# Patient Record
Sex: FEMALE | Race: BLACK OR AFRICAN AMERICAN
[De-identification: names, ages, dates, MRNs, and addresses within clinical notes are randomized per-mention and may not be internally consistent; named-entity substitution may affect disease eponyms.]

---

## 2017-01-17 ENCOUNTER — HOSPITAL ENCOUNTER (EMERGENCY)
Dept: HOSPITAL 17 - NEPE | Age: 52
Discharge: HOME | End: 2017-01-17
Payer: COMMERCIAL

## 2017-01-17 VITALS
RESPIRATION RATE: 18 BRPM | OXYGEN SATURATION: 98 % | SYSTOLIC BLOOD PRESSURE: 203 MMHG | DIASTOLIC BLOOD PRESSURE: 95 MMHG | HEART RATE: 99 BPM

## 2017-01-17 VITALS
DIASTOLIC BLOOD PRESSURE: 95 MMHG | OXYGEN SATURATION: 97 % | HEART RATE: 87 BPM | RESPIRATION RATE: 18 BRPM | TEMPERATURE: 98.7 F | SYSTOLIC BLOOD PRESSURE: 204 MMHG

## 2017-01-17 VITALS — BODY MASS INDEX: 19.49 KG/M2 | WEIGHT: 121.25 LBS | HEIGHT: 66 IN

## 2017-01-17 VITALS
TEMPERATURE: 98.8 F | SYSTOLIC BLOOD PRESSURE: 200 MMHG | DIASTOLIC BLOOD PRESSURE: 91 MMHG | HEART RATE: 86 BPM | OXYGEN SATURATION: 100 % | RESPIRATION RATE: 18 BRPM

## 2017-01-17 VITALS — SYSTOLIC BLOOD PRESSURE: 186 MMHG | DIASTOLIC BLOOD PRESSURE: 88 MMHG | TEMPERATURE: 98.6 F

## 2017-01-17 DIAGNOSIS — F17.210: ICD-10-CM

## 2017-01-17 DIAGNOSIS — R10.13: Primary | ICD-10-CM

## 2017-01-17 DIAGNOSIS — I25.2: ICD-10-CM

## 2017-01-17 DIAGNOSIS — I25.10: ICD-10-CM

## 2017-01-17 DIAGNOSIS — I10: ICD-10-CM

## 2017-01-17 DIAGNOSIS — Z79.4: ICD-10-CM

## 2017-01-17 DIAGNOSIS — E11.9: ICD-10-CM

## 2017-01-17 LAB
ALP SERPL-CCNC: 198 U/L (ref 45–117)
ALT SERPL-CCNC: 21 U/L (ref 10–53)
ANION GAP SERPL CALC-SCNC: 13 MEQ/L (ref 5–15)
AST SERPL-CCNC: 12 U/L (ref 15–37)
BASOPHILS # BLD AUTO: 0.1 TH/MM3 (ref 0–0.2)
BASOPHILS NFR BLD: 0.8 % (ref 0–2)
BILIRUB SERPL-MCNC: 0.3 MG/DL (ref 0.2–1)
BUN SERPL-MCNC: 17 MG/DL (ref 7–18)
CHLORIDE SERPL-SCNC: 103 MEQ/L (ref 98–107)
EOSINOPHIL # BLD: 0.1 TH/MM3 (ref 0–0.4)
EOSINOPHIL NFR BLD: 1.6 % (ref 0–4)
ERYTHROCYTE [DISTWIDTH] IN BLOOD BY AUTOMATED COUNT: 13.2 % (ref 11.6–17.2)
GFR SERPLBLD BASED ON 1.73 SQ M-ARVRAT: 31 ML/MIN (ref 89–?)
HCO3 BLD-SCNC: 20.3 MEQ/L (ref 21–32)
HCT VFR BLD CALC: 35.6 % (ref 35–46)
HEMO FLAGS: (no result)
LYMPHOCYTES # BLD AUTO: 1.3 TH/MM3 (ref 1–4.8)
LYMPHOCYTES NFR BLD AUTO: 15.9 % (ref 9–44)
MCH RBC QN AUTO: 31.7 PG (ref 27–34)
MCHC RBC AUTO-ENTMCNC: 33.6 % (ref 32–36)
MCV RBC AUTO: 94.1 FL (ref 80–100)
MONOCYTES NFR BLD: 5.1 % (ref 0–8)
NEUTROPHILS # BLD AUTO: 6.4 TH/MM3 (ref 1.8–7.7)
NEUTROPHILS NFR BLD AUTO: 76.6 % (ref 16–70)
PLATELET # BLD: 418 TH/MM3 (ref 150–450)
POTASSIUM SERPL-SCNC: 4.1 MEQ/L (ref 3.5–5.1)
RBC # BLD AUTO: 3.79 MIL/MM3 (ref 4–5.3)
SODIUM SERPL-SCNC: 136 MEQ/L (ref 136–145)
WBC # BLD AUTO: 8.4 TH/MM3 (ref 4–11)

## 2017-01-17 PROCEDURE — 96375 TX/PRO/DX INJ NEW DRUG ADDON: CPT

## 2017-01-17 PROCEDURE — 83690 ASSAY OF LIPASE: CPT

## 2017-01-17 PROCEDURE — 99284 EMERGENCY DEPT VISIT MOD MDM: CPT

## 2017-01-17 PROCEDURE — 85025 COMPLETE CBC W/AUTO DIFF WBC: CPT

## 2017-01-17 PROCEDURE — 96374 THER/PROPH/DIAG INJ IV PUSH: CPT

## 2017-01-17 PROCEDURE — 80053 COMPREHEN METABOLIC PANEL: CPT

## 2017-01-17 NOTE — PD
HPI


Chief Complaint:  Abdominal Pain


Time Seen by Provider:  07:40


Travel History


International Travel<30 days:  No


Contact w/Intl Traveler<30days:  No


Traveled to known affect area:  No





History of Present Illness


HPI


The 51-year-old woman who presents to the emergency department complaining of 

epigastric pain.  She has a history of gastritis and pancreatitis.  Ongoing for 

the past 5 days or so but started getting worse yesterday.  She getting nausea 

yesterday.  She has her stools normally she takes iron, has not noticed any 

change.  She takes baby aspirin daily, but no alcohol, no NSAIDs.  She is on 

omeprazole now that she has been taking regularly.  She didn't take her morning 

medicines today.  States she is a lot of trouble with epigastric pain but hasn'

t really been giving her trouble recently.





Abdominal surgical history includes cholecystectomy.





History


Past Medical History


*** Narrative Medical


Diabetes


CAD, history of stent, history of MI


Hypertension


hyperlipidemia


Influenza Vaccination:  Yes


Menopausal:  Yes


:  2


Para:  3





Social History


Alcohol Use:  Yes (use to abuse; quit 3 years ago. )


Tobacco Use:  Yes (0.5 ppd)





Allergies-Medications


(Allergen,Severity, Reaction):  


Coded Allergies:  


     Shellfish (Verified  Adverse Reaction, Mild, VOMITNG, 16)


Reported Meds & Prescriptions





Reported Meds & Active Scripts


Active


Reglan (Metoclopramide HCl) 10 Mg Tab 10 Mg PO TIDAC 10 Days


Zofran ODT (Ondansetron HCl) 4 Mg Tab 4 Mg PO Q6 PRN


     May substitute, non-ODT form


Prilosec 20 Mg Cap (Omeprazole) 20 Mg Capcr 40 Mg PO DAILY 30 Days


Reported


Folate 1 Mg Tab (Folic Acid) 1 Mg Tab 1 Mg PO DAILY


Ferrous Sulfate 324 Mg Tab 325 Mg PO BID


Lioresal (Baclofen) 10 Mg Tab 10 Mg PO TID PRN


Gabapentin 400 Mg Cap 400 Mg PO 5 TIMES A DAY


Lantus (Insulin Glargine) 100 Units/Ml Inj 26 Unit SC HS


Ultram (Tramadol HCl) 50 Mg Tab 50 Mg PO Q4H PRN


Vasotec (Enalapril Maleate) 20 Mg Tab 20 Mg PO BID


Hctz (Hydrochlorothiazide) 50 Mg Tab 50 Mg PO DAILY


     FOR SWELLING


Simvastatin 20 Mg Tab 20 Mg PO HS


Aspirin 81 Mg Tab 81 Mg PO DAILY


Norvasc (Amlodipine Besylate) 2.5 Mg Tab 5 Mg PO DAILY


Tenormin (Atenolol) 25 Mg Tab 25 Mg PO BID


Glucophage (Metformin HCl) 1,000 Mg Tab 1,000 Mg PO BID








Review of Systems


Except as stated in HPI:  all other systems reviewed are Neg





Physical Exam


Narrative


GENERAL: Well-appearing 51-year-old woman, no acute distress.


SKIN: Warm and dry.


HEAD: Atraumatic. Normocephalic. 


CARDIOVASCULAR: Regular rate and rhythm.  No murmur appreciated.


RESPIRATORY: No accessory muscle use. Clear to auscultation. Breath sounds 

equal bilaterally. 


GASTROINTESTINAL: Abdomen is flat and soft.  Moderate epigastric tenderness.  

No rebound or guarding.


MUSCULOSKELETAL: No obvious deformities. No clubbing.  No cyanosis.  No edema. 


NEUROLOGICAL: Awake and alert. No obvious cranial nerve deficits.  Motor 

grossly within normal limits. Normal speech.


PSYCHIATRIC: Appropriate mood and affect; insight and judgment normal.





Data


Data


Last Documented VS





Vital Signs








  Date Time  Temp Pulse Resp B/P Pulse Ox O2 Delivery O2 Flow Rate FiO2


 


17 07:30   18     


 


17 07:30 98.8 86  200/91 100 Room Air  








Orders





 Complete Blood Count With Diff (17 07:34)


Comprehensive Metabolic Panel (17 07:34)


Lipase (17 07:34)


Iv Access Insert/Monitor (17 07:34)


Sodium Chloride 0.9% Flush (Ns Flush) (17 07:45)


Sodium Chlor 0.9% 1000 Ml Inj (Ns 1000 M (17 08:03)


Famotidine Inj (Pepcid Inj) (17 08:15)


Al-Mag Hy-Si 40-40-4 Mg/Ml Liq (Mag-Al P (17 08:15)


Lidocaine 2% Viscous (Xylocaine 2% Visco (17 08:15)


Morphine Inj (Morphine Inj) (17 09:15)





Labs








 Laboratory Tests








Test 17





 07:55


 


White Blood Count 8.4 TH/MM3


 


Red Blood Count 3.79 MIL/MM3


 


Hemoglobin 12.0 GM/DL


 


Hematocrit 35.6 %


 


Mean Corpuscular Volume 94.1 FL


 


Mean Corpuscular Hemoglobin 31.7 PG


 


Mean Corpuscular Hemoglobin 33.6 %





Concent 


 


Red Cell Distribution Width 13.2 %


 


Platelet Count 418 TH/MM3


 


Mean Platelet Volume 7.5 FL


 


Neutrophils (%) (Auto) 76.6 %


 


Lymphocytes (%) (Auto) 15.9 %


 


Monocytes (%) (Auto) 5.1 %


 


Eosinophils (%) (Auto) 1.6 %


 


Basophils (%) (Auto) 0.8 %


 


Neutrophils # (Auto) 6.4 TH/MM3


 


Lymphocytes # (Auto) 1.3 TH/MM3


 


Monocytes # (Auto) 0.4 TH/MM3


 


Eosinophils # (Auto) 0.1 TH/MM3


 


Basophils # (Auto) 0.1 TH/MM3


 


CBC Comment DIFF FINAL 


 


Differential Comment  


 


Sodium Level 136 MEQ/L


 


Potassium Level 4.1 MEQ/L


 


Chloride Level 103 MEQ/L


 


Carbon Dioxide Level 20.3 MEQ/L


 


Anion Gap 13 MEQ/L


 


Blood Urea Nitrogen 17 MG/DL


 


Creatinine 2.05 MG/DL


 


Estimat Glomerular Filtration 31 ML/MIN





Rate 


 


Random Glucose 189 MG/DL


 


Calcium Level 9.2 MG/DL


 


Total Bilirubin 0.3 MG/DL


 


Aspartate Amino Transf 12 U/L





(AST/SGOT) 


 


Alanine Aminotransferase 21 U/L





(ALT/SGPT) 


 


Alkaline Phosphatase 198 U/L


 


Total Protein 8.3 GM/DL


 


Albumin 3.6 GM/DL


 


Lipase 137 U/L














[facility]


Medical Decision Making


Medical Screen Exam Complete:  Yes


Emergency Medical Condition:  Yes


Interpretation(s)


LABS:


CBC unremarkable.


CMP remarkable for creatinine elevation, consistent with baseline


Lipase normal


Differential Diagnosis


Gastritis, pancreatitis, peptic ulcer disease, other


Narrative Course


Medical decision making





INITIAL cause a 51-year-old woman presents emergent from with epigastric pain, 

history of the same.  Looks well.  Multiple previous ED visits for this.  

Suspect gastritis.  No clear precipitant.  We will check labs, supportive 

treatment.





Diagnosis





 Primary Impression:  


 Abdominal pain


 Qualified Code:  R10.13 - Epigastric pain





***Additional Instructions:


Continue omeprazole as prescribed.





Take sucralfate in addition as prescribed.





Follow-up with your primary doctor for further evaluation.


***Med/Other Pt SpecificInfo:  Prescription(s) given


Scripts


Sucralfate (Carafate)1 Gm Tab1 Gm PO QID  #120 TAB  Ref 0


   On empty stomach


   Prov:Edgardo Stewart MD         17 


Omeprazole 40 Mg Cap40 Mg PO DAILY  #30 CAP  Ref 0


   Prov:Edgardo Stewart MD         17


Disposition:  01 DISCHARGE HOME


Condition:  Stable








Edgardo Stewart MD 2017 08:02

## 2017-05-29 ENCOUNTER — HOSPITAL ENCOUNTER (INPATIENT)
Dept: HOSPITAL 17 - NEPE | Age: 52
LOS: 3 days | Discharge: HOME | DRG: 439 | End: 2017-06-01
Attending: HOSPITALIST | Admitting: HOSPITALIST
Payer: COMMERCIAL

## 2017-05-29 VITALS
OXYGEN SATURATION: 100 % | TEMPERATURE: 99.1 F | DIASTOLIC BLOOD PRESSURE: 84 MMHG | HEART RATE: 86 BPM | SYSTOLIC BLOOD PRESSURE: 175 MMHG | RESPIRATION RATE: 16 BRPM

## 2017-05-29 VITALS
OXYGEN SATURATION: 96 % | SYSTOLIC BLOOD PRESSURE: 160 MMHG | TEMPERATURE: 97.6 F | HEART RATE: 74 BPM | RESPIRATION RATE: 20 BRPM | DIASTOLIC BLOOD PRESSURE: 78 MMHG

## 2017-05-29 VITALS
HEART RATE: 83 BPM | SYSTOLIC BLOOD PRESSURE: 179 MMHG | RESPIRATION RATE: 18 BRPM | DIASTOLIC BLOOD PRESSURE: 81 MMHG | OXYGEN SATURATION: 100 %

## 2017-05-29 VITALS — HEIGHT: 66 IN | WEIGHT: 120.15 LBS | BODY MASS INDEX: 19.31 KG/M2

## 2017-05-29 VITALS — SYSTOLIC BLOOD PRESSURE: 166 MMHG | DIASTOLIC BLOOD PRESSURE: 72 MMHG

## 2017-05-29 VITALS
SYSTOLIC BLOOD PRESSURE: 152 MMHG | RESPIRATION RATE: 22 BRPM | HEART RATE: 88 BPM | DIASTOLIC BLOOD PRESSURE: 87 MMHG | TEMPERATURE: 98.1 F | OXYGEN SATURATION: 100 %

## 2017-05-29 VITALS
TEMPERATURE: 98.6 F | DIASTOLIC BLOOD PRESSURE: 93 MMHG | HEART RATE: 82 BPM | SYSTOLIC BLOOD PRESSURE: 168 MMHG | RESPIRATION RATE: 22 BRPM | OXYGEN SATURATION: 100 %

## 2017-05-29 VITALS
DIASTOLIC BLOOD PRESSURE: 84 MMHG | HEART RATE: 82 BPM | SYSTOLIC BLOOD PRESSURE: 179 MMHG | OXYGEN SATURATION: 100 % | RESPIRATION RATE: 18 BRPM

## 2017-05-29 DIAGNOSIS — K59.00: ICD-10-CM

## 2017-05-29 DIAGNOSIS — E78.5: ICD-10-CM

## 2017-05-29 DIAGNOSIS — K21.9: ICD-10-CM

## 2017-05-29 DIAGNOSIS — I25.2: ICD-10-CM

## 2017-05-29 DIAGNOSIS — N17.9: ICD-10-CM

## 2017-05-29 DIAGNOSIS — E11.22: ICD-10-CM

## 2017-05-29 DIAGNOSIS — Z83.3: ICD-10-CM

## 2017-05-29 DIAGNOSIS — Z82.49: ICD-10-CM

## 2017-05-29 DIAGNOSIS — Z95.5: ICD-10-CM

## 2017-05-29 DIAGNOSIS — E11.9: ICD-10-CM

## 2017-05-29 DIAGNOSIS — N18.4: ICD-10-CM

## 2017-05-29 DIAGNOSIS — K31.84: ICD-10-CM

## 2017-05-29 DIAGNOSIS — Z72.0: ICD-10-CM

## 2017-05-29 DIAGNOSIS — I25.10: ICD-10-CM

## 2017-05-29 DIAGNOSIS — K85.90: Primary | ICD-10-CM

## 2017-05-29 DIAGNOSIS — I12.9: ICD-10-CM

## 2017-05-29 DIAGNOSIS — G62.9: ICD-10-CM

## 2017-05-29 LAB
ALP SERPL-CCNC: 176 U/L (ref 45–117)
ALT SERPL-CCNC: 24 U/L (ref 10–53)
ANION GAP SERPL CALC-SCNC: 8 MEQ/L (ref 5–15)
APTT BLD: 23.6 SEC (ref 24.3–30.1)
AST SERPL-CCNC: 14 U/L (ref 15–37)
BASOPHILS # BLD AUTO: 0.1 TH/MM3 (ref 0–0.2)
BASOPHILS NFR BLD: 0.7 % (ref 0–2)
BILIRUB SERPL-MCNC: 0.2 MG/DL (ref 0.2–1)
BUN SERPL-MCNC: 30 MG/DL (ref 7–18)
CHLORIDE SERPL-SCNC: 108 MEQ/L (ref 98–107)
COLOR UR: (no result)
COMMENT (UR): (no result)
CULTURE IF INDICATED: (no result)
EOSINOPHIL # BLD: 0.2 TH/MM3 (ref 0–0.4)
EOSINOPHIL NFR BLD: 2.2 % (ref 0–4)
ERYTHROCYTE [DISTWIDTH] IN BLOOD BY AUTOMATED COUNT: 13.6 % (ref 11.6–17.2)
GFR SERPLBLD BASED ON 1.73 SQ M-ARVRAT: 28 ML/MIN (ref 89–?)
GLUCOSE UR STRIP-MCNC: (no result) MG/DL
HCO3 BLD-SCNC: 22.9 MEQ/L (ref 21–32)
HCT VFR BLD CALC: 31.2 % (ref 35–46)
HEMO FLAGS: (no result)
HGB UR QL STRIP: (no result)
INR PPP: 0.9 RATIO
KETONES UR STRIP-MCNC: (no result) MG/DL
LYMPHOCYTES # BLD AUTO: 1.5 TH/MM3 (ref 1–4.8)
LYMPHOCYTES NFR BLD AUTO: 18.5 % (ref 9–44)
MAGNESIUM SERPL-MCNC: 2.3 MG/DL (ref 1.5–2.5)
MCH RBC QN AUTO: 31.5 PG (ref 27–34)
MCHC RBC AUTO-ENTMCNC: 33.8 % (ref 32–36)
MCV RBC AUTO: 93 FL (ref 80–100)
MONOCYTES NFR BLD: 8.3 % (ref 0–8)
NEUTROPHILS # BLD AUTO: 5.6 TH/MM3 (ref 1.8–7.7)
NEUTROPHILS NFR BLD AUTO: 70.3 % (ref 16–70)
NITRITE UR QL STRIP: (no result)
PLATELET # BLD: 390 TH/MM3 (ref 150–450)
POTASSIUM SERPL-SCNC: 4.4 MEQ/L (ref 3.5–5.1)
PROTHROMBIN TIME: 9.6 SEC (ref 9.8–11.6)
RBC # BLD AUTO: 3.35 MIL/MM3 (ref 4–5.3)
SODIUM SERPL-SCNC: 139 MEQ/L (ref 136–145)
SP GR UR STRIP: 1.01 (ref 1–1.03)
WBC # BLD AUTO: 8 TH/MM3 (ref 4–11)

## 2017-05-29 PROCEDURE — 83690 ASSAY OF LIPASE: CPT

## 2017-05-29 PROCEDURE — 96374 THER/PROPH/DIAG INJ IV PUSH: CPT

## 2017-05-29 PROCEDURE — 80076 HEPATIC FUNCTION PANEL: CPT

## 2017-05-29 PROCEDURE — 82787 IGG 1 2 3 OR 4 EACH: CPT

## 2017-05-29 PROCEDURE — 80061 LIPID PANEL: CPT

## 2017-05-29 PROCEDURE — 83735 ASSAY OF MAGNESIUM: CPT

## 2017-05-29 PROCEDURE — 74176 CT ABD & PELVIS W/O CONTRAST: CPT

## 2017-05-29 PROCEDURE — 85610 PROTHROMBIN TIME: CPT

## 2017-05-29 PROCEDURE — 71010: CPT

## 2017-05-29 PROCEDURE — 85025 COMPLETE CBC W/AUTO DIFF WBC: CPT

## 2017-05-29 PROCEDURE — 82948 REAGENT STRIP/BLOOD GLUCOSE: CPT

## 2017-05-29 PROCEDURE — 96375 TX/PRO/DX INJ NEW DRUG ADDON: CPT

## 2017-05-29 PROCEDURE — 80048 BASIC METABOLIC PNL TOTAL CA: CPT

## 2017-05-29 PROCEDURE — 83605 ASSAY OF LACTIC ACID: CPT

## 2017-05-29 PROCEDURE — 80053 COMPREHEN METABOLIC PANEL: CPT

## 2017-05-29 PROCEDURE — 82784 ASSAY IGA/IGD/IGG/IGM EACH: CPT

## 2017-05-29 PROCEDURE — 83036 HEMOGLOBIN GLYCOSYLATED A1C: CPT

## 2017-05-29 PROCEDURE — 85730 THROMBOPLASTIN TIME PARTIAL: CPT

## 2017-05-29 PROCEDURE — 81001 URINALYSIS AUTO W/SCOPE: CPT

## 2017-05-29 PROCEDURE — 96361 HYDRATE IV INFUSION ADD-ON: CPT

## 2017-05-29 PROCEDURE — 86140 C-REACTIVE PROTEIN: CPT

## 2017-05-29 RX ADMIN — ASPIRIN 81 MG SCH MG: 81 TABLET ORAL at 09:00

## 2017-05-29 RX ADMIN — FERROUS SULFATE TAB 325 MG (65 MG ELEMENTAL FE) SCH MG: 325 (65 FE) TAB at 09:00

## 2017-05-29 RX ADMIN — BACLOFEN SCH MG: 10 TABLET ORAL at 13:00

## 2017-05-29 RX ADMIN — ENALAPRIL MALEATE SCH MG: 10 TABLET ORAL at 09:00

## 2017-05-29 RX ADMIN — GABAPENTIN SCH MG: 300 CAPSULE ORAL at 21:25

## 2017-05-29 RX ADMIN — PHENYTOIN SODIUM SCH MLS/HR: 50 INJECTION INTRAMUSCULAR; INTRAVENOUS at 21:27

## 2017-05-29 RX ADMIN — MORPHINE SULFATE PRN MG: 2 INJECTION, SOLUTION INTRAMUSCULAR; INTRAVENOUS at 15:03

## 2017-05-29 RX ADMIN — ENALAPRIL MALEATE SCH MG: 10 TABLET ORAL at 21:26

## 2017-05-29 RX ADMIN — BACLOFEN SCH MG: 10 TABLET ORAL at 09:00

## 2017-05-29 RX ADMIN — FERROUS SULFATE TAB 325 MG (65 MG ELEMENTAL FE) SCH MG: 325 (65 FE) TAB at 21:26

## 2017-05-29 RX ADMIN — INSULIN ASPART SCH: 100 INJECTION, SOLUTION INTRAVENOUS; SUBCUTANEOUS at 18:00

## 2017-05-29 RX ADMIN — ATENOLOL SCH MG: 25 TABLET ORAL at 09:00

## 2017-05-29 RX ADMIN — SODIUM CHLORIDE PRN ML: 234 INJECTION INTRAMUSCULAR; INTRAVENOUS; SUBCUTANEOUS at 18:20

## 2017-05-29 RX ADMIN — FOLIC ACID SCH MG: 1 TABLET ORAL at 09:00

## 2017-05-29 RX ADMIN — INSULIN ASPART SCH: 100 INJECTION, SOLUTION INTRAVENOUS; SUBCUTANEOUS at 11:52

## 2017-05-29 RX ADMIN — ONDANSETRON PRN MG: 2 INJECTION, SOLUTION INTRAMUSCULAR; INTRAVENOUS at 15:03

## 2017-05-29 RX ADMIN — PANTOPRAZOLE SCH MG: 40 TABLET, DELAYED RELEASE ORAL at 09:00

## 2017-05-29 RX ADMIN — INSULIN ASPART SCH: 100 INJECTION, SOLUTION INTRAVENOUS; SUBCUTANEOUS at 20:14

## 2017-05-29 RX ADMIN — PRAVASTATIN SODIUM SCH MG: 40 TABLET ORAL at 21:00

## 2017-05-29 RX ADMIN — MORPHINE SULFATE PRN MG: 2 INJECTION, SOLUTION INTRAMUSCULAR; INTRAVENOUS at 21:26

## 2017-05-29 RX ADMIN — PHENYTOIN SODIUM SCH MLS/HR: 50 INJECTION INTRAMUSCULAR; INTRAVENOUS at 07:34

## 2017-05-29 RX ADMIN — BACLOFEN SCH MG: 10 TABLET ORAL at 18:00

## 2017-05-29 NOTE — PD.PN.STU
Subjective


Remarks


The patient is a 52 year old female with PMH diabetes, pancreatitis, HTN, HPL, 

gastroparesis who presented to the ED 5/28 with epigastric abdominal pain. The 

pain began Thursday night and progressively worsened. The character is 

squeezing with intermittent jabbing. It is constant. She has had similar pain 

in the past and was diagnosed with pancreatitis, gastritis, and gastroparesis. 

She denies drinking any alcohol. She has nausea without vomiting. She denies 

diarrhea. The pain is in the midepigastric area and goes around the right side 

of the abdomen to the back. Denies alleviating factors. Eating aggravates the 

pain. Normally has loose stools but last BM 5/27 was hard and had been 

constipated recently. She had to take a laxative. No blood in the stool.  





Surgery Hx: oral abscess drained, cholecystectomy, 1x stent


Family Hx: Mom: diabetes     Father: HTN


Social Hx: 1/2 ppd 30 yrs, denies alcohol use, smokes marijuana occasionally


Allergies: Shellfish





Objective


Vitals


General: The patient is a well-developed well-nourished female, uncomfortable 

appearing on my arrival to the room, holding the midepigastric area. 


Head and Neck exam: Head is normocephalic atraumatic. 


Eyes: EOMI, pupils are equal round and reactive to light. 


Nose: Midline septum with pink mucous membranes 


Mouth: Dentition unremarkable. Moist mucus membranes. Posterior oropharynx is 

not erythematous. No tonsillar hypertrophy. Uvula midline. Airway patent. 


Neck: No palpable lymphadenopathy. No nuchal rigidity. No thyromegaly. 


Cardiovascular: Regular rate and rhythm without murmurs, gallops, or rubs. 


Lungs: Clear to auscultation bilaterally. No wheezes, rhonchi, or rales.


 Abdomen:Soft, with tenderness on palpation in the midepigastric area and right 

upper quadrant of the abdomen. No guarding, rebound, or rigidity.  No 

tenderness on palpation of McBurney's point.


Extremities: No clubbing, cyanosis, or edema. 2+ pulses in all 4 extremities.  

No calf tenderness on palpation.











 Vital Signs








  Date Time  Temp Pulse Resp B/P Pulse Ox O2 Delivery O2 Flow Rate FiO2


 


5/29/17 08:20  81 19 166/72 99   


 


5/29/17 07:00     100 Room Air  


 


5/29/17 07:00  82 18 179/84 100 Room Air  


 


5/29/17 04:38  83 18 179/81 100 Room Air  


 


5/29/17 04:10 99.1 86 16 175/84 100 Room Air  








Result Diagram:  


5/29/17 0445                                                                   

             5/29/17 0445








A/P


Assessment and Plan


Acute pancreatitis:


NPO


IV fluids


Lipase levels 8074





Stage 4 CKD:


IV fluids


Hold Metformin


Pt needs to be on ACE-inhibitor at discharge





Type 2 diabetes:


Pt reports glucose levels in the 300s recently


Order HbA1c


D/C Metformin as GFR <35





Hypertension:


continue to monitor





Hyperlipidemia:


order lipid panel





Tobacco use:


Discussed cessation








Karel Reese M3 May 29, 2017 10:41

## 2017-05-29 NOTE — HHI.HP
HPI


Service


[facility]


Primary Care Physician


Jamie Hill MD


Admission Diagnosis


Acute Pancreatitis


Chief Complaint:  


abdomen pain


Travel History


International Travel<30 Days:  No


Contact w/Intl Traveler <30 Da:  No


Traveled to Known Affected Are:  No


History of Present Illness





Pt is 53 yo with recurrent pancreatitis, gastroparesis, cad, htn who presents 

with midepigastric and ruq abdomen pain over past3 or 4 days. Noted some 

vomiting as well. Feels some episodic


spasms sensation and does note constipation in recent days. took dulcolax with 

some hard stools. In ED noted to have acute pancreatitis In past has had 

multiple egd/colonoscopies and EUS


eval of pancreas. Also was given reglan with symptomatic improvment of GP in 

past but stopped and says her gastroenterologist told her to do so.











Review of Systems


Other


epigastric/ruq pain


constipation


vomiting.





Past Family Social History


Past Medical History


Hypertension, essential


Diabetes


GERD, 


CAD, hx of MI in  s/p PTCA with stent placement


Hyperlipidemia


Multiple hospitalizations for recurrent abdominal pain


History of pancreatitis


History of alcohol abuse


Uterine fibroids


Cholecystectomy


Tubal ligation, bilateral


PTCA with stent placement approximately 8 years ago


Cataract surgery


Colonoscopy 14 with Dr. Granados --> 2 sessile polyps of approximately 4 

mm were found in the sigmoid colon, internal and external hemorrhoids.  

Pathology did not show malignancy. 


EGD performed 3/27/14 by Dr. Hurtado --> Normal esophagus, some nodularity in 

the body and antrum of the stomach, mild gastritis.  Pathology did not show 

malignancy


EUS performed by Dr. Wade Uriostegui 14 --> Pancreatic parenchyma was 

isoechoic and homogeneous, common bile duct was normal.


Reported Medications





Tramadol (Tramadol HCl) 50 Mg Tab 50 Mg PO Q4H PRN


Simvastatin 20 Mg Tab 20 Mg PO HS


Omeprazole 40 Mg Cap 40 Mg PO DAILY


Norvasc (Amlodipine Besylate) 5 Mg Tab 5 Mg PO DAILY


Metformin (Metformin HCl) 1,000 Mg Tab 1,000 Mg PO BIDPC


     With meals


Lantus Inj (Insulin Glargine) 1,000 Unit/10 Ml Vial 26 Units SQ HS


Hydrochlorothiazide 50 Mg Tab 50 Mg PO DAILY


Gabapentin 300mg hs


Folic Acid 400 Mcg Tab 1 Mg PO DAILY


Feosol (Ferrous Sulfate) 200 Mg Tab 325 Mg PO BID


Vasotec (Enalapril Maleate) 20 Mg Tab 20 Mg PO BID


Atenolol 25 Mg Tab 25 Mg PO DAILY


Aspirin Children's (Aspirin) 81 Mg Chew 81 Mg CHEW DAILY


Allergies:  


Coded Allergies:  


     Shellfish (Verified  Adverse Reaction, Mild, VOMITNG, 17)


Family History


Father  81, prostate cancer


Mother  84, DM


1 living brother 62, DM


Brother , 53, lung CA


Sister  54, lung CA


Social History





Pt works as and LPN for [employer]


(+)Tobacco use, one half pack per day 30 years


Hx of alcohol use, former heavy alcohol use, the patient currently denies. Pt 

stopped etoh in 


Patient denies illicit street drugs





Physical Exam


Vital Signs


nad


heart reg


lung cta


abd epigastric/ruq tenderness. bs


ext no edema


 Vital Signs








  Date Time  Temp Pulse Resp B/P Pulse Ox O2 Delivery O2 Flow Rate FiO2


 


17 12:00 97.6 74 20 160/78 96   


 


17 08:20  81 19 166/72 99   


 


17 07:00     100 Room Air  


 


17 07:00  82 18 179/84 100 Room Air  


 


17 04:38  83 18 179/81 100 Room Air  


 


17 04:10 99.1 86 16 175/84 100 Room Air  








Laboratory





Laboratory Tests








Test 17





 04:45


 


White Blood Count 8.0 


 


Red Blood Count 3.35 


 


Hemoglobin 10.6 


 


Hematocrit 31.2 


 


Mean Corpuscular Volume 93.0 


 


Mean Corpuscular Hemoglobin 31.5 


 


Mean Corpuscular Hemoglobin 33.8 





Concent 


 


Red Cell Distribution Width 13.6 


 


Platelet Count 390 


 


Mean Platelet Volume 8.3 


 


Neutrophils (%) (Auto) 70.3 


 


Lymphocytes (%) (Auto) 18.5 


 


Monocytes (%) (Auto) 8.3 


 


Eosinophils (%) (Auto) 2.2 


 


Basophils (%) (Auto) 0.7 


 


Neutrophils # (Auto) 5.6 


 


Lymphocytes # (Auto) 1.5 


 


Monocytes # (Auto) 0.7 


 


Eosinophils # (Auto) 0.2 


 


Basophils # (Auto) 0.1 


 


CBC Comment DIFF FINAL 


 


Differential Comment  


 


Prothrombin Time 9.6 


 


Prothromb Time International 0.9 





Ratio 


 


Activated Partial 23.6 





Thromboplast Time 


 


Sodium Level 139 


 


Potassium Level 4.4 


 


Chloride Level 108 


 


Carbon Dioxide Level 22.9 


 


Anion Gap 8 


 


Blood Urea Nitrogen 30 


 


Creatinine 2.23 


 


Estimat Glomerular Filtration 28 





Rate 


 


Random Glucose 187 


 


Lactic Acid Level 1.0 


 


Calcium Level 9.1 


 


Magnesium Level 2.3 


 


Total Bilirubin 0.2 


 


Aspartate Amino Transf 14 





(AST/SGOT) 


 


Alanine Aminotransferase 24 





(ALT/SGPT) 


 


Alkaline Phosphatase 176 


 


C-Reactive Protein LESS THAN 0.29 


 


Total Protein 7.4 


 


Albumin 3.1 


 


Lipase 8074 








Result Diagram:  


17








Assessment and Plan


Problem List:  


(1) Acute pancreatitis


Status:  Acute


Plan:  Pt presents with recurrent acute pancreatitis of unclear etiology


apparent a/ckd





npo except meds


ivf


pain meds


check lipids/IgG levels


GI consult as needed


hold metformin and clarify


  outpt cr level. needs better


  control. resume insulin when


   taking po. ssi.





(2) Diabetes


Status:  Chronic


Plan:  see above





(3) HTN (hypertension)


Status:  Chronic


Plan:  see above





(4) CAD (coronary artery disease)


Status:  Chronic


Plan:  home meds





(5) Acute kidney injury superimposed on CKD


Status:  Acute


Plan:  see above








Physician Certification


2 Midnight Certification Type:  Admission for Inpatient Services


Order for Inpatient Services


3The services are ordered in accordance with Medicare regulations or non-

Medicare payer requirements, as applicable.  In the case of services not 

specified as inpatient-only, they are appropriately provided as inpatient 

services in accordance with the 2-midnight benchmark.


Estimated LOS (days):  3


3 days is the estimated time the patient will need to remain in the hospital, 

assuming treatment plan goals are met and no additional complications.


Post-Hospital Plan:  Home





Problem Qualifiers





(1) Acute pancreatitis:  


Qualified Code:  K85.90 - Acute pancreatitis without infection or necrosis, 

unspecified pancreatitis type





Vik Jasmine MD May 29, 2017 14:07

## 2017-05-29 NOTE — PD
HPI


Chief Complaint:  Abdominal Pain


Time Seen by Provider:  04:51


Travel History


International Travel<30 days:  No


Contact w/Intl Traveler<30days:  No


Traveled to known affect area:  No





History of Present Illness


HPI


The patient is a 52 year old female who presents to the [facility] 

emergency department with a history of abdominal pain that began on Thursday or 

Friday. The character is squeezing with intermittent jabbing. It is constant. 

She has had similar pain in the past and was diagnosed with pancreatitis, 

gastritis, and gastroparesis. Her GI Doctor is Dr. Granados. She last saw him 2 

months ago. She denies drinking any alcohol. She has nausea without vomiting. 

She denies diarrhea. The pain is in the midepigastric area. It is not made 

better with anything. It is made worse with eating. Her last BM was 2 days ago 

and was slightly hard. No blood in the stool.  


The patient denies any recent fevers, cough, congestion, neck pain, chest pain, 

shortness of breath, urinary symptoms, or neurologic symptoms.





[facility]


Past Medical History


*** Narrative Medical


The patient's past medical history is significant for pancreatitis, gastritis, 

and gastroparesis, DM, hypertension, hyperlipidemia, CAD with one stent placed, 

chronic back pain. PCP: Liz.


Hx Anticoagulant Therapy:  No


Arthritis:  Yes


Blood Disorders:  No


Anxiety:  No


Depression:  No


Cancer:  No


Cardiac Catheterization:  Yes


Cardiovascular Problems:  Yes


High Cholesterol:  Yes


Chemotherapy:  No


Chest Pain:  Yes


Congestive Heart Failure:  No


Cerebrovascular Accident:  No


Coronary Artery Disease:  Yes


Diabetes:  Yes


Patient Takes Glucophage:  Yes


Diminished Hearing:  No


Endocrine:  Yes


Gastrointestinal Disorders:  Yes (HX PANCREATITIS)


GERD:  Yes


Genitourinary:  Yes (BLOCKAGE IN URETER, STENTED BUT REMOVED)


Hypertension:  Yes


Immune Disorder:  No


Implanted Vascular Access Dvce:  Yes


Musculoskeletal:  No


Neurologic:  No


Psychiatric:  No


Reproductive:  Yes (PT STATES FIBROIDS)


Respiratory:  No


Migraines:  Yes


Pancreatitis:  Yes


Pneumonia:  Yes (CHILDHOOD)


Thyroid Disease:  No


Tetanus Vaccination:  Unknown


Influenza Vaccination:  Yes


Pregnant?:  Not Pregnant


Menopausal:  Yes


:  2


Para:  3


Miscarriage:  0


:  0


Tubal Ligation:  Yes





Past Surgical History


*** Narrative Surgical


The patient's past surgical history is significant for BTL, cholecystectomy, 

cardiac cath with stent.


Abdominal Surgery:  Yes


Cardiac Surgery:  Yes


Cholecystectomy:  Yes


Coronary Stent:  Yes


Ear Surgery:  No


Endocrine Surgery:  No


Eye Surgery:  No


Genitourinary Surgery:  Yes


Gynecologic Surgery:  Yes (TUBAL)


Hysterectomy:  No


Neurologic Surgery:  No


Oral Surgery:  Yes


Pacemaker:  No


Thoracic Surgery:  No


Other Surgery:  Yes





Social History


Alcohol Use:  Yes (use to abuse; quit 3 years ago. )


Tobacco Use:  Yes (0.5 ppd)


Substance Use:  Yes (thc )





Allergies-Medications


(Allergen,Severity, Reaction):  


Coded Allergies:  


     Shellfish (Verified  Adverse Reaction, Mild, VOMITNG, 17)


Reported Meds & Prescriptions





Reported Meds & Active Scripts


Active


Reported


Tramadol (Tramadol HCl) 50 Mg Tab 50 Mg PO Q4H PRN


Simvastatin 20 Mg Tab 20 Mg PO HS


Omeprazole 40 Mg Cap 40 Mg PO DAILY


Norvasc (Amlodipine Besylate) 5 Mg Tab 5 Mg PO DAILY


Reglan (Metoclopramide HCl) 10 Mg Tab 10 Mg PO TID


Metformin (Metformin HCl) 1,000 Mg Tab 1,000 Mg PO BIDPC


     With meals


Lantus Inj (Insulin Glargine) 1,000 Unit/10 Ml Vial 26 Units SQ HS


Hydrochlorothiazide 50 Mg Tab 50 Mg PO DAILY


Gabapentin 400 Mg Cap 400 Cap PO QID


Folic Acid 400 Mcg Tab 1 Mg PO DAILY


Feosol (Ferrous Sulfate) 200 Mg Tab 325 Mg PO BID


Vasotec (Enalapril Maleate) 20 Mg Tab 20 Mg PO BID


Baclofen 10 Mg Tab 10 Mg PO TID


Atenolol 25 Mg Tab 25 Mg PO DAILY


Aspirin Children's (Aspirin) 81 Mg Chew 81 Mg CHEW DAILY








Review of Systems


Except as stated in HPI:  all other systems reviewed are Neg


General / Constitutional:  No: Fever


Eyes:  No: Visual changes


HENT:  No: Headaches


Cardiovascular:  No: Chest Pain or Discomfort


Respiratory:  No: Shortness of Breath


Gastrointestinal:  Positive: Nausea, Abdominal Pain, Constipation, Indigestion, 

Loss of Appetite,  No: Vomiting, Diarrhea, Changes in Bowel Habits


Genitourinary:  No: Dysuria


Musculoskeletal:  No: Pain


Skin:  No Rash


Neurologic:  No: Weakness


Psychiatric:  No: Depression


Endocrine:  No: Polydipsia


Hematologic/Lymphatic:  No: Easy Bruising





Physical Exam


Narrative


General: 


The patient is a well-developed well-nourished female, uncomfortable appearing 

on my arrival to the room, holding the midepigastric area. 





Head and Neck exam: 


Head is normocephalic atraumatic. 


Eyes: EOMI, pupils are equal round and reactive to light. 


Nose: Midline septum with pink mucous membranes 


Mouth: Dentition unremarkable. Moist mucus membranes. Posterior oropharynx is 

not erythematous. No tonsillar hypertrophy. Uvula midline. Airway patent. 


Neck: No palpable lymphadenopathy. No nuchal rigidity. No thyromegaly. 





Cardiovascular: 


Regular rate and rhythm without murmurs, gallops, or rubs. 





Lungs: 


Clear to auscultation bilaterally. No wheezes, rhonchi, or rales.


 


Abdomen:


Soft, with tenderness on palpation in the midepigastric area and right upper 

quadrant of the abdomen. No guarding, rebound, or rigidity.  No tenderness on 

palpation of McBurney's point.  The patient has a positive Ayala sign.





Extremities: 


No clubbing, cyanosis, or edema. 2+ pulses in all 4 extremities.  No calf 

tenderness on palpation.





Back: 


No spinous process tenderness to palpation.  Right-sided CVA tenderness on 

palpation.





Neurologic Exam: Grossly nonfocal.





Skin Exam: No rash noted. Intact skin that is warm and dry.





Data


Data


Last Documented VS





Vital Signs








  Date Time  Temp Pulse Resp B/P Pulse Ox O2 Delivery O2 Flow Rate FiO2


 


17 07:00     100 Room Air  


 


17 07:00  82 18 179/84    


 


17 04:10 99.1       








Orders





 Complete Blood Count With Diff (17 04:52)


Comprehensive Metabolic Panel (17 04:52)


Prothrombin Time / Inr (Pt) (17 04:52)


Act Partial Throm Time (Ptt) (17 04:52)


C-Reactive Protein (Crp) (17 04:52)


Lipase (17 04:52)


Urinalysis - C+S If Indicated (17 04:52)


Magnesium (Mg) (17 04:52)


Chest, Single Ap (17 04:52)


Iv Access Insert/Monitor (17 04:52)


Ecg Monitoring (17 04:52)


Oximetry (17 04:52)


Lactic Acid (17 04:52)


Sodium Chlor 0.9% 1000 Ml Inj (Ns 1000 M (17 05:15)


Ondansetron Inj (Zofran Inj) (17 05:15)


Morphine Inj (Morphine Inj) (17 05:15)


Ct Abd/Pel W/O Iv Contrast (17 04:52)


Admit Order (Ed Use Only) (17 07:08)





Labs





 Laboratory Tests








Test 17





 04:45


 


White Blood Count 8.0 TH/MM3


 


Red Blood Count 3.35 MIL/MM3


 


Hemoglobin 10.6 GM/DL


 


Hematocrit 31.2 %


 


Mean Corpuscular Volume 93.0 FL


 


Mean Corpuscular Hemoglobin 31.5 PG


 


Mean Corpuscular Hemoglobin 33.8 %





Concent 


 


Red Cell Distribution Width 13.6 %


 


Platelet Count 390 TH/MM3


 


Mean Platelet Volume 8.3 FL


 


Neutrophils (%) (Auto) 70.3 %


 


Lymphocytes (%) (Auto) 18.5 %


 


Monocytes (%) (Auto) 8.3 %


 


Eosinophils (%) (Auto) 2.2 %


 


Basophils (%) (Auto) 0.7 %


 


Neutrophils # (Auto) 5.6 TH/MM3


 


Lymphocytes # (Auto) 1.5 TH/MM3


 


Monocytes # (Auto) 0.7 TH/MM3


 


Eosinophils # (Auto) 0.2 TH/MM3


 


Basophils # (Auto) 0.1 TH/MM3


 


CBC Comment DIFF FINAL 


 


Differential Comment  


 


Prothrombin Time 9.6 SEC


 


Prothromb Time International 0.9 RATIO





Ratio 


 


Activated Partial 23.6 SEC





Thromboplast Time 


 


Sodium Level 139 MEQ/L


 


Potassium Level 4.4 MEQ/L


 


Chloride Level 108 MEQ/L


 


Carbon Dioxide Level 22.9 MEQ/L


 


Anion Gap 8 MEQ/L


 


Blood Urea Nitrogen 30 MG/DL


 


Creatinine 2.23 MG/DL


 


Estimat Glomerular Filtration 28 ML/MIN





Rate 


 


Random Glucose 187 MG/DL


 


Lactic Acid Level 1.0 mmol/L


 


Calcium Level 9.1 MG/DL


 


Magnesium Level 2.3 MG/DL


 


Total Bilirubin 0.2 MG/DL


 


Aspartate Amino Transf 14 U/L





(AST/SGOT) 


 


Alanine Aminotransferase 24 U/L





(ALT/SGPT) 


 


Alkaline Phosphatase 176 U/L


 


C-Reactive Protein LESS THAN 0.29





 MG/DL


 


Total Protein 7.4 GM/DL


 


Albumin 3.1 GM/DL


 


Lipase 8074 U/L











[facility]


Medical Decision Making


Medical Screen Exam Complete:  Yes


Emergency Medical Condition:  Yes


Medical Record Reviewed:  Yes


Interpretation(s)





Last Impressions








Chest X-Ray 172 Signed





Impressions: 





 Service Date/Time:  Monday, May 29, 2017 05:50 - CONCLUSION: No acute disease.

   





     Severo Jean MD 


 


Abdomen/Pelvis  Signed





Impressions: 





 Service Date/Time:  Monday, May 29, 2017 06:16 - CONCLUSION:  Negative 





 noncontrast CT evaluation of the abdomen and pelvis.     Severo Jean MD 








Differential Diagnosis


Acute pancreatitis, versus peptic ulcer disease, versus gastritis, versus viral 

syndrome, versus acid reflux, versus bowel obstruction


Narrative Course


During the course of the patients emergency department visit, the patients 

history, examination, and differential diagnosis were reviewed with the 

patient. The patient had  IV access obtained and blood work sent for analysis.  

The patient was placed on a cardiac monitor with oximetry and blood pressure 

monitoring.  A chest x-ray to rule out free air was ordered, CT scan of the 

abdomen and pelvis was ordered.





The patient was initially provided normal saline 1 L IV fluid bolus, morphine 4 

mg IV for pain, Zofran 4 mg IV for nausea. 





The patients laboratory studies were reviewed and remarkable for an elevated 

lipase at 8074, CMP is remarkable for a chloride of 108, BUN 30, creatinine 2.23

, glucose 187 with AST 14, , C-reactive protein less than 0.29, PT 9.6, 

PTT 23.6.





Radiology studies were reviewed and remarkable for CT scan of the abdomen and 

pelvis showed no acute abnormality.  Chest x-ray showed no acute abnormality.





The patient will be admitted to the hospital for acute pancreatitis.





The patients results were discussed with the patient, including the plan of 

care. I explained that further testing and/ or monitoring is indicated based on 

the patients history, examination, and/ or laboratory findings. Therefore, I 

recommended admission for additional evaluation. The patient expressed 

understanding and was agreeable with this plan. The patient was admitted to the 

hospital in stable condition and sent to a bed under the care of the [facility] service.





Physician Communication


Physician Communication


The patient's case was discussed with Dr. Jasmine who did agree to admit 

the patient for further evaluation and treatment at this time.





Diagnosis





 Primary Impression:  


 Abdominal pain


 Qualified Code:  R10.10 - Pain of upper abdomen


 Additional Impression:  


 Acute pancreatitis


 Qualified Code:  K85.90 - Acute pancreatitis without infection or necrosis, 

unspecified pancreatitis type





Admitting Information


Admitting Physician Requests:  Admit








Lea Cuevas MD May 29, 2017 05:04

## 2017-05-29 NOTE — RADRPT
EXAM DATE/TIME:  05/29/2017 05:50 

 

[facility] COMPARISON:     

CHEST SINGLE AP, July 31, 2016, 18:38.

 

                     

INDICATIONS :     

Short of breath.

                     

 

MEDICAL HISTORY :     

Cardiovascular disease.  Hypertension  Diabetes mellitus type II.   Pancreatitis.   

 

SURGICAL HISTORY :     

Cholecystectomy. Tubal ligation.  

 

ENCOUNTER:     

Initial                                        

 

ACUITY:     

1 day      

 

PAIN SCORE:     

1/10

 

LOCATION:     

Bilateral chest 

 

FINDINGS:     

A single view of the chest demonstrates the lungs to be symmetrically aerated without evidence of mas
s, infiltrate or effusion.  The cardiomediastinal contours are unremarkable.  Osseous structures are 
intact.

 

CONCLUSION:     No acute disease.  

 

 

 

 Severo Jean MD on May 29, 2017 at 6:36           

Board Certified Radiologist.

 This report was verified electronically.

## 2017-05-29 NOTE — RADRPT
EXAM DATE/TIME:  05/29/2017 06:16 

 

[facility] COMPARISON:     

No previous studies available for comparison.

 

 

INDICATIONS :     

Epigastric pain X 3 days.

                  

 

ORAL CONTRAST:      

No oral contrast ingested.

                  

 

RADIATION DOSE:     

5.12 CTDIvol (mGy) 

 

 

MEDICAL HISTORY :     

Pancreatitis. Cardiovascular disease Myocardial infarction.Hypertension

 

SURGICAL HISTORY :      

Cholecystectomy. Tubal ligation.

 

ENCOUNTER:      

Initial

 

ACUITY:      

3 days

 

PAIN SCALE:      

7/10

 

LOCATION:         

abdomen

 

TECHNIQUE:     

Volumetric scanning of the abdomen and pelvis was performed.  Using automated exposure control and ad
justment of the mA and/or kV according to patient size, radiation dose was kept as low as reasonably 
achievable to obtain optimal diagnostic quality images. 

 

FINDINGS:     

 

LOWER LUNGS:     

The visualized lower lungs are clear.

 

LIVER:     

Homogeneous density without lesion.  There is no dilation of the biliary tree.  No calcified gallston
es.

 

SPLEEN:     

Normal size without lesion.

 

PANCREAS:     

Within normal limits. 

 

KIDNEYS:     

Normal in size and shape.  There is no mass, stone, or hydronephrosis.

 

ADRENAL GLANDS:     

Within normal limits.

 

VASCULAR:     

There is no aortic aneurysm.

 

BOWEL/MESENTERY:     

The stomach, small bowel, and colon demonstrate no acute abnormality.  There is no free intraperitone
al air or fluid. 

 

ABDOMINAL WALL:     

Within normal limits.

 

RETROPERITONEUM:     

There is no lymphadenopathy.

 

BLADDER:     

No wall thickening or mass.

 

REPRODUCTIVE:     

Calcifications within presumed uterine fibroids. Left lateral pelvic calcification to be calcified ly
mph node. No evidence of free pelvic fluid. No definite soft tissue mass.

 

INGUINAL:     

There is no lymphadenopathy or hernia.

 

MUSCULOSKELETAL:     

Within normal limits for patient age.

 

CONCLUSION:     

Negative noncontrast CT evaluation of the abdomen and pelvis.

 

 

 

 Severo Jean MD on May 29, 2017 at 6:41           

Board Certified Radiologist.

 This report was verified electronically.

## 2017-05-30 VITALS
OXYGEN SATURATION: 97 % | RESPIRATION RATE: 18 BRPM | SYSTOLIC BLOOD PRESSURE: 163 MMHG | DIASTOLIC BLOOD PRESSURE: 76 MMHG | TEMPERATURE: 97.6 F | HEART RATE: 83 BPM

## 2017-05-30 VITALS
SYSTOLIC BLOOD PRESSURE: 152 MMHG | RESPIRATION RATE: 18 BRPM | TEMPERATURE: 99.7 F | OXYGEN SATURATION: 96 % | DIASTOLIC BLOOD PRESSURE: 80 MMHG | HEART RATE: 74 BPM

## 2017-05-30 VITALS
DIASTOLIC BLOOD PRESSURE: 86 MMHG | RESPIRATION RATE: 16 BRPM | HEART RATE: 77 BPM | SYSTOLIC BLOOD PRESSURE: 177 MMHG | OXYGEN SATURATION: 98 % | TEMPERATURE: 98.5 F

## 2017-05-30 VITALS
HEART RATE: 75 BPM | SYSTOLIC BLOOD PRESSURE: 190 MMHG | DIASTOLIC BLOOD PRESSURE: 82 MMHG | OXYGEN SATURATION: 100 % | RESPIRATION RATE: 18 BRPM | TEMPERATURE: 98.3 F

## 2017-05-30 VITALS
HEART RATE: 78 BPM | OXYGEN SATURATION: 97 % | TEMPERATURE: 98.9 F | RESPIRATION RATE: 18 BRPM | SYSTOLIC BLOOD PRESSURE: 146 MMHG | DIASTOLIC BLOOD PRESSURE: 70 MMHG

## 2017-05-30 VITALS
SYSTOLIC BLOOD PRESSURE: 190 MMHG | OXYGEN SATURATION: 99 % | DIASTOLIC BLOOD PRESSURE: 94 MMHG | RESPIRATION RATE: 20 BRPM | HEART RATE: 78 BPM | TEMPERATURE: 98.5 F

## 2017-05-30 LAB
ALP SERPL-CCNC: 170 U/L (ref 45–117)
ALT SERPL-CCNC: 24 U/L (ref 10–53)
ANION GAP SERPL CALC-SCNC: 7 MEQ/L (ref 5–15)
AST SERPL-CCNC: 23 U/L (ref 15–37)
BILIRUB INDIRECT SERPL-MCNC: 0.2 MG/DL (ref 0–0.8)
BILIRUB SERPL-MCNC: 0.3 MG/DL (ref 0.2–1)
BUN SERPL-MCNC: 19 MG/DL (ref 7–18)
CHLORIDE SERPL-SCNC: 112 MEQ/L (ref 98–107)
GFR SERPLBLD BASED ON 1.73 SQ M-ARVRAT: 36 ML/MIN (ref 89–?)
HCO3 BLD-SCNC: 25.2 MEQ/L (ref 21–32)
HDLC SERPL-MCNC: 52.6 MG/DL (ref 40–60)
HEMOGLOBIN A1A: 0.8 %
HEMOGLOBIN A1B: 1.8 %
HEMOGLOBIN AO: 85 %
HEMOGLOBIN LA1C: 1.9 %
HEMOGLOBIN P3: 5.4 %
LDLC SERPL-MCNC: 76 MG/DL (ref 0–99)
POTASSIUM SERPL-SCNC: 4 MEQ/L (ref 3.5–5.1)
SODIUM SERPL-SCNC: 144 MEQ/L (ref 136–145)

## 2017-05-30 RX ADMIN — SODIUM BICARBONATE SCH MLS/HR: 84 INJECTION, SOLUTION INTRAVENOUS at 17:35

## 2017-05-30 RX ADMIN — SODIUM BICARBONATE SCH MLS/HR: 84 INJECTION, SOLUTION INTRAVENOUS at 06:13

## 2017-05-30 RX ADMIN — INSULIN ASPART SCH: 100 INJECTION, SOLUTION INTRAVENOUS; SUBCUTANEOUS at 05:54

## 2017-05-30 RX ADMIN — GABAPENTIN SCH MG: 300 CAPSULE ORAL at 21:30

## 2017-05-30 RX ADMIN — FERROUS SULFATE TAB 325 MG (65 MG ELEMENTAL FE) SCH MG: 325 (65 FE) TAB at 21:30

## 2017-05-30 RX ADMIN — ASPIRIN 81 MG SCH MG: 81 TABLET ORAL at 12:24

## 2017-05-30 RX ADMIN — INSULIN ASPART SCH: 100 INJECTION, SOLUTION INTRAVENOUS; SUBCUTANEOUS at 12:24

## 2017-05-30 RX ADMIN — FOLIC ACID SCH MG: 1 TABLET ORAL at 08:14

## 2017-05-30 RX ADMIN — PANTOPRAZOLE SCH MG: 40 TABLET, DELAYED RELEASE ORAL at 08:14

## 2017-05-30 RX ADMIN — ONDANSETRON PRN MG: 2 INJECTION, SOLUTION INTRAMUSCULAR; INTRAVENOUS at 18:37

## 2017-05-30 RX ADMIN — INSULIN ASPART SCH: 100 INJECTION, SOLUTION INTRAVENOUS; SUBCUTANEOUS at 18:39

## 2017-05-30 RX ADMIN — MORPHINE SULFATE PRN MG: 2 INJECTION, SOLUTION INTRAMUSCULAR; INTRAVENOUS at 06:14

## 2017-05-30 RX ADMIN — MORPHINE SULFATE PRN MG: 2 INJECTION, SOLUTION INTRAMUSCULAR; INTRAVENOUS at 12:24

## 2017-05-30 RX ADMIN — SODIUM CHLORIDE PRN ML: 234 INJECTION INTRAMUSCULAR; INTRAVENOUS; SUBCUTANEOUS at 01:00

## 2017-05-30 RX ADMIN — ENALAPRIL MALEATE SCH MG: 10 TABLET ORAL at 08:15

## 2017-05-30 RX ADMIN — PRAVASTATIN SODIUM SCH MG: 40 TABLET ORAL at 21:30

## 2017-05-30 RX ADMIN — ENALAPRIL MALEATE SCH MG: 10 TABLET ORAL at 21:30

## 2017-05-30 RX ADMIN — MORPHINE SULFATE PRN MG: 2 INJECTION, SOLUTION INTRAMUSCULAR; INTRAVENOUS at 17:34

## 2017-05-30 RX ADMIN — FERROUS SULFATE TAB 325 MG (65 MG ELEMENTAL FE) SCH MG: 325 (65 FE) TAB at 08:14

## 2017-05-30 RX ADMIN — ATENOLOL SCH MG: 25 TABLET ORAL at 08:14

## 2017-05-30 NOTE — HHI.PR
Subjective


Remarks


doing a little better.





Objective


Vitals


heart reg


lung cta


abd s/nt


ext no edema


 Vital Signs








  Date Time  Temp Pulse Resp B/P Pulse Ox O2 Delivery O2 Flow Rate FiO2


 


5/30/17 08:25 98.5 77 16 177/86 98   


 


5/30/17 06:37   18     


 


5/30/17 06:37   18     


 


5/30/17 04:50 98.3 75 18 190/82 100   


 


5/30/17 00:45 97.6 83 18 163/76 97   


 


5/29/17 22:00 98.1 88 22 152/87 100   


 


5/29/17 16:17 98.6 82 22 168/93 100   


 


5/29/17 12:00 97.6 74 20 160/78 96   














 5/29/17 5/29/17 5/30/17





 15:00 23:00 07:00


 


Intake Total  120 ml 0 ml


 


Output Total  500 ml 


 


Balance  -380 ml 0 ml


 


   


 


Intake Oral  120 ml 0 ml


 


Output Urine Total  500 ml 


 


# Voids 2  2


 


# Bowel Movements  0 0








Result Diagram:  


5/29/17 0445                                                                   

             5/30/17 0603








A/P


Problem List:  


(1) Acute pancreatitis


Status:  Acute


Plan:  Pt presents with recurrent acute pancreatitis of unclear etiology


on review of records she had an mrcp and dedicated pancreatic ct in 2014.


there was concern at that time for pancreatic head mass but this was not seen


  on the ct and he had f/u eus evaluation. on both imaging studies there was


  mention of pancreatic divisum or an accessory pancreatic duct.


apparent a/ckd





clears


more aggressive laxatives.


consider restarting reglan . pt stopped a yr ago.


ivf with d5 for low bg


pain meds


IgG level pending.


GI consult as needed


hold metformin and clarify


  outpt cr level. needs better


  control. resume insulin when


   taking po. ssi.





(2) Diabetes


Status:  Chronic


Plan:  see above





(3) HTN (hypertension)


Status:  Chronic


Plan:  see above





(4) CAD (coronary artery disease)


Status:  Chronic


Plan:  home meds





(5) Acute kidney injury superimposed on CKD


Status:  Acute


Plan:  see above








Problem Qualifiers





(1) Acute pancreatitis:  


Qualified Code:  K85.90 - Acute pancreatitis without infection or necrosis, 

unspecified pancreatitis type





Vik Jasmine MD May 30, 2017 08:52

## 2017-05-30 NOTE — PD.PN.STU
Subjective


Remarks


pt reports some improvement in abdominal pain


no BM/flatus


denies CP, SOB, n/v





Objective


Vitals


General: The patient is a well-developed well-nourished female, alert/awake/

oriented x3, does not appear to be in distress. 


Head and Neck exam: Head is normocephalic atraumatic. 


Eyes: EOMI, pupils are equal round and reactive to light. 


Nose: Midline septum with pink mucous membranes 


Mouth: Dentition unremarkable. Moist mucus membranes. Posterior oropharynx is 

not erythematous. No tonsillar hypertrophy. Uvula midline. Airway patent. 


Neck: No palpable lymphadenopathy. No nuchal rigidity. No thyromegaly. 


Cardiovascular: Regular rate and rhythm without murmurs, gallops, or rubs. 


Lungs: Clear to auscultation bilaterally. No wheezes, rhonchi, or rales.


Abdomen:Soft, with tenderness on palpation in the midepigastric area and right 

upper quadrant of the abdomen. No guarding, rebound, or rigidity. 





 Vital Signs








  Date Time  Temp Pulse Resp B/P Pulse Ox O2 Delivery O2 Flow Rate FiO2


 


5/30/17 06:37   18     


 


5/30/17 06:37   18     


 


5/30/17 04:50 98.3 75 18 190/82 100   


 


5/30/17 00:45 97.6 83 18 163/76 97   


 


5/29/17 22:00 98.1 88 22 152/87 100   


 


5/29/17 16:17 98.6 82 22 168/93 100   


 


5/29/17 12:00 97.6 74 20 160/78 96   


 


5/29/17 08:20  81 19 166/72 99   








 I/O








 5/29/17 5/29/17 5/29/17 5/30/17 5/30/17 5/30/17





 07:00 15:00 23:00 07:00 15:00 23:00


 


Intake Total   120 ml 0 ml  


 


Output Total   500 ml   


 


Balance   -380 ml 0 ml  


 


      


 


Intake Oral   120 ml 0 ml  


 


Output Urine Total   500 ml   


 


# Voids  2  2  


 


# Bowel Movements   0 0  








Result Diagram:  


5/29/17 0445                                                                   

             5/29/17 0445








A/P


Assessment and Plan


Acute pancreatitis:


Lipase levels 8074 on admission


5/30 - lipase levels down from 8074 to 398


advance diet to clears


D5 as hypoglycemic last night





Constipation:


CT shows large amount of stool


No BM with lactulose


5/30 - give Miralax





Stage 4 CKD:


IV fluids


Hold Metformin


Pt needs to be on ACE-inhibitor at discharge





Type 2 diabetes:


Pt reports glucose levels in the 300s recently


Order HbA1c


D/C Metformin as GFR <35





Hypertension:


5/30 - elevated this AM


has clonidine prn





Hyperlipidemia:


Triglycerides 174





Tobacco use:


Discussed cessation








Karel Reese M3 May 30, 2017 07:45

## 2017-05-31 VITALS
DIASTOLIC BLOOD PRESSURE: 69 MMHG | OXYGEN SATURATION: 100 % | TEMPERATURE: 96.2 F | SYSTOLIC BLOOD PRESSURE: 127 MMHG | HEART RATE: 75 BPM | RESPIRATION RATE: 16 BRPM

## 2017-05-31 VITALS
OXYGEN SATURATION: 97 % | HEART RATE: 79 BPM | DIASTOLIC BLOOD PRESSURE: 91 MMHG | SYSTOLIC BLOOD PRESSURE: 192 MMHG | TEMPERATURE: 98.4 F | RESPIRATION RATE: 20 BRPM

## 2017-05-31 VITALS
RESPIRATION RATE: 20 BRPM | SYSTOLIC BLOOD PRESSURE: 166 MMHG | DIASTOLIC BLOOD PRESSURE: 81 MMHG | TEMPERATURE: 98.1 F | OXYGEN SATURATION: 98 % | HEART RATE: 83 BPM

## 2017-05-31 VITALS
TEMPERATURE: 97.8 F | DIASTOLIC BLOOD PRESSURE: 80 MMHG | SYSTOLIC BLOOD PRESSURE: 169 MMHG | HEART RATE: 79 BPM | OXYGEN SATURATION: 98 % | RESPIRATION RATE: 20 BRPM

## 2017-05-31 VITALS
SYSTOLIC BLOOD PRESSURE: 182 MMHG | OXYGEN SATURATION: 99 % | TEMPERATURE: 97.9 F | HEART RATE: 74 BPM | RESPIRATION RATE: 20 BRPM | DIASTOLIC BLOOD PRESSURE: 77 MMHG

## 2017-05-31 VITALS
SYSTOLIC BLOOD PRESSURE: 182 MMHG | TEMPERATURE: 98.2 F | OXYGEN SATURATION: 97 % | RESPIRATION RATE: 20 BRPM | HEART RATE: 75 BPM | DIASTOLIC BLOOD PRESSURE: 79 MMHG

## 2017-05-31 LAB
ANION GAP SERPL CALC-SCNC: 6 MEQ/L (ref 5–15)
BUN SERPL-MCNC: 14 MG/DL (ref 7–18)
CHLORIDE SERPL-SCNC: 110 MEQ/L (ref 98–107)
GFR SERPLBLD BASED ON 1.73 SQ M-ARVRAT: 39 ML/MIN (ref 89–?)
HCO3 BLD-SCNC: 25.3 MEQ/L (ref 21–32)
IGG SERUM-IGG SUBCLASSES: 982 MG/DL (ref 694–1618)
IGG SUBCLASSES 1: 655 MG/DL (ref 382–929)
IGG SUBCLASSES 2: 198 MG/DL (ref 241–700)
IGG SUBCLASSES 3: 34 MG/DL (ref 22–178)
IGG4 SER-MCNC: 21.4 MG/DL (ref 4–86)
POTASSIUM SERPL-SCNC: 4 MEQ/L (ref 3.5–5.1)
SODIUM SERPL-SCNC: 141 MEQ/L (ref 136–145)

## 2017-05-31 RX ADMIN — FOLIC ACID SCH MG: 1 TABLET ORAL at 08:59

## 2017-05-31 RX ADMIN — INSULIN ASPART SCH: 100 INJECTION, SOLUTION INTRAVENOUS; SUBCUTANEOUS at 14:30

## 2017-05-31 RX ADMIN — WATER SCH ML: 1 IRRIGANT IRRIGATION at 16:00

## 2017-05-31 RX ADMIN — INSULIN ASPART SCH: 100 INJECTION, SOLUTION INTRAVENOUS; SUBCUTANEOUS at 02:20

## 2017-05-31 RX ADMIN — GABAPENTIN SCH MG: 300 CAPSULE ORAL at 20:58

## 2017-05-31 RX ADMIN — ENALAPRIL MALEATE SCH MG: 10 TABLET ORAL at 09:00

## 2017-05-31 RX ADMIN — HYDROCODONE BITARTRATE AND ACETAMINOPHEN PRN TAB: 7.5; 325 TABLET ORAL at 21:53

## 2017-05-31 RX ADMIN — FERROUS SULFATE TAB 325 MG (65 MG ELEMENTAL FE) SCH MG: 325 (65 FE) TAB at 08:59

## 2017-05-31 RX ADMIN — ATENOLOL SCH MG: 25 TABLET ORAL at 08:59

## 2017-05-31 RX ADMIN — HYDROCODONE BITARTRATE AND ACETAMINOPHEN PRN TAB: 7.5; 325 TABLET ORAL at 11:26

## 2017-05-31 RX ADMIN — PRAVASTATIN SODIUM SCH MG: 40 TABLET ORAL at 20:59

## 2017-05-31 RX ADMIN — WATER SCH ML: 1 IRRIGANT IRRIGATION at 11:22

## 2017-05-31 RX ADMIN — INSULIN ASPART SCH: 100 INJECTION, SOLUTION INTRAVENOUS; SUBCUTANEOUS at 06:15

## 2017-05-31 RX ADMIN — INSULIN ASPART SCH: 100 INJECTION, SOLUTION INTRAVENOUS; SUBCUTANEOUS at 17:58

## 2017-05-31 RX ADMIN — WATER SCH ML: 1 IRRIGANT IRRIGATION at 08:59

## 2017-05-31 RX ADMIN — ASPIRIN 81 MG SCH MG: 81 TABLET ORAL at 08:59

## 2017-05-31 RX ADMIN — ENALAPRIL MALEATE SCH MG: 10 TABLET ORAL at 20:58

## 2017-05-31 RX ADMIN — WATER SCH ML: 1 IRRIGANT IRRIGATION at 20:00

## 2017-05-31 RX ADMIN — SODIUM BICARBONATE SCH MLS/HR: 84 INJECTION, SOLUTION INTRAVENOUS at 02:22

## 2017-05-31 RX ADMIN — PANTOPRAZOLE SCH MG: 40 TABLET, DELAYED RELEASE ORAL at 08:59

## 2017-05-31 RX ADMIN — FERROUS SULFATE TAB 325 MG (65 MG ELEMENTAL FE) SCH MG: 325 (65 FE) TAB at 20:58

## 2017-05-31 RX ADMIN — WATER SCH ML: 1 IRRIGANT IRRIGATION at 23:10

## 2017-05-31 NOTE — PD.PN.STU
Subjective


Remarks


pt reports no abdominal pain


had a little bit of nausea last night, still no BM


tolerated clears, would like to advance diet


denies CP, SOB





Objective


Vitals


General: The patient is a well-developed well-nourished female, alert/awake/

oriented x3, does not appear to be in distress. 


Head and Neck exam: Head is normocephalic atraumatic. 


Eyes: EOMI, pupils are equal round and reactive to light. 


Nose: Midline septum with pink mucous membranes 


Mouth: Dentition unremarkable. Moist mucus membranes. Posterior oropharynx is 

not erythematous. No tonsillar hypertrophy. Uvula midline. Airway patent. 


Neck: No palpable lymphadenopathy. No nuchal rigidity. No thyromegaly. 


Cardiovascular: Regular rate and rhythm without murmurs, gallops, or rubs. 


Lungs: Clear to auscultation bilaterally. No wheezes, rhonchi, or rales.


Abdomen:Soft, nondistended, nontender. No guarding, rebound, or rigidity. 


 Vital Signs








  Date Time  Temp Pulse Resp B/P Pulse Ox O2 Delivery O2 Flow Rate FiO2


 


5/31/17 04:00 97.9 74 20 182/77 99   


 


5/31/17 00:00 98.1 83 20 166/81 98   


 


5/30/17 20:00 98.5 78 20 190/94 99   


 


5/30/17 17:39   16     


 


5/30/17 16:00 99.7 74 18 152/80 96   


 


5/30/17 12:00 98.9 78 18 146/70 97   








 I/O








 5/30/17 5/30/17 5/30/17 5/31/17 5/31/17 5/31/17





 07:00 15:00 23:00 07:00 15:00 23:00


 


Intake Total 0 ml 600 ml 420 ml 120 ml  


 


Output Total   300 ml 220 ml  


 


Balance 0 ml 600 ml 120 ml -100 ml  


 


      


 


Intake Oral 0 ml 600 ml 420 ml 120 ml  


 


Output Urine Total   300 ml 220 ml  


 


# Voids 2 2    


 


# Bowel Movements 0 0 0 0  








Result Diagram:  


5/29/17 0445                                                                   

             5/30/17 0603








A/P


Assessment and Plan


Acute pancreatitis:


Lipase levels 8074 on admission


5/30 - lipase levels down from 8074 to 398


discussed findings of previous imaging that showed pancreatic divisum


she is to f/u with GI who may be able to refer her to specialist in divisum 

cases


D5 as hypoglycemic last night


5/31 abdominal pain resolved, advancing to normal diet





Constipation:


CT shows large amount of stool


No BM with lactulose


5/31 - give additional lactulose





Stage 4 CKD:


IV fluids


Hold Metformin


D/C HCTZ from home meds


Pt needs to be on ACE-inhibitor at discharge





Type 2 diabetes:


Pt reports glucose levels in the 300s recently


Order HbA1c


D/C Metformin as GFR <35





Hypertension:


5/31 - elevated this AM


has clonidine prn





Hyperlipidemia:


Triglycerides 174





Tobacco use:


Discussed cessation








Karel Reese M3 May 31, 2017 08:40

## 2017-05-31 NOTE — HHI.PR
Subjective


Remarks


tolerating food.





Objective


Vitals


heart reg


lung cta


abd s/nt


ext no edema


 Vital Signs








  Date Time  Temp Pulse Resp B/P Pulse Ox O2 Delivery O2 Flow Rate FiO2


 


5/31/17 15:50 98.4 79 20 192/91 97   


 


5/31/17 11:55 98.2 75 20 182/79 97   


 


5/31/17 07:50 97.8 79 20 169/80 98   


 


5/31/17 04:00 97.9 74 20 182/77 99   


 


5/31/17 00:00 98.1 83 20 166/81 98   


 


5/30/17 20:00 98.5 78 20 190/94 99   














 5/30/17 5/30/17 5/31/17





 15:00 23:00 07:00


 


Intake Total 600 ml 420 ml 120 ml


 


Output Total  300 ml 220 ml


 


Balance 600 ml 120 ml -100 ml


 


   


 


Intake Oral 600 ml 420 ml 120 ml


 


Output Urine Total  300 ml 220 ml


 


# Voids 2  


 


# Bowel Movements 0 0 0








Result Diagram:  


5/29/17 0445                                                                   

             5/31/17 0709








A/P


Problem List:  


(1) Acute pancreatitis


Status:  Acute


Plan:  Pt presents with recurrent acute pancreatitis of unclear etiology


on review of records she had an mrcp and dedicated pancreatic ct in 2014.


there was concern at that time for pancreatic head mass but this was not seen


  on the ct and he had f/u eus evaluation. on both imaging studies there was


  mention of pancreatic divisum or an accessory pancreatic duct. she was


also dehydrated and was using hctz which could all be exacerbating the


pancreas from susceptible anatomy


apparent a/ckd





advance diet


more aggressive laxatives.


consider restarting reglan . pt stopped a yr ago.


ivf with d5 for low bg....stop today. resume levemir on d/c


pain meds


IgG level pending.


hold metformin due to cr


adjust bp meds for better control. d/c norvasc. start procardia.





(2) Diabetes


Status:  Chronic


Plan:  see above





(3) HTN (hypertension)


Status:  Chronic


Plan:  see above





(4) CAD (coronary artery disease)


Status:  Chronic


Plan:  home meds





(5) Acute kidney injury superimposed on CKD


Status:  Acute


Plan:  see above








Problem Qualifiers





(1) Acute pancreatitis:  


Qualified Code:  K85.90 - Acute pancreatitis without infection or necrosis, 

unspecified pancreatitis type





Vik Jasmine MD May 31, 2017 19:28

## 2017-06-01 VITALS
RESPIRATION RATE: 16 BRPM | OXYGEN SATURATION: 95 % | SYSTOLIC BLOOD PRESSURE: 133 MMHG | DIASTOLIC BLOOD PRESSURE: 62 MMHG | HEART RATE: 90 BPM | TEMPERATURE: 97.8 F

## 2017-06-01 VITALS
HEART RATE: 84 BPM | TEMPERATURE: 97 F | DIASTOLIC BLOOD PRESSURE: 80 MMHG | SYSTOLIC BLOOD PRESSURE: 182 MMHG | RESPIRATION RATE: 20 BRPM | OXYGEN SATURATION: 98 %

## 2017-06-01 VITALS
OXYGEN SATURATION: 100 % | SYSTOLIC BLOOD PRESSURE: 148 MMHG | TEMPERATURE: 97.6 F | RESPIRATION RATE: 20 BRPM | HEART RATE: 83 BPM | DIASTOLIC BLOOD PRESSURE: 68 MMHG

## 2017-06-01 RX ADMIN — INSULIN ASPART SCH: 100 INJECTION, SOLUTION INTRAVENOUS; SUBCUTANEOUS at 12:00

## 2017-06-01 RX ADMIN — WATER SCH ML: 1 IRRIGANT IRRIGATION at 11:12

## 2017-06-01 RX ADMIN — PANTOPRAZOLE SCH MG: 40 TABLET, DELAYED RELEASE ORAL at 08:12

## 2017-06-01 RX ADMIN — ATENOLOL SCH MG: 25 TABLET ORAL at 08:13

## 2017-06-01 RX ADMIN — INSULIN ASPART SCH: 100 INJECTION, SOLUTION INTRAVENOUS; SUBCUTANEOUS at 06:00

## 2017-06-01 RX ADMIN — FOLIC ACID SCH MG: 1 TABLET ORAL at 08:12

## 2017-06-01 RX ADMIN — ASPIRIN 81 MG SCH MG: 81 TABLET ORAL at 08:13

## 2017-06-01 RX ADMIN — ENALAPRIL MALEATE SCH MG: 10 TABLET ORAL at 08:13

## 2017-06-01 RX ADMIN — FERROUS SULFATE TAB 325 MG (65 MG ELEMENTAL FE) SCH MG: 325 (65 FE) TAB at 08:12

## 2017-06-01 RX ADMIN — WATER SCH ML: 1 IRRIGANT IRRIGATION at 03:14

## 2017-06-01 RX ADMIN — INSULIN ASPART SCH: 100 INJECTION, SOLUTION INTRAVENOUS; SUBCUTANEOUS at 00:00

## 2017-06-01 RX ADMIN — WATER SCH ML: 1 IRRIGANT IRRIGATION at 07:32

## 2017-06-01 RX ADMIN — HYDROCODONE BITARTRATE AND ACETAMINOPHEN PRN TAB: 7.5; 325 TABLET ORAL at 01:49

## 2017-06-01 NOTE — HHI.PR
Subjective


Remarks


had bad night. says she needed iv pain meds and wasn't provided


says she is not having pain after eating food. large bm yesterday


wants to go home. refusing labs this AM





Objective


Vitals


nad


heart reg


lung cta


abd s/bs


ext no edema


 Vital Signs








  Date Time  Temp Pulse Resp B/P Pulse Ox O2 Delivery O2 Flow Rate FiO2


 


6/1/17 00:00 97.8 90 16 133/62 95   


 


5/31/17 20:00 96.2 75 16 127/69 100   


 


5/31/17 15:50 98.4 79 20 192/91 97   


 


5/31/17 11:55 98.2 75 20 182/79 97   














 5/31/17 5/31/17 6/1/17





 15:00 23:00 07:00


 


Intake Total 360 ml 600 ml 480 ml


 


Balance 360 ml 600 ml 480 ml


 


   


 


Intake Oral 360 ml 600 ml 480 ml


 


# Voids 2 2 2


 


# Bowel Movements 1  








Result Diagram:  


5/29/17 0445                                                                   

             5/31/17 0709








A/P


Problem List:  


(1) Acute pancreatitis


Status:  Acute


Plan:  Pt presents with recurrent acute pancreatitis of unclear etiology


on review of records she had an mrcp and dedicated pancreatic ct in 2014.


there was concern at that time for pancreatic head mass but this was not seen


  on the ct and he had f/u eus evaluation. on both imaging studies there was


  mention of pancreatic divisum or an accessory pancreatic duct. she was


also dehydrated and was using hctz which could all be exacerbating the


pancreas from susceptible anatomy. no evidence for autoimmune pancreatitis.


apparent a/ckd





advance diet


consider restarting reglan . pt stopped a yr ago. she is not interested now


resume lantus on  d/c. advised to hold metformin due to high cr...she is not


  interested in rechecking cr prior to d/c but it was improving....I have 

advised


  her to hold metformin and have pcp recheck the cr next week and decide on 


  resuming the medicine.


we have made bp med adjustments...give meds this AM and recheck at noon prior


  to d/c


pain meds


stop hctz on d/c


I





(2) Diabetes


Status:  Chronic


Plan:  see above





(3) HTN (hypertension)


Status:  Chronic


Plan:  see above





(4) CAD (coronary artery disease)


Status:  Chronic


Plan:  home meds





(5) Acute kidney injury superimposed on CKD


Status:  Acute


Plan:  see above








Problem Qualifiers





(1) Acute pancreatitis:  


Qualified Code:  K85.90 - Acute pancreatitis without infection or necrosis, 

unspecified pancreatitis type





Vik Jasmine MD Jun 1, 2017 08:16

## 2017-06-01 NOTE — HHI.DCPOC
Discharge Care Plan


Diagnosis:  


(1) Acute pancreatitis


(2) Pancreatic divisum


(3) Dehydration


(4) Acute kidney injury superimposed on CKD


(5) Constipation


(6) HTN (hypertension)


(7) Diabetes


(8) CAD (coronary artery disease)


Goals to Promote Your Health


* To prevent worsening of your condition and complications


* To maintain your health at the optimal level


Directions to Meet Your Goals


*** Take your medications as prescribed


*** Follow your dietary instruction


*** Follow activity as directed








*** Keep your appointments as scheduled


*** Take your immunizations and boosters as scheduled


*** If your symptoms worsen call your PCP, if no PCP go to Urgent Care Center 

or Emergency Room***


*** Smoking is Dangerous to Your Health. Avoid second hand smoke***


***Call the 24-hour hour crisis hotline for domestic abuse at [phone]***








Vik Jasmine MD Jun 1, 2017 08:22

## 2017-08-16 ENCOUNTER — HOSPITAL ENCOUNTER (INPATIENT)
Dept: HOSPITAL 17 - NEPC | Age: 52
LOS: 12 days | Discharge: HOME | DRG: 871 | End: 2017-08-28
Attending: EMERGENCY MEDICINE
Payer: COMMERCIAL

## 2017-08-16 VITALS
OXYGEN SATURATION: 98 % | SYSTOLIC BLOOD PRESSURE: 179 MMHG | RESPIRATION RATE: 30 BRPM | HEART RATE: 108 BPM | DIASTOLIC BLOOD PRESSURE: 77 MMHG

## 2017-08-16 VITALS
TEMPERATURE: 102.5 F | HEART RATE: 112 BPM | RESPIRATION RATE: 32 BRPM | OXYGEN SATURATION: 100 % | DIASTOLIC BLOOD PRESSURE: 86 MMHG | SYSTOLIC BLOOD PRESSURE: 213 MMHG

## 2017-08-16 VITALS
RESPIRATION RATE: 28 BRPM | DIASTOLIC BLOOD PRESSURE: 88 MMHG | SYSTOLIC BLOOD PRESSURE: 193 MMHG | OXYGEN SATURATION: 97 % | HEART RATE: 115 BPM

## 2017-08-16 VITALS
DIASTOLIC BLOOD PRESSURE: 114 MMHG | TEMPERATURE: 101.9 F | HEART RATE: 114 BPM | SYSTOLIC BLOOD PRESSURE: 224 MMHG | OXYGEN SATURATION: 91 % | RESPIRATION RATE: 18 BRPM

## 2017-08-16 VITALS
SYSTOLIC BLOOD PRESSURE: 184 MMHG | DIASTOLIC BLOOD PRESSURE: 80 MMHG | HEART RATE: 118 BPM | RESPIRATION RATE: 28 BRPM | OXYGEN SATURATION: 98 % | TEMPERATURE: 100.9 F

## 2017-08-16 VITALS
HEART RATE: 110 BPM | RESPIRATION RATE: 32 BRPM | OXYGEN SATURATION: 98 % | SYSTOLIC BLOOD PRESSURE: 179 MMHG | DIASTOLIC BLOOD PRESSURE: 76 MMHG

## 2017-08-16 VITALS
SYSTOLIC BLOOD PRESSURE: 228 MMHG | HEART RATE: 112 BPM | RESPIRATION RATE: 36 BRPM | DIASTOLIC BLOOD PRESSURE: 105 MMHG | OXYGEN SATURATION: 91 %

## 2017-08-16 VITALS
RESPIRATION RATE: 34 BRPM | OXYGEN SATURATION: 93 % | SYSTOLIC BLOOD PRESSURE: 213 MMHG | DIASTOLIC BLOOD PRESSURE: 86 MMHG | TEMPERATURE: 102.5 F | HEART RATE: 114 BPM

## 2017-08-16 VITALS
DIASTOLIC BLOOD PRESSURE: 98 MMHG | HEART RATE: 119 BPM | RESPIRATION RATE: 32 BRPM | SYSTOLIC BLOOD PRESSURE: 219 MMHG | OXYGEN SATURATION: 100 %

## 2017-08-16 VITALS — WEIGHT: 132.72 LBS | BODY MASS INDEX: 22.11 KG/M2 | HEIGHT: 65 IN

## 2017-08-16 VITALS
SYSTOLIC BLOOD PRESSURE: 181 MMHG | DIASTOLIC BLOOD PRESSURE: 66 MMHG | OXYGEN SATURATION: 98 % | RESPIRATION RATE: 29 BRPM | HEART RATE: 112 BPM

## 2017-08-16 VITALS — OXYGEN SATURATION: 94 %

## 2017-08-16 DIAGNOSIS — I12.9: ICD-10-CM

## 2017-08-16 DIAGNOSIS — A41.9: Primary | ICD-10-CM

## 2017-08-16 DIAGNOSIS — I25.10: ICD-10-CM

## 2017-08-16 DIAGNOSIS — K59.00: ICD-10-CM

## 2017-08-16 DIAGNOSIS — E78.5: ICD-10-CM

## 2017-08-16 DIAGNOSIS — E11.43: ICD-10-CM

## 2017-08-16 DIAGNOSIS — E11.22: ICD-10-CM

## 2017-08-16 DIAGNOSIS — N17.0: ICD-10-CM

## 2017-08-16 DIAGNOSIS — D50.9: ICD-10-CM

## 2017-08-16 DIAGNOSIS — E11.42: ICD-10-CM

## 2017-08-16 DIAGNOSIS — Z79.4: ICD-10-CM

## 2017-08-16 DIAGNOSIS — E87.2: ICD-10-CM

## 2017-08-16 DIAGNOSIS — E87.5: ICD-10-CM

## 2017-08-16 DIAGNOSIS — D63.1: ICD-10-CM

## 2017-08-16 DIAGNOSIS — N18.4: ICD-10-CM

## 2017-08-16 DIAGNOSIS — J81.1: ICD-10-CM

## 2017-08-16 DIAGNOSIS — B37.3: ICD-10-CM

## 2017-08-16 DIAGNOSIS — R65.20: ICD-10-CM

## 2017-08-16 DIAGNOSIS — E11.649: ICD-10-CM

## 2017-08-16 DIAGNOSIS — J96.01: ICD-10-CM

## 2017-08-16 DIAGNOSIS — J18.9: ICD-10-CM

## 2017-08-16 DIAGNOSIS — E86.0: ICD-10-CM

## 2017-08-16 DIAGNOSIS — I27.2: ICD-10-CM

## 2017-08-16 DIAGNOSIS — I25.2: ICD-10-CM

## 2017-08-16 DIAGNOSIS — K31.84: ICD-10-CM

## 2017-08-16 DIAGNOSIS — Z95.5: ICD-10-CM

## 2017-08-16 DIAGNOSIS — K21.9: ICD-10-CM

## 2017-08-16 DIAGNOSIS — F17.210: ICD-10-CM

## 2017-08-16 LAB
ALP SERPL-CCNC: 209 U/L (ref 45–117)
ALT SERPL-CCNC: 22 U/L (ref 10–53)
ANION GAP SERPL CALC-SCNC: 11 MEQ/L (ref 5–15)
AST SERPL-CCNC: 26 U/L (ref 15–37)
BASOPHILS # BLD AUTO: 0.1 TH/MM3 (ref 0–0.2)
BASOPHILS NFR BLD: 0.4 % (ref 0–2)
BILIRUB SERPL-MCNC: 1.1 MG/DL (ref 0.2–1)
BUN SERPL-MCNC: 34 MG/DL (ref 7–18)
CHLORIDE SERPL-SCNC: 106 MEQ/L (ref 98–107)
EOSINOPHIL # BLD: 0 TH/MM3 (ref 0–0.4)
EOSINOPHIL NFR BLD: 0 % (ref 0–4)
ERYTHROCYTE [DISTWIDTH] IN BLOOD BY AUTOMATED COUNT: 13.8 % (ref 11.6–17.2)
GFR SERPLBLD BASED ON 1.73 SQ M-ARVRAT: 22 ML/MIN (ref 89–?)
HCO3 BLD-SCNC: 19 MEQ/L (ref 21–32)
HCT VFR BLD CALC: 28.8 % (ref 35–46)
HEMO FLAGS: (no result)
LYMPHOCYTES # BLD AUTO: 1.2 TH/MM3 (ref 1–4.8)
LYMPHOCYTES NFR BLD AUTO: 4.8 % (ref 9–44)
MCH RBC QN AUTO: 31.3 PG (ref 27–34)
MCHC RBC AUTO-ENTMCNC: 32.2 % (ref 32–36)
MCV RBC AUTO: 97.1 FL (ref 80–100)
MONOCYTES NFR BLD: 4.8 % (ref 0–8)
NEUTROPHILS # BLD AUTO: 22.1 TH/MM3 (ref 1.8–7.7)
NEUTROPHILS NFR BLD AUTO: 90 % (ref 16–70)
PLATELET # BLD: 315 TH/MM3 (ref 150–450)
POTASSIUM SERPL-SCNC: 4.6 MEQ/L (ref 3.5–5.1)
RBC # BLD AUTO: 2.97 MIL/MM3 (ref 4–5.3)
SODIUM SERPL-SCNC: 136 MEQ/L (ref 136–145)
WBC # BLD AUTO: 24.6 TH/MM3 (ref 4–11)

## 2017-08-16 PROCEDURE — 87040 BLOOD CULTURE FOR BACTERIA: CPT

## 2017-08-16 PROCEDURE — 94150 VITAL CAPACITY TEST: CPT

## 2017-08-16 PROCEDURE — 86235 NUCLEAR ANTIGEN ANTIBODY: CPT

## 2017-08-16 PROCEDURE — 86901 BLOOD TYPING SEROLOGIC RH(D): CPT

## 2017-08-16 PROCEDURE — 36430 TRANSFUSION BLD/BLD COMPNT: CPT

## 2017-08-16 PROCEDURE — 94003 VENT MGMT INPAT SUBQ DAY: CPT

## 2017-08-16 PROCEDURE — 80053 COMPREHEN METABOLIC PANEL: CPT

## 2017-08-16 PROCEDURE — 87449 NOS EACH ORGANISM AG IA: CPT

## 2017-08-16 PROCEDURE — 94640 AIRWAY INHALATION TREATMENT: CPT

## 2017-08-16 PROCEDURE — 85018 HEMOGLOBIN: CPT

## 2017-08-16 PROCEDURE — 82607 VITAMIN B-12: CPT

## 2017-08-16 PROCEDURE — A9567 TECHNETIUM TC-99M AEROSOL: HCPCS

## 2017-08-16 PROCEDURE — 86160 COMPLEMENT ANTIGEN: CPT

## 2017-08-16 PROCEDURE — 71250 CT THORAX DX C-: CPT

## 2017-08-16 PROCEDURE — 84165 PROTEIN E-PHORESIS SERUM: CPT

## 2017-08-16 PROCEDURE — 82805 BLOOD GASES W/O2 SATURATION: CPT

## 2017-08-16 PROCEDURE — 86803 HEPATITIS C AB TEST: CPT

## 2017-08-16 PROCEDURE — 85730 THROMBOPLASTIN TIME PARTIAL: CPT

## 2017-08-16 PROCEDURE — 76937 US GUIDE VASCULAR ACCESS: CPT

## 2017-08-16 PROCEDURE — 83520 IMMUNOASSAY QUANT NOS NONAB: CPT

## 2017-08-16 PROCEDURE — 86039 ANTINUCLEAR ANTIBODIES (ANA): CPT

## 2017-08-16 PROCEDURE — 86021 WBC ANTIBODY IDENTIFICATION: CPT

## 2017-08-16 PROCEDURE — 93005 ELECTROCARDIOGRAM TRACING: CPT

## 2017-08-16 PROCEDURE — 83540 ASSAY OF IRON: CPT

## 2017-08-16 PROCEDURE — 83010 ASSAY OF HAPTOGLOBIN QUANT: CPT

## 2017-08-16 PROCEDURE — 86922 COMPATIBILITY TEST ANTIGLOB: CPT

## 2017-08-16 PROCEDURE — 86225 DNA ANTIBODY NATIVE: CPT

## 2017-08-16 PROCEDURE — 86038 ANTINUCLEAR ANTIBODIES: CPT

## 2017-08-16 PROCEDURE — 83735 ASSAY OF MAGNESIUM: CPT

## 2017-08-16 PROCEDURE — 71010: CPT

## 2017-08-16 PROCEDURE — 93306 TTE W/DOPPLER COMPLETE: CPT

## 2017-08-16 PROCEDURE — 80048 BASIC METABOLIC PNL TOTAL CA: CPT

## 2017-08-16 PROCEDURE — 83605 ASSAY OF LACTIC ACID: CPT

## 2017-08-16 PROCEDURE — 83880 ASSAY OF NATRIURETIC PEPTIDE: CPT

## 2017-08-16 PROCEDURE — 36600 WITHDRAWAL OF ARTERIAL BLOOD: CPT

## 2017-08-16 PROCEDURE — P9016 RBC LEUKOCYTES REDUCED: HCPCS

## 2017-08-16 PROCEDURE — 84100 ASSAY OF PHOSPHORUS: CPT

## 2017-08-16 PROCEDURE — 87641 MR-STAPH DNA AMP PROBE: CPT

## 2017-08-16 PROCEDURE — 84484 ASSAY OF TROPONIN QUANT: CPT

## 2017-08-16 PROCEDURE — 83516 IMMUNOASSAY NONANTIBODY: CPT

## 2017-08-16 PROCEDURE — 82746 ASSAY OF FOLIC ACID SERUM: CPT

## 2017-08-16 PROCEDURE — 87086 URINE CULTURE/COLONY COUNT: CPT

## 2017-08-16 PROCEDURE — 86317 IMMUNOASSAY INFECTIOUS AGENT: CPT

## 2017-08-16 PROCEDURE — 82272 OCCULT BLD FECES 1-3 TESTS: CPT

## 2017-08-16 PROCEDURE — 94664 DEMO&/EVAL PT USE INHALER: CPT

## 2017-08-16 PROCEDURE — 85025 COMPLETE CBC W/AUTO DIFF WBC: CPT

## 2017-08-16 PROCEDURE — 86900 BLOOD TYPING SEROLOGIC ABO: CPT

## 2017-08-16 PROCEDURE — 85044 MANUAL RETICULOCYTE COUNT: CPT

## 2017-08-16 PROCEDURE — 80202 ASSAY OF VANCOMYCIN: CPT

## 2017-08-16 PROCEDURE — A9540 TC99M MAA: HCPCS

## 2017-08-16 PROCEDURE — 81001 URINALYSIS AUTO W/SCOPE: CPT

## 2017-08-16 PROCEDURE — 96365 THER/PROPH/DIAG IV INF INIT: CPT

## 2017-08-16 PROCEDURE — 83550 IRON BINDING TEST: CPT

## 2017-08-16 PROCEDURE — 86255 FLUORESCENT ANTIBODY SCREEN: CPT

## 2017-08-16 PROCEDURE — 86335 IMMUNFIX E-PHORSIS/URINE/CSF: CPT

## 2017-08-16 PROCEDURE — 82043 UR ALBUMIN QUANTITATIVE: CPT

## 2017-08-16 PROCEDURE — 76775 US EXAM ABDO BACK WALL LIM: CPT

## 2017-08-16 PROCEDURE — 86920 COMPATIBILITY TEST SPIN: CPT

## 2017-08-16 PROCEDURE — 94002 VENT MGMT INPAT INIT DAY: CPT

## 2017-08-16 PROCEDURE — 83690 ASSAY OF LIPASE: CPT

## 2017-08-16 PROCEDURE — 87804 INFLUENZA ASSAY W/OPTIC: CPT

## 2017-08-16 PROCEDURE — 85610 PROTHROMBIN TIME: CPT

## 2017-08-16 PROCEDURE — 78582 LUNG VENTILAT&PERFUS IMAGING: CPT

## 2017-08-16 PROCEDURE — 87340 HEPATITIS B SURFACE AG IA: CPT

## 2017-08-16 PROCEDURE — 96375 TX/PRO/DX INJ NEW DRUG ADDON: CPT

## 2017-08-16 PROCEDURE — 85014 HEMATOCRIT: CPT

## 2017-08-16 PROCEDURE — 96361 HYDRATE IV INFUSION ADD-ON: CPT

## 2017-08-16 PROCEDURE — 86431 RHEUMATOID FACTOR QUANT: CPT

## 2017-08-16 PROCEDURE — 86850 RBC ANTIBODY SCREEN: CPT

## 2017-08-16 PROCEDURE — 74176 CT ABD & PELVIS W/O CONTRAST: CPT

## 2017-08-16 PROCEDURE — 82948 REAGENT STRIP/BLOOD GLUCOSE: CPT

## 2017-08-16 RX ADMIN — ALBUTEROL SULFATE SCH MG: 2.5 SOLUTION RESPIRATORY (INHALATION) at 21:59

## 2017-08-16 NOTE — RADRPT
EXAM DATE/TIME:  08/16/2017 22:26 

 

[facility] COMPARISON:     

CHEST SINGLE AP, May 29, 2017, 5:50.

 

                     

INDICATIONS :     

Cough, fever, and shortness of breath. 

                     

 

MEDICAL HISTORY :     

None.          

 

SURGICAL HISTORY :        

Cardiac stent. 

 

ENCOUNTER:     

Initial                                        

 

ACUITY:     

2 days      

 

PAIN SCORE:     

0/10

 

LOCATION:      

chest 

 

FINDINGS:     

The heart size is borderline enlarged. There is diffuse increased interstitial markings. There is myrtle
eolar consolidation seen in the left mid and lower lung and at the right lower lung. A significant ef
fusion is not clearly seen.

 

CONCLUSION:     

Diffuse interstitial prominence and alveolar consolidation seen bilaterally being more prominent on t
he left. Diffuse infectious processes should be considered.

 

 

 

 Severo Weir MD on August 16, 2017 at 22:47           

Board Certified Radiologist.

 This report was verified electronically.

## 2017-08-16 NOTE — PD
HPI


Chief Complaint:  Respiratory Distress


Time Seen by Provider:  21:44


Travel History


International Travel<30 days:  No


Contact w/Intl Traveler<30days:  No


Traveled to known affect area:  No





History of Present Illness


HPI


2 DAYS OF SOB, PROD COUGH WITH YELLOW GREEN SPUTUM, WORSENING SOB, SMOKES ABOUT 

1PPD THOUGH NOT LATELY BC TOO SOB FOR IT.





PFSH


Past Medical History


Hx Anticoagulant Therapy:  No


Arthritis:  Yes


Blood Disorders:  No


Anxiety:  No


Depression:  No


Cancer:  No


Cardiac Catheterization:  Yes


Cardiovascular Problems:  Yes


High Cholesterol:  Yes


Chemotherapy:  No


Chest Pain:  Yes


Congestive Heart Failure:  No


Cerebrovascular Accident:  No


Coronary Artery Disease:  Yes


Diabetes:  Yes


Diminished Hearing:  No


Endocrine:  Yes


Gastrointestinal Disorders:  Yes (HX PANCREATITIS)


GERD:  Yes


Genitourinary:  Yes (BLOCKAGE IN URETER, STENTED BUT REMOVED)


Hypertension:  Yes


Immune Disorder:  No


Implanted Vascular Access Dvce:  Yes


Musculoskeletal:  No


Neurologic:  No


Psychiatric:  No


Reproductive:  Yes (PT STATES FIBROIDS)


Respiratory:  Yes


Migraines:  Yes


Pancreatitis:  Yes


Pneumonia:  Yes (CHILDHOOD)


Thyroid Disease:  No


Pregnant?:  Not Pregnant


Menopausal:  Yes


:  2


Para:  3


Miscarriage:  0


:  0


Tubal Ligation:  Yes





Past Surgical History


Abdominal Surgery:  Yes


Cardiac Surgery:  Yes


Cholecystectomy:  Yes


Coronary Stent:  Yes


Ear Surgery:  No


Endocrine Surgery:  No


Eye Surgery:  No


Genitourinary Surgery:  Yes


Gynecologic Surgery:  Yes (TUBAL)


Hysterectomy:  No


Neurologic Surgery:  No


Oral Surgery:  Yes


Pacemaker:  No


Thoracic Surgery:  No


Other Surgery:  Yes





Social History


Alcohol Use:  Yes (use to abuse; quit 3 years ago. )


Tobacco Use:  Yes (0.5 ppd)


Substance Use:  Yes (thc )





Allergies-Medications


(Allergen,Severity, Reaction):  


Coded Allergies:  


     shellfish derived (Unverified  Adverse Reaction, Mild, VOMITNG, 8/15/17)


Reported Meds & Prescriptions





Reported Meds & Active Scripts


Active


Nifedipine ER 24 HR (Nifedipine) 30 Mg Tab 30 Mg PO AS DIRECTED


     60mg po in morning


     30mg po in evening


Reported


Gabapentin 100 Mg Cap 300 Mg PO HS


Tramadol (Tramadol HCl) 50 Mg Tab 50 Mg PO Q4H PRN


Simvastatin 20 Mg Tab 20 Mg PO HS


Omeprazole 40 Mg Cap 40 Mg PO DAILY


Lantus Inj (Insulin Glargine) 1,000 Unit/10 Ml Vial 26 Units SQ HS


Folic Acid 400 Mcg Tab 1 Mg PO DAILY


Feosol (Ferrous Sulfate) 200 Mg Tab 325 Mg PO BID


Vasotec (Enalapril Maleate) 20 Mg Tab 20 Mg PO BID


Atenolol 25 Mg Tab 25 Mg PO DAILY


Aspirin Children's (Aspirin) 81 Mg Chew 81 Mg CHEW DAILY








Review of Systems


Except as stated in HPI:  all other systems reviewed are Neg


Respiratory:  Positive: Cough, Shortness of Breath, Wheezing





Physical Exam


Narrative


GENERAL: 


SKIN: Warm and dry.


HEAD: Atraumatic. Normocephalic. 


EYES: Pupils equal and round. No scleral icterus. No injection or drainage. 


ENT: No nasal bleeding or discharge.  Mucous membranes pink and moist.


NECK: Trachea midline. No JVD. 


CARDIOVASCULAR: TACHYCARDIC


RESPIRATORY: No accessory muscle use. TACHYPNEIC, LLL WHEEZY AND CRACKLES. 


GASTROINTESTINAL: Abdomen soft, non-tender, nondistended. Hepatic and splenic 

margins not palpable. 


MUSCULOSKELETAL: Extremities without clubbing, cyanosis, or edema. No obvious 

deformities. 


NEUROLOGICAL: Awake and alert. No obvious cranial nerve deficits.  Motor 

grossly within normal limits. Five out of 5 muscle strength in the arms and 

legs.  Normal speech.


PSYCHIATRIC: Appropriate mood and affect; insight and judgment normal.





Data


Data


Last Documented VS





Orders





 Orders


Albuterol Neb (Albuterol Neb) (17 21:44)


Albuterol Neb (Albuterol Neb) (17 21:45)


Albuterol Neb (Albuterol Neb) (17 21:45)


Electrocardiogram (17 21:44)


Complete Blood Count With Diff (17 21:44)


Comprehensive Metabolic Panel (17 21:44)


Lactic Acid Sepsis Protocol (17 21:44)


Lipase (17 21:44)


Troponin I (17 21:44)


Urinalysis - C+S If Indicated (17 21:44)


Influenzae A/B Antigen (17 21:44)


Blood Culture (17 21:44)


Chest, Single Ap (17 21:44)


Blood Glucose (17 21:44)


Ecg Monitoring (17 21:44)


Iv Access Insert/Monitor (17 21:44)


Oximetry (17 21:44)


Oxygen Administration (17 21:44)


Ceftriaxone Inj (Rocephin Inj) (17 21:44)


Azithromycin Inj (Zithromax Inj) (17 21:44)


Albuterol Neb (Albuterol Neb) (17 21:45)


Methylprednisolone So Succ Inj (Solumedr (17 21:45)


Ibuprofen (Motrin) (17 21:45)


Sodium Chlor 0.9% 1000 Ml Inj (Ns 1000 M (17 21:44)


Sodium Chlor 0.9% 1000 Ml Inj (Ns 1000 M (17 21:44)


Hydralazine Inj (Apresoline Inj) (17 21:45)


Ketorolac Inj (Toradol Inj) (17 23:15)


Hydromorphone Pf Inj (Dilaudid Pf Inj) (17 23:15)


Aspirin Chew (Aspirin Chew) (17 23:45)


Nitroglycerin 2% Oint (Nitroglycerin 2% (17 23:45)


Enoxaparin Inj (Lovenox Inj) (17 23:45)


Admit Order (Ed Use Only) (17 23:50)





Labs





Laboratory Tests








Test


  17


21:58 17


22:01


 


White Blood Count 24.6 TH/MM3  


 


Red Blood Count 2.97 MIL/MM3  


 


Hemoglobin 9.3 GM/DL  


 


Hematocrit 28.8 %  


 


Mean Corpuscular Volume 97.1 FL  


 


Mean Corpuscular Hemoglobin 31.3 PG  


 


Mean Corpuscular Hemoglobin


Concent 32.2 % 


  


 


 


Red Cell Distribution Width 13.8 %  


 


Platelet Count 315 TH/MM3  


 


Mean Platelet Volume 8.2 FL  


 


Neutrophils (%) (Auto) 90.0 %  


 


Lymphocytes (%) (Auto) 4.8 %  


 


Monocytes (%) (Auto) 4.8 %  


 


Eosinophils (%) (Auto) 0.0 %  


 


Basophils (%) (Auto) 0.4 %  


 


Neutrophils # (Auto) 22.1 TH/MM3  


 


Lymphocytes # (Auto) 1.2 TH/MM3  


 


Monocytes # (Auto) 1.2 TH/MM3  


 


Eosinophils # (Auto) 0.0 TH/MM3  


 


Basophils # (Auto) 0.1 TH/MM3  


 


CBC Comment DIFF FINAL  


 


Differential Comment   


 


Blood Urea Nitrogen 34 MG/DL  


 


Creatinine 2.79 MG/DL  


 


Random Glucose 84 MG/DL  


 


Total Protein 7.9 GM/DL  


 


Albumin 3.0 GM/DL  


 


Calcium Level 8.9 MG/DL  


 


Alkaline Phosphatase 209 U/L  


 


Aspartate Amino Transf


(AST/SGOT) 26 U/L 


  


 


 


Alanine Aminotransferase


(ALT/SGPT) 22 U/L 


  


 


 


Total Bilirubin 1.1 MG/DL  


 


Sodium Level 136 MEQ/L  


 


Potassium Level 4.6 MEQ/L  


 


Chloride Level 106 MEQ/L  


 


Carbon Dioxide Level 19.0 MEQ/L  


 


Anion Gap 11 MEQ/L  


 


Estimat Glomerular Filtration


Rate 22 ML/MIN 


  


 


 


Troponin I 0.37 NG/ML  


 


Lipase 46 U/L  


 


Lactic Acid Level  1.6 mmol/L 











[facility]


Medical Decision Making


Medical Screen Exam Complete:  Yes


Emergency Medical Condition:  Yes


Medical Record Reviewed:  Yes


Interpretation(s)


, INVERTED TWAVES AND SLIGHT DEPRESSION ON I, V5,V6


Differential Diagnosis


SEPSIS V PNA V HYPOXEMIA V UTI V PE V STEMI V NONSTEMI


Narrative Course


patient found to have pna findings on cxr as well as leukocytosis of 24...also e

/o renal insufficiency which may be causing elevation of troponin as well.


Critical Care Narrative


CRITICAL CARE NOTE: With evaluation of the patient, labs, EKG, receipt of 

radiologic studies, administration of medications, reevaluation the patient and 

discussion of the patient with the admitting physicians, the total critical 

care time was [60] minutes. Time to perform other separately billable 

procedures was not included in the critical care time.





Sepsis Criteria


SIRS Criteria (2 or more):  Temp > 100.9 or < 96.8, Heart rate over 90, WBC > 

[address], < 4000 or > 10% bands


Sepsis Criteria (SIRS+source):  Infect source susp/known


Severe Sepsis (+one):  Organ Dysfunction





Diagnosis





 Primary Impression:  


 ACUTE BILATERAL PNEUMONIA


 Additional Impressions:  


 Severe sepsis without septic shock


 ELEVATED TROPONIN


 Renal insufficiency





Admitting Information


Admitting Physician Requests:  it











Octavio Acosta MD Aug 16, 2017 22:02

## 2017-08-17 VITALS
OXYGEN SATURATION: 98 % | HEART RATE: 93 BPM | DIASTOLIC BLOOD PRESSURE: 84 MMHG | SYSTOLIC BLOOD PRESSURE: 167 MMHG | RESPIRATION RATE: 22 BRPM

## 2017-08-17 VITALS
OXYGEN SATURATION: 96 % | SYSTOLIC BLOOD PRESSURE: 138 MMHG | RESPIRATION RATE: 26 BRPM | DIASTOLIC BLOOD PRESSURE: 55 MMHG | HEART RATE: 95 BPM

## 2017-08-17 VITALS — HEART RATE: 83 BPM

## 2017-08-17 VITALS
HEART RATE: 104 BPM | OXYGEN SATURATION: 99 % | RESPIRATION RATE: 24 BRPM | DIASTOLIC BLOOD PRESSURE: 79 MMHG | SYSTOLIC BLOOD PRESSURE: 169 MMHG

## 2017-08-17 VITALS
SYSTOLIC BLOOD PRESSURE: 115 MMHG | HEART RATE: 94 BPM | DIASTOLIC BLOOD PRESSURE: 56 MMHG | OXYGEN SATURATION: 97 % | RESPIRATION RATE: 21 BRPM

## 2017-08-17 VITALS
HEART RATE: 84 BPM | TEMPERATURE: 99.6 F | SYSTOLIC BLOOD PRESSURE: 125 MMHG | OXYGEN SATURATION: 100 % | DIASTOLIC BLOOD PRESSURE: 63 MMHG | RESPIRATION RATE: 26 BRPM

## 2017-08-17 VITALS
DIASTOLIC BLOOD PRESSURE: 76 MMHG | HEART RATE: 78 BPM | OXYGEN SATURATION: 96 % | TEMPERATURE: 97.8 F | SYSTOLIC BLOOD PRESSURE: 150 MMHG | RESPIRATION RATE: 24 BRPM

## 2017-08-17 VITALS
TEMPERATURE: 98.2 F | HEART RATE: 83 BPM | SYSTOLIC BLOOD PRESSURE: 152 MMHG | DIASTOLIC BLOOD PRESSURE: 74 MMHG | OXYGEN SATURATION: 92 % | RESPIRATION RATE: 17 BRPM

## 2017-08-17 VITALS
RESPIRATION RATE: 20 BRPM | SYSTOLIC BLOOD PRESSURE: 143 MMHG | HEART RATE: 86 BPM | OXYGEN SATURATION: 100 % | DIASTOLIC BLOOD PRESSURE: 68 MMHG

## 2017-08-17 VITALS
DIASTOLIC BLOOD PRESSURE: 64 MMHG | HEART RATE: 84 BPM | RESPIRATION RATE: 18 BRPM | OXYGEN SATURATION: 100 % | SYSTOLIC BLOOD PRESSURE: 142 MMHG

## 2017-08-17 VITALS
OXYGEN SATURATION: 100 % | HEART RATE: 94 BPM | DIASTOLIC BLOOD PRESSURE: 73 MMHG | SYSTOLIC BLOOD PRESSURE: 153 MMHG | RESPIRATION RATE: 26 BRPM | TEMPERATURE: 98.4 F

## 2017-08-17 VITALS
HEART RATE: 90 BPM | RESPIRATION RATE: 22 BRPM | OXYGEN SATURATION: 97 % | DIASTOLIC BLOOD PRESSURE: 58 MMHG | SYSTOLIC BLOOD PRESSURE: 122 MMHG

## 2017-08-17 VITALS
TEMPERATURE: 98.1 F | DIASTOLIC BLOOD PRESSURE: 72 MMHG | SYSTOLIC BLOOD PRESSURE: 157 MMHG | HEART RATE: 93 BPM | RESPIRATION RATE: 19 BRPM | OXYGEN SATURATION: 100 %

## 2017-08-17 VITALS
RESPIRATION RATE: 20 BRPM | SYSTOLIC BLOOD PRESSURE: 133 MMHG | HEART RATE: 98 BPM | DIASTOLIC BLOOD PRESSURE: 60 MMHG | OXYGEN SATURATION: 100 %

## 2017-08-17 VITALS — OXYGEN SATURATION: 97 %

## 2017-08-17 VITALS — OXYGEN SATURATION: 95 %

## 2017-08-17 VITALS — OXYGEN SATURATION: 98 %

## 2017-08-17 LAB
ALP SERPL-CCNC: 170 U/L (ref 45–117)
ALT SERPL-CCNC: 18 U/L (ref 10–53)
ANION GAP SERPL CALC-SCNC: 14 MEQ/L (ref 5–15)
AST SERPL-CCNC: 21 U/L (ref 15–37)
BACTERIA #/AREA URNS HPF: (no result) /HPF
BASE EXCESS BLD CALC-SCNC: -10 MMOL/L (ref -2–2)
BASOPHILS # BLD AUTO: 0.1 TH/MM3 (ref 0–0.2)
BASOPHILS NFR BLD: 0.4 % (ref 0–2)
BENZODIAZEPINES PNL UR: 91 % (ref 90–100)
BILIRUB SERPL-MCNC: 0.5 MG/DL (ref 0.2–1)
BLOOD GAS CARBOXYHEMOGLOBIN: 1.5 % (ref 0–4)
BLOOD GAS HCO3: 15 MMOL/L (ref 22–26)
BLOOD GAS OXYGEN CONTENT: 10.5 VOL % (ref 12–20)
BLOOD GAS PCO2: 29 MMHG (ref 38–42)
BUN SERPL-MCNC: 40 MG/DL (ref 7–18)
CHLORIDE SERPL-SCNC: 110 MEQ/L (ref 98–107)
COLOR UR: YELLOW
COMMENT (UR): (no result)
CREAT UR-MCNC: 117 MG/DL (ref 27–300)
CRITICAL VALUE: YES
CULTURE IF INDICATED: (no result)
DRAW SITE: (no result)
EOSINOPHIL # BLD: 0 TH/MM3 (ref 0–0.4)
EOSINOPHIL NFR BLD: 0 % (ref 0–4)
ERYTHROCYTE [DISTWIDTH] IN BLOOD BY AUTOMATED COUNT: 14.1 % (ref 11.6–17.2)
GFR SERPLBLD BASED ON 1.73 SQ M-ARVRAT: 19 ML/MIN (ref 89–?)
GLUCOSE UR STRIP-MCNC: 300 MG/DL
HCO3 BLD-SCNC: 16 MEQ/L (ref 21–32)
HCT VFR BLD CALC: 26.2 % (ref 35–46)
HEMO FLAGS: (no result)
HGB UR QL STRIP: (no result)
HYALINE CASTS #/AREA URNS LPF: 3 /LPF
KETONES UR STRIP-MCNC: (no result) MG/DL
LITER FLOW: 15 L/M
LYMPHOCYTES # BLD AUTO: 0.7 TH/MM3 (ref 1–4.8)
LYMPHOCYTES NFR BLD AUTO: 3 % (ref 9–44)
MCH RBC QN AUTO: 31.3 PG (ref 27–34)
MCHC RBC AUTO-ENTMCNC: 31.3 % (ref 32–36)
MCV RBC AUTO: 100.1 FL (ref 80–100)
METHGB MFR BLDA: 1 % (ref 0–2)
MONOCYTES NFR BLD: 0.9 % (ref 0–8)
MUCOUS THREADS #/AREA URNS LPF: (no result) /LPF
NEUTROPHILS # BLD AUTO: 21.6 TH/MM3 (ref 1.8–7.7)
NEUTROPHILS NFR BLD AUTO: 95.7 % (ref 16–70)
NITRITE UR QL STRIP: (no result)
NUMBER OF ARTERIAL PUNCTURES: 1
O2/TOTAL GAS SETTING VFR VENT: 100 %
OXYGEN DEVICE: (no result)
PLATELET # BLD: 294 TH/MM3 (ref 150–450)
PO2 BLD: 76 MMHG (ref 61–120)
POTASSIUM SERPL-SCNC: 5.4 MEQ/L (ref 3.5–5.1)
RBC # BLD AUTO: 2.62 MIL/MM3 (ref 4–5.3)
SALICYLATES SERPL-MCNC: 8.1 G/DL (ref 12–16)
SODIUM SERPL-SCNC: 140 MEQ/L (ref 136–145)
SP GR UR STRIP: 1.02 (ref 1–1.03)
SQUAMOUS #/AREA URNS HPF: 1 /HPF (ref 0–5)
STAT: YES
TEMP CORR TO: 98.6
ULNAR PULSE: PRESENT
WBC # BLD AUTO: 22.6 TH/MM3 (ref 4–11)

## 2017-08-17 PROCEDURE — 5A09357 ASSISTANCE WITH RESPIRATORY VENTILATION, LESS THAN 24 CONSECUTIVE HOURS, CONTINUOUS POSITIVE AIRWAY PRESSURE: ICD-10-PCS | Performed by: EMERGENCY MEDICINE

## 2017-08-17 RX ADMIN — INSULIN ASPART SCH: 100 INJECTION, SOLUTION INTRAVENOUS; SUBCUTANEOUS at 21:00

## 2017-08-17 RX ADMIN — BUMETANIDE SCH MG: 0.25 INJECTION, SOLUTION INTRAMUSCULAR; INTRAVENOUS at 22:20

## 2017-08-17 RX ADMIN — CEFEPIME SCH MLS/HR: 2 INJECTION, POWDER, FOR SOLUTION INTRAVENOUS at 23:38

## 2017-08-17 RX ADMIN — IPRATROPIUM BROMIDE AND ALBUTEROL SULFATE SCH AMPULE: .5; 3 SOLUTION RESPIRATORY (INHALATION) at 15:33

## 2017-08-17 RX ADMIN — STANDARDIZED SENNA CONCENTRATE AND DOCUSATE SODIUM SCH TAB: 8.6; 5 TABLET, FILM COATED ORAL at 22:20

## 2017-08-17 RX ADMIN — PANTOPRAZOLE SCH MG: 40 TABLET, DELAYED RELEASE ORAL at 09:10

## 2017-08-17 RX ADMIN — PRAVASTATIN SODIUM SCH MG: 40 TABLET ORAL at 22:20

## 2017-08-17 RX ADMIN — NIFEDIPINE SCH MG: 30 TABLET, FILM COATED, EXTENDED RELEASE ORAL at 22:20

## 2017-08-17 RX ADMIN — NIFEDIPINE SCH MG: 30 TABLET, FILM COATED, EXTENDED RELEASE ORAL at 09:11

## 2017-08-17 RX ADMIN — Medication SCH ML: at 09:16

## 2017-08-17 RX ADMIN — GUAIFENESIN SCH MG: 600 TABLET, EXTENDED RELEASE ORAL at 10:56

## 2017-08-17 RX ADMIN — ATENOLOL SCH MG: 25 TABLET ORAL at 09:11

## 2017-08-17 RX ADMIN — IPRATROPIUM BROMIDE AND ALBUTEROL SULFATE SCH AMPULE: .5; 3 SOLUTION RESPIRATORY (INHALATION) at 11:44

## 2017-08-17 RX ADMIN — ASPIRIN 81 MG SCH MG: 81 TABLET ORAL at 09:11

## 2017-08-17 RX ADMIN — ACYCLOVIR SCH UNITS: 800 TABLET ORAL at 21:00

## 2017-08-17 RX ADMIN — FERROUS SULFATE TAB 325 MG (65 MG ELEMENTAL FE) SCH MG: 325 (65 FE) TAB at 22:20

## 2017-08-17 RX ADMIN — HYDROMORPHONE HYDROCHLORIDE PRN MG: 1 INJECTION, SOLUTION INTRAMUSCULAR; INTRAVENOUS; SUBCUTANEOUS at 22:58

## 2017-08-17 RX ADMIN — FOLIC ACID SCH MG: 1 TABLET ORAL at 09:11

## 2017-08-17 RX ADMIN — HYDROMORPHONE HYDROCHLORIDE PRN MG: 1 INJECTION, SOLUTION INTRAMUSCULAR; INTRAVENOUS; SUBCUTANEOUS at 18:41

## 2017-08-17 RX ADMIN — INSULIN ASPART SCH: 100 INJECTION, SOLUTION INTRAVENOUS; SUBCUTANEOUS at 12:38

## 2017-08-17 RX ADMIN — STANDARDIZED SENNA CONCENTRATE AND DOCUSATE SODIUM SCH TAB: 8.6; 5 TABLET, FILM COATED ORAL at 10:56

## 2017-08-17 RX ADMIN — GUAIFENESIN SCH MG: 600 TABLET, EXTENDED RELEASE ORAL at 22:20

## 2017-08-17 RX ADMIN — Medication SCH ML: at 22:59

## 2017-08-17 RX ADMIN — VANCOMYCIN HYDROCHLORIDE SCH MLS/HR: 1 INJECTION, SOLUTION INTRAVENOUS at 01:00

## 2017-08-17 RX ADMIN — IPRATROPIUM BROMIDE AND ALBUTEROL SULFATE SCH AMPULE: .5; 3 SOLUTION RESPIRATORY (INHALATION) at 20:33

## 2017-08-17 RX ADMIN — FERROUS SULFATE TAB 325 MG (65 MG ELEMENTAL FE) SCH MG: 325 (65 FE) TAB at 09:10

## 2017-08-17 NOTE — PD.CONS
\[facility] [place] [facility]\""


Service


Critical Care Medicine


Consult Requested By


Dr. Milan


Reason for Consult


Critical care management of patient with respiratory failure


Primary Care Physician


Jamie Hill MD


History of Present Illness


52-year-old  female with past medical history of type 2 

diabetes mellitus, coronary artery disease with prior myocardial infarction and 

stents, hyperlipidemia, recurrent alcohol-induced pancreatitis who presents to 

[facility] with 2 day history of shortness of breath.  She states 

dyspnea on exertion started Tuesday evening (8/15). She has had a cough which 

is now productive of yellow sputum.  She had not checked her temperature at 

home but was febrile upon admission with a MAXIMUM TEMPERATURE of 102.5.  She 

has had this some discomfort of her left far lateral  chest wall that occurs 

with cough. Her WBC on admission was 24.6 and CXR demonstrated bilateral 

interstitial and alveolar opacities.  Lactic acid was normal.  Blood cultures 

were drawn in the ED and are negative to date..  She was started on ceftriaxone 

and azithromycin.  Urine Legionella and pneumococcal antigens were performed 

and were negative.  Influenza was negative.  She may have  CK D stage III (

difficult to determine baseline creatinine due to multiple admissions with LETI) 

but presented with creatinine of 2.79, BUN 34 and GFR 22.  She was started on 

NS @ 100 ml/hr. Her creatinine has worsened since admission to 3.11. Her 

respiratory status has also worsened and she is now on NR which prompted 

transfer to ICU with intensivist consult. CT chest demonstrates bilateral 

opacities, most prominent RML/RLL and CARIN/LLL.   BNP is 2464. Troponins are 

flat at 0.37





Past Family Social History


Allergies:  


Coded Allergies:  


     shellfish derived (Unverified  Adverse Reaction, Mild, VOMITNG, 8/15/17)


Past Medical History


Diabetes mellitus


Coronary artery disease with prior myocardial infarction and stents


Hyperlipidemia


GERD


Alcohol abuse


Uterine fibroids


Alcohol-induced pancreatitis


Peripheral neuropathy


Past Surgical History


Cholecystectomy


Bilateral tubal ligation


Cataract resection


EGD 3/27/14


Colonoscopy


Coronary stent 2008


Reported Medications


Enalapril 20 mg by mouth twice a day


Gabapentin 3 mg by mouth daily at bedtime


Atenolol 25 mg by mouth daily


Nifedipine 60 mg by mouth every morning/30 g by mouth every afternoon


Simvastatin 20 mg by mouth daily at bedtime


Lantus 26 units subcutaneous daily at bedtime


Ferrous sulfate 325 mill grams by mouth twice a day


Aspirin 81 mg daily


Tramadol 50 g by mouth every 4 hours when necessary pain


Omeprazole 40 mg daily


Folic acid 1 mg by mouth daily


Family History


Mother had diabetes and end-stage renal disease, was on dialysis


Social History


She works as an LPN for [employer]


Smoked a pack of cigarettes per day for 30 years currently is still smoking a 

half a pack of cigarettes per day


Used to drink alcohol heavily but states she discontinued in 2014





Physical Exam


Vital Signs





 Vital Signs








  Date Time  Temp Pulse Resp B/P Pulse Ox O2 Delivery O2 Flow Rate FiO2


 


8/17/17 18:30 99.6 84 26 125/63 100   


 


8/17/17 18:00     95 Non-Rebreather 15.00 


 


8/17/17 15:29 98.2 83 17 152/74 92 Nasal Cannula 2 


 


8/17/17 11:40  93 22 167/84 98 Room Air  


 


8/17/17 07:00 98.1 93 19 157/72 100 Nasal Cannula 4 


 


8/17/17 06:15 98.4 94 26 153/73 100 Nasal Cannula 4 


 


8/17/17 05:00  86 20 143/68 100 Nasal Cannula 4 


 


8/17/17 04:00  84 18 142/64 100 Nasal Cannula 4 


 


8/17/17 03:00  95 26 138/55 96 Nasal Cannula 4 


 


8/17/17 02:00  90 22 122/58 97 Nasal Cannula 4 


 


8/17/17 01:15  94 21 115/56 97 Nasal Cannula 4 


 


8/17/17 00:15  98 20 133/60 100 Nasal Cannula 4 


 


8/17/17 00:00  104 24 169/79 99 Nasal Cannula 4 


 


8/16/17 23:45  110 32 179/76 98 Nasal Cannula 4 


 


8/16/17 23:30  108 30 179/77 98 Nasal Cannula 4 


 


8/16/17 23:15  112 29 181/66 98 Nasal Cannula 4 


 


8/16/17 23:03 100.9 118 28 184/80 98 Nasal Cannula 4 


 


8/16/17 22:30  115 28 193/88 97 Nasal Cannula 4 


 


8/16/17 22:15  119 32 219/98 100 Nasal Cannula 4 


 


8/16/17 21:54     94 Nasal Cannula 3.00 


 


8/16/17 21:50 102.5 112 32 213/86 100 Nasal Cannula 4 


 


8/16/17 21:50     98 Aerosol Mask  


 


8/16/17 21:50     94 Nasal Cannula 4 


 


8/16/17 21:43 102.5 114 34 213/86 93   


 


8/16/17 21:41  112 36 228/105 91 Nasal Cannula 4 


 


8/16/17 21:31 101.9 114 18 224/114 91 Room Air  








Physical Exam


GENERAL: Well-nourished, well-developed  female who is sitting 

up in [facility] bed, tachypneic, not wearing NR mask at sats are 79%. 


SKIN: Warm and dry, well-perfused.


HEAD: Atraumatic. Normocephalic. 


EYES: Pupils equal and round. No scleral icterus. No injection or drainage. 


ENT: No nasal bleeding or discharge.  Mucous membranes pink and moist.


NECK: Trachea midline. 


CARDIOVASCULAR: Regular rate and rhythm, rate in the 70s. No murmurs rubs or 

gallops. 


RESPIRATORY:  Tachypneic with labored breathing. Bibasilar rales. Scattered 

rhonchi with some wheeze. 


GASTROINTESTINAL: Abdomen soft, non-tender, nondistended. Bowel sounds present. 


MUSCULOSKELETAL: Extremities without clubbing, cyanosis, or edema. No obvious 

deformities. 


NEUROLOGICAL: Awake and alert. No obvious cranial nerve deficits.  Motor 

grossly within normal limits, moving all extremities spontaneously .  Normal 

speech.


Laboratory





Laboratory Tests








Test 8/16/17 8/16/17 8/17/17 8/17/17





 21:58 22:01 05:47 06:29


 


Sodium Level 136    140 


 


Potassium Level 4.6    5.4 


 


Chloride Level 106    110 


 


Carbon Dioxide Level 19.0    16.0 


 


Anion Gap 11    14 


 


Blood Urea Nitrogen 34    40 


 


Creatinine 2.79    3.11 


 


Estimat Glomerular Filtration 22    19 





Rate    


 


Random Glucose 84    211 


 


Calcium Level 8.9    8.4 


 


Total Bilirubin 1.1    0.5 


 


Aspartate Amino Transf 26    21 





(AST/SGOT)    


 


Alanine Aminotransferase 22    18 





(ALT/SGPT)    


 


Alkaline Phosphatase 209    170 


 


Troponin I 0.37    0.37 


 


Total Protein 7.9    7.2 


 


Albumin 3.0    2.6 


 


Lipase 46    


 


White Blood Count 24.6   22.6  


 


Red Blood Count 2.97   2.62  


 


Hemoglobin 9.3   8.2  


 


Hematocrit 28.8   26.2  


 


Mean Corpuscular Volume 97.1   100.1  


 


Mean Corpuscular Hemoglobin 31.3   31.3  


 


Mean Corpuscular Hemoglobin 32.2   31.3  





Concent    


 


Red Cell Distribution Width 13.8   14.1  


 


Platelet Count 315   294  


 


Mean Platelet Volume 8.2   8.6  


 


Neutrophils (%) (Auto) 90.0   95.7  


 


Lymphocytes (%) (Auto) 4.8   3.0  


 


Monocytes (%) (Auto) 4.8   0.9  


 


Eosinophils (%) (Auto) 0.0   0.0  


 


Basophils (%) (Auto) 0.4   0.4  


 


Neutrophils # (Auto) 22.1   21.6  


 


Lymphocytes # (Auto) 1.2   0.7  


 


Monocytes # (Auto) 1.2   0.2  


 


Eosinophils # (Auto) 0.0   0.0  


 


Basophils # (Auto) 0.1   0.1  


 


CBC Comment DIFF FINAL   DIFF FINAL  


 


Differential Comment      


 


Lactic Acid Level  1.6   


 


B-Type Natriuretic Peptide   2464  


 


    





Test 8/17/17   





 15:31   


 


Urine Color YELLOW    


 


Urine Turbidity HAZY    


 


Urine pH 5.5    


 


Urine Specific Gravity 1.017    


 


Urine Protein 300    


 


Urine Glucose (UA) 300    


 


Urine Ketones NEG    


 


Urine Occult Blood TRACE    


 


Urine Nitrite NEG    


 


Urine Bilirubin NEG    


 


Urine Urobilinogen LESS THAN 2.0    


 


Urine Leukocyte Esterase NEG    


 


Urine RBC 6    


 


Urine WBC 7    


 


Urine Squamous Epithelial 1    





Cells    


 


Urine Amorphous Sediment RARE    


 


Urine Bacteria RARE    


 


Urine Hyaline Casts 3    


 


Urine Mucus FEW    


 


Microscopic Urinalysis Comment CATH-CULTURE   





 IND   














 Date/Time Procedure Status





Source Growth 


 


 8/17/17 15:31 Urine Culture Received





Urine Catheterized Urine Pending 


 


 8/17/17 15:31 Legionella Antigen - Final Complete





Urine Random Urine PRESUMPTIVE NEGATIVE FOR LEGIONELLA P... 





 8/17/17 15:31 Streptococcus pneumoniae Antigen (M - Final Complete





Urine Random Urine PRESUMPTIVE NEGATIVE FOR STREPTOCOCCU... 


 


 8/16/17 23:07 Influenza Types A,B Antigen (ARLINE) - Final Complete





Nasal Washing NEGATIVE FOR FLU A AND B ANTIGEN.... 


 


 8/16/17 22:05 Aerobic Blood Culture - Preliminary Resulted





Blood Peripheral NO GROWTH IN 1 DAY 





 8/16/17 22:05 Anaerobic Blood Culture - Preliminary Resulted





Blood Peripheral NO GROWTH IN 1 DAY 








Result Diagram:  


8/17/17 0547                                                                   

             8/17/17 0629








Assessment and Plan


Assessment and Plan


NEURO:


Peripheral neuropathy


Prior history of alcohol abuse


Change Gabapentin 100 bid due to LETI


Ativan 0.5 mg IV for anxiety associated with Bipap mask placement. 


Hydromorphone as needed for pain





RESP:


Acute respiratory failure


Tobacco abuse


HCAP


Pulmonary edema -  noncardiogenic vs cardiogenic


Sats 100% on NR but placed on BiPAP 10/5 titrate FIO2  for sat >92% due to 

increased work of breathing. 


Monitor respiratory status closely in ICU, may require intubation. 


DuoNeb every 4 hours


Albuterol every 2 hours as needed


Broaden antimicrobial coverage to cover HCAP as per below. 


Continue guaifenesin 600 mg by mouth twice a day





CV:


Coronary artery disease with prior myocardial infarction and stents


Hyperlipidema


Hypertension


BNP 2400.  Patient denies known history of CHF. Followup 2 D Echo.


EKG demonstrates sinus tach and LVH.


Continue atenolol 25 mg by mouth daily.  Continue Procardia XL 30 mg twice a day


Continue aspirin 81 mg by mouth daily


Continue pravastatin 40 mg by mouth daily at bedtime





GI: 


History of recurrent pancreatitis, alcohol induced


History of gastroparesis


GERD


1800 ADA heart healthy diet


Lipase WNL 





FEN/RENAL:


Acute kidney injury overlying probable CKD


Proteinuria


Insert Rhodes to monitor intake and output closely.  Creatinine worsened despite 

fluid resus, remains oliguric. Started on Bumex 2 mg IV bid per nephrology. 


Renal ultrasoundno  hydronephrosis.  Small cyst on left kidney.  Increased 

echogenicity consistent with medical renal disease


Followup Complement, ANCA, PARMJIT, SPEP. 


Nephrology following, Dr. Whiteside. 


Avoid nephrotoxins.  Received Toradol and Motrin 8/16





ID:


Pneumonia ?HCAP


Leukocytosis


(Discharged 6/1/17 after hospitalization for pancreatitis)


Urine Legionella and pneumococcal antigen negative.  Influenza screen negative.

  Blood cultures 8/16 no growth to date


Urine culture pending


Will broaden antimicrobial coverage to include cefepime, vancomycin, Levaquin 

to cover for HCAP.  





HEME:


Chronic anemia


Monitor CBC


Continue ferrous sulfate 325 mg by mouth twice a day


Continue folic acid 1 mg by mouth daily





ENDO:


Diabetes mellitus


Detemir has been decreased to 20 units subcutaneous daily at bedtime.  Medium 

dose insulin sliding scale before meals/at bedtime





PROPH: Heparin 5000 subcutaneous every 12 for DVT prophylaxis. Pantoprazole 40 

mg po daily. 





ACCESS: Peripheral IV providing adequate access at this time.





Patient is critical ill with hypoxemic respiratory failure. Placing on Bipap 

now. If fails Bipap, will require intubation. Multiple reassessments at bedside 

to assess response.Patient clinically improving overnight, FIO2 weaned to 60%. 

Modest UOP ~750 in response to Bumex. 





CCT 55 minutes exclusive of separately billable procedures.








Rebekah Young MD Aug 17, 2017 20:00

## 2017-08-17 NOTE — PD.CONS
HPI


Service


Nephrology


Consult Requested By





Reason for Consult


Acute on CKD


Primary Care Physician


Jamie Hill MD


History of Present Illness


This is a 51 y/o AAF admitted for shortness of breath. On arrival she was 

febrile with leukocytosis, being treated for pneumonia with Zithromax and 

Rocephin. On arrival her creatinine was 2.7, is 3.1 today. She has metabolic 

acidosis and her potassium is 5.4. We were consulted for management. Looking 

back her creatinine has never been normal. In Aug 2016 her best creatinine was 

1.3, In may of this year it was 1.6, which may be her baseline (GFR in 30s). 

She has heavy proteinuria this admission. She is on 0.9% NS with no 

improvement. She has been given IV toradol and PO motrin. She has no edema. PMH 

of HTN, DM II for 20 years and is on metformin at home. She is a full code. Her 

mother had DM II and passed away. She was on dialysis.  (Migdalia Camejo

)





Review of Systems


Constitutional:  COMPLAINS OF: Fatigue, Fever, Chills, Change in appetite


Respiratory:  COMPLAINS OF: Cough, Sputum production, Shortness of breath,  

DENIES: Wheezing


Cardiovascular:  COMPLAINS OF: Dyspnea on Exertion,  DENIES: Chest pain


Genitourinary:  DENIES: Urinary frequency, Urgency


Musculoskeletal:  COMPLAINS OF: Back pain,  DENIES: Joint pain, Neck pain


Neurologic:  DENIES: Abnormal gait, Localized weakness, Paresthesias (Migdalia Camejo)





Past Family Social History


Allergies:  


Coded Allergies:  


     shellfish derived (Unverified  Adverse Reaction, Mild, VOMITNG, 8/15/17)


Past Medical History


Hypertension, essential


probable CKD 


DM II x 22 years


GERD, no esophagitis


CAD, hx of MI in 2008 s/p PTCA with stent placement


Hyperlipidemia


Multiple hospitalizations for recurrent abdominal pain


History of pancreatitis , ETOH induced 


History of alcohol abuse


Uterine fibroids


Past Surgical History


Cholecystectomy


Tubal ligation, bilateral


PTCA with stent placement approximately 8 years ago


Cataract surgery


Colonoscopy 4/29/14 with Dr. Granados --> 2 sessile polyps of approximately 4 

mm were found in the sigmoid colon, internal and external hemorrhoids.  

Pathology did not show malignancy. 


EGD performed 3/27/14 by Dr. Hemeidan --> Normal esophagus, some nodularity in 

the body and antrum of the stomach, mild gastritis.  Pathology did not show 

malignancy


EUS performed by Dr. Wade Uriostegui 5/22/14 --> Pancreatic parenchyma was 

isoechoic and homogeneous, common bile duct was normal.


Reported Medications


Metformin 1000 mg po BID


Nifedipine ER 24 HR (Nifedipine) 30 Mg Tab 30 Mg PO AS DIRECTED


     60mg po in morning


     30mg po in evening


Gabapentin 100 Mg Cap 300 Mg PO HS


Tramadol (Tramadol HCl) 50 Mg Tab 50 Mg PO Q4H PRN


Simvastatin 20 Mg Tab 20 Mg PO HS


Omeprazole 40 Mg Cap 40 Mg PO DAILY


Lantus Inj (Insulin Glargine) 1,000 Unit/10 Ml Vial 26 Units SQ HS


Folic Acid 400 Mcg Tab 1 Mg PO DAILY


Feosol (Ferrous Sulfate) 200 Mg Tab 325 Mg PO BID


Vasotec (Enalapril Maleate) 20 Mg Tab 20 Mg PO BID


Atenolol 25 Mg Tab 25 Mg PO DAILY


Aspirin Children's (Aspirin) 81 Mg Chew 81 Mg CHEW DAILY


Active Ordered Medications





 Current Medications








 Medications


  (Trade)  Dose


 Ordered  Sig/Luis Armando


 Route  Start Time


 Stop Time Status Last Admin


 


  (Aspirin Chew)  81 mg  DAILY


 CHEW  8/17/17 09:00


    8/17/17 09:11


 


 


  (Tenormin)  25 mg  DAILY


 PO  8/17/17 09:00


    8/17/17 09:11


 


 


  (Ferrous Sulfate)  325 mg  BID


 PO  8/17/17 09:00


    8/17/17 09:10


 


 


  (Folate)  1 mg  DAILY


 PO  8/17/17 09:00


    8/17/17 09:11


 


 


  (Neurontin)  300 mg  HS


 PO  8/17/17 21:00


     


 


 


  (Procardia Xl)  30 mg  BID


 PO  8/17/17 09:00


    8/17/17 09:11


 


 


  (Ultram)  50 mg  Q4H  PRN


 PO  8/17/17 00:00


    8/17/17 16:42


 


 


  (Protonix)  40 mg  DAILY


 PO  8/17/17 09:00


    8/17/17 09:10


 


 


  (Pravachol)  40 mg  HS


 PO  8/17/17 21:00


     


 


 


  (NS Flush)  2 ml  UNSCH  PRN


 IV FLUSH  8/17/17 00:00


     


 


 


  (NS Flush)  2 ml  BID


 IV FLUSH  8/17/17 09:00


    8/17/17 09:16


 


 


  (Zofran Inj)  4 mg  Q6H  PRN


 IVP  8/17/17 00:00


     


 


 


  (Narcan Inj)  0.4 mg  UNSCH  PRN


 IV  8/17/17 00:00


     


 


 


  (Theresa-Colace)  1 tab  BID


 PO  8/17/17 09:00


    8/17/17 10:56


 


 


  (Milk Of


 Magnesia Liq)  30 ml  Q12H  PRN


 PO  8/17/17 00:00


     


 


 


  (Senokot)  17.2 mg  Q12H  PRN


 PO  8/17/17 00:00


     


 


 


  (Dulcolax Supp)  10 mg  DAILY  PRN


 RECTAL  8/17/17 00:00


     


 


 


 Lactulose 30 ml  30 ml  DAILY  PRN


 PO  8/17/17 00:00


     


 


 


 Ceftriaxone


 Sodium 1000 mg/


 Sodium Chloride  100 ml @ 


 200 mls/hr  Q24H


 IV  8/17/17 21:00


     


 


 


  (Zithromax Inj/


  ml Inj)  250 ml @ 


 250 mls/hr  Q24H


 IV  8/17/17 22:00


     


 


 


 Guaifenesin 600 mg  600 mg  BID


 PO  8/17/17 09:00


    8/17/17 10:56


 


 


  (NS 1000 ml Inj)  1,000 ml @ 


 100 mls/hr  Q10H


 IV  8/17/17 10:15


    8/17/17 16:43


 


 


  (Catapres)  0.1 mg  Q6H  PRN


 PO  8/17/17 10:45


     


 


 


  (D50w (Vial) Inj)  50 ml  UNSCH  PRN


 IV PUSH  8/17/17 10:45


     


 


 


  (Glucagon Inj)  1 mg  UNSCH  PRN


 OTHER  8/17/17 10:45


     


 


 


  (Levemir Inj)  20 units  HS


 SQ  8/17/17 21:00


     


 








Family History


Mother had DM II, passed away on dialysis


Social History


smokes 1/2 PPD for years


former heavy ETOH, quit 5 years ago


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


independent


single, lives with brother


full code  (Migdalia Camejo)





Physical Exam


Vital Signs





 Vital Signs








  Date Time  Temp Pulse Resp B/P Pulse Ox O2 Delivery O2 Flow Rate FiO2


 


8/17/17 15:29 98.2 83 17 152/74 92 Nasal Cannula 2 


 


8/17/17 11:40  93 22 167/84 98 Room Air  


 


8/17/17 07:00 98.1 93 19 157/72 100 Nasal Cannula 4 


 


8/17/17 06:15 98.4 94 26 153/73 100 Nasal Cannula 4 


 


8/17/17 05:00  86 20 143/68 100 Nasal Cannula 4 


 


8/17/17 04:00  84 18 142/64 100 Nasal Cannula 4 


 


8/17/17 03:00  95 26 138/55 96 Nasal Cannula 4 


 


8/17/17 02:00  90 22 122/58 97 Nasal Cannula 4 


 


8/17/17 01:15  94 21 115/56 97 Nasal Cannula 4 


 


8/17/17 00:15  98 20 133/60 100 Nasal Cannula 4 


 


8/17/17 00:00  104 24 169/79 99 Nasal Cannula 4 


 


8/16/17 23:45  110 32 179/76 98 Nasal Cannula 4 


 


8/16/17 23:30  108 30 179/77 98 Nasal Cannula 4 


 


8/16/17 23:15  112 29 181/66 98 Nasal Cannula 4 


 


8/16/17 23:03 100.9 118 28 184/80 98 Nasal Cannula 4 


 


8/16/17 22:30  115 28 193/88 97 Nasal Cannula 4 


 


8/16/17 22:15  119 32 219/98 100 Nasal Cannula 4 


 


8/16/17 21:54     94 Nasal Cannula 3.00 


 


8/16/17 21:50 102.5 112 32 213/86 100 Nasal Cannula 4 


 


8/16/17 21:50     98 Aerosol Mask  


 


8/16/17 21:50     94 Nasal Cannula 4 


 


8/16/17 21:43 102.5 114 34 213/86 93   


 


8/16/17 21:41  112 36 228/105 91 Nasal Cannula 4 


 


8/16/17 21:31 101.9 114 18 224/114 91 Room Air  








Physical Exam


GENERAL: This is a well-nourished, well-developed AAF patient, in no apparent 

distress.


HEENT: Atraumatic. Normocephalic. No temporal or scalp tenderness. No scleral 

icterus. Airway patent.


NECK: Trachea midline, supple, nontender.


CARDIO: Regular rate, rhythm. no murmurs. 


RESP: No rales. lungs relatively clear, decreased in bases;  no wheezing. 


ABD: +BS, soft, non-tender, nondistended. 


EXT: Extremities without clubbing, cyanosis, or edema. Distal pulses intact 


NEURO: Awake and alert. Motor and sensory grossly within normal limits. Normal 

speech. CN II-XII intact


Laboratory





Laboratory Tests








Test 8/16/17 8/16/17 8/17/17 8/17/17





 21:58 22:01 05:47 06:29


 


Sodium Level 136    140 


 


Potassium Level 4.6    5.4 


 


Chloride Level 106    110 


 


Carbon Dioxide Level 19.0    16.0 


 


Anion Gap 11    14 


 


Blood Urea Nitrogen 34    40 


 


Creatinine 2.79    3.11 


 


Estimat Glomerular Filtration 22    19 





Rate    


 


Random Glucose 84    211 


 


Calcium Level 8.9    8.4 


 


Total Bilirubin 1.1    0.5 


 


Aspartate Amino Transf 26    21 





(AST/SGOT)    


 


Alanine Aminotransferase 22    18 





(ALT/SGPT)    


 


Alkaline Phosphatase 209    170 


 


Troponin I 0.37    0.37 


 


Total Protein 7.9    7.2 


 


Albumin 3.0    2.6 


 


Lipase 46    


 


White Blood Count 24.6   22.6  


 


Red Blood Count 2.97   2.62  


 


Hemoglobin 9.3   8.2  


 


Hematocrit 28.8   26.2  


 


Mean Corpuscular Volume 97.1   100.1  


 


Mean Corpuscular Hemoglobin 31.3   31.3  


 


Mean Corpuscular Hemoglobin 32.2   31.3  





Concent    


 


Red Cell Distribution Width 13.8   14.1  


 


Platelet Count 315   294  


 


Mean Platelet Volume 8.2   8.6  


 


Neutrophils (%) (Auto) 90.0   95.7  


 


Lymphocytes (%) (Auto) 4.8   3.0  


 


Monocytes (%) (Auto) 4.8   0.9  


 


Eosinophils (%) (Auto) 0.0   0.0  


 


Basophils (%) (Auto) 0.4   0.4  


 


Neutrophils # (Auto) 22.1   21.6  


 


Lymphocytes # (Auto) 1.2   0.7  


 


Monocytes # (Auto) 1.2   0.2  


 


Eosinophils # (Auto) 0.0   0.0  


 


Basophils # (Auto) 0.1   0.1  


 


CBC Comment DIFF FINAL   DIFF FINAL  


 


Differential Comment      


 


Lactic Acid Level  1.6   


 


    





Test 8/17/17   





 15:31   


 


Urine Color YELLOW    


 


Urine Turbidity HAZY    


 


Urine pH 5.5    


 


Urine Specific Gravity 1.017    


 


Urine Protein 300    


 


Urine Glucose (UA) 300    


 


Urine Ketones NEG    


 


Urine Occult Blood TRACE    


 


Urine Nitrite NEG    


 


Urine Bilirubin NEG    


 


Urine Urobilinogen LESS THAN 2.0    


 


Urine Leukocyte Esterase NEG    


 


Urine RBC 6    


 


Urine WBC 7    


 


Urine Squamous Epithelial 1    





Cells    


 


Urine Amorphous Sediment RARE    


 


Urine Bacteria RARE    


 


Urine Hyaline Casts 3    


 


Urine Mucus FEW    


 


Microscopic Urinalysis Comment CATH-CULTURE   





 IND   














 Date/Time Procedure Status





Source Growth 


 


 8/17/17 15:31 Urine Culture Received





Urine Catheterized Urine Pending 


 


 8/16/17 23:07 Influenza Types A,B Antigen (ARLINE) - Final Complete





Nasal Washing NEGATIVE FOR FLU A AND B ANTIGEN.... 


 


 8/16/17 22:05 Aerobic Blood Culture - Preliminary Resulted





Blood Peripheral NO GROWTH IN 1 DAY 





 8/16/17 22:05 Anaerobic Blood Culture - Preliminary Resulted





Blood Peripheral NO GROWTH IN 1 DAY 





 (Migdalia Camejo)


Result Diagram:  


8/17/17 0547                                                                   

             8/17/17 0629





Imaging





Last 72 hours Impressions








Renal Ultrasound 8/17/17 0000 Signed





Impressions: 





 Service Date/Time:  Thursday, August 17, 2017 10:58 - CONCLUSION:  1. No 





 evidence of hydronephrosis. 2. Small cyst in left kidney. 3. Kidneys appear 





 mildly increased in echogenicity which could indicate medical renal disease.  

   





 Rogerio Weaver MD 


 


Chest CT 8/17/17 0000 Signed





Impressions: 





 Service Date/Time:  Thursday, August 17, 2017 10:35 - CONCLUSION:  1. 

Bilateral 





 airspace disease with patchy areas of consolidation. This may represent 





 pulmonary edema. 2. Small bilateral pleural effusions and mild cardiomegaly 





 which could indicate congestive heart failure.      Rogerio Weaver MD 


 


Chest X-Ray 8/16/17 2144 Signed





Impressions: 





 Service Date/Time:  Wednesday, August 16, 2017 22:26 - CONCLUSION:  Diffuse 





 interstitial prominence and alveolar consolidation seen bilaterally being more 





 prominent on the left. Diffuse infectious processes should be considered.     





 Severo Weir MD 





 (Migdalia Camejo)





Assessment and Plan


Problem List:  


(1) Acute kidney injury superimposed on CKD


Plan:  It appears she has underlying CKD


baseline hard to determine as she has had multiple episodes of LETI


In Aug 2016 her creatinine was 1.3, In May of 2017 it was 1.6, GFR in 30s (CKD 3

)


LETI may be due to infection, sepsis syndrome, NSAID use. 


she has heavy proteinuria, quantify, this likely represents underlying diabetic 

nephropathy


I will stop the IVF as she is not improving


avoid nephrotoxic substances, she was given PO Motrin yesterday


monitor drug levels when appropriate, minimize non essential medications 


K 5.4, monitor for now





(2) Severe sepsis without septic shock


Plan:  On Rocephin and Zithromax, likely CAP 


significant leukocytosis 


ordered steroids, nebulizers, oxygen


CT reviewed 





(3) HTN (hypertension)


Plan:  monitor blood pressure


continue ordered medications, avoid ACE 





(4) Diabetes


Plan:  continue insulin therapy


I would avoid metformin (home medication) given hx of CKD 3-4


glucose goal 140-180 mg/dL


 (Migdalia Camejo)


Assessment and Plan


patient was seen and examined. Discussed with the intensivist. The patient has 

been transferred to ICU due to hypoxia and progressive shortness of breath. 


She has underlying stage III-IV CKD associated with proteinuria. 


Has leukocytosis. 


CXR, CT with bilateral pulmonary infiltrates. 


Diabetic. 


Plan:


Quantify proteinuria.


Diuretic challenge. 


Serologies since has renal and pulmonary involvement. 


Treat for HCAP: antibiotics to be changed. 


Will follow. May need dialysis.  (Kelton Whiteside MD)








Migdalia Camejo Aug 17, 2017 17:20


Kelton Whiteside MD Aug 17, 2017 20:01

## 2017-08-17 NOTE — HHI.HP
HPI


Service


[facility]


Primary Care Physician


Jamie Hill MD


Admission Diagnosis


SEPSIS, DENILSON PNA, ELEVATED TROPONIN


Chief Complaint:  


SOB


Travel History


International Travel<30 Days:  No


Contact w/Intl Traveler <30 Da:  No


Traveled to Known Affected Are:  No


History of Present Illness


Ms. Bonner is a 53 y/o AAF with HTN, diabetes, GERD, chronic tobacco use 

and CAD. She presented to the ED at [facility] on 17 with complaints of 

worsening SOB. She states that Tuesday (8/15/17) evening she started noticing 

some increased SOB with ambulation. She had been having a dry cough 

intermittently but on Tuesday her cough became more productive and she started 

bringing up some yellow/green colored sputum. She was having a lot of sinus 

congestion and post-nasal drip as well. She felt warm at home but did not take 

her temperature. She denies any chills. 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 

not aware of being around anyone who was acutely sick with a respiratory 

illness. She states that for the last two days she has not been eating or 

drinking much. Her SOB with minimal ambulation seemed to worsen yesterday 

evening and this prompted her to come to the ED for further evaluation. In the 

ED she was noted to have a fever and an elevated WBC count of 24.6. Blood 

cultures were drawn in the ED. Her renal function was worse than baseline as 

well with Cr 2.79, BUN 34, GFR 22. CXR in the ED revealed diffuse interstitial 

prominence and alveolar consolidation seen bilaterally being more prominent on 

the left, diffuse infectious processes should be considered. Pt was given IVF, 

IV Solu-Medrol, Duoneb treatment, Rocephin and Azithromycin in the ED. She had 

an episode of N/V in the ED last night. Pt reports that she has had decreased 

urine output in the last day as well but related this to not eating or drinking 

much of anything. She did urinate this morning which she felt was a normal 

amount.





Review of Systems


Constitutional:  COMPLAINS OF: Fever, DENIES: Chills, Night Sweats


Eyes:  DENIES: Vision loss


Ears, nose, mouth, throat:  DENIES: Hearing loss


Respiratory:  COMPLAINS OF: Cough, Sputum production, Shortness of breath


Cardiovascular:  COMPLAINS OF: Dyspnea on Exertion, DENIES: Chest pain, 

Palpitations, Lower Extremity Edema


Gastrointestinal:  COMPLAINS OF: Nausea, Vomiting, DENIES: Abdominal pain, GERD


Genitourinary:  COMPLAINS OF: Dysuria


Musculoskeletal:  DENIES: Back pain


Integumentary:  DENIES: Rash


Neurologic:  DENIES: Headache


Psychiatric:  DENIES: Confusion





Past Family Social History


Past Medical History


Hypertension, essential


Diabetes, unspecified complications


GERD, no esophagitis


CAD, hx of MI in  s/p PTCA with stent placement


Hyperlipidemia


Multiple hospitalizations for recurrent abdominal pain


History of pancreatitis ?pancreatic divisum


History of alcohol abuse


Uterine fibroids


Past Surgical History


Cholecystectomy


Tubal ligation, bilateral


PTCA with stent placement approximately 8 years ago


Cataract surgery


Colonoscopy 14 with Dr. Granados --> 2 sessile polyps of approximately 4 

mm were found in the sigmoid colon, internal and external hemorrhoids.  

Pathology did not show malignancy. 


EGD performed 3/27/14 by Dr. Hurtado --> Normal esophagus, some nodularity in 

the body and antrum of the stomach, mild gastritis.  Pathology did not show 

malignancy


EUS performed by Dr. Wade Uriostegui 14 --> Pancreatic parenchyma was 

isoechoic and homogeneous, common bile duct was normal.


Reported Medications


Nifedipine ER 24 HR 60mg po in morning, 30mg po in evening


Gabapentin 100 Mg Cap 300 Mg PO HS


Tramadol (Tramadol HCl) 50 Mg Tab 50 Mg PO Q4H PRN


Simvastatin 20 Mg Tab 20 Mg PO HS


Omeprazole 40 Mg Cap 40 Mg PO DAILY


Lantus Inj (Insulin Glargine) 1,000 Unit/10 Ml Vial 26 Units SQ HS


Folic Acid 400 Mcg Tab 1 Mg PO DAILY


Feosol (Ferrous Sulfate) 200 Mg Tab 325 Mg PO BID


Vasotec (Enalapril Maleate) 20 Mg Tab 20 Mg PO BID


Atenolol 25 Mg Tab 25 Mg PO DAILY


Aspirin Children's (Aspirin) 81 Mg Chew 81 Mg CHEW DAILY


Allergies:  


Coded Allergies:  


     shellfish derived (Unverified  Adverse Reaction, Mild, VOMITNG, 8/15/17)


Family History


Father  81, prostate cancer


Mother  84, DM


1 living brother 62, DM


Brother , 53, lung CA


Sister  54, lung CA


Social History


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


(+)Tobacco use, one half pack per day 30 years


Hx of alcohol use, former heavy alcohol use, the patient currently denies. Pt 

stopped etoh in 


Patient denies illicit street drugs





Physical Exam


Vital Signs





 Vital Signs








  Date Time  Temp Pulse Resp B/P Pulse Ox O2 Delivery O2 Flow Rate FiO2


 


17 07:00 98.1 93 19 157/72 100 Nasal Cannula 4 


 


17 06:15 98.4 94 26 153/73 100 Nasal Cannula 4 


 


17 05:00  86 20 143/68 100 Nasal Cannula 4 


 


17 04:00  84 18 142/64 100 Nasal Cannula 4 


 


17 03:00  95 26 138/55 96 Nasal Cannula 4 


 


17 02:00  90 22 122/58 97 Nasal Cannula 4 


 


17 01:15  94 21 115/56 97 Nasal Cannula 4 


 


17 00:15  98 20 133/60 100 Nasal Cannula 4 


 


17 00:00  104 24 169/79 99 Nasal Cannula 4 


 


17 23:45  110 32 179/76 98 Nasal Cannula 4 


 


17 23:30  108 30 179/77 98 Nasal Cannula 4 


 


17 23:15  112 29 181/66 98 Nasal Cannula 4 


 


17 23:03 100.9 118 28 184/80 98 Nasal Cannula 4 


 


17 22:30  115 28 193/88 97 Nasal Cannula 4 


 


17 22:15  119 32 219/98 100 Nasal Cannula 4 


 


17 21:54     94 Nasal Cannula 3.00 


 


17 21:50 102.5 112 32 213/86 100 Nasal Cannula 4 


 


17 21:50     98 Aerosol Mask  


 


17 21:50     94 Nasal Cannula 4 


 


17 21:43 102.5 114 34 213/86 93   


 


17 21:41  112 36 228/105 91 Nasal Cannula 4 


 


17 21:31 101.9 114 18 224/114 91 Room Air  








Physical Exam


GENERAL: This is a well-nourished, well-developed patient, in no apparent 

distress.


HEENT: Atraumatic. Normocephalic. No temporal or scalp tenderness. No scleral 

icterus. Airway patent.


NECK: Trachea midline, supple, nontender.


CARDIO: Regular. 


RESP: Bilateral rhonchi at the bases (R>L)


ABD: +BS, soft, non-tender, nondistended. 


EXT: Extremities without clubbing, cyanosis, or edema. 


NEURO: Awake and alert. Motor and sensory grossly within normal limits. Normal 

speech.


Laboratory





Laboratory Tests








Test 17





 21:58 22:01 05:47 06:29


 


White Blood Count 24.6   22.6  


 


Red Blood Count 2.97   2.62  


 


Hemoglobin 9.3   8.2  


 


Hematocrit 28.8   26.2  


 


Mean Corpuscular Volume 97.1   100.1  


 


Mean Corpuscular Hemoglobin 31.3   31.3  


 


Mean Corpuscular Hemoglobin 32.2   31.3  





Concent    


 


Red Cell Distribution Width 13.8   14.1  


 


Platelet Count 315   294  


 


Mean Platelet Volume 8.2   8.6  


 


Neutrophils (%) (Auto) 90.0   95.7  


 


Lymphocytes (%) (Auto) 4.8   3.0  


 


Monocytes (%) (Auto) 4.8   0.9  


 


Eosinophils (%) (Auto) 0.0   0.0  


 


Basophils (%) (Auto) 0.4   0.4  


 


Neutrophils # (Auto) 22.1   21.6  


 


Lymphocytes # (Auto) 1.2   0.7  


 


Monocytes # (Auto) 1.2   0.2  


 


Eosinophils # (Auto) 0.0   0.0  


 


Basophils # (Auto) 0.1   0.1  


 


CBC Comment DIFF FINAL   DIFF FINAL  


 


Differential Comment      


 


Sodium Level 136    140 


 


Potassium Level 4.6    5.4 


 


Chloride Level 106    110 


 


Carbon Dioxide Level 19.0    16.0 


 


Anion Gap 11    14 


 


Blood Urea Nitrogen 34    40 


 


Creatinine 2.79    3.11 


 


Estimat Glomerular Filtration 22    19 





Rate    


 


Random Glucose 84    211 


 


Calcium Level 8.9    8.4 


 


Total Bilirubin 1.1    0.5 


 


Aspartate Amino Transf 26    21 





(AST/SGOT)    


 


Alanine Aminotransferase 22    18 





(ALT/SGPT)    


 


Alkaline Phosphatase 209    170 


 


Troponin I 0.37    0.37 


 


Total Protein 7.9    7.2 


 


Albumin 3.0    2.6 


 


Lipase 46    


 


Lactic Acid Level  1.6   














 Date/Time Procedure Status





Source Growth 


 


 17 23:07 Influenza Types A,B Antigen (ARLINE) - Final Complete





Nasal Washing NEGATIVE FOR FLU A AND B ANTIGEN.... 


 


 17 22:05 Aerobic Blood Culture Received





Blood Peripheral Pending 





 17 22:05 Anaerobic Blood Culture Received





Blood Peripheral Pending 








Result Diagram:  


17 [id number]                                                                   

             17 0629





Imaging





Last Impressions








Chest X-Ray 178 Signed





Impressions: 





 Service Date/Time:  2017 22:26 - CONCLUSION:  Diffuse 





 interstitial prominence and alveolar consolidation seen bilaterally being more 





 prominent on the left. Diffuse infectious processes should be considered.     





 Severo Weir MD 











Septic Shock Reassessment


Heart:  Regular rate and rhythm


Lungs:  Course


Skin:  Warm





Assessment and Plan


Problem List:  


(1) Severe sepsis without septic shock


Status:  Acute


Plan:  


- Pt is a 53 y/o female with HTN, diabetes, chronic tobacco use, and CKD who 

presented to the ED with worsening productive cough, SOB, decreased appetite, 


 and fevers. 


- In the ED she was noted to have a fever and an elevated WBC count of 24.6. 


- Blood cultures were drawn in the ED and are pending.


- CXR in the ED revealed diffuse interstitial prominence and alveolar 

consolidation seen bilaterally being more prominent on the left, diffuse 

infectious processes


 should be considered. 


- Pt was given IVF, IV Solu-Medrol, Duoneb treatment, Rocephin and Azithromycin 

in the ED.  


- Renal function is worse than baseline as well with Cr 2.79, BUN 34, GFR 22 

and is worse today despite being given IVF in the ED. She had an episode of N/V


 in the ED last night. Pt reports that she has had decreased urine output in 

the last day as well but related this to not eating or drinking much of 

anything. She 


did urinate this morning which she felt was a normal amount. 


- Azithromycin and Rocephin were continued at admission. 


- Duoneb Q4H WA


- Mucinex


- Sputum culture


- Obtain urine legionella antigen and pneumococcal antigen


- CT thorax


- Renal US


- Cont. IVF


- Encourage oral intake


- Repeat labs in AM


- Stop ACE, Clonidine PRN


- Her troponins were elevated but flat in the ED, likely related to her acute 

on chronic renal failure. Pt has not had any chest pain. 


- Supportive care





- DVT prophylaxis with Lovenox





(2) Acute kidney injury superimposed on CKD


Status:  Chronic


Plan:  


- See above. 


- Etiology unclear, possibly related to dehydration


- Cont. IVF


- Renal US


- Avoid nephrotoxins





(3) Dehydration


Status:  Acute


Plan:  


- See above. 





(4) HTN (hypertension)


Status:  Chronic


Plan:  


- ACE on hold due to renal function. 


- Cont. Procardia XL, 60mg in AM and 30mg in PM


- Cont. Atenolol 25mg po daily


- Clonidine PRN





(5) Diabetes


Status:  Chronic


Plan:  


- NovoLog SSI


- Pt is on Levemir 26 units HS, this was resumed but will need to monitor her 

BS today 


 and see if this needs to be held for this evening. 





(6) CAD (coronary artery disease)


Status:  Chronic


Assessment and Plan


Patient examined.


Assessment and plan formulated with Isatu Smiley PA-C.


I agree with the above.





Physician Certification


2 Midnight Certification Type:  Admission for Inpatient Services


Order for Inpatient Services


The services are ordered in accordance with Medicare regulations or non-

Medicare payer requirements, as applicable.  In the case of services not 

specified as inpatient-only, they are appropriately provided as inpatient 

services in accordance with the 2-midnight benchmark.


Estimated LOS (days):  3


3 days is the estimated time the patient will need to remain in the hospital, 

assuming treatment plan goals are met and no additional complications.


Post-Hospital Plan:  Not yet determined











Isatu Smiley Aug 17, 2017 08:42


Jamaal Milan DO Aug 21, 2017 01:01

## 2017-08-17 NOTE — RADRPT
EXAM DATE/TIME:  08/17/2017 10:35 

 

[facility] COMPARISON:     

CHEST SINGLE AP, May 29, 2017, 5:50.  CHEST SINGLE AP, August 16, 2017, 22:26.

 

 

INDICATIONS :     

Shortness of breath. New bilateral pulmonary opacities and abnormal chest x-ray. 

                      

 

RADIATION DOSE:     

3.33 CTDIvol (mGy) 

 

 

MEDICAL HISTORY :     

Cardiovascular disease. Hypertension.  Diabetes

 

SURGICAL HISTORY :      

Cholecystectomy.  Cardiac stents

 

ENCOUNTER:      

Initial

 

ACUITY:      

3 days

 

PAIN SCALE:      

3/10

 

LOCATION:       

Bilateral chest 

 

TECHNIQUE:      

Volumetric scanning of the chest was performed.  Using automated exposure control and adjustment of t
he mA and/or kV according to patient size, radiation dose was kept as low as reasonably achievable to
 obtain optimal diagnostic quality images.  DICOM format image data is available electronically for r
eview and comparison.  

 

Follow-up recommendations for detected pulmonary nodules are based at a minimum on nodule size and pa
tient risk factors according to Fleischner Society Guidelines.

 

FINDINGS:     

 

LUNGS:     

There is no pneumothorax. There are bilateral scattered areas of airspace disease with multiple focal
 areas of mild consolidation. This greatest in the right anterior lateral middle lobe and posterior r
ight lower lobe.

 

PLEURAE:     

There are small bilateral pleural effusions.

 

MEDIASTINUM:     

The heart and great vessels demonstrate no acute abnormality.  There is no mediastinal or hilar lymph
adenopathy. Coronary artery calcifications are present. The heart size is mildly enlarged.

 

AXILLAE:     

Within normal limits.  No lymphadenopathy.

 

MUSCULOSKELETAL:     

Within normal limits for patient age.

 

MISCELLANEOUS:     

The visualized upper abdominal organs demonstrate no acute abnormality.

 

CONCLUSION:     

1. Bilateral airspace disease with patchy areas of consolidation. This may represent pulmonary edema.


2. Small bilateral pleural effusions and mild cardiomegaly which could indicate congestive heart fail
ure.

 

 

 

 

 Rogerio Weaver MD on August 17, 2017 at 10:45           

Board Certified Radiologist.

 This report was verified electronically.

## 2017-08-17 NOTE — RADRPT
EXAM DATE/TIME:  08/17/2017 10:58 

 

[facility] COMPARISON:     

No previous studies available for comparison.

        

 

 

INDICATIONS :     

Increased BUN/creatinine. 

                     

 

MEDICAL HISTORY :     

Hypercholesterolemia. Pancreatitis. Arthritis. Migraine. Coronary artery disease. Chest pain. HTN. Pn
eumonia. GERD. Fibroids. Diabetes. Substance use.  

 

SURGICAL HISTORY :     

Coronary artery stent. Cholecystectomy. Tubal ligation. Cardiac cath. Ureter stent placed and removed
. 

 

ENCOUNTER:     

Initial

 

ACUITY:     

1 day

 

PAIN SCORE:     

3/10

 

LOCATION:     

Bilateral flank 

MEASUREMENTS:     

 

RIGHT KIDNEY:     

12.5 x 6.9 x 6.5 cm

 

LEFT KIDNEY:     

10.9 x 6.1 x 5.9 cm

 

FINDINGS:     

 

RIGHT KIDNEY:     

Renal cortex is normal in thickness and echotexture. Kidneys are mildly increased in echogenicity. No
 hydronephrosis, stone, or mass.  

 

LEFT KIDNEY:     

Renal cortex is normal in thickness and echotexture. The kidneys are mildly increased in echogenicity
. No hydronephrosis, stone, or solid mass.  There is a small cyst in the lower pole measuring 8 x 9 m
m.

 

BLADDER:     

Within normal limits given the degree of distension.  

 

CONCLUSION:     

1. No evidence of hydronephrosis.

2. Small cyst in left kidney.

3. Kidneys appear mildly increased in echogenicity which could indicate medical renal disease.

 

 

 

 Rogerio Weaver MD on August 17, 2017 at 13:49           

Board Certified Radiologist.

 This report was verified electronically.

## 2017-08-17 NOTE — EKG
Date Performed: 08/16/2017       Time Performed: 21:44:22

 

PTAGE:      52 years

 

EKG:      SINUS TACHYCARDIA POSSIBLE LEFT ATRIAL ENLARGEMENT LEFT VENTRICULAR HYPERTROPHY AND ST-T CH
GEORGINA ABNORMAL ECG 

 

NO PREVIOUS TRACING            

 

DOCTOR:   Rene Phan  Interpretating Date/Time  08/17/2017 09:00:24

## 2017-08-18 VITALS
TEMPERATURE: 99.2 F | RESPIRATION RATE: 19 BRPM | DIASTOLIC BLOOD PRESSURE: 75 MMHG | OXYGEN SATURATION: 95 % | HEART RATE: 98 BPM | SYSTOLIC BLOOD PRESSURE: 157 MMHG

## 2017-08-18 VITALS — HEART RATE: 99 BPM

## 2017-08-18 VITALS
OXYGEN SATURATION: 100 % | DIASTOLIC BLOOD PRESSURE: 78 MMHG | TEMPERATURE: 100.7 F | HEART RATE: 85 BPM | RESPIRATION RATE: 24 BRPM | SYSTOLIC BLOOD PRESSURE: 163 MMHG

## 2017-08-18 VITALS
DIASTOLIC BLOOD PRESSURE: 80 MMHG | RESPIRATION RATE: 18 BRPM | SYSTOLIC BLOOD PRESSURE: 156 MMHG | HEART RATE: 91 BPM | OXYGEN SATURATION: 97 % | TEMPERATURE: 99.5 F

## 2017-08-18 VITALS
SYSTOLIC BLOOD PRESSURE: 163 MMHG | OXYGEN SATURATION: 99 % | DIASTOLIC BLOOD PRESSURE: 74 MMHG | TEMPERATURE: 100.5 F | HEART RATE: 103 BPM

## 2017-08-18 VITALS — OXYGEN SATURATION: 94 %

## 2017-08-18 VITALS — HEART RATE: 87 BPM

## 2017-08-18 VITALS — OXYGEN SATURATION: 92 %

## 2017-08-18 VITALS
OXYGEN SATURATION: 100 % | RESPIRATION RATE: 20 BRPM | DIASTOLIC BLOOD PRESSURE: 78 MMHG | HEART RATE: 77 BPM | TEMPERATURE: 98.2 F | SYSTOLIC BLOOD PRESSURE: 138 MMHG

## 2017-08-18 VITALS — HEART RATE: 84 BPM

## 2017-08-18 VITALS
OXYGEN SATURATION: 100 % | TEMPERATURE: 98.7 F | RESPIRATION RATE: 20 BRPM | HEART RATE: 82 BPM | DIASTOLIC BLOOD PRESSURE: 76 MMHG | SYSTOLIC BLOOD PRESSURE: 154 MMHG

## 2017-08-18 VITALS — HEART RATE: 95 BPM

## 2017-08-18 VITALS — HEART RATE: 85 BPM

## 2017-08-18 LAB
ALP SERPL-CCNC: 145 U/L (ref 45–117)
ALP SERPL-CCNC: 169 U/L (ref 45–117)
ALT SERPL-CCNC: 33 U/L (ref 10–53)
ALT SERPL-CCNC: 46 U/L (ref 10–53)
ANION GAP SERPL CALC-SCNC: 10 MEQ/L (ref 5–15)
ANION GAP SERPL CALC-SCNC: 10 MEQ/L (ref 5–15)
APTT BLD: 29.2 SEC (ref 24.3–30.1)
AST SERPL-CCNC: 16 U/L (ref 15–37)
AST SERPL-CCNC: 25 U/L (ref 15–37)
BASOPHILS # BLD AUTO: 0.1 TH/MM3 (ref 0–0.2)
BASOPHILS NFR BLD: 0.3 % (ref 0–2)
BILIRUB SERPL-MCNC: 0.5 MG/DL (ref 0.2–1)
BILIRUB SERPL-MCNC: 0.5 MG/DL (ref 0.2–1)
BUN SERPL-MCNC: 63 MG/DL (ref 7–18)
BUN SERPL-MCNC: 65 MG/DL (ref 7–18)
CALCIUM TP COR SERPL-MCNC: 7.6 MG/DL (ref 8.5–10.1)
CHLORIDE SERPL-SCNC: 107 MEQ/L (ref 98–107)
CHLORIDE SERPL-SCNC: 110 MEQ/L (ref 98–107)
EOSINOPHIL # BLD: 0 TH/MM3 (ref 0–0.4)
EOSINOPHIL NFR BLD: 0.1 % (ref 0–4)
ERYTHROCYTE [DISTWIDTH] IN BLOOD BY AUTOMATED COUNT: 13.9 % (ref 11.6–17.2)
GFR SERPLBLD BASED ON 1.73 SQ M-ARVRAT: 15 ML/MIN (ref 89–?)
GFR SERPLBLD BASED ON 1.73 SQ M-ARVRAT: 16 ML/MIN (ref 89–?)
HCO3 BLD-SCNC: 17.2 MEQ/L (ref 21–32)
HCO3 BLD-SCNC: 18.6 MEQ/L (ref 21–32)
HCT VFR BLD CALC: 25.8 % (ref 35–46)
HEMO FLAGS: (no result)
INR PPP: 0.9 RATIO
LYMPHOCYTES # BLD AUTO: 0.9 TH/MM3 (ref 1–4.8)
LYMPHOCYTES NFR BLD AUTO: 4.9 % (ref 9–44)
MAGNESIUM SERPL-MCNC: 2 MG/DL (ref 1.5–2.5)
MCH RBC QN AUTO: 32 PG (ref 27–34)
MCHC RBC AUTO-ENTMCNC: 32.2 % (ref 32–36)
MCV RBC AUTO: 99.3 FL (ref 80–100)
MONOCYTES NFR BLD: 4.8 % (ref 0–8)
NEUTROPHILS # BLD AUTO: 16.3 TH/MM3 (ref 1.8–7.7)
NEUTROPHILS NFR BLD AUTO: 89.9 % (ref 16–70)
PLATELET # BLD: 274 TH/MM3 (ref 150–450)
POTASSIUM SERPL-SCNC: 4.9 MEQ/L (ref 3.5–5.1)
POTASSIUM SERPL-SCNC: 5.6 MEQ/L (ref 3.5–5.1)
PROTHROMBIN TIME: 10.1 SEC (ref 9.8–11.6)
RBC # BLD AUTO: 2.6 MIL/MM3 (ref 4–5.3)
SODIUM SERPL-SCNC: 136 MEQ/L (ref 136–145)
SODIUM SERPL-SCNC: 137 MEQ/L (ref 136–145)
URINE TOTAL PROTEIN TIMED: 685 MG/DL
WBC # BLD AUTO: 18.1 TH/MM3 (ref 4–11)

## 2017-08-18 RX ADMIN — INSULIN ASPART SCH: 100 INJECTION, SOLUTION INTRAVENOUS; SUBCUTANEOUS at 16:54

## 2017-08-18 RX ADMIN — HEPARIN SODIUM SCH UNITS: 10000 INJECTION, SOLUTION INTRAVENOUS; SUBCUTANEOUS at 21:31

## 2017-08-18 RX ADMIN — VANCOMYCIN HYDROCHLORIDE SCH MLS/HR: 1 INJECTION, SOLUTION INTRAVENOUS at 01:00

## 2017-08-18 RX ADMIN — BUMETANIDE SCH MG: 0.25 INJECTION, SOLUTION INTRAMUSCULAR; INTRAVENOUS at 16:51

## 2017-08-18 RX ADMIN — ACYCLOVIR SCH UNITS: 800 TABLET ORAL at 21:00

## 2017-08-18 RX ADMIN — IPRATROPIUM BROMIDE AND ALBUTEROL SULFATE SCH AMPULE: .5; 3 SOLUTION RESPIRATORY (INHALATION) at 11:55

## 2017-08-18 RX ADMIN — INSULIN ASPART SCH: 100 INJECTION, SOLUTION INTRAVENOUS; SUBCUTANEOUS at 21:00

## 2017-08-18 RX ADMIN — HYDROMORPHONE HYDROCHLORIDE PRN MG: 1 INJECTION, SOLUTION INTRAMUSCULAR; INTRAVENOUS; SUBCUTANEOUS at 21:33

## 2017-08-18 RX ADMIN — GUAIFENESIN SCH MG: 600 TABLET, EXTENDED RELEASE ORAL at 21:31

## 2017-08-18 RX ADMIN — IPRATROPIUM BROMIDE AND ALBUTEROL SULFATE SCH AMPULE: .5; 3 SOLUTION RESPIRATORY (INHALATION) at 08:38

## 2017-08-18 RX ADMIN — STANDARDIZED SENNA CONCENTRATE AND DOCUSATE SODIUM SCH TAB: 8.6; 5 TABLET, FILM COATED ORAL at 21:31

## 2017-08-18 RX ADMIN — Medication SCH ML: at 07:58

## 2017-08-18 RX ADMIN — CHLORHEXIDINE GLUCONATE SCH PACK: 500 CLOTH TOPICAL at 04:00

## 2017-08-18 RX ADMIN — FERROUS SULFATE TAB 325 MG (65 MG ELEMENTAL FE) SCH MG: 325 (65 FE) TAB at 21:31

## 2017-08-18 RX ADMIN — FERROUS SULFATE TAB 325 MG (65 MG ELEMENTAL FE) SCH MG: 325 (65 FE) TAB at 07:55

## 2017-08-18 RX ADMIN — Medication SCH ML: at 21:31

## 2017-08-18 RX ADMIN — FOLIC ACID SCH MG: 1 TABLET ORAL at 07:55

## 2017-08-18 RX ADMIN — NIFEDIPINE SCH MG: 30 TABLET, FILM COATED, EXTENDED RELEASE ORAL at 07:55

## 2017-08-18 RX ADMIN — STANDARDIZED SENNA CONCENTRATE AND DOCUSATE SODIUM SCH TAB: 8.6; 5 TABLET, FILM COATED ORAL at 07:56

## 2017-08-18 RX ADMIN — ATENOLOL SCH MG: 25 TABLET ORAL at 07:56

## 2017-08-18 RX ADMIN — INSULIN ASPART SCH: 100 INJECTION, SOLUTION INTRAVENOUS; SUBCUTANEOUS at 11:00

## 2017-08-18 RX ADMIN — PRAVASTATIN SODIUM SCH MG: 40 TABLET ORAL at 21:31

## 2017-08-18 RX ADMIN — NIFEDIPINE SCH MG: 30 TABLET, FILM COATED, EXTENDED RELEASE ORAL at 21:31

## 2017-08-18 RX ADMIN — HEPARIN SODIUM SCH UNITS: 10000 INJECTION, SOLUTION INTRAVENOUS; SUBCUTANEOUS at 10:34

## 2017-08-18 RX ADMIN — GABAPENTIN SCH MG: 100 CAPSULE ORAL at 21:31

## 2017-08-18 RX ADMIN — LEVOFLOXACIN SCH MLS/HR: 5 INJECTION, SOLUTION INTRAVENOUS at 00:09

## 2017-08-18 RX ADMIN — BUMETANIDE SCH MG: 0.25 INJECTION, SOLUTION INTRAMUSCULAR; INTRAVENOUS at 07:58

## 2017-08-18 RX ADMIN — ASPIRIN 81 MG SCH MG: 81 TABLET ORAL at 07:55

## 2017-08-18 RX ADMIN — GUAIFENESIN SCH MG: 600 TABLET, EXTENDED RELEASE ORAL at 07:54

## 2017-08-18 RX ADMIN — INSULIN ASPART SCH: 100 INJECTION, SOLUTION INTRAVENOUS; SUBCUTANEOUS at 07:00

## 2017-08-18 RX ADMIN — HYDROMORPHONE HYDROCHLORIDE PRN MG: 1 INJECTION, SOLUTION INTRAMUSCULAR; INTRAVENOUS; SUBCUTANEOUS at 06:58

## 2017-08-18 RX ADMIN — IPRATROPIUM BROMIDE AND ALBUTEROL SULFATE SCH AMPULE: .5; 3 SOLUTION RESPIRATORY (INHALATION) at 15:23

## 2017-08-18 RX ADMIN — CEFEPIME SCH MLS/HR: 2 INJECTION, POWDER, FOR SOLUTION INTRAVENOUS at 23:08

## 2017-08-18 RX ADMIN — PANTOPRAZOLE SCH MG: 40 TABLET, DELAYED RELEASE ORAL at 07:55

## 2017-08-18 RX ADMIN — GABAPENTIN SCH MG: 100 CAPSULE ORAL at 08:02

## 2017-08-18 RX ADMIN — IPRATROPIUM BROMIDE AND ALBUTEROL SULFATE SCH AMPULE: .5; 3 SOLUTION RESPIRATORY (INHALATION) at 19:37

## 2017-08-18 NOTE — RADRPT
EXAM DATE/TIME:  08/18/2017 03:20 

 

[facility] COMPARISON:     

CHEST SINGLE AP, August 16, 2017, 22:26.

 

                     

INDICATIONS :     

Evaluate for pnuemonia

                     

 

MEDICAL HISTORY :            

Cardiovascular disease. Hypertension. Diabetes   

 

SURGICAL HISTORY :     

Cholecystectomy.   

 

ENCOUNTER:     

Subsequent                                        

 

ACUITY:     

3 days      

 

PAIN SCORE:     

Non-responsive.

 

LOCATION:     

Bilateral chest 

 

FINDINGS:     

A single view of the chest demonstrates the lungs to be symmetrically aerated with bilateral airspace
 disease. This actually appears to be slightly worse when compared to prior with probable associated 
developing effusions. Heart size is prominent. Osseous structures are intact.

 

CONCLUSION:     

Worsening bibasilar airspace disease and associated effusions.

 

 

 

 Ren Stubbs MD on August 18, 2017 at 4:36           

Board Certified Radiologist.

 This report was verified electronically.

## 2017-08-18 NOTE — ECHRPT
Indication:   shortness of breath

 

 CONCLUSIONS

 The left ventricular systolic function is normal with an estimated ejection fraction in the range of
 55-60%. 

 Mild concentric left ventricular hypertrophy. 

 Mild mitral valve regurgitation. 

 There is mild tricuspid valve regurgitation. 

 

 BP:  157   / 72      HR: 93                       Rhythm:           Sinus

 

 MEASUREMENTS  (Male / Female) Normal Values       Technical Quality:Good

 2D ECHO

 LV Diastolic Diameter PLAX        4.2 cm                4.2 - 5.9 / 3.9 - 5.3 cm

 LV Systolic Diameter PLAX         3.1 cm                

 IVS Diastolic Thickness           1.4 cm                0.6 - 1.0 / 0.6 - 0.9 cm

 LVPW Diastolic Thickness          1.2 cm                0.6 - 1.0 / 0.6 - 0.9 cm

 LV Relative Wall Thickness        0.6                   

 LA Systolic Diameter LX           3.8 cm                3.0 - 4.0 / 2.7 - 3.8 cm

 

 M-MODE

 Aortic Root Diameter MM           3.3 cm                

 AV Cusp Separation MM             2.2 cm                

 

 DOPPLER

 Mitral E Point Velocity           108.0 cm/s            

 Mitral A Point Velocity           65.8 cm/s             

 Mitral E to A Ratio               1.6                   

 TR Peak Velocity                  373.0 cm/s            

 TR Peak Gradient                  55.7 mmHg             

 

 

 FINDINGS

 

 LEFT VENTRICLE

 Normal left ventricular size. 

 Mild concentric left ventricular hypertrophy. 

 The left ventricular systolic function is normal with an estimated ejection fraction in the range of
 55-60%. 

 

 RIGHT VENTRICLE

 Normal right ventricular size and systolic function.  

 

 LEFT ATRIUM

 The left atrial size is mildly dilated. 

 

 

 RIGHT ATRIUM

 The right atrial size is normal.  

 

 ATRIAL SEPTUM

 The interatrial septum not well visualized. 

 

 

 AORTA

 The aortic root and proximal ascending aorta are normal in size on limited imaging.  

 

 MITRAL VALVE

 Structurally normal mitral valve. 

 Mild mitral valve regurgitation. 

 No mitral valve stenosis. 

 

 AORTIC VALVE

 Trileaflet aortic valve. No aortic valve stenosis or regurgitation.  

 

 TRICUSPID VALVE

 Structurally normal tricuspid valve. 

 No tricuspid valve stenosis. 

 There is mild tricuspid valve regurgitation. 

 There is estimated moderate-to-severe pulmonary hypertension present ( 66 mmHg). 

 

 PULMONARY VALVE

 The pulmonary valve is not well visualized.  

 

 VESSELS

 The inferior vena cava is normal in size.  

 

 

 

 

  Colby Thacker DO

  (Electronically Signed)

  Final Date:18 August 2017 20:16

## 2017-08-18 NOTE — HHI.CCPN
Subjective


Remarks/Hospital Course


52-year-old  female with past medical history of type 2 

diabetes mellitus, coronary artery disease with prior myocardial infarction and 

stents, hyperlipidemia, recurrent alcohol-induced pancreatitis who presents to 

[facility] with 2 day history of shortness of breath.  She states 

dyspnea on exertion started Tuesday evening (8/15). She has had a cough which 

is now productive of yellow sputum.  She had not checked her temperature at 

home but was febrile upon admission with a MAXIMUM TEMPERATURE of 102.5.  She 

has had this some discomfort of her left far lateral  chest wall that occurs 

with cough. Her WBC on admission was 24.6 and CXR demonstrated bilateral 

interstitial and alveolar opacities.  Lactic acid was normal.  Blood cultures 

were drawn in the ED and are negative to date..  She was started on ceftriaxone 

and azithromycin.  Urine Legionella and pneumococcal antigens were performed 

and were negative.  Influenza was negative.  She may have  CK D stage III (

difficult to determine baseline creatinine due to multiple admissions with LETI) 

but presented with creatinine of 2.79, BUN 34 and GFR 22.  She was started on 

NS @ 100 ml/hr. Her creatinine has worsened since admission to 3.11. Her 

respiratory status has also worsened and she is now on NR which prompted 

transfer to ICU with intensivist consult. CT chest demonstrates bilateral 

opacities, most prominent RML/RLL and CARIN/LLL.   BNP is 2464. Troponins are 

flat at 0.37





SUBJ 8/18: Patient is breathing fairly comfortably on Ventimask.  Lethargic.  

Urine output 750 mL since 6 AM, received Bumex 2 mg IV today by nephrology.  

Potassium of 5.6 treated by nephrology with IV insulin bicarbonate.  Repeat CMP 

pending at 3:00 pm.  Creatinine has worsened to 3.4.





Objective





 Vital Signs








  Date Time  Temp Pulse Resp B/P Pulse Ox O2 Delivery O2 Flow Rate FiO2


 


8/18/17 10:00  84      


 


8/18/17 08:00 100.7  24 163/78 100   


 


8/17/17 22:45        100


 


8/17/17 20:33      Non-Rebreather 15.00 








 Intake and Output








 8/17/17 8/17/17 8/18/17





 08:00 16:00 00:00


 


Intake Total   312 ml


 


Output Total   250 ml


 


Balance   62 ml








Result Diagram:  


8/18/17 0558                                                                   

             8/18/17 0558





Other Results





Microbiology








 Date/Time Procedure Status





Source Growth 


 


 8/16/17 23:07 Influenza Types A,B Antigen (ARLINE) - Final Complete





Nasal Washing NEGATIVE FOR FLU A AND B ANTIGEN.... 


 


 8/17/17 15:31 Legionella Antigen - Final Complete





Urine Random Urine PRESUMPTIVE NEGATIVE FOR LEGIONELLA P... 





 8/17/17 15:31 Streptococcus pneumoniae Antigen (M - Final Complete





Urine Random Urine PRESUMPTIVE NEGATIVE FOR STREPTOCOCCU... 








Laboratory Tests








Test 8/17/17





 21:22


 


Blood Gas Puncture Site LT RADIAL 


 


Blood Gas Patient Temperature 98.6 


 


Blood Gas HCO3 15 mmol/L





 (22-26)


 


Blood Gas Base Excess -10.0 mmol/L





 (-2-2)


 


Blood Gas Oxygen Saturation 91 % ()


 


Arterial Blood pH 7.33





 (7.380-7.420)


 


Arterial Blood Partial 29 mmHg (38-42)





Pressure CO2 


 


Arterial Blood Partial 76 mmHg





Pressure O2 ()


 


Arterial Blood Oxygen Content 10.5 Vol %





 (12.0-20.0)


 


Arterial Blood 1.5 % (0-4)





Carboxyhemoglobin 


 


Arterial Blood Methemoglobin 1.0 % (0-2)


 


Blood Gas Hemoglobin 8.1 G/DL





 (12.0-16.0)


 


Oxygen Delivery Device NRB 


 


Blood Gas Liter Flow 15 L/M


 


Blood Gas Inspired Oxygen 100 %








Objective Remarks


GENERAL: Well-nourished, well-developed  female who is sitting 

up in [facility] bed, on VM with O2 sat 95%


SKIN: Warm and dry, well-perfused.


HEAD: Atraumatic. Normocephalic. 


EYES: Pupils equal and round. No scleral icterus. 


ENT: No nasal bleeding or discharge.  Mucous membranes pink and moist.


NECK: Trachea midline. 


CARDIOVASCULAR: Regular rate and rhythm, rate in the 70s. No murmurs rubs or 

gallops. 


RESPIRATORY:  Bibasilar rales. Scattered rhonchi with some wheeze. 


GASTROINTESTINAL: Abdomen soft, non-tender, nondistended. Bowel sounds present. 


MUSCULOSKELETAL: Extremities without clubbing, cyanosis, or edema. 


NEUROLOGICAL: Awake and alert. Motor grossly within normal limits, moving all 

extremities spontaneously.  Normal speech.


Urinary Catheter:  Yes


Assessment to:  Continue





A/P


Assessment and Plan


NEURO:


Peripheral neuropathy


Prior history of alcohol abuse


Change Gabapentin 100 bid due to LETI


Ativan 0.5 mg IV for anxiety associated with Bipap mask placement. 


Hydromorphone as needed for pain





RESP:


Acute respiratory failure


Tobacco abuse


HCAP


Pulmonary edema -  noncardiogenic vs cardiogenic


BiPAP 10/5 as needed, currently comfortable on Ventimask


Monitor respiratory status closely in ICU, may require intubation. 


DuoNeb every 4 hours, Albuterol every 2 hours as needed


Broaden antimicrobial coverage to cover HCAP as per below. 


Continue guaifenesin 600 mg by mouth twice a day





CV:


Coronary artery disease with prior myocardial infarction and stents


Elevated troponin


Hyperlipidema


Hypertension


BNP 2400.  Patient denies known history of CHF. Followup 2 D Echo.


EKG demonstrates sinus tach and LVH.


Continue atenolol 25 mg by mouth daily.  Continue Procardia XL 30 mg twice a day


Continue aspirin 81 mg by mouth daily


Continue pravastatin 40 mg by mouth daily at bedtime


Use PRN Clonidine and Hydralazine for SBP >170





GI: 


History of recurrent pancreatitis, alcohol induced


History of gastroparesis


GERD


1800 ADA heart healthy diet


Lipase WNL 





FEN/RENAL:


Acute kidney injury overlying probable CKD


Proteinuria


Insert Rhodes to monitor intake and output closely. 


Started on Bumex 2 mg IV bid per nephrology. Creat 3.4 but no acute indication 

for HD


Bicarbonate and IV insulin given for hyperkalemia


Renal ultrasoundno  hydronephrosis.  Small cyst on left kidney.  Increased 

echogenicity consistent with medical renal disease


Followup Complement, ANCA, PARMJIT, SPEP. 


Nephrology following, Dr. Whiteside. 


Avoid nephrotoxins.  Received Toradol and Motrin 8/16





ID:


Pneumonia ?HCAP


Leukocytosis


(Discharged 6/1/17 after hospitalization for pancreatitis)


Urine Legionella and pneumococcal antigen negative.  Influenza screen negative.

  Blood cultures 8/16 no growth to date


Urine culture pending


Continue cefepime, vancomycin, Levaquin to cover for HCAP.  





HEME:


Chronic anemia


Monitor CBC


Continue ferrous sulfate 325 mg by mouth twice a day


Continue folic acid 1 mg by mouth daily





ENDO:


Diabetes mellitus


Detemir has been decreased to 20 units subcutaneous daily at bedtime.  Medium 

dose insulin sliding scale before meals/at bedtime





PROPH: Heparin 5000 subcutaneous every 12 for DVT prophylaxis. Pantoprazole 40 

mg po daily. 





ACCESS: Peripheral IV providing adequate access at this time.





Patient is critical ill with hypoxemic respiratory failure. may require 

intubation, and invasive mechanical ventilation.Patient clinically improving, 

FIO2 weaned to 50%. 





CCT 32 minutes exclusive of separately billable procedures.








Rocky Elkins MD Aug 18, 2017 13:47

## 2017-08-18 NOTE — HHI.NPPN
Subjective


Complaints:  Shortness of Breath


General Problems:  Anemia, Hypertension, Mebatolic Acidosis


Renal Failure:  Chronic, Acute


Interval History


She was moved to [facility]. On venti mask for oxygen. Her creatinine is worse today. 

In addition she is hyperkalemic.  (Migdalia Camejo)





Review of Systems


Respiratory


Lungs:  SOB, Cough (Migdalia Camejo)





Musculoskeletal


MS:  Pain/Stiffness (Migdalia Camejo)





Objective Data


Data











 8/17/17 8/18/17





 18:59 06:59


 


Intake Total  1073 ml


 


Output Total  525 ml


 


Balance  548 ml


 


  


 


Intake IV Total  1073 ml


 


Output Urine Total  525 ml











 Vital Signs








  Date Time  Temp Pulse Resp B/P Pulse Ox O2 Delivery O2 Flow Rate FiO2


 


8/18/17 10:00  84      


 


8/18/17 08:00 100.7 85 24 163/78 100   


 


8/18/17 08:00  85      


 


8/18/17 06:00  87      


 


8/18/17 04:00  82      


 


8/18/17 04:00 98.7 82 20 154/76 100   


 


8/18/17 02:00  85      


 


8/18/17 00:00  77      


 


8/18/17 00:00 98.2 77 20 138/78 100   


 


8/17/17 23:37   20     


 


8/17/17 22:45     98   100


 


8/17/17 22:00  83      


 


8/17/17 20:33     97 Non-Rebreather 15.00 


 


8/17/17 20:00  78      


 


8/17/17 20:00 97.8 78 24 150/76 96   


 


8/17/17 18:30 99.6 84 26 125/63 100   


 


8/17/17 18:00     95 Non-Rebreather 15.00 


 


8/17/17 15:29 98.2 83 17 152/74 92 Nasal Cannula 2 


 


8/17/17 11:40  93 22 167/84 98 Room Air  





 (Migdalia Camejo)


-:  


8/18/17 0558                                                                   

             8/18/17 0558








 Microbiology


8/17/17 Legionella Antigen - Final, Complete


          PRESUMPTIVE NEGATIVE FOR LEGIONELLA P...


8/17/17 Streptococcus pneumoniae Antigen (M - Final, Complete


          PRESUMPTIVE NEGATIVE FOR STREPTOCOCCU...


8/17/17 Urine Culture, Received


          Pending


Imaging





Last 72 hours Impressions








Chest X-Ray 8/18/17 0600 Signed





Impressions: 





 Service Date/Time:  Friday, August 18, 2017 03:20 - CONCLUSION:  Worsening 





 bibasilar airspace disease and associated effusions.     Ren Stubbs MD 


 


Renal Ultrasound 8/17/17 0000 Signed





Impressions: 





 Service Date/Time:  Thursday, August 17, 2017 10:58 - CONCLUSION:  1. No 





 evidence of hydronephrosis. 2. Small cyst in left kidney. 3. Kidneys appear 





 mildly increased in echogenicity which could indicate medical renal disease.  

   





 Rogerio Weaver MD 


 


Chest CT 8/17/17 0000 Signed





Impressions: 





 Service Date/Time:  Thursday, August 17, 2017 10:35 - CONCLUSION:  1. 

Bilateral 





 airspace disease with patchy areas of consolidation. This may represent 





 pulmonary edema. 2. Small bilateral pleural effusions and mild cardiomegaly 





 which could indicate congestive heart failure.      Rogerio Weaver MD 


 


Chest X-Ray 8/16/17 2144 Signed





Impressions: 





 Service Date/Time:  Wednesday, August 16, 2017 22:26 - CONCLUSION:  Diffuse 





 interstitial prominence and alveolar consolidation seen bilaterally being more 





 prominent on the left. Diffuse infectious processes should be considered.     





 Severo Weir MD 








Tubes & Lines:  Rhodes (Migdalia Camejo)





Physical Exam


General


Appearance:  Well Developed, Comfortable (Migdalia Camejo)





Throat


Throat Exam:  Oral Mucosa Pink & Moist (Migdalia Camejo)





Pulmonary


Resp Exam:  Crackles, Rhonchi, Sputum, Labored


Resp Remarks


left side lungs course (Migdalia Camejo)





Cardiology


CV Exam:  Regular, Normal Sinus Rhythm (Migdalia Camejo)





Gastrointestinal/Abdomen


GI Exam:  Soft, Non-Tender (Migdalia Camejo)





Musculoskeletal


MS Exam:  Joints Intact, Normal Tone (Migdalia Camejo)





Integumentary


Skin Exam:  Clear, Warm, Dry, Intact (Migdalia Camejo)





Extremeties


Extremities Exam:  No Edema, Pedal Pulses Palpable (Migdalia Camejo)





Neurologic


Neuro Exam:  Alert, Awake, Oriented, Speech Clear, Moving All Extremities (

Migdalia Camejo)





Psychiatric


Psych Exam:  Appropriate Responses (Migdalia Camejo)





Assessment/Plan


Discussed Condition With:  Patient


Assessment Summary:  LETI/Acute Renal Failure, Anemia of CKD, Hypertension, CKD 

Stage IV


Electrolyte Assessment:  Hyperkalemia


Problem List:  


(1) Acute kidney injury superimposed on CKD


Plan:  It appears she has underlying CKD, possibly due to diabetic nephropathy, 

possibly stage 3 with multiple episodes of LETI


this hospitalization she has LETI likely due to infection, sepsis syndrome, also 

given NSAIDs


serologies, complement levels, and serum electrophoresis  have been ordered, 

she has heavy proteinuria, 4.7 grams (will need ACE or ARB at discharge if able)


hyperkalemic today, ordered IV insulin, dextrose, bicarbonate


avoid nephrotoxic substances


monitor drug levels when appropriate, minimize non essential medications 


repeat potassium later today, repeat renal panel in AM


monitor blood pressure, maintain MAP > 65mmHg 





it is possible if she does not respond she may require dialysis support 





(2) Severe sepsis without septic shock


Plan:  respiratory source, CT and xray reviewed 


she is on cefepime, levaquin, vancomycin; febrile today


significant leukocytosis, may be due to steroid use 


on  oxygen, appreciate Resp therapy assistance 





(3) HTN (hypertension)


Plan:  monitor blood pressure


continue ordered medications, avoid ACE 





(4) Diabetes


Plan:  continue insulin therapy


glucose goal 140-180 mg/dL





avoid metformin (home medication) given hx of CKD 3-4


 (Migdalia Camejo)


Plan


patient was seen and examined. Renal function is worse. Has heavy proteinuria, 

placing her at very high risk for disease progression. She may require dialysis 

very soon, as early as tomorrow.  (Kelton Whiteside MD)








Migdalia Camejo Aug 18, 2017 11:20


Kelton Whiteside MD Aug 18, 2017 20:49

## 2017-08-19 VITALS
DIASTOLIC BLOOD PRESSURE: 78 MMHG | HEART RATE: 96 BPM | SYSTOLIC BLOOD PRESSURE: 147 MMHG | TEMPERATURE: 98 F | OXYGEN SATURATION: 95 %

## 2017-08-19 VITALS — OXYGEN SATURATION: 93 % | HEART RATE: 81 BPM | RESPIRATION RATE: 27 BRPM

## 2017-08-19 VITALS — HEART RATE: 96 BPM | OXYGEN SATURATION: 95 %

## 2017-08-19 VITALS — HEART RATE: 91 BPM | OXYGEN SATURATION: 98 %

## 2017-08-19 VITALS
OXYGEN SATURATION: 96 % | DIASTOLIC BLOOD PRESSURE: 71 MMHG | SYSTOLIC BLOOD PRESSURE: 145 MMHG | TEMPERATURE: 98.9 F | HEART RATE: 92 BPM

## 2017-08-19 VITALS
DIASTOLIC BLOOD PRESSURE: 79 MMHG | HEART RATE: 95 BPM | SYSTOLIC BLOOD PRESSURE: 170 MMHG | OXYGEN SATURATION: 95 % | TEMPERATURE: 98.6 F

## 2017-08-19 VITALS
OXYGEN SATURATION: 95 % | TEMPERATURE: 98.8 F | DIASTOLIC BLOOD PRESSURE: 72 MMHG | SYSTOLIC BLOOD PRESSURE: 152 MMHG | HEART RATE: 88 BPM

## 2017-08-19 VITALS — HEART RATE: 83 BPM | SYSTOLIC BLOOD PRESSURE: 137 MMHG | DIASTOLIC BLOOD PRESSURE: 65 MMHG | OXYGEN SATURATION: 95 %

## 2017-08-19 VITALS — OXYGEN SATURATION: 97 % | HEART RATE: 94 BPM

## 2017-08-19 VITALS — RESPIRATION RATE: 20 BRPM | OXYGEN SATURATION: 94 % | HEART RATE: 82 BPM

## 2017-08-19 VITALS — HEART RATE: 86 BPM | OXYGEN SATURATION: 94 % | RESPIRATION RATE: 24 BRPM

## 2017-08-19 VITALS — HEART RATE: 103 BPM

## 2017-08-19 VITALS
HEART RATE: 88 BPM | OXYGEN SATURATION: 95 % | SYSTOLIC BLOOD PRESSURE: 152 MMHG | DIASTOLIC BLOOD PRESSURE: 72 MMHG | TEMPERATURE: 98.8 F

## 2017-08-19 VITALS — OXYGEN SATURATION: 93 %

## 2017-08-19 VITALS
DIASTOLIC BLOOD PRESSURE: 61 MMHG | RESPIRATION RATE: 19 BRPM | OXYGEN SATURATION: 92 % | HEART RATE: 83 BPM | SYSTOLIC BLOOD PRESSURE: 131 MMHG

## 2017-08-19 VITALS — OXYGEN SATURATION: 92 % | HEART RATE: 85 BPM

## 2017-08-19 VITALS — OXYGEN SATURATION: 97 % | DIASTOLIC BLOOD PRESSURE: 71 MMHG | HEART RATE: 96 BPM | SYSTOLIC BLOOD PRESSURE: 163 MMHG

## 2017-08-19 VITALS
DIASTOLIC BLOOD PRESSURE: 73 MMHG | HEART RATE: 103 BPM | SYSTOLIC BLOOD PRESSURE: 155 MMHG | TEMPERATURE: 98.6 F | OXYGEN SATURATION: 95 %

## 2017-08-19 VITALS — HEART RATE: 81 BPM | RESPIRATION RATE: 19 BRPM | OXYGEN SATURATION: 95 %

## 2017-08-19 VITALS — OXYGEN SATURATION: 95 %

## 2017-08-19 VITALS — HEART RATE: 82 BPM

## 2017-08-19 VITALS — HEART RATE: 87 BPM | OXYGEN SATURATION: 95 %

## 2017-08-19 VITALS — OXYGEN SATURATION: 94 % | HEART RATE: 84 BPM

## 2017-08-19 VITALS — OXYGEN SATURATION: 96 % | HEART RATE: 93 BPM

## 2017-08-19 VITALS
HEART RATE: 94 BPM | TEMPERATURE: 99.2 F | OXYGEN SATURATION: 95 % | DIASTOLIC BLOOD PRESSURE: 72 MMHG | SYSTOLIC BLOOD PRESSURE: 151 MMHG

## 2017-08-19 VITALS — OXYGEN SATURATION: 98 % | HEART RATE: 96 BPM

## 2017-08-19 VITALS — HEART RATE: 85 BPM | OXYGEN SATURATION: 89 %

## 2017-08-19 VITALS — HEART RATE: 83 BPM | OXYGEN SATURATION: 95 %

## 2017-08-19 LAB
ALP SERPL-CCNC: 140 U/L (ref 45–117)
ALT SERPL-CCNC: 27 U/L (ref 10–53)
ANION GAP SERPL CALC-SCNC: 11 MEQ/L (ref 5–15)
AST SERPL-CCNC: 16 U/L (ref 15–37)
BACTERIA #/AREA URNS HPF: (no result) /HPF
BASOPHILS # BLD AUTO: 0.1 TH/MM3 (ref 0–0.2)
BASOPHILS NFR BLD: 0.4 % (ref 0–2)
BILIRUB SERPL-MCNC: 0.5 MG/DL (ref 0.2–1)
BUN SERPL-MCNC: 71 MG/DL (ref 7–18)
CALCIUM TP COR SERPL-MCNC: 7.6 MG/DL (ref 8.5–10.1)
CHLORIDE SERPL-SCNC: 106 MEQ/L (ref 98–107)
COLOR UR: YELLOW
COMMENT (UR): (no result)
CULTURE IF INDICATED: (no result)
EOSINOPHIL # BLD: 0.1 TH/MM3 (ref 0–0.4)
EOSINOPHIL NFR BLD: 0.7 % (ref 0–4)
ERYTHROCYTE [DISTWIDTH] IN BLOOD BY AUTOMATED COUNT: 13.6 % (ref 11.6–17.2)
GFR SERPLBLD BASED ON 1.73 SQ M-ARVRAT: 16 ML/MIN (ref 89–?)
GLUCOSE UR STRIP-MCNC: (no result) MG/DL
HCO3 BLD-SCNC: 18.8 MEQ/L (ref 21–32)
HCT VFR BLD CALC: 21.3 % (ref 35–46)
HCT VFR BLD CALC: 21.8 % (ref 35–46)
HEMO FLAGS: (no result)
HGB UR QL STRIP: (no result)
KETONES UR STRIP-MCNC: (no result) MG/DL
LYMPHOCYTES # BLD AUTO: 0.6 TH/MM3 (ref 1–4.8)
LYMPHOCYTES NFR BLD AUTO: 4.3 % (ref 9–44)
MAGNESIUM SERPL-MCNC: 1.9 MG/DL (ref 1.5–2.5)
MCH RBC QN AUTO: 31.6 PG (ref 27–34)
MCHC RBC AUTO-ENTMCNC: 32.5 % (ref 32–36)
MCV RBC AUTO: 97.4 FL (ref 80–100)
MONOCYTES NFR BLD: 5.8 % (ref 0–8)
MUCOUS THREADS #/AREA URNS LPF: (no result) /LPF
NEUTROPHILS # BLD AUTO: 11.8 TH/MM3 (ref 1.8–7.7)
NEUTROPHILS NFR BLD AUTO: 88.8 % (ref 16–70)
NITRITE UR QL STRIP: (no result)
PLATELET # BLD: 298 TH/MM3 (ref 150–450)
POTASSIUM SERPL-SCNC: 4.9 MEQ/L (ref 3.5–5.1)
RBC # BLD AUTO: 2.18 MIL/MM3 (ref 4–5.3)
REVIEW FLAG: (no result)
SODIUM SERPL-SCNC: 136 MEQ/L (ref 136–145)
SP GR UR STRIP: 1.01 (ref 1–1.03)
SQUAMOUS #/AREA URNS HPF: 2 /HPF (ref 0–5)
WBC # BLD AUTO: 13.3 TH/MM3 (ref 4–11)

## 2017-08-19 RX ADMIN — HEPARIN SODIUM SCH UNITS: 10000 INJECTION, SOLUTION INTRAVENOUS; SUBCUTANEOUS at 09:01

## 2017-08-19 RX ADMIN — Medication SCH ML: at 20:17

## 2017-08-19 RX ADMIN — INSULIN ASPART SCH: 100 INJECTION, SOLUTION INTRAVENOUS; SUBCUTANEOUS at 11:10

## 2017-08-19 RX ADMIN — NIFEDIPINE SCH MG: 30 TABLET, FILM COATED, EXTENDED RELEASE ORAL at 20:16

## 2017-08-19 RX ADMIN — FERROUS SULFATE TAB 325 MG (65 MG ELEMENTAL FE) SCH MG: 325 (65 FE) TAB at 09:01

## 2017-08-19 RX ADMIN — FERROUS SULFATE TAB 325 MG (65 MG ELEMENTAL FE) SCH MG: 325 (65 FE) TAB at 20:16

## 2017-08-19 RX ADMIN — PANTOPRAZOLE SCH MG: 40 TABLET, DELAYED RELEASE ORAL at 09:01

## 2017-08-19 RX ADMIN — FOLIC ACID SCH MG: 1 TABLET ORAL at 09:01

## 2017-08-19 RX ADMIN — IPRATROPIUM BROMIDE AND ALBUTEROL SULFATE SCH AMPULE: .5; 3 SOLUTION RESPIRATORY (INHALATION) at 09:32

## 2017-08-19 RX ADMIN — HYDROMORPHONE HYDROCHLORIDE PRN MG: 1 INJECTION, SOLUTION INTRAMUSCULAR; INTRAVENOUS; SUBCUTANEOUS at 01:00

## 2017-08-19 RX ADMIN — ASPIRIN 81 MG SCH MG: 81 TABLET ORAL at 09:01

## 2017-08-19 RX ADMIN — HEPARIN SODIUM SCH UNITS: 10000 INJECTION, SOLUTION INTRAVENOUS; SUBCUTANEOUS at 20:16

## 2017-08-19 RX ADMIN — BUMETANIDE SCH MG: 0.25 INJECTION, SOLUTION INTRAMUSCULAR; INTRAVENOUS at 17:29

## 2017-08-19 RX ADMIN — INSULIN ASPART SCH: 100 INJECTION, SOLUTION INTRAVENOUS; SUBCUTANEOUS at 18:23

## 2017-08-19 RX ADMIN — ATENOLOL SCH MG: 25 TABLET ORAL at 09:01

## 2017-08-19 RX ADMIN — ACYCLOVIR SCH UNITS: 800 TABLET ORAL at 20:15

## 2017-08-19 RX ADMIN — STANDARDIZED SENNA CONCENTRATE AND DOCUSATE SODIUM SCH TAB: 8.6; 5 TABLET, FILM COATED ORAL at 20:16

## 2017-08-19 RX ADMIN — CHLORHEXIDINE GLUCONATE SCH PACK: 500 CLOTH TOPICAL at 04:00

## 2017-08-19 RX ADMIN — Medication SCH ML: at 09:00

## 2017-08-19 RX ADMIN — IPRATROPIUM BROMIDE AND ALBUTEROL SULFATE SCH AMPULE: .5; 3 SOLUTION RESPIRATORY (INHALATION) at 11:42

## 2017-08-19 RX ADMIN — GABAPENTIN SCH MG: 100 CAPSULE ORAL at 21:00

## 2017-08-19 RX ADMIN — STANDARDIZED SENNA CONCENTRATE AND DOCUSATE SODIUM SCH TAB: 8.6; 5 TABLET, FILM COATED ORAL at 09:00

## 2017-08-19 RX ADMIN — PRAVASTATIN SODIUM SCH MG: 40 TABLET ORAL at 20:16

## 2017-08-19 RX ADMIN — IPRATROPIUM BROMIDE AND ALBUTEROL SULFATE SCH AMPULE: .5; 3 SOLUTION RESPIRATORY (INHALATION) at 15:01

## 2017-08-19 RX ADMIN — BUMETANIDE SCH MG: 0.25 INJECTION, SOLUTION INTRAMUSCULAR; INTRAVENOUS at 09:00

## 2017-08-19 RX ADMIN — INSULIN ASPART SCH: 100 INJECTION, SOLUTION INTRAVENOUS; SUBCUTANEOUS at 07:00

## 2017-08-19 RX ADMIN — HYDROMORPHONE HYDROCHLORIDE PRN MG: 1 INJECTION, SOLUTION INTRAMUSCULAR; INTRAVENOUS; SUBCUTANEOUS at 20:35

## 2017-08-19 RX ADMIN — INSULIN ASPART SCH: 100 INJECTION, SOLUTION INTRAVENOUS; SUBCUTANEOUS at 21:40

## 2017-08-19 RX ADMIN — GUAIFENESIN SCH MG: 600 TABLET, EXTENDED RELEASE ORAL at 09:00

## 2017-08-19 RX ADMIN — INSULIN ASPART SCH: 100 INJECTION, SOLUTION INTRAVENOUS; SUBCUTANEOUS at 16:00

## 2017-08-19 RX ADMIN — NIFEDIPINE SCH MG: 30 TABLET, FILM COATED, EXTENDED RELEASE ORAL at 09:01

## 2017-08-19 RX ADMIN — GUAIFENESIN SCH MG: 600 TABLET, EXTENDED RELEASE ORAL at 20:16

## 2017-08-19 RX ADMIN — GABAPENTIN SCH MG: 100 CAPSULE ORAL at 09:01

## 2017-08-19 RX ADMIN — ONDANSETRON PRN MG: 2 INJECTION, SOLUTION INTRAMUSCULAR; INTRAVENOUS at 20:36

## 2017-08-19 RX ADMIN — IPRATROPIUM BROMIDE AND ALBUTEROL SULFATE SCH AMPULE: .5; 3 SOLUTION RESPIRATORY (INHALATION) at 20:00

## 2017-08-19 NOTE — RADRPT
EXAM DATE/TIME:  08/19/2017 03:28 

 

[facility] COMPARISON:     

CHEST SINGLE AP, August 18, 2017, 3:20.

 

                     

INDICATIONS :     

Shortness of breath, possible pulmonary disease.

                     

 

MEDICAL HISTORY :     

Cardiovascular disease.  Hypertension  Diabetes mellitus type II.      

 

SURGICAL HISTORY :     

Cholecystectomy.   

 

ENCOUNTER:     

Subsequent                                        

 

ACUITY:     

4 - 6 days      

 

PAIN SCORE:     

Non-responsive.

 

LOCATION:     

Bilateral chest 

 

FINDINGS:     

There is stable to slight improvement in basilar airspace disease since August 18. Small effusions re
main. Cardiomegaly. No new infiltrate. No pneumothorax.

 

CONCLUSION:     

1. Slight improvement in basilar airspace disease. Small effusions remain.

 

 

 

 Valdez Colunga MD on August 19, 2017 at 4:32           

Board Certified Radiologist.

 This report was verified electronically.

## 2017-08-19 NOTE — HHI.NPPN
Subjective


Complaints:  Shortness of Breath


General Problems:  Anemia, Hypertension, Mebatolic Acidosis


Renal Failure:  Chronic, Acute


Additional Remarks


Patient feeling somewhat better today, no acute complaints





Review of Systems


Respiratory


Lungs:  SOB, Cough





Musculoskeletal


MS:  Pain/Stiffness





Objective Data


Data











 8/18/17 8/19/17





 19:00 07:00


 


Intake Total 720 ml 1481 ml


 


Output Total 800 ml 1200 ml


 


Balance -80 ml 281 ml


 


  


 


Intake Oral 720 ml 480 ml


 


IV Total  1001 ml


 


Output Urine Total 800 ml 1200 ml


 


# Bowel Movements 0 0











 Vital Signs








  Date Time  Temp Pulse Resp B/P Pulse Ox O2 Delivery O2 Flow Rate FiO2


 


8/19/17 09:32     93 Nasal Cannula 5.00 


 


8/19/17 08:00  103      


 


8/19/17 08:00 98.8 88 0 152/72 95   


 


8/19/17 07:34 98.8 88 0 152/72 95   


 


8/19/17 06:00  82      


 


8/19/17 04:15     95   50


 


8/19/17 04:00  96      


 


8/19/17 04:00 98.0 96 0 147/78 95   


 


8/19/17 02:00  93      


 


8/19/17 02:00     96   50


 


8/19/17 01:30   16     


 


8/19/17 00:00  95      


 


8/19/17 00:00 98.6 95 0 170/79 95   


 


8/18/17 23:01     94 Venturi Mask 6.00 50


 


8/18/17 22:00  95      


 


8/18/17 21:30   16     


 


8/18/17 20:00 100.5 103 0 163/74 99   


 


8/18/17 20:00  103      


 


8/18/17 18:00  99      


 


8/18/17 16:00  98      


 


8/18/17 16:00 99.2 98 19 157/75 95   


 


8/18/17 15:24     92 Venturi Mask  50


 


8/18/17 14:00  95      








-:  


8/19/17 1039                                                                   

             8/19/17 0610





Tubes & Lines:  Rhodes





Physical Exam


General


Appearance:  Well Developed, Comfortable





Throat


Throat Exam:  Oral Mucosa [place] & Moist





Pulmonary


Resp Exam:  Crackles, Rhonchi, Sputum, Labored





Cardiology


CV Exam:  Regular, Normal Sinus Rhythm





Gastrointestinal/Abdomen


GI Exam:  Soft, Non-Tender





Musculoskeletal


MS Exam:  Joints Intact, Normal Tone





Integumentary


Skin Exam:  Clear, Warm, Dry, Intact





Extremeties


Extremities Exam:  No Edema, Pedal Pulses Palpable





Neurologic


Neuro Exam:  Alert, Awake, Oriented, Speech Clear, Moving All Extremities





Psychiatric


Psych Exam:  Appropriate Responses





Assessment/Plan


Discussed Condition With:  Patient


Assessment Summary:  LETI/Acute Renal Failure, Anemia of CKD, Hypertension, CKD 

Stage IV


Electrolyte Assessment:  Hyperkalemia


Problem List:  


(1) Acute kidney injury superimposed on CKD


Plan:  It appears she has underlying CKD, possibly due to diabetic nephropathy, 

possibly stage 3 with multiple episodes of LETI.  


This hospitalization she has LETI likely due to infection, sepsis syndrome, also 

given NSAIDs





Hyperkalemia yesterday - treated with IV insulin, dextrose, bicarbonate.  

Improved today


UOP, creatinine improving - on Bumex 2mg IV BID.  Continue for now.








serologies, serum electrophoresis have been ordered


Complements wnl


She has heavy proteinuria, 4.7 grams (will need ACE or ARB at discharge if able

) - suspect DM nephropathy.  Her mother was previously on dialysis





avoid nephrotoxic substances


monitor drug levels when appropriate, minimize non essential medications


Vancomycin was ordered - however would prefer to avoid vancomycin as possible 

given LETI on CKD.  Daptomycin may be considered only if necessary.  Follow with 

critical care.


 





No need for HD at this time, however may need HD at some point, mother with 

ESRD and patient is willing to do dialysis if needed.  Continue to monitor for 

now.





(2) Severe sepsis without septic shock


Plan:  respiratory source


significant leukocytosis, may be due to steroid use 


Renal dose medications and antibiotics as possible.


On oxygen





(3) HTN (hypertension)


Plan:  monitor blood pressure


continue ordered medications, avoid ACE 





(4) Diabetes


Plan:  continue insulin therapy


glucose goal 140-180 mg/dL





avoid metformin (home medication) given hx of CKD 3-4











Pepe Guillermo MD Aug 19, 2017 12:26

## 2017-08-19 NOTE — HHI.CCPN
Subjective


Remarks/Hospital Course


52-year-old  female with past medical history of type 2 

diabetes mellitus, coronary artery disease with prior myocardial infarction and 

stents, hyperlipidemia, recurrent alcohol-induced pancreatitis who presents to 

[facility] with 2 day history of shortness of breath.  She states 

dyspnea on exertion started Tuesday evening (8/15). She has had a cough which 

is now productive of yellow sputum.  She had not checked her temperature at 

home but was febrile upon admission with a MAXIMUM TEMPERATURE of 102.5.  She 

has had this some discomfort of her left far lateral  chest wall that occurs 

with cough. Her WBC on admission was 24.6 and CXR demonstrated bilateral 

interstitial and alveolar opacities.  Lactic acid was normal.  Blood cultures 

were drawn in the ED and are negative to date..  She was started on ceftriaxone 

and azithromycin.  Urine Legionella and pneumococcal antigens were performed 

and were negative.  Influenza was negative.  She may have  CK D stage III (

difficult to determine baseline creatinine due to multiple admissions with LETI) 

but presented with creatinine of 2.79, BUN 34 and GFR 22.  She was started on 

NS @ 100 ml/hr. Her creatinine has worsened since admission to 3.11. Her 

respiratory status has also worsened and she is now on NR which prompted 

transfer to ICU with intensivist consult. CT chest demonstrates bilateral 

opacities, most prominent RML/RLL and CARIN/LLL.   BNP is 2464. Troponins are 

flat at 0.37





8/18: Patient is breathing fairly comfortably on Ventimask.  Lethargic.  Urine 

output 750 mL since 6 AM, received Bumex 2 mg IV today by nephrology.  

Potassium of 5.6 treated by nephrology with IV insulin bicarbonate.  Repeat CMP 

pending at 3:00 pm.  Creatinine has worsened to 3.4. 





Subjective:


8/19 hemoglobin 6.9 today from 8.3.  It was 7.3 on the recheck.  She does not 

have evidence of GI bleeding.  Creatinine is 3.68.  Potassium is better today.  

Complement levels are normal.  Serologies pending.


White blood cell count downtrending.  Afebrile. On NC. Feels vaginal pruritis 

like developing yeast infection.





Objective





 Vital Signs








  Date Time  Temp Pulse Resp B/P Pulse Ox O2 Delivery O2 Flow Rate FiO2


 


8/19/17 14:00  103      


 


8/19/17 12:00 98.6  0 155/73 95   


 


8/19/17 09:32      Nasal Cannula 5.00 


 


8/19/17 04:15        50








 Intake and Output








 8/18/17 8/18/17 8/19/17





 08:00 16:00 00:00


 


Intake Total 761 ml 720 ml 480 ml


 


Output Total 275 ml 800 ml 800 ml


 


Balance 486 ml -80 ml -320 ml








Result Diagram:  


8/19/17 1039                                                                   

             8/19/17 0610





Other Results





Microbiology








 Date/Time Procedure Status





Source Growth 


 


 8/16/17 23:07 Influenza Types A,B Antigen (ARLINE) - Final Complete





Nasal Washing NEGATIVE FOR FLU A AND B ANTIGEN.... 


 


 8/17/17 15:31 Legionella Antigen - Final Complete





Urine Random Urine PRESUMPTIVE NEGATIVE FOR LEGIONELLA P... 





 8/17/17 15:31 Streptococcus pneumoniae Antigen (M - Final Complete





Urine Random Urine PRESUMPTIVE NEGATIVE FOR STREPTOCOCCU... 


 


 8/17/17 15:31 Urine Culture - Final Complete





Urine Catheterized Urine  








Objective Remarks


GENERAL: Well-nourished, well-developed  female who is sitting 

up in bedside chair on NC. 


SKIN: Warm and dry, well-perfused.


HEAD: Atraumatic. Normocephalic. 


EYES: Pupils equal and round. No scleral icterus. 


ENT: No nasal bleeding or discharge.  Mucous membranes pink and moist.


NECK: Trachea midline. 


CARDIOVASCULAR: Regular rate and rhythm, rate in the 70s. No murmurs rubs or 

gallops. 


RESPIRATORY:  Bibasilar rales. Occasional rhonchi. No wheeze. 


GASTROINTESTINAL: Abdomen soft, non-tender, nondistended. Bowel sounds present. 


: Rhodes in place with yellow urine output. 


MUSCULOSKELETAL: Extremities without clubbing, cyanosis, or edema. 


NEUROLOGICAL: Awake and alert. Motor grossly within normal limits, moving all 

extremities spontaneously.  Normal speech.





A/P


Assessment and Plan


NEURO:


Peripheral neuropathy


Prior history of alcohol abuse


On Gabapentin 100 bid due to LETI


Hydromorphone as needed for pain





RESP:


Acute respiratory failure, resolved. 


Tobacco abuse


HCAP


Pulmonary edema


Off Bipap, tolerating NC.  


DuoNeb every 4 hours, Albuterol every 2 hours as needed


Antimicrobial coverage for HCAP as per below. 


Continue guaifenesin 600 mg by mouth twice a day





CV:


Coronary artery disease with prior myocardial infarction and stents


Elevated troponin


Hyperlipidema


Essential Hypertension


Pulmonary hypertension


BNP 2400.  Troponin flat at 0.37 more likely related to renal failure/volume 

status/pulm htn


2 D Echo  8/18ejection fraction 55-60%.  Mild concentric LVH.  Mild MR.  Mild 

TR.  Normal RV function. Moderate to severe pulmonary HTN present (66 mm Hg)


EKG demonstrates sinus tach and LVH.


Continue atenolol 25 mg by mouth daily.  Continue Procardia XL 30 mg twice a day


Continue aspirin 81 mg by mouth daily


Continue pravastatin 40 mg by mouth daily at bedtime


Use PRN Clonidine and Hydralazine for SBP >170


May request pulmonary to follow her on Monday.





GI: 


History of recurrent pancreatitis, alcohol induced


History of gastroparesis


GERD


1800 ADA heart healthy diet


Lipase WNL 





FEN/RENAL:


Acute kidney injury overlying probable CKD


Proteinuria


Has Rhodes to monitor intake and output closely while on diuretics.  


Continuing on Bumex 2 mg IV bid per nephrology. Creatinine 3.68. 


Bicarbonate and IV insulin given for hyperkalemia yesterday, now improved. 


Renal ultrasoundno  hydronephrosis.  Small cyst on left kidney.  Increased 

echogenicity consistent with medical renal disease


Complements normal. Followup , ANCA, PARMJIT, SPEP, anti gBM ab


Avoid nephrotoxins.  Received Toradol and Motrin 8/16


Nephrology following, Dr. Whiteside. 





ID:


Pneumonia ?Viral , also risk for HCAP (Discharged 6/1/17 after hospitalization 

for pancreatitis)


Leukocytosis


Has some upper respiratory symptoms as well, so may be viral proces. 


Urine Legionella and pneumococcal antigen negative.  Influenza screen negative.

  Blood cultures 8/16 no growth to date


Urine culture negative 


On  cefepime, vancomycin, Levaquin to cover for HCAP.  D/c vancomycin as 

cultures negative for MRSA. 


Diflucan 150 mg po x1 for vaginal candidiasis.





HEME:


Chronic anemia


Monitor CBC


Continue ferrous sulfate 325 mg by mouth twice a day


Continue folic acid 1 mg by mouth daily





ENDO:


Diabetes mellitus


Detemir 20 units subcutaneous daily at bedtime.  Medium dose insulin sliding 

scale before meals/at bedtime





PROPH: Heparin 5000 subcutaneous every 12 for DVT prophylaxis. Pantoprazole 40 

mg po daily. 





ACCESS: Peripheral IV providing adequate access at this time.





Patient updated at bedside





Level 2








Rebekah Young MD Aug 19, 2017 16:07

## 2017-08-20 VITALS — OXYGEN SATURATION: 95 %

## 2017-08-20 VITALS
DIASTOLIC BLOOD PRESSURE: 72 MMHG | RESPIRATION RATE: 19 BRPM | HEART RATE: 80 BPM | SYSTOLIC BLOOD PRESSURE: 150 MMHG | OXYGEN SATURATION: 97 %

## 2017-08-20 VITALS
OXYGEN SATURATION: 100 % | HEART RATE: 79 BPM | SYSTOLIC BLOOD PRESSURE: 122 MMHG | RESPIRATION RATE: 20 BRPM | DIASTOLIC BLOOD PRESSURE: 60 MMHG

## 2017-08-20 VITALS
TEMPERATURE: 98.6 F | OXYGEN SATURATION: 92 % | DIASTOLIC BLOOD PRESSURE: 73 MMHG | RESPIRATION RATE: 20 BRPM | SYSTOLIC BLOOD PRESSURE: 118 MMHG | HEART RATE: 72 BPM

## 2017-08-20 VITALS — HEART RATE: 83 BPM | RESPIRATION RATE: 32 BRPM | OXYGEN SATURATION: 98 %

## 2017-08-20 VITALS
SYSTOLIC BLOOD PRESSURE: 98 MMHG | OXYGEN SATURATION: 91 % | RESPIRATION RATE: 22 BRPM | HEART RATE: 81 BPM | DIASTOLIC BLOOD PRESSURE: 52 MMHG | TEMPERATURE: 98.3 F

## 2017-08-20 VITALS — OXYGEN SATURATION: 85 % | HEART RATE: 72 BPM | RESPIRATION RATE: 21 BRPM

## 2017-08-20 VITALS — OXYGEN SATURATION: 95 % | HEART RATE: 80 BPM | RESPIRATION RATE: 34 BRPM

## 2017-08-20 VITALS
OXYGEN SATURATION: 93 % | RESPIRATION RATE: 31 BRPM | SYSTOLIC BLOOD PRESSURE: 129 MMHG | HEART RATE: 79 BPM | DIASTOLIC BLOOD PRESSURE: 66 MMHG

## 2017-08-20 VITALS
RESPIRATION RATE: 22 BRPM | OXYGEN SATURATION: 96 % | HEART RATE: 87 BPM | SYSTOLIC BLOOD PRESSURE: 145 MMHG | DIASTOLIC BLOOD PRESSURE: 67 MMHG

## 2017-08-20 VITALS
HEART RATE: 82 BPM | DIASTOLIC BLOOD PRESSURE: 61 MMHG | RESPIRATION RATE: 20 BRPM | OXYGEN SATURATION: 98 % | SYSTOLIC BLOOD PRESSURE: 131 MMHG

## 2017-08-20 VITALS
RESPIRATION RATE: 19 BRPM | SYSTOLIC BLOOD PRESSURE: 137 MMHG | OXYGEN SATURATION: 97 % | HEART RATE: 80 BPM | DIASTOLIC BLOOD PRESSURE: 64 MMHG

## 2017-08-20 VITALS
DIASTOLIC BLOOD PRESSURE: 57 MMHG | SYSTOLIC BLOOD PRESSURE: 109 MMHG | OXYGEN SATURATION: 89 % | HEART RATE: 76 BPM | RESPIRATION RATE: 20 BRPM | TEMPERATURE: 99 F

## 2017-08-20 VITALS — OXYGEN SATURATION: 95 % | RESPIRATION RATE: 23 BRPM | HEART RATE: 81 BPM

## 2017-08-20 VITALS — HEART RATE: 83 BPM | RESPIRATION RATE: 21 BRPM | OXYGEN SATURATION: 92 %

## 2017-08-20 VITALS
RESPIRATION RATE: 22 BRPM | SYSTOLIC BLOOD PRESSURE: 98 MMHG | HEART RATE: 72 BPM | DIASTOLIC BLOOD PRESSURE: 52 MMHG | OXYGEN SATURATION: 89 % | TEMPERATURE: 98.8 F

## 2017-08-20 VITALS — OXYGEN SATURATION: 98 % | HEART RATE: 85 BPM | RESPIRATION RATE: 22 BRPM

## 2017-08-20 VITALS — RESPIRATION RATE: 21 BRPM | HEART RATE: 82 BPM | OXYGEN SATURATION: 94 %

## 2017-08-20 VITALS — HEART RATE: 85 BPM | OXYGEN SATURATION: 94 % | RESPIRATION RATE: 21 BRPM

## 2017-08-20 VITALS — HEART RATE: 79 BPM | RESPIRATION RATE: 37 BRPM | OXYGEN SATURATION: 98 %

## 2017-08-20 VITALS — HEART RATE: 87 BPM | RESPIRATION RATE: 31 BRPM | OXYGEN SATURATION: 98 %

## 2017-08-20 VITALS — HEART RATE: 71 BPM

## 2017-08-20 VITALS
HEART RATE: 81 BPM | RESPIRATION RATE: 19 BRPM | OXYGEN SATURATION: 94 % | SYSTOLIC BLOOD PRESSURE: 128 MMHG | DIASTOLIC BLOOD PRESSURE: 59 MMHG | TEMPERATURE: 100.5 F

## 2017-08-20 VITALS — RESPIRATION RATE: 25 BRPM | HEART RATE: 80 BPM | OXYGEN SATURATION: 97 %

## 2017-08-20 VITALS — RESPIRATION RATE: 28 BRPM | OXYGEN SATURATION: 96 % | HEART RATE: 79 BPM

## 2017-08-20 VITALS — HEART RATE: 79 BPM | RESPIRATION RATE: 31 BRPM | OXYGEN SATURATION: 96 %

## 2017-08-20 VITALS
SYSTOLIC BLOOD PRESSURE: 138 MMHG | OXYGEN SATURATION: 95 % | RESPIRATION RATE: 27 BRPM | HEART RATE: 90 BPM | DIASTOLIC BLOOD PRESSURE: 63 MMHG

## 2017-08-20 VITALS — HEART RATE: 79 BPM | RESPIRATION RATE: 18 BRPM | OXYGEN SATURATION: 98 %

## 2017-08-20 VITALS
RESPIRATION RATE: 18 BRPM | HEART RATE: 81 BPM | SYSTOLIC BLOOD PRESSURE: 136 MMHG | DIASTOLIC BLOOD PRESSURE: 67 MMHG | OXYGEN SATURATION: 96 %

## 2017-08-20 VITALS — OXYGEN SATURATION: 99 % | RESPIRATION RATE: 18 BRPM | HEART RATE: 80 BPM

## 2017-08-20 VITALS — RESPIRATION RATE: 19 BRPM | OXYGEN SATURATION: 97 % | HEART RATE: 80 BPM

## 2017-08-20 VITALS — RESPIRATION RATE: 21 BRPM | OXYGEN SATURATION: 97 % | HEART RATE: 86 BPM

## 2017-08-20 VITALS — RESPIRATION RATE: 17 BRPM | OXYGEN SATURATION: 98 % | HEART RATE: 79 BPM

## 2017-08-20 VITALS — HEART RATE: 75 BPM | OXYGEN SATURATION: 90 % | RESPIRATION RATE: 33 BRPM

## 2017-08-20 VITALS
OXYGEN SATURATION: 93 % | RESPIRATION RATE: 20 BRPM | DIASTOLIC BLOOD PRESSURE: 59 MMHG | HEART RATE: 80 BPM | SYSTOLIC BLOOD PRESSURE: 113 MMHG

## 2017-08-20 VITALS — RESPIRATION RATE: 22 BRPM | OXYGEN SATURATION: 98 % | HEART RATE: 81 BPM

## 2017-08-20 VITALS — OXYGEN SATURATION: 99 % | RESPIRATION RATE: 18 BRPM | HEART RATE: 78 BPM

## 2017-08-20 VITALS
OXYGEN SATURATION: 91 % | SYSTOLIC BLOOD PRESSURE: 118 MMHG | HEART RATE: 72 BPM | TEMPERATURE: 98.8 F | DIASTOLIC BLOOD PRESSURE: 60 MMHG | RESPIRATION RATE: 21 BRPM

## 2017-08-20 VITALS — HEART RATE: 78 BPM | OXYGEN SATURATION: 91 % | RESPIRATION RATE: 17 BRPM

## 2017-08-20 VITALS — HEART RATE: 80 BPM | OXYGEN SATURATION: 97 % | RESPIRATION RATE: 18 BRPM

## 2017-08-20 VITALS — RESPIRATION RATE: 17 BRPM | HEART RATE: 72 BPM | SYSTOLIC BLOOD PRESSURE: 110 MMHG | DIASTOLIC BLOOD PRESSURE: 54 MMHG

## 2017-08-20 VITALS
RESPIRATION RATE: 20 BRPM | HEART RATE: 79 BPM | SYSTOLIC BLOOD PRESSURE: 113 MMHG | DIASTOLIC BLOOD PRESSURE: 56 MMHG | OXYGEN SATURATION: 95 %

## 2017-08-20 VITALS — OXYGEN SATURATION: 94 % | HEART RATE: 80 BPM | RESPIRATION RATE: 21 BRPM

## 2017-08-20 VITALS
SYSTOLIC BLOOD PRESSURE: 128 MMHG | RESPIRATION RATE: 15 BRPM | HEART RATE: 76 BPM | OXYGEN SATURATION: 95 % | DIASTOLIC BLOOD PRESSURE: 63 MMHG

## 2017-08-20 VITALS — OXYGEN SATURATION: 96 % | HEART RATE: 79 BPM | RESPIRATION RATE: 23 BRPM

## 2017-08-20 VITALS — HEART RATE: 89 BPM | OXYGEN SATURATION: 98 % | RESPIRATION RATE: 22 BRPM

## 2017-08-20 VITALS — OXYGEN SATURATION: 94 % | RESPIRATION RATE: 31 BRPM | HEART RATE: 79 BPM

## 2017-08-20 VITALS
SYSTOLIC BLOOD PRESSURE: 131 MMHG | RESPIRATION RATE: 20 BRPM | DIASTOLIC BLOOD PRESSURE: 63 MMHG | OXYGEN SATURATION: 94 % | HEART RATE: 79 BPM

## 2017-08-20 VITALS — OXYGEN SATURATION: 94 % | HEART RATE: 83 BPM | RESPIRATION RATE: 22 BRPM

## 2017-08-20 VITALS — HEART RATE: 85 BPM | RESPIRATION RATE: 18 BRPM | OXYGEN SATURATION: 95 %

## 2017-08-20 VITALS
RESPIRATION RATE: 22 BRPM | HEART RATE: 79 BPM | DIASTOLIC BLOOD PRESSURE: 56 MMHG | TEMPERATURE: 98.3 F | SYSTOLIC BLOOD PRESSURE: 122 MMHG | OXYGEN SATURATION: 92 %

## 2017-08-20 VITALS — OXYGEN SATURATION: 93 % | HEART RATE: 77 BPM | RESPIRATION RATE: 18 BRPM

## 2017-08-20 VITALS — OXYGEN SATURATION: 97 % | RESPIRATION RATE: 23 BRPM | HEART RATE: 87 BPM

## 2017-08-20 VITALS
OXYGEN SATURATION: 97 % | SYSTOLIC BLOOD PRESSURE: 142 MMHG | HEART RATE: 79 BPM | DIASTOLIC BLOOD PRESSURE: 70 MMHG | RESPIRATION RATE: 19 BRPM

## 2017-08-20 VITALS — RESPIRATION RATE: 21 BRPM | OXYGEN SATURATION: 99 % | HEART RATE: 83 BPM

## 2017-08-20 VITALS
RESPIRATION RATE: 16 BRPM | DIASTOLIC BLOOD PRESSURE: 73 MMHG | OXYGEN SATURATION: 97 % | SYSTOLIC BLOOD PRESSURE: 142 MMHG | HEART RATE: 72 BPM

## 2017-08-20 VITALS — OXYGEN SATURATION: 89 % | HEART RATE: 77 BPM | RESPIRATION RATE: 30 BRPM

## 2017-08-20 VITALS
HEART RATE: 79 BPM | TEMPERATURE: 99.6 F | RESPIRATION RATE: 33 BRPM | DIASTOLIC BLOOD PRESSURE: 67 MMHG | SYSTOLIC BLOOD PRESSURE: 129 MMHG | OXYGEN SATURATION: 97 %

## 2017-08-20 VITALS
DIASTOLIC BLOOD PRESSURE: 72 MMHG | OXYGEN SATURATION: 97 % | HEART RATE: 86 BPM | SYSTOLIC BLOOD PRESSURE: 150 MMHG | RESPIRATION RATE: 22 BRPM

## 2017-08-20 VITALS — HEART RATE: 81 BPM | RESPIRATION RATE: 21 BRPM | OXYGEN SATURATION: 91 %

## 2017-08-20 VITALS — RESPIRATION RATE: 22 BRPM | HEART RATE: 84 BPM | OXYGEN SATURATION: 97 %

## 2017-08-20 VITALS — RESPIRATION RATE: 35 BRPM | OXYGEN SATURATION: 96 % | HEART RATE: 87 BPM

## 2017-08-20 VITALS — OXYGEN SATURATION: 99 % | RESPIRATION RATE: 21 BRPM | HEART RATE: 83 BPM

## 2017-08-20 VITALS — OXYGEN SATURATION: 96 % | RESPIRATION RATE: 19 BRPM | HEART RATE: 84 BPM

## 2017-08-20 VITALS — RESPIRATION RATE: 20 BRPM | HEART RATE: 79 BPM

## 2017-08-20 VITALS — HEART RATE: 83 BPM | RESPIRATION RATE: 36 BRPM | OXYGEN SATURATION: 95 %

## 2017-08-20 VITALS — OXYGEN SATURATION: 97 % | RESPIRATION RATE: 19 BRPM | HEART RATE: 80 BPM

## 2017-08-20 VITALS — OXYGEN SATURATION: 97 %

## 2017-08-20 LAB
ANION GAP SERPL CALC-SCNC: 11 MEQ/L (ref 5–15)
BASOPHILS # BLD AUTO: 0 TH/MM3 (ref 0–0.2)
BASOPHILS NFR BLD: 0.5 % (ref 0–2)
BUN SERPL-MCNC: 68 MG/DL (ref 7–18)
CHLORIDE SERPL-SCNC: 106 MEQ/L (ref 98–107)
EOSINOPHIL # BLD: 0.2 TH/MM3 (ref 0–0.4)
EOSINOPHIL NFR BLD: 2.1 % (ref 0–4)
ERYTHROCYTE [DISTWIDTH] IN BLOOD BY AUTOMATED COUNT: 13.3 % (ref 11.6–17.2)
GFR SERPLBLD BASED ON 1.73 SQ M-ARVRAT: 14 ML/MIN (ref 89–?)
HCO3 BLD-SCNC: 19.3 MEQ/L (ref 21–32)
HCT VFR BLD CALC: 17.9 % (ref 35–46)
HCT VFR BLD CALC: 28.7 % (ref 35–46)
HEMO FLAGS: (no result)
LYMPHOCYTES # BLD AUTO: 0.8 TH/MM3 (ref 1–4.8)
LYMPHOCYTES NFR BLD AUTO: 8.6 % (ref 9–44)
MCH RBC QN AUTO: 32.6 PG (ref 27–34)
MCHC RBC AUTO-ENTMCNC: 33.6 % (ref 32–36)
MCV RBC AUTO: 96.8 FL (ref 80–100)
MONOCYTES NFR BLD: 6.6 % (ref 0–8)
NEUTROPHILS # BLD AUTO: 7.5 TH/MM3 (ref 1.8–7.7)
NEUTROPHILS NFR BLD AUTO: 82.2 % (ref 16–70)
PLATELET # BLD: 274 TH/MM3 (ref 150–450)
POTASSIUM SERPL-SCNC: 4.2 MEQ/L (ref 3.5–5.1)
RBC # BLD AUTO: 1.85 MIL/MM3 (ref 4–5.3)
RETICS/RBC NFR: 1.4 % (ref 0.4–3)
REVIEW FLAG: (no result)
REVIEW FLAG: (no result)
SODIUM SERPL-SCNC: 136 MEQ/L (ref 136–145)
TOTAL PROTEIN SPE: 6.5 GM/DL (ref 6–7.6)
TRANSFERRIN IRON PROFILE: 159 MG/DL (ref 200–360)
VIT B12 SERPL-MCNC: (no result) PG/ML (ref 193–986)
WBC # BLD AUTO: 9.1 TH/MM3 (ref 4–11)

## 2017-08-20 PROCEDURE — 30233N1 TRANSFUSION OF NONAUTOLOGOUS RED BLOOD CELLS INTO PERIPHERAL VEIN, PERCUTANEOUS APPROACH: ICD-10-PCS | Performed by: EMERGENCY MEDICINE

## 2017-08-20 RX ADMIN — GABAPENTIN SCH MG: 100 CAPSULE ORAL at 09:00

## 2017-08-20 RX ADMIN — STANDARDIZED SENNA CONCENTRATE AND DOCUSATE SODIUM SCH TAB: 8.6; 5 TABLET, FILM COATED ORAL at 20:58

## 2017-08-20 RX ADMIN — PRAVASTATIN SODIUM SCH MG: 40 TABLET ORAL at 20:58

## 2017-08-20 RX ADMIN — FOLIC ACID SCH MG: 1 TABLET ORAL at 09:00

## 2017-08-20 RX ADMIN — CHLORHEXIDINE GLUCONATE SCH PACK: 500 CLOTH TOPICAL at 04:00

## 2017-08-20 RX ADMIN — INSULIN ASPART SCH: 100 INJECTION, SOLUTION INTRAVENOUS; SUBCUTANEOUS at 21:00

## 2017-08-20 RX ADMIN — IPRATROPIUM BROMIDE AND ALBUTEROL SULFATE SCH AMPULE: .5; 3 SOLUTION RESPIRATORY (INHALATION) at 15:14

## 2017-08-20 RX ADMIN — FERROUS SULFATE TAB 325 MG (65 MG ELEMENTAL FE) SCH MG: 325 (65 FE) TAB at 09:00

## 2017-08-20 RX ADMIN — NIFEDIPINE SCH MG: 30 TABLET, FILM COATED, EXTENDED RELEASE ORAL at 20:59

## 2017-08-20 RX ADMIN — IPRATROPIUM BROMIDE AND ALBUTEROL SULFATE SCH AMPULE: .5; 3 SOLUTION RESPIRATORY (INHALATION) at 07:42

## 2017-08-20 RX ADMIN — GUAIFENESIN SCH MG: 600 TABLET, EXTENDED RELEASE ORAL at 09:00

## 2017-08-20 RX ADMIN — GABAPENTIN SCH MG: 100 CAPSULE ORAL at 20:58

## 2017-08-20 RX ADMIN — PANTOPRAZOLE SCH MG: 40 TABLET, DELAYED RELEASE ORAL at 09:00

## 2017-08-20 RX ADMIN — Medication SCH ML: at 21:00

## 2017-08-20 RX ADMIN — HYDROMORPHONE HYDROCHLORIDE PRN MG: 1 INJECTION, SOLUTION INTRAMUSCULAR; INTRAVENOUS; SUBCUTANEOUS at 23:33

## 2017-08-20 RX ADMIN — Medication SCH ML: at 09:00

## 2017-08-20 RX ADMIN — ALBUTEROL SULFATE PRN MG: 2.5 SOLUTION RESPIRATORY (INHALATION) at 01:33

## 2017-08-20 RX ADMIN — ASPIRIN 81 MG SCH MG: 81 TABLET ORAL at 09:00

## 2017-08-20 RX ADMIN — GUAIFENESIN SCH MG: 600 TABLET, EXTENDED RELEASE ORAL at 20:59

## 2017-08-20 RX ADMIN — ACYCLOVIR SCH UNITS: 800 TABLET ORAL at 21:00

## 2017-08-20 RX ADMIN — ATENOLOL SCH MG: 25 TABLET ORAL at 09:00

## 2017-08-20 RX ADMIN — IPRATROPIUM BROMIDE AND ALBUTEROL SULFATE SCH AMPULE: .5; 3 SOLUTION RESPIRATORY (INHALATION) at 19:57

## 2017-08-20 RX ADMIN — ONDANSETRON PRN MG: 2 INJECTION, SOLUTION INTRAMUSCULAR; INTRAVENOUS at 02:42

## 2017-08-20 RX ADMIN — BUMETANIDE SCH MG: 0.25 INJECTION, SOLUTION INTRAMUSCULAR; INTRAVENOUS at 17:30

## 2017-08-20 RX ADMIN — INSULIN ASPART SCH: 100 INJECTION, SOLUTION INTRAVENOUS; SUBCUTANEOUS at 07:00

## 2017-08-20 RX ADMIN — HYDROMORPHONE HYDROCHLORIDE PRN MG: 1 INJECTION, SOLUTION INTRAMUSCULAR; INTRAVENOUS; SUBCUTANEOUS at 02:43

## 2017-08-20 RX ADMIN — BUMETANIDE SCH MG: 0.25 INJECTION, SOLUTION INTRAMUSCULAR; INTRAVENOUS at 09:00

## 2017-08-20 RX ADMIN — STANDARDIZED SENNA CONCENTRATE AND DOCUSATE SODIUM SCH TAB: 8.6; 5 TABLET, FILM COATED ORAL at 09:00

## 2017-08-20 RX ADMIN — LEVOFLOXACIN SCH MLS/HR: 5 INJECTION, SOLUTION INTRAVENOUS at 00:23

## 2017-08-20 RX ADMIN — CEFEPIME SCH MLS/HR: 2 INJECTION, POWDER, FOR SOLUTION INTRAVENOUS at 00:23

## 2017-08-20 RX ADMIN — INSULIN ASPART SCH: 100 INJECTION, SOLUTION INTRAVENOUS; SUBCUTANEOUS at 16:00

## 2017-08-20 RX ADMIN — INSULIN ASPART SCH: 100 INJECTION, SOLUTION INTRAVENOUS; SUBCUTANEOUS at 11:00

## 2017-08-20 RX ADMIN — FERROUS SULFATE TAB 325 MG (65 MG ELEMENTAL FE) SCH MG: 325 (65 FE) TAB at 20:58

## 2017-08-20 RX ADMIN — CEFEPIME SCH MLS/HR: 2 INJECTION, POWDER, FOR SOLUTION INTRAVENOUS at 22:54

## 2017-08-20 RX ADMIN — NIFEDIPINE SCH MG: 30 TABLET, FILM COATED, EXTENDED RELEASE ORAL at 09:00

## 2017-08-20 RX ADMIN — HYDROCODONE BITARTRATE AND ACETAMINOPHEN PRN TAB: 5; 325 TABLET ORAL at 21:36

## 2017-08-20 RX ADMIN — ONDANSETRON PRN MG: 2 INJECTION, SOLUTION INTRAMUSCULAR; INTRAVENOUS at 23:32

## 2017-08-20 RX ADMIN — IPRATROPIUM BROMIDE AND ALBUTEROL SULFATE SCH AMPULE: .5; 3 SOLUTION RESPIRATORY (INHALATION) at 11:20

## 2017-08-20 RX ADMIN — HYDROMORPHONE HYDROCHLORIDE PRN MG: 1 INJECTION, SOLUTION INTRAMUSCULAR; INTRAVENOUS; SUBCUTANEOUS at 17:29

## 2017-08-20 NOTE — HHI.NPPN
Subjective


Complaints:  Shortness of Breath


General Problems:  Anemia, Hypertension, Mebatolic Acidosis


Renal Failure:  Chronic, Acute


Additional Remarks


Being transfused PRBCs today, no acute complaints.





Review of Systems


Respiratory


Lungs:  SOB, Cough





Musculoskeletal


MS:  Pain/Stiffness





Objective Data


Data











 8/20/17 8/21/17





 19:00 07:00


 


Intake Total 1030 ml 


 


Output Total 300 ml 


 


Balance 730 ml 


 


  


 


Intake Oral 280 ml 


 


IV Total 750 ml 


 


Output Urine Total 300 ml 


 


# Bowel Movements 0 











Vital Signs








  Date Time  Temp Pulse Resp B/P (MAP) Pulse Ox O2 Delivery O2 Flow Rate FiO2


 


8/20/17 14:16 98.6 72 20 118/73 (88) 92   


 


8/20/17 14:00  72      


 


8/20/17 13:30  79 17  98   


 


8/20/17 13:22  79 20 122/60 (80) 100   


 


8/20/17 13:00  80 18  99   


 


8/20/17 12:30  83 32  98   


 


8/20/17 12:00 98.8 82 22 122/60 (80) 91   


 


8/20/17 12:00  72      


 


8/20/17 12:00  82 21 118/73 (88) 99   


 


8/20/17 11:30  80 19  97   


 


8/20/17 11:00  76 15 128/63 (84) 95   


 


8/20/17 10:30  77 18  93   


 


8/20/17 10:00  75 17 110/54 (72)    


 


8/20/17 10:00  72      


 


8/20/17 09:30  79 20     


 


8/20/17 09:00  79 20 113/56 (75) 95   


 


8/20/17 08:30  79 28  96   


 


8/20/17 08:00 98.3 79 18 113/56 (75) 92   


 


8/20/17 08:00  80 22 122/58 (79) 96   


 


8/20/17 07:54 98.3 81 22 98/52 (67) 91   


 


8/20/17 07:42     95 Nasal Cannula 3.00 


 


8/20/17 07:22  71      


 


8/20/17 07:17 98.8 72 22 98/52 (67) 89   


 


8/20/17 04:45  83 21  99   


 


8/20/17 04:30  81 21  91   


 


8/20/17 04:15  75 33  90   


 


8/20/17 04:00 99.0  20     


 


8/20/17 04:00  76 32 109/57 (74) 89   


 


8/20/17 03:45  78 17  91   


 


8/20/17 03:30  72 21  85   


 


8/20/17 03:15  77 30  89   


 


8/20/17 03:00  80 20 113/59 (77) 93   


 


8/20/17 02:45  87 35  96   


 


8/20/17 02:30  82 21  94   


 


8/20/17 02:15  79 18  98   


 


8/20/17 02:00  79 20 131/63 (85) 94   


 


8/20/17 01:45  79 23  96   


 


8/20/17 01:30  80 21  94   


 


8/20/17 01:15  79 37  98   


 


8/20/17 01:00  79 31 129/66 (87) 93   


 


8/20/17 00:45  83 21  92   


 


8/20/17 00:30  79 31  94   


 


8/20/17 00:15  81 23  95   


 


8/20/17 00:00  81 19 128/59 (82) 94   


 


8/20/17 00:00 100.5       


 


8/20/17 00:00  81      


 


8/19/17 23:45  81 19  95   


 


8/19/17 23:30  82 20  94   


 


8/19/17 23:15  81 27  93   


 


8/19/17 23:00  83 19 131/61 (84) 92   


 


8/19/17 22:45  86 24  94   


 


8/19/17 22:30  85   89   


 


8/19/17 22:15  83   95   


 


8/19/17 22:00  83      


 


8/19/17 22:00  83  137/65 (89) 95   


 


8/19/17 21:45  84   94   


 


8/19/17 21:30  85   92   


 


8/19/17 21:15  87   95   


 


8/19/17 21:00  96  163/71 (101) 97   


 


8/19/17 20:45  96   95   


 


8/19/17 20:30  96   98   


 


8/19/17 20:15  94   97   


 


8/19/17 20:00  92      


 


8/19/17 20:00 98.9       


 


8/19/17 20:00  92  145/71 (95) 96   


 


8/19/17 20:00     96 Nasal Cannula 3.00 


 


8/19/17 19:45  91   98   


 


8/19/17 18:00  103      


 


8/19/17 16:00  103      


 


8/19/17 16:00 99.2 94 0 151/72 (98) 95   








-:  


8/20/17 0346                                                                   

             8/20/17 0346








 Microbiology


8/19/17 Urine Culture - Preliminary, Resulted


          NO GROWTH IN 24 HOURS.


Tubes & Lines:  Rhodes





Physical Exam


General


Appearance:  Well Developed, Comfortable





Throat


Throat Exam:  Oral Mucosa [place] & Moist





Pulmonary


Resp Exam:  Crackles, Rhonchi, Sputum, Labored





Cardiology


CV Exam:  Regular, Normal Sinus Rhythm





Gastrointestinal/Abdomen


GI Exam:  Soft, Non-Tender





Musculoskeletal


MS Exam:  Joints Intact, Normal Tone





Integumentary


Skin Exam:  Clear, Warm, Dry, Intact





Extremeties


Extremities Exam:  No Edema, Pedal Pulses Palpable





Neurologic


Neuro Exam:  Alert, Awake, Oriented, Speech Clear, Moving All Extremities





Psychiatric


Psych Exam:  Appropriate Responses





Assessment/Plan


Discussed Condition With:  Patient


Assessment Summary:  LETI/Acute Renal Failure, Anemia of CKD, Hypertension, CKD 

Stage IV


Electrolyte Assessment:  Hyperkalemia


Problem List:  


(1) Acute kidney injury superimposed on CKD


ICD Codes:  N17.9 - Acute kidney failure, unspecified; N18.9 - Chronic kidney 

disease, unspecified


Status:  Chronic


Plan:  It appears she has underlying CKD, possibly due to diabetic nephropathy, 

possibly stage 3 with multiple episodes of LETI.  


This hospitalization she has LETI likely due to infection, sepsis syndrome, also 

given NSAIDs





Hyperkalemia Friday, with medical management.  K+ 4.2 today


On Bumex 2mg IV BID.   1L UOP over last 24 hours, creatinine 3.6 -> 4.0





Discussed with patient, may need HD in 1-2 days if continued worsening 

azotemia. 


Patient works at [employer].  If she goes to ESRD, she would like to 

consider PD as a treatment modality.


Monitor for now.  Discussed with critical care.





serologies, serum electrophoresis have been ordered


Complements wnl


She has heavy proteinuria, 4.7 grams - suspect DM nephropathy.  Her mother was 

previously on dialysis.








avoid nephrotoxic substances


monitor drug levels when appropriate, minimize non essential medications


Vancomycin was ordered - however would prefer to avoid vancomycin as possible 

given LETI on CKD.  Daptomycin may be considered only if necessary.  Follow with 

critical care.


 





(2) Severe sepsis without septic shock


ICD Codes:  A41.9 - Sepsis, unspecified organism; R65.20 - Severe sepsis 

without septic shock


Status:  Acute


Plan:  respiratory source


significant leukocytosis, may be due to steroid use 


Renal dose medications and antibiotics as possible.


On oxygen





(3) HTN (hypertension)


ICD Codes:  I10 - Hypertension


Status:  Chronic


Plan:  monitor blood pressure


continue ordered medications, avoid ACE 





(4) Diabetes


ICD Codes:  E11.9 - Diabetes mellitus


Status:  Chronic


Plan:  continue insulin therapy


glucose goal 140-180 mg/dL





avoid metformin (home medication) given hx of CKD 3-4





(5) Anemia


ICD Codes:  D64.9 - Anemia, unspecified


Status:  Chronic


Plan:  Drop in hemoglobin - being transfused today.


Likely component of chronic anemia with CKD. 


However relatively acute drop  - follow with critical care.  


Patient had been taking NSAIDs prior to admission.  Continue to monitor.


Transfusion ordered earlier today.








Problem Qualifiers





(1) Anemia:  


Qualified Codes:  N18.9 - Chronic kidney disease, unspecified; D63.1 - Anemia 

in chronic kidney disease








Pepe Guillermo MD Aug 20, 2017 15:17

## 2017-08-20 NOTE — RADRPT
EXAM DATE/TIME:  08/20/2017 20:36 

 

[facility] COMPARISON:     

No previous studies available for comparison.

 

 

INDICATIONS :     

Flank pain, anemia, evaluate for bleed.

                  

 

ORAL CONTRAST:      

Prescribed oral contrast ingested.

                  

 

RADIATION DOSE:     

9.96 CTDIvol (mGy) 

 

 

MEDICAL HISTORY :     

Cardiovascular disease. Hypertension. Pancreatitis.Diabetes.

 

SURGICAL HISTORY :      

Tubal ligation. Cholecystectomy.

 

ENCOUNTER:      

Initial

 

ACUITY:      

1 day

 

PAIN SCALE:      

8/10

 

LOCATION:       

Bilateral flank 

 

TECHNIQUE:     

Volumetric scanning of the abdomen and pelvis was performed.  Using automated exposure control and ad
justment of the mA and/or kV according to patient size, radiation dose was kept as low as reasonably 
achievable to obtain optimal diagnostic quality images.  DICOM format image data is available electro
nically for review and comparison.  

 

FINDINGS:     

CT Abdomen: The liver, spleen, pancreas, kidneys, adrenals are unremarkable. There is no evidence for
 any appreciable pathological adenopathy, free fluid, or bowel obstruction.  Small bilateral pleural 
effusions are present with bibasilar consolidation and focal infiltrates in lingula and right middle 
lobe. Injection sites are identified in subcutaneous tissues of anterior abdominal wall on the right.


 

CT pelvis: Multiple calcified fibroids are present. There is moderate amount of stool throughout the 
colon.

 

 

CONCLUSION:     

1. Uterine fibroid and moderate amount of stool. 

2. Bilateral pleural effusions and bibasilar consolidation and infiltrates in right middle lobe/lingu
laMichael 

 

 KMichael Quach MD on August 20, 2017 at 20:58           

Board Certified Radiologist.

 This report was verified electronically.

## 2017-08-20 NOTE — HHI.CCPN
Subjective


Remarks/Hospital Course


52-year-old  female with past medical history of type 2 

diabetes mellitus, coronary artery disease with prior myocardial infarction and 

stents, hyperlipidemia, recurrent alcohol-induced pancreatitis who presents to 

[facility] with 2 day history of shortness of breath.  She states 

dyspnea on exertion started Tuesday evening (8/15). She has had a cough which 

is now productive of yellow sputum.  She had not checked her temperature at 

home but was febrile upon admission with a MAXIMUM TEMPERATURE of 102.5.  She 

has had this some discomfort of her left far lateral  chest wall that occurs 

with cough. Her WBC on admission was 24.6 and CXR demonstrated bilateral 

interstitial and alveolar opacities.  Lactic acid was normal.  Blood cultures 

were drawn in the ED and are negative to date..  She was started on ceftriaxone 

and azithromycin.  Urine Legionella and pneumococcal antigens were performed 

and were negative.  Influenza was negative.  She may have  CK D stage III (

difficult to determine baseline creatinine due to multiple admissions with LETI) 

but presented with creatinine of 2.79, BUN 34 and GFR 22.  She was started on 

NS @ 100 ml/hr. Her creatinine has worsened since admission to 3.11. Her 

respiratory status has also worsened and she is now on NR which prompted 

transfer to ICU with intensivist consult. CT chest demonstrates bilateral 

opacities, most prominent RML/RLL and CARIN/LLL.   BNP is 2464. Troponins are 

flat at 0.37





8/18: Patient is breathing fairly comfortably on Ventimask.  Lethargic.  Urine 

output 750 mL since 6 AM, received Bumex 2 mg IV today by nephrology.  

Potassium of 5.6 treated by nephrology with IV insulin bicarbonate.  Repeat CMP 

pending at 3:00 pm.  Creatinine has worsened to 3.4. 





8/19 hemoglobin 6.9 today from 8.3.  It was 7.3 on the recheck.  She does not 

have evidence of GI bleeding.  Creatinine is 3.68.  Potassium is better today.  

Complement levels are normal.  Serologies pending.


White blood cell count downtrending.  Afebrile. On NC. Feels vaginal pruritis 

like developing yeast infection. 





Subjective:


8/20 [employer] downtime, note entered later.  Hgb 6 this morning after 

transfusion 1 unit PRBC yesterday.  No BMs. Transfusing 2 units PRBC. 


Complains of bilateral flank pain, abdominal fullness. 


Creatinine worsening, now 4.07. UOP 1 L last 24 hours. 


On 3 L NC. Afebrile.  She is anxious about possibly needing dialysis.





Objective





Vital Signs








  Date Time  Temp Pulse Resp B/P (MAP) Pulse Ox O2 Delivery O2 Flow Rate FiO2


 


8/20/17 14:16 98.6 72 20 118/73 (88) 92   


 


8/20/17 07:42      Nasal Cannula 3.00 


 


8/19/17 04:15        50














Intake and Output   


 


 8/20/17 8/20/17 8/21/17





 08:00 16:00 00:00


 


Intake Total 275 ml 1030 ml 


 


Output Total 300 ml 300 ml 


 


Balance -25 ml 730 ml 








Result Diagram:  


8/20/17 0346                                                                   

             8/20/17 0346





Other Results





Microbiology








 Date/Time


Source Procedure


Growth Status


 


 


 8/17/17 15:31


Urine Catheterized Urine Urine Culture - Final Complete





 8/17/17 15:31


Urine Random Urine Legionella Antigen - Final


PRESUMPTIVE NEGATIVE FOR LEGIONELLA P... Complete


 


 8/17/17 15:31


Urine Random Urine Streptococcus pneumoniae Antigen (M - Final


PRESUMPTIVE NEGATIVE FOR STREPTOCOCCU... Complete








Objective Remarks


GENERAL: Well-nourished, well-developed  female who is sitting 

up in bedside chair on NC 3L. 


SKIN: Warm and dry, well-perfused.


HEAD: Atraumatic. Normocephalic. 


EYES: Pupils equal and round. No scleral icterus. 


ENT: No nasal bleeding or discharge.  Mucous membranes pink and moist.


NECK: Trachea midline. 


CARDIOVASCULAR: Regular rate and rhythm, rate in the 70s. No murmurs rubs or 

gallops. 


RESPIRATORY:  R basilar rales. Occasional rhonchi. No wheeze. 


GASTROINTESTINAL: Abdomen soft, non-tender, mildly distended. Bowel sounds 

present. 


: Rhodes in place with yellow urine output. 


MUSCULOSKELETAL: Extremities without clubbing, cyanosis, or edema. 


NEUROLOGICAL: Awake and alert. Motor grossly within normal limits, moving all 

extremities spontaneously.  Normal speech.





A/P


Assessment and Plan


NEURO:


Peripheral neuropathy


Prior history of alcohol abuse


On Gabapentin 100 bid due to LETI


Lortab as needed for pain.  Hydromorphone as needed for xpain





RESP:


Acute respiratory failure, resolved. 


Tobacco abuse


Pneumonia ?Viral/HCAP


Pulmonary edema


Off Bipap, tolerating NC.  


DuoNeb every 4 hours, Albuterol every 2 hours as needed


Antimicrobial coverage for HCAP as per below. 


Continue guaifenesin 600 mg by mouth twice a day





CV:


Coronary artery disease with prior myocardial infarction and stents


Elevated troponin


Hyperlipidema


Essential Hypertension


Pulmonary hypertension


BNP 2400.  Troponin flat at 0.37 more likely related to renal failure/volume 

status/pulm htn


2 D Echo  8/18ejection fraction 55-60%.  Mild concentric LVH.  Mild MR.  Mild 

TR.  Normal RV function. Moderate to severe pulmonary HTN present (66 mm Hg)


EKG demonstrates sinus tach and LVH.


Continue atenolol 25 mg by mouth daily.  Continue Procardia XL 30 mg twice a 

day (pulm htn)


Continue aspirin 81 mg by mouth daily


Continue pravastatin 40 mg by mouth daily at bedtime


Use PRN Clonidine and Hydralazine for SBP >170





GI: 


History of recurrent pancreatitis, alcohol induced


History of gastroparesis


GERD


1800 ADA heart healthy diet


Lipase WNL 





FEN/RENAL:


Acute kidney injury overlying probable CKD


Proteinuria


Has Rhodes to monitor intake and output closely while on diuretics.  


Continuing on Bumex 2 mg IV bid per nephrology. Creatinine worsening 4.07. 

Discussed with Dr. Guillermo, nephrology.  


No urgent need for dialysis however may need dialysis tomorrow.  She is 

diuresing.


She likely has septic ATN superimposed on chronic CKD and unclear whether HD 

would be life long or if there is a chance of some improvement.  


Renal ultrasoundno  hydronephrosis.  Small cyst on left kidney.  Increased 

echogenicity consistent with medical renal disease


Complements normal. Followup , ANCA, PARMJIT, SPEP, anti gBM ab


Avoid nephrotoxins.  Received Toradol and Motrin 8/16


Nephrology following, Dr. Whiteside. 





ID:


Pneumonia ?Viral , also risk for HCAP (Discharged 6/1/17 after hospitalization 

for pancreatitis)


Leukocytosis


Has some upper respiratory symptoms as well, so may be viral proces. 


Urine Legionella and pneumococcal antigen negative.  Influenza screen negative.

  Blood cultures 8/16 no growth to date


Urine culture negative 


On  cefepime 8/17 #4,  Levaquin to cover for HCAP/atypicals .  Off  vancomycin 

as cultures negative for MRSA. 


Diflucan 150 mg po x1 for vaginal candidiasis.





HEME:


Acute on Chronic anemia


Transfused 1 unit packed red cells 8/19.  Hemoglobin 6 this morning.  No 

obvious source of GI bleeding.  Transfused 2 units packed red cells 8/20.


Patient states she had a colonoscopy 3-4 months ago by Dr. Granados and was 

negative.  No prior endoscopy.


Sent retic count which is low side of normal. Likely multifactorial anemia 

secondary to iron deficiency/anemia chronic disease/renal failure but unclear 

why continuing to drop. Haptoglobin is not low to indicate hemolysis. B12 ok. 

Does have flank pain so will obtain CT to r/o RP bleed. Hemoccult stool if she 

has a BM. 


Holding subcut heparin.


Continue ferrous sulfate 325 mg by mouth twice a day


Continue folic acid 1 mg by mouth daily





ENDO:


Diabetes mellitus


Glucose 69 overnight. Back off  detemir 15qhs.  Medium dose insulin sliding 

scale before meals/at bedtime





PROPH: Heparin 5000 subcutaneous holding now due to anemia with transfusions.. 

Pantoprazole 40 mg po daily. 





ACCESS: Peripheral IV providing adequate access at this time.





Patient and her brother updated at bedside.  Discussed with Dr. Guillermo.





Level 2











Rebekah Young MD Aug 20, 2017 15:23

## 2017-08-21 VITALS
OXYGEN SATURATION: 90 % | DIASTOLIC BLOOD PRESSURE: 68 MMHG | SYSTOLIC BLOOD PRESSURE: 144 MMHG | TEMPERATURE: 98.7 F | HEART RATE: 76 BPM | RESPIRATION RATE: 18 BRPM

## 2017-08-21 VITALS
DIASTOLIC BLOOD PRESSURE: 58 MMHG | SYSTOLIC BLOOD PRESSURE: 122 MMHG | RESPIRATION RATE: 31 BRPM | OXYGEN SATURATION: 93 % | HEART RATE: 81 BPM

## 2017-08-21 VITALS — RESPIRATION RATE: 19 BRPM | OXYGEN SATURATION: 99 % | HEART RATE: 83 BPM

## 2017-08-21 VITALS
SYSTOLIC BLOOD PRESSURE: 139 MMHG | HEART RATE: 80 BPM | DIASTOLIC BLOOD PRESSURE: 66 MMHG | OXYGEN SATURATION: 97 % | RESPIRATION RATE: 15 BRPM

## 2017-08-21 VITALS
RESPIRATION RATE: 22 BRPM | HEART RATE: 79 BPM | DIASTOLIC BLOOD PRESSURE: 59 MMHG | OXYGEN SATURATION: 98 % | SYSTOLIC BLOOD PRESSURE: 122 MMHG | TEMPERATURE: 98.3 F

## 2017-08-21 VITALS
OXYGEN SATURATION: 99 % | DIASTOLIC BLOOD PRESSURE: 69 MMHG | HEART RATE: 84 BPM | RESPIRATION RATE: 26 BRPM | SYSTOLIC BLOOD PRESSURE: 152 MMHG

## 2017-08-21 VITALS — HEART RATE: 82 BPM | OXYGEN SATURATION: 100 % | RESPIRATION RATE: 20 BRPM

## 2017-08-21 VITALS — HEART RATE: 83 BPM

## 2017-08-21 VITALS — OXYGEN SATURATION: 95 % | HEART RATE: 82 BPM | RESPIRATION RATE: 41 BRPM

## 2017-08-21 VITALS
OXYGEN SATURATION: 98 % | TEMPERATURE: 98 F | RESPIRATION RATE: 30 BRPM | HEART RATE: 82 BPM | SYSTOLIC BLOOD PRESSURE: 134 MMHG | DIASTOLIC BLOOD PRESSURE: 64 MMHG

## 2017-08-21 VITALS — RESPIRATION RATE: 21 BRPM | HEART RATE: 86 BPM | OXYGEN SATURATION: 99 %

## 2017-08-21 VITALS — OXYGEN SATURATION: 99 % | HEART RATE: 81 BPM | RESPIRATION RATE: 16 BRPM

## 2017-08-21 VITALS
HEART RATE: 77 BPM | DIASTOLIC BLOOD PRESSURE: 58 MMHG | OXYGEN SATURATION: 96 % | TEMPERATURE: 98 F | SYSTOLIC BLOOD PRESSURE: 124 MMHG | RESPIRATION RATE: 18 BRPM

## 2017-08-21 VITALS — RESPIRATION RATE: 19 BRPM | HEART RATE: 83 BPM | OXYGEN SATURATION: 96 %

## 2017-08-21 VITALS — RESPIRATION RATE: 21 BRPM | HEART RATE: 84 BPM | OXYGEN SATURATION: 96 %

## 2017-08-21 VITALS
DIASTOLIC BLOOD PRESSURE: 71 MMHG | TEMPERATURE: 98.8 F | OXYGEN SATURATION: 96 % | HEART RATE: 81 BPM | RESPIRATION RATE: 18 BRPM | SYSTOLIC BLOOD PRESSURE: 151 MMHG

## 2017-08-21 VITALS
DIASTOLIC BLOOD PRESSURE: 63 MMHG | HEART RATE: 82 BPM | SYSTOLIC BLOOD PRESSURE: 131 MMHG | RESPIRATION RATE: 20 BRPM | TEMPERATURE: 99.8 F | OXYGEN SATURATION: 92 %

## 2017-08-21 VITALS — HEART RATE: 84 BPM | RESPIRATION RATE: 21 BRPM | OXYGEN SATURATION: 98 %

## 2017-08-21 VITALS — HEART RATE: 79 BPM

## 2017-08-21 VITALS — OXYGEN SATURATION: 100 % | RESPIRATION RATE: 17 BRPM | HEART RATE: 81 BPM

## 2017-08-21 VITALS
HEART RATE: 83 BPM | RESPIRATION RATE: 16 BRPM | OXYGEN SATURATION: 99 % | SYSTOLIC BLOOD PRESSURE: 147 MMHG | DIASTOLIC BLOOD PRESSURE: 68 MMHG

## 2017-08-21 VITALS — RESPIRATION RATE: 22 BRPM | HEART RATE: 80 BPM | OXYGEN SATURATION: 95 %

## 2017-08-21 VITALS
DIASTOLIC BLOOD PRESSURE: 69 MMHG | RESPIRATION RATE: 18 BRPM | SYSTOLIC BLOOD PRESSURE: 146 MMHG | HEART RATE: 84 BPM | OXYGEN SATURATION: 98 %

## 2017-08-21 VITALS — HEART RATE: 81 BPM | OXYGEN SATURATION: 99 % | RESPIRATION RATE: 22 BRPM

## 2017-08-21 VITALS
RESPIRATION RATE: 28 BRPM | OXYGEN SATURATION: 99 % | HEART RATE: 75 BPM | SYSTOLIC BLOOD PRESSURE: 125 MMHG | DIASTOLIC BLOOD PRESSURE: 61 MMHG

## 2017-08-21 VITALS
RESPIRATION RATE: 20 BRPM | SYSTOLIC BLOOD PRESSURE: 150 MMHG | OXYGEN SATURATION: 95 % | DIASTOLIC BLOOD PRESSURE: 71 MMHG | HEART RATE: 86 BPM

## 2017-08-21 VITALS — HEART RATE: 80 BPM | RESPIRATION RATE: 18 BRPM | OXYGEN SATURATION: 92 %

## 2017-08-21 VITALS — HEART RATE: 77 BPM

## 2017-08-21 VITALS — OXYGEN SATURATION: 96 %

## 2017-08-21 VITALS — HEART RATE: 75 BPM

## 2017-08-21 VITALS — HEART RATE: 81 BPM

## 2017-08-21 VITALS — HEART RATE: 83 BPM | RESPIRATION RATE: 24 BRPM | OXYGEN SATURATION: 95 %

## 2017-08-21 VITALS — HEART RATE: 80 BPM

## 2017-08-21 VITALS — OXYGEN SATURATION: 99 %

## 2017-08-21 VITALS — RESPIRATION RATE: 18 BRPM | HEART RATE: 80 BPM | OXYGEN SATURATION: 92 %

## 2017-08-21 VITALS — HEART RATE: 73 BPM

## 2017-08-21 VITALS — HEART RATE: 84 BPM | RESPIRATION RATE: 24 BRPM | OXYGEN SATURATION: 95 %

## 2017-08-21 VITALS — HEART RATE: 84 BPM

## 2017-08-21 LAB
ALP SERPL-CCNC: 146 U/L (ref 45–117)
ALT SERPL-CCNC: 23 U/L (ref 10–53)
ANA SER QL: (no result)
ANION GAP SERPL CALC-SCNC: 12 MEQ/L (ref 5–15)
AST SERPL-CCNC: 13 U/L (ref 15–37)
BASOPHILS # BLD AUTO: 0 TH/MM3 (ref 0–0.2)
BASOPHILS NFR BLD: 0.2 % (ref 0–2)
BILIRUB SERPL-MCNC: 0.5 MG/DL (ref 0.2–1)
BUN SERPL-MCNC: 69 MG/DL (ref 7–18)
CHLORIDE SERPL-SCNC: 106 MEQ/L (ref 98–107)
EOSINOPHIL # BLD: 0.2 TH/MM3 (ref 0–0.4)
EOSINOPHIL NFR BLD: 2.3 % (ref 0–4)
ERYTHROCYTE [DISTWIDTH] IN BLOOD BY AUTOMATED COUNT: 14.6 % (ref 11.6–17.2)
GFR SERPLBLD BASED ON 1.73 SQ M-ARVRAT: 13 ML/MIN (ref 89–?)
HCO3 BLD-SCNC: 17 MEQ/L (ref 21–32)
HCT VFR BLD CALC: 29.5 % (ref 35–46)
HEMO FLAGS: (no result)
HEPATITIS B SURFACE ANTIBODY: 0 MIU/ML
LYMPHOCYTES # BLD AUTO: 0.4 TH/MM3 (ref 1–4.8)
LYMPHOCYTES NFR BLD AUTO: 4.8 % (ref 9–44)
MAGNESIUM SERPL-MCNC: 2 MG/DL (ref 1.5–2.5)
MCH RBC QN AUTO: 31.8 PG (ref 27–34)
MCHC RBC AUTO-ENTMCNC: 34 % (ref 32–36)
MCV RBC AUTO: 93.7 FL (ref 80–100)
MONOCYTES NFR BLD: 7.7 % (ref 0–8)
NEUTROPHILS # BLD AUTO: 7.7 TH/MM3 (ref 1.8–7.7)
NEUTROPHILS NFR BLD AUTO: 85 % (ref 16–70)
PLATELET # BLD: 313 TH/MM3 (ref 150–450)
POTASSIUM SERPL-SCNC: 4.5 MEQ/L (ref 3.5–5.1)
RBC # BLD AUTO: 3.15 MIL/MM3 (ref 4–5.3)
SODIUM SERPL-SCNC: 135 MEQ/L (ref 136–145)
WBC # BLD AUTO: 9.1 TH/MM3 (ref 4–11)

## 2017-08-21 RX ADMIN — INSULIN ASPART SCH: 100 INJECTION, SOLUTION INTRAVENOUS; SUBCUTANEOUS at 11:00

## 2017-08-21 RX ADMIN — HYDROMORPHONE HYDROCHLORIDE PRN MG: 1 INJECTION, SOLUTION INTRAMUSCULAR; INTRAVENOUS; SUBCUTANEOUS at 22:00

## 2017-08-21 RX ADMIN — Medication SCH ML: at 08:57

## 2017-08-21 RX ADMIN — INSULIN ASPART SCH: 100 INJECTION, SOLUTION INTRAVENOUS; SUBCUTANEOUS at 21:00

## 2017-08-21 RX ADMIN — FOLIC ACID SCH MG: 1 TABLET ORAL at 08:56

## 2017-08-21 RX ADMIN — ACYCLOVIR SCH UNITS: 800 TABLET ORAL at 21:00

## 2017-08-21 RX ADMIN — FERROUS SULFATE TAB 325 MG (65 MG ELEMENTAL FE) SCH MG: 325 (65 FE) TAB at 08:56

## 2017-08-21 RX ADMIN — NIFEDIPINE SCH MG: 30 TABLET, FILM COATED, EXTENDED RELEASE ORAL at 08:56

## 2017-08-21 RX ADMIN — BUMETANIDE SCH MG: 0.25 INJECTION, SOLUTION INTRAMUSCULAR; INTRAVENOUS at 08:55

## 2017-08-21 RX ADMIN — ATENOLOL SCH MG: 25 TABLET ORAL at 08:56

## 2017-08-21 RX ADMIN — GABAPENTIN SCH MG: 100 CAPSULE ORAL at 08:57

## 2017-08-21 RX ADMIN — NIFEDIPINE SCH MG: 30 TABLET, FILM COATED, EXTENDED RELEASE ORAL at 21:23

## 2017-08-21 RX ADMIN — STANDARDIZED SENNA CONCENTRATE AND DOCUSATE SODIUM SCH TAB: 8.6; 5 TABLET, FILM COATED ORAL at 08:56

## 2017-08-21 RX ADMIN — IPRATROPIUM BROMIDE AND ALBUTEROL SULFATE SCH AMPULE: .5; 3 SOLUTION RESPIRATORY (INHALATION) at 07:45

## 2017-08-21 RX ADMIN — GUAIFENESIN SCH MG: 600 TABLET, EXTENDED RELEASE ORAL at 08:56

## 2017-08-21 RX ADMIN — IPRATROPIUM BROMIDE AND ALBUTEROL SULFATE SCH AMPULE: .5; 3 SOLUTION RESPIRATORY (INHALATION) at 15:40

## 2017-08-21 RX ADMIN — GUAIFENESIN SCH MG: 600 TABLET, EXTENDED RELEASE ORAL at 21:22

## 2017-08-21 RX ADMIN — HYDROCODONE BITARTRATE AND ACETAMINOPHEN PRN TAB: 5; 325 TABLET ORAL at 13:03

## 2017-08-21 RX ADMIN — HYDROCODONE BITARTRATE AND ACETAMINOPHEN PRN TAB: 5; 325 TABLET ORAL at 08:31

## 2017-08-21 RX ADMIN — Medication SCH ML: at 21:23

## 2017-08-21 RX ADMIN — IPRATROPIUM BROMIDE AND ALBUTEROL SULFATE SCH AMPULE: .5; 3 SOLUTION RESPIRATORY (INHALATION) at 20:55

## 2017-08-21 RX ADMIN — IPRATROPIUM BROMIDE AND ALBUTEROL SULFATE SCH AMPULE: .5; 3 SOLUTION RESPIRATORY (INHALATION) at 11:07

## 2017-08-21 RX ADMIN — ASPIRIN 81 MG SCH MG: 81 TABLET ORAL at 08:56

## 2017-08-21 RX ADMIN — FERROUS SULFATE TAB 325 MG (65 MG ELEMENTAL FE) SCH MG: 325 (65 FE) TAB at 21:22

## 2017-08-21 RX ADMIN — GABAPENTIN SCH MG: 100 CAPSULE ORAL at 21:22

## 2017-08-21 RX ADMIN — STANDARDIZED SENNA CONCENTRATE AND DOCUSATE SODIUM SCH TAB: 8.6; 5 TABLET, FILM COATED ORAL at 21:22

## 2017-08-21 RX ADMIN — INSULIN ASPART SCH: 100 INJECTION, SOLUTION INTRAVENOUS; SUBCUTANEOUS at 07:00

## 2017-08-21 RX ADMIN — INSULIN ASPART SCH: 100 INJECTION, SOLUTION INTRAVENOUS; SUBCUTANEOUS at 16:00

## 2017-08-21 RX ADMIN — PANTOPRAZOLE SCH MG: 40 TABLET, DELAYED RELEASE ORAL at 08:57

## 2017-08-21 RX ADMIN — HEPARIN SODIUM SCH UNITS: 10000 INJECTION, SOLUTION INTRAVENOUS; SUBCUTANEOUS at 21:22

## 2017-08-21 NOTE — PD.TRANSFR
Transfer Summary


Admission Date


Aug 16, 2017 at 23:52


Admitting Diagnosis


SEPSIS, DENILSON PNA, ELEVATED TROPONIN


Diagnoses:  


(1) Severe sepsis without septic shock


(2) Acute kidney injury superimposed on CKD


Diagnosis:  Principal





(3) Dehydration


(4) HTN (hypertension)


Diagnosis:  Secondary





(5) Diabetes


Diagnosis:  Secondary





(6) CAD (coronary artery disease)


Diagnosis:  Secondary





Transfer Summary/Subjective


52-year-old  female with past medical history of type 2 

diabetes mellitus, coronary artery disease with prior myocardial infarction and 

stents, hyperlipidemia, recurrent alcohol-induced pancreatitis who presents to 

[facility] with 2 day history of shortness of breath.  She states 

dyspnea on exertion started Tuesday evening (8/15). She has had a cough which 

is now productive of yellow sputum.  She had not checked her temperature at 

home but was febrile upon admission with a MAXIMUM TEMPERATURE of 102.5.  She 

has had this some discomfort of her left far lateral  chest wall that occurs 

with cough. Her WBC on admission was 24.6 and CXR demonstrated bilateral 

interstitial and alveolar opacities.  Lactic acid was normal.  Blood cultures 

were drawn in the ED and are negative to date..  She was started on ceftriaxone 

and azithromycin.  Urine Legionella and pneumococcal antigens were performed 

and were negative.  Influenza was negative.  She may have  CK D stage III (

difficult to determine baseline creatinine due to multiple admissions with LETI) 

but presented with creatinine of 2.79, BUN 34 and GFR 22.  She was started on 

NS @ 100 ml/hr. Her creatinine has worsened since admission to 3.11. Her 

respiratory status has also worsened and she is now on NR which prompted 

transfer to ICU with intensivist consult. CT chest demonstrates bilateral 

opacities, most prominent RML/RLL and CARIN/LLL.   BNP is 2464. Troponins are 

flat at 0.37





8/18: Patient is breathing fairly comfortably on Ventimask.  Lethargic.  Urine 

output 750 mL since 6 AM, received Bumex 2 mg IV today by nephrology.  

Potassium of 5.6 treated by nephrology with IV insulin bicarbonate.  Repeat CMP 

pending at 3:00 pm.  Creatinine has worsened to 3.4. 





8/19 hemoglobin 6.9 today from 8.3.  It was 7.3 on the recheck.  She does not 

have evidence of GI bleeding.  Creatinine is 3.68.  Potassium is better today.  

Complement levels are normal.  Serologies pending.


White blood cell count downtrending.  Afebrile. On NC. Feels vaginal pruritis 

like developing yeast infection. 





8/20 [employer] downtime, note entered later.  Hgb 6 this morning after 

transfusion 1 unit PRBC yesterday.  No BMs. Transfusing 2 units PRBC. 


Complains of bilateral flank pain, abdominal fullness. 


Creatinine worsening, now 4.07. UOP 1 L last 24 hours. 


On 3 L NC. Afebrile.  She is anxious about possibly needing dialysis.





Subjective:


8/21 Pleuritic chest pain mid anterior chest since last night, now gone after 

some morphine, with some SOB. (previously pain left lateral and radiating back)

.  Initially thought it was heart burn but progressively worsening severity 

overnight. Cough with blood tinged sputum once this morning. Repeat Hgb 10 is 

higher than expected after transfusion of 2 units PRBC yesterday for Hgb 6 so 6 

may be lab error? She clinically hasn't seemed to be actively bleeding. No RP 

bleed on CT. On NC. She is anxious about possible HD start. Would be interested 

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 

discussed with nephrology.





Objective





Vital Signs








  Date Time  Temp Pulse Resp B/P (MAP) Pulse Ox O2 Delivery O2 Flow Rate FiO2


 


8/21/17 07:47     99 Nasal Cannula 3.00 


 


8/21/17 06:00  83      


 


8/21/17 04:45   17     


 


8/21/17 04:00    151/71 (97)    


 


8/21/17 04:00 98.8       


 


8/19/17 04:15        50














Intake and Output   


 


 8/21/17 8/21/17 8/22/17





 08:00 16:00 00:00


 


Intake Total 340 ml  


 


Output Total 1051 ml  


 


Balance -711 ml  








Result Diagram:  


8/21/17 0414                                                                   

             8/21/17 0414





Other Results





Microbiology








 Date/Time


Source Procedure


Growth Status


 


 


 8/19/17 20:25


Urine Catheterized Urine Urine Culture - Final


NO GROWTH IN 48 HOURS. Complete








Objective Remarks


GENERAL: Well-nourished, well-developed  female who is sitting 

up in bedside chair on NC 3L, anxious.  


SKIN: Warm and dry, well-perfused.


HEAD: Atraumatic. Normocephalic. 


EYES: Pupils equal and round. No scleral icterus. 


ENT: No nasal bleeding or discharge.  Mucous membranes pink and moist.


NECK: Trachea midline. 


CARDIOVASCULAR: Regular rate and rhythm, rate in the 70s. No murmurs rubs or 

gallops. 


RESPIRATORY:  R basilar rales. Occasional rhonchi. No wheeze. 


GASTROINTESTINAL: Abdomen soft, non-tender, mildly distended. Bowel sounds 

present. 


: Rhodes in place with yellow urine output. 


MUSCULOSKELETAL: Extremities without clubbing, cyanosis, or edema. 


NEUROLOGICAL: Awake and alert. Motor grossly within normal limits, moving all 

extremities spontaneously.  Normal speech.





A/P


Assessment and Plan


NEURO:


Peripheral neuropathy


Prior history of alcohol abuse


On Gabapentin 100 bid due to LETI


Lortab as needed for pain.  Hydromorphone as needed for xpain





RESP:


Acute respiratory failure, resolved. 


Tobacco abuse


Pneumonia ?Viral/HCAP


Pulmonary edema


Off Bipap, tolerating NC.  


DuoNeb every 4 hours, Albuterol every 2 hours as needed


Antimicrobial coverage for HCAP as per below. 


Continue guaifenesin 600 mg by mouth twice a day





CV:


Coronary artery disease with prior myocardial infarction and stents


Elevated troponin


Hyperlipidema


Essential Hypertension


Pulmonary hypertension


8/17 BNP 2400.  Initial Troponin flat at 0.37 more likely related to renal 

failure/volume status/pulm htn


Now with pleuritic chest pain that is worse. Troponin 0.13 EKG with LVH pattern 

with ST depression V5/v6 seen on prior. 


Obtain VQ scan 


2 D Echo  8/18ejection fraction 55-60%.  Mild concentric LVH.  Mild MR.  Mild 

TR.  Normal RV function. Moderate to severe pulmonary HTN present (66 mm Hg)


EKG demonstrates sinus tach and LVH.


D/c atenolol and start metoprolol 50 bid given HTN/renal failure. .  Continue 

Procardia XL 30 mg twice a day (pulm htn)


Continue aspirin 81 mg by mouth daily


Continue pravastatin 40 mg by mouth daily at bedtime


Use PRN Clonidine and Hydralazine for SBP >170





GI: 


History of recurrent pancreatitis, alcohol induced


History of gastroparesis


GERD


Constipation


1800 ADA heart healthy diet


Lipase WNL 


CT abd/pelvis - uterine fibroid.  Moderate amount of stool throughout the colon 

consistent with constipation.  Bibasilar consolidation and right middle lobe 

lingular infiltrate


Place unscheduled Theresa-Colace twice a day.  Dulcolax suppository now.





FEN/RENAL:


Acute kidney injury overlying probable CKD


Proteinuria


Has Rhodes to monitor intake and output closely while on diuretics.  Removed 

today per nephrology.  


Continuing on Bumex 2 mg IV bid per nephrology. Creatinine continues to worsen, 

4.39 


She is diuresing.  Nephrology watching and may need dialysis tomorrow.


She likely has septic ATN but superimposed on chronic CKD  and likely would 

result in lifelong need for renal replacement vs ?transplant


Renal ultrasoundno  hydronephrosis.  Small cyst on left kidney.  Increased 

echogenicity consistent with medical renal disease


Complements normal. Followup , ANCA, PARMJIT, SPEP, anti gBM ab


Avoid nephrotoxins.  Received Toradol and Motrin 8/16


Nephrology following, Dr. Whiteside. 





ID:


Pneumonia ?Viral , also risk for HCAP (Discharged 6/1/17 after hospitalization 

for pancreatitis)


Leukocytosis


Has some upper respiratory symptoms as well, so may have been viral process. 


Urine Legionella and pneumococcal antigen negative.  Influenza screen negative.

  Blood cultures 8/16 no growth to date


Urine culture negative 


On  cefepime 8/17 #5,  Levaquin to cover for HCAP/atypicals 8/17 (750 q 48 hours

, changed to po 8/21) .  Off  vancomycin as cultures negative for MRSA. 


Diflucan 150 mg po x1 for vaginal candidiasis.





HEME:


Acute on Chronic anemia


Transfused 1 unit packed red cells 8/19 after recheck of Hgb lab also low.  

Hemoglobin 6 on 8/21.  No obvious source of GI bleeding.  Transfused 2 units 

packed red cells 8/20. And now more than expected increase so suspect lab abn. 


Patient states she had a colonoscopy 3-4 months ago by Dr. Granados and was 

negative.  No prior endoscopy.


Sent retic count which is low side of normal. Likely multifactorial anemia 

secondary to iron deficiency/anemia chronic disease/renal failure. Haptoglobin 

is not low to indicate hemolysis. B12 ok. HAs  flank pain (vs pain lower thorax 

from pna)...CT with no e/o RP bleed.  


Continue ferrous sulfate 325 mg by mouth twice a day


Continue folic acid 1 mg by mouth daily





ENDO:


Diabetes mellitus


Glucose 69 overnight 8/19. Backed off  detemir 15qhs.  Medium dose insulin 

sliding scale before meals/at bedtime





PROPH: Does not seem to be bleeding and at risk for VTE so resumed Heparin 5000 

subcutaneous and will monitor closely. .. Pantoprazole 40 mg po daily. 





ACCESS: Peripheral IV providing adequate access at this time.





Patient and her brother updated at bedside.  Discussed with Dr. Whiteside and 

Migdalia Camejo. Discussed with Dr. Jasmine who will assume care. 

Transferring to [facility]. 





Level 2











Rebekah Young MD Aug 21, 2017 12:08

## 2017-08-21 NOTE — RADRPT
EXAM DATE/TIME:  08/21/2017 14:39 

 

[facility] COMPARISON:     

CHEST SINGLE AP, August 19, 2017, 3:28.

 

 

INDICATIONS :     

*** Shortness of breath for two days.

                       

 

DOSE:      

8.1 mCi Tc99m MAA IV 

                                           0.50 mCi Tc99m DTPA aerosol 

                       

                       

 

MEDICAL HISTORY :     

Diabetes mellitus type 2. Cardiovascular disease Myocardial infarction. 

 

SURGICAL HISTORY :      

Coronary artery stent.  Cholecystectomy. Tubal ligation. 

 

ENCOUNTER:     

Initial

 

ACUITY:     

1 day

 

PAIN SCALE:     

1/10

 

LOCATION:       

chest 

 

TECHNIQUE:     

Following five minutes of tidal breathing of DTPA aerosol, planar images of the lungs were performed 
in eight projections.  The patient was then injected with MAA, and eight-view perfusion scan was perf
ormed.  

 

FINDINGS:     

There is a homogeneous pattern of aerosol delivery to the periphery of both lungs.  No focal ventilat
ory defects are seen.

 

The perfusion lung scan demonstrates a homogenous pattern of uptake in both lungs.  No segmental or s
ubsegmental defects are seen. 

 

CONCLUSION:     Low probability for pulmonary embolism.

 

 

 

 Mp Dang MD on August 21, 2017 at 16:11           

Board Certified Radiologist.

 This report was verified electronically.

## 2017-08-21 NOTE — HHI.NPPN
Subjective


Complaints:  Shortness of Breath


General Problems:  Anemia, Hypertension, Mebatolic Acidosis


Renal Failure:  Chronic, Acute


Interval History


Creatinine is worse today. She has good urine output. No evidence of fluid 

overload. 


 (Migdalia Camejo)





Review of Systems


General


Constitutional:  Fatigue


 (Migdalia Camejo)





Respiratory


Lungs:  SOB, Cough


 (Migdalia Camejo)





Musculoskeletal


MS:  Pain/Stiffness


 (Migdalia Camejo)





Objective Data


Data





Vital Signs








  Date Time  Temp Pulse Resp B/P (MAP) Pulse Ox O2 Delivery O2 Flow Rate FiO2


 


8/21/17 12:00 98.3 79 22 122/59 (80) 98   


 


8/21/17 08:00 98.0 82 30 134/64 (87) 98   


 


8/21/17 07:47     99 Nasal Cannula 3.00 


 


8/21/17 06:00  83      


 


8/21/17 04:45  81 17  100   


 


8/21/17 04:30  81 16  99   


 


8/21/17 04:15  81 22  99   


 


8/21/17 04:00  81 18 151/71 (97) 96   


 


8/21/17 04:00 98.8       


 


8/21/17 04:00  81      


 


8/21/17 03:45  80 22  95   


 


8/21/17 03:30  80 18  92   


 


8/21/17 03:15  80 18  92   


 


8/21/17 03:00  81 31 122/58 (79) 93   


 


8/21/17 02:45  82 41  95   


 


8/21/17 02:30  83 19  96   


 


8/21/17 02:15  82 20  100   


 


8/21/17 02:00  83 16 147/68 (94) 99   


 


8/21/17 02:00  83      


 


8/21/17 01:45  83 19  99   


 


8/21/17 01:30  86 21  99   


 


8/21/17 01:15  84 21  98   


 


8/21/17 01:00  84 26 152/69 (96) 99   


 


8/21/17 00:45  84 24  95   


 


8/21/17 00:30  84 21  96   


 


8/21/17 00:15  83 24  95   


 


8/21/17 00:00  82 20 131/63 (85) 92   


 


8/21/17 00:00 99.8       


 


8/21/17 00:00  82      


 


8/20/17 23:45  85 21  94   


 


8/20/17 23:30  85 18  95   


 


8/20/17 23:15  86 21  97   


 


8/20/17 23:00  87 22 145/67 (93) 96   


 


8/20/17 22:45  89 22  98   


 


8/20/17 22:30  84 19  96   


 


8/20/17 22:15  84 22  97   


 


8/20/17 22:00  86 22 150/72 (98) 97   


 


8/20/17 22:00  86      


 


8/20/17 21:45  87 23  97   


 


8/20/17 21:30  90 27 138/63 (88) 95   


 


8/20/17 21:15  87 31  98   


 


8/20/17 21:00  85 22  98   


 


8/20/17 20:30  83 22  94   


 


8/20/17 20:15  79 31  96   


 


8/20/17 20:00 99.6       


 


8/20/17 20:00  80      


 


8/20/17 20:00  79 33 129/67 (87) 97   


 


8/20/17 19:57     97 Nasal Cannula 4.00 


 


8/20/17 19:45  80 25  97   


 


8/20/17 19:30  81 22  98   


 


8/20/17 19:15  80 19  97   


 


8/20/17 19:00  80 19 150/72 (98) 97   


 


8/20/17 18:45  80 34  95   


 


8/20/17 18:00  80      


 


8/20/17 18:00  80 19 137/64 (88) 97   


 


8/20/17 17:30  83      


 


8/20/17 17:30  83 21  99   


 


8/20/17 17:00  81 18 136/67 (90) 96   


 


8/20/17 17:00  81      


 


8/20/17 16:30  83 36  95   


 


8/20/17 16:30  83      


 


8/20/17 16:00  82 20 131/61 (84) 98   


 


8/20/17 16:00  82      


 


8/20/17 16:00  72      


 


8/20/17 15:30  80      


 


8/20/17 15:30  80 18  97   


 


8/20/17 15:00  79 19 142/70 (94) 97   


 


8/20/17 15:00  79      


 


8/20/17 14:30  78      


 


8/20/17 14:30  78 18  99   


 


8/20/17 14:16 98.6 72 20 118/73 (88) 92   


 


8/20/17 14:00  72      


 


8/20/17 14:00  78 16 142/73 (96) 97   


 


8/20/17 14:00  78      


 


8/20/17 13:30  79 17  98   


 


8/20/17 13:22  79 20 122/60 (80) 100   


 


8/20/17 13:00  80 18  99   








 (Migdalia Camejo)


-:  


8/21/17 0414                                                                   

             8/21/17 0414








 Microbiology


8/21/17 Stool Occult Blood (ARLINE) - Final, Complete


          HEMOCCULT NEGATIVE


Imaging





Last Impressions








Chest X-Ray 8/19/17 0600 Signed





Impressions: 





 Service Date/Time:  Saturday, August 19, 2017 03:28 - CONCLUSION:  1. Slight 





 improvement in basilar airspace disease. Small effusions remain.     Valdez Colunga MD 


 


Renal Ultrasound 8/17/17 0000 Signed





Impressions: 





 Service Date/Time:  Thursday, August 17, 2017 10:58 - CONCLUSION:  1. No 





 evidence of hydronephrosis. 2. Small cyst in left kidney. 3. Kidneys appear 





 mildly increased in echogenicity which could indicate medical renal disease.  

   





 Rogerio Weaver MD 


 


Chest CT 8/17/17 0000 Signed





Impressions: 





 Service Date/Time:  Thursday, August 17, 2017 10:35 - CONCLUSION:  1. 

Bilateral 





 airspace disease with patchy areas of consolidation. This may represent 





 pulmonary edema. 2. Small bilateral pleural effusions and mild cardiomegaly 





 which could indicate congestive heart failure.      Rogerio Weaver MD 








Tubes & Lines:  Valladares


 (Migdalia Camejo)





Physical Exam


General


Appearance:  Well Developed, No Acute Distress, Comfortable


 (Migdalia Camejo)





Throat


Throat Exam:  Oral Mucosa Pink & Moist


 (Migdalia Camejo)





Pulmonary


Resp Exam:  Clear Bilaterally, Breath Sounds Equal, No Distress, Labored


 (Migdalia Camejo)





Cardiology


CV Exam:  Regular, Normal Sinus Rhythm


 (Migdalia Camejo)





Gastrointestinal/Abdomen


GI Exam:  Soft, Non-Tender


 (Migdalia Camejo)





Musculoskeletal


MS Exam:  Joints Intact, Normal Tone


 (Migdalia Camejo)





Integumentary


Skin Exam:  Clear, Warm, Dry, Intact


 (Migdalia Camejo)





Extremeties


Extremities Exam:  No Edema, Pedal Pulses Palpable


 (Migdalia Camejo)





Neurologic


Neuro Exam:  Alert, Awake, Oriented, Speech Clear, Moving All Extremities


 (Migdalia Camejo)





Psychiatric


Psych Exam:  Appropriate Responses


 (Migdalia Camejo)





Assessment/Plan


Discussed Condition With:  Patient


Assessment Summary:  LETI/Acute Renal Failure, Anemia of CKD, Hypertension, CKD 

Stage IV


Electrolyte Assessment:  Hyperkalemia


Problem List:  


(1) Acute kidney injury superimposed on CKD


ICD Codes:  N17.9 - Acute kidney failure, unspecified; N18.9 - Chronic kidney 

disease, unspecified


Status:  Acute


Plan:  It appears she has underlying CKD, possibly due to diabetic nephropathy, 

stage 3 with multiple episodes of LETI. 


    She has heavy proteinuria, 4.7 grams - suspect DM nephropathy.  Her mother 

was previously on dialysis.





This hospitalization she has LETI likely due to infection, sepsis syndrome, also 

given NSAIDs


Renal function is worse today


she is on bumex BID, change to once daily, oral route


monitor urine output, currently non oliguric





it is possible she may require dialysis this admission, D/W patient; if HD is 

started it may become indefinite. In that case PD may be a better option for 

her. 


remove valladares today


avoid nephrotoxic substances


monitor drug levels when appropriate, minimize non essential medications





(2) Severe sepsis without septic shock


ICD Codes:  A41.9 - Sepsis, unspecified organism; R65.20 - Severe sepsis 

without septic shock


Status:  Acute


Plan:  respiratory source, possible viral illness


Renal dose medications and antibiotics as possible.


On oxygen


she is on cefepime and Levaquin 





(3) HTN (hypertension)


ICD Codes:  I10 - Hypertension


Status:  Chronic


Plan:  monitor blood pressure


continue ordered medications, avoid ACE 





(4) Diabetes


ICD Codes:  E11.9 - Diabetes mellitus


Status:  Chronic


Plan:  continue insulin therapy


glucose goal 140-180 mg/dL





avoid metformin (home medication) given hx of CKD 3-4





(5) Anemia


ICD Codes:  D64.9 - Anemia, unspecified


Status:  Chronic


Plan:  initial anemia reported may have been lab error


she was transfused, repeat Hb improved


no source of bleeding


follow CBC 


 (Migdalia Camejo)


Plan


patient was seen and examined. Renal function is worse. Discussed with the 

patient. May need to start dialysis during this admission. 


 (Kelton Whiteside MD)





Problem Qualifiers





(1) Anemia:  


Qualified Codes:  N18.9 - Chronic kidney disease, unspecified; D63.1 - Anemia 

in chronic kidney disease








Migdalia Camejo Aug 21, 2017 12:42


Kelton Whiteside MD Aug 22, 2017 11:13

## 2017-08-21 NOTE — EKG
Date Performed: 08/21/2017       Time Performed: 08:58:00

 

PTAGE:      52 years

 

EKG:      Sinus rhythm 

 

. Possible left atrial abnormality Possible LVH with secondary repolarization abnormality Lateral ST-
T changes are probably due to ventricular hypertrophy Abnormal ECG

 

PREVIOUS TRACING       : 08/16/2017 21.44 No significant change from previous tracing noted.

 

DOCTOR:   Bony Monroe  Interpretating Date/Time  08/21/2017 13:46:58

## 2017-08-22 VITALS
RESPIRATION RATE: 22 BRPM | OXYGEN SATURATION: 94 % | DIASTOLIC BLOOD PRESSURE: 68 MMHG | SYSTOLIC BLOOD PRESSURE: 144 MMHG | HEART RATE: 87 BPM

## 2017-08-22 VITALS
RESPIRATION RATE: 23 BRPM | HEART RATE: 91 BPM | OXYGEN SATURATION: 99 % | DIASTOLIC BLOOD PRESSURE: 70 MMHG | SYSTOLIC BLOOD PRESSURE: 159 MMHG

## 2017-08-22 VITALS — OXYGEN SATURATION: 98 %

## 2017-08-22 VITALS
RESPIRATION RATE: 23 BRPM | HEART RATE: 88 BPM | DIASTOLIC BLOOD PRESSURE: 69 MMHG | SYSTOLIC BLOOD PRESSURE: 150 MMHG | OXYGEN SATURATION: 93 %

## 2017-08-22 VITALS — RESPIRATION RATE: 25 BRPM | OXYGEN SATURATION: 99 % | HEART RATE: 85 BPM

## 2017-08-22 VITALS
TEMPERATURE: 98.9 F | OXYGEN SATURATION: 96 % | DIASTOLIC BLOOD PRESSURE: 68 MMHG | SYSTOLIC BLOOD PRESSURE: 146 MMHG | HEART RATE: 85 BPM | RESPIRATION RATE: 28 BRPM

## 2017-08-22 VITALS
HEART RATE: 87 BPM | RESPIRATION RATE: 19 BRPM | TEMPERATURE: 97.6 F | DIASTOLIC BLOOD PRESSURE: 75 MMHG | OXYGEN SATURATION: 96 % | SYSTOLIC BLOOD PRESSURE: 157 MMHG

## 2017-08-22 VITALS
OXYGEN SATURATION: 97 % | SYSTOLIC BLOOD PRESSURE: 168 MMHG | HEART RATE: 87 BPM | RESPIRATION RATE: 32 BRPM | DIASTOLIC BLOOD PRESSURE: 76 MMHG

## 2017-08-22 VITALS
HEART RATE: 91 BPM | SYSTOLIC BLOOD PRESSURE: 155 MMHG | DIASTOLIC BLOOD PRESSURE: 68 MMHG | OXYGEN SATURATION: 94 % | RESPIRATION RATE: 27 BRPM

## 2017-08-22 VITALS
RESPIRATION RATE: 18 BRPM | OXYGEN SATURATION: 98 % | DIASTOLIC BLOOD PRESSURE: 74 MMHG | SYSTOLIC BLOOD PRESSURE: 158 MMHG | HEART RATE: 87 BPM

## 2017-08-22 VITALS
RESPIRATION RATE: 24 BRPM | HEART RATE: 90 BPM | OXYGEN SATURATION: 97 % | DIASTOLIC BLOOD PRESSURE: 74 MMHG | SYSTOLIC BLOOD PRESSURE: 164 MMHG

## 2017-08-22 VITALS — RESPIRATION RATE: 20 BRPM | TEMPERATURE: 98.8 F | OXYGEN SATURATION: 97 % | HEART RATE: 89 BPM

## 2017-08-22 VITALS
DIASTOLIC BLOOD PRESSURE: 74 MMHG | HEART RATE: 92 BPM | RESPIRATION RATE: 18 BRPM | SYSTOLIC BLOOD PRESSURE: 162 MMHG | OXYGEN SATURATION: 97 %

## 2017-08-22 VITALS
HEART RATE: 83 BPM | RESPIRATION RATE: 18 BRPM | SYSTOLIC BLOOD PRESSURE: 144 MMHG | DIASTOLIC BLOOD PRESSURE: 68 MMHG | OXYGEN SATURATION: 91 %

## 2017-08-22 VITALS
HEART RATE: 85 BPM | SYSTOLIC BLOOD PRESSURE: 147 MMHG | DIASTOLIC BLOOD PRESSURE: 68 MMHG | OXYGEN SATURATION: 94 % | RESPIRATION RATE: 16 BRPM

## 2017-08-22 VITALS
DIASTOLIC BLOOD PRESSURE: 73 MMHG | HEART RATE: 89 BPM | OXYGEN SATURATION: 97 % | RESPIRATION RATE: 19 BRPM | SYSTOLIC BLOOD PRESSURE: 155 MMHG

## 2017-08-22 VITALS — RESPIRATION RATE: 26 BRPM | OXYGEN SATURATION: 93 % | TEMPERATURE: 98.6 F | HEART RATE: 90 BPM

## 2017-08-22 VITALS
OXYGEN SATURATION: 99 % | SYSTOLIC BLOOD PRESSURE: 147 MMHG | HEART RATE: 94 BPM | DIASTOLIC BLOOD PRESSURE: 66 MMHG | RESPIRATION RATE: 21 BRPM

## 2017-08-22 VITALS — HEART RATE: 89 BPM

## 2017-08-22 VITALS
TEMPERATURE: 98.8 F | DIASTOLIC BLOOD PRESSURE: 64 MMHG | RESPIRATION RATE: 21 BRPM | HEART RATE: 92 BPM | OXYGEN SATURATION: 94 % | SYSTOLIC BLOOD PRESSURE: 142 MMHG

## 2017-08-22 VITALS — HEART RATE: 91 BPM

## 2017-08-22 VITALS
HEART RATE: 86 BPM | OXYGEN SATURATION: 94 % | SYSTOLIC BLOOD PRESSURE: 154 MMHG | RESPIRATION RATE: 34 BRPM | DIASTOLIC BLOOD PRESSURE: 74 MMHG

## 2017-08-22 VITALS — HEART RATE: 97 BPM

## 2017-08-22 LAB
ANION GAP SERPL CALC-SCNC: 10 MEQ/L (ref 5–15)
BASOPHILS # BLD AUTO: 0 TH/MM3 (ref 0–0.2)
BASOPHILS NFR BLD: 0.3 % (ref 0–2)
BUN SERPL-MCNC: 64 MG/DL (ref 7–18)
CHLORIDE SERPL-SCNC: 110 MEQ/L (ref 98–107)
EOSINOPHIL # BLD: 0.3 TH/MM3 (ref 0–0.4)
EOSINOPHIL NFR BLD: 5.1 % (ref 0–4)
ERYTHROCYTE [DISTWIDTH] IN BLOOD BY AUTOMATED COUNT: 15.2 % (ref 11.6–17.2)
GFR SERPLBLD BASED ON 1.73 SQ M-ARVRAT: 13 ML/MIN (ref 89–?)
HCO3 BLD-SCNC: 16.7 MEQ/L (ref 21–32)
HCT VFR BLD CALC: 32.6 % (ref 35–46)
HEMO FLAGS: (no result)
LYMPHOCYTES # BLD AUTO: 0.5 TH/MM3 (ref 1–4.8)
LYMPHOCYTES NFR BLD AUTO: 7.6 % (ref 9–44)
MCH RBC QN AUTO: 31.7 PG (ref 27–34)
MCHC RBC AUTO-ENTMCNC: 33.3 % (ref 32–36)
MCV RBC AUTO: 95.3 FL (ref 80–100)
MONOCYTES NFR BLD: 12.3 % (ref 0–8)
NEUTROPHILS # BLD AUTO: 4.9 TH/MM3 (ref 1.8–7.7)
NEUTROPHILS NFR BLD AUTO: 74.7 % (ref 16–70)
PLATELET # BLD: 371 TH/MM3 (ref 150–450)
POTASSIUM SERPL-SCNC: 4.4 MEQ/L (ref 3.5–5.1)
RBC # BLD AUTO: 3.42 MIL/MM3 (ref 4–5.3)
SODIUM SERPL-SCNC: 137 MEQ/L (ref 136–145)
WBC # BLD AUTO: 6.5 TH/MM3 (ref 4–11)

## 2017-08-22 RX ADMIN — HYDROCODONE BITARTRATE AND ACETAMINOPHEN PRN TAB: 5; 325 TABLET ORAL at 22:15

## 2017-08-22 RX ADMIN — GUAIFENESIN SCH MG: 600 TABLET, EXTENDED RELEASE ORAL at 09:05

## 2017-08-22 RX ADMIN — LEVOFLOXACIN SCH MG: 750 TABLET, FILM COATED ORAL at 09:05

## 2017-08-22 RX ADMIN — IPRATROPIUM BROMIDE AND ALBUTEROL SULFATE SCH AMPULE: .5; 3 SOLUTION RESPIRATORY (INHALATION) at 12:03

## 2017-08-22 RX ADMIN — IPRATROPIUM BROMIDE AND ALBUTEROL SULFATE SCH AMPULE: .5; 3 SOLUTION RESPIRATORY (INHALATION) at 15:57

## 2017-08-22 RX ADMIN — HEPARIN SODIUM SCH UNITS: 10000 INJECTION, SOLUTION INTRAVENOUS; SUBCUTANEOUS at 22:16

## 2017-08-22 RX ADMIN — INSULIN ASPART SCH: 100 INJECTION, SOLUTION INTRAVENOUS; SUBCUTANEOUS at 16:00

## 2017-08-22 RX ADMIN — STANDARDIZED SENNA CONCENTRATE AND DOCUSATE SODIUM SCH TAB: 8.6; 5 TABLET, FILM COATED ORAL at 22:17

## 2017-08-22 RX ADMIN — HYDROMORPHONE HYDROCHLORIDE PRN MG: 1 INJECTION, SOLUTION INTRAMUSCULAR; INTRAVENOUS; SUBCUTANEOUS at 13:28

## 2017-08-22 RX ADMIN — NIFEDIPINE SCH MG: 30 TABLET, FILM COATED, EXTENDED RELEASE ORAL at 09:05

## 2017-08-22 RX ADMIN — BUMETANIDE SCH MG: 1 TABLET ORAL at 09:05

## 2017-08-22 RX ADMIN — PRAVASTATIN SODIUM SCH MG: 40 TABLET ORAL at 00:04

## 2017-08-22 RX ADMIN — Medication SCH ML: at 09:06

## 2017-08-22 RX ADMIN — INSULIN ASPART SCH: 100 INJECTION, SOLUTION INTRAVENOUS; SUBCUTANEOUS at 10:22

## 2017-08-22 RX ADMIN — GABAPENTIN SCH MG: 100 CAPSULE ORAL at 09:05

## 2017-08-22 RX ADMIN — INSULIN ASPART SCH: 100 INJECTION, SOLUTION INTRAVENOUS; SUBCUTANEOUS at 06:57

## 2017-08-22 RX ADMIN — INSULIN ASPART SCH: 100 INJECTION, SOLUTION INTRAVENOUS; SUBCUTANEOUS at 21:00

## 2017-08-22 RX ADMIN — ONDANSETRON PRN MG: 2 INJECTION, SOLUTION INTRAMUSCULAR; INTRAVENOUS at 22:24

## 2017-08-22 RX ADMIN — NIFEDIPINE SCH MG: 30 TABLET, FILM COATED, EXTENDED RELEASE ORAL at 22:18

## 2017-08-22 RX ADMIN — CEFEPIME SCH MLS/HR: 2 INJECTION, POWDER, FOR SOLUTION INTRAVENOUS at 00:04

## 2017-08-22 RX ADMIN — ONDANSETRON PRN MG: 2 INJECTION, SOLUTION INTRAMUSCULAR; INTRAVENOUS at 12:11

## 2017-08-22 RX ADMIN — ACYCLOVIR SCH UNITS: 800 TABLET ORAL at 21:00

## 2017-08-22 RX ADMIN — ALBUTEROL SULFATE PRN MG: 2.5 SOLUTION RESPIRATORY (INHALATION) at 22:30

## 2017-08-22 RX ADMIN — Medication SCH ML: at 22:14

## 2017-08-22 RX ADMIN — METOPROLOL TARTRATE SCH MG: 50 TABLET, FILM COATED ORAL at 09:05

## 2017-08-22 RX ADMIN — FERROUS SULFATE TAB 325 MG (65 MG ELEMENTAL FE) SCH MG: 325 (65 FE) TAB at 09:05

## 2017-08-22 RX ADMIN — PRAVASTATIN SODIUM SCH MG: 40 TABLET ORAL at 22:17

## 2017-08-22 RX ADMIN — HYDROMORPHONE HYDROCHLORIDE PRN MG: 1 INJECTION, SOLUTION INTRAMUSCULAR; INTRAVENOUS; SUBCUTANEOUS at 06:55

## 2017-08-22 RX ADMIN — PANTOPRAZOLE SCH MG: 40 TABLET, DELAYED RELEASE ORAL at 09:05

## 2017-08-22 RX ADMIN — IPRATROPIUM BROMIDE AND ALBUTEROL SULFATE SCH AMPULE: .5; 3 SOLUTION RESPIRATORY (INHALATION) at 09:29

## 2017-08-22 RX ADMIN — GABAPENTIN SCH MG: 100 CAPSULE ORAL at 22:18

## 2017-08-22 RX ADMIN — HEPARIN SODIUM SCH UNITS: 10000 INJECTION, SOLUTION INTRAVENOUS; SUBCUTANEOUS at 09:00

## 2017-08-22 RX ADMIN — METOPROLOL TARTRATE SCH MG: 50 TABLET, FILM COATED ORAL at 22:17

## 2017-08-22 RX ADMIN — IPRATROPIUM BROMIDE AND ALBUTEROL SULFATE SCH AMPULE: .5; 3 SOLUTION RESPIRATORY (INHALATION) at 20:16

## 2017-08-22 RX ADMIN — GUAIFENESIN SCH MG: 600 TABLET, EXTENDED RELEASE ORAL at 22:17

## 2017-08-22 RX ADMIN — FOLIC ACID SCH MG: 1 TABLET ORAL at 09:06

## 2017-08-22 RX ADMIN — HYDROCODONE BITARTRATE AND ACETAMINOPHEN PRN TAB: 5; 325 TABLET ORAL at 00:07

## 2017-08-22 RX ADMIN — FERROUS SULFATE TAB 325 MG (65 MG ELEMENTAL FE) SCH MG: 325 (65 FE) TAB at 22:17

## 2017-08-22 RX ADMIN — ASPIRIN 81 MG SCH MG: 81 TABLET ORAL at 09:06

## 2017-08-22 RX ADMIN — STANDARDIZED SENNA CONCENTRATE AND DOCUSATE SODIUM SCH TAB: 8.6; 5 TABLET, FILM COATED ORAL at 09:05

## 2017-08-22 NOTE — HHI.NPPN
Subjective


Complaints:  Shortness of Breath


General Problems:  Anemia, Hypertension, Mebatolic Acidosis


Renal Failure:  Chronic, Acute


Interval History


Renal function is stable . No acute overnight events. Her urine output is 

adequate. 


 (Migdalia Camejo)





Review of Systems


General


Constitutional:  Fatigue


 (Migdalia Camejo)





Respiratory


Lungs:  SOB, Cough


 (Migdalia Camejo)





Musculoskeletal


MS:  Pain/Stiffness


 (Migdalia Camejo)





Objective Data


Data





Vital Signs








  Date Time  Temp Pulse Resp B/P (MAP) Pulse Ox O2 Delivery O2 Flow Rate FiO2


 


8/22/17 09:31     98   


 


8/22/17 09:00  87      


 


8/22/17 09:00  87 18 158/74 (102) 98   


 


8/22/17 08:00  85      


 


8/22/17 08:00 98.9 85 28 146/68 (94) 96   


 


8/22/17 07:28   17     


 


8/22/17 07:00  90      


 


8/22/17 07:00  90 24 164/74 (104) 97   


 


8/22/17 06:00  87 32 168/76 (106) 97   


 


8/22/17 05:00  86 34 154/74 (100) 94   


 


8/22/17 04:00 98.9 87 19 157/75 (102) 96   


 


8/22/17 04:00  87      


 


8/22/17 03:00  85 16 147/68 (94) 94   


 


8/22/17 02:00  87 22 144/68 (93) 94   


 


8/22/17 01:15   16     


 


8/22/17 01:00  83 18 144/68 (93) 91   


 


8/22/17 00:01  88 23 150/69 (96) 93   


 


8/22/17 00:00 98.6 90 26  93   


 


8/22/17 00:00  90      


 


8/21/17 23:00  86 20 150/71 (97) 95   


 


8/21/17 22:00  84 18 146/69 (94) 98   


 


8/21/17 21:00  80 15 139/66 (90) 97   


 


8/21/17 20:51     96 Nasal Cannula 4.00 


 


8/21/17 20:00 98.0 77 18 124/58 (80) 96   


 


8/21/17 20:00  77      


 


8/21/17 19:00  75 28 125/61 (82) 99   


 


8/21/17 18:00  73      


 


8/21/17 17:00  75      


 


8/21/17 16:00  76      


 


8/21/17 16:00 98.7 76 18 144/68 (93) 90   


 


8/21/17 14:00  77      


 


8/21/17 13:00  79      


 


8/21/17 12:00  79      


 


8/21/17 12:00 98.3 79 22 122/59 (80) 98   








 (Migdalia Camejo)


-:  


8/22/17 1024                                                                   

             8/21/17 0414





Imaging





Last Impressions








Abdomen/Pelvis CT 8/20/17 0000 Signed





Impressions: 





 Service Date/Time:  Sunday, August 20, 2017 20:36 - CONCLUSION:  1. Uterine 





 fibroid and moderate amount of stool.  2. Bilateral pleural effusions and 





 bibasilar consolidation and infiltrates in right middle lobe/lingula.    GILDA Quach MD 


 


Chest X-Ray 8/19/17 0600 Signed





Impressions: 





 Service Date/Time:  Saturday, August 19, 2017 03:28 - CONCLUSION:  1. Slight 





 improvement in basilar airspace disease. Small effusions remain.     Valdez Colunga MD 


 


Renal Ultrasound 8/17/17 0000 Signed





Impressions: 





 Service Date/Time:  Thursday, August 17, 2017 10:58 - CONCLUSION:  1. No 





 evidence of hydronephrosis. 2. Small cyst in left kidney. 3. Kidneys appear 





 mildly increased in echogenicity which could indicate medical renal disease.  

   





 Rogerio Weaver MD 


 


Chest CT 8/17/17 0000 Signed





Impressions: 





 Service Date/Time:  Thursday, August 17, 2017 10:35 - CONCLUSION:  1. 

Bilateral 





 airspace disease with patchy areas of consolidation. This may represent 





 pulmonary edema. 2. Small bilateral pleural effusions and mild cardiomegaly 





 which could indicate congestive heart failure.      Rogerio Weaver MD 








Tubes & Lines:  Rhodes


 (Migdalia Camejo)





Physical Exam


General


Appearance:  Well Developed, Well Nourished, No Acute Distress, Comfortable


 (Migdalia Camejo)





Throat


Throat Exam:  Oral Mucosa Pink & Moist


 (Migdalia Camejo)





Pulmonary


Resp Exam:  Clear Bilaterally, Breath Sounds Equal, No Distress, Labored


 (Migdalia Camejo)





Cardiology


CV Exam:  Regular, Normal Sinus Rhythm, Good Perfusion


 (Migdalia Camejo)





Gastrointestinal/Abdomen


GI Exam:  Soft, Non-Tender, Bowel Sounds Present


 (Migdalia Camejo)





Musculoskeletal


MS Exam:  Joints Intact, Normal Gait, Normal Tone, Good Strength


 (Migdalia Camejo)





Integumentary


Skin Exam:  Clear, Warm, Dry, Intact


 (Migdalia Camejo)





Extremeties


Extremities Exam:  No Edema, Pedal Pulses Palpable


 (Migdalia Camejo)





Neurologic


Neuro Exam:  Alert, Awake, Oriented, Speech Clear, Moving All Extremities


 (Migdalia Camejo)





Psychiatric


Psych Exam:  Appropriate Responses


 (Migdalia Camejo)





VTE Prophylaxis


Device:  SCDs


 (Migdalia Camejo)





Assessment/Plan


Discussed Condition With:  Patient


Assessment Summary:  LETI/Acute Renal Failure, Anemia of CKD, Hypertension, CKD 

Stage IV


Electrolyte Assessment:  Hyperkalemia


Problem List:  


(1) Acute kidney injury superimposed on CKD


ICD Codes:  N17.9 - Acute kidney failure, unspecified; N18.9 - Chronic kidney 

disease, unspecified


Status:  Acute


Plan:  It appears she has underlying CKD, possibly due to diabetic nephropathy, 

stage 3 with multiple episodes of LETI. 


    She has heavy proteinuria, 4.7 grams - suspect DM nephropathy.  





This hospitalization she has LETI likely due to infection, sepsis syndrome, also 

given NSAIDs


Renal function stabilized overnight 


she is on bumex PO once daily 


monitor urine output, currently non oliguric





it is possible she may require dialysis this admission, patient is aware; if 

dialysis is started it may become indefinite. In that case PD may be a better 

option for her. 


avoid nephrotoxic substances


monitor drug levels when appropriate, minimize non essential medications


obtain daily renal panel 





(2) Severe sepsis without septic shock


ICD Codes:  A41.9 - Sepsis, unspecified organism; R65.20 - Severe sepsis 

without septic shock


Status:  Acute


Plan:  respiratory source, possible viral illness


Renal dose medications and antibiotics as possible.


On oxygen


she is on cefepime and Levaquin 





(3) HTN (hypertension)


ICD Codes:  I10 - Hypertension


Status:  Chronic


Plan:  monitor blood pressure


continue ordered medications, avoid ACE 





(4) Diabetes


ICD Codes:  E11.9 - Diabetes mellitus


Status:  Chronic


Plan:  continue insulin therapy


glucose goal 140-180 mg/dL





avoid metformin (home medication) given hx of CKD 3-4





(5) Anemia


ICD Codes:  D64.9 - Anemia, unspecified


Status:  Chronic


Plan:  Hb improved and stable 


she was transfused the previous day


follow CBC 


 (Migdalia Camejo)


Plan


patient was seen and examined. Renal function is about the same as yesterday. 

Will defer initiation of dialysis. Continue observation. 


Avoid nephrotoxic agents. 


Agree with above assessment and plan. 


 (Kelton Whiteside MD)





Problem Qualifiers





(1) Anemia:  


Qualified Codes:  N18.9 - Chronic kidney disease, unspecified; D63.1 - Anemia 

in chronic kidney disease








Migdalia Camejo Aug 22, 2017 11:16


Kelton Whiteside MD Aug 22, 2017 16:25

## 2017-08-22 NOTE — EKG
Date Performed: 2017       Time Performed: 17:02:42

 

PTAGE:      52 years

 

EKG:      Sinus rhythm 

 

 POSSIBLE LEFT ATRIAL ENLARGEMENT ST DEVIATION AND MODERATE T-WAVE ABNORMALITY, CONSIDER LATERAL ISCH
EMIA LEFT VENTRICULAR HYPERTROPHY ABNORMAL ECG Compared to 

 

 PREVIOUS TRACING            , there has been no serial change. PREVIOUS TRACIN2017 08.58

 

DOCTOR:   Jimena Casiano  Interpretating Date/Time  2017 06:55:41

## 2017-08-22 NOTE — HHI.PR
Subjective


Remarks


breathing is better.


concerned about need for dialysis





Objective


Vitals


heart reg


lung good air entry


abd s/nt


ext no edema


Vital Signs








  Date Time  Temp Pulse Resp B/P (MAP) Pulse Ox O2 Delivery O2 Flow Rate FiO2


 


8/22/17 09:31     98   


 


8/22/17 09:00  87 18 158/74 (102) 98   


 


8/22/17 08:00 98.9 85 28 146/68 (94) 96   


 


8/22/17 07:28   17     


 


8/22/17 07:00  90 24 164/74 (104) 97   


 


8/22/17 06:00  87 32 168/76 (106) 97   


 


8/22/17 05:00  86 34 154/74 (100) 94   


 


8/22/17 04:00 98.9 87 19 157/75 (102) 96   


 


8/22/17 04:00  87      


 


8/22/17 03:00  85 16 147/68 (94) 94   


 


8/22/17 02:00  87 22 144/68 (93) 94   


 


8/22/17 01:15   16     


 


8/22/17 01:00  83 18 144/68 (93) 91   


 


8/22/17 00:01  88 23 150/69 (96) 93   


 


8/22/17 00:00 98.6 90 26  93   


 


8/22/17 00:00  90      


 


8/21/17 23:00  86 20 150/71 (97) 95   


 


8/21/17 22:00  84 18 146/69 (94) 98   


 


8/21/17 21:00  80 15 139/66 (90) 97   


 


8/21/17 20:51     96 Nasal Cannula 4.00 


 


8/21/17 20:00 98.0 77 18 124/58 (80) 96   


 


8/21/17 20:00  77      


 


8/21/17 19:00  75 28 125/61 (82) 99   


 


8/21/17 18:00  73      


 


8/21/17 17:00  75      


 


8/21/17 16:00  76      


 


8/21/17 16:00 98.7 76 18 144/68 (93) 90   


 


8/21/17 14:00  77      


 


8/21/17 13:00  79      


 


8/21/17 12:00  79      


 


8/21/17 12:00 98.3 79 22 122/59 (80) 98   


 


8/21/17 11:00  80      


 


8/21/17 10:00  81      








Result Diagram:  


8/21/17 0414                                                                   

             8/21/17 0414





Imaging





Last Impressions








Chest X-Ray 8/16/17 2144 Signed





Impressions: 





 Service Date/Time:  Wednesday, August 16, 2017 22:26 - CONCLUSION:  Diffuse 





 interstitial prominence and alveolar consolidation seen bilaterally being more 





 prominent on the left. Diffuse infectious processes should be considered.     





 Severo Weir MD 











A/P


Problem List:  


(1) Severe sepsis without septic shock


ICD Codes:  A41.9 - Sepsis, unspecified organism; R65.20 - Severe sepsis 

without septic shock


Status:  Acute


Plan:  - Pt is a 51 y/o female with HTN, diabetes, chronic tobacco use, and CKD 

who presented to the ED with worsening productive cough, SOB, decreased appetite

, 


 and fevers. - In the ED she was noted to have a fever and an elevated WBC 

count of 24.6. 


bilateral pneumonia


navid/ckd felt related to acute illness/infection and atn. ?meds.


pulmonary edema better.


acute anemia. stable after transfusion . no obvious bleeding..Patient states 

she had a colonoscopy 3-4 months ago by Dr. Granados and was negative.


Likely multifactorial anemia secondary to iron deficiency/anemia chronic disease

/renal failure. no hemolysis.





transfer out of ICU


oob/PT


dvt prophylaxis


pt on abx levaquin/cefepime for pna


duonebs. oxygen as needed.


renal following and to decide on dialysis


labs today are pending.


ssi. monitor bg. titrate basal insulin as needed.


avoid nephrotoxins


adjust bp meds as needed












































(2) Acute kidney injury superimposed on CKD


ICD Codes:  N17.9 - Acute kidney failure, unspecified; N18.9 - Chronic kidney 

disease, unspecified


Status:  Acute


Plan:  


- See above. 





(3) Dehydration


ICD Codes:  E86.0 - Dehydration


Status:  Acute


Plan:  


- See above. 





(4) HTN (hypertension)


ICD Codes:  I10 - Hypertension


Status:  Chronic


Plan:  


see above





(5) Diabetes


ICD Codes:  E11.9 - Diabetes mellitus


Status:  Chronic


Plan:  see above





(6) CAD (coronary artery disease)


ICD Codes:  I25.10 - Atherosclerotic heart disease of native coronary artery 

without angina pectoris


Status:  Chronic











Vik Jasmine MD Aug 22, 2017 09:46

## 2017-08-23 VITALS
SYSTOLIC BLOOD PRESSURE: 155 MMHG | RESPIRATION RATE: 22 BRPM | HEART RATE: 88 BPM | DIASTOLIC BLOOD PRESSURE: 73 MMHG | TEMPERATURE: 98 F | OXYGEN SATURATION: 97 %

## 2017-08-23 VITALS
HEART RATE: 94 BPM | RESPIRATION RATE: 18 BRPM | OXYGEN SATURATION: 99 % | DIASTOLIC BLOOD PRESSURE: 74 MMHG | TEMPERATURE: 98.9 F | SYSTOLIC BLOOD PRESSURE: 163 MMHG

## 2017-08-23 VITALS
OXYGEN SATURATION: 100 % | TEMPERATURE: 99 F | SYSTOLIC BLOOD PRESSURE: 130 MMHG | RESPIRATION RATE: 28 BRPM | HEART RATE: 85 BPM | DIASTOLIC BLOOD PRESSURE: 72 MMHG

## 2017-08-23 VITALS — HEART RATE: 92 BPM

## 2017-08-23 VITALS
HEART RATE: 91 BPM | TEMPERATURE: 98.6 F | OXYGEN SATURATION: 96 % | SYSTOLIC BLOOD PRESSURE: 154 MMHG | DIASTOLIC BLOOD PRESSURE: 73 MMHG | RESPIRATION RATE: 20 BRPM

## 2017-08-23 VITALS — OXYGEN SATURATION: 97 %

## 2017-08-23 VITALS
TEMPERATURE: 98.7 F | DIASTOLIC BLOOD PRESSURE: 63 MMHG | SYSTOLIC BLOOD PRESSURE: 137 MMHG | OXYGEN SATURATION: 99 % | RESPIRATION RATE: 20 BRPM | HEART RATE: 90 BPM

## 2017-08-23 VITALS
TEMPERATURE: 98.5 F | OXYGEN SATURATION: 96 % | DIASTOLIC BLOOD PRESSURE: 69 MMHG | SYSTOLIC BLOOD PRESSURE: 160 MMHG | RESPIRATION RATE: 25 BRPM | HEART RATE: 90 BPM

## 2017-08-23 VITALS — HEART RATE: 93 BPM

## 2017-08-23 VITALS — HEART RATE: 96 BPM

## 2017-08-23 VITALS — HEART RATE: 99 BPM

## 2017-08-23 VITALS — HEART RATE: 98 BPM

## 2017-08-23 VITALS — OXYGEN SATURATION: 96 %

## 2017-08-23 LAB
ALBUMIN SPE: 3.13 GM/DL (ref 3.5–5)
ALPHA1 GLOB SERPL ELPH-MCNC: 0.37 GM/DL (ref 0.11–0.29)
ALPHA2 GLOB SERPL ELPH-MCNC: 1.24 GM/DL (ref 0.22–1)
ANA TITER QUANT: (no result)
ANION GAP SERPL CALC-SCNC: 9 MEQ/L (ref 5–15)
BETA GLOBULINS  (SPE): 0.96 GM/DL (ref 0.53–1.03)
BUN SERPL-MCNC: 61 MG/DL (ref 7–18)
CHLORIDE SERPL-SCNC: 111 MEQ/L (ref 98–107)
GFR SERPLBLD BASED ON 1.73 SQ M-ARVRAT: 14 ML/MIN (ref 89–?)
HCO3 BLD-SCNC: 18.2 MEQ/L (ref 21–32)
MYELOPEROXIDASE: (no result) AI (ref ?–1)
POTASSIUM SERPL-SCNC: 4 MEQ/L (ref 3.5–5.1)
PROTEINASE3 AB SER IA-ACNC: (no result) AI (ref ?–1)
SODIUM SERPL-SCNC: 138 MEQ/L (ref 136–145)

## 2017-08-23 RX ADMIN — NIFEDIPINE SCH MG: 30 TABLET, FILM COATED, EXTENDED RELEASE ORAL at 22:01

## 2017-08-23 RX ADMIN — HYDROMORPHONE HYDROCHLORIDE PRN MG: 1 INJECTION, SOLUTION INTRAMUSCULAR; INTRAVENOUS; SUBCUTANEOUS at 22:20

## 2017-08-23 RX ADMIN — SIMETHICONE SCH MG: 125 TABLET, CHEWABLE ORAL at 22:01

## 2017-08-23 RX ADMIN — ONDANSETRON PRN MG: 2 INJECTION, SOLUTION INTRAMUSCULAR; INTRAVENOUS at 04:14

## 2017-08-23 RX ADMIN — FOLIC ACID SCH MG: 1 TABLET ORAL at 11:48

## 2017-08-23 RX ADMIN — IPRATROPIUM BROMIDE AND ALBUTEROL SULFATE SCH AMPULE: .5; 3 SOLUTION RESPIRATORY (INHALATION) at 07:33

## 2017-08-23 RX ADMIN — HYDROMORPHONE HYDROCHLORIDE PRN MG: 1 INJECTION, SOLUTION INTRAMUSCULAR; INTRAVENOUS; SUBCUTANEOUS at 23:30

## 2017-08-23 RX ADMIN — INSULIN ASPART SCH: 100 INJECTION, SOLUTION INTRAVENOUS; SUBCUTANEOUS at 06:37

## 2017-08-23 RX ADMIN — STANDARDIZED SENNA CONCENTRATE AND DOCUSATE SODIUM SCH TAB: 8.6; 5 TABLET, FILM COATED ORAL at 11:47

## 2017-08-23 RX ADMIN — BUMETANIDE SCH MG: 1 TABLET ORAL at 11:47

## 2017-08-23 RX ADMIN — PANTOPRAZOLE SCH MG: 40 TABLET, DELAYED RELEASE ORAL at 11:48

## 2017-08-23 RX ADMIN — INSULIN ASPART SCH: 100 INJECTION, SOLUTION INTRAVENOUS; SUBCUTANEOUS at 11:00

## 2017-08-23 RX ADMIN — IPRATROPIUM BROMIDE AND ALBUTEROL SULFATE SCH AMPULE: .5; 3 SOLUTION RESPIRATORY (INHALATION) at 21:36

## 2017-08-23 RX ADMIN — METOPROLOL TARTRATE SCH MG: 50 TABLET, FILM COATED ORAL at 22:02

## 2017-08-23 RX ADMIN — PRAVASTATIN SODIUM SCH MG: 40 TABLET ORAL at 22:01

## 2017-08-23 RX ADMIN — METOPROLOL TARTRATE SCH MG: 50 TABLET, FILM COATED ORAL at 11:48

## 2017-08-23 RX ADMIN — GUAIFENESIN SCH MG: 600 TABLET, EXTENDED RELEASE ORAL at 22:01

## 2017-08-23 RX ADMIN — Medication SCH ML: at 09:00

## 2017-08-23 RX ADMIN — STANDARDIZED SENNA CONCENTRATE AND DOCUSATE SODIUM SCH TAB: 8.6; 5 TABLET, FILM COATED ORAL at 22:02

## 2017-08-23 RX ADMIN — Medication SCH ML: at 22:03

## 2017-08-23 RX ADMIN — CEFEPIME SCH MLS/HR: 2 INJECTION, POWDER, FOR SOLUTION INTRAVENOUS at 01:09

## 2017-08-23 RX ADMIN — FERROUS SULFATE TAB 325 MG (65 MG ELEMENTAL FE) SCH MG: 325 (65 FE) TAB at 22:01

## 2017-08-23 RX ADMIN — FERROUS SULFATE TAB 325 MG (65 MG ELEMENTAL FE) SCH MG: 325 (65 FE) TAB at 11:47

## 2017-08-23 RX ADMIN — ASPIRIN 81 MG SCH MG: 81 TABLET ORAL at 11:48

## 2017-08-23 RX ADMIN — GABAPENTIN SCH MG: 100 CAPSULE ORAL at 22:02

## 2017-08-23 RX ADMIN — ONDANSETRON PRN MG: 2 INJECTION, SOLUTION INTRAMUSCULAR; INTRAVENOUS at 23:30

## 2017-08-23 RX ADMIN — INSULIN ASPART SCH: 100 INJECTION, SOLUTION INTRAVENOUS; SUBCUTANEOUS at 15:15

## 2017-08-23 RX ADMIN — HYDROMORPHONE HYDROCHLORIDE PRN MG: 1 INJECTION, SOLUTION INTRAMUSCULAR; INTRAVENOUS; SUBCUTANEOUS at 01:21

## 2017-08-23 RX ADMIN — HEPARIN SODIUM SCH UNITS: 10000 INJECTION, SOLUTION INTRAVENOUS; SUBCUTANEOUS at 11:48

## 2017-08-23 RX ADMIN — IPRATROPIUM BROMIDE AND ALBUTEROL SULFATE SCH AMPULE: .5; 3 SOLUTION RESPIRATORY (INHALATION) at 11:24

## 2017-08-23 RX ADMIN — GUAIFENESIN SCH MG: 600 TABLET, EXTENDED RELEASE ORAL at 11:48

## 2017-08-23 RX ADMIN — NIFEDIPINE SCH MG: 30 TABLET, FILM COATED, EXTENDED RELEASE ORAL at 11:48

## 2017-08-23 RX ADMIN — SIMETHICONE SCH MG: 125 TABLET, CHEWABLE ORAL at 14:59

## 2017-08-23 RX ADMIN — INSULIN ASPART SCH: 100 INJECTION, SOLUTION INTRAVENOUS; SUBCUTANEOUS at 22:21

## 2017-08-23 RX ADMIN — ONDANSETRON PRN MG: 2 INJECTION, SOLUTION INTRAMUSCULAR; INTRAVENOUS at 22:19

## 2017-08-23 RX ADMIN — IPRATROPIUM BROMIDE AND ALBUTEROL SULFATE SCH AMPULE: .5; 3 SOLUTION RESPIRATORY (INHALATION) at 16:22

## 2017-08-23 RX ADMIN — HEPARIN SODIUM SCH UNITS: 10000 INJECTION, SOLUTION INTRAVENOUS; SUBCUTANEOUS at 22:02

## 2017-08-23 RX ADMIN — GABAPENTIN SCH MG: 100 CAPSULE ORAL at 11:48

## 2017-08-23 RX ADMIN — CEFEPIME SCH MLS/HR: 2 INJECTION, POWDER, FOR SOLUTION INTRAVENOUS at 23:46

## 2017-08-23 NOTE — RADRPT
EXAM DATE/TIME:  08/23/2017 10:03 

 

[facility] COMPARISON:     

CHEST SINGLE AP, August 19, 2017, 3:28.

 

                     

INDICATIONS :     

Short of breath.

                     

 

MEDICAL HISTORY :     

Cardiovascular disease.  Hypertension  Pancreatitis.   myocardial infarction   

 

SURGICAL HISTORY :     

Cholecystectomy.   

 

ENCOUNTER:     

Initial                                        

 

ACUITY:     

1 week      

 

PAIN SCORE:     

0/10

 

LOCATION:     

Bilateral chest 

 

FINDINGS:     

A single portable frontal view of the chest shows bibasilar infiltrates less pronounced from the prio
r study. No discrete effusions. Heart since the upper limits of normal in terms of size. Bony structu
res are unremarkable.

 

CONCLUSION:     

Some improvement in the bibasilar infiltrates.

 

 

 

 Fei Greenfield Jr., MD on August 23, 2017 at 10:53           

Board Certified Radiologist.

 This report was verified electronically.

## 2017-08-23 NOTE — HHI.NPPN
Subjective


Complaints:  Shortness of Breath


General Problems:  Anemia, Hypertension, Mebatolic Acidosis


Renal Failure:  Chronic, Acute


Interval History


Renal function is better. She is resting quietly. Non oliguric. Vital signs are 

stable. 


 (Migdalia Camejo)





Review of Systems


General


Constitutional:  Fatigue


 (Migdalia Camejo)





Respiratory


Lungs:  SOB, Cough


 (Migdalia Camejo)





Musculoskeletal


MS:  Pain/Stiffness


 (Migdalia Camejo)





Objective Data


Data





Vital Signs








  Date Time  Temp Pulse Resp B/P (MAP) Pulse Ox O2 Delivery O2 Flow Rate FiO2


 


8/23/17 12:00  87      


 


8/23/17 12:00 98.5 90 25 160/69 (99) 96   


 


8/23/17 10:00  93      


 


8/23/17 09:00  92      


 


8/23/17 08:00  91      


 


8/23/17 08:00 98.6 91 20 154/73 (100) 96   


 


8/23/17 07:34     97   


 


8/23/17 04:00 99.0 85 28 130/72 (91) 100   


 


8/23/17 04:00  85      


 


8/23/17 00:00  90      


 


8/23/17 00:00 98.7 90 20 137/63 (87) 99   


 


8/22/17 22:00  97      


 


8/22/17 20:16     98   21


 


8/22/17 20:00  91      


 


8/22/17 17:00  91 23 159/70 (99) 99   


 


8/22/17 17:00  91      


 


8/22/17 16:06  89      


 


8/22/17 16:00  89      


 


8/22/17 16:00 98.8 89 20  97   


 


8/22/17 15:00  85      


 


8/22/17 15:00  85 25  99   


 


8/22/17 14:00  89 19 155/73 (100) 97   


 


8/22/17 14:00  89      








 (Migdalia Camejo)


-:  


8/22/17 1024                                                                   

             8/23/17 1054





Imaging





Last 72 hours Impressions








Chest X-Ray 8/23/17 0000 Signed





Impressions: 





 Service Date/Time:  Wednesday, August 23, 2017 10:03 - CONCLUSION:  Some 





 improvement in the bibasilar infiltrates.     Fei Greenfield Jr., MD 


 


Lung Scan- Nuclear Medicine 8/21/17 0000 Signed





Impressions: 





 Service Date/Time:  Monday, August 21, 2017 14:39 - CONCLUSION: Low 

probability 





 for pulmonary embolism.     Mp Dang MD 








Tubes & Lines:  Rhodes


 (NellMigdalia B. ARNP)





Physical Exam


General


Appearance:  Well Developed, Well Nourished, No Acute Distress, Comfortable


 (Nell,Migdalia B. ARNP)





Throat


Throat Exam:  Oral Mucosa Pink & Moist


 (Nell,Migdalia B. ARNP)





Pulmonary


Resp Exam:  Clear Bilaterally, Breath Sounds Equal, No Distress, Labored


 (Nell,Migdalia B. ARNP)





Cardiology


CV Exam:  Regular, Normal Sinus Rhythm, Good Perfusion


 (Nell,Migdalia B. ARNP)





Gastrointestinal/Abdomen


GI Exam:  Soft, Non-Tender, Bowel Sounds Present


 (Nell,Migdalia B. ARNP)





Musculoskeletal


MS Exam:  Joints Intact, Normal Gait, Normal Tone, Good Strength


 (Nell,Migdalia B. ARNP)





Integumentary


Skin Exam:  Clear, Warm, Dry, Intact


 (NellMigdalia B. ARNP)





Extremeties


Extremities Exam:  No Edema, Pedal Pulses Palpable


 (Nell,Migdalia B. ARNP)





Neurologic


Neuro Exam:  Alert, Awake, Oriented, Speech Clear, Moving All Extremities


 (NellMigdalia B. ARNP)





Psychiatric


Psych Exam:  Appropriate Responses


 (Nell,Migdalia B. ARNP)





VTE Prophylaxis


Device:  SCDs


 (Nell,Migdalia B. ARNP)





Assessment/Plan


Discussed Condition With:  Patient


Assessment Summary:  LETI/Acute Renal Failure, Anemia of CKD, Hypertension, CKD 

Stage IV


Electrolyte Assessment:  Hyperkalemia


Problem List:  


(1) Acute kidney injury superimposed on CKD


ICD Codes:  N17.9 - Acute kidney failure, unspecified; N18.9 - Chronic kidney 

disease, unspecified


Status:  Acute


Plan:  Suspected underlying CKD, possibly due to diabetic nephropathy, stage 3 

with multiple episodes of LETI. 


    She has heavy proteinuria, 4.7 grams - suspect DM nephropathy. Serum 

electrophoresis is in process. 





 LETI likely due to infection, sepsis syndrome, also given NSAIDs


Renal function improved, dialysis is not required at this time


she is on bumex PO once daily , continue


monitor urine output, currently non oliguric





Of note: if dialysis is started it may become indefinite. In that case PD may 

be a better option for her. 


avoid nephrotoxic substances


monitor drug levels when appropriate, minimize non essential medications


obtain daily renal panel 





(2) Severe sepsis without septic shock


ICD Codes:  A41.9 - Sepsis, unspecified organism; R65.20 - Severe sepsis 

without septic shock


Status:  Acute


Plan:  respiratory source, possible viral illness


Renal dose medications and antibiotics as possible.


On oxygen


she is on IV cefepime and po Levaquin 





(3) HTN (hypertension)


ICD Codes:  I10 - Hypertension


Status:  Chronic


Plan:  monitor blood pressure


continue ordered medications, avoid ACE 





(4) Diabetes


ICD Codes:  E11.9 - Diabetes mellitus


Status:  Chronic


Plan:  continue insulin therapy


glucose goal 140-180 mg/dL





avoid metformin (home medication) given hx of CKD 3-4





(5) Anemia


ICD Codes:  D64.9 - Anemia, unspecified


Status:  Chronic


Plan:  Hb improved and stable 


she was transfused this week


follow CBC 


 (Migdalia Camejo)


Plan


patient was seen and examined. Agree with above assessment and plan. No 

immediate need for dialysis. Continue to monitor. Possible discharge in 1-2 

days with outpatient followup. 


 (Kelton Whiteside MD)





Problem Qualifiers





(1) Anemia:  


Qualified Codes:  N18.9 - Chronic kidney disease, unspecified; D63.1 - Anemia 

in chronic kidney disease








Migdalia Camejo Aug 23, 2017 13:18


Kelton Whiteside MD Aug 23, 2017 20:17

## 2017-08-23 NOTE — HHI.PR
Subjective


Remarks


says she was having some upper abdomen


pressure into chest. worse with eating.





Objective


Vitals


heart reg


lung crackles bases


abd s/nt


ext no edema


Vital Signs








  Date Time  Temp Pulse Resp B/P (MAP) Pulse Ox O2 Delivery O2 Flow Rate FiO2


 


8/23/17 07:34     97   


 


8/23/17 04:00 99.0 85 28 130/72 (91) 100   


 


8/23/17 04:00  85      


 


8/23/17 00:00  90      


 


8/23/17 00:00 98.7 90 20 137/63 (87) 99   


 


8/22/17 22:00  97      


 


8/22/17 20:16     98   21


 


8/22/17 20:00  91      


 


8/22/17 17:00  91 23 159/70 (99) 99   


 


8/22/17 17:00  91      


 


8/22/17 16:06  89      


 


8/22/17 16:00  89      


 


8/22/17 16:00 98.8 89 20  97   


 


8/22/17 15:00  85      


 


8/22/17 15:00  85 25  99   


 


8/22/17 14:00  89 19 155/73 (100) 97   


 


8/22/17 14:00  89      


 


8/22/17 13:00  94      


 


8/22/17 13:00  94 21 147/66 (93) 99   


 


8/22/17 12:00  92      


 


8/22/17 12:00 98.8 92 21 142/64 (90) 94   


 


8/22/17 11:00  91 27 155/68 (97) 94   


 


8/22/17 11:00  91      


 


8/22/17 10:00  92      


 


8/22/17 10:00  92 18 162/74 (103) 97   








Result Diagram:  


8/22/17 1024                                                                   

             8/22/17 1035





Imaging





Last Impressions








Chest X-Ray 8/16/17 2144 Signed





Impressions: 





 Service Date/Time:  Wednesday, August 16, 2017 22:26 - CONCLUSION:  Diffuse 





 interstitial prominence and alveolar consolidation seen bilaterally being more 





 prominent on the left. Diffuse infectious processes should be considered.     





 Severo Weir MD 











A/P


Problem List:  


(1) Severe sepsis without septic shock


ICD Codes:  A41.9 - Sepsis, unspecified organism; R65.20 - Severe sepsis 

without septic shock


Status:  Acute


Plan:  - Pt is a 51 y/o female with HTN, diabetes, chronic tobacco use, and CKD 

who presented to the ED with worsening productive cough, SOB, decreased appetite

, 


 and fevers. - In the ED she was noted to have a fever and an elevated WBC 

count of 24.6. 


bilateral pneumonia


navid/ckd felt related to acute illness/infection and atn. ?meds.


pulmonary edema better.


acute anemia. stable after transfusion . no obvious bleeding..Patient states 

she had a colonoscopy 3-4 months ago by Dr. Granados and was negative.


Likely multifactorial anemia secondary to iron deficiency/anemia chronic disease

/renal failure. no hemolysis.


dm- hypoglycemia this morning due to poor po and insulin





transfer out of ICU


oob/PT


dvt prophylaxis


pt on abx levaquin/cefepime for pna


duonebs. oxygen as needed.


renal following and to decide on dialysis


labs today are pending.


ssi. monitor bg. hold levemir


avoid nephrotoxins


adjust bp meds as needed


cxr today


added simethicone.












































(2) Acute kidney injury superimposed on CKD


ICD Codes:  N17.9 - Acute kidney failure, unspecified; N18.9 - Chronic kidney 

disease, unspecified


Status:  Acute


Plan:  


- See above. 





(3) Dehydration


ICD Codes:  E86.0 - Dehydration


Status:  Acute


Plan:  


- See above. 





(4) HTN (hypertension)


ICD Codes:  I10 - Hypertension


Status:  Chronic


Plan:  


see above





(5) Diabetes


ICD Codes:  E11.9 - Diabetes mellitus


Status:  Chronic


Plan:  see above





(6) CAD (coronary artery disease)


ICD Codes:  I25.10 - Atherosclerotic heart disease of native coronary artery 

without angina pectoris


Status:  Chronic











Vik Jasmine MD Aug 23, 2017 09:38

## 2017-08-24 VITALS
DIASTOLIC BLOOD PRESSURE: 69 MMHG | HEART RATE: 90 BPM | RESPIRATION RATE: 18 BRPM | SYSTOLIC BLOOD PRESSURE: 129 MMHG | TEMPERATURE: 98.1 F | OXYGEN SATURATION: 98 %

## 2017-08-24 VITALS — HEART RATE: 90 BPM

## 2017-08-24 VITALS — HEART RATE: 94 BPM

## 2017-08-24 VITALS
OXYGEN SATURATION: 98 % | HEART RATE: 92 BPM | SYSTOLIC BLOOD PRESSURE: 160 MMHG | RESPIRATION RATE: 18 BRPM | DIASTOLIC BLOOD PRESSURE: 76 MMHG | TEMPERATURE: 99.5 F

## 2017-08-24 VITALS — HEART RATE: 92 BPM

## 2017-08-24 VITALS
SYSTOLIC BLOOD PRESSURE: 162 MMHG | TEMPERATURE: 98.5 F | RESPIRATION RATE: 16 BRPM | DIASTOLIC BLOOD PRESSURE: 74 MMHG | OXYGEN SATURATION: 98 % | HEART RATE: 84 BPM

## 2017-08-24 VITALS
OXYGEN SATURATION: 99 % | SYSTOLIC BLOOD PRESSURE: 161 MMHG | DIASTOLIC BLOOD PRESSURE: 75 MMHG | RESPIRATION RATE: 18 BRPM | TEMPERATURE: 98.3 F | HEART RATE: 87 BPM

## 2017-08-24 VITALS — HEART RATE: 85 BPM

## 2017-08-24 VITALS
SYSTOLIC BLOOD PRESSURE: 159 MMHG | OXYGEN SATURATION: 98 % | TEMPERATURE: 98.4 F | DIASTOLIC BLOOD PRESSURE: 82 MMHG | RESPIRATION RATE: 16 BRPM | HEART RATE: 92 BPM

## 2017-08-24 VITALS — HEART RATE: 89 BPM

## 2017-08-24 VITALS
HEART RATE: 90 BPM | DIASTOLIC BLOOD PRESSURE: 63 MMHG | RESPIRATION RATE: 18 BRPM | OXYGEN SATURATION: 94 % | SYSTOLIC BLOOD PRESSURE: 148 MMHG | TEMPERATURE: 98.4 F

## 2017-08-24 VITALS — HEART RATE: 96 BPM

## 2017-08-24 VITALS — HEART RATE: 86 BPM

## 2017-08-24 VITALS — HEART RATE: 88 BPM

## 2017-08-24 VITALS
RESPIRATION RATE: 18 BRPM | SYSTOLIC BLOOD PRESSURE: 153 MMHG | HEART RATE: 93 BPM | DIASTOLIC BLOOD PRESSURE: 68 MMHG | TEMPERATURE: 98.4 F | OXYGEN SATURATION: 98 %

## 2017-08-24 VITALS — HEART RATE: 87 BPM

## 2017-08-24 VITALS — OXYGEN SATURATION: 99 %

## 2017-08-24 VITALS — HEART RATE: 84 BPM

## 2017-08-24 VITALS — HEART RATE: 91 BPM

## 2017-08-24 LAB
ANION GAP SERPL CALC-SCNC: 8 MEQ/L (ref 5–15)
BUN SERPL-MCNC: 56 MG/DL (ref 7–18)
CHLORIDE SERPL-SCNC: 114 MEQ/L (ref 98–107)
GFR SERPLBLD BASED ON 1.73 SQ M-ARVRAT: 15 ML/MIN (ref 89–?)
HCO3 BLD-SCNC: 18.4 MEQ/L (ref 21–32)
POTASSIUM SERPL-SCNC: 3.8 MEQ/L (ref 3.5–5.1)
SODIUM SERPL-SCNC: 140 MEQ/L (ref 136–145)

## 2017-08-24 RX ADMIN — GUAIFENESIN SCH MG: 600 TABLET, EXTENDED RELEASE ORAL at 08:38

## 2017-08-24 RX ADMIN — FERROUS SULFATE TAB 325 MG (65 MG ELEMENTAL FE) SCH MG: 325 (65 FE) TAB at 08:37

## 2017-08-24 RX ADMIN — SIMETHICONE SCH MG: 125 TABLET, CHEWABLE ORAL at 05:58

## 2017-08-24 RX ADMIN — INSULIN ASPART SCH: 100 INJECTION, SOLUTION INTRAVENOUS; SUBCUTANEOUS at 20:43

## 2017-08-24 RX ADMIN — LEVOFLOXACIN SCH MG: 750 TABLET, FILM COATED ORAL at 08:37

## 2017-08-24 RX ADMIN — INSULIN ASPART SCH: 100 INJECTION, SOLUTION INTRAVENOUS; SUBCUTANEOUS at 05:53

## 2017-08-24 RX ADMIN — FOLIC ACID SCH MG: 1 TABLET ORAL at 08:37

## 2017-08-24 RX ADMIN — GABAPENTIN SCH MG: 100 CAPSULE ORAL at 08:37

## 2017-08-24 RX ADMIN — STANDARDIZED SENNA CONCENTRATE AND DOCUSATE SODIUM SCH TAB: 8.6; 5 TABLET, FILM COATED ORAL at 08:38

## 2017-08-24 RX ADMIN — NIFEDIPINE SCH MG: 30 TABLET, FILM COATED, EXTENDED RELEASE ORAL at 08:37

## 2017-08-24 RX ADMIN — GUAIFENESIN SCH MG: 600 TABLET, EXTENDED RELEASE ORAL at 21:02

## 2017-08-24 RX ADMIN — ASPIRIN 81 MG SCH MG: 81 TABLET ORAL at 08:37

## 2017-08-24 RX ADMIN — Medication SCH ML: at 08:38

## 2017-08-24 RX ADMIN — ONDANSETRON PRN MG: 2 INJECTION, SOLUTION INTRAMUSCULAR; INTRAVENOUS at 16:12

## 2017-08-24 RX ADMIN — PRAVASTATIN SODIUM SCH MG: 40 TABLET ORAL at 21:02

## 2017-08-24 RX ADMIN — HYDROMORPHONE HYDROCHLORIDE PRN MG: 1 INJECTION, SOLUTION INTRAMUSCULAR; INTRAVENOUS; SUBCUTANEOUS at 16:16

## 2017-08-24 RX ADMIN — HEPARIN SODIUM SCH UNITS: 10000 INJECTION, SOLUTION INTRAVENOUS; SUBCUTANEOUS at 08:38

## 2017-08-24 RX ADMIN — IPRATROPIUM BROMIDE AND ALBUTEROL SULFATE SCH AMPULE: .5; 3 SOLUTION RESPIRATORY (INHALATION) at 08:44

## 2017-08-24 RX ADMIN — SIMETHICONE SCH MG: 125 TABLET, CHEWABLE ORAL at 21:02

## 2017-08-24 RX ADMIN — CEFEPIME SCH MLS/HR: 2 INJECTION, POWDER, FOR SOLUTION INTRAVENOUS at 23:12

## 2017-08-24 RX ADMIN — METOPROLOL TARTRATE SCH MG: 50 TABLET, FILM COATED ORAL at 21:02

## 2017-08-24 RX ADMIN — INSULIN ASPART SCH: 100 INJECTION, SOLUTION INTRAVENOUS; SUBCUTANEOUS at 16:23

## 2017-08-24 RX ADMIN — STANDARDIZED SENNA CONCENTRATE AND DOCUSATE SODIUM SCH TAB: 8.6; 5 TABLET, FILM COATED ORAL at 21:00

## 2017-08-24 RX ADMIN — HEPARIN SODIUM SCH UNITS: 10000 INJECTION, SOLUTION INTRAVENOUS; SUBCUTANEOUS at 21:03

## 2017-08-24 RX ADMIN — ONDANSETRON PRN MG: 2 INJECTION, SOLUTION INTRAMUSCULAR; INTRAVENOUS at 23:21

## 2017-08-24 RX ADMIN — FERROUS SULFATE TAB 325 MG (65 MG ELEMENTAL FE) SCH MG: 325 (65 FE) TAB at 21:02

## 2017-08-24 RX ADMIN — IPRATROPIUM BROMIDE AND ALBUTEROL SULFATE SCH AMPULE: .5; 3 SOLUTION RESPIRATORY (INHALATION) at 12:17

## 2017-08-24 RX ADMIN — NIFEDIPINE SCH MG: 30 TABLET, FILM COATED, EXTENDED RELEASE ORAL at 21:02

## 2017-08-24 RX ADMIN — IPRATROPIUM BROMIDE AND ALBUTEROL SULFATE SCH AMPULE: .5; 3 SOLUTION RESPIRATORY (INHALATION) at 16:04

## 2017-08-24 RX ADMIN — SIMETHICONE SCH MG: 125 TABLET, CHEWABLE ORAL at 12:22

## 2017-08-24 RX ADMIN — GABAPENTIN SCH MG: 100 CAPSULE ORAL at 21:02

## 2017-08-24 RX ADMIN — HYDROMORPHONE HYDROCHLORIDE PRN MG: 1 INJECTION, SOLUTION INTRAMUSCULAR; INTRAVENOUS; SUBCUTANEOUS at 23:20

## 2017-08-24 RX ADMIN — METOPROLOL TARTRATE SCH MG: 50 TABLET, FILM COATED ORAL at 08:38

## 2017-08-24 RX ADMIN — INSULIN ASPART SCH: 100 INJECTION, SOLUTION INTRAVENOUS; SUBCUTANEOUS at 10:55

## 2017-08-24 RX ADMIN — Medication SCH ML: at 21:03

## 2017-08-24 RX ADMIN — BUMETANIDE SCH MG: 1 TABLET ORAL at 08:37

## 2017-08-24 RX ADMIN — PANTOPRAZOLE SCH MG: 40 TABLET, DELAYED RELEASE ORAL at 08:37

## 2017-08-24 NOTE — HHI.NPPN
Subjective


Complaints:  Shortness of Breath


General Problems:  Anemia, Hypertension, Mebatolic Acidosis


Renal Failure:  Chronic, Acute


History of Present Illness


53 y/o AAF admitted for shortness of breath. On arrival she was febrile with 

leukocytosis, being treated for pneumonia with Zithromax and Rocephin. On 

arrival her creatinine was 2.7, called to see for elevated BUN and Creatinine.


Additional Remarks


Patient is alert, no SOB, eating well, on room air.





Review of Systems


General


Constitutional:  Fatigue





Respiratory


Lungs:  SOB, Cough





Musculoskeletal


MS:  Pain/Stiffness





Objective Data


Data





Vital Signs








  Date Time  Temp Pulse Resp B/P (MAP) Pulse Ox O2 Delivery O2 Flow Rate FiO2


 


8/24/17 15:32  87      


 


8/24/17 15:31 98.3 87 18 161/75 (103) 99   


 


8/24/17 13:22  85      


 


8/24/17 12:10  88      


 


8/24/17 11:08  90      


 


8/24/17 11:08 98.1 88 18 129/69 (89) 98   


 


8/24/17 09:31  88      


 


8/24/17 08:50  90      


 


8/24/17 07:00 98.4 93 18 153/68 (96) 98   


 


8/24/17 07:00  92      


 


8/24/17 06:00  90      


 


8/24/17 05:00  89      


 


8/24/17 04:00  90      


 


8/24/17 03:30 98.4 90 18 148/63 (91) 94   


 


8/24/17 03:00  89      


 


8/24/17 02:00  92      


 


8/24/17 01:00  91      


 


8/24/17 00:00 99.5 94 18 160/76 (104) 98   


 


8/24/17 00:00  92      


 


8/23/17 23:00  99      


 


8/23/17 22:00  98      


 


8/23/17 21:36     96   


 


8/23/17 21:00  94      


 


8/23/17 21:00 98.9 92 18 163/74 (103) 99   


 


8/23/17 20:00  96      








-:  


8/22/17 1024                                                                   

             8/24/17 0525





Tubes & Lines:  [place]





Physical Exam


General


Appearance:  Well Developed, Well Nourished, No Acute Distress, Comfortable





Throat


Throat Exam:  Oral Mucosa [place] & Moist





Pulmonary


Resp Exam:  Clear Bilaterally, Breath Sounds Equal, No Distress, Labored





Cardiology


CV Exam:  Regular, Normal Sinus Rhythm, Good Perfusion





Gastrointestinal/Abdomen


GI Exam:  Soft, Non-Tender, Bowel Sounds Present





Musculoskeletal


MS Exam:  Joints Intact, Normal Gait, Normal Tone, Good Strength





Integumentary


Skin Exam:  Clear, Warm, Dry, Intact





Extremeties


Extremities Exam:  Trace Edema





Neurologic


Neuro Exam:  Alert, Awake, Oriented, Speech Clear





VTE Prophylaxis


Device:  SCDs





Assessment/Plan


Discussed Condition With:  Patient


Assessment Summary:  LETI/Acute Renal Failure, Anemia of CKD, Hypertension, CKD 

Stage IV


Electrolyte Assessment:  Hyperkalemia


Problem List:  


(1) Acute kidney injury superimposed on CKD


ICD Codes:  N17.9 - Acute kidney failure, unspecified; N18.9 - Chronic kidney 

disease, unspecified


Status:  Acute


Plan:  Suspected underlying CKD, possibly due to diabetic nephropathy, stage 3 

with multiple episodes of LETI. 


 She has heavy proteinuria, 4.7 grams - suspect DM nephropathy. Serum 

electrophoresis is in process. 





 LETI likely due to infection, sepsis syndrome, also given NSAIDs


Renal function improved, dialysis is not required at this time


she is on bumex PO once daily , continue


monitor urine output, currently non oliguric





Creatinine slightly better.


There is an element of LETI.





(2) Severe sepsis without septic shock


ICD Codes:  A41.9 - Sepsis, unspecified organism; R65.20 - Severe sepsis 

without septic shock


Status:  Acute


Plan:  respiratory source, possible viral illness


Renal dose medications and antibiotics as possible.


On oxygen


she is on IV cefepime and po Levaquin 





(3) HTN (hypertension)


ICD Codes:  I10 - Hypertension


Status:  Chronic


Plan:  monitor blood pressure


continue ordered medications, avoid ACE 





(4) Diabetes


ICD Codes:  E11.9 - Diabetes mellitus


Status:  Chronic


Plan:  continue insulin therapy


glucose goal 140-180 mg/dL





avoid metformin (home medication) given hx of CKD 3-4





(5) Anemia


ICD Codes:  D64.9 - Anemia, unspecified


Status:  Chronic


Plan:  Hb improved and stable 


she was transfused this week


follow CBC 








Problem Qualifiers





(1) Anemia:  


Qualified Codes:  N18.9 - Chronic kidney disease, unspecified; D63.1 - Anemia 

in chronic kidney disease








Jesica Choudhary MD Aug 24, 2017 17:41

## 2017-08-24 NOTE — HHI.PR
Subjective


Remarks


abdomen and chest pressure better with simethicone


eager for d/c home.





Objective


Vitals


nad


heart reg


lung few basilar crackles


abd s/nt


ext no edema


Vital Signs








  Date Time  Temp Pulse Resp B/P (MAP) Pulse Ox O2 Delivery O2 Flow Rate FiO2


 


8/24/17 06:00  90      


 


8/24/17 05:00  89      


 


8/24/17 04:00  90      


 


8/24/17 03:30 98.4 90 18 148/63 (91) 94   


 


8/24/17 03:00  89      


 


8/24/17 02:00  92      


 


8/24/17 01:00  91      


 


8/24/17 00:00 99.5 94 18 160/76 (104) 98   


 


8/24/17 00:00  92      


 


8/23/17 23:00  99      


 


8/23/17 22:00  98      


 


8/23/17 21:36     96   


 


8/23/17 21:00  94      


 


8/23/17 21:00 98.9 92 18 163/74 (103) 99   


 


8/23/17 20:00  96      


 


8/23/17 16:00 98.0 81 22 155/73 (100) 97   


 


8/23/17 16:00  88      


 


8/23/17 12:00  87      


 


8/23/17 12:00 98.5 90 25 160/69 (99) 96   


 


8/23/17 10:00  93      


 


8/23/17 09:00  92      








Result Diagram:  


8/22/17 1024                                                                   

             8/24/17 0525





Imaging





Last Impressions








Chest X-Ray 8/16/17 2144 Signed





Impressions: 





 Service Date/Time:  Wednesday, August 16, 2017 22:26 - CONCLUSION:  Diffuse 





 interstitial prominence and alveolar consolidation seen bilaterally being more 





 prominent on the left. Diffuse infectious processes should be considered.     





 Severo Weir MD 











A/P


Problem List:  


(1) Severe sepsis without septic shock


ICD Codes:  A41.9 - Sepsis, unspecified organism; R65.20 - Severe sepsis 

without septic shock


Status:  Acute


Plan:  - Pt is a 51 y/o female with HTN, diabetes, chronic tobacco use, and CKD 

who presented to the ED with worsening productive cough, SOB, decreased appetite

, 


 and fevers. - In the ED she was noted to have a fever and an elevated WBC 

count of 24.6. 


bilateral pneumonia


navid/ckd felt related to acute illness/infection and atn. ?meds.......bun/cr 

improved today.


pulmonary edema better.


acute anemia. stable after transfusion . no obvious bleeding..Patient states 

she had a colonoscopy 3-4 months ago by Dr. Granados and was negative.


Likely multifactorial anemia secondary to iron deficiency/anemia chronic disease

/renal failure. no hemolysis.


dm- hypoglycemia. better after holding scheduled insulin.





oob/PT


dvt prophylaxis


pt on abx levaquin/cefepime for pna


duonebs. oxygen as needed.


renal following and to decide on dialysis


diuretics per renal.


f/u positive phillip panel


ssi. monitor bg. hold levemir


avoid nephrotoxins


adjust bp meds as needed


simethicone and ppi












































(2) Acute kidney injury superimposed on CKD


ICD Codes:  N17.9 - Acute kidney failure, unspecified; N18.9 - Chronic kidney 

disease, unspecified


Status:  Acute


Plan:  


- See above. 





(3) Dehydration


ICD Codes:  E86.0 - Dehydration


Status:  Acute


Plan:  


- See above. 





(4) HTN (hypertension)


ICD Codes:  I10 - Hypertension


Status:  Chronic


Plan:  


see above





(5) Diabetes


ICD Codes:  E11.9 - Diabetes mellitus


Status:  Chronic


Plan:  see above





(6) CAD (coronary artery disease)


ICD Codes:  I25.10 - Atherosclerotic heart disease of native coronary artery 

without angina pectoris


Status:  Chronic











Vik Jasmine MD Aug 24, 2017 08:19

## 2017-08-25 VITALS — HEART RATE: 85 BPM

## 2017-08-25 VITALS — HEART RATE: 86 BPM

## 2017-08-25 VITALS — HEART RATE: 89 BPM

## 2017-08-25 VITALS
HEART RATE: 89 BPM | TEMPERATURE: 98.9 F | DIASTOLIC BLOOD PRESSURE: 68 MMHG | OXYGEN SATURATION: 94 % | SYSTOLIC BLOOD PRESSURE: 158 MMHG | RESPIRATION RATE: 16 BRPM

## 2017-08-25 VITALS
DIASTOLIC BLOOD PRESSURE: 88 MMHG | SYSTOLIC BLOOD PRESSURE: 160 MMHG | HEART RATE: 87 BPM | TEMPERATURE: 99.5 F | RESPIRATION RATE: 16 BRPM | OXYGEN SATURATION: 95 %

## 2017-08-25 VITALS
SYSTOLIC BLOOD PRESSURE: 160 MMHG | DIASTOLIC BLOOD PRESSURE: 94 MMHG | OXYGEN SATURATION: 98 % | HEART RATE: 88 BPM | RESPIRATION RATE: 20 BRPM

## 2017-08-25 VITALS
OXYGEN SATURATION: 98 % | DIASTOLIC BLOOD PRESSURE: 71 MMHG | HEART RATE: 96 BPM | RESPIRATION RATE: 16 BRPM | TEMPERATURE: 98.3 F | SYSTOLIC BLOOD PRESSURE: 142 MMHG

## 2017-08-25 VITALS
HEART RATE: 88 BPM | TEMPERATURE: 98.9 F | DIASTOLIC BLOOD PRESSURE: 67 MMHG | SYSTOLIC BLOOD PRESSURE: 150 MMHG | RESPIRATION RATE: 16 BRPM | OXYGEN SATURATION: 94 %

## 2017-08-25 VITALS — HEART RATE: 84 BPM

## 2017-08-25 VITALS
RESPIRATION RATE: 20 BRPM | TEMPERATURE: 97.8 F | SYSTOLIC BLOOD PRESSURE: 153 MMHG | OXYGEN SATURATION: 99 % | HEART RATE: 92 BPM | DIASTOLIC BLOOD PRESSURE: 76 MMHG

## 2017-08-25 VITALS — HEART RATE: 91 BPM

## 2017-08-25 VITALS — HEART RATE: 95 BPM

## 2017-08-25 VITALS — HEART RATE: 87 BPM

## 2017-08-25 VITALS — HEART RATE: 92 BPM

## 2017-08-25 LAB
ANION GAP SERPL CALC-SCNC: 7 MEQ/L (ref 5–15)
BUN SERPL-MCNC: 41 MG/DL (ref 7–18)
CHLORIDE SERPL-SCNC: 111 MEQ/L (ref 98–107)
GFR SERPLBLD BASED ON 1.73 SQ M-ARVRAT: 18 ML/MIN (ref 89–?)
HCO3 BLD-SCNC: 21 MEQ/L (ref 21–32)
POTASSIUM SERPL-SCNC: 3.6 MEQ/L (ref 3.5–5.1)
SODIUM SERPL-SCNC: 139 MEQ/L (ref 136–145)

## 2017-08-25 RX ADMIN — STANDARDIZED SENNA CONCENTRATE AND DOCUSATE SODIUM SCH TAB: 8.6; 5 TABLET, FILM COATED ORAL at 07:42

## 2017-08-25 RX ADMIN — INSULIN ASPART SCH: 100 INJECTION, SOLUTION INTRAVENOUS; SUBCUTANEOUS at 20:15

## 2017-08-25 RX ADMIN — METOPROLOL TARTRATE SCH MG: 50 TABLET, FILM COATED ORAL at 07:43

## 2017-08-25 RX ADMIN — GUAIFENESIN SCH MG: 600 TABLET, EXTENDED RELEASE ORAL at 07:43

## 2017-08-25 RX ADMIN — Medication SCH ML: at 20:15

## 2017-08-25 RX ADMIN — METOPROLOL TARTRATE SCH MG: 50 TABLET, FILM COATED ORAL at 20:14

## 2017-08-25 RX ADMIN — PANTOPRAZOLE SCH MG: 40 TABLET, DELAYED RELEASE ORAL at 07:44

## 2017-08-25 RX ADMIN — GABAPENTIN SCH MG: 100 CAPSULE ORAL at 07:42

## 2017-08-25 RX ADMIN — SIMETHICONE SCH MG: 125 TABLET, CHEWABLE ORAL at 14:00

## 2017-08-25 RX ADMIN — FERROUS SULFATE TAB 325 MG (65 MG ELEMENTAL FE) SCH MG: 325 (65 FE) TAB at 20:15

## 2017-08-25 RX ADMIN — FOLIC ACID SCH MG: 1 TABLET ORAL at 07:44

## 2017-08-25 RX ADMIN — PRAVASTATIN SODIUM SCH MG: 40 TABLET ORAL at 20:14

## 2017-08-25 RX ADMIN — GABAPENTIN SCH MG: 100 CAPSULE ORAL at 20:14

## 2017-08-25 RX ADMIN — HEPARIN SODIUM SCH UNITS: 10000 INJECTION, SOLUTION INTRAVENOUS; SUBCUTANEOUS at 07:43

## 2017-08-25 RX ADMIN — SIMETHICONE SCH MG: 125 TABLET, CHEWABLE ORAL at 06:17

## 2017-08-25 RX ADMIN — HEPARIN SODIUM SCH UNITS: 10000 INJECTION, SOLUTION INTRAVENOUS; SUBCUTANEOUS at 20:15

## 2017-08-25 RX ADMIN — INSULIN ASPART SCH: 100 INJECTION, SOLUTION INTRAVENOUS; SUBCUTANEOUS at 11:00

## 2017-08-25 RX ADMIN — SIMETHICONE SCH MG: 125 TABLET, CHEWABLE ORAL at 20:14

## 2017-08-25 RX ADMIN — STANDARDIZED SENNA CONCENTRATE AND DOCUSATE SODIUM SCH TAB: 8.6; 5 TABLET, FILM COATED ORAL at 20:14

## 2017-08-25 RX ADMIN — ONDANSETRON PRN MG: 2 INJECTION, SOLUTION INTRAMUSCULAR; INTRAVENOUS at 21:34

## 2017-08-25 RX ADMIN — INSULIN ASPART SCH: 100 INJECTION, SOLUTION INTRAVENOUS; SUBCUTANEOUS at 16:00

## 2017-08-25 RX ADMIN — ASPIRIN 81 MG SCH MG: 81 TABLET ORAL at 07:43

## 2017-08-25 RX ADMIN — NIFEDIPINE SCH MG: 30 TABLET, FILM COATED, EXTENDED RELEASE ORAL at 20:14

## 2017-08-25 RX ADMIN — BUMETANIDE SCH MG: 1 TABLET ORAL at 07:43

## 2017-08-25 RX ADMIN — CEFEPIME SCH MLS/HR: 2 INJECTION, POWDER, FOR SOLUTION INTRAVENOUS at 23:00

## 2017-08-25 RX ADMIN — Medication SCH ML: at 07:44

## 2017-08-25 RX ADMIN — GUAIFENESIN SCH MG: 600 TABLET, EXTENDED RELEASE ORAL at 20:14

## 2017-08-25 RX ADMIN — NIFEDIPINE SCH MG: 30 TABLET, FILM COATED, EXTENDED RELEASE ORAL at 07:43

## 2017-08-25 RX ADMIN — INSULIN ASPART SCH: 100 INJECTION, SOLUTION INTRAVENOUS; SUBCUTANEOUS at 06:02

## 2017-08-25 RX ADMIN — FERROUS SULFATE TAB 325 MG (65 MG ELEMENTAL FE) SCH MG: 325 (65 FE) TAB at 07:43

## 2017-08-25 RX ADMIN — HYDROMORPHONE HYDROCHLORIDE PRN MG: 1 INJECTION, SOLUTION INTRAMUSCULAR; INTRAVENOUS; SUBCUTANEOUS at 21:35

## 2017-08-25 NOTE — HHI.NPPN
Subjective


Complaints:  Shortness of Breath


General Problems:  Anemia, Hypertension, Mebatolic Acidosis


Renal Failure:  Chronic, Acute


History of Present Illness


53 y/o AAF admitted for shortness of breath. On arrival she was febrile with 

leukocytosis, being treated for pneumonia with Zithromax and Rocephin. On 

arrival her creatinine was 2.7, called to see for elevated BUN and Creatinine.


Additional Remarks


Patient is alert, eating well, no SOB.





Review of Systems


General


Constitutional:  Fatigue





Respiratory


Lungs:  SOB, Cough





Musculoskeletal


MS:  Pain/Stiffness





Objective Data


Data





Vital Signs








  Date Time  Temp Pulse Resp B/P (MAP) Pulse Ox O2 Delivery O2 Flow Rate FiO2


 


8/25/17 17:00  85      


 


8/25/17 16:00  86      


 


8/25/17 15:00 99.5 87 16 160/88 (112) 95   


 


8/25/17 15:00  83      


 


8/25/17 14:31  95      


 


8/25/17 13:11  92      


 


8/25/17 12:38  84      


 


8/25/17 12:28 98.9 89 16 158/68 (98) 94   


 


8/25/17 11:00  87      


 


8/25/17 10:00  84      


 


8/25/17 09:04  86      


 


8/25/17 08:00  84      


 


8/25/17 07:30  89      


 


8/25/17 07:00 98.9 88 16 150/67 (94) 94   


 


8/25/17 06:00  85      


 


8/25/17 05:00  84      


 


8/25/17 04:00  87      


 


8/25/17 03:30 98.3 96 16 142/71 (94) 98   


 


8/25/17 03:00  85      


 


8/25/17 02:00  85      


 


8/25/17 01:00  91      


 


8/25/17 00:00  84      


 


8/24/17 23:20 98.5 84 16 162/74 (103) 98   


 


8/24/17 23:00  84      


 


8/24/17 22:00  86      


 


8/24/17 21:28     99   21


 


8/24/17 21:00  96      


 


8/24/17 20:00 98.4 92 16 159/82 (107) 98   


 


8/24/17 20:00  90      


 


8/24/17 19:00  94      


 


8/24/17 18:00  84      








-:  


8/22/17 1024                                                                   

             8/25/17 0555





Tubes & Lines:  [place]





Physical Exam


General


Appearance:  Well Developed, Well Nourished, No Acute Distress, Comfortable





Throat


Throat Exam:  Oral Mucosa [place] & Moist





Pulmonary


Resp Exam:  Clear Bilaterally, Breath Sounds Equal, No Distress, Labored





Cardiology


CV Exam:  Regular, Normal Sinus Rhythm, Good Perfusion





Gastrointestinal/Abdomen


GI Exam:  Soft, Non-Tender, Bowel Sounds Present





Musculoskeletal


MS Exam:  Joints Intact, Normal Gait, Normal Tone, Good Strength





Integumentary


Skin Exam:  Clear, Warm, Dry, Intact





Extremeties


Extremities Exam:  Trace Edema





Neurologic


Neuro Exam:  Alert, Awake, Oriented, Speech Clear





VTE Prophylaxis


Device:  SCDs





Assessment/Plan


Discussed Condition With:  Patient


Assessment Summary:  LETI/Acute Renal Failure, Anemia of CKD, Hypertension, CKD 

Stage IV


Electrolyte Assessment:  Hyperkalemia


Problem List:  


(1) Acute kidney injury superimposed on CKD


ICD Codes:  N17.9 - Acute kidney failure, unspecified; N18.9 - Chronic kidney 

disease, unspecified


Status:  Acute


Plan:  Suspected underlying CKD, possibly due to diabetic nephropathy, stage 4 

with multiple episodes of LETI. 


 She has heavy proteinuria, 4.7 grams - suspect DM nephropathy. Serum 

electrophoresis is in process. 





 LETI likely due to infection, sepsis syndrome, also given NSAIDs


Renal function improved, dialysis is not required at this time


she is on bumex PO once daily , continue


monitor urine output, currently non oliguric





Creatinine continue to improve.


Patient most likely has chronic kidney disease due to Hypertensive and Diabetic 

renal disease and develop LETI.


Continue Bumex and antibiotics.


Dr. García will follow over the weekend and Dr. Whiteside from Monday, if patient 

is still here.





(2) Severe sepsis without septic shock


ICD Codes:  A41.9 - Sepsis, unspecified organism; R65.20 - Severe sepsis 

without septic shock


Status:  Acute


Plan:  respiratory source, possible viral illness


Renal dose medications and antibiotics as possible.


On oxygen


she is on IV cefepime and po Levaquin 





(3) HTN (hypertension)


ICD Codes:  I10 - Hypertension


Status:  Chronic


Plan:  monitor blood pressure


continue ordered medications, avoid ACE 





(4) Diabetes


ICD Codes:  E11.9 - Diabetes mellitus


Status:  Chronic


Plan:  continue insulin therapy


glucose goal 140-180 mg/dL





avoid metformin (home medication) given hx of CKD 3-4





(5) Anemia


ICD Codes:  D64.9 - Anemia, unspecified


Status:  Chronic


Plan:  Hb improved and stable 


she was transfused this week


follow CBC 








Problem Qualifiers





(1) Anemia:  


Qualified Codes:  N18.9 - Chronic kidney disease, unspecified; D63.1 - Anemia 

in chronic kidney disease








Jesica Choudhary MD Aug 25, 2017 17:29

## 2017-08-26 VITALS
HEART RATE: 82 BPM | TEMPERATURE: 98.7 F | RESPIRATION RATE: 20 BRPM | OXYGEN SATURATION: 97 % | SYSTOLIC BLOOD PRESSURE: 145 MMHG | DIASTOLIC BLOOD PRESSURE: 80 MMHG

## 2017-08-26 VITALS
HEART RATE: 80 BPM | DIASTOLIC BLOOD PRESSURE: 83 MMHG | SYSTOLIC BLOOD PRESSURE: 172 MMHG | RESPIRATION RATE: 18 BRPM | OXYGEN SATURATION: 99 % | TEMPERATURE: 98 F

## 2017-08-26 VITALS
HEART RATE: 88 BPM | RESPIRATION RATE: 18 BRPM | TEMPERATURE: 98.9 F | DIASTOLIC BLOOD PRESSURE: 81 MMHG | OXYGEN SATURATION: 94 % | SYSTOLIC BLOOD PRESSURE: 126 MMHG

## 2017-08-26 VITALS
OXYGEN SATURATION: 100 % | RESPIRATION RATE: 20 BRPM | HEART RATE: 84 BPM | TEMPERATURE: 98.9 F | DIASTOLIC BLOOD PRESSURE: 82 MMHG | SYSTOLIC BLOOD PRESSURE: 168 MMHG

## 2017-08-26 VITALS
RESPIRATION RATE: 18 BRPM | TEMPERATURE: 98.9 F | HEART RATE: 87 BPM | SYSTOLIC BLOOD PRESSURE: 135 MMHG | DIASTOLIC BLOOD PRESSURE: 71 MMHG | OXYGEN SATURATION: 96 %

## 2017-08-26 VITALS
TEMPERATURE: 97.8 F | SYSTOLIC BLOOD PRESSURE: 153 MMHG | DIASTOLIC BLOOD PRESSURE: 76 MMHG | HEART RATE: 83 BPM | RESPIRATION RATE: 18 BRPM | OXYGEN SATURATION: 98 %

## 2017-08-26 VITALS — HEART RATE: 85 BPM

## 2017-08-26 VITALS — HEART RATE: 75 BPM

## 2017-08-26 LAB
ANION GAP SERPL CALC-SCNC: 8 MEQ/L (ref 5–15)
BUN SERPL-MCNC: 38 MG/DL (ref 7–18)
C3 SERPL-MCNC: 143 MG/DL (ref 90–180)
C4 SERPL-MCNC: 47 MG/DL
CHLORIDE SERPL-SCNC: 108 MEQ/L (ref 98–107)
GFR SERPLBLD BASED ON 1.73 SQ M-ARVRAT: 20 ML/MIN (ref 89–?)
HCO3 BLD-SCNC: 21 MEQ/L (ref 21–32)
POTASSIUM SERPL-SCNC: 3.4 MEQ/L (ref 3.5–5.1)
RHEUMATOID FACT SERPL-ACNC: 14 IU/ML (ref ?–14)
SODIUM SERPL-SCNC: 137 MEQ/L (ref 136–145)

## 2017-08-26 RX ADMIN — METOPROLOL TARTRATE SCH MG: 50 TABLET, FILM COATED ORAL at 22:08

## 2017-08-26 RX ADMIN — HEPARIN SODIUM SCH UNITS: 10000 INJECTION, SOLUTION INTRAVENOUS; SUBCUTANEOUS at 08:25

## 2017-08-26 RX ADMIN — STANDARDIZED SENNA CONCENTRATE AND DOCUSATE SODIUM SCH TAB: 8.6; 5 TABLET, FILM COATED ORAL at 22:07

## 2017-08-26 RX ADMIN — NIFEDIPINE SCH MG: 30 TABLET, FILM COATED, EXTENDED RELEASE ORAL at 08:24

## 2017-08-26 RX ADMIN — HEPARIN SODIUM SCH UNITS: 10000 INJECTION, SOLUTION INTRAVENOUS; SUBCUTANEOUS at 22:11

## 2017-08-26 RX ADMIN — INSULIN ASPART SCH: 100 INJECTION, SOLUTION INTRAVENOUS; SUBCUTANEOUS at 05:36

## 2017-08-26 RX ADMIN — Medication SCH ML: at 22:17

## 2017-08-26 RX ADMIN — FERROUS SULFATE TAB 325 MG (65 MG ELEMENTAL FE) SCH MG: 325 (65 FE) TAB at 08:25

## 2017-08-26 RX ADMIN — NIFEDIPINE SCH MG: 30 TABLET, FILM COATED, EXTENDED RELEASE ORAL at 22:07

## 2017-08-26 RX ADMIN — INSULIN ASPART SCH: 100 INJECTION, SOLUTION INTRAVENOUS; SUBCUTANEOUS at 21:00

## 2017-08-26 RX ADMIN — INSULIN ASPART SCH: 100 INJECTION, SOLUTION INTRAVENOUS; SUBCUTANEOUS at 11:02

## 2017-08-26 RX ADMIN — CEFEPIME SCH MLS/HR: 2 INJECTION, POWDER, FOR SOLUTION INTRAVENOUS at 22:18

## 2017-08-26 RX ADMIN — GABAPENTIN SCH MG: 100 CAPSULE ORAL at 08:25

## 2017-08-26 RX ADMIN — SIMETHICONE SCH MG: 125 TABLET, CHEWABLE ORAL at 05:36

## 2017-08-26 RX ADMIN — GUAIFENESIN SCH MG: 600 TABLET, EXTENDED RELEASE ORAL at 22:07

## 2017-08-26 RX ADMIN — ONDANSETRON PRN MG: 2 INJECTION, SOLUTION INTRAMUSCULAR; INTRAVENOUS at 10:59

## 2017-08-26 RX ADMIN — Medication SCH ML: at 08:26

## 2017-08-26 RX ADMIN — METOPROLOL TARTRATE SCH MG: 50 TABLET, FILM COATED ORAL at 08:25

## 2017-08-26 RX ADMIN — FERROUS SULFATE TAB 325 MG (65 MG ELEMENTAL FE) SCH MG: 325 (65 FE) TAB at 22:07

## 2017-08-26 RX ADMIN — PANTOPRAZOLE SCH MG: 40 TABLET, DELAYED RELEASE ORAL at 08:25

## 2017-08-26 RX ADMIN — PRAVASTATIN SODIUM SCH MG: 40 TABLET ORAL at 22:07

## 2017-08-26 RX ADMIN — FOLIC ACID SCH MG: 1 TABLET ORAL at 08:25

## 2017-08-26 RX ADMIN — ASPIRIN 81 MG SCH MG: 81 TABLET ORAL at 08:25

## 2017-08-26 RX ADMIN — GABAPENTIN SCH MG: 100 CAPSULE ORAL at 22:07

## 2017-08-26 RX ADMIN — HYDROMORPHONE HYDROCHLORIDE PRN MG: 1 INJECTION, SOLUTION INTRAMUSCULAR; INTRAVENOUS; SUBCUTANEOUS at 10:52

## 2017-08-26 RX ADMIN — STANDARDIZED SENNA CONCENTRATE AND DOCUSATE SODIUM SCH TAB: 8.6; 5 TABLET, FILM COATED ORAL at 08:25

## 2017-08-26 RX ADMIN — GUAIFENESIN SCH MG: 600 TABLET, EXTENDED RELEASE ORAL at 08:26

## 2017-08-26 RX ADMIN — BUMETANIDE SCH MG: 1 TABLET ORAL at 08:24

## 2017-08-26 RX ADMIN — SIMETHICONE SCH MG: 125 TABLET, CHEWABLE ORAL at 22:07

## 2017-08-26 RX ADMIN — SIMETHICONE SCH MG: 125 TABLET, CHEWABLE ORAL at 14:02

## 2017-08-26 RX ADMIN — INSULIN ASPART SCH: 100 INJECTION, SOLUTION INTRAVENOUS; SUBCUTANEOUS at 17:36

## 2017-08-26 RX ADMIN — LEVOFLOXACIN SCH MG: 750 TABLET, FILM COATED ORAL at 08:25

## 2017-08-26 RX ADMIN — HYDROMORPHONE HYDROCHLORIDE PRN MG: 1 INJECTION, SOLUTION INTRAMUSCULAR; INTRAVENOUS; SUBCUTANEOUS at 18:41

## 2017-08-26 NOTE — HHI.NPPN
Subjective


Complaints:  Shortness of Breath


General Problems:  Anemia, Hypertension, Mebatolic Acidosis


Renal Failure:  Chronic, Acute


History of Present Illness


51 y/o AAF admitted for shortness of breath. On arrival she was febrile with 

leukocytosis, being treated for pneumonia with Zithromax and Rocephin. On 

arrival her creatinine was 2.7, called to see for elevated BUN and Creatinine.


Additional Remarks


Patient is alert, eating well, no SOB.





Review of Systems


General


Constitutional:  Fatigue





Respiratory


Lungs:  SOB, Cough





Musculoskeletal


MS:  Pain/Stiffness





Objective Data


Data





Vital Signs








  Date Time  Temp Pulse Resp B/P (MAP) Pulse Ox O2 Delivery O2 Flow Rate FiO2


 


8/26/17 12:19 98.9 88 18 126/81 (96) 94   


 


8/26/17 07:31  85      


 


8/26/17 07:00 98.9 87 18 135/71 (92) 96   


 


8/26/17 05:49 97.8 83 18 153/76 (101) 98   


 


8/26/17 05:10  75      


 


8/25/17 23:02  88 20 160/94 (116) 98   


 


8/25/17 20:12 97.8 92 20 153/76 (101) 99   


 


8/25/17 17:00  85      


 


8/25/17 16:00  86      


 


8/25/17 15:00 99.5 87 16 160/88 (112) 95   


 


8/25/17 15:00  83      


 


8/25/17 14:31  95      


 


8/25/17 13:11  92      








-:  


8/22/17 1024                                                                   

             8/26/17 0516





Tubes & Lines:  [place]





Physical Exam


General


Appearance:  Well Developed, Well Nourished, No Acute Distress, Comfortable





Throat


Throat Exam:  Oral Mucosa [place] & Moist





Pulmonary


Resp Exam:  Clear Bilaterally, Breath Sounds Equal, No Distress, Labored





Cardiology


CV Exam:  Regular, Normal Sinus Rhythm, Good Perfusion





Gastrointestinal/Abdomen


GI Exam:  Soft, Non-Tender, Bowel Sounds Present





Musculoskeletal


MS Exam:  Joints Intact, Normal Gait, Normal Tone, Good Strength





Integumentary


Skin Exam:  Clear, Warm, Dry, Intact





Extremeties


Extremities Exam:  Trace Edema





Neurologic


Neuro Exam:  Alert, Awake, Oriented, Speech Clear





VTE Prophylaxis


Device:  SCDs





Assessment/Plan


Discussed Condition With:  Patient


Assessment Summary:  LETI/Acute Renal Failure, Anemia of CKD, Hypertension, CKD 

Stage IV


Electrolyte Assessment:  Hyperkalemia


Problem List:  


(1) Acute kidney injury superimposed on CKD


ICD Codes:  N17.9 - Acute kidney failure, unspecified; N18.9 - Chronic kidney 

disease, unspecified


Status:  Acute


Plan:  Suspected underlying CKD, possibly due to diabetic nephropathy, stage 4 

with multiple episodes of LETI. 


 She has heavy proteinuria, 4.7 grams - suspect DM nephropathy. Serum 

electrophoresis is in process. 





 LETI likely due to infection, sepsis syndrome, also given NSAIDs


Renal function improved, dialysis is not required at this time


she is on bumex PO once daily , continue


monitor urine output, currently non oliguric





Creatinine continue to improve.


Patient most likely has chronic kidney disease due to Hypertensive and Diabetic 

renal disease and develop LEIT.


Continue Bumex and antibiotics.


Dr. Whiteside from Monday, if patient is still here.


OK to discharge follow up as out patient





(2) Severe sepsis without septic shock


ICD Codes:  A41.9 - Sepsis, unspecified organism; R65.20 - Severe sepsis 

without septic shock


Status:  Acute


Plan:  respiratory source, possible viral illness


Renal dose medications and antibiotics as possible.


On oxygen


she is on IV cefepime and po Levaquin 





(3) HTN (hypertension)


ICD Codes:  I10 - Hypertension


Status:  Chronic


Plan:  monitor blood pressure


continue ordered medications, avoid ACE 





(4) Diabetes


ICD Codes:  E11.9 - Diabetes mellitus


Status:  Chronic


Plan:  continue insulin therapy


glucose goal 140-180 mg/dL





avoid metformin (home medication) given hx of CKD 3-4





(5) Anemia


ICD Codes:  D64.9 - Anemia, unspecified


Status:  Chronic


Plan:  Hb improved and stable 


she was transfused this week


follow CBC 








Problem Qualifiers





(1) Anemia:  


Qualified Codes:  N18.9 - Chronic kidney disease, unspecified; D63.1 - Anemia 

in chronic kidney disease








Valentin García MD Aug 26, 2017 12:41

## 2017-08-26 NOTE — HHI.PR
Subjective


Remarks


nursing tells me her son  last night





Objective


Vitals


heart reg


lung course bs


abd s/nt


ext no edema


Vital Signs








  Date Time  Temp Pulse Resp B/P (MAP) Pulse Ox O2 Delivery O2 Flow Rate FiO2


 


17 07:31  85      


 


17 07:00 98.9 87 18 135/71 (92) 96   


 


17 05:49 97.8 83 18 153/76 (101) 98   


 


17 05:10  75      


 


17 23:02  88 20 160/94 (116) 98   


 


17 20:12 97.8 92 20 153/76 (101) 99   


 


17 17:00  85      


 


17 16:00  86      


 


17 15:00 99.5 87 16 160/88 (112) 95   


 


17 15:00  83      


 


17 14:31  95      


 


17 13:11  92      


 


17 12:38  84      


 


17 12:28 98.9 89 16 158/68 (98) 94   


 


17 11:00  87      


 


17 10:00  84      


 


17 09:04  86      








Result Diagram:  


17 1024                                                                   

             17 0516





Imaging





Last Impressions








Chest X-Ray 17 Signed





Impressions: 





 Service Date/Time:  2017 22:26 - CONCLUSION:  Diffuse 





 interstitial prominence and alveolar consolidation seen bilaterally being more 





 prominent on the left. Diffuse infectious processes should be considered.     





 Severo Weir MD 











A/P


Problem List:  


(1) Severe sepsis without septic shock


ICD Codes:  A41.9 - Sepsis, unspecified organism; R65.20 - Severe sepsis 

without septic shock


Status:  Acute


Plan:  - Pt is a 53 y/o female with HTN, diabetes, chronic tobacco use, and CKD 

who presented to the ED with worsening productive cough, SOB, decreased appetite

, 


 and fevers. - In the ED she was noted to have a fever and an elevated WBC 

count of 24.6. 


bilateral pneumonia


navid/ckd felt related to acute illness/infection and atn. ?meds.......bun/cr 

improved today.


pulmonary edema better.


acute anemia. stable after transfusion . no obvious bleeding..Patient states 

she had a colonoscopy 3-4 months ago by Dr. Granados and was negative.


Likely multifactorial anemia secondary to iron deficiency/anemia chronic disease

/renal failure. no hemolysis.


dm- hypoglycemia. better after holding scheduled insulin.





oob/PT


dvt prophylaxis


pt on abx levaquin/cefepime for pna..day 11


duonebs. oxygen as needed.


renal followingl....so far cr improving and no HD requirement


...will plan for d/c Monday if continued improvement unless renal feels


 she can go sooner with f/u


diuretics per renal.


f/u positive phillip panel


ssi. monitor bg. hold levemir


avoid nephrotoxins


adjust bp meds as needed


simethicone and ppi












































(2) Acute kidney injury superimposed on CKD


ICD Codes:  N17.9 - Acute kidney failure, unspecified; N18.9 - Chronic kidney 

disease, unspecified


Status:  Acute


Plan:  


- See above. 





(3) Dehydration


ICD Codes:  E86.0 - Dehydration


Status:  Acute


Plan:  


- See above. 





(4) HTN (hypertension)


ICD Codes:  I10 - Hypertension


Status:  Chronic


Plan:  


see above





(5) Diabetes


ICD Codes:  E11.9 - Diabetes mellitus


Status:  Chronic


Plan:  see above





(6) CAD (coronary artery disease)


ICD Codes:  I25.10 - Atherosclerotic heart disease of native coronary artery 

without angina pectoris


Status:  Chronic











Vik Jasmine MD Aug 26, 2017 08:03

## 2017-08-27 VITALS
HEART RATE: 84 BPM | DIASTOLIC BLOOD PRESSURE: 73 MMHG | OXYGEN SATURATION: 99 % | TEMPERATURE: 98.8 F | SYSTOLIC BLOOD PRESSURE: 141 MMHG | RESPIRATION RATE: 18 BRPM

## 2017-08-27 VITALS
RESPIRATION RATE: 18 BRPM | DIASTOLIC BLOOD PRESSURE: 77 MMHG | OXYGEN SATURATION: 96 % | HEART RATE: 83 BPM | SYSTOLIC BLOOD PRESSURE: 159 MMHG | TEMPERATURE: 98.6 F

## 2017-08-27 VITALS
DIASTOLIC BLOOD PRESSURE: 79 MMHG | RESPIRATION RATE: 16 BRPM | SYSTOLIC BLOOD PRESSURE: 174 MMHG | HEART RATE: 80 BPM | TEMPERATURE: 98.2 F | OXYGEN SATURATION: 99 %

## 2017-08-27 VITALS
OXYGEN SATURATION: 96 % | HEART RATE: 83 BPM | TEMPERATURE: 98.7 F | DIASTOLIC BLOOD PRESSURE: 74 MMHG | SYSTOLIC BLOOD PRESSURE: 172 MMHG | RESPIRATION RATE: 18 BRPM

## 2017-08-27 VITALS
RESPIRATION RATE: 20 BRPM | HEART RATE: 75 BPM | OXYGEN SATURATION: 97 % | TEMPERATURE: 97.9 F | SYSTOLIC BLOOD PRESSURE: 168 MMHG | DIASTOLIC BLOOD PRESSURE: 74 MMHG

## 2017-08-27 VITALS
OXYGEN SATURATION: 100 % | TEMPERATURE: 98.5 F | DIASTOLIC BLOOD PRESSURE: 77 MMHG | RESPIRATION RATE: 18 BRPM | SYSTOLIC BLOOD PRESSURE: 170 MMHG | HEART RATE: 79 BPM

## 2017-08-27 LAB
ANION GAP SERPL CALC-SCNC: 8 MEQ/L (ref 5–15)
BUN SERPL-MCNC: 37 MG/DL (ref 7–18)
CHLORIDE SERPL-SCNC: 107 MEQ/L (ref 98–107)
ENA SCL70 AB SER IA-ACNC: (no result) AI
GFR SERPLBLD BASED ON 1.73 SQ M-ARVRAT: 20 ML/MIN (ref 89–?)
HCO3 BLD-SCNC: 24.1 MEQ/L (ref 21–32)
POTASSIUM SERPL-SCNC: 3.7 MEQ/L (ref 3.5–5.1)
SODIUM SERPL-SCNC: 139 MEQ/L (ref 136–145)

## 2017-08-27 RX ADMIN — Medication SCH ML: at 09:54

## 2017-08-27 RX ADMIN — STANDARDIZED SENNA CONCENTRATE AND DOCUSATE SODIUM SCH TAB: 8.6; 5 TABLET, FILM COATED ORAL at 22:40

## 2017-08-27 RX ADMIN — GUAIFENESIN SCH MG: 600 TABLET, EXTENDED RELEASE ORAL at 09:52

## 2017-08-27 RX ADMIN — ASPIRIN 81 MG SCH MG: 81 TABLET ORAL at 09:53

## 2017-08-27 RX ADMIN — FERROUS SULFATE TAB 325 MG (65 MG ELEMENTAL FE) SCH MG: 325 (65 FE) TAB at 22:40

## 2017-08-27 RX ADMIN — INSULIN ASPART SCH: 100 INJECTION, SOLUTION INTRAVENOUS; SUBCUTANEOUS at 22:50

## 2017-08-27 RX ADMIN — INSULIN ASPART SCH: 100 INJECTION, SOLUTION INTRAVENOUS; SUBCUTANEOUS at 05:25

## 2017-08-27 RX ADMIN — NIFEDIPINE SCH MG: 30 TABLET, FILM COATED, EXTENDED RELEASE ORAL at 09:52

## 2017-08-27 RX ADMIN — METOPROLOL TARTRATE SCH MG: 50 TABLET, FILM COATED ORAL at 22:39

## 2017-08-27 RX ADMIN — HEPARIN SODIUM SCH UNITS: 10000 INJECTION, SOLUTION INTRAVENOUS; SUBCUTANEOUS at 09:53

## 2017-08-27 RX ADMIN — ONDANSETRON PRN MG: 2 INJECTION, SOLUTION INTRAMUSCULAR; INTRAVENOUS at 17:24

## 2017-08-27 RX ADMIN — HYDROMORPHONE HYDROCHLORIDE PRN MG: 1 INJECTION, SOLUTION INTRAMUSCULAR; INTRAVENOUS; SUBCUTANEOUS at 09:51

## 2017-08-27 RX ADMIN — HYDROMORPHONE HYDROCHLORIDE PRN MG: 1 INJECTION, SOLUTION INTRAMUSCULAR; INTRAVENOUS; SUBCUTANEOUS at 22:41

## 2017-08-27 RX ADMIN — SIMETHICONE SCH MG: 125 TABLET, CHEWABLE ORAL at 22:40

## 2017-08-27 RX ADMIN — PANTOPRAZOLE SCH MG: 40 TABLET, DELAYED RELEASE ORAL at 09:52

## 2017-08-27 RX ADMIN — CEFEPIME SCH MLS/HR: 2 INJECTION, POWDER, FOR SOLUTION INTRAVENOUS at 23:09

## 2017-08-27 RX ADMIN — FOLIC ACID SCH MG: 1 TABLET ORAL at 09:53

## 2017-08-27 RX ADMIN — METOPROLOL TARTRATE SCH MG: 50 TABLET, FILM COATED ORAL at 09:53

## 2017-08-27 RX ADMIN — NIFEDIPINE SCH MG: 30 TABLET, FILM COATED, EXTENDED RELEASE ORAL at 22:39

## 2017-08-27 RX ADMIN — SIMETHICONE SCH MG: 125 TABLET, CHEWABLE ORAL at 05:24

## 2017-08-27 RX ADMIN — SIMETHICONE SCH MG: 125 TABLET, CHEWABLE ORAL at 14:00

## 2017-08-27 RX ADMIN — HYDROMORPHONE HYDROCHLORIDE PRN MG: 1 INJECTION, SOLUTION INTRAMUSCULAR; INTRAVENOUS; SUBCUTANEOUS at 17:19

## 2017-08-27 RX ADMIN — GABAPENTIN SCH MG: 100 CAPSULE ORAL at 09:52

## 2017-08-27 RX ADMIN — GABAPENTIN SCH MG: 100 CAPSULE ORAL at 22:39

## 2017-08-27 RX ADMIN — Medication SCH ML: at 22:41

## 2017-08-27 RX ADMIN — FERROUS SULFATE TAB 325 MG (65 MG ELEMENTAL FE) SCH MG: 325 (65 FE) TAB at 09:53

## 2017-08-27 RX ADMIN — BUMETANIDE SCH MG: 1 TABLET ORAL at 09:52

## 2017-08-27 RX ADMIN — STANDARDIZED SENNA CONCENTRATE AND DOCUSATE SODIUM SCH TAB: 8.6; 5 TABLET, FILM COATED ORAL at 09:52

## 2017-08-27 RX ADMIN — HYDROMORPHONE HYDROCHLORIDE PRN MG: 1 INJECTION, SOLUTION INTRAMUSCULAR; INTRAVENOUS; SUBCUTANEOUS at 00:51

## 2017-08-27 RX ADMIN — HEPARIN SODIUM SCH UNITS: 10000 INJECTION, SOLUTION INTRAVENOUS; SUBCUTANEOUS at 22:41

## 2017-08-27 RX ADMIN — INSULIN ASPART SCH: 100 INJECTION, SOLUTION INTRAVENOUS; SUBCUTANEOUS at 11:00

## 2017-08-27 RX ADMIN — PRAVASTATIN SODIUM SCH MG: 40 TABLET ORAL at 22:40

## 2017-08-27 RX ADMIN — GUAIFENESIN SCH MG: 600 TABLET, EXTENDED RELEASE ORAL at 22:40

## 2017-08-27 NOTE — HHI.PR
Subjective


Remarks


pt still hoping to go home today.





Objective


Vitals


heart reg


lung cta


abd s/nt


ext no edema


Vital Signs








  Date Time  Temp Pulse Resp B/P (MAP) Pulse Ox O2 Delivery O2 Flow Rate FiO2


 


8/27/17 08:00 98.7 83 18 172/74 (106) 96   


 


8/27/17 04:00 97.9 75 20 168/74 (105) 97   


 


8/27/17 00:53 98.2 80 16 174/79 (110) 99   


 


8/26/17 20:00 98.9 84 20 168/82 (110) 100   


 


8/26/17 16:00 98.7 82 20 145/80 (101) 97   


 


8/26/17 14:02 98.0 80 18 172/83 (112) 99   


 


8/26/17 12:19 98.9 88 18 126/81 (96) 94   








Result Diagram:  


8/26/17 0516





Imaging





Last Impressions








Chest X-Ray 8/16/17 2144 Signed





Impressions: 





 Service Date/Time:  Wednesday, August 16, 2017 22:26 - CONCLUSION:  Diffuse 





 interstitial prominence and alveolar consolidation seen bilaterally being more 





 prominent on the left. Diffuse infectious processes should be considered.     





 Severo Weir MD 











A/P


Problem List:  


(1) Severe sepsis without septic shock


ICD Codes:  A41.9 - Sepsis, unspecified organism; R65.20 - Severe sepsis 

without septic shock


Status:  Acute


Plan:  - Pt is a 51 y/o female with HTN, diabetes, chronic tobacco use, and CKD 

who presented to the ED with worsening productive cough, SOB, decreased appetite

, 


 and fevers. - In the ED she was noted to have a fever and an elevated WBC 

count of 24.6. 


bilateral pneumonia


navid/ckd felt related to acute illness/infection and atn. ?meds.......bun/cr 

improved today.


pulmonary edema better.


acute anemia. stable after transfusion . no obvious bleeding..Patient states 

she had a colonoscopy 3-4 months ago by Dr. Granados and was negative.


Likely multifactorial anemia secondary to iron deficiency/anemia chronic disease

/renal failure. no hemolysis.


dm- hypoglycemia. better after holding scheduled insulin.





oob/PT


dvt prophylaxis


pt on abx levaquin/cefepime for pna..day 12


duonebs. oxygen as needed.


renal followingl....so far cr improving and no HD requirement


..labs pending for today. renal ok with d/c and pt wants to go.


  the recc is to continue diuretic..will need bmp this week and


  close renal/pcp f/u on labs and in office to assure getting better


  and davion the diuretic.


diuretics per renal.


f/u positive phillip panel


ssi. monitor bg. hold levemir


avoid nephrotoxins


adjust bp meds as needed


simethicone and ppi












































(2) Acute kidney injury superimposed on CKD


ICD Codes:  N17.9 - Acute kidney failure, unspecified; N18.9 - Chronic kidney 

disease, unspecified


Status:  Acute


Plan:  


- See above. 





(3) Dehydration


ICD Codes:  E86.0 - Dehydration


Status:  Acute


Plan:  


- See above. 





(4) HTN (hypertension)


ICD Codes:  I10 - Hypertension


Status:  Chronic


Plan:  


see above





(5) Diabetes


ICD Codes:  E11.9 - Diabetes mellitus


Status:  Chronic


Plan:  see above





(6) CAD (coronary artery disease)


ICD Codes:  I25.10 - Atherosclerotic heart disease of native coronary artery 

without angina pectoris


Status:  Chronic











Vik Jamsine MD Aug 27, 2017 09:44

## 2017-08-27 NOTE — HHI.NPPN
Subjective


Complaints:  Shortness of Breath


General Problems:  Anemia, Hypertension, Mebatolic Acidosis


Renal Failure:  Chronic, Acute


History of Present Illness


51 y/o AAF admitted for shortness of breath. On arrival she was febrile with 

leukocytosis, being treated for pneumonia with Zithromax and Rocephin. On 

arrival her creatinine was 2.7, called to see for elevated BUN and Creatinine.


Additional Remarks


Patient is alert, eating well, no SOB.





Review of Systems


General


Constitutional:  Fatigue





Respiratory


Lungs:  SOB, Cough





Musculoskeletal


MS:  Pain/Stiffness





Objective Data


Data





Vital Signs








  Date Time  Temp Pulse Resp B/P (MAP) Pulse Ox O2 Delivery O2 Flow Rate FiO2


 


8/27/17 12:00 98.5 79 18 170/77 (108) 100   


 


8/27/17 08:00 98.7 83 18 172/74 (106) 96   


 


8/27/17 04:00 97.9 75 20 168/74 (105) 97   


 


8/27/17 00:53 98.2 80 16 174/79 (110) 99   


 


8/26/17 20:00 98.9 84 20 168/82 (110) 100   


 


8/26/17 16:00 98.7 82 20 145/80 (101) 97   








-:  


8/27/17 0851





Tubes & Lines:  [place]





Physical Exam


General


Appearance:  Well Developed, Well Nourished, No Acute Distress, Comfortable





Throat


Throat Exam:  Oral Mucosa [place] & Moist





Pulmonary


Resp Exam:  Clear Bilaterally, Breath Sounds Equal, No Distress, Labored





Cardiology


CV Exam:  Regular, Normal Sinus Rhythm, Good Perfusion





Gastrointestinal/Abdomen


GI Exam:  Soft, Non-Tender, Bowel Sounds Present





Musculoskeletal


MS Exam:  Joints Intact, Normal Gait, Normal Tone, Good Strength





Integumentary


Skin Exam:  Clear, Warm, Dry, Intact





Extremeties


Extremities Exam:  Trace Edema





Neurologic


Neuro Exam:  Alert, Awake, Oriented, Speech Clear





VTE Prophylaxis


Device:  SCDs





Assessment/Plan


Discussed Condition With:  Patient


Assessment Summary:  LETI/Acute Renal Failure, Anemia of CKD, Hypertension, CKD 

Stage IV


Electrolyte Assessment:  Hyperkalemia


Problem List:  


(1) Acute kidney injury superimposed on CKD


ICD Codes:  N17.9 - Acute kidney failure, unspecified; N18.9 - Chronic kidney 

disease, unspecified


Status:  Acute


Plan:  Suspected underlying CKD, possibly due to diabetic nephropathy, stage 4 

with multiple episodes of LETI. 


 She has heavy proteinuria, 4.7 grams - suspect DM nephropathy. Serum 

electrophoresis is in process. 





 LETI likely due to infection, sepsis syndrome, also given NSAIDs


Renal function improved, dialysis is not required at this time


she is on bumex PO once daily , continue


monitor urine output, currently non oliguric





Creatinine continue to improve.


Patient most likely has chronic kidney disease due to Hypertensive and Diabetic 

renal disease and develop LETI.


Continue Bumex and antibiotics.


Dr. Whiteside from Monday, if patient is still here.


cr slightly higher


BP need better control





(2) Severe sepsis without septic shock


ICD Codes:  A41.9 - Sepsis, unspecified organism; R65.20 - Severe sepsis 

without septic shock


Status:  Acute


Plan:  respiratory source, possible viral illness


Renal dose medications and antibiotics as possible.


On oxygen


she is on IV cefepime and po Levaquin 





(3) HTN (hypertension)


ICD Codes:  I10 - Hypertension


Status:  Chronic


Plan:  monitor blood pressure


continue ordered medications, avoid ACE 





(4) Diabetes


ICD Codes:  E11.9 - Diabetes mellitus


Status:  Chronic


Plan:  continue insulin therapy


glucose goal 140-180 mg/dL





avoid metformin (home medication) given hx of CKD 3-4





(5) Anemia


ICD Codes:  D64.9 - Anemia, unspecified


Status:  Chronic


Plan:  Hb improved and stable 


she was transfused this week


follow CBC 








Problem Qualifiers





(1) Anemia:  


Qualified Codes:  N18.9 - Chronic kidney disease, unspecified; D63.1 - Anemia 

in chronic kidney disease








Valentin García MD Aug 27, 2017 14:48

## 2017-08-28 VITALS
HEART RATE: 87 BPM | OXYGEN SATURATION: 99 % | SYSTOLIC BLOOD PRESSURE: 158 MMHG | DIASTOLIC BLOOD PRESSURE: 75 MMHG | RESPIRATION RATE: 18 BRPM | TEMPERATURE: 98.7 F

## 2017-08-28 VITALS
RESPIRATION RATE: 18 BRPM | TEMPERATURE: 98.3 F | OXYGEN SATURATION: 97 % | DIASTOLIC BLOOD PRESSURE: 70 MMHG | SYSTOLIC BLOOD PRESSURE: 133 MMHG | HEART RATE: 78 BPM

## 2017-08-28 VITALS
TEMPERATURE: 98.4 F | RESPIRATION RATE: 18 BRPM | OXYGEN SATURATION: 98 % | DIASTOLIC BLOOD PRESSURE: 72 MMHG | SYSTOLIC BLOOD PRESSURE: 118 MMHG | HEART RATE: 82 BPM

## 2017-08-28 VITALS
HEART RATE: 79 BPM | DIASTOLIC BLOOD PRESSURE: 76 MMHG | TEMPERATURE: 98.8 F | RESPIRATION RATE: 18 BRPM | SYSTOLIC BLOOD PRESSURE: 164 MMHG | OXYGEN SATURATION: 98 %

## 2017-08-28 VITALS
DIASTOLIC BLOOD PRESSURE: 72 MMHG | RESPIRATION RATE: 18 BRPM | HEART RATE: 80 BPM | OXYGEN SATURATION: 97 % | TEMPERATURE: 98.2 F | SYSTOLIC BLOOD PRESSURE: 139 MMHG

## 2017-08-28 LAB
ANION GAP SERPL CALC-SCNC: 6 MEQ/L (ref 5–15)
BUN SERPL-MCNC: 40 MG/DL (ref 7–18)
CHLORIDE SERPL-SCNC: 107 MEQ/L (ref 98–107)
GFR SERPLBLD BASED ON 1.73 SQ M-ARVRAT: 19 ML/MIN (ref 89–?)
HCO3 BLD-SCNC: 25.2 MEQ/L (ref 21–32)
POTASSIUM SERPL-SCNC: 4 MEQ/L (ref 3.5–5.1)
SODIUM SERPL-SCNC: 138 MEQ/L (ref 136–145)

## 2017-08-28 RX ADMIN — Medication SCH ML: at 09:00

## 2017-08-28 RX ADMIN — PANTOPRAZOLE SCH MG: 40 TABLET, DELAYED RELEASE ORAL at 11:30

## 2017-08-28 RX ADMIN — LEVOFLOXACIN SCH MG: 750 TABLET, FILM COATED ORAL at 11:30

## 2017-08-28 RX ADMIN — FOLIC ACID SCH MG: 1 TABLET ORAL at 11:31

## 2017-08-28 RX ADMIN — STANDARDIZED SENNA CONCENTRATE AND DOCUSATE SODIUM SCH TAB: 8.6; 5 TABLET, FILM COATED ORAL at 11:31

## 2017-08-28 RX ADMIN — FERROUS SULFATE TAB 325 MG (65 MG ELEMENTAL FE) SCH MG: 325 (65 FE) TAB at 11:30

## 2017-08-28 RX ADMIN — NIFEDIPINE SCH MG: 30 TABLET, FILM COATED, EXTENDED RELEASE ORAL at 11:30

## 2017-08-28 RX ADMIN — HEPARIN SODIUM SCH UNITS: 10000 INJECTION, SOLUTION INTRAVENOUS; SUBCUTANEOUS at 11:31

## 2017-08-28 RX ADMIN — GABAPENTIN SCH MG: 100 CAPSULE ORAL at 11:30

## 2017-08-28 RX ADMIN — SIMETHICONE SCH MG: 125 TABLET, CHEWABLE ORAL at 15:52

## 2017-08-28 RX ADMIN — INSULIN ASPART SCH: 100 INJECTION, SOLUTION INTRAVENOUS; SUBCUTANEOUS at 16:24

## 2017-08-28 RX ADMIN — ASPIRIN 81 MG SCH MG: 81 TABLET ORAL at 11:30

## 2017-08-28 RX ADMIN — ONDANSETRON PRN MG: 2 INJECTION, SOLUTION INTRAMUSCULAR; INTRAVENOUS at 10:10

## 2017-08-28 RX ADMIN — ONDANSETRON PRN MG: 2 INJECTION, SOLUTION INTRAMUSCULAR; INTRAVENOUS at 16:26

## 2017-08-28 RX ADMIN — SIMETHICONE SCH MG: 125 TABLET, CHEWABLE ORAL at 06:00

## 2017-08-28 RX ADMIN — GUAIFENESIN SCH MG: 600 TABLET, EXTENDED RELEASE ORAL at 11:30

## 2017-08-28 RX ADMIN — HYDROMORPHONE HYDROCHLORIDE PRN MG: 1 INJECTION, SOLUTION INTRAMUSCULAR; INTRAVENOUS; SUBCUTANEOUS at 10:11

## 2017-08-28 RX ADMIN — INSULIN ASPART SCH: 100 INJECTION, SOLUTION INTRAVENOUS; SUBCUTANEOUS at 06:49

## 2017-08-28 RX ADMIN — METOPROLOL TARTRATE SCH MG: 50 TABLET, FILM COATED ORAL at 11:30

## 2017-08-28 RX ADMIN — BUMETANIDE SCH MG: 1 TABLET ORAL at 11:29

## 2017-08-28 NOTE — HHI.DS
Discharge Summary


Admission Date


Aug 16, 2017 at 23:52


Discharge Date:  Aug 28, 2017


Admitting Diagnosis


SEPSIS, DENILSON PNA, ELEVATED TROPONIN





(1) Severe sepsis without septic shock


ICD Codes:  A41.9 - Sepsis, unspecified organism; R65.20 - Severe sepsis 

without septic shock


Status:  Acute


(2) Acute kidney injury superimposed on CKD


ICD Codes:  N17.9 - Acute kidney failure, unspecified; N18.9 - Chronic kidney 

disease, unspecified


Status:  Acute


(3) Dehydration


ICD Codes:  E86.0 - Dehydration


Status:  Acute


(4) HTN (hypertension)


ICD Codes:  I10 - Hypertension


Status:  Chronic


(5) Diabetes


ICD Codes:  E11.9 - Diabetes mellitus


Status:  Chronic


(6) CAD (coronary artery disease)


ICD Codes:  I25.10 - Atherosclerotic heart disease of native coronary artery 

without angina pectoris


Status:  Chronic


Brief History


Ms. Bonner is a 51 y/o AAF with HTN, diabetes, GERD, chronic tobacco use 

and CAD. She presented to the ED at [facility] on 8/16/17 with complaints of 

worsening SOB. She states that Tuesday (8/15/17) evening she started noticing 

some increased SOB with ambulation. She had been having a dry cough 

intermittently but on Tuesday her cough became more productive and she started 

bringing up some yellow/green colored sputum. She was having a lot of sinus 

congestion and post-nasal drip as well. She felt warm at home but did not take 

her temperature. She denies any chills. 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 

not aware of being around anyone who was acutely sick with a respiratory 

illness. She states that for the last two days she has not been eating or 

drinking much. Her SOB with minimal ambulation seemed to worsen yesterday 

evening and this prompted her to come to the ED for further evaluation. In the 

ED she was noted to have a fever and an elevated WBC count of 24.6. Blood 

cultures were drawn in the ED. Her renal function was worse than baseline as 

well with Cr 2.79, BUN 34, GFR 22. CXR in the ED revealed diffuse interstitial 

prominence and alveolar consolidation seen bilaterally being more prominent on 

the left, diffuse infectious processes should be considered. Pt was given IVF, 

IV Solu-Medrol, Duoneb treatment, Rocephin and Azithromycin in the ED. She had 

an episode of N/V in the ED last night. Pt reports that she has had decreased 

urine output in the last day as well but related this to not eating or drinking 

much of anything. She did urinate this morning which she felt was a normal 

amount.


CBC/BMP:  


8/28/17 1127





Significant Findings





Laboratory Tests








Test


  8/26/17


05:16 8/27/17


08:51 8/28/17


11:27


 


Blood Urea Nitrogen


  38 MG/DL


(7-18) 37 MG/DL


(7-18) 40 MG/DL


(7-18)


 


Creatinine


  2.99 MG/DL


(0.50-1.00) 3.05 MG/DL


(0.50-1.00) 3.14 MG/DL


(0.50-1.00)


 


Random Glucose


  147 MG/DL


() 172 MG/DL


() 187 MG/DL


()


 


Calcium Level


  8.2 MG/DL


(8.5-10.1) 7.9 MG/DL


(8.5-10.1) 


 


 


Potassium Level


  3.4 MEQ/L


(3.5-5.1) 


  


 


 


Chloride Level


  108 MEQ/L


() 


  


 


 


Estimat Glomerular Filtration


Rate 20 ML/MIN


(>89) 20 ML/MIN


(>89) 19 ML/MIN


(>89)








Transfer Summary


52-year-old  female with past medical history of type 2 

diabetes mellitus, coronary artery disease with prior myocardial infarction and 

stents, hyperlipidemia, recurrent alcohol-induced pancreatitis who presents to 

[facility] with 2 day history of shortness of breath.  She states 

dyspnea on exertion started Tuesday evening (8/15). She has had a cough which 

is now productive of yellow sputum.  She had not checked her temperature at 

home but was febrile upon admission with a MAXIMUM TEMPERATURE of 102.5.  She 

has had this some discomfort of her left far lateral  chest wall that occurs 

with cough. Her WBC on admission was 24.6 and CXR demonstrated bilateral 

interstitial and alveolar opacities.  Lactic acid was normal.  Blood cultures 

were drawn in the ED and are negative to date..  She was started on ceftriaxone 

and azithromycin.  Urine Legionella and pneumococcal antigens were performed 

and were negative.  Influenza was negative.  She may have  CK D stage III (

difficult to determine baseline creatinine due to multiple admissions with LETI) 

but presented with creatinine of 2.79, BUN 34 and GFR 22.  She was started on 

NS @ 100 ml/hr. Her creatinine has worsened since admission to 3.11. Her 

respiratory status has also worsened and she is now on NR which prompted 

transfer to ICU with intensivist consult. CT chest demonstrates bilateral 

opacities, most prominent RML/RLL and CARIN/LLL.   BNP is 2464. Troponins are 

flat at 0.37





8/18: Patient is breathing fairly comfortably on Ventimask.  Lethargic.  Urine 

output 750 mL since 6 AM, received Bumex 2 mg IV today by nephrology.  

Potassium of 5.6 treated by nephrology with IV insulin bicarbonate.  Repeat CMP 

pending at 3:00 pm.  Creatinine has worsened to 3.4. 





8/19 hemoglobin 6.9 today from 8.3.  It was 7.3 on the recheck.  She does not 

have evidence of GI bleeding.  Creatinine is 3.68.  Potassium is better today.  

Complement levels are normal.  Serologies pending.


White blood cell count downtrending.  Afebrile. On NC. Feels vaginal pruritis 

like developing yeast infection. 





8/20 [employer] downtime, note entered later.  Hgb 6 this morning after 

transfusion 1 unit PRBC yesterday.  No BMs. Transfusing 2 units PRBC. 


Complains of bilateral flank pain, abdominal fullness. 


Creatinine worsening, now 4.07. UOP 1 L last 24 hours. 


On 3 L NC. Afebrile.  She is anxious about possibly needing dialysis.





Subjective:


8/21 Pleuritic chest pain mid anterior chest since last night, now gone after 

some morphine, with some SOB. (previously pain left lateral and radiating back)

.  Initially thought it was heart burn but progressively worsening severity 

overnight. Cough with blood tinged sputum once this morning. Repeat Hgb 10 is 

higher than expected after transfusion of 2 units PRBC yesterday for Hgb 6 so 6 

may be lab error? She clinically hasn't seemed to be actively bleeding. No RP 

bleed on CT. On NC. She is anxious about possible HD start. Would be interested 

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 

discussed with nephrology.


Hospital Course


(1) Severe sepsis without septic shock


ICD Codes:  A41.9 - Sepsis, unspecified organism; R65.20 - Severe sepsis 

without septic shock


Status:  Acute


Plan:  - Pt is a 51 y/o female with HTN, diabetes, chronic tobacco use, and CKD 

who presented to the ED with worsening productive cough, SOB, decreased appetite

, 


 and fevers. - In the ED she was noted to have a fever and an elevated WBC 

count of 24.6. 


bilateral pneumonia


leti/ckd felt related to acute illness/infection and atn. ?meds.......bun/cr 

improved today.


pulmonary edema better.


acute anemia. stable after transfusion . no obvious bleeding..Patient states 

she had a colonoscopy 3-4 months ago by Dr. Granados and was negative.


Likely multifactorial anemia secondary to iron deficiency/anemia chronic disease

/renal failure. no hemolysis.


dm- hypoglycemia. better after holding scheduled insulin.





oob/PT


dvt prophylaxis


pt on abx levaquin/cefepime for pna..day 12


duonebs. oxygen as needed.


renal followingl....so far cr improving and no HD requirement


..labs pending for today. renal ok with d/c and pt wants to go.


  the recc is to continue diuretic..will need bmp this week and


  close renal/pcp f/u on labs and in office to assure getting better


  and davion the diuretic.


diuretics per renal.


f/u positive phillip panel


ssi. monitor bg. hold levemir


avoid nephrotoxins


adjust bp meds as needed


simethicone and ppi














(2) Acute kidney injury superimposed on CKD


ICD Codes:  N17.9 - Acute kidney failure, unspecified; N18.9 - Chronic kidney 

disease, unspecified


Status:  Acute


Plan:  


- See above. 





(3) Dehydration


ICD Codes:  E86.0 - Dehydration


Status:  Acute


Plan:  


- See above. 





(4) HTN (hypertension)


ICD Codes:  I10 - Hypertension


Status:  Chronic


Plan:  


see above





(5) Diabetes


ICD Codes:  E11.9 - Diabetes mellitus


Status:  Chronic


Plan:  see above





(6) CAD (coronary artery disease)


ICD Codes:  I25.10 - Atherosclerotic heart disease of native coronary artery 

without angina pectoris


Status:  Chronic


Pt Condition on Discharge:  Stable


Discharge Disposition:  Discharge Home


Discharge Instructions


DIET: Follow Instructions for:  Heart Healthy Diet, Diabetic Diet


Activities you can perform:  Regular-No Restrictions


Activities to Avoid:  Strenuous Activity


Follow up Referrals:  


Nephrology with Dr. Kelton Whiteside


PCP Follow-up - 1 Week with Dr. Jamie Ombogo





New Medications:  


Bumetanide (Bumetanide) 1 Mg Tab


2 MG PO DAILY for renal disease, #30 TAB 0 Refills





Metoprolol Tartrate (Lopressor) 50 Mg Tab


50 MG PO Q12HR for htn, #60 TAB 0 Refills





Simethicone (Gas Relief Maximum Streng) 125 Mg Chw


125 MG PO Q8HR for gas pain, #60 EA 0 Refills





 


Continued Medications:  


Aspirin (Aspirin Children's) 81 Mg Chew


81 MG CHEW DAILY, TAB 0 Refills





Enalapril (Vasotec) 20 Mg Tab


20 MG PO BID, #30 TAB 0 Refills





Ferrous Sulfate (Feosol) 200 Mg Tab


325 MG PO BID for Nutritional Supplement, #60 TAB 0 Refills





Folic Acid (Folic Acid) 400 Mcg Tab


1 MG PO DAILY for Nutritional Supplement, TAB 0 Refills





Gabapentin (Gabapentin) 100 Mg Cap


300 MG PO HS, #90 CAP 0 Refills





Insulin Glargine Inj (Lantus Inj) 1,000 Unit/10 Ml Vial


26 UNITS SQ HS for Blood Sugar Management, VIAL 0 Refills





Nifedipine ER 24 HR (Nifedipine ER 24 HR) 30 Mg Tab


30 MG PO AS DIRECTED for htn, #90 TAB


60mg po in morning


 30mg po in evening


Omeprazole (Omeprazole) 40 Mg Cap


40 MG PO DAILY, #30 CAP 0 Refills





Simvastatin (Simvastatin) 20 Mg Tab


20 MG PO HS for Cholesterol Management, #30 TAB 0 Refills





Tramadol (Tramadol) 50 Mg Tab


50 MG PO Q4H PRN for PAIN, TAB 0 Refills





 


Discontinued Medications:  


Atenolol (Atenolol) 25 Mg Tab


25 MG PO DAILY for Blood Pressure Management, #30 TAB

















Jamaal Milan DO Aug 28, 2017 15:07

## 2017-08-28 NOTE — HHI.DCPOC
Discharge Care Plan


Diagnosis:  


(1) HTN (hypertension)


(2) Acute kidney injury superimposed on CKD


(3) Diabetes


(4) CAD (coronary artery disease)


Goals to Promote Your Health


* To prevent worsening of your condition and complications


* To maintain your health at the optimal level


Directions to Meet Your Goals


*** Take your medications as prescribed


*** Follow your dietary instruction


*** Follow activity as directed








*** Keep your appointments as scheduled


*** Take your immunizations and boosters as scheduled


*** If your symptoms worsen call your PCP, if no PCP go to Urgent Care Center 

or Emergency Room***


*** Smoking is Dangerous to Your Health. Avoid second hand smoke***


***Call the 24-hour hour crisis hotline for domestic abuse at 1-672.749.2316***











Jamaal Milan DO Aug 28, 2017 15:04

## 2017-08-30 LAB — SMA IGG SER-ACNC: <20 U

## 2017-09-06 ENCOUNTER — HOSPITAL ENCOUNTER (EMERGENCY)
Dept: HOSPITAL 17 - NEPC | Age: 52
Discharge: HOME | End: 2017-09-06
Payer: COMMERCIAL

## 2017-09-06 VITALS
HEART RATE: 84 BPM | TEMPERATURE: 98.9 F | DIASTOLIC BLOOD PRESSURE: 68 MMHG | OXYGEN SATURATION: 99 % | RESPIRATION RATE: 20 BRPM | SYSTOLIC BLOOD PRESSURE: 136 MMHG

## 2017-09-06 VITALS
SYSTOLIC BLOOD PRESSURE: 162 MMHG | DIASTOLIC BLOOD PRESSURE: 77 MMHG | HEART RATE: 78 BPM | RESPIRATION RATE: 18 BRPM | OXYGEN SATURATION: 98 %

## 2017-09-06 VITALS — SYSTOLIC BLOOD PRESSURE: 164 MMHG | DIASTOLIC BLOOD PRESSURE: 78 MMHG

## 2017-09-06 VITALS — RESPIRATION RATE: 18 BRPM

## 2017-09-06 VITALS — BODY MASS INDEX: 24.34 KG/M2 | HEIGHT: 62 IN | WEIGHT: 132.28 LBS

## 2017-09-06 DIAGNOSIS — Z95.5: ICD-10-CM

## 2017-09-06 DIAGNOSIS — E11.8: ICD-10-CM

## 2017-09-06 DIAGNOSIS — R10.13: ICD-10-CM

## 2017-09-06 DIAGNOSIS — K29.00: Primary | ICD-10-CM

## 2017-09-06 DIAGNOSIS — I25.10: ICD-10-CM

## 2017-09-06 DIAGNOSIS — N18.4: ICD-10-CM

## 2017-09-06 DIAGNOSIS — I13.10: ICD-10-CM

## 2017-09-06 LAB
ALP SERPL-CCNC: 175 U/L (ref 45–117)
ALT SERPL-CCNC: 23 U/L (ref 10–53)
ANION GAP SERPL CALC-SCNC: 7 MEQ/L (ref 5–15)
APTT BLD: 26.4 SEC (ref 24.3–30.1)
AST SERPL-CCNC: 19 U/L (ref 15–37)
BACTERIA #/AREA URNS HPF: (no result) /HPF
BASOPHILS # BLD AUTO: 0.1 TH/MM3 (ref 0–0.2)
BASOPHILS NFR BLD: 0.9 % (ref 0–2)
BILIRUB SERPL-MCNC: 0.8 MG/DL (ref 0.2–1)
BUN SERPL-MCNC: 38 MG/DL (ref 7–18)
CHLORIDE SERPL-SCNC: 112 MEQ/L (ref 98–107)
COLOR UR: (no result)
COMMENT (UR): (no result)
CULTURE IF INDICATED: (no result)
EOSINOPHIL # BLD: 0.1 TH/MM3 (ref 0–0.4)
EOSINOPHIL NFR BLD: 1 % (ref 0–4)
ERYTHROCYTE [DISTWIDTH] IN BLOOD BY AUTOMATED COUNT: 15.9 % (ref 11.6–17.2)
GFR SERPLBLD BASED ON 1.73 SQ M-ARVRAT: 21 ML/MIN (ref 89–?)
GLUCOSE UR STRIP-MCNC: (no result) MG/DL
HCO3 BLD-SCNC: 23 MEQ/L (ref 21–32)
HCT VFR BLD CALC: 33 % (ref 35–46)
HEMO FLAGS: (no result)
HGB UR QL STRIP: (no result)
HYALINE CASTS #/AREA URNS LPF: 1 /LPF
INR PPP: 0.9 RATIO
KETONES UR STRIP-MCNC: (no result) MG/DL
LYMPHOCYTES # BLD AUTO: 1.3 TH/MM3 (ref 1–4.8)
LYMPHOCYTES NFR BLD AUTO: 10.8 % (ref 9–44)
MCH RBC QN AUTO: 31.4 PG (ref 27–34)
MCHC RBC AUTO-ENTMCNC: 33.1 % (ref 32–36)
MCV RBC AUTO: 95 FL (ref 80–100)
MONOCYTES NFR BLD: 5.2 % (ref 0–8)
NEUTROPHILS # BLD AUTO: 10 TH/MM3 (ref 1.8–7.7)
NEUTROPHILS NFR BLD AUTO: 82.1 % (ref 16–70)
NITRITE UR QL STRIP: (no result)
PLATELET # BLD: 484 TH/MM3 (ref 150–450)
POTASSIUM SERPL-SCNC: 4.6 MEQ/L (ref 3.5–5.1)
PROTHROMBIN TIME: 10 SEC (ref 9.8–11.6)
RBC # BLD AUTO: 3.47 MIL/MM3 (ref 4–5.3)
SODIUM SERPL-SCNC: 142 MEQ/L (ref 136–145)
SP GR UR STRIP: 1.01 (ref 1–1.03)
SQUAMOUS #/AREA URNS HPF: 1 /HPF (ref 0–5)
WBC # BLD AUTO: 12.2 TH/MM3 (ref 4–11)

## 2017-09-06 PROCEDURE — 93005 ELECTROCARDIOGRAM TRACING: CPT

## 2017-09-06 PROCEDURE — 96361 HYDRATE IV INFUSION ADD-ON: CPT

## 2017-09-06 PROCEDURE — C9113 INJ PANTOPRAZOLE SODIUM, VIA: HCPCS

## 2017-09-06 PROCEDURE — 71010: CPT

## 2017-09-06 PROCEDURE — 85025 COMPLETE CBC W/AUTO DIFF WBC: CPT

## 2017-09-06 PROCEDURE — 85730 THROMBOPLASTIN TIME PARTIAL: CPT

## 2017-09-06 PROCEDURE — 96376 TX/PRO/DX INJ SAME DRUG ADON: CPT

## 2017-09-06 PROCEDURE — 99285 EMERGENCY DEPT VISIT HI MDM: CPT

## 2017-09-06 PROCEDURE — 85610 PROTHROMBIN TIME: CPT

## 2017-09-06 PROCEDURE — 80053 COMPREHEN METABOLIC PANEL: CPT

## 2017-09-06 PROCEDURE — 83605 ASSAY OF LACTIC ACID: CPT

## 2017-09-06 PROCEDURE — 81001 URINALYSIS AUTO W/SCOPE: CPT

## 2017-09-06 PROCEDURE — 96375 TX/PRO/DX INJ NEW DRUG ADDON: CPT

## 2017-09-06 PROCEDURE — 96374 THER/PROPH/DIAG INJ IV PUSH: CPT

## 2017-09-06 PROCEDURE — 83690 ASSAY OF LIPASE: CPT

## 2017-09-06 NOTE — RADRPT
EXAM DATE/TIME:  09/06/2017 12:42 

 

[facility] COMPARISON:     

CHEST SINGLE AP, August 23, 2017, 10:03.

 

                     

INDICATIONS :     

Cough. Patient complains of abdomen pain.

                     

 

MEDICAL HISTORY :            

Cardiovascular disease. Hypertension. Pancreatitis.Diabetes   

 

SURGICAL HISTORY :     

Cholecystectomy. Tubal ligation.  

 

ENCOUNTER:     

Initial                                        

 

ACUITY:     

1 day      

 

PAIN SCORE:     

0/10

 

LOCATION:     

Bilateral chest 

 

FINDINGS:     

A single view of the chest demonstrates the lungs to be symmetrically aerated without evidence of mas
s, infiltrate or effusion.  The cardiomediastinal contours are unremarkable.  Osseous structures are 
intact.

 

CONCLUSION:     

The lungs are clear.  No infiltrates seen.  The

 

 

 

 Fei Sahni MD on September 06, 2017 at 12:58           

Board Certified Radiologist.

 This report was verified electronically.

## 2017-09-06 NOTE — PD
Data


Data


Last Documented VS





Vital Signs








  Date Time  Temp Pulse Resp B/P (MAP) Pulse Ox O2 Delivery O2 Flow Rate FiO2


 


9/6/17 15:08   18     


 


9/6/17 12:05  78  162/77 (105) 98 Room Air  


 


9/6/17 11:32 98.9       








Orders





 Orders


Complete Blood Count With Diff (9/6/17 11:59)


Comprehensive Metabolic Panel (9/6/17 11:59)


Lipase (9/6/17 11:59)


Lactic Acid (9/6/17 11:59)


Prothrombin Time / Inr (Pt) (9/6/17 11:59)


Act Partial Throm Time (Ptt) (9/6/17 11:59)


Urinalysis - C+S If Indicated (9/6/17 11:59)


Iv Access Insert/Monitor (9/6/17 11:59)


Ecg Monitoring (9/6/17 11:59)


Oximetry (9/6/17 11:59)


NPO (9/6/17 11:59)


Morphine Inj (Morphine Inj) (9/6/17 12:00)


Ondansetron Inj (Zofran Inj) (9/6/17 12:00)


Sodium Chlor 0.9% 1000 Ml Inj (Ns 1000 M (9/6/17 11:59)


Sodium Chloride 0.9% Flush (Ns Flush) (9/6/17 12:00)


Electrocardiogram (9/6/17 11:59)


Chest, Single Ap (9/6/17 12:40)


Morphine Inj (Morphine Inj) (9/6/17 13:45)


Pantoprazole Inj (Protonix Inj) (9/6/17 15:00)





Labs





Laboratory Tests








Test


  9/6/17


12:30 9/6/17


13:00


 


White Blood Count 12.2 TH/MM3  


 


Red Blood Count 3.47 MIL/MM3  


 


Hemoglobin 10.9 GM/DL  


 


Hematocrit 33.0 %  


 


Mean Corpuscular Volume 95.0 FL  


 


Mean Corpuscular Hemoglobin 31.4 PG  


 


Mean Corpuscular Hemoglobin


Concent 33.1 % 


  


 


 


Red Cell Distribution Width 15.9 %  


 


Platelet Count 484 TH/MM3  


 


Mean Platelet Volume 8.2 FL  


 


Neutrophils (%) (Auto) 82.1 %  


 


Lymphocytes (%) (Auto) 10.8 %  


 


Monocytes (%) (Auto) 5.2 %  


 


Eosinophils (%) (Auto) 1.0 %  


 


Basophils (%) (Auto) 0.9 %  


 


Neutrophils # (Auto) 10.0 TH/MM3  


 


Lymphocytes # (Auto) 1.3 TH/MM3  


 


Monocytes # (Auto) 0.6 TH/MM3  


 


Eosinophils # (Auto) 0.1 TH/MM3  


 


Basophils # (Auto) 0.1 TH/MM3  


 


CBC Comment DIFF FINAL  


 


Differential Comment   


 


Prothrombin Time 10.0 SEC  


 


Prothromb Time International


Ratio 0.9 RATIO 


  


 


 


Activated Partial


Thromboplast Time 26.4 SEC 


  


 


 


Blood Urea Nitrogen 38 MG/DL  


 


Creatinine 2.81 MG/DL  


 


Random Glucose 69 MG/DL  


 


Total Protein 8.1 GM/DL  


 


Albumin 2.5 GM/DL  


 


Calcium Level 8.3 MG/DL  


 


Alkaline Phosphatase 175 U/L  


 


Aspartate Amino Transf


(AST/SGOT) 19 U/L 


  


 


 


Alanine Aminotransferase


(ALT/SGPT) 23 U/L 


  


 


 


Total Bilirubin 0.8 MG/DL  


 


Sodium Level 142 MEQ/L  


 


Potassium Level 4.6 MEQ/L  


 


Chloride Level 112 MEQ/L  


 


Carbon Dioxide Level 23.0 MEQ/L  


 


Anion Gap 7 MEQ/L  


 


Estimat Glomerular Filtration


Rate 21 ML/MIN 


  


 


 


Lactic Acid Level 0.5 mmol/L  


 


Lipase 284 U/L  


 


Urine Color  LIGHT-YELLOW 


 


Urine Turbidity  CLEAR 


 


Urine pH  6.0 


 


Urine Specific Gravity  1.006 


 


Urine Protein  100 mg/dL 


 


Urine Glucose (UA)  NEG mg/dL 


 


Urine Ketones  NEG mg/dL 


 


Urine Occult Blood  NEG 


 


Urine Nitrite  NEG 


 


Urine Bilirubin  NEG 


 


Urine Urobilinogen


  


  LESS THAN 2.0


MG/DL


 


Urine Leukocyte Esterase  NEG 


 


Urine RBC  1 /hpf 


 


Urine WBC  1 /hpf 


 


Urine Squamous Epithelial


Cells 


  1 /hpf 


 


 


Urine Bacteria  RARE /hpf 


 


Urine Hyaline Casts  1 /lpf 


 


Microscopic Urinalysis Comment


  


  CULT NOT


INDICATED











MDM


Supervised Visit with VALENTINA:  Yes


Narrative Course


The history, exam, and medical decision-making in the associated mid-level 

provider note were completed with my assistance. I reviewed and agree with the 

findings presented.  I attest that I had a face-to-face encounter with the 

patient on the same day, and personally performed and documented my assessment 

and findings in the medical record. 





*My assessment and Findings:





52-year-old woman with multiple medical problems, chronic kidney disease, 

presents with nausea vomiting not feeling well for the past several days.  

Recently in the hospital.  Which is a diuretic.  No change in her labs.  No 

evidence of worsening renal insufficiency.  Benign abdominal exam.  Recommend 

supportive treatment and close outpatient follow-up.


Diagnosis





 Primary Impression:  


 Epigastric abdominal pain


 Additional Impressions:  


 Gastritis


 Qualified Codes:  K29.00 - Acute gastritis without bleeding


 Nausea


Referrals:  


Jamie Hill MD (PCP)











Primary Care Physician


Patient Instructions:  General Instructions, Diet for Stomach Ulcers and 

Gastritis (ED)


Departure Forms:  Tests/Procedures





***Additional Instruction:  


Patient is felt to be stable for discharge based on her labs compared to 

previous.


She'll be discharged home with omeprazole 40 mg daily #30.


Patient also given Zofran 4 mg every 6 hours when necessary nausea.  #15 for


Patient is also given Carafate 1 g 30 minutes prior to eating and at that time.

  #120.


Patient is to rest and use a bland diet and push fluids and follow-up with her 

primary care physician.  


Patient will return to emergency Department with worsening symptoms as needed.


Scripts


Ondansetron (Zofran) 4 Mg Tab


4 MG PO Q6HR Y for NAUSEA OR VOMITING, #15 TAB 0 Refills


   Prov: Edgardo Stewart MD         9/6/17 


Sucralfate (Carafate) 1 Gram Tab


1 GM PO QID for Ulcer Prevention, #120 TAB 0 Refills


   On empty stomach


   Prov: Edgardo Stewart MD         9/6/17 


Omeprazole (Omeprazole) 40 Mg Cap


40 MG PO DAILY, #30 CAP 0 Refills


   Prov: Edgardo Stewart MD         9/6/17


Disposition:  01 DISCHARGE HOME


Condition:  Stable











Edgardo Stewart MD Sep 6, 2017 15:13

## 2017-09-06 NOTE — PD
HPI


Chief Complaint:  Abdominal Pain


Time Seen by Provider:  11:45


Travel History


International Travel<30 days:  No


Contact w/Intl Traveler<30days:  No


Traveled to known affect area:  No





History of Present Illness


HPI


52-year-old Afro-American female presents emergency Department with a five-day 

history of nausea and vomiting and abdominal discomfort and pain.  Patient has 

a history of recurrent pancreatitis, stage IV kidney disease, and gastritis in 

the past.  Patient is a type insulin-dependent diabetic.  She currently takes 

metformin and Lantus at night.  Patient states she's not been taking her 

insulin for the past 3 days as she has not been eating.  Patient states she is 

still urinating but decreased amount.  She states her stool is dark but she 

takes iron supplementation.  Patient denies fever, chills, or other 

constitutional symptoms.  Patient is been trying to drink broth but cannot keep 

any solid food down since last Friday.  She was recently hospitalized for 

bilateral pneumonia with sepsis approximately 2 weeks ago.  She called her 

primary care doctor Dr. Saab, who recommended she come into the emergency 

department for evaluation.  She is allergic to shellfish.





PFSH


Past Medical History


Hx Anticoagulant Therapy:  No


Arthritis:  Yes


Blood Disorders:  No


Anxiety:  No


Depression:  No


Cancer:  No


Cardiac Catheterization:  Yes


Cardiovascular Problems:  Yes


High Cholesterol:  Yes


Chemotherapy:  No


Chest Pain:  Yes


Congestive Heart Failure:  No


Cerebrovascular Accident:  No


Coronary Artery Disease:  Yes


Diabetes:  Yes


Diminished Hearing:  No


Endocrine:  Yes


Gastrointestinal Disorders:  Yes (HX PANCREATITIS)


GERD:  Yes


Genitourinary:  Yes (BLOCKAGE IN URETER, STENTED BUT REMOVED)


Hypertension:  Yes


Immune Disorder:  No


Implanted Vascular Access Dvce:  Yes


Musculoskeletal:  No


Neurologic:  No


Psychiatric:  No


Reproductive:  Yes (PT STATES FIBROIDS)


Respiratory:  Yes (pna)


Migraines:  Yes


Pancreatitis:  Yes


Pneumonia:  Yes (CHILDHOOD)


Thyroid Disease:  No


Pregnant?:  Not Pregnant


Menopausal:  Yes


:  2


Para:  3


Miscarriage:  0


:  0


Tubal Ligation:  Yes





Past Surgical History


Abdominal Surgery:  Yes


Cardiac Surgery:  Yes


Cholecystectomy:  Yes


Coronary Stent:  Yes


Ear Surgery:  No


Endocrine Surgery:  No


Eye Surgery:  No


Genitourinary Surgery:  Yes


Gynecologic Surgery:  Yes (TUBAL LIGATION)


Hysterectomy:  No


Neurologic Surgery:  No


Oral Surgery:  Yes


Pacemaker:  No


Thoracic Surgery:  No


Other Surgery:  Yes





Social History


Alcohol Use:  No (use to abuse; quit 3 years ago. )


Tobacco Use:  Yes (0.5 ppd)


Substance Use:  Yes (thc )





Allergies-Medications


(Allergen,Severity, Reaction):  


Coded Allergies:  


     shellfish derived (Unverified  Adverse Reaction, Mild, VOMITNG, 8/15/17)


Reported Meds & Prescriptions





Reported Meds & Active Scripts


Active


Gas Relief Maximum Streng (Simethicone) 125 Mg Chw 125 Mg PO Q8HR


Bumetanide 1 Mg Tab 2 Mg PO DAILY


Lopressor (Metoprolol Tartrate) 50 Mg Tab 50 Mg PO Q12HR


Nifedipine ER 24 HR (Nifedipine) 30 Mg Tab 30 Mg PO AS DIRECTED


     60mg po in morning


     30mg po in evening


Reported


Gabapentin 100 Mg Cap 300 Mg PO HS


Tramadol (Tramadol HCl) 50 Mg Tab 50 Mg PO Q4H PRN


Simvastatin 20 Mg Tab 20 Mg PO HS


Omeprazole 40 Mg Cap 40 Mg PO DAILY


Lantus Inj (Insulin Glargine) 1,000 Unit/10 Ml Vial 26 Units SQ HS


Folic Acid 400 Mcg Tab 1 Mg PO DAILY


Feosol (Ferrous Sulfate) 200 Mg Tab 325 Mg PO BID


Vasotec (Enalapril Maleate) 20 Mg Tab 20 Mg PO BID


Aspirin Children's (Aspirin) 81 Mg Chew 81 Mg CHEW DAILY








Review of Systems


Except as stated in HPI:  all other systems reviewed are Neg


General / Constitutional:  No: Fever, Chills


Eyes:  No: Visual changes


HENT:  No: Headaches


Cardiovascular:  No: Chest Pain or Discomfort


Respiratory:  No: Cough, Shortness of Breath, Wheezing


Gastrointestinal:  Positive: Nausea, Vomiting, Abdominal Pain, Loss of Appetite

, No: Diarrhea, Hematemesis, Hematochezia, Constipation, Changes in Bowel Habits

, Indigestion, Dysphagia


Genitourinary:  Positive: Decreased Urinary Output, Flank Pain, No: Urgency, 

Frequency, Dysuria, Pelvic Pain


Musculoskeletal:  No: Pain


Skin:  No Rash


Neurologic:  No: Weakness


Psychiatric:  No: Depression


Endocrine:  No: Polydipsia


Hematologic/Lymphatic:  No: Easy Bruising





Physical Exam


Narrative


GENERAL: Patient appears weak and in mild distress.


SKIN: Warm and dry.  Normal color.  Poor turgor with mild skin tenting.


HEAD: Atraumatic. Normocephalic. 


EYES: Pupils equal and round. No scleral icterus. No injection or drainage. 


ENT: No nasal bleeding or discharge.  Mucous membranes pink and somewhat dry.  

Posterior pharynx appears normal.  Airway is patent.


NECK: Trachea midline.  Supple and nontender.


CARDIOVASCULAR: Regular rate and rhythm.  No murmurs gallops or rubs.


RESPIRATORY: No accessory muscle use. Clear to auscultation. Breath sounds 

equal bilaterally. 


GASTROINTESTINAL: Abdomen soft, moderate epigastric tenderness, nondistended.  

Normal bowel sounds throughout all quadrants.  Mild bilateral CVA tenderness 

with percussion.  Hepatic and splenic margins not palpable. 


MUSCULOSKELETAL: Extremities without clubbing, cyanosis, or edema. No obvious 

deformities. 


NEUROLOGICAL: Awake and alert. No obvious cranial nerve deficits.  Motor 

grossly within normal limits. Five out of 5 muscle strength in the arms and 

legs.  Normal speech.


PSYCHIATRIC: Appropriate mood and affect; insight and judgment normal.





Data


Data


Last Documented VS





Vital Signs








  Date Time  Temp Pulse Resp B/P (MAP) Pulse Ox O2 Delivery O2 Flow Rate FiO2


 


17 12:05  78 18 162/77 (105) 98 Room Air  


 


17 11:32 98.9       








Orders





 Orders


Complete Blood Count With Diff (17 11:59)


Comprehensive Metabolic Panel (17 11:59)


Lipase (17 11:59)


Lactic Acid (17 11:59)


Prothrombin Time / Inr (Pt) (17 11:59)


Act Partial Throm Time (Ptt) (17 11:59)


Urinalysis - C+S If Indicated (17 11:59)


Iv Access Insert/Monitor (17 11:59)


Ecg Monitoring (17 11:59)


Oximetry (17 11:59)


NPO (17 11:59)


Morphine Inj (Morphine Inj) (17 12:00)


Ondansetron Inj (Zofran Inj) (17 12:00)


Sodium Chlor 0.9% 1000 Ml Inj (Ns 1000 M (17 11:59)


Sodium Chloride 0.9% Flush (Ns Flush) (17 12:00)


Electrocardiogram (17 11:59)


Chest, Single Ap (17 12:40)


Morphine Inj (Morphine Inj) (17 13:45)


Pantoprazole Inj (Protonix Inj) (17 15:00)





Labs





Laboratory Tests








Test


  17


12:30 17


13:00


 


White Blood Count 12.2 TH/MM3  


 


Red Blood Count 3.47 MIL/MM3  


 


Hemoglobin 10.9 GM/DL  


 


Hematocrit 33.0 %  


 


Mean Corpuscular Volume 95.0 FL  


 


Mean Corpuscular Hemoglobin 31.4 PG  


 


Mean Corpuscular Hemoglobin


Concent 33.1 % 


  


 


 


Red Cell Distribution Width 15.9 %  


 


Platelet Count 484 TH/MM3  


 


Mean Platelet Volume 8.2 FL  


 


Neutrophils (%) (Auto) 82.1 %  


 


Lymphocytes (%) (Auto) 10.8 %  


 


Monocytes (%) (Auto) 5.2 %  


 


Eosinophils (%) (Auto) 1.0 %  


 


Basophils (%) (Auto) 0.9 %  


 


Neutrophils # (Auto) 10.0 TH/MM3  


 


Lymphocytes # (Auto) 1.3 TH/MM3  


 


Monocytes # (Auto) 0.6 TH/MM3  


 


Eosinophils # (Auto) 0.1 TH/MM3  


 


Basophils # (Auto) 0.1 TH/MM3  


 


CBC Comment DIFF FINAL  


 


Differential Comment   


 


Prothrombin Time 10.0 SEC  


 


Prothromb Time International


Ratio 0.9 RATIO 


  


 


 


Activated Partial


Thromboplast Time 26.4 SEC 


  


 


 


Blood Urea Nitrogen 38 MG/DL  


 


Creatinine 2.81 MG/DL  


 


Random Glucose 69 MG/DL  


 


Total Protein 8.1 GM/DL  


 


Albumin 2.5 GM/DL  


 


Calcium Level 8.3 MG/DL  


 


Alkaline Phosphatase 175 U/L  


 


Aspartate Amino Transf


(AST/SGOT) 19 U/L 


  


 


 


Alanine Aminotransferase


(ALT/SGPT) 23 U/L 


  


 


 


Total Bilirubin 0.8 MG/DL  


 


Sodium Level 142 MEQ/L  


 


Potassium Level 4.6 MEQ/L  


 


Chloride Level 112 MEQ/L  


 


Carbon Dioxide Level 23.0 MEQ/L  


 


Anion Gap 7 MEQ/L  


 


Estimat Glomerular Filtration


Rate 21 ML/MIN 


  


 


 


Lactic Acid Level 0.5 mmol/L  


 


Lipase 284 U/L  


 


Urine Color  LIGHT-YELLOW 


 


Urine Turbidity  CLEAR 


 


Urine pH  6.0 


 


Urine Specific Gravity  1.006 


 


Urine Protein  100 mg/dL 


 


Urine Glucose (UA)  NEG mg/dL 


 


Urine Ketones  NEG mg/dL 


 


Urine Occult Blood  NEG 


 


Urine Nitrite  NEG 


 


Urine Bilirubin  NEG 


 


Urine Urobilinogen


  


  LESS THAN 2.0


MG/DL


 


Urine Leukocyte Esterase  NEG 


 


Urine RBC  1 /hpf 


 


Urine WBC  1 /hpf 


 


Urine Squamous Epithelial


Cells 


  1 /hpf 


 


 


Urine Bacteria  RARE /hpf 


 


Urine Hyaline Casts  1 /lpf 


 


Microscopic Urinalysis Comment


  


  CULT NOT


INDICATED











MDM


Medical Decision Making


Medical Screen Exam Complete:  Yes


Emergency Medical Condition:  Yes


Medical Record Reviewed:  Yes


Differential Diagnosis


Recurrent pancreatitis.  Gastritis.  Nausea and vomiting.  Dehydration.  Renal 

failure.  Electrolyte disturbance.  Abdominal pain.


Narrative Course


Patient is medically stable at time of exam.


Labs ordered including CBC, CMP, lipase, lactic acid, urinalysis.


CT scan is withheld at this time pending lab work.


EKG and chest x-ray ordered.


IV access is obtained patient is given 1000 mL normal saline bolus, 4 mg Zofran 

IV, and 4 mg morphine IV.


Chest x-ray shows no acute process per radiologist.


CBC shows slight leukocytosis at 12.2.  Hemoglobin is 10.9.  Hematocrit 33.0.  

Platelet count is 484.


EKG shows normal sinus rhythm without acute changes noted.


CMP shows sodium 142, potassium 4.6, chloride of 112, BUN is 38 and creatinine 

is 2.81 which is improved from previous.


Lactic acid is 0.5.  Lipase is 284.  Alkaline phosphatase is elevated at 175, 

however AST and ALT are unremarkable.  Albumin is 2.5.


Coagulation studies normal.


Urinalysis is unremarkable except for a protein of 100.


Patient is given 40 mg pantoprazole IV.


Patient is felt to be stable for discharge based on her labs compared to 

previous.


She'll be discharged home with omeprazole 40 mg daily #30.


Patient also given Zofran 4 mg every 6 hours when necessary nausea.  #15 for


Patient is also given Carafate 1 g 30 minutes prior to eating and at that time.

  #120.


Patient is to rest and use a bland diet and push fluids and follow-up with her 

primary care physician.  


Patient will return to emergency Department with worsening symptoms as needed.





Diagnosis





 Primary Impression:  


 Epigastric abdominal pain


 Additional Impressions:  


 Gastritis


 Qualified Codes:  K29.00 - Acute gastritis without bleeding


 Nausea


Referrals:  


Primary Care Physician


Patient Instructions:  Diet for Stomach Ulcers and Gastritis (ED), General 

Instructions





***Additional Instructions:  


Patient is felt to be stable for discharge based on her labs compared to 

previous.


She'll be discharged home with omeprazole 40 mg daily #30.


Patient also given Zofran 4 mg every 6 hours when necessary nausea.  #15 for


Patient is also given Carafate 1 g 30 minutes prior to eating and at that time.

  #120.


Patient is to rest and use a bland diet and push fluids and follow-up with her 

primary care physician.  


Patient will return to emergency Department with worsening symptoms as needed.


Disposition:  01 DISCHARGE HOME


Condition:  Stable











Valentin Olmedo Sep 6, 2017 11:46

## 2017-09-07 NOTE — EKG
Date Performed: 09/06/2017       Time Performed: 13:23:56

 

PTAGE:      52 years

 

EKG:      Sinus rhythm 

 

 LEFT ATRIAL ENLARGEMENT NONSPECIFIC T-WAVE ABNORMALITY ABNORMAL ECG

 

PREVIOUS TRACING       : 08/21/2017 17.02

 

DOCTOR:   Babatunde Jerez  Interpretating Date/Time  09/07/2017 12:50:44

## 2017-10-18 ENCOUNTER — HOSPITAL ENCOUNTER (OUTPATIENT)
Dept: HOSPITAL 17 - NEPD | Age: 52
Setting detail: OBSERVATION
LOS: 3 days | Discharge: HOME | End: 2017-10-21
Attending: HOSPITALIST | Admitting: HOSPITALIST
Payer: COMMERCIAL

## 2017-10-18 VITALS
OXYGEN SATURATION: 99 % | HEART RATE: 102 BPM | SYSTOLIC BLOOD PRESSURE: 181 MMHG | RESPIRATION RATE: 14 BRPM | TEMPERATURE: 98.7 F | DIASTOLIC BLOOD PRESSURE: 88 MMHG

## 2017-10-18 VITALS
OXYGEN SATURATION: 100 % | TEMPERATURE: 98.3 F | RESPIRATION RATE: 20 BRPM | DIASTOLIC BLOOD PRESSURE: 102 MMHG | SYSTOLIC BLOOD PRESSURE: 221 MMHG | HEART RATE: 112 BPM

## 2017-10-18 VITALS — OXYGEN SATURATION: 99 % | RESPIRATION RATE: 14 BRPM

## 2017-10-18 VITALS — HEIGHT: 64 IN | WEIGHT: 123.46 LBS | BODY MASS INDEX: 21.08 KG/M2

## 2017-10-18 DIAGNOSIS — I25.2: ICD-10-CM

## 2017-10-18 DIAGNOSIS — I25.10: ICD-10-CM

## 2017-10-18 DIAGNOSIS — E11.22: ICD-10-CM

## 2017-10-18 DIAGNOSIS — Z79.899: ICD-10-CM

## 2017-10-18 DIAGNOSIS — Z95.5: ICD-10-CM

## 2017-10-18 DIAGNOSIS — Z87.19: ICD-10-CM

## 2017-10-18 DIAGNOSIS — Z79.4: ICD-10-CM

## 2017-10-18 DIAGNOSIS — A04.72: ICD-10-CM

## 2017-10-18 DIAGNOSIS — M54.9: ICD-10-CM

## 2017-10-18 DIAGNOSIS — I12.9: ICD-10-CM

## 2017-10-18 DIAGNOSIS — R94.31: ICD-10-CM

## 2017-10-18 DIAGNOSIS — F17.210: ICD-10-CM

## 2017-10-18 DIAGNOSIS — R11.2: ICD-10-CM

## 2017-10-18 DIAGNOSIS — E86.0: ICD-10-CM

## 2017-10-18 DIAGNOSIS — E87.5: ICD-10-CM

## 2017-10-18 DIAGNOSIS — R10.13: Primary | ICD-10-CM

## 2017-10-18 DIAGNOSIS — G89.29: ICD-10-CM

## 2017-10-18 DIAGNOSIS — K21.9: ICD-10-CM

## 2017-10-18 DIAGNOSIS — N18.9: ICD-10-CM

## 2017-10-18 DIAGNOSIS — M19.90: ICD-10-CM

## 2017-10-18 DIAGNOSIS — K31.84: ICD-10-CM

## 2017-10-18 DIAGNOSIS — E78.00: ICD-10-CM

## 2017-10-18 LAB
ALP SERPL-CCNC: 143 U/L (ref 45–117)
ALT SERPL-CCNC: 28 U/L (ref 10–53)
ANION GAP SERPL CALC-SCNC: 10 MEQ/L (ref 5–15)
APTT BLD: 26.5 SEC (ref 24.3–30.1)
AST SERPL-CCNC: 26 U/L (ref 15–37)
B-OH-BUTYR SERPL-SCNC: 2.78 MMOL/L (ref 0–0.39)
BASOPHILS # BLD AUTO: 0.1 TH/MM3 (ref 0–0.2)
BASOPHILS NFR BLD: 1.2 % (ref 0–2)
BILIRUB SERPL-MCNC: 0.4 MG/DL (ref 0.2–1)
BUN SERPL-MCNC: 43 MG/DL (ref 7–18)
CHLORIDE SERPL-SCNC: 107 MEQ/L (ref 98–107)
COLOR UR: (no result)
COMMENT (UR): (no result)
CULTURE IF INDICATED: (no result)
EOSINOPHIL # BLD: 0.1 TH/MM3 (ref 0–0.4)
EOSINOPHIL NFR BLD: 1 % (ref 0–4)
ERYTHROCYTE [DISTWIDTH] IN BLOOD BY AUTOMATED COUNT: 14.8 % (ref 11.6–17.2)
GFR SERPLBLD BASED ON 1.73 SQ M-ARVRAT: 21 ML/MIN (ref 89–?)
GLUCOSE UR STRIP-MCNC: (no result) MG/DL
HCO3 BLD-SCNC: 21.4 MEQ/L (ref 21–32)
HCT VFR BLD CALC: 40.5 % (ref 35–46)
HEMO FLAGS: (no result)
HGB UR QL STRIP: (no result)
HYALINE CASTS #/AREA URNS LPF: 1 /LPF
INR PPP: 0.9 RATIO
KETONES UR STRIP-MCNC: 10 MG/DL
LYMPHOCYTES # BLD AUTO: 1.4 TH/MM3 (ref 1–4.8)
LYMPHOCYTES NFR BLD AUTO: 16 % (ref 9–44)
MCH RBC QN AUTO: 32.3 PG (ref 27–34)
MCHC RBC AUTO-ENTMCNC: 34.1 % (ref 32–36)
MCV RBC AUTO: 94.5 FL (ref 80–100)
MONOCYTES NFR BLD: 4.4 % (ref 0–8)
MUCOUS THREADS #/AREA URNS LPF: (no result) /LPF
NEUTROPHILS # BLD AUTO: 6.6 TH/MM3 (ref 1.8–7.7)
NEUTROPHILS NFR BLD AUTO: 77.4 % (ref 16–70)
NITRITE UR QL STRIP: (no result)
PLATELET # BLD: 554 TH/MM3 (ref 150–450)
POTASSIUM SERPL-SCNC: 5.5 MEQ/L (ref 3.5–5.1)
PROTHROMBIN TIME: 9.4 SEC (ref 9.8–11.6)
RBC # BLD AUTO: 4.29 MIL/MM3 (ref 4–5.3)
SODIUM SERPL-SCNC: 138 MEQ/L (ref 136–145)
SP GR UR STRIP: 1.01 (ref 1–1.03)
SQUAMOUS #/AREA URNS HPF: 1 /HPF (ref 0–5)
WBC # BLD AUTO: 8.5 TH/MM3 (ref 4–11)

## 2017-10-18 PROCEDURE — 96372 THER/PROPH/DIAG INJ SC/IM: CPT

## 2017-10-18 PROCEDURE — 99285 EMERGENCY DEPT VISIT HI MDM: CPT

## 2017-10-18 PROCEDURE — 96361 HYDRATE IV INFUSION ADD-ON: CPT

## 2017-10-18 PROCEDURE — G0378 HOSPITAL OBSERVATION PER HR: HCPCS

## 2017-10-18 PROCEDURE — 83690 ASSAY OF LIPASE: CPT

## 2017-10-18 PROCEDURE — 80053 COMPREHEN METABOLIC PANEL: CPT

## 2017-10-18 PROCEDURE — 96375 TX/PRO/DX INJ NEW DRUG ADDON: CPT

## 2017-10-18 PROCEDURE — 84100 ASSAY OF PHOSPHORUS: CPT

## 2017-10-18 PROCEDURE — 96376 TX/PRO/DX INJ SAME DRUG ADON: CPT

## 2017-10-18 PROCEDURE — C9113 INJ PANTOPRAZOLE SODIUM, VIA: HCPCS

## 2017-10-18 PROCEDURE — 80048 BASIC METABOLIC PNL TOTAL CA: CPT

## 2017-10-18 PROCEDURE — 85610 PROTHROMBIN TIME: CPT

## 2017-10-18 PROCEDURE — 82948 REAGENT STRIP/BLOOD GLUCOSE: CPT

## 2017-10-18 PROCEDURE — 82010 KETONE BODYS QUAN: CPT

## 2017-10-18 PROCEDURE — 85730 THROMBOPLASTIN TIME PARTIAL: CPT

## 2017-10-18 PROCEDURE — 96374 THER/PROPH/DIAG INJ IV PUSH: CPT

## 2017-10-18 PROCEDURE — 93005 ELECTROCARDIOGRAM TRACING: CPT

## 2017-10-18 PROCEDURE — 85025 COMPLETE CBC W/AUTO DIFF WBC: CPT

## 2017-10-18 PROCEDURE — 82800 BLOOD PH: CPT

## 2017-10-18 PROCEDURE — 83735 ASSAY OF MAGNESIUM: CPT

## 2017-10-18 PROCEDURE — 81001 URINALYSIS AUTO W/SCOPE: CPT

## 2017-10-18 PROCEDURE — 74176 CT ABD & PELVIS W/O CONTRAST: CPT

## 2017-10-18 RX ADMIN — Medication PRN ML: at 21:41

## 2017-10-18 RX ADMIN — Medication PRN ML: at 23:34

## 2017-10-18 NOTE — PD
HPI


Chief Complaint:  GI Complaint


Time Seen by Provider:  20:38


Travel History


International Travel<30 days:  No


Contact w/Intl Traveler<30days:  No


Traveled to known affect area:  No





History of Present Illness


HPI


52-year-old female here for evaluation of epigastric abdominal pain, nausea, 

and vomiting.  The patient reports symptoms have been going on for last 2 

weeks.  Chart review shows that the patient has had pancreatitis in the past.  

She reports that she no longer drinks alcohol.  Pain is described as nagging, 

severe, worse with movement and palpation.  She has not noted any blood in her 

emesis.  Last bowel movement was 2 days ago.  She denies history of abdominal 

surgeries.  States that she has not had anything to eat or drink in the last 

couple of days because of nausea.





PFSH


Past Medical History


Hx Anticoagulant Therapy:  No


Arthritis:  Yes


Blood Disorders:  No


Anxiety:  No


Depression:  No


Cancer:  No


Cardiac Catheterization:  Yes


Cardiovascular Problems:  Yes


High Cholesterol:  Yes


Chemotherapy:  No


Chest Pain:  Yes


Congestive Heart Failure:  No


Cerebrovascular Accident:  No


Coronary Artery Disease:  Yes


Diabetes:  Yes


Diminished Hearing:  No


Endocrine:  Yes


Gastrointestinal Disorders:  Yes (HX PANCREATITIS)


GERD:  Yes


Genitourinary:  Yes (BLOCKAGE IN URETER, STENTED BUT REMOVED)


Hypertension:  Yes


Immune Disorder:  No


Implanted Vascular Access Dvce:  Yes


Musculoskeletal:  No


Neurologic:  No


Psychiatric:  No


Reproductive:  Yes (PT STATES FIBROIDS)


Respiratory:  Yes (pna)


Migraines:  Yes


Pancreatitis:  Yes


Pneumonia:  Yes (CHILDHOOD)


Thyroid Disease:  No


Pregnant?:  Not Pregnant


Menopausal:  Yes


:  2


Para:  3


Miscarriage:  0


:  0


Tubal Ligation:  Yes





Past Surgical History


Abdominal Surgery:  Yes


Cardiac Surgery:  Yes


Cholecystectomy:  Yes


Coronary Stent:  Yes


Ear Surgery:  No


Endocrine Surgery:  No


Eye Surgery:  No


Genitourinary Surgery:  Yes


Gynecologic Surgery:  Yes (TUBAL LIGATION)


Hysterectomy:  No


Neurologic Surgery:  No


Oral Surgery:  Yes


Pacemaker:  No


Thoracic Surgery:  No


Other Surgery:  Yes





Social History


Alcohol Use:  No (use to abuse; quit 3 years ago. )


Tobacco Use:  Yes (0.5 ppd)


Substance Use:  Yes (thc )





Allergies-Medications


(Allergen,Severity, Reaction):  


Coded Allergies:  


     shellfish derived (Unverified  Adverse Reaction, Mild, VOMITNG, 10/18/17)


Reported Meds & Prescriptions





Reported Meds & Active Scripts


Active


Zofran (Ondansetron HCl) 4 Mg Tab 4 Mg PO Q6HR PRN


Carafate (Sucralfate) 1 Gram Tab 1 Gm PO QID


     On empty stomach


Omeprazole 40 Mg Cap 40 Mg PO DAILY


Gas Relief Maximum Streng (Simethicone) 125 Mg Chw 125 Mg PO Q8HR


Bumetanide 1 Mg Tab 2 Mg PO DAILY


Lopressor (Metoprolol Tartrate) 50 Mg Tab 50 Mg PO Q12HR


Nifedipine ER 24 HR (Nifedipine) 30 Mg Tab 30 Mg PO AS DIRECTED


     60mg po in morning


     30mg po in evening


Reported


Gabapentin 100 Mg Cap 300 Mg PO HS


Tramadol (Tramadol HCl) 50 Mg Tab 50 Mg PO Q4H PRN


Simvastatin 20 Mg Tab 20 Mg PO HS


Omeprazole 40 Mg Cap 40 Mg PO DAILY


Lantus Inj (Insulin Glargine) 1,000 Unit/10 Ml Vial 26 Units SQ HS


Folic Acid 400 Mcg Tab 1 Mg PO DAILY


Feosol (Ferrous Sulfate) 200 Mg Tab 325 Mg PO BID


Vasotec (Enalapril Maleate) 20 Mg Tab 20 Mg PO BID


Aspirin Children's (Aspirin) 81 Mg Chew 81 Mg CHEW DAILY








Review of Systems


Except as stated in HPI:  all other systems reviewed are Neg





Physical Exam


Narrative


GENERAL: Well-developed, thin, no apparent distress.


SKIN: Focused skin assessment warm/dry.


HEAD: Atraumatic. Normocephalic. 


EYES: Pupils equal and round. No scleral icterus. No injection or drainage. 


ENT: Mucous membranes pink and dry.


NECK: Trachea midline. No JVD. 


CARDIOVASCULAR: Regular rate and rhythm.  


RESPIRATORY: No accessory muscle use. Clear to auscultation. Breath sounds 

equal bilaterally. 


GASTROINTESTINAL: Abdomen soft, nondistended.  Mild epigastric tenderness 

without rebound or guarding.  Rest of abdomen is soft and nontender.  No 

hernias.  Normal bowel sounds.


MUSCULOSKELETAL: No obvious deformities. No clubbing.  No cyanosis.  No edema. 


NEUROLOGICAL: Awake and alert. No obvious cranial nerve deficits.  Motor 

grossly within normal limits. Normal speech.


PSYCHIATRIC: Appropriate mood and affect; insight and judgment normal.





Data


Data


Last Documented VS





Vital Signs








  Date Time  Temp Pulse Resp B/P (MAP) Pulse Ox O2 Delivery O2 Flow Rate FiO2


 


10/18/17 22:20   16     


 


10/18/17 21:51     99 Room Air  


 


10/18/17 17:39 98.3 112      








Orders





 Orders


Complete Blood Count With Diff (10/18/17 20:41)


Comprehensive Metabolic Panel (10/18/17 20:41)


Lipase (10/18/17 20:41)


Prothrombin Time / Inr (Pt) (10/18/17 20:41)


Act Partial Throm Time (Ptt) (10/18/17 20:41)


Urinalysis - C+S If Indicated (10/18/17 20:41)


Ct Abd/Pel W/O Iv Contrast (10/18/17 20:41)


Iv Access Insert/Monitor (10/18/17 20:41)


Ecg Monitoring (10/18/17 20:41)


Oximetry (10/18/17 20:41)


Morphine Inj (Morphine Inj) (10/18/17 20:45)


Ondansetron Inj (Zofran Inj) (10/18/17 20:45)


Sodium Chlor 0.9% 1000 Ml Inj (Ns 1000 M (10/18/17 20:41)


Sodium Chloride 0.9% Flush (Ns Flush) (10/18/17 20:45)


Electrocardiogram (10/18/17 20:41)


Blood Gas Venous Ph (10/18/17 20:44)


Beta Hydroxybutyrate (Acetone) (10/18/17 21:00)


Sodium Polysty Sulfate Liq (Kayexalate L (10/18/17 23:30)


Morphine Inj (Morphine Inj) (10/18/17 23:30)


Ondansetron Inj (Zofran Inj) (10/18/17 23:30)





Labs





Laboratory Tests








Test


  10/18/17


21:00 10/18/17


22:10


 


White Blood Count 8.5 TH/MM3  


 


Red Blood Count 4.29 MIL/MM3  


 


Hemoglobin 13.8 GM/DL  


 


Hematocrit 40.5 %  


 


Mean Corpuscular Volume 94.5 FL  


 


Mean Corpuscular Hemoglobin 32.3 PG  


 


Mean Corpuscular Hemoglobin


Concent 34.1 % 


  


 


 


Red Cell Distribution Width 14.8 %  


 


Platelet Count 554 TH/MM3  


 


Mean Platelet Volume 8.1 FL  


 


Neutrophils (%) (Auto) 77.4 %  


 


Lymphocytes (%) (Auto) 16.0 %  


 


Monocytes (%) (Auto) 4.4 %  


 


Eosinophils (%) (Auto) 1.0 %  


 


Basophils (%) (Auto) 1.2 %  


 


Neutrophils # (Auto) 6.6 TH/MM3  


 


Lymphocytes # (Auto) 1.4 TH/MM3  


 


Monocytes # (Auto) 0.4 TH/MM3  


 


Eosinophils # (Auto) 0.1 TH/MM3  


 


Basophils # (Auto) 0.1 TH/MM3  


 


CBC Comment DIFF FINAL  


 


Differential Comment   


 


Prothrombin Time 9.4 SEC  


 


Prothromb Time International


Ratio 0.9 RATIO 


  


 


 


Activated Partial


Thromboplast Time 26.5 SEC 


  


 


 


Urine Color LIGHT-YELLOW  


 


Urine Turbidity CLEAR  


 


Urine pH 6.0  


 


Urine Specific Gravity 1.014  


 


Urine Protein


  GREATER THAN


600 mg/dL 


 


 


Urine Glucose (UA) NEG mg/dL  


 


Urine Ketones 10 mg/dL  


 


Urine Occult Blood SMALL  


 


Urine Nitrite NEG  


 


Urine Bilirubin NEG  


 


Urine Urobilinogen


  LESS THAN 2.0


MG/DL 


 


 


Urine Leukocyte Esterase NEG  


 


Urine RBC 2 /hpf  


 


Urine WBC 1 /hpf  


 


Urine Squamous Epithelial


Cells 1 /hpf 


  


 


 


Urine Hyaline Casts 1 /lpf  


 


Urine Mucus FEW /lpf  


 


Microscopic Urinalysis Comment


  CULT NOT


INDICATED 


 


 


Venous Blood pH 7.30  


 


Blood Urea Nitrogen  43 MG/DL 


 


Creatinine  2.81 MG/DL 


 


Random Glucose  92 MG/DL 


 


Total Protein  7.3 GM/DL 


 


Albumin  3.1 GM/DL 


 


Calcium Level  8.8 MG/DL 


 


Alkaline Phosphatase  143 U/L 


 


Aspartate Amino Transf


(AST/SGOT) 


  26 U/L 


 


 


Alanine Aminotransferase


(ALT/SGPT) 


  28 U/L 


 


 


Total Bilirubin  0.4 MG/DL 


 


Sodium Level  138 MEQ/L 


 


Potassium Level  5.5 MEQ/L 


 


Chloride Level  107 MEQ/L 


 


Carbon Dioxide Level  21.4 MEQ/L 


 


Anion Gap  10 MEQ/L 


 


Estimat Glomerular Filtration


Rate 


  21 ML/MIN 


 


 


Lipase  281 U/L 


 


B-Hydroxybutyrate  2.78 MMOL/L 











[facility]


Medical Decision Making


Medical Screen Exam Complete:  Yes


Emergency Medical Condition:  Yes


Medical Record Reviewed:  Yes


Differential Diagnosis


Pancreatitis, gastritis, hepatobiliary disease, peptic ulcer disease, 

dehydration/metabolic abnormality, DKA


Narrative Course


Initial vital signs show heart rate 112, blood pressure 221/102, pulse ox 100% 

on room air, oral temp of 98.3F.





CBC shows WBC 8.5, hemoglobin 13.8, hematocrit 40.5, platelets 554, neutrophils 

77.4%.


CMP is remarkable for potassium 5.5, BUN 43, creatinine 2.81, GFR 21 which is 

around her baseline.


Lipase is 281.


Venous pH is 7.3.


Beta hydroxybutyrate is 2.78.





UA shows greater than 600 protein, 10 ketones, small occult blood, not 

suggestive of UTI.





CT abdomen pelvis:


CONCLUSION:     Stable CT scan of the abdomen and pelvis with no acute intra-

abdominal or pelvic process. 


 


                                       Extensive atherosclerotic vascular 

calcifications probably premature atherosclerosis. 


                                       Prior cholecystectomy and stable uterine 

calcified fibroids 





Patient was made aware of all findings.  She was given a dose of morphine and 

Zofran which initially improved her pain, however a couple hours later her pain 

had returned as well as her nausea.  She will be given a dose of Kayexalate for 

her hyperkalemia.  Given intractable pain and nausea as well as hyperkalemia, 

she'll be admitted for overnight observation.  Case discussed with [facility] 

hospitalist Dr. Jasmine who will admit the patient to his service.





Diagnosis





 Primary Impression:  


 Intractable abdominal pain


 Additional Impressions:  


 Hyperkalemia


 Nausea & vomiting


 Qualified Codes:  R11.2 - Nausea with vomiting, unspecified





Admitting Information


Admitting Physician Requests:  Observation











Deion Busch MD Oct 18, 2017 20:44

## 2017-10-18 NOTE — RADRPT
EXAM DATE/TIME:  10/18/2017 21:09 

 

[facility] COMPARISON:     

CT ABDOMEN & PELVIS W/O CONTRAST, August 20, 2017, 20:36.

 

 

INDICATIONS :     

Epigastric abdominal pain with nausea and vomiting.

                  

 

ORAL CONTRAST:      

No oral contrast ingested.

                  

 

RADIATION DOSE:     

6.64 CTDIvol (mGy) 

 

 

MEDICAL HISTORY :     

Pancreatitis. Gastroesophageal reflux disease. Myocardial infarction.CAD. Hypertension. Diabetes.

 

SURGICAL HISTORY :      

Cholecystectomy. Tubal ligation.

 

ENCOUNTER:      

Initial

 

ACUITY:      

1 week

 

PAIN SCALE:      

10/10

 

LOCATION:         

Epigastric.

 

TECHNIQUE:     

Volumetric scanning of the abdomen and pelvis was performed.  Using automated exposure control and ad
justment of the mA and/or kV according to patient size, radiation dose was kept as low as reasonably 
achievable to obtain optimal diagnostic quality images.  DICOM format image data is available electro
nically for review and comparison.  

 

FINDINGS:     

 

LOWER LUNGS:     

The visualized lower lungs are clear.

 

LIVER:     

Homogeneous density without lesion.  There is no dilation of the biliary tree. Clips in place prior c
holecystectomy. SPLEEN:     Normal size without lesion.

 

PANCREAS:     

Within normal limits. 

 

KIDNEYS:     

Normal in size and shape.  There is no mass, stone, or hydronephrosis.

 

ADRENAL GLANDS:     

Within normal limits.

 

VASCULAR:     

There is no aortic aneurysm. Calcifications in the abdominal aorta and iliac arteries as well as femo
ral arteries and origin of the celiac as well as portions of the SMA.

 

BOWEL/MESENTERY:     

The stomach, small bowel, and colon demonstrate no acute abnormality.  There is no free intraperitone
al air or fluid. 

 

ABDOMINAL WALL:     

Within normal limits.

 

RETROPERITONEUM:     

There is no lymphadenopathy.

 

BLADDER:     

No wall thickening or mass.

 

REPRODUCTIVE:     

Calcified uterine fibroids.

 

 INGUINAL:     

There is no lymphadenopathy or hernia.

 

MUSCULOSKELETAL:     

Within normal limits for patient age.

 

CONCLUSION:     Stable CT scan of the abdomen and pelvis with no acute intra-abdominal or pelvic proc
ess. 

 

                                       Extensive atherosclerotic vascular calcifications probably pre
mature atherosclerosis. 

                                       Prior cholecystectomy and stable uterine calcified fibroids 

 

 

 Boo Ricardo MD on October 18, 2017 at 21:22           

Board Certified Radiologist.

 This report was verified electronically.

## 2017-10-19 VITALS
RESPIRATION RATE: 18 BRPM | SYSTOLIC BLOOD PRESSURE: 142 MMHG | OXYGEN SATURATION: 98 % | TEMPERATURE: 97.7 F | DIASTOLIC BLOOD PRESSURE: 74 MMHG | HEART RATE: 84 BPM

## 2017-10-19 VITALS
DIASTOLIC BLOOD PRESSURE: 87 MMHG | TEMPERATURE: 97.8 F | HEART RATE: 99 BPM | OXYGEN SATURATION: 100 % | RESPIRATION RATE: 20 BRPM | SYSTOLIC BLOOD PRESSURE: 177 MMHG

## 2017-10-19 VITALS
HEART RATE: 98 BPM | DIASTOLIC BLOOD PRESSURE: 100 MMHG | TEMPERATURE: 98.1 F | RESPIRATION RATE: 20 BRPM | OXYGEN SATURATION: 99 % | SYSTOLIC BLOOD PRESSURE: 214 MMHG

## 2017-10-19 VITALS
DIASTOLIC BLOOD PRESSURE: 109 MMHG | SYSTOLIC BLOOD PRESSURE: 236 MMHG | TEMPERATURE: 97.6 F | OXYGEN SATURATION: 99 % | RESPIRATION RATE: 16 BRPM | HEART RATE: 107 BPM

## 2017-10-19 VITALS
DIASTOLIC BLOOD PRESSURE: 99 MMHG | HEART RATE: 108 BPM | RESPIRATION RATE: 20 BRPM | TEMPERATURE: 98.9 F | OXYGEN SATURATION: 97 % | SYSTOLIC BLOOD PRESSURE: 193 MMHG

## 2017-10-19 VITALS
OXYGEN SATURATION: 98 % | RESPIRATION RATE: 18 BRPM | HEART RATE: 93 BPM | DIASTOLIC BLOOD PRESSURE: 74 MMHG | TEMPERATURE: 98.1 F | SYSTOLIC BLOOD PRESSURE: 160 MMHG

## 2017-10-19 VITALS
DIASTOLIC BLOOD PRESSURE: 81 MMHG | OXYGEN SATURATION: 97 % | TEMPERATURE: 98.2 F | HEART RATE: 84 BPM | SYSTOLIC BLOOD PRESSURE: 166 MMHG | RESPIRATION RATE: 18 BRPM

## 2017-10-19 VITALS — HEART RATE: 82 BPM

## 2017-10-19 VITALS — TEMPERATURE: 98.5 F | DIASTOLIC BLOOD PRESSURE: 78 MMHG | SYSTOLIC BLOOD PRESSURE: 176 MMHG

## 2017-10-19 VITALS — HEART RATE: 85 BPM

## 2017-10-19 VITALS — HEART RATE: 107 BPM

## 2017-10-19 LAB
ANION GAP SERPL CALC-SCNC: 9 MEQ/L (ref 5–15)
BUN SERPL-MCNC: 36 MG/DL (ref 7–18)
CHLORIDE SERPL-SCNC: 112 MEQ/L (ref 98–107)
GFR SERPLBLD BASED ON 1.73 SQ M-ARVRAT: 25 ML/MIN (ref 89–?)
HCO3 BLD-SCNC: 19.3 MEQ/L (ref 21–32)
MAGNESIUM SERPL-MCNC: 2.4 MG/DL (ref 1.5–2.5)
POTASSIUM SERPL-SCNC: 4.8 MEQ/L (ref 3.5–5.1)
SODIUM SERPL-SCNC: 140 MEQ/L (ref 136–145)

## 2017-10-19 RX ADMIN — PHENYTOIN SODIUM SCH MLS/HR: 50 INJECTION INTRAMUSCULAR; INTRAVENOUS at 22:20

## 2017-10-19 RX ADMIN — ONDANSETRON PRN MG: 2 INJECTION, SOLUTION INTRAMUSCULAR; INTRAVENOUS at 07:50

## 2017-10-19 RX ADMIN — PHENYTOIN SODIUM SCH MLS/HR: 50 INJECTION INTRAMUSCULAR; INTRAVENOUS at 00:08

## 2017-10-19 RX ADMIN — ASPIRIN 81 MG SCH MG: 81 TABLET ORAL at 09:09

## 2017-10-19 RX ADMIN — FERROUS SULFATE TAB 325 MG (65 MG ELEMENTAL FE) SCH MG: 325 (65 FE) TAB at 09:09

## 2017-10-19 RX ADMIN — GABAPENTIN SCH MG: 100 CAPSULE ORAL at 20:36

## 2017-10-19 RX ADMIN — HYDROMORPHONE HYDROCHLORIDE PRN MG: 1 INJECTION, SOLUTION INTRAMUSCULAR; INTRAVENOUS; SUBCUTANEOUS at 11:44

## 2017-10-19 RX ADMIN — INSULIN ASPART SCH: 100 INJECTION, SOLUTION INTRAVENOUS; SUBCUTANEOUS at 17:00

## 2017-10-19 RX ADMIN — MORPHINE SULFATE PRN MG: 2 INJECTION, SOLUTION INTRAMUSCULAR; INTRAVENOUS at 07:50

## 2017-10-19 RX ADMIN — FOLIC ACID SCH MG: 1 TABLET ORAL at 09:09

## 2017-10-19 RX ADMIN — ONDANSETRON PRN MG: 2 INJECTION, SOLUTION INTRAMUSCULAR; INTRAVENOUS at 03:54

## 2017-10-19 RX ADMIN — BUMETANIDE SCH MG: 1 TABLET ORAL at 09:10

## 2017-10-19 RX ADMIN — PHENYTOIN SODIUM SCH MLS/HR: 50 INJECTION INTRAMUSCULAR; INTRAVENOUS at 08:00

## 2017-10-19 RX ADMIN — FERROUS SULFATE TAB 325 MG (65 MG ELEMENTAL FE) SCH MG: 325 (65 FE) TAB at 20:36

## 2017-10-19 RX ADMIN — SODIUM CHLORIDE SCH MG: 900 INJECTION, SOLUTION INTRAVENOUS at 22:29

## 2017-10-19 RX ADMIN — PRAVASTATIN SODIUM SCH MG: 40 TABLET ORAL at 20:36

## 2017-10-19 RX ADMIN — SODIUM CHLORIDE SCH MG: 900 INJECTION, SOLUTION INTRAVENOUS at 11:17

## 2017-10-19 RX ADMIN — MORPHINE SULFATE PRN MG: 2 INJECTION, SOLUTION INTRAMUSCULAR; INTRAVENOUS at 03:56

## 2017-10-19 RX ADMIN — ONDANSETRON PRN MG: 2 INJECTION, SOLUTION INTRAMUSCULAR; INTRAVENOUS at 14:38

## 2017-10-19 RX ADMIN — METOPROLOL TARTRATE SCH MG: 50 TABLET, FILM COATED ORAL at 09:09

## 2017-10-19 RX ADMIN — SIMETHICONE SCH MG: 125 TABLET, CHEWABLE ORAL at 22:20

## 2017-10-19 RX ADMIN — METOPROLOL TARTRATE SCH MG: 50 TABLET, FILM COATED ORAL at 20:36

## 2017-10-19 RX ADMIN — SIMETHICONE SCH MG: 125 TABLET, CHEWABLE ORAL at 07:47

## 2017-10-19 RX ADMIN — INSULIN ASPART SCH: 100 INJECTION, SOLUTION INTRAVENOUS; SUBCUTANEOUS at 08:00

## 2017-10-19 RX ADMIN — NIFEDIPINE SCH MG: 30 TABLET, FILM COATED, EXTENDED RELEASE ORAL at 20:36

## 2017-10-19 RX ADMIN — INSULIN ASPART SCH: 100 INJECTION, SOLUTION INTRAVENOUS; SUBCUTANEOUS at 22:21

## 2017-10-19 RX ADMIN — INSULIN ASPART SCH: 100 INJECTION, SOLUTION INTRAVENOUS; SUBCUTANEOUS at 12:00

## 2017-10-19 RX ADMIN — SODIUM CHLORIDE SCH MG: 900 INJECTION, SOLUTION INTRAVENOUS at 14:38

## 2017-10-19 RX ADMIN — SIMETHICONE SCH MG: 125 TABLET, CHEWABLE ORAL at 14:00

## 2017-10-19 RX ADMIN — HYDROMORPHONE HYDROCHLORIDE PRN MG: 1 INJECTION, SOLUTION INTRAMUSCULAR; INTRAVENOUS; SUBCUTANEOUS at 20:35

## 2017-10-19 RX ADMIN — ENALAPRIL MALEATE SCH MG: 10 TABLET ORAL at 20:36

## 2017-10-19 NOTE — HHI.HP
HPI


Service


[facility]


Primary Care Physician


Jamie Hill MD


Admission Diagnosis





intractable abdominal pain, hyperkalemia, nausea and vomiting


Chief Complaint:  


Abdominal pain


Travel History


International Travel<30 Days:  No


Contact w/Intl Traveler <30 Da:  No


Traveled to Known Affected Are:  No


History of Present Illness


Mrs. Bonner is a pleasant 53 y/o AAF with gastroparesis, hx of 

pancreatitis, HTN, diabetes, GERD, chronic back pain and CAD. She [place] 

[place] [facility] [place] with complaints of abd pain and nausea/vomiting that 

began around 2 weeks ago. She describes the pain as being located in the upper 

abdomen, occurs intermittently, pt unclear if this is worse with food intake or 

not. She tried taking oral pain medications at home but this did not seem to 

help with the pain. She states that she feels that this pain is similar pain 

she has had in the past with her pancreatitis. She has had intermittent N/V as 

well with the last episode of vomiting occurring yesterday and was nonbloody 

emesis. She also has a hx of gastroparesis but is not on any prokinetic 

medications currently. During a previous hospitalization in 2016 she was 

admitted with abd pain, N/V and had an EGD which noted gastritis. She was given 

a trial of Reglan with symptomatic improvement but she did not continue the 

Reglan after the prescription from the hospital ran out. She was seen by GI as 

an outpt and in 2016 she tested positive for C. diff and was treated with 

Flagyl. She then had an outpt GES in 10/2016 which noted gastroparesis and she 

was started on EES which gave her diarrhea. In 2016 she tested positive 

again for C. diff and was treated with Vancomycin x 14 days. She had a Capsule 

endoscopy on 2016 which noted severe delay in gastric emptying, pill 

stayed in the stomach for over 4 hours, severe gastritis and retained fluid in 

the the stomach, visualized SB is normal. She has not been on any prokinetic 

meds as her symptoms have been fairly well controlled per the pt. 





Pts labs in the ED noted Lipase 281, AlkPhos is 143 with all other LFTs being 

WNL. Her Cr is 2/47/BUN 36. CT Abd/pelvis (10/18) noted stable CT scan of the 

abdomen and pelvis with no acute intra-abdominal or pelvic process,      

extensive atherosclerotic vascular calcifications probably premature 

atherosclerosis, prior cholecystectomy and stable uterine calcified fibroids.  

No new medications in the last few months since her hospitalization in 2017 

with bilateral pneumonia and required an admission to the ICU. Pt denies any 

alcohol use, last etoh was in . No fevers or chills. No diarrhea. Last BM 

was around 4-5 days ago, typically has a BM every 2-3 days. Has not been eating 

or drinking much over last few days prior to admission.





Review of Systems


Constitutional:  DENIES: Fever, Chills


Eyes:  DENIES: Vision loss


Ears, nose, mouth, throat:  DENIES: Hearing loss


Respiratory:  DENIES: Cough, Shortness of breath


Cardiovascular:  DENIES: Chest pain, Palpitations, Lower Extremity Edema


Gastrointestinal:  COMPLAINS OF: Abdominal pain, Nausea, Vomiting, DENIES: 

Black stools, Bloody stools, Diarrhea


Genitourinary:  DENIES: Urinary frequency, Urinary incontinence, Hematuria, 

Dysuria


Integumentary:  DENIES: Rash


Neurologic:  DENIES: Headache


Psychiatric:  DENIES: Confusion





Past Family Social History


Past Medical History


Hypertension, essential


Diabetes, unspecified complications


GERD, no esophagitis


CAD, hx of MI in  s/p PTCA with stent placement


Hyperlipidemia


Multiple hospitalizations for recurrent abdominal pain


History of pancreatitis ?pancreatic divisum


Gastroparesis


History of alcohol abuse


Uterine fibroids


Hx of C. diff infection


Past Surgical History


Cholecystectomy


Tubal ligation, bilateral


PTCA with stent placement approximately 8 years ago


Cataract surgery


Colonoscopy 3/27/2017 with Dr. Granados --> Sessile polyp ranging between 3-5mm 

in the ascending colon, grade II internal hemorrhoids, medium external 

hemorrhoids


Capsule endoscopy 2016 --> Severe delay in gastric emptying, pill stayed 

in the stomach for over 4 hours, severe gastritis and retained fluid in the the 

stomach, visualized SB is normal. 


EGD 2016 with Dr. Granados --> erythematous gastritis in the gastric antrum.


Colonoscopy 14 with Dr. Granados --> 2 sessile polyps of approximately 4 

mm were found in the sigmoid colon, internal and external hemorrhoids.  

Pathology did not show malignancy. 


EGD performed 3/27/14 by Dr. Hurtado --> Normal esophagus, some nodularity in 

the body and antrum of the stomach, mild gastritis.  Pathology did not show 

malignancy


EUS performed by Dr. Wade Uriostegui 14 --> Pancreatic parenchyma was 

isoechoic and homogeneous, common bile duct was normal.


Reported Medications


Zofran 4 Mg PO Q6HR PRN


Gas Relief Maximum Streng 125 Mg PO Q8HR


Bumetanide 2 Mg PO DAILY


Lopressor 50 Mg PO Q12HR


Nifedipine 30 Mg PO TID


Lantus Inj 15 Units SQ HS


Gabapentin 300 Mg PO HS


Tramadol 50 Mg PO Q4H PRN


Simvastatin 20 Mg PO HS


Folic Acid 400 Mcg PO DAILY


Feosol 325 Mg PO BID


Vasotec 20 Mg PO BID


Aspirin Children's 81 Mg CHEW DAILY


Allergies:  


Coded Allergies:  


     shellfish derived (Unverified  Adverse Reaction, Mild, VOMITNG, 10/18/17)


Family History


Father  81, prostate cancer


Mother  84, DM


1 living brother 62, DM


Brother , 53, lung CA


Sister  54, lung CA


Social History


Pt works as and LPN for [employer]


(+)Tobacco use, one half pack per day 30+ years, none for the last 2 weeks. 


Hx of alcohol use, former heavy alcohol use, the patient currently denies. Pt 

stopped etoh in 


Patient denies illicit street drugs





Physical Exam


Vital Signs





Vital Signs








  Date Time  Temp Pulse Resp B/P (MAP) Pulse Ox O2 Delivery O2 Flow Rate FiO2


 


10/19/17 08:06 98.1 98 20 214/100 (138) 99   


 


10/19/17 03:32 98.9 108 20 193/99 (130) 97   


 


10/19/17 01:08 97.6 107 16 236/109 (151) 99   


 


10/19/17 00:31 98.5 108 16 176/78 (110) 98   


 


10/19/17 00:07   14     


 


10/18/17 22:20   16     


 


10/18/17 22:00 98.7 102 14 181/88 (119) 99 Room Air  


 


10/18/17 21:51   14  99 Room Air  


 


10/18/17 21:51   14     


 


10/18/17 17:39 98.3 112 20 221/102 (141) 100 Room Air  








Physical Exam


GENERAL: This is a well-nourished, well-developed patient, in no apparent 

distress.


HEENT: Atraumatic. Normocephalic. No temporal or scalp tenderness.No scleral 

icterus. Airway patent.


NECK: Trachea midline, supple, nontender.


CARDIO: Regular. 


RESP: CTA bilaterally. No wheezes, rales, or rhonchi.  


ABD: +BS, soft, upper abdominal tenderness to minimal palpation, nondistended. 


EXT: Extremities without clubbing, cyanosis, or edema. 


NEURO: Awake and alert. Motor and sensory grossly within normal limits. Normal 

speech.


Laboratory





Laboratory Tests








Test


  10/18/17


21:00 10/18/17


22:10 10/19/17


06:30


 


White Blood Count 8.5   


 


Red Blood Count 4.29   


 


Hemoglobin 13.8   


 


Hematocrit 40.5   


 


Mean Corpuscular Volume 94.5   


 


Mean Corpuscular Hemoglobin 32.3   


 


Mean Corpuscular Hemoglobin


Concent 34.1 


  


  


 


 


Red Cell Distribution Width 14.8   


 


Platelet Count 554   


 


Mean Platelet Volume 8.1   


 


Neutrophils (%) (Auto) 77.4   


 


Lymphocytes (%) (Auto) 16.0   


 


Monocytes (%) (Auto) 4.4   


 


Eosinophils (%) (Auto) 1.0   


 


Basophils (%) (Auto) 1.2   


 


Neutrophils # (Auto) 6.6   


 


Lymphocytes # (Auto) 1.4   


 


Monocytes # (Auto) 0.4   


 


Eosinophils # (Auto) 0.1   


 


Basophils # (Auto) 0.1   


 


CBC Comment DIFF FINAL   


 


Differential Comment    


 


Prothrombin Time 9.4   


 


Prothromb Time International


Ratio 0.9 


  


  


 


 


Activated Partial


Thromboplast Time 26.5 


  


  


 


 


Urine Color LIGHT-YELLOW   


 


Urine Turbidity CLEAR   


 


Urine pH 6.0   


 


Urine Specific Gravity 1.014   


 


Urine Protein


  GREATER THAN


600 


  


 


 


Urine Glucose (UA) NEG   


 


Urine Ketones 10   


 


Urine Occult Blood SMALL   


 


Urine Nitrite NEG   


 


Urine Bilirubin NEG   


 


Urine Urobilinogen LESS THAN 2.0   


 


Urine Leukocyte Esterase NEG   


 


Urine RBC 2   


 


Urine WBC 1   


 


Urine Squamous Epithelial


Cells 1 


  


  


 


 


Urine Hyaline Casts 1   


 


Urine Mucus FEW   


 


Microscopic Urinalysis Comment


  CULT NOT


INDICATED 


  


 


 


Venous Blood pH 7.30   


 


Blood Urea Nitrogen  43  36 


 


Creatinine  2.81  2.47 


 


Random Glucose  92  86 


 


Total Protein  7.3  


 


Albumin  3.1  


 


Calcium Level  8.8  8.3 


 


Alkaline Phosphatase  143  


 


Aspartate Amino Transf


(AST/SGOT) 


  26 


  


 


 


Alanine Aminotransferase


(ALT/SGPT) 


  28 


  


 


 


Total Bilirubin  0.4  


 


Sodium Level  138  140 


 


Potassium Level  5.5  4.8 


 


Chloride Level  107  112 


 


Carbon Dioxide Level  21.4  19.3 


 


Anion Gap  10  9 


 


Estimat Glomerular Filtration


Rate 


  21 


  25 


 


 


Lipase  281  


 


B-Hydroxybutyrate  2.78  


 


Phosphorus Level   4.4 


 


Magnesium Level   2.4 








Result Diagram:  


10/18/17 2100                                                                  

              10/19/17 0630





Imaging





Last Impressions








Abdomen/Pelvis CT 10/18/17 2041 Signed





Impressions: 





 Service Date/Time:  2017 21:09 - CONCLUSION: Stable CT 





 scan of the abdomen and pelvis with no acute intra-abdominal or pelvic 

process.  





                                         Extensive atherosclerotic vascular 





 calcifications probably premature atherosclerosis.                            

   





           Prior cholecystectomy and stable uterine calcified fibroids     

Boo Ricardo MD 











Septic Shock Reassessment


Heart:  Regular rate and rhythm


Lungs:  Clear


Skin:  Warm





Caprini VTE Risk Assessment


Caprini VTE Risk Assessment:  Mod/High Risk (score >= 2)


Caprini Risk Assessment Model











 Point Value = 1          Point Value = 2  Point Value = 3  Point Value = 5


 


Age 41-60


Minor surgery


BMI > 25 kg/m2


Swollen legs


Varicose veins


Pregnancy or postpartum


History of unexplained or recurrent


   spontaneous 


Oral contraceptives or hormone


   replacement


Sepsis (< 1 month)


Serious lung disease, including


   pneumonia (< 1 month)


Abnormal pulmonary function


Acute myocardial infarction


Congestive heart failure (< 1 month)


History of inflammatory bowel disease


Medical patient at bed rest Age 61-74


Arthroscopic surgery


Major open surgery (> 45 min)


Laparoscopic surgery (> 45 min)


Malignancy


Confined to bed (> 72 hours)


Immobilizing plaster cast


Central venous access Age >= 75


History of VTE


Family history of VTE


Factor V Leiden


Prothrombin 93691M


Lupus anticoagulant


Anticardiolipin antibodies


Elevated serum homocysteine


Heparin-induced thrombocytopenia


Other congenital or acquired


   thrombophilia Stroke (< 1 month)


Elective arthroplasty


Hip, pelvis, or leg fracture


Acute spinal cord injury (< 1 month)








Prophylaxis Regimen











   Total Risk


Factor Score Risk Level Prophylaxis Regimen


 


0-1      Low Early ambulation


 


2 Moderate Order ONE of the following:


*Sequential Compression Device (SCD)


*Heparin 5000 units SQ BID


 


3-4 Higher Order ONE of the following medications:


*Heparin 5000 units SQ TID


*Enoxaparin/Lovenox 40 mg SQ daily (WT < 150 kg, CrCl > 30 mL/min)


*Enoxaparin/Lovenox 30 mg SQ daily (WT < 150 kg, CrCl > 10-29 mL/min)


*Enoxaparin/Lovenox 30 mg SQ BID (WT < 150 kg, CrCl > 30 mL/min)


AND/OR


*Sequential Compression Device (SCD)


 


5 or more Highest Order ONE of the following medications:


*Heparin 5000 units SQ TID (Preferred with Epidurals)


*Enoxaparin/Lovenox 40 mg SQ daily (WT < 150 kg, CrCl > 30 mL/min)


*Enoxaparin/Lovenox 30 mg SQ daily (WT < 150 kg, CrCl > 10-29 mL/min)


*Enoxaparin/Lovenox 30 mg SQ BID (WT < 150 kg, CrCl > 30 mL/min)


AND


*Sequential Compression Device (SCD)











Assessment and Plan


Problem List:  


(1) Intractable abdominal pain


ICD Codes:  R10.9 - Unspecified abdominal pain


Status:  Acute


Plan:  


-  Pt is a 53 y/o AAF with gastroparesis, hx of pancreatitis, HTN, diabetes, 

GERD, chronic back pain and CAD. 


- She presented [place] [facility] [place] with complaints of abd pain and 

nausea/vomiting that began around 2 weeks ago. 


 She states that she feels that this pain is similar pain she has had in the 

past with her pancreatitis. She also has a hx of gastroparesis 


 but is not on any prokinetic medications currently. 


- During a previous hospitalization in 2016 she was admitted with abd pain, N/

V and had an EGD which noted gastritis. She was given a


 trial of Reglan with symptomatic improvement but she did not continue the 

Reglan after the prescription from the hospital ran out. She had


 an outpt GES in 10/2016 which noted gastroparesis and she was started on EES 

which gave her diarrhea. She had a Capsule endoscopy 


 on 2016 which noted severe delay in gastric emptying, pill stayed in the 

stomach for over 4 hours, severe gastritis and retained fluid 


 in the the stomach, visualized SB is normal. She has not been on any 

prokinetic meds as her symptoms have been fairly well controlled per the pt. 





- Pts labs in the ED noted Lipase 281, AlkPhos is 143 with all other LFTs being 

WNL. Her Cr is 2.47/BUN 36. 


- CT Abd/pelvis (10/18) noted stable CT scan of the abdomen and pelvis with no 

acute intra-abdominal or pelvic process, extensive atherosclerotic 


 vascular calcifications probably premature atherosclerosis, prior 

cholecystectomy and stable uterine calcified fibroids. 


- We will start Reglan 5mg IV Q8H


- Full liquid diet as tolerated


- IVF


- Monitor labs


- Supportive care





- DVT prophylaxis 





(2) Nausea & vomiting


ICD Codes:  R11.2 - Nausea with vomiting, unspecified


Status:  Acute


Plan:  


- See above. 





(3) Acute kidney injury superimposed on CKD


ICD Codes:  N17.9 - Acute kidney failure, unspecified; N18.9 - Chronic kidney 

disease, unspecified


Status:  Acute


Plan:  


- Likely secondary to dehydration from poor po intake. 


- Cont. IVF


- Monitor labs





(4) HTN (hypertension)


ICD Codes:  I10 - Hypertension


Status:  Chronic


Plan:  


- Home meds resumed


- Clonidine and Vasotec PRN





(5) Diabetes


ICD Codes:  E11.9 - Diabetes mellitus


Status:  Chronic


Plan:  


- NovoLog SSI


- Levemir





(6) Hyperkalemia


ICD Codes:  E87.5 - Hyperkalemia


Status:  Acute


Plan:  


- IVF


- Monitor





(7) Hx of Clostridium difficile infection


ICD Codes:  Z86.19 - Personal history of other infectious and parasitic diseases


Status:  Resolved


Plan:  


- She was seen by GI as an outpt and in 2016 she tested positive for C. diff 

and was treated with Flagyl.


- In 2016 she tested positive again for C. diff and was treated with 

Vancomycin x 14 days


- No diarrhea currently








Problem Qualifiers





(1) Nausea & vomiting:  


Qualified Codes:  R11.2 - Nausea with vomiting, unspecified


(2) Diabetes:  








Isatu Smiley Oct 19, 2017 08:25

## 2017-10-19 NOTE — EKG
Date Performed: 10/18/2017       Time Performed: 21:31:16

 

PTAGE:      52 years

 

EKG:      SINUS TACHYCARDIA POSSIBLE LEFT ATRIAL ENLARGEMENT BORDERLINE LEFT AXIS DEVIATION NONSPECIF
IC T-WAVE ABNORMALITY ABNORMAL RHYTHM ECG

 

PREVIOUS TRACING        09/06/2017 @ 13.23.56 Compared to prior tracing no significant change

 

DOCTOR:   Gomez Cuevas  Interpretating Date/Time  10/19/2017 21:45:08

## 2017-10-20 VITALS
TEMPERATURE: 98.3 F | SYSTOLIC BLOOD PRESSURE: 169 MMHG | OXYGEN SATURATION: 98 % | HEART RATE: 87 BPM | DIASTOLIC BLOOD PRESSURE: 77 MMHG | RESPIRATION RATE: 12 BRPM

## 2017-10-20 VITALS
TEMPERATURE: 98.4 F | HEART RATE: 82 BPM | RESPIRATION RATE: 18 BRPM | SYSTOLIC BLOOD PRESSURE: 165 MMHG | DIASTOLIC BLOOD PRESSURE: 74 MMHG | OXYGEN SATURATION: 99 %

## 2017-10-20 VITALS
OXYGEN SATURATION: 98 % | RESPIRATION RATE: 18 BRPM | HEART RATE: 85 BPM | SYSTOLIC BLOOD PRESSURE: 191 MMHG | DIASTOLIC BLOOD PRESSURE: 81 MMHG | TEMPERATURE: 98.5 F

## 2017-10-20 VITALS
HEART RATE: 85 BPM | OXYGEN SATURATION: 97 % | TEMPERATURE: 98.4 F | SYSTOLIC BLOOD PRESSURE: 146 MMHG | RESPIRATION RATE: 18 BRPM | DIASTOLIC BLOOD PRESSURE: 74 MMHG

## 2017-10-20 VITALS
TEMPERATURE: 98.2 F | DIASTOLIC BLOOD PRESSURE: 86 MMHG | RESPIRATION RATE: 16 BRPM | SYSTOLIC BLOOD PRESSURE: 177 MMHG | OXYGEN SATURATION: 100 % | HEART RATE: 84 BPM

## 2017-10-20 VITALS — HEART RATE: 83 BPM

## 2017-10-20 VITALS — HEART RATE: 82 BPM

## 2017-10-20 VITALS — HEART RATE: 90 BPM

## 2017-10-20 LAB
ANION GAP SERPL CALC-SCNC: 11 MEQ/L (ref 5–15)
BASOPHILS # BLD AUTO: 0.1 TH/MM3 (ref 0–0.2)
BASOPHILS NFR BLD: 1.1 % (ref 0–2)
BUN SERPL-MCNC: 31 MG/DL (ref 7–18)
CHLORIDE SERPL-SCNC: 110 MEQ/L (ref 98–107)
EOSINOPHIL # BLD: 0.2 TH/MM3 (ref 0–0.4)
EOSINOPHIL NFR BLD: 2.8 % (ref 0–4)
ERYTHROCYTE [DISTWIDTH] IN BLOOD BY AUTOMATED COUNT: 14.4 % (ref 11.6–17.2)
GFR SERPLBLD BASED ON 1.73 SQ M-ARVRAT: 27 ML/MIN (ref 89–?)
HCO3 BLD-SCNC: 20.3 MEQ/L (ref 21–32)
HCT VFR BLD CALC: 28.1 % (ref 35–46)
HEMO FLAGS: (no result)
LYMPHOCYTES # BLD AUTO: 1.6 TH/MM3 (ref 1–4.8)
LYMPHOCYTES NFR BLD AUTO: 25.8 % (ref 9–44)
MAGNESIUM SERPL-MCNC: 2 MG/DL (ref 1.5–2.5)
MCH RBC QN AUTO: 33.9 PG (ref 27–34)
MCHC RBC AUTO-ENTMCNC: 35.6 % (ref 32–36)
MCV RBC AUTO: 95.2 FL (ref 80–100)
MONOCYTES NFR BLD: 8.7 % (ref 0–8)
NEUTROPHILS # BLD AUTO: 3.8 TH/MM3 (ref 1.8–7.7)
NEUTROPHILS NFR BLD AUTO: 61.6 % (ref 16–70)
PLATELET # BLD: 412 TH/MM3 (ref 150–450)
POTASSIUM SERPL-SCNC: 4.5 MEQ/L (ref 3.5–5.1)
RBC # BLD AUTO: 2.95 MIL/MM3 (ref 4–5.3)
SODIUM SERPL-SCNC: 141 MEQ/L (ref 136–145)
WBC # BLD AUTO: 6.2 TH/MM3 (ref 4–11)

## 2017-10-20 RX ADMIN — PANTOPRAZOLE SODIUM SCH MG: 40 INJECTION, POWDER, FOR SOLUTION INTRAVENOUS at 20:45

## 2017-10-20 RX ADMIN — PHENYTOIN SODIUM SCH MLS/HR: 50 INJECTION INTRAMUSCULAR; INTRAVENOUS at 16:54

## 2017-10-20 RX ADMIN — ONDANSETRON PRN MG: 2 INJECTION, SOLUTION INTRAMUSCULAR; INTRAVENOUS at 09:32

## 2017-10-20 RX ADMIN — HYDROMORPHONE HYDROCHLORIDE PRN MG: 1 INJECTION, SOLUTION INTRAMUSCULAR; INTRAVENOUS; SUBCUTANEOUS at 03:39

## 2017-10-20 RX ADMIN — Medication PRN ML: at 09:33

## 2017-10-20 RX ADMIN — METOPROLOL TARTRATE SCH MG: 50 TABLET, FILM COATED ORAL at 20:44

## 2017-10-20 RX ADMIN — FOLIC ACID SCH MG: 1 TABLET ORAL at 09:32

## 2017-10-20 RX ADMIN — INSULIN ASPART SCH: 100 INJECTION, SOLUTION INTRAVENOUS; SUBCUTANEOUS at 08:00

## 2017-10-20 RX ADMIN — PANTOPRAZOLE SODIUM SCH MG: 40 INJECTION, POWDER, FOR SOLUTION INTRAVENOUS at 11:53

## 2017-10-20 RX ADMIN — SIMETHICONE SCH MG: 125 TABLET, CHEWABLE ORAL at 05:53

## 2017-10-20 RX ADMIN — GABAPENTIN SCH MG: 100 CAPSULE ORAL at 20:44

## 2017-10-20 RX ADMIN — PRAVASTATIN SODIUM SCH MG: 40 TABLET ORAL at 21:12

## 2017-10-20 RX ADMIN — SIMETHICONE SCH MG: 125 TABLET, CHEWABLE ORAL at 20:45

## 2017-10-20 RX ADMIN — NIFEDIPINE SCH MG: 30 TABLET, FILM COATED, EXTENDED RELEASE ORAL at 20:45

## 2017-10-20 RX ADMIN — NIFEDIPINE SCH MG: 30 TABLET, FILM COATED, EXTENDED RELEASE ORAL at 09:32

## 2017-10-20 RX ADMIN — HYDROCODONE BITARTRATE AND ACETAMINOPHEN PRN TAB: 5; 325 TABLET ORAL at 20:46

## 2017-10-20 RX ADMIN — ENALAPRIL MALEATE SCH MG: 10 TABLET ORAL at 20:45

## 2017-10-20 RX ADMIN — INSULIN ASPART SCH: 100 INJECTION, SOLUTION INTRAVENOUS; SUBCUTANEOUS at 12:00

## 2017-10-20 RX ADMIN — FERROUS SULFATE TAB 325 MG (65 MG ELEMENTAL FE) SCH MG: 325 (65 FE) TAB at 20:45

## 2017-10-20 RX ADMIN — BUMETANIDE SCH MG: 1 TABLET ORAL at 09:32

## 2017-10-20 RX ADMIN — SODIUM CHLORIDE SCH MG: 900 INJECTION, SOLUTION INTRAVENOUS at 16:54

## 2017-10-20 RX ADMIN — HYDROCODONE BITARTRATE AND ACETAMINOPHEN PRN TAB: 5; 325 TABLET ORAL at 11:52

## 2017-10-20 RX ADMIN — SODIUM CHLORIDE SCH MG: 900 INJECTION, SOLUTION INTRAVENOUS at 05:54

## 2017-10-20 RX ADMIN — INSULIN ASPART SCH: 100 INJECTION, SOLUTION INTRAVENOUS; SUBCUTANEOUS at 18:13

## 2017-10-20 RX ADMIN — METOPROLOL TARTRATE SCH MG: 50 TABLET, FILM COATED ORAL at 09:32

## 2017-10-20 RX ADMIN — ASPIRIN 81 MG SCH MG: 81 TABLET ORAL at 09:31

## 2017-10-20 RX ADMIN — ENALAPRIL MALEATE SCH MG: 10 TABLET ORAL at 09:32

## 2017-10-20 RX ADMIN — SODIUM CHLORIDE SCH MG: 900 INJECTION, SOLUTION INTRAVENOUS at 11:53

## 2017-10-20 RX ADMIN — SIMETHICONE SCH MG: 125 TABLET, CHEWABLE ORAL at 14:29

## 2017-10-20 RX ADMIN — INSULIN ASPART SCH: 100 INJECTION, SOLUTION INTRAVENOUS; SUBCUTANEOUS at 20:44

## 2017-10-20 RX ADMIN — FERROUS SULFATE TAB 325 MG (65 MG ELEMENTAL FE) SCH MG: 325 (65 FE) TAB at 09:32

## 2017-10-21 VITALS
SYSTOLIC BLOOD PRESSURE: 140 MMHG | HEART RATE: 80 BPM | DIASTOLIC BLOOD PRESSURE: 62 MMHG | RESPIRATION RATE: 18 BRPM | TEMPERATURE: 98.2 F | OXYGEN SATURATION: 96 %

## 2017-10-21 VITALS — HEART RATE: 81 BPM

## 2017-10-21 VITALS
SYSTOLIC BLOOD PRESSURE: 176 MMHG | HEART RATE: 84 BPM | RESPIRATION RATE: 15 BRPM | DIASTOLIC BLOOD PRESSURE: 81 MMHG | TEMPERATURE: 98.4 F | OXYGEN SATURATION: 99 %

## 2017-10-21 VITALS
SYSTOLIC BLOOD PRESSURE: 146 MMHG | HEART RATE: 86 BPM | OXYGEN SATURATION: 96 % | RESPIRATION RATE: 20 BRPM | TEMPERATURE: 98.2 F

## 2017-10-21 VITALS
TEMPERATURE: 97.9 F | DIASTOLIC BLOOD PRESSURE: 88 MMHG | HEART RATE: 79 BPM | SYSTOLIC BLOOD PRESSURE: 165 MMHG | OXYGEN SATURATION: 99 %

## 2017-10-21 VITALS — HEART RATE: 86 BPM

## 2017-10-21 VITALS — HEART RATE: 80 BPM

## 2017-10-21 LAB
ANION GAP SERPL CALC-SCNC: 6 MEQ/L (ref 5–15)
BUN SERPL-MCNC: 26 MG/DL (ref 7–18)
CHLORIDE SERPL-SCNC: 109 MEQ/L (ref 98–107)
GFR SERPLBLD BASED ON 1.73 SQ M-ARVRAT: 30 ML/MIN (ref 89–?)
HCO3 BLD-SCNC: 23.6 MEQ/L (ref 21–32)
POTASSIUM SERPL-SCNC: 4 MEQ/L (ref 3.5–5.1)
SODIUM SERPL-SCNC: 139 MEQ/L (ref 136–145)

## 2017-10-21 RX ADMIN — SIMETHICONE SCH MG: 125 TABLET, CHEWABLE ORAL at 05:27

## 2017-10-21 RX ADMIN — FOLIC ACID SCH MG: 1 TABLET ORAL at 08:39

## 2017-10-21 RX ADMIN — METOPROLOL TARTRATE SCH MG: 50 TABLET, FILM COATED ORAL at 08:37

## 2017-10-21 RX ADMIN — ASPIRIN 81 MG SCH MG: 81 TABLET ORAL at 08:39

## 2017-10-21 RX ADMIN — BUMETANIDE SCH MG: 1 TABLET ORAL at 08:39

## 2017-10-21 RX ADMIN — FERROUS SULFATE TAB 325 MG (65 MG ELEMENTAL FE) SCH MG: 325 (65 FE) TAB at 08:40

## 2017-10-21 RX ADMIN — ENALAPRIL MALEATE SCH MG: 10 TABLET ORAL at 08:38

## 2017-10-21 RX ADMIN — INSULIN ASPART SCH: 100 INJECTION, SOLUTION INTRAVENOUS; SUBCUTANEOUS at 12:00

## 2017-10-21 RX ADMIN — INSULIN ASPART SCH: 100 INJECTION, SOLUTION INTRAVENOUS; SUBCUTANEOUS at 08:00

## 2017-10-21 RX ADMIN — METOCLOPRAMIDE HYDROCHLORIDE SCH MG: 10 TABLET ORAL at 08:45

## 2017-10-21 RX ADMIN — NIFEDIPINE SCH MG: 30 TABLET, FILM COATED, EXTENDED RELEASE ORAL at 08:38

## 2017-10-21 RX ADMIN — PANTOPRAZOLE SODIUM SCH MG: 40 INJECTION, POWDER, FOR SOLUTION INTRAVENOUS at 08:40

## 2017-10-21 RX ADMIN — PHENYTOIN SODIUM SCH MLS/HR: 50 INJECTION INTRAMUSCULAR; INTRAVENOUS at 04:10

## 2017-10-21 RX ADMIN — METOCLOPRAMIDE HYDROCHLORIDE SCH MG: 10 TABLET ORAL at 12:00

## 2017-10-21 NOTE — HHI.PR
Subjective


Remarks


Pt overall feeling better, less abd pain


No vomiting


Tolerating regular consistency diet in small amounts.





Objective


Vitals





Vital Signs








  Date Time  Temp Pulse Resp B/P (MAP) Pulse Ox O2 Delivery O2 Flow Rate FiO2


 


10/21/17 09:13  81      


 


10/21/17 08:32 98.2 86 20 146/ 96   


 


10/21/17 04:02  86      


 


10/21/17 03:59 98.4 84 15 176/81 (112) 99   


 


10/21/17 00:12 97.9 79  165/88 (113) 99   


 


10/21/17 00:05  80      


 


10/20/17 20:09 98.2 84 16 177/86 (116) 100   


 


10/20/17 20:00  90      


 


10/20/17 16:27  82      


 


10/20/17 16:23 98.4 82 18 165/74 (104) 99   


 


10/20/17 10:58 98.4 85 18 146/74 (98) 97   








Result Diagram:  


10/20/17 0726                                                                  

              10/20/17 0720





Other Results





 Laboratory Tests








Test


  10/20/17


07:20 10/20/17


07:26 10/21/17


09:47


 


Blood Urea Nitrogen 31 MG/DL   


 


Creatinine 2.32 MG/DL   


 


Random Glucose 113 MG/DL   


 


Calcium Level 8.5 MG/DL   


 


Magnesium Level 2.0 MG/DL   


 


Sodium Level 141 MEQ/L   


 


Potassium Level 4.5 MEQ/L   


 


Chloride Level 110 MEQ/L   


 


Carbon Dioxide Level 20.3 MEQ/L   


 


Anion Gap 11 MEQ/L   


 


Estimat Glomerular Filtration


Rate 27 ML/MIN 


  


  


 


 


White Blood Count  6.2 TH/MM3  


 


Red Blood Count  2.95 MIL/MM3  


 


Hemoglobin  10.0 GM/DL  


 


Hematocrit  28.1 %  


 


Mean Corpuscular Volume  95.2 FL  


 


Mean Corpuscular Hemoglobin  33.9 PG  


 


Mean Corpuscular Hemoglobin


Concent 


  35.6 % 


  


 


 


Red Cell Distribution Width  14.4 %  


 


Platelet Count  412 TH/MM3  


 


Mean Platelet Volume  7.4 FL  


 


Neutrophils (%) (Auto)  61.6 %  


 


Lymphocytes (%) (Auto)  25.8 %  


 


Monocytes (%) (Auto)  8.7 %  


 


Eosinophils (%) (Auto)  2.8 %  


 


Basophils (%) (Auto)  1.1 %  


 


Neutrophils # (Auto)  3.8 TH/MM3  


 


Lymphocytes # (Auto)  1.6 TH/MM3  


 


Monocytes # (Auto)  0.5 TH/MM3  


 


Eosinophils # (Auto)  0.2 TH/MM3  


 


Basophils # (Auto)  0.1 TH/MM3  


 


CBC Comment  DIFF FINAL  


 


Differential Comment    








Imaging





Last Impressions








Abdomen/Pelvis CT 10/18/17 2041 Signed





Impressions: 





 Service Date/Time:  Wednesday, October 18, 2017 21:09 - CONCLUSION: Stable CT 





 scan of the abdomen and pelvis with no acute intra-abdominal or pelvic 

process.  





                                         Extensive atherosclerotic vascular 





 calcifications probably premature atherosclerosis.                            

   





           Prior cholecystectomy and stable uterine calcified fibroids     

Boo Ricardo MD 








Objective Remarks


General: NAD, AAOx3


Chest: CTA


Cardiac: Regular


Abd: +BS, soft ND much less tenderness in the upper abdomen


Ext: No edema





A/P


Problem List:  


(1) Intractable abdominal pain


ICD Codes:  R10.9 - Unspecified abdominal pain


Status:  Acute


Plan:  


-  Pt is a 51 y/o AAF with gastroparesis, hx of pancreatitis, HTN, diabetes, 

GERD, chronic back pain and CAD. 


- [place] [facility] [place] with complaints of abd pain and 

nausea/vomiting that began around 2 weeks ago. 


 She states that she feels that this pain is similar pain she has had in the 

past with her pancreatitis. She also has a hx of gastroparesis 


 but is not on any prokinetic medications currently. 


- During a previous hospitalization in 5/2016 she was admitted with abd pain, N/

V and had an EGD which noted gastritis. She was given a


 trial of Reglan with symptomatic improvement but she did not continue the 

Reglan after the prescription from the hospital ran out. She had


 an outpt GES in 10/2016 which noted gastroparesis and she was started on EES 

which gave her diarrhea. She had a Capsule endoscopy 


 on 12/14/2016 which noted severe delay in gastric emptying, pill stayed in the 

stomach for over 4 hours, severe gastritis and retained fluid 


 in the the stomach, visualized SB is normal. She has not been on any 

prokinetic meds as her symptoms have been fairly well controlled per the pt. 





- Pts labs in the ED noted Lipase 281, AlkPhos is 143 with all other LFTs being 

WNL. Her Cr is 2.47/BUN 36. 


- CT Abd/pelvis (10/18) noted stable CT scan of the abdomen and pelvis with no 

acute intra-abdominal or pelvic process, extensive atherosclerotic 


 vascular calcifications probably premature atherosclerosis, prior 

cholecystectomy and stable uterine calcified fibroids. 


- Pt has had symptomatic improvement with the Reglan, her symptoms are likely 

secondary to gastroparesis. 


- Pt tolerated Reglan 5mg IV Q8H


- She is tolerating regular consistency diet


- Stop IVF


- Minimize narcotics, Norco PRN


- Monitor labs


- Switch to oral Reglan 10mg TIDAC today and if tolerating will discharge to 

home this afternoon. 


- Pt will need outpt followup with GI


- Supportive care





- DVT prophylaxis 





(2) Nausea & vomiting


ICD Codes:  R11.2 - Nausea with vomiting, unspecified


Status:  Acute


Plan:  


- See above. 





(3) Acute kidney injury superimposed on CKD


ICD Codes:  N17.9 - Acute kidney failure, unspecified; N18.9 - Chronic kidney 

disease, unspecified


Status:  Acute


Plan:  


- Likely secondary to dehydration from poor po intake. 


- Cont. IVF


- Monitor labs





(4) HTN (hypertension)


ICD Codes:  I10 - Hypertension


Status:  Chronic


Plan:  


- Home meds resumed


- Clonidine and Vasotec PRN





(5) Diabetes


ICD Codes:  E11.9 - Diabetes mellitus


Status:  Chronic


Plan:  


- NovoLog SSI


- Levemir





(6) Hyperkalemia


ICD Codes:  E87.5 - Hyperkalemia


Status:  Acute


Plan:  


- IVF


- Monitor





(7) Hx of Clostridium difficile infection


ICD Codes:  Z86.19 - Personal history of other infectious and parasitic diseases


Status:  Resolved


Plan:  


- She was seen by GI as an outpt and in 8/2016 she tested positive for C. diff 

and was treated with Flagyl.


- In 12/2016 she tested positive again for C. diff and was treated with 

Vancomycin x 14 days


- No diarrhea currently








Problem Qualifiers





(1) Nausea & vomiting:  


Qualified Codes:  R11.2 - Nausea with vomiting, unspecified


(2) Diabetes:  








Isatu Smiley Oct 21, 2017 10:46

## 2017-10-21 NOTE — HHI.DS
Discharge Summary


Admission Date


Oct 18, 2017 at 23:24


Discharge Date:  Oct 21, 2017


Admitting Diagnosis





intractable abdominal pain, hyperkalemia, nausea and vomiting





(1) Gastroparesis


Diagnosis:  Principal


ICD Codes:  K31.84 - Gastroparesis


(2) Intractable abdominal pain


Diagnosis:  Secondary


ICD Codes:  R10.9 - Unspecified abdominal pain


Status:  Acute


(3) Nausea & vomiting


Diagnosis:  Secondary


ICD Codes:  R11.2 - Nausea with vomiting, unspecified


Status:  Acute


(4) Acute kidney injury superimposed on CKD


Diagnosis:  Secondary


ICD Codes:  N17.9 - Acute kidney failure, unspecified; N18.9 - Chronic kidney 

disease, unspecified


Status:  Acute


(5) HTN (hypertension)


Diagnosis:  Secondary


ICD Codes:  I10 - Hypertension


Status:  Chronic


(6) Diabetes


Diagnosis:  Secondary


ICD Codes:  E11.9 - Diabetes mellitus


Status:  Chronic


(7) Hyperkalemia


Diagnosis:  Secondary


ICD Codes:  E87.5 - Hyperkalemia


Status:  Acute


(8) Hx of Clostridium difficile infection


Diagnosis:  Secondary


ICD Codes:  Z86.19 - Personal history of other infectious and parasitic diseases


Status:  Resolved


Brief History


Mrs. Bonner is a pleasant 51 y/o AAF with gastroparesis, hx of 

pancreatitis, HTN, diabetes, GERD, chronic back pain and CAD. She presented to 

the ED at [facility] on 10/18/17 with complaints of abd pain and nausea/vomiting that 

began around 2 weeks ago. She describes the pain as being located in the upper 

abdomen, occurs intermittently, pt unclear if this is worse with food intake or 

not. She tried taking oral pain medications at home but this did not seem to 

help with the pain. She states that she feels that this pain is similar pain 

she has had in the past with her pancreatitis. She has had intermittent N/V as 

well with the last episode of vomiting occurring yesterday and was nonbloody 

emesis. She also has a hx of gastroparesis but is not on any prokinetic 

medications currently. During a previous hospitalization in 5/2016 she was 

admitted with abd pain, N/V and had an EGD which noted gastritis. She was given 

a trial of Reglan with symptomatic improvement but she did not continue the 

Reglan after the prescription from the hospital ran out. She was seen by GI as 

an outpt and in 8/2016 she tested positive for C. diff and was treated with 

Flagyl. She then had an outpt GES in 10/2016 which noted gastroparesis and she 

was started on EES which gave her diarrhea. In 12/2016 she tested positive 

again for C. diff and was treated with Vancomycin x 14 days. She had a Capsule 

endoscopy on 12/14/2016 which noted severe delay in gastric emptying, pill 

stayed in the stomach for over 4 hours, severe gastritis and retained fluid in 

the the stomach, visualized SB is normal. She has not been on any prokinetic 

meds as her symptoms have been fairly well controlled per the pt. 





Pts labs in the ED noted Lipase 281, AlkPhos is 143 with all other LFTs being 

WNL. Her Cr is 2/47/BUN 36. CT Abd/pelvis (10/18) noted stable CT scan of the 

abdomen and pelvis with no acute intra-abdominal or pelvic process,      

extensive atherosclerotic vascular calcifications probably premature 

atherosclerosis, prior cholecystectomy and stable uterine calcified fibroids.  

No new medications in the last few months since her hospitalization in 8/2017 

with bilateral pneumonia and required an admission to the ICU. Pt denies any 

alcohol use, last etoh was in 2014. No fevers or chills. No diarrhea. Last BM 

was around 4-5 days ago, typically has a BM every 2-3 days. Has not been eating 

or drinking much over last few days prior to admission.


CBC/BMP:  


10/20/17 0726                                                                  

              10/21/17 0947





Significant Findings





Laboratory Tests








Test


  10/18/17


21:00 10/18/17


22:10 10/19/17


06:30 10/20/17


07:20


 


Platelet Count


  554 TH/MM3


(150-450) 


  


  


 


 


Neutrophils (%) (Auto)


  77.4 %


(16.0-70.0) 


  


  


 


 


Prothrombin Time


  9.4 SEC


(9.8-11.6) 


  


  


 


 


Urine Protein


  GREATER THAN


600 mg/dL 


  


  


 


 


Urine Ketones 10 mg/dL (NEG)    


 


Urine Occult Blood SMALL (NEG)    


 


Urine Mucus FEW /lpf (OCC)    


 


Venous Blood pH


  7.30


(7.360-7.400) 


  


  


 


 


Blood Urea Nitrogen


  


  43 MG/DL


(7-18) 36 MG/DL


(7-18) 31 MG/DL


(7-18)


 


Creatinine


  


  2.81 MG/DL


(0.50-1.00) 2.47 MG/DL


(0.50-1.00) 2.32 MG/DL


(0.50-1.00)


 


Albumin


  


  3.1 GM/DL


(3.4-5.0) 


  


 


 


Alkaline Phosphatase


  


  143 U/L


() 


  


 


 


Potassium Level


  


  5.5 MEQ/L


(3.5-5.1) 


  


 


 


Estimat Glomerular Filtration


Rate 


  21 ML/MIN


(>89) 25 ML/MIN


(>89) 27 ML/MIN


(>89)


 


B-Hydroxybutyrate


  


  2.78 MMOL/L


(0.00-0.39) 


  


 


 


Calcium Level


  


  


  8.3 MG/DL


(8.5-10.1) 


 


 


Chloride Level


  


  


  112 MEQ/L


() 110 MEQ/L


()


 


Carbon Dioxide Level


  


  


  19.3 MEQ/L


(21.0-32.0) 20.3 MEQ/L


(21.0-32.0)


 


Random Glucose


  


  


  


  113 MG/DL


()


 


Test


  10/20/17


07:26 10/21/17


09:47 


  


 


 


Red Blood Count


  2.95 MIL/MM3


(4.00-5.30) 


  


  


 


 


Hemoglobin


  10.0 GM/DL


(11.6-15.3) 


  


  


 


 


Hematocrit


  28.1 %


(35.0-46.0) 


  


  


 


 


Monocytes (%) (Auto)


  8.7 %


(0.0-8.0) 


  


  


 


 


Blood Urea Nitrogen


  


  26 MG/DL


(7-18) 


  


 


 


Creatinine


  


  2.12 MG/DL


(0.50-1.00) 


  


 


 


Random Glucose


  


  216 MG/DL


() 


  


 


 


Chloride Level


  


  109 MEQ/L


() 


  


 


 


Estimat Glomerular Filtration


Rate 


  30 ML/MIN


(>89) 


  


 








Imaging





Last Impressions








Abdomen/Pelvis CT 10/18/17 2041 Signed





Impressions: 





 Service Date/Time:  Wednesday, October 18, 2017 21:09 - CONCLUSION: Stable CT 





 scan of the abdomen and pelvis with no acute intra-abdominal or pelvic 

process.  





                                         Extensive atherosclerotic vascular 





 calcifications probably premature atherosclerosis.                            

   





           Prior cholecystectomy and stable uterine calcified fibroids     

Boo Ricardo MD 








PE at Discharge


General: NAD, AAOx3


Chest: CTA


Cardiac: Regular


Abd: +BS, soft ND much less tenderness in the upper abdomen


Ext: No edema


Hospital Course


Gastroparesis/Intractable abdominal pain


Pt is a 51 y/o AAF with gastroparesis, hx of pancreatitis, HTN, diabetes, GERD, 

chronic back pain and CAD. She presented to the ED at [facility] on 10/18/17 with 

complaints of abd pain and nausea/vomiting that began around 2 weeks ago.  She 

states that she feels that this pain is similar pain she has had in the past 

with her pancreatitis. She also has a hx of gastroparesis but is not on any 

prokinetic medications currently. During a previous hospitalization in 5/2016 

she was admitted with abd pain, N/V and had an EGD which noted gastritis. She 

was given a trial of Reglan with symptomatic improvement but she did not 

continue the Reglan after the prescription from the hospital ran out. She had 

an outpt GES in 10/2016 which noted gastroparesis and she was started on EES 

which gave her diarrhea. She had a Capsule endoscopy on 12/14/2016 which noted 

severe delay in gastric emptying, pill stayed in the stomach for over 4 hours, 

severe gastritis and retained fluid in the the stomach, visualized SB is 

normal. She has not been on any prokinetic meds as her symptoms have been 

fairly well controlled per the pt. 





Pts labs in the ED noted Lipase 281, AlkPhos is 143 with all other LFTs being 

WNL. Her Cr is 2.47/BUN 36. CT Abd/pelvis (10/18) noted stable CT scan of the 

abdomen and pelvis with no acute intra-abdominal or pelvic process, extensive 

atherosclerotic vascular calcifications probably premature atherosclerosis, 

prior cholecystectomy and stable uterine calcified fibroids. Pt was started on 

IV Reglan and has had symptomatic improvement with the Reglan, her symptoms are 

likely secondary to gastroparesis. Pt tolerated Reglan 5mg IV Q8H and this was 

switched to PO Reglan on 10/21 and she tolerated regular consistency diet. Pt 

will be continued on Reglan 5mg TIDAC and we will continue Protonix 40mg po 

daily. 





Pt will need outpt followup with GI with Dr. Granados in 2 weeks, she will need 

to call for an appt


Pt will need to followup with her PCP, Dr. Hill, in 1 week, call for an appt.

  





Acute kidney injury superimposed on CKD


Pt previously had A/CKD in 8/2017 related to sepsis. The labs at admission were 

about the same as when she was previously discharged in 8/2017 but there was 

likely a degree of dehydration from N/V and poor po intake. Pts labs have been 

steadily improving with IVF and increased oral intake.


Pt Condition on Discharge:  Stable


Discharge Disposition:  Discharge Home


Discharge Instructions


DIET: Follow Instructions for:  Diabetic Diet, Renal Failure Diet


Activities you can perform:  Regular-No Restrictions


Follow up Referrals:  


Gastroenterology - 2 Weeks with Isis Granados MD


PCP Follow-up - 1 Week with Dr. Hill





New Medications:  


Pantoprazole (Protonix) 40 Mg Tab


40 MG PO DAILY for Reflux, #30 TAB 0 Refills





Metoclopramide (Metoclopramide) 10 Mg Tab


5 MG PO ACHS for gastroparesis, #90 TAB





 


Continued Medications:  


Aspirin (Aspirin Children's) 81 Mg Chew


81 MG CHEW DAILY, TAB 0 Refills





Bumetanide (Bumetanide) 1 Mg Tab


2 MG PO DAILY for renal disease, #30 TAB 0 Refills





Enalapril (Vasotec) 20 Mg Tab


20 MG PO BID, #30 TAB 0 Refills





Ferrous Sulfate (Feosol) 200 Mg Tab


325 MG PO BID for Nutritional Supplement, #60 TAB 0 Refills





Folic Acid (Folic Acid) 400 Mcg Tab


1 MG PO DAILY for Nutritional Supplement, TAB 0 Refills





Gabapentin (Gabapentin) 100 Mg Cap


300 MG PO HS, #90 CAP 0 Refills





Insulin Glargine Inj (Lantus Inj) 1,000 Unit/10 Ml Vial


15 UNITS SQ HS for Blood Sugar Management, VIAL 0 Refills





Metoprolol Tartrate (Lopressor) 50 Mg Tab


50 MG PO Q12HR for htn, #60 TAB 0 Refills





Nifedipine (Nifedipine) 20 Mg Cap


30 MG PO TID for Chest Pain, #90 CAP 0 Refills





Ondansetron (Zofran) 4 Mg Tab


4 MG PO Q6HR PRN for NAUSEA OR VOMITING, #15 TAB 0 Refills





Simethicone (Gas Relief Maximum Streng) 125 Mg Chw


125 MG PO Q8HR for gas pain, #60 EA 0 Refills





Simvastatin (Simvastatin) 20 Mg Tab


20 MG PO HS for Cholesterol Management, #30 TAB 0 Refills





Tramadol (Tramadol) 50 Mg Tab


50 MG PO Q4H PRN for PAIN, TAB 0 Refills

















Isatu Smiley Oct 21, 2017 12:51


Vik Jasmine MD Oct 21, 2017 12:54

## 2018-01-15 ENCOUNTER — HOSPITAL ENCOUNTER (INPATIENT)
Dept: HOSPITAL 17 - NEPE | Age: 53
LOS: 17 days | Discharge: HOME HEALTH | DRG: 871 | End: 2018-02-01
Attending: HOSPITALIST | Admitting: HOSPITALIST
Payer: SELF-PAY

## 2018-01-15 VITALS
TEMPERATURE: 99.6 F | OXYGEN SATURATION: 98 % | RESPIRATION RATE: 18 BRPM | DIASTOLIC BLOOD PRESSURE: 74 MMHG | HEART RATE: 95 BPM | SYSTOLIC BLOOD PRESSURE: 140 MMHG

## 2018-01-15 VITALS
RESPIRATION RATE: 18 BRPM | HEART RATE: 100 BPM | SYSTOLIC BLOOD PRESSURE: 177 MMHG | OXYGEN SATURATION: 92 % | DIASTOLIC BLOOD PRESSURE: 80 MMHG

## 2018-01-15 VITALS
RESPIRATION RATE: 21 BRPM | OXYGEN SATURATION: 91 % | DIASTOLIC BLOOD PRESSURE: 118 MMHG | SYSTOLIC BLOOD PRESSURE: 183 MMHG | HEART RATE: 103 BPM

## 2018-01-15 VITALS
DIASTOLIC BLOOD PRESSURE: 87 MMHG | SYSTOLIC BLOOD PRESSURE: 165 MMHG | RESPIRATION RATE: 20 BRPM | HEART RATE: 95 BPM | OXYGEN SATURATION: 100 % | TEMPERATURE: 98.6 F

## 2018-01-15 VITALS
HEART RATE: 99 BPM | OXYGEN SATURATION: 91 % | RESPIRATION RATE: 18 BRPM | TEMPERATURE: 98.7 F | DIASTOLIC BLOOD PRESSURE: 89 MMHG | SYSTOLIC BLOOD PRESSURE: 181 MMHG

## 2018-01-15 VITALS — OXYGEN SATURATION: 94 %

## 2018-01-15 VITALS
RESPIRATION RATE: 18 BRPM | HEART RATE: 98 BPM | OXYGEN SATURATION: 99 % | SYSTOLIC BLOOD PRESSURE: 198 MMHG | DIASTOLIC BLOOD PRESSURE: 93 MMHG

## 2018-01-15 VITALS
OXYGEN SATURATION: 100 % | HEART RATE: 102 BPM | DIASTOLIC BLOOD PRESSURE: 79 MMHG | TEMPERATURE: 99 F | SYSTOLIC BLOOD PRESSURE: 142 MMHG | RESPIRATION RATE: 20 BRPM

## 2018-01-15 VITALS
DIASTOLIC BLOOD PRESSURE: 89 MMHG | RESPIRATION RATE: 16 BRPM | SYSTOLIC BLOOD PRESSURE: 199 MMHG | HEART RATE: 103 BPM | OXYGEN SATURATION: 92 %

## 2018-01-15 VITALS
OXYGEN SATURATION: 83 % | SYSTOLIC BLOOD PRESSURE: 182 MMHG | HEART RATE: 112 BPM | RESPIRATION RATE: 22 BRPM | TEMPERATURE: 99.6 F | DIASTOLIC BLOOD PRESSURE: 81 MMHG

## 2018-01-15 VITALS — HEART RATE: 97 BPM

## 2018-01-15 VITALS — HEART RATE: 92 BPM

## 2018-01-15 VITALS — WEIGHT: 143.96 LBS | HEIGHT: 66 IN | BODY MASS INDEX: 23.14 KG/M2

## 2018-01-15 VITALS
OXYGEN SATURATION: 93 % | DIASTOLIC BLOOD PRESSURE: 78 MMHG | RESPIRATION RATE: 18 BRPM | SYSTOLIC BLOOD PRESSURE: 139 MMHG

## 2018-01-15 VITALS — OXYGEN SATURATION: 92 %

## 2018-01-15 VITALS
RESPIRATION RATE: 18 BRPM | HEART RATE: 95 BPM | SYSTOLIC BLOOD PRESSURE: 167 MMHG | OXYGEN SATURATION: 96 % | DIASTOLIC BLOOD PRESSURE: 87 MMHG

## 2018-01-15 VITALS — TEMPERATURE: 98.3 F | HEART RATE: 88 BPM

## 2018-01-15 VITALS — HEART RATE: 101 BPM

## 2018-01-15 VITALS — DIASTOLIC BLOOD PRESSURE: 81 MMHG | SYSTOLIC BLOOD PRESSURE: 158 MMHG

## 2018-01-15 VITALS — HEART RATE: 93 BPM

## 2018-01-15 DIAGNOSIS — E11.43: ICD-10-CM

## 2018-01-15 DIAGNOSIS — E83.39: ICD-10-CM

## 2018-01-15 DIAGNOSIS — K63.5: ICD-10-CM

## 2018-01-15 DIAGNOSIS — F17.210: ICD-10-CM

## 2018-01-15 DIAGNOSIS — I13.2: ICD-10-CM

## 2018-01-15 DIAGNOSIS — K31.84: ICD-10-CM

## 2018-01-15 DIAGNOSIS — K92.2: ICD-10-CM

## 2018-01-15 DIAGNOSIS — E88.89: ICD-10-CM

## 2018-01-15 DIAGNOSIS — D25.9: ICD-10-CM

## 2018-01-15 DIAGNOSIS — A41.9: Primary | ICD-10-CM

## 2018-01-15 DIAGNOSIS — F10.11: ICD-10-CM

## 2018-01-15 DIAGNOSIS — K29.70: ICD-10-CM

## 2018-01-15 DIAGNOSIS — R09.02: ICD-10-CM

## 2018-01-15 DIAGNOSIS — E78.5: ICD-10-CM

## 2018-01-15 DIAGNOSIS — D63.1: ICD-10-CM

## 2018-01-15 DIAGNOSIS — N18.6: ICD-10-CM

## 2018-01-15 DIAGNOSIS — R00.0: ICD-10-CM

## 2018-01-15 DIAGNOSIS — I25.10: ICD-10-CM

## 2018-01-15 DIAGNOSIS — I13.0: ICD-10-CM

## 2018-01-15 DIAGNOSIS — J18.9: ICD-10-CM
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
## 2018-01-15 DIAGNOSIS — I50.33: ICD-10-CM

## 2018-01-15 DIAGNOSIS — Z95.5: ICD-10-CM

## 2018-01-15 DIAGNOSIS — E11.21: ICD-10-CM

## 2018-01-15 DIAGNOSIS — D62: ICD-10-CM

## 2018-01-15 DIAGNOSIS — E11.22: ICD-10-CM

## 2018-01-15 DIAGNOSIS — M19.90: ICD-10-CM

## 2018-01-15 DIAGNOSIS — Z86.19: ICD-10-CM

## 2018-01-15 DIAGNOSIS — I25.2: ICD-10-CM

## 2018-01-15 DIAGNOSIS — N17.9: ICD-10-CM
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
## 2018-01-15 DIAGNOSIS — R04.2: ICD-10-CM

## 2018-01-15 DIAGNOSIS — Z86.010: ICD-10-CM

## 2018-01-15 DIAGNOSIS — J98.11: ICD-10-CM

## 2018-01-15 DIAGNOSIS — Z79.82: ICD-10-CM

## 2018-01-15 DIAGNOSIS — I21.4: ICD-10-CM

## 2018-01-15 DIAGNOSIS — K21.9: ICD-10-CM

## 2018-01-15 DIAGNOSIS — Z79.4: ICD-10-CM

## 2018-01-15 DIAGNOSIS — I27.20: ICD-10-CM

## 2018-01-15 LAB
ALBUMIN SERPL-MCNC: 3.1 GM/DL (ref 3.4–5)
ALP SERPL-CCNC: 172 U/L (ref 45–117)
ALT SERPL-CCNC: 40 U/L (ref 10–53)
AST SERPL-CCNC: 26 U/L (ref 15–37)
BASOPHILS # BLD AUTO: 0.1 TH/MM3 (ref 0–0.2)
BASOPHILS NFR BLD: 0.7 % (ref 0–2)
BILIRUB SERPL-MCNC: 0.4 MG/DL (ref 0.2–1)
BUN SERPL-MCNC: 47 MG/DL (ref 7–18)
CALCIUM SERPL-MCNC: 8.6 MG/DL (ref 8.5–10.1)
CHLORIDE SERPL-SCNC: 107 MEQ/L (ref 98–107)
CREAT SERPL-MCNC: 2.76 MG/DL (ref 0.5–1)
EOSINOPHIL # BLD: 0.2 TH/MM3 (ref 0–0.4)
EOSINOPHIL NFR BLD: 1.8 % (ref 0–4)
ERYTHROCYTE [DISTWIDTH] IN BLOOD BY AUTOMATED COUNT: 13.9 % (ref 11.6–17.2)
GFR SERPLBLD BASED ON 1.73 SQ M-ARVRAT: 22 ML/MIN (ref 89–?)
GLUCOSE SERPL-MCNC: 234 MG/DL (ref 74–106)
HCO3 BLD-SCNC: 23.8 MEQ/L (ref 21–32)
HCT VFR BLD CALC: 25.4 % (ref 35–46)
HGB BLD-MCNC: 8.8 GM/DL (ref 11.6–15.3)
INR PPP: 0.9 RATIO
LIPASE: 348 U/L (ref 73–393)
LYMPHOCYTES # BLD AUTO: 1.7 TH/MM3 (ref 1–4.8)
LYMPHOCYTES NFR BLD AUTO: 14.4 % (ref 9–44)
MAGNESIUM SERPL-MCNC: 2.6 MG/DL (ref 1.5–2.5)
MCH RBC QN AUTO: 34.6 PG (ref 27–34)
MCHC RBC AUTO-ENTMCNC: 34.7 % (ref 32–36)
MCV RBC AUTO: 99.8 FL (ref 80–100)
MONOCYTE #: 0.4 TH/MM3 (ref 0–0.9)
MONOCYTES NFR BLD: 3.3 % (ref 0–8)
NEUTROPHILS # BLD AUTO: 9.6 TH/MM3 (ref 1.8–7.7)
NEUTROPHILS NFR BLD AUTO: 79.8 % (ref 16–70)
PLATELET # BLD: 418 TH/MM3 (ref 150–450)
PMV BLD AUTO: 8.3 FL (ref 7–11)
PROT SERPL-MCNC: 7.7 GM/DL (ref 6.4–8.2)
PROTHROMBIN TIME: 9.1 SEC (ref 9.8–11.6)
RBC # BLD AUTO: 2.55 MIL/MM3 (ref 4–5.3)
SODIUM SERPL-SCNC: 138 MEQ/L (ref 136–145)
TROPONIN I SERPL-MCNC: 1.28 NG/ML (ref 0.02–0.05)
WBC # BLD AUTO: 12 TH/MM3 (ref 4–11)

## 2018-01-15 PROCEDURE — 94664 DEMO&/EVAL PT USE INHALER: CPT

## 2018-01-15 PROCEDURE — 96374 THER/PROPH/DIAG INJ IV PUSH: CPT

## 2018-01-15 PROCEDURE — 88305 TISSUE EXAM BY PATHOLOGIST: CPT

## 2018-01-15 PROCEDURE — 82607 VITAMIN B-12: CPT

## 2018-01-15 PROCEDURE — 83540 ASSAY OF IRON: CPT

## 2018-01-15 PROCEDURE — 80074 ACUTE HEPATITIS PANEL: CPT

## 2018-01-15 PROCEDURE — 93306 TTE W/DOPPLER COMPLETE: CPT

## 2018-01-15 PROCEDURE — 86900 BLOOD TYPING SEROLOGIC ABO: CPT

## 2018-01-15 PROCEDURE — 85730 THROMBOPLASTIN TIME PARTIAL: CPT

## 2018-01-15 PROCEDURE — 74250 X-RAY XM SM INT 1CNTRST STD: CPT

## 2018-01-15 PROCEDURE — 86901 BLOOD TYPING SEROLOGIC RH(D): CPT

## 2018-01-15 PROCEDURE — 80053 COMPREHEN METABOLIC PANEL: CPT

## 2018-01-15 PROCEDURE — P9016 RBC LEUKOCYTES REDUCED: HCPCS

## 2018-01-15 PROCEDURE — 71045 X-RAY EXAM CHEST 1 VIEW: CPT

## 2018-01-15 PROCEDURE — 85027 COMPLETE CBC AUTOMATED: CPT

## 2018-01-15 PROCEDURE — 86850 RBC ANTIBODY SCREEN: CPT

## 2018-01-15 PROCEDURE — 83010 ASSAY OF HAPTOGLOBIN QUANT: CPT

## 2018-01-15 PROCEDURE — 82552 ASSAY OF CPK IN BLOOD: CPT

## 2018-01-15 PROCEDURE — 85025 COMPLETE CBC W/AUTO DIFF WBC: CPT

## 2018-01-15 PROCEDURE — C9113 INJ PANTOPRAZOLE SODIUM, VIA: HCPCS

## 2018-01-15 PROCEDURE — 83550 IRON BINDING TEST: CPT

## 2018-01-15 PROCEDURE — 71046 X-RAY EXAM CHEST 2 VIEWS: CPT

## 2018-01-15 PROCEDURE — 82746 ASSAY OF FOLIC ACID SERUM: CPT

## 2018-01-15 PROCEDURE — 36556 INSERT NON-TUNNEL CV CATH: CPT

## 2018-01-15 PROCEDURE — 84703 CHORIONIC GONADOTROPIN ASSAY: CPT

## 2018-01-15 PROCEDURE — 96375 TX/PRO/DX INJ NEW DRUG ADDON: CPT

## 2018-01-15 PROCEDURE — 85014 HEMATOCRIT: CPT

## 2018-01-15 PROCEDURE — C1752 CATH,HEMODIALYSIS,SHORT-TERM: HCPCS

## 2018-01-15 PROCEDURE — 80069 RENAL FUNCTION PANEL: CPT

## 2018-01-15 PROCEDURE — 77001 FLUOROGUIDE FOR VEIN DEVICE: CPT

## 2018-01-15 PROCEDURE — 84100 ASSAY OF PHOSPHORUS: CPT

## 2018-01-15 PROCEDURE — 88312 SPECIAL STAINS GROUP 1: CPT

## 2018-01-15 PROCEDURE — 83615 LACTATE (LD) (LDH) ENZYME: CPT

## 2018-01-15 PROCEDURE — 85610 PROTHROMBIN TIME: CPT

## 2018-01-15 PROCEDURE — 94060 EVALUATION OF WHEEZING: CPT

## 2018-01-15 PROCEDURE — C1893 INTRO/SHEATH, FIXED,NON-PEEL: HCPCS

## 2018-01-15 PROCEDURE — 90935 HEMODIALYSIS ONE EVALUATION: CPT

## 2018-01-15 PROCEDURE — 36430 TRANSFUSION BLD/BLD COMPNT: CPT

## 2018-01-15 PROCEDURE — 93458 L HRT ARTERY/VENTRICLE ANGIO: CPT

## 2018-01-15 PROCEDURE — 84484 ASSAY OF TROPONIN QUANT: CPT

## 2018-01-15 PROCEDURE — 82948 REAGENT STRIP/BLOOD GLUCOSE: CPT

## 2018-01-15 PROCEDURE — 85018 HEMOGLOBIN: CPT

## 2018-01-15 PROCEDURE — 86922 COMPATIBILITY TEST ANTIGLOB: CPT

## 2018-01-15 PROCEDURE — C1769 GUIDE WIRE: HCPCS

## 2018-01-15 PROCEDURE — 80048 BASIC METABOLIC PNL TOTAL CA: CPT

## 2018-01-15 PROCEDURE — 83735 ASSAY OF MAGNESIUM: CPT

## 2018-01-15 PROCEDURE — 82550 ASSAY OF CK (CPK): CPT

## 2018-01-15 PROCEDURE — 83690 ASSAY OF LIPASE: CPT

## 2018-01-15 PROCEDURE — 83880 ASSAY OF NATRIURETIC PEPTIDE: CPT

## 2018-01-15 PROCEDURE — 93005 ELECTROCARDIOGRAM TRACING: CPT

## 2018-01-15 PROCEDURE — 86920 COMPATIBILITY TEST SPIN: CPT

## 2018-01-15 PROCEDURE — 94640 AIRWAY INHALATION TREATMENT: CPT

## 2018-01-15 PROCEDURE — 76937 US GUIDE VASCULAR ACCESS: CPT

## 2018-01-15 RX ADMIN — FOLIC ACID SCH MG: 1 TABLET ORAL at 08:48

## 2018-01-15 RX ADMIN — FUROSEMIDE SCH MG: 10 INJECTION, SOLUTION INTRAMUSCULAR; INTRAVENOUS at 08:50

## 2018-01-15 RX ADMIN — MORPHINE SULFATE PRN MG: 2 INJECTION, SOLUTION INTRAMUSCULAR; INTRAVENOUS at 22:12

## 2018-01-15 RX ADMIN — GABAPENTIN SCH MG: 300 CAPSULE ORAL at 20:31

## 2018-01-15 RX ADMIN — FUROSEMIDE SCH MG: 10 INJECTION, SOLUTION INTRAMUSCULAR; INTRAVENOUS at 17:54

## 2018-01-15 RX ADMIN — MORPHINE SULFATE PRN MG: 2 INJECTION, SOLUTION INTRAMUSCULAR; INTRAVENOUS at 16:15

## 2018-01-15 RX ADMIN — INSULIN ASPART SCH: 100 INJECTION, SOLUTION INTRAVENOUS; SUBCUTANEOUS at 12:00

## 2018-01-15 RX ADMIN — Medication SCH ML: at 09:00

## 2018-01-15 RX ADMIN — HEPARIN SODIUM PRN MLS/HR: 10000 INJECTION, SOLUTION INTRAVENOUS at 03:19

## 2018-01-15 RX ADMIN — SODIUM CHLORIDE SCH MLS/HR: 900 INJECTION INTRAVENOUS at 05:12

## 2018-01-15 RX ADMIN — HYDROCODONE BITARTRATE AND ACETAMINOPHEN PRN TAB: 5; 325 TABLET ORAL at 21:59

## 2018-01-15 RX ADMIN — NITROGLYCERIN SCH MG: 0.4 TABLET SUBLINGUAL at 01:49

## 2018-01-15 RX ADMIN — METOPROLOL TARTRATE SCH MG: 50 TABLET, FILM COATED ORAL at 08:48

## 2018-01-15 RX ADMIN — INSULIN ASPART SCH: 100 INJECTION, SOLUTION INTRAVENOUS; SUBCUTANEOUS at 17:00

## 2018-01-15 RX ADMIN — NITROGLYCERIN SCH MG: 0.4 TABLET SUBLINGUAL at 01:54

## 2018-01-15 RX ADMIN — INSULIN ASPART SCH: 100 INJECTION, SOLUTION INTRAVENOUS; SUBCUTANEOUS at 21:00

## 2018-01-15 RX ADMIN — METOPROLOL TARTRATE SCH MG: 50 TABLET, FILM COATED ORAL at 20:31

## 2018-01-15 RX ADMIN — INSULIN ASPART SCH: 100 INJECTION, SOLUTION INTRAVENOUS; SUBCUTANEOUS at 08:00

## 2018-01-15 RX ADMIN — MORPHINE SULFATE PRN MG: 2 INJECTION, SOLUTION INTRAMUSCULAR; INTRAVENOUS at 11:54

## 2018-01-15 RX ADMIN — AZITHROMYCIN SCH MLS/HR: 500 INJECTION, POWDER, LYOPHILIZED, FOR SOLUTION INTRAVENOUS at 04:31

## 2018-01-15 RX ADMIN — NITROGLYCERIN SCH MG: 0.4 TABLET SUBLINGUAL at 01:42

## 2018-01-15 RX ADMIN — STANDARDIZED SENNA CONCENTRATE AND DOCUSATE SODIUM SCH TAB: 8.6; 5 TABLET, FILM COATED ORAL at 20:31

## 2018-01-15 RX ADMIN — STANDARDIZED SENNA CONCENTRATE AND DOCUSATE SODIUM SCH TAB: 8.6; 5 TABLET, FILM COATED ORAL at 09:00

## 2018-01-15 RX ADMIN — PRAVASTATIN SODIUM SCH MG: 40 TABLET ORAL at 20:31

## 2018-01-15 RX ADMIN — ASPIRIN 81 MG SCH MG: 81 TABLET ORAL at 08:48

## 2018-01-15 RX ADMIN — Medication SCH ML: at 20:32

## 2018-01-15 RX ADMIN — IPRATROPIUM BROMIDE AND ALBUTEROL SULFATE PRN AMPULE: .5; 3 SOLUTION RESPIRATORY (INHALATION) at 12:35

## 2018-01-15 RX ADMIN — ACYCLOVIR SCH UNITS: 800 TABLET ORAL at 20:32

## 2018-01-15 NOTE — PD.CONS
HPI


Service


cardiology


Consult Requested By





Reason for Consult


NSTEMI, chf


Primary Care Physician


Jamie Hill MD


History of Present Illness


This is a 53 yo AAF with history of CAD, cardiac stent placed ~ 10 years ago 

who is not currently followed by cardiologist, CKD, CHF and diabetes admitted 

for progressive SOB and chest pain. She has been feeling SOB x several weeks 

and noted new onset chest pain 2 nights ago at rest. She was found to be septic 

on admission with pneumonia and LETI with creatinine 2.76. Troponin elevated, 

EKG showing T wave inversion to precordial leads. She continues to feel SOB, 

denies chest pain currently.





Review of Systems


Consitutional:  DENIES: Chills, Weight gain, Weight loss


Respiratory:  DENIES: Cough, Snoring, Wheezing, Sputum production


Cardiovascular:  DENIES: Palpitations, Syncope, Tachycardia


Gastrointestinal:  DENIES: Nausea, Vomiting, Change in bowel habits, Reflux, 

Bloody stools, Melena





Past Family Social History


Allergies:  


Coded Allergies:  


     shellfish derived (Unverified  Adverse Reaction, Mild, VOMITNG, 1/15/18)


Past Medical History





PMH:  HTN, Hyperlipidemia, CHF (Echo 8/18/17 w/ EF 55-60%), DM and CAD


Past Surgical History





PAST SURGICAL HISTORY: Cholecystectomy, Tubal Ligation


Reported Medications





Reported Meds & Active Scripts


Active


Zofran (Ondansetron HCl) 4 Mg Tab 4 Mg PO Q6HR PRN


Gas Relief Maximum Streng (Simethicone) 125 Mg Chw 125 Mg PO Q8HR


Lopressor (Metoprolol Tartrate) 50 Mg Tab 50 Mg PO Q12HR


Reported


Fish Oil + D3 (Fish Oil-Cholecalciferol) 1,200-1,000 Mg-Unit Cap 1 Cap PO DAILY


Nifedipine 20 Mg Cap 30 Mg PO TID


Lantus Inj (Insulin Glargine) 1,000 Unit/10 Ml Vial 15 Units SQ HS


Gabapentin 100 Mg Cap 300 Mg PO HS


Simvastatin 20 Mg Tab 20 Mg PO HS


Folic Acid 400 Mcg Tab 1 Mg PO DAILY


Feosol (Ferrous Sulfate) 200 Mg Tab 325 Mg PO BID


Vasotec (Enalapril Maleate) 20 Mg Tab 20 Mg PO BID


Aspirin Children's (Aspirin) 81 Mg Chew 81 Mg CHEW DAILY


Active Ordered Medications





Current Medications








 Medications


  (Trade)  Dose


 Ordered  Sig/Luis Armando


 Route  Start Time


 Stop Time Status Last Admin


 


  (NS Flush)  2 ml  UNSCH  PRN


 IVF  1/15/18 01:30


    1/15/18 01:42


 


 


 Heparin Sodium/


 Dextrose  250 ml @ 7


 mls/hr  TITRATE  PRN


 IV  1/15/18 02:30


    1/15/18 03:19


 


 


  (D50w (Vial) Inj)  50 ml  UNSCH  PRN


 IV PUSH  1/15/18 04:00


     


 


 


  (Glucagon Inj)  1 mg  UNSCH  PRN


 OTHER  1/15/18 04:00


     


 


 


  (NovoLOG


 SUPPLEMENTAL


 SCALE)  1  ACHS SLIDING  SCALE


 SQ  1/15/18 08:00


     


 


 


  (Lasix Inj)  40 mg  BID@09,18


 IV PUSH  1/15/18 09:00


     


 


 


 Ceftriaxone


 Sodium 1000 mg/


 Sodium Chloride  100 ml @ 


 200 mls/hr  Q24H


 IV  1/15/18 05:00


    1/15/18 05:12


 


 


 Azithromycin 500


 mg/Sodium Chloride  250 ml @ 


 250 mls/hr  Q24H


 IV  1/15/18 04:00


    1/15/18 04:31


 


 


  (Duoneb Neb)  1 ampule  Q4HR NEB  PRN


 NEB  1/15/18 04:00


     


 


 


  (NS Flush)  2 ml  UNSCH  PRN


 IV FLUSH  1/15/18 04:00


     


 


 


  (NS Flush)  2 ml  BID


 IV FLUSH  1/15/18 09:00


     


 


 


  (Zofran Inj)  4 mg  Q6H  PRN


 IVP  1/15/18 04:00


     


 


 


  (Tylenol)  650 mg  Q6H  PRN


 PO  1/15/18 04:00


     


 


 


  (Norco  5-325 Mg)  1 tab  Q4H  PRN


 PO  1/15/18 04:00


     


 


 


  (Morphine Inj)  2 mg  Q3H  PRN


 IV PUSH  1/15/18 04:00


     


 


 


  (Theresa-Colace)  1 tab  BID


 PO  1/15/18 09:00


     


 


 


  (Milk Of


 Magnesia Liq)  30 ml  Q12H  PRN


 PO  1/15/18 04:00


     


 


 


  (Senokot)  17.2 mg  Q12H  PRN


 PO  1/15/18 04:00


     


 


 


  (Dulcolax Supp)  10 mg  DAILY  PRN


 RECTAL  1/15/18 04:00


     


 


 


  (Lactulose Liq)  30 ml  DAILY  PRN


 PO  1/15/18 04:00


     


 


 


  (Aspirin Chew)  81 mg  DAILY


 CHEW  1/15/18 09:00


     


 


 


  (Folate)  1 mg  DAILY


 PO  1/15/18 09:00


     


 


 


  (Neurontin)  300 mg  HS


 PO  1/15/18 21:00


     


 


 


  (Levemir Inj)  15 units  HS


 SQ  1/15/18 21:00


     


 


 


  (Lopressor)  50 mg  Q12HR


 PO  1/15/18 09:00


     


 


 


  (Procardia)  30 mg  TID


 PO  1/15/18 09:00


     


 


 


  (Pravachol)  40 mg  HS


 PO  1/15/18 21:00


     


 








Family History


 No h/o DM or CAD


Social History


 History of alcohol abuse, quit 3 years ago.  Positive for tobacco.  Positive 

for Marijuana.





Physical Exam


Vital Signs





Vital Signs








  Date Time  Temp Pulse Resp B/P (MAP) Pulse Ox O2 Delivery O2 Flow Rate FiO2


 


1/15/18 07:33        


 


1/15/18 06:54  95 18 167/87 (113) 96 Nasal Cannula 3.00 


 


1/15/18 04:30  98 18 198/93 (128) 99 Room Air  


 


1/15/18 01:52  100 18 177/80 (112) 92 Nasal Cannula 6.00 


 


1/15/18 01:46  103 16 199/89 (125) 92 Nasal Cannula 6.00 


 


1/15/18 01:43  103 21 183/118 (139) 91 Nasal Cannula 6.00 


 


1/15/18 01:43     92 Nasal Cannula 6.00 


 


1/15/18 01:25     94 Nasal Cannula 6.00 


 


1/15/18 01:10     94  6.00 


 


1/15/18 01:09     94 Nasal Cannula 6.00 


 


1/15/18 01:07 98.7 99 18 181/89 (119) 91   


 


1/15/18 00:58 99.6 112 22 182/81 (114) 83 Room Air  








Physical Exam


GENERAL: 


SKIN: Warm and dry.


HEAD: Atraumatic. Normocephalic. 


EYES: Pupils equal and round. No scleral icterus. No injection or drainage. 


ENT: No nasal bleeding or discharge.  


NECK: Trachea midline. No JVD. 


CARDIOVASCULAR: Regular rate and rhythm.  No murmurs


RESPIRATORY: No accessory muscle use. Clear to auscultation. decreased breath 

sounds at bases bilateral


GASTROINTESTINAL: Abdomen soft, non-tender, nondistended. Hepatic and splenic 

margins not palpable. 


MUSCULOSKELETAL: Extremities without clubbing, cyanosis, or edema. No obvious 

deformities. 


NEUROLOGICAL: Awake and alert. No obvious cranial nerve deficits. .  Normal 

speech.


PSYCHIATRIC: Appropriate mood and affect; insight and judgment normal.


Laboratory





Laboratory Tests








Test


  1/15/18


01:35


 


White Blood Count 12.0 


 


Red Blood Count 2.55 


 


Hemoglobin 8.8 


 


Hematocrit 25.4 


 


Mean Corpuscular Volume 99.8 


 


Mean Corpuscular Hemoglobin 34.6 


 


Mean Corpuscular Hemoglobin


Concent 34.7 


 


 


Red Cell Distribution Width 13.9 


 


Platelet Count 418 


 


Mean Platelet Volume 8.3 


 


Neutrophils (%) (Auto) 79.8 


 


Lymphocytes (%) (Auto) 14.4 


 


Monocytes (%) (Auto) 3.3 


 


Eosinophils (%) (Auto) 1.8 


 


Basophils (%) (Auto) 0.7 


 


Neutrophils # (Auto) 9.6 


 


Lymphocytes # (Auto) 1.7 


 


Monocytes # (Auto) 0.4 


 


Eosinophils # (Auto) 0.2 


 


Basophils # (Auto) 0.1 


 


CBC Comment DIFF FINAL 


 


Differential Comment  


 


Prothrombin Time 9.1 


 


Prothromb Time International


Ratio 0.9 


 


 


Activated Partial


Thromboplast Time 23.3 


 


 


Blood Urea Nitrogen 47 


 


Creatinine 2.76 


 


Random Glucose 234 


 


Total Protein 7.7 


 


Albumin 3.1 


 


Calcium Level 8.6 


 


Magnesium Level 2.6 


 


Alkaline Phosphatase 172 


 


Aspartate Amino Transf


(AST/SGOT) 26 


 


 


Alanine Aminotransferase


(ALT/SGPT) 40 


 


 


Total Bilirubin 0.4 


 


Sodium Level 138 


 


Potassium Level 4.8 


 


Chloride Level 107 


 


Carbon Dioxide Level 23.8 


 


Anion Gap 7 


 


Estimat Glomerular Filtration


Rate 22 


 


 


Total Creatine Kinase 106 


 


Creatine Kinase MB 3.2 


 


Troponin I 1.28 


 


B-Type Natriuretic Peptide 2111 


 


Lipase 348 








Result Diagram:  


1/15/18 0135                                                                   

             1/15/18 0135





Imaging





Last 48 hours Impressions








Chest X-Ray 1/15/18 0125 Signed





Impressions: 





 Service Date/Time:  Monday, January 15, 2018 01:32 - CONCLUSION:  1. 





 Consolidation or atelectasis at the bases being worse on the right. 2. 

Suspected 





 nipple shadows. This could be followup at some point with a chest x-ray with 





 nipple markers.     Severo Weir MD 











Assessment and Plan


Problem List:  


(1) NSTEMI (non-ST elevated myocardial infarction)


ICD Codes:  I21.4 - Non-ST elevation (NSTEMI) myocardial infarction


(2) CHF (congestive heart failure)


ICD Codes:  I50.9 - Heart failure, unspecified


Assessment and Plan


This is a 53 yo AAF with history of CAD, cardiac stent placed ~ 10 years ago 

who is not currently followed by cardiologist, CKD, CHF and diabetes admitted 

for progressive SOB and chest pain. 





NSTEMI- troponin elevation may be demand mediated due to CKD vs. ischemic injury


she will need an ischemic work up once respiratory status and renal function 

improves.


 


respiratory insufficiency- multifactorial CHF vs. PNA, septic.





will repeat echo to check LV function











Blanca Luong Kerwin 15, 2018 08:55

## 2018-01-15 NOTE — HHI.PR
Subjective


Remarks


This is a pleasant 53 y/o Female admitted today she has Hypertension, 

Hyperlipidemia, CHF Echo 8/18/17 with Ejection Fraction 55-60%, 


DM II, CAD, %), DM and CAD, who came to ER with left side chest pain, with 

associated Shortness of breath, Creatinine 2.76, Leukocytosis


CXR with consolidation or atelectasis at bases.  S/p Lasix 40mg IV, Morphine, 

ASA and started on Heparin gtt.


Today seen in her bedroom, she has chronic Tobacco dependence Cardiology 

specialist following, continue antibiotics for Pneumonia.





Objective





Vital Signs








  Date Time  Temp Pulse Resp B/P (MAP) Pulse Ox O2 Delivery O2 Flow Rate FiO2


 


1/15/18 15:16  95      


 


1/15/18 15:16     98 Nasal Cannula 6.00 





      Humidified  


 


1/15/18 15:16 99.6 95 18 140/74 (96) 98   


 


1/15/18 14:00 98.6 95 20 165/87 (113) 100   


 


1/15/18 12:33     92 Nasal Cannula 6.00 


 


1/15/18 11:00   18 139/78 (98) 93   


 


1/15/18 07:53 98.3 88      


 


1/15/18 07:33        


 


1/15/18 06:54  95 18 167/87 (113) 96 Nasal Cannula 3.00 


 


1/15/18 04:30  98 18 198/93 (128) 99 Room Air  


 


1/15/18 01:52  100 18 177/80 (112) 92 Nasal Cannula 6.00 


 


1/15/18 01:46  103 16 199/89 (125) 92 Nasal Cannula 6.00 


 


1/15/18 01:43  103 21 183/118 (139) 91 Nasal Cannula 6.00 


 


1/15/18 01:43     92 Nasal Cannula 6.00 


 


1/15/18 01:25     94 Nasal Cannula 6.00 


 


1/15/18 01:10     94  6.00 


 


1/15/18 01:09     94 Nasal Cannula 6.00 


 


1/15/18 01:07 98.7 99 18 181/89 (119) 91   


 


1/15/18 00:58 99.6 112 22 182/81 (114) 83 Room Air  














I/O      


 


 1/14/18 1/14/18 1/14/18 1/15/18 1/15/18 1/15/18





 07:00 15:00 23:00 07:00 15:00 23:00


 


Intake Total    350 ml 100 ml 


 


Output Total     550 ml 


 


Balance    350 ml -450 ml 


 


      


 


Intake Oral     100 ml 


 


IV Total    350 ml  


 


Output Urine Total     550 ml 








Result Diagram:  


1/15/18 0135                                                                   

             1/15/18 0135





Imaging





Last Impressions








Chest X-Ray 1/15/18 0125 Signed





Impressions: 





 Service Date/Time:  Monday, January 15, 2018 01:32 - CONCLUSION:  1. 





 Consolidation or atelectasis at the bases being worse on the right. 2. 

Suspected 





 nipple shadows. This could be followup at some point with a chest x-ray with 





 nipple markers.     Severo Weir MD 








Procedures


None


Other Results





Laboratory Tests








Test


  1/15/18


01:35 1/15/18


09:51


 


White Blood Count 12.0 TH/MM3  


 


Red Blood Count 2.55 MIL/MM3  


 


Hemoglobin 8.8 GM/DL  


 


Hematocrit 25.4 %  


 


Mean Corpuscular Volume 99.8 FL  


 


Mean Corpuscular Hemoglobin 34.6 PG  


 


Mean Corpuscular Hemoglobin


Concent 34.7 % 


  


 


 


Red Cell Distribution Width 13.9 %  


 


Platelet Count 418 TH/MM3  


 


Mean Platelet Volume 8.3 FL  


 


Neutrophils (%) (Auto) 79.8 %  


 


Lymphocytes (%) (Auto) 14.4 %  


 


Monocytes (%) (Auto) 3.3 %  


 


Eosinophils (%) (Auto) 1.8 %  


 


Basophils (%) (Auto) 0.7 %  


 


Neutrophils # (Auto) 9.6 TH/MM3  


 


Lymphocytes # (Auto) 1.7 TH/MM3  


 


Monocytes # (Auto) 0.4 TH/MM3  


 


Eosinophils # (Auto) 0.2 TH/MM3  


 


Basophils # (Auto) 0.1 TH/MM3  


 


CBC Comment DIFF FINAL  


 


Differential Comment   


 


Prothrombin Time 9.1 SEC  


 


Prothromb Time International


Ratio 0.9 RATIO 


  


 


 


Blood Urea Nitrogen 47 MG/DL  


 


Creatinine 2.76 MG/DL  


 


Random Glucose 234 MG/DL  


 


Total Protein 7.7 GM/DL  


 


Albumin 3.1 GM/DL  


 


Calcium Level 8.6 MG/DL  


 


Magnesium Level 2.6 MG/DL  


 


Alkaline Phosphatase 172 U/L  


 


Aspartate Amino Transf


(AST/SGOT) 26 U/L 


  


 


 


Alanine Aminotransferase


(ALT/SGPT) 40 U/L 


  


 


 


Total Bilirubin 0.4 MG/DL  


 


Sodium Level 138 MEQ/L  


 


Potassium Level 4.8 MEQ/L  


 


Chloride Level 107 MEQ/L  


 


Carbon Dioxide Level 23.8 MEQ/L  


 


Anion Gap 7 MEQ/L  


 


Estimat Glomerular Filtration


Rate 22 ML/MIN 


  


 


 


Total Creatine Kinase 106 U/L  


 


Creatine Kinase MB 3.2 NG/ML  


 


Troponin I 1.28 NG/ML  


 


B-Type Natriuretic Peptide 2111 PG/ML  


 


Lipase 348 U/L  


 


Activated Partial


Thromboplast Time 


  26.4 SEC 


 








Objective Remarks


GENERAL: No acute distress. 


HEENT: PERRLA, EOMI. No scleral icterus or conjunctival pallor. No lid lag or 

facial droop.  


CARDIOVASCULAR: Regular rate and rhythm.  No obvious murmurs to auscultation. 

No chest tenderness to palpation. 


RESPIRATORY: No obvious rhonchi or wheezing. Clear to auscultation. Breath 

sounds equal bilaterally. 


GASTROINTESTINAL: Abdomen soft, non-tender, nondistended. BS normal. 


MUSCULOSKELETAL: Extremities without clubbing, cyanosis, or edema. No obvious 

deformities. 


NEUROLOGICAL: Awake, alert and oriented x4. No focal neurologic deficits. 

Moving both upper and lower extremities spontaneously.


Medications and IVs





Current Medications








 Medications


  (Trade)  Dose


 Ordered  Sig/Luis Armando


 Route  Start Time


 Stop Time Status Last Admin


 


  (NS Flush)  2 ml  UNSCH  PRN


 IVF  1/15/18 01:30


    1/15/18 01:42


 


 


 Heparin Sodium/


 Dextrose  250 ml @ 7


 mls/hr  TITRATE  PRN


 IV  1/15/18 02:30


    1/15/18 03:19


 


 


  (D50w (Vial) Inj)  50 ml  UNSCH  PRN


 IV PUSH  1/15/18 04:00


     


 


 


  (Glucagon Inj)  1 mg  UNSCH  PRN


 OTHER  1/15/18 04:00


     


 


 


  (NovoLOG


 SUPPLEMENTAL


 SCALE)  1  ACHS SLIDING  SCALE


 SQ  1/15/18 08:00


    1/15/18 12:00


 


 


  (Lasix Inj)  40 mg  BID@09,18


 IV PUSH  1/15/18 09:00


    1/15/18 08:50


 


 


 Ceftriaxone


 Sodium 1000 mg/


 Sodium Chloride  100 ml @ 


 200 mls/hr  Q24H


 IV  1/15/18 05:00


    1/15/18 05:12


 


 


 Azithromycin 500


 mg/Sodium Chloride  250 ml @ 


 250 mls/hr  Q24H


 IV  1/15/18 04:00


    1/15/18 04:31


 


 


  (Duoneb Neb)  1 ampule  Q4HR NEB  PRN


 NEB  1/15/18 04:00


    1/15/18 12:35


 


 


  (NS Flush)  2 ml  UNSCH  PRN


 IV FLUSH  1/15/18 04:00


     


 


 


  (NS Flush)  2 ml  BID


 IV FLUSH  1/15/18 09:00


    1/15/18 09:00


 


 


  (Zofran Inj)  4 mg  Q6H  PRN


 IVP  1/15/18 04:00


     


 


 


  (Tylenol)  650 mg  Q6H  PRN


 PO  1/15/18 04:00


     


 


 


  (Norco  5-325 Mg)  1 tab  Q4H  PRN


 PO  1/15/18 04:00


     


 


 


  (Morphine Inj)  2 mg  Q3H  PRN


 IV PUSH  1/15/18 04:00


    1/15/18 11:54


 


 


  (Theresa-Colace)  1 tab  BID


 PO  1/15/18 09:00


    1/15/18 09:00


 


 


  (Milk Of


 Magnesia Liq)  30 ml  Q12H  PRN


 PO  1/15/18 04:00


     


 


 


  (Senokot)  17.2 mg  Q12H  PRN


 PO  1/15/18 04:00


     


 


 


  (Dulcolax Supp)  10 mg  DAILY  PRN


 RECTAL  1/15/18 04:00


     


 


 


  (Lactulose Liq)  30 ml  DAILY  PRN


 PO  1/15/18 04:00


     


 


 


  (Aspirin Chew)  81 mg  DAILY


 CHEW  1/15/18 09:00


    1/15/18 08:48


 


 


  (Folate)  1 mg  DAILY


 PO  1/15/18 09:00


    1/15/18 08:48


 


 


  (Neurontin)  300 mg  HS


 PO  1/15/18 21:00


     


 


 


  (Levemir Inj)  15 units  HS


 SQ  1/15/18 21:00


     


 


 


  (Lopressor)  50 mg  Q12HR


 PO  1/15/18 09:00


    1/15/18 08:48


 


 


  (Procardia)  30 mg  TID


 PO  1/15/18 09:00


    1/15/18 14:16


 


 


  (Pravachol)  40 mg  HS


 PO  1/15/18 21:00


     


 


 


  (Flu


  (Quadrivalent)


 Vaccine Inj)  0.5 ml  ONCE ONCE


 IM  1/16/18 10:00


 1/16/18 10:01   


 











A/P


Assessment and Plan


1.  Sepsis:  Temp 99.6, , WBC 12.0, Source-PNA.  Rocephin/Zithro IV, 

caution w/ IVF in light of CHF. 


2.  PNA:  CXR w/ consolidation at bases, worse on right, images reviewed by me.

  Start Rocephin/Zithro as above, DuoNeb prn. 


3.  Hypoxia:  O2 sat 83% on RA upon arrival, likely secondary to combination of 

CHF and PNA.  Continue O2 as needed, monitor O2 closely.


4.  CHF:  Acute on Chronic.  Diastolic.  Echo 8/18/17 w/ EF 55-60%, BNP 2111, +

crackles on arrival, s/p Lasix 40mg IV, continue w/ Lasix 40mg IV bid, monitor I

/O, resume home medications. 


5.  NSTEMI:  Trop 1.28, c/o chest pain, s/p Morphine/NTG, currently chest pain 

free.  Started on Heparin gtt, will continue.  Admit to [facility], Telemetry, check 

serial enzymes for trend, ASA, Statin, B-Blocker. 


     Cardiology specialist following. 


6.  Renal Insufficiency:  Acute on Chronic.  Creatinine 2.76, previously 2.12 

on 10/21/17, caution with diuresis.  Monitor I/O.  Repeat labs in am. 


7.  DM:  Sliding scale w/ Accu-Cheks. 


8.  Tobacco Abuse: Strongly recommended to stop smoking, no NicoDerm to avoid 

vasoconstriction.  Ativan prn if needed








DVT Prophylaxis:  Heparin gtt


Discharge Planning


once cleared by Cardiology specialist.











Pop Jaffe MD [place] 15, 2018 15:52

## 2018-01-15 NOTE — HHI.HP
__________________________________________________





HPI


Service


[facility]


Primary Care Physician


Jamie Hill MD


Admission Diagnosis





NonSTEMI, CHF


Diagnoses:  


(1) Sepsis


Diagnosis:  Principal





(2) PNA (pneumonia)


Diagnosis:  Principal





(3) Hypoxia


Diagnosis:  Principal





(4) NSTEMI (non-ST elevated myocardial infarction)


Diagnosis:  Principal





(5) CHF (congestive heart failure)


Diagnosis:  Principal





(6) Renal insufficiency


Diagnosis:  Principal





(7) DM (diabetes mellitus)


Diagnosis:  Principal





Travel History


International Travel<30 Days:  No


Contact w/Intl Traveler <30 Da:  No


Traveled to Known Affected Are:  No


History of Present Illness


This is a 52-year-old female with a PMH of HTN, Hyperlipidemia, CHF (Echo  w/ EF 55-60%), DM and CAD who presented to the ER w/ complaints of chest 

pain and SOB starting last night.  Reports left-sided chest pain, constant, 

severe, pressure-like, 9/10, non-radiating w/ associated SOB.  Denies fever, 

chills or sick contacts.  On arrival, /81, , O2 sat 83% on RA, Temp 

99.6.  WBC 12.0.  Creatinine 2.76, producing 2.12 on 10/21/17.  Troponin 1.28.  

BNP 2111.  INR 0.9.  CXR with consolidation or atelectasis at bases.  S/p Lasix 

40mg IV, Morphine, ASA and started on Heparin gtt.





Review of Systems


Except as stated in HPI:  all other systems reviewed are Neg


ROS: 14 point review of systems otherwise negative.





Past Family Social History


Past Medical History


PMH:  HTN, Hyperlipidemia, CHF (Echo 17 w/ EF 55-60%), DM and CAD


Past Surgical History


PAST SURGICAL HISTORY: Cholecystectomy, Tubal Ligation


Allergies:  


Coded Allergies:  


     shellfish derived (Unverified  Adverse Reaction, Mild, VOMITNG, 1/15/18)


Family History


PAST FAMILY HISTORY:  Reviewed.  No h/o DM or CAD


Social History


PAST SOCIAL HISTORY:  History of alcohol abuse, quit 3 years ago.  Positive for 

tobacco.  Positive for Marijuana.





Physical Exam


Vital Signs





Vital Signs








  Date Time  Temp Pulse Resp B/P (MAP) Pulse Ox O2 Delivery O2 Flow Rate FiO2


 


1/15/18 04:30  98 18 198/93 (128) 99 Room Air  


 


1/15/18 01:52  100 18 177/80 (112) 92 Nasal Cannula 6.00 


 


1/15/18 01:46  103 16 199/89 (125) 92 Nasal Cannula 6.00 


 


1/15/18 01:43  103 21 183/118 (139) 91 Nasal Cannula 6.00 


 


1/15/18 01:43     92 Nasal Cannula 6.00 


 


1/15/18 01:25     94 Nasal Cannula 6.00 


 


1/15/18 01:10     94  6.00 


 


1/15/18 01:09     94 Nasal Cannula 6.00 


 


1/15/18 01:07 98.7 99 18 181/89 (119) 91   


 


1/15/18 00:58 99.6 112 22 182/81 (114) 83 Room Air  








Physical Exam


PE:


GENERAL: Middle-aged female in no acute distress.


HEENT: PERRLA, EOMI. No scleral icterus or conjunctival pallor. No lid lag or 

facial droop.  


CARDIOVASCULAR: Regular rate and rhythm.  No obvious murmurs to auscultation. 

No chest tenderness to palpation. 


RESPIRATORY: No obvious rhonchi or wheezing. Clear to auscultation. Breath 

sounds equal bilaterally. 


GASTROINTESTINAL: Abdomen soft, non-tender, nondistended. BS normal. 


MUSCULOSKELETAL: Extremities without clubbing, cyanosis, or edema. No obvious 

deformities. 


NEUROLOGICAL: Awake, alert and oriented x4. No focal neurologic deficits. 

Moving both upper and lower extremities spontaneously.


Laboratory





Laboratory Tests








Test


  1/15/18


01:35


 


White Blood Count 12.0 


 


Red Blood Count 2.55 


 


Hemoglobin 8.8 


 


Hematocrit 25.4 


 


Mean Corpuscular Volume 99.8 


 


Mean Corpuscular Hemoglobin 34.6 


 


Mean Corpuscular Hemoglobin


Concent 34.7 


 


 


Red Cell Distribution Width 13.9 


 


Platelet Count 418 


 


Mean Platelet Volume 8.3 


 


Neutrophils (%) (Auto) 79.8 


 


Lymphocytes (%) (Auto) 14.4 


 


Monocytes (%) (Auto) 3.3 


 


Eosinophils (%) (Auto) 1.8 


 


Basophils (%) (Auto) 0.7 


 


Neutrophils # (Auto) 9.6 


 


Lymphocytes # (Auto) 1.7 


 


Monocytes # (Auto) 0.4 


 


Eosinophils # (Auto) 0.2 


 


Basophils # (Auto) 0.1 


 


CBC Comment DIFF FINAL 


 


Differential Comment  


 


Prothrombin Time 9.1 


 


Prothromb Time International


Ratio 0.9 


 


 


Activated Partial


Thromboplast Time 23.3 


 


 


Blood Urea Nitrogen 47 


 


Creatinine 2.76 


 


Random Glucose 234 


 


Total Protein 7.7 


 


Albumin 3.1 


 


Calcium Level 8.6 


 


Magnesium Level 2.6 


 


Alkaline Phosphatase 172 


 


Aspartate Amino Transf


(AST/SGOT) 26 


 


 


Alanine Aminotransferase


(ALT/SGPT) 40 


 


 


Total Bilirubin 0.4 


 


Sodium Level 138 


 


Potassium Level 4.8 


 


Chloride Level 107 


 


Carbon Dioxide Level 23.8 


 


Anion Gap 7 


 


Estimat Glomerular Filtration


Rate 22 


 


 


Total Creatine Kinase 106 


 


Creatine Kinase MB 3.2 


 


Troponin I 1.28 


 


B-Type Natriuretic Peptide 2111 


 


Lipase 348 








Result Diagram:  


1/15/18 0135                                                                   

             1/15/18 0135








Caprini VTE Risk Assessment


Caprini VTE Risk Assessment:  Mod/High Risk (score >= 2)


Caprini Risk Assessment Model











 Point Value = 1          Point Value = 2  Point Value = 3  Point Value = 5


 


Age 41-60


Minor surgery


BMI > 25 kg/m2


Swollen legs


Varicose veins


Pregnancy or postpartum


History of unexplained or recurrent


   spontaneous 


Oral contraceptives or hormone


   replacement


Sepsis (< 1 month)


Serious lung disease, including


   pneumonia (< 1 month)


Abnormal pulmonary function


Acute myocardial infarction


Congestive heart failure (< 1 month)


History of inflammatory bowel disease


Medical patient at bed rest Age 61-74


Arthroscopic surgery


Major open surgery (> 45 min)


Laparoscopic surgery (> 45 min)


Malignancy


Confined to bed (> 72 hours)


Immobilizing plaster cast


Central venous access Age >= 75


History of VTE


Family history of VTE


Factor V Leiden


Prothrombin 33061I


Lupus anticoagulant


Anticardiolipin antibodies


Elevated serum homocysteine


Heparin-induced thrombocytopenia


Other congenital or acquired


   thrombophilia Stroke (< 1 month)


Elective arthroplasty


Hip, pelvis, or leg fracture


Acute spinal cord injury (< 1 month)








Prophylaxis Regimen











   Total Risk


Factor Score Risk Level Prophylaxis Regimen


 


0-1      Low Early ambulation


 


2 Moderate Order ONE of the following:


*Sequential Compression Device (SCD)


*Heparin 5000 units SQ BID


 


3-4 Higher Order ONE of the following medications:


*Heparin 5000 units SQ TID


*Enoxaparin/Lovenox 40 mg SQ daily (WT < 150 kg, CrCl > 30 mL/min)


*Enoxaparin/Lovenox 30 mg SQ daily (WT < 150 kg, CrCl > 10-29 mL/min)


*Enoxaparin/Lovenox 30 mg SQ BID (WT < 150 kg, CrCl > 30 mL/min)


AND/OR


*Sequential Compression Device (SCD)


 


5 or more Highest Order ONE of the following medications:


*Heparin 5000 units SQ TID (Preferred with Epidurals)


*Enoxaparin/Lovenox 40 mg SQ daily (WT < 150 kg, CrCl > 30 mL/min)


*Enoxaparin/Lovenox 30 mg SQ daily (WT < 150 kg, CrCl > 10-29 mL/min)


*Enoxaparin/Lovenox 30 mg SQ BID (WT < 150 kg, CrCl > 30 mL/min)


AND


*Sequential Compression Device (SCD)











Assessment and Plan


Problem List:  


(1) Sepsis


ICD Code:  A41.9 - Sepsis, unspecified organism


(2) PNA (pneumonia)


ICD Code:  J18.9 - Pneumonia, unspecified organism


(3) Hypoxia


ICD Code:  R09.02 - Hypoxemia


(4) NSTEMI (non-ST elevated myocardial infarction)


ICD Code:  I21.4 - Non-ST elevation (NSTEMI) myocardial infarction


(5) CHF (congestive heart failure)


ICD Code:  I50.9 - Heart failure, unspecified


(6) Renal insufficiency


ICD Code:  N28.9 - Disorder of kidney and ureter, unspecified


(7) DM (diabetes mellitus)


ICD Code:  E11.9 - Type 2 diabetes mellitus without complications


Assessment and Plan


A/P:


1.  Sepsis:  Temp 99.6, , WBC 12.0, Source-PNA.  Rocephin/Zithro IV, 

caution w/ IVF in light of CHF. 


2.  PNA:  CXR w/ consolidation at bases, worse on right, images reviewed by me.

  Start Rocephin/Zithro as above, DuoNeb prn. 


3.  Hypoxia:  O2 sat 83% on RA upon arrival, likely secondary to combination of 

CHF and PNA.  Continue O2 as needed, monitor O2 closely.


4.  CHF:  Acute on Chronic.  Diastolic.  Echo 17 w/ EF 55-60%, BNP 2111, +

crackles on arrival, s/p Lasix 40mg IV, continue w/ Lasix 40mg IV bid, monitor I

/O, resume home medications. 


5.  NSTEMI:  Trop 1.28, c/o chest pain, s/p Morphine/NTG, currently chest pain 

free.  Started on Heparin gtt, will continue.  Admit to [facility], Telemetry, check 

serial enzymes for trend, ASA, Statin, B-Blocker.  Consult Cardiology for 

further intervention. 


6.  Renal Insufficiency:  Acute on Chronic.  Creatinine 2.76, previously 2.12 

on 10/21/17, caution with diuresis.  Monitor I/O.  Repeat labs in am. 


7.  DM:  Sliding scale w/ Accu-Cheks. 


8.  Tobacco Abuse:  Pt counselled, no NicoDerm to avoid vasoconstriction.  

Ativan prn if needed


9.  DVT Prophylaxis:  Heparin gtt


10.  Social work for d/c planning as needed. 


11.  Labs/records/imaging reviewed by me, case discussed at length w/ ER 

physician.





Physician Certification


2 Midnight Certification Type:  Admission for Inpatient Services


Order for Inpatient Services


The services are ordered in accordance with Medicare regulations or non-

Medicare payer requirements, as applicable.  In the case of services not 

specified as inpatient-only, they are appropriately provided as inpatient 

services in accordance with the 2-midnight benchmark.


Estimated LOS (days):  2


 days is the estimated time the patient will need to remain in the hospital, 

assuming treatment plan goals are met and no additional complications.


Post-Hospital Plan:  Not yet determined











Maritza Hart MD [place] 15, 2018 04:56

## 2018-01-15 NOTE — EKG
Date Performed: 01/15/2018       Time Performed: 01:08:30

 

PTAGE:      52 years

 

EKG:      SINUS TACHYCARDIA POSSIBLE LEFT ATRIAL ENLARGEMENT LEFT VENTRICULAR HYPERTROPHY AND ST-T CH
GEORGINA ABNORMAL ECG Compared to 

 

 PREVIOUS TRACING            , ST segment depression and T-wave inversion is much more prominent in t
he lateral leads, consider lateral ischemia. PREVIOUS TRACING: 10/18/2017 21.31

 

DOCTOR:   Cali Huerta  Interpretating Date/Time  01/15/2018 16:15:36

## 2018-01-15 NOTE — PD
HPI


Chief Complaint:  Respiratory Distress


Time Seen by Provider:  01:12


Travel History


International Travel<30 days:  No


Contact w/Intl Traveler<30days:  No


Traveled to known affect area:  No





History of Present Illness


HPI


The patient is 52 year old female who presents to the [facility] emergency 

department with a history of shortness of breath that began last night she was 

trying to sleep.  The patient reports that she awakened frequently with 

shortness of breath.  She reports that throughout the day the shortness of 

breath was improved, however when she tried to go back to sleep again this 

evening she became short of breath.  She reports that she's had intermittent 

chest pain in the left side of her chest since yesterday.  She reports that the 

pain is a pressure sensation.  She reports that she does have a history of 

coronary artery disease and has had a myocardial infarction in the past and 

stents placed previously.  She cannot recall when she last had a stress test 

done.  She reports that it was at least over a year ago.  She denies having any 

prior history of congestive heart failure, DVT, or PE.  She reports that in 

August she did have a bilateral pneumonia.  She reports that she smokes a half 

a pack of cigarettes per day.  On review of systems otherwise, she denies 

having any recent fevers, cough or congestion, neck pain, worsening abdominal 

pain, vomiting, diarrhea, urinary symptoms, or neurologic symptoms.





[facility]


Past Medical History


*** Narrative Medical


The patient's past medical history is significant for a history of hypertension

, diabetes mellitus, acid reflux, coronary artery disease with a prior MI in 

 status post angioplasty and stent placement, hyperlipidemia, recurrent 

hospitalizations for recurrent abdominal pain, pancreatitis, gastroparesis, 

uterine fibroids, history of C. difficile colitis infection.


Hx Anticoagulant Therapy:  No


Arthritis:  Yes


Blood Disorders:  No


Anxiety:  No


Depression:  No


Cancer:  No


Cardiac Catheterization:  Yes


Cardiovascular Problems:  Yes


High Cholesterol:  Yes


Chemotherapy:  No


Chest Pain:  Yes


Congestive Heart Failure:  No


Cerebrovascular Accident:  No


Coronary Artery Disease:  Yes


Diabetes:  Yes


Patient Takes Glucophage:  No


Diminished Hearing:  No


Endocrine:  Yes


Gastrointestinal Disorders:  Yes (HX PANCREATITIS)


GERD:  Yes


Genitourinary:  Yes (BLOCKAGE IN URETER, STENTED BUT REMOVED)


Hypertension:  Yes


Immune Disorder:  No


Implanted Vascular Access Dvce:  Yes


Musculoskeletal:  No


Neurologic:  No


Psychiatric:  No


Reproductive:  Yes (PT STATES FIBROIDS)


Respiratory:  Yes (pna)


Migraines:  Yes


Pancreatitis:  Yes


Pneumonia:  Yes


Thyroid Disease:  No


Pregnant?:  Not Pregnant


Menopausal:  Yes


:  2


Para:  3


Miscarriage:  0


:  0


Tubal Ligation:  Yes





Past Surgical History


*** Narrative Surgical


The patient's past surgical history is significant for cholecystectomy, 

bilateral tubal ligation, cardiac stent placement, cataract surgery endoscopy 

and colonoscopy.


Abdominal Surgery:  Yes


Cardiac Surgery:  Yes


Cholecystectomy:  Yes


Coronary Stent:  Yes


Ear Surgery:  No


Endocrine Surgery:  No


Eye Surgery:  No


Genitourinary Surgery:  Yes


Gynecologic Surgery:  Yes (TUBAL LIGATION)


Hysterectomy:  No


Neurologic Surgery:  No


Oral Surgery:  Yes


Pacemaker:  No


Thoracic Surgery:  No


Other Surgery:  Yes





Social History


Alcohol Use:  No (use to abuse; quit 3 years ago. )


Tobacco Use:  Yes (0.5 ppd)


Substance Use:  Yes (marijuana)





Allergies-Medications


(Allergen,Severity, Reaction):  


Coded Allergies:  


     shellfish derived (Unverified  Adverse Reaction, Mild, VOMITNG, 1/15/18)


Reported Meds & Prescriptions





Reported Meds & Active Scripts


Active


Zofran (Ondansetron HCl) 4 Mg Tab 4 Mg PO Q6HR PRN


Gas Relief Maximum Streng (Simethicone) 125 Mg Chw 125 Mg PO Q8HR


Lopressor (Metoprolol Tartrate) 50 Mg Tab 50 Mg PO Q12HR


Reported


Fish Oil + D3 (Fish Oil-Cholecalciferol) 1,200-1,000 Mg-Unit Cap 1 Cap PO DAILY


Nifedipine 20 Mg Cap 30 Mg PO TID


Lantus Inj (Insulin Glargine) 1,000 Unit/10 Ml Vial 15 Units SQ HS


Gabapentin 100 Mg Cap 300 Mg PO HS


Simvastatin 20 Mg Tab 20 Mg PO HS


Folic Acid 400 Mcg Tab 1 Mg PO DAILY


Feosol (Ferrous Sulfate) 200 Mg Tab 325 Mg PO BID


Vasotec (Enalapril Maleate) 20 Mg Tab 20 Mg PO BID


Aspirin Children's (Aspirin) 81 Mg Chew 81 Mg CHEW DAILY








Review of Systems


Except as stated in HPI:  all other systems reviewed are Neg


General / Constitutional:  No: Fever


Eyes:  No: Visual changes


HENT:  No: Headaches


Cardiovascular:  Positive: Chest Pain or Discomfort, Dyspnea on exertion


Respiratory:  Positive: Shortness of Breath


Gastrointestinal:  No: Nausea, Vomiting, Diarrhea, Abdominal Pain


Genitourinary:  No: Dysuria


Musculoskeletal:  No: Pain


Skin:  No Rash


Neurologic:  No: Weakness


Psychiatric:  No: Depression


Endocrine:  No: Polydipsia


Hematologic/Lymphatic:  No: Easy Bruising





Physical Exam


Narrative


General: 


The patient is a well-developed well-nourished female, uncomfortable appearing 

on arrival holding the left side of her chest, short of breath with O2 

saturations on room air of 83%.  The patient is placed on supplemental oxygen 

and her O2 saturation came up into the 90s. 





Head and Neck exam: 


Head is normocephalic atraumatic. 


Eyes: EOMI, pupils are equal round and reactive to light. 


Nose: Midline septum with pink mucous membranes 


Mouth: Dentition unremarkable. Moist mucus membranes. Posterior oropharynx is 

not erythematous. No tonsillar hypertrophy. Uvula midline. Airway patent. 


Neck: No palpable lymphadenopathy. No nuchal rigidity. No thyromegaly. 





Cardiovascular: 


Sinus tachycardia in the low 100s without murmurs, gallops, or rubs. No pulse 

deficit to the extremities on simultaneous auscultation and palpation of her 

radial artery. 





Lungs: 


Crackles are audible bilateral lung bases, no wheezes, no rhonchi.  No 

accessory muscle use.  The patient has tachypnea noted on examination with 

tripoding.


 


Abdomen:


Soft, with reported pressure sensations on palpation of the upper abdomen 

bilaterally and in the midepigastric area.  No other tenderness on palpation of 

the other quadrants of the abdomen. No guarding, rebound, or rigidity.  Normal 

bowel sounds are audible.  No tenderness on palpation of McBurney's point.





Extremities: 


No clubbing, cyanosis, or edema. 2+ pulses in all 4 extremities.  No calf 

tenderness on palpation.  Negative Homans sign.  No palpable cords.





Back: 


No spinous process tenderness to palpation. No costovertebral angle tenderness 

to palpation. 





Neurologic Exam: Grossly nonfocal.  





Skin Exam: No rash noted. Intact skin that is warm and dry.





Data


Data


Last Documented VS





Vital Signs








  Date Time  Temp Pulse Resp B/P (MAP) Pulse Ox O2 Delivery O2 Flow Rate FiO2


 


1/15/18 01:52  100 18 177/80 (112) 92 Nasal Cannula 6.00 


 


1/15/18 01:07 98.7       








Orders





 Orders


Electrocardiogram (1/15/18 01:25)


B-Type Natriuretic Peptide (1/15/18 01:25)


Ckmb (Isoenzyme) Profile (1/15/18 01:25)


Complete Blood Count With Diff (1/15/18 01:25)


Comprehensive Metabolic Panel (1/15/18 01:25)


Magnesium (Mg) (1/15/18 01:25)


Prothrombin Time / Inr (Pt) (1/15/18 01:25)


Act Partial Throm Time (Ptt) (1/15/18 01:25)


Troponin I (1/15/18 01:25)


Lipase (1/15/18 01:25)


Chest, Single Ap (1/15/18 01:25)


Ecg Monitoring (1/15/18 01:25)


Bilateral Bp Monitoring (1/15/18 01:25)


Iv Access Insert/Monitor (1/15/18 01:25)


Oximetry (1/15/18 01:25)


Oxygen Administration (1/15/18 01:)


Aspirin Chew (Aspirin Chew) (1/15/18 01:30)


Nitroglycerin 2% Oint (Nitroglycerin 2% (1/15/18 01:30)


Sodium Chloride 0.9% Flush (Ns Flush) (1/15/18 01:30)


Nitroglycerin Sl (Nitrostat Sl) (1/15/18 01:30)


Ed Urine Pregnancytest Poc (1/15/18 01:25)


CKMB (1/15/18 01:35)


CKMB% (1/15/18 01:35)


Heparin Inj (Heparin Inj) (1/15/18 02:30)


Heparin-D5w 25,000 U/250 Ml (Heparin-D5w (1/15/18 02:30)


Cbc No Diff, Includes Plts (18 06:00)


Act Partial Throm Time (Ptt) (1/15/18 09:22)


Occult Blood (Hemoccult) Stool (1/15/18 02:22)


Furosemide Inj (Lasix Inj) (1/15/18 02:30)


Admit Order (Ed Use Only) (1/15/18 03:31)





Labs





Laboratory Tests








Test


  1/15/18


01:35


 


White Blood Count 12.0 TH/MM3 


 


Red Blood Count 2.55 MIL/MM3 


 


Hemoglobin 8.8 GM/DL 


 


Hematocrit 25.4 % 


 


Mean Corpuscular Volume 99.8 FL 


 


Mean Corpuscular Hemoglobin 34.6 PG 


 


Mean Corpuscular Hemoglobin


Concent 34.7 % 


 


 


Red Cell Distribution Width 13.9 % 


 


Platelet Count 418 TH/MM3 


 


Mean Platelet Volume 8.3 FL 


 


Neutrophils (%) (Auto) 79.8 % 


 


Lymphocytes (%) (Auto) 14.4 % 


 


Monocytes (%) (Auto) 3.3 % 


 


Eosinophils (%) (Auto) 1.8 % 


 


Basophils (%) (Auto) 0.7 % 


 


Neutrophils # (Auto) 9.6 TH/MM3 


 


Lymphocytes # (Auto) 1.7 TH/MM3 


 


Monocytes # (Auto) 0.4 TH/MM3 


 


Eosinophils # (Auto) 0.2 TH/MM3 


 


Basophils # (Auto) 0.1 TH/MM3 


 


CBC Comment DIFF FINAL 


 


Differential Comment  


 


Prothrombin Time 9.1 SEC 


 


Prothromb Time International


Ratio 0.9 RATIO 


 


 


Activated Partial


Thromboplast Time 23.3 SEC 


 


 


Blood Urea Nitrogen 47 MG/DL 


 


Creatinine 2.76 MG/DL 


 


Random Glucose 234 MG/DL 


 


Total Protein 7.7 GM/DL 


 


Albumin 3.1 GM/DL 


 


Calcium Level 8.6 MG/DL 


 


Magnesium Level 2.6 MG/DL 


 


Alkaline Phosphatase 172 U/L 


 


Aspartate Amino Transf


(AST/SGOT) 26 U/L 


 


 


Alanine Aminotransferase


(ALT/SGPT) 40 U/L 


 


 


Total Bilirubin 0.4 MG/DL 


 


Sodium Level 138 MEQ/L 


 


Potassium Level 4.8 MEQ/L 


 


Chloride Level 107 MEQ/L 


 


Carbon Dioxide Level 23.8 MEQ/L 


 


Anion Gap 7 MEQ/L 


 


Estimat Glomerular Filtration


Rate 22 ML/MIN 


 


 


Total Creatine Kinase 106 U/L 


 


Creatine Kinase MB 3.2 NG/ML 


 


Troponin I 1.28 NG/ML 


 


B-Type Natriuretic Peptide 2111 PG/ML 


 


Lipase 348 U/L 











MDM


Medical Decision Making


Medical Screen Exam Complete:  Yes


Emergency Medical Condition:  Yes


Medical Record Reviewed:  Yes


Interpretation(s)





Last Impressions








Chest X-Ray 1/15/18 0125 Signed





Impressions: 





 Service Date/Time:  Monday, January 15, 2018 01:32 - CONCLUSION:  1. 





 Consolidation or atelectasis at the bases being worse on the right. 2. 

Suspected 





 nipple shadows. This could be followup at some point with a chest x-ray with 





 nipple markers.     Severo Weir MD 








Differential Diagnosis


New-onset CHF, versus acute coronary syndrome, versus pneumonia.


Narrative Course


During the course of the patients emergency department visit, the patients 

history, examination, and differential diagnosis were reviewed with the 

patient. The patient was placed on a cardiac monitor with oximetry and frequent 

blood pressure monitoring. The patient had  IV access obtained and blood work 

sent for analysis.  The patient had an ECG done on arrival that shows a sinus 

tachycardia heart rate of 101, left ventricular hypertrophy by voltage criteria

, QRS duration is 81 ms,  ms, T waves are inverted in V4, V5, V6, lead 1

, aVL with downsloping ST segments.  No acute ST segment elevation.





The patient was initially provided aspirin 162 mg by mouth 1, nitroglycerin 

sublingual every 5 minutes 3, nitroglycerin 1 inch the chest wall.  Her chest x

-ray showed evidence of what appeared to be fluid overload and the patient was 

given Lasix 40 mg IV.





The patients laboratory studies were reviewed and remarkable for a white count 

of 12, hemoglobin 8.8, platelets 418 with a neutrophil count of 79.8.  The 

patient's hemoglobin compared to previously was 10 on 2017.  CMP is 

remarkable for BUN of 47, creatinine 2.76 which is increased compared to 

previously at 26 and 2.12 respectively.  Magnesium is 2.6, alkaline phosphatase 

172, , troponin I 1.28, BNP 2111, lipase 348, PT 9.1, PTT 23.3.





The patients results were discussed with the patient, including the plan of 

care. I explained that further testing and/ or monitoring is indicated based on 

the patients history, examination, and/ or laboratory findings. Therefore, I 

recommended admission for additional evaluation. The patient expressed 

understanding and was agreeable with this plan. The patient was admitted to the 

hospital in guarded condition and [facility]


Critical Care Narrative


Aggregate critical care time was 32 minutes. Time to perform other separately 

billable procedures was not  


included in the critical care time. My time did not include minutes spent 

treating any other patients simultaneously or on  


activities that did not directly contribute to the patient's treatment.  





The services I provided to this patient were to treat and/or prevent clinically 

significant deterioration that could result  


in: Cardiovascular collapse related to congestive heart failure, versus fluid 

overload with progressive respiratory failure





I provided critical care services requiring my management, as noted below:


Chart data review, documentation time, medication orders and management, vital 

sign assessments/reviewing monitor data,  


ordering and reviewing lab tests, ordering and interpreting/reviewing x-rays 

and diagnostic studies, care of the patient  


and discussion of the patient with the admitting physicians.





Physician Communication


Physician Communication


The patient's case including history, pertinent physical examination findings, 

and laboratory studies were discussed with Dr. Hart. It was agreed that the 

[facility]





Diagnosis





 Primary Impression:  


 NSTEMI (non-ST elevated myocardial infarction)


 Additional Impressions:  


 CHF (congestive heart failure)


 Qualified Codes:  I50.9 - Heart failure, unspecified


 Leukocytosis


 Qualified Codes:  D72.829 - Elevated white blood cell count, unspecified





Admitting Information


Admitting Physician Requests:  Admit











Lea Cuevas MD Ker[place] 15, 2018 01:52

## 2018-01-15 NOTE — RADRPT
EXAM DATE/TIME:  01/15/2018 01:32 

 

[facility] COMPARISON:     

CHEST SINGLE AP, September 06, 2017, 12:42.

 

                     

INDICATIONS :     

Shortness of breath.

                     

 

MEDICAL HISTORY :     

Pancreatitis.  Gastroesophageal reflux disease.  Myocardial infarction.   CAD Hypertension   

 

SURGICAL HISTORY :     

Cholecystectomy. Tubal ligation.  

 

ENCOUNTER:     

Initial                                        

 

ACUITY:     

1 day      

 

PAIN SCORE:     

0/10

 

LOCATION:     

Bilateral chest 

 

FINDINGS:     

The heart size is normal. There is increased density in the lower lungs bilaterally being worse on th
e right. There are nodular densities projecting over the lower chest regions bilaterally likely relat
ed to nipple shadows given their symmetry.

 

CONCLUSION:     

1. Consolidation or atelectasis at the bases being worse on the right.

2. Suspected nipple shadows. This could be followup at some point with a chest x-ray with nipple hermes
ers.

 

 

 

 Severo Weir MD on January 15, 2018 at 1:47           

Board Certified Radiologist.

 This report was verified electronically.

## 2018-01-16 VITALS
RESPIRATION RATE: 19 BRPM | DIASTOLIC BLOOD PRESSURE: 93 MMHG | HEART RATE: 85 BPM | OXYGEN SATURATION: 100 % | TEMPERATURE: 97.5 F | SYSTOLIC BLOOD PRESSURE: 180 MMHG

## 2018-01-16 VITALS
OXYGEN SATURATION: 99 % | RESPIRATION RATE: 20 BRPM | DIASTOLIC BLOOD PRESSURE: 84 MMHG | TEMPERATURE: 99.5 F | SYSTOLIC BLOOD PRESSURE: 148 MMHG | HEART RATE: 100 BPM

## 2018-01-16 VITALS
RESPIRATION RATE: 18 BRPM | DIASTOLIC BLOOD PRESSURE: 78 MMHG | HEART RATE: 87 BPM | TEMPERATURE: 97.8 F | OXYGEN SATURATION: 100 % | SYSTOLIC BLOOD PRESSURE: 153 MMHG

## 2018-01-16 VITALS — SYSTOLIC BLOOD PRESSURE: 160 MMHG | DIASTOLIC BLOOD PRESSURE: 84 MMHG

## 2018-01-16 VITALS — HEART RATE: 88 BPM

## 2018-01-16 VITALS — HEART RATE: 98 BPM

## 2018-01-16 VITALS
HEART RATE: 88 BPM | RESPIRATION RATE: 18 BRPM | TEMPERATURE: 98.7 F | OXYGEN SATURATION: 97 % | DIASTOLIC BLOOD PRESSURE: 72 MMHG | SYSTOLIC BLOOD PRESSURE: 141 MMHG

## 2018-01-16 VITALS — HEART RATE: 95 BPM

## 2018-01-16 VITALS
RESPIRATION RATE: 18 BRPM | SYSTOLIC BLOOD PRESSURE: 123 MMHG | TEMPERATURE: 98.6 F | DIASTOLIC BLOOD PRESSURE: 63 MMHG | OXYGEN SATURATION: 95 % | HEART RATE: 93 BPM

## 2018-01-16 VITALS
RESPIRATION RATE: 18 BRPM | DIASTOLIC BLOOD PRESSURE: 75 MMHG | TEMPERATURE: 97.5 F | OXYGEN SATURATION: 98 % | HEART RATE: 73 BPM | SYSTOLIC BLOOD PRESSURE: 150 MMHG

## 2018-01-16 VITALS — HEART RATE: 91 BPM

## 2018-01-16 VITALS — HEART RATE: 92 BPM

## 2018-01-16 VITALS — OXYGEN SATURATION: 99 %

## 2018-01-16 VITALS — HEART RATE: 83 BPM

## 2018-01-16 VITALS — DIASTOLIC BLOOD PRESSURE: 78 MMHG | OXYGEN SATURATION: 95 % | SYSTOLIC BLOOD PRESSURE: 155 MMHG

## 2018-01-16 VITALS — HEART RATE: 100 BPM

## 2018-01-16 VITALS — HEART RATE: 90 BPM

## 2018-01-16 VITALS — HEART RATE: 86 BPM

## 2018-01-16 VITALS — HEART RATE: 94 BPM

## 2018-01-16 VITALS — HEART RATE: 97 BPM

## 2018-01-16 VITALS — HEART RATE: 87 BPM

## 2018-01-16 VITALS — HEART RATE: 89 BPM

## 2018-01-16 LAB
ALBUMIN SERPL-MCNC: 2.7 GM/DL (ref 3.4–5)
ALP SERPL-CCNC: 168 U/L (ref 45–117)
ALT SERPL-CCNC: 33 U/L (ref 10–53)
AST SERPL-CCNC: 23 U/L (ref 15–37)
BASOPHILS # BLD AUTO: 0.1 TH/MM3 (ref 0–0.2)
BASOPHILS NFR BLD: 0.5 % (ref 0–2)
BILIRUB SERPL-MCNC: 0.4 MG/DL (ref 0.2–1)
BUN SERPL-MCNC: 43 MG/DL (ref 7–18)
CALCIUM SERPL-MCNC: 9 MG/DL (ref 8.5–10.1)
CHLORIDE SERPL-SCNC: 110 MEQ/L (ref 98–107)
CREAT SERPL-MCNC: 2.96 MG/DL (ref 0.5–1)
EOSINOPHIL # BLD: 0.1 TH/MM3 (ref 0–0.4)
EOSINOPHIL NFR BLD: 0.6 % (ref 0–4)
ERYTHROCYTE [DISTWIDTH] IN BLOOD BY AUTOMATED COUNT: 13.6 % (ref 11.6–17.2)
GFR SERPLBLD BASED ON 1.73 SQ M-ARVRAT: 20 ML/MIN (ref 89–?)
GLUCOSE SERPL-MCNC: 89 MG/DL (ref 74–106)
HCO3 BLD-SCNC: 20.9 MEQ/L (ref 21–32)
HCT VFR BLD CALC: 26 % (ref 35–46)
HGB BLD-MCNC: 8.6 GM/DL (ref 11.6–15.3)
LYMPHOCYTES # BLD AUTO: 0.9 TH/MM3 (ref 1–4.8)
LYMPHOCYTES NFR BLD AUTO: 7.2 % (ref 9–44)
MAGNESIUM SERPL-MCNC: 2.4 MG/DL (ref 1.5–2.5)
MCH RBC QN AUTO: 33.1 PG (ref 27–34)
MCHC RBC AUTO-ENTMCNC: 33 % (ref 32–36)
MCV RBC AUTO: 100.4 FL (ref 80–100)
MONOCYTE #: 0.4 TH/MM3 (ref 0–0.9)
MONOCYTES NFR BLD: 3.3 % (ref 0–8)
NEUTROPHILS # BLD AUTO: 11.3 TH/MM3 (ref 1.8–7.7)
NEUTROPHILS NFR BLD AUTO: 88.4 % (ref 16–70)
PLATELET # BLD: 423 TH/MM3 (ref 150–450)
PMV BLD AUTO: 8.3 FL (ref 7–11)
PROT SERPL-MCNC: 7.7 GM/DL (ref 6.4–8.2)
RBC # BLD AUTO: 2.59 MIL/MM3 (ref 4–5.3)
SODIUM SERPL-SCNC: 142 MEQ/L (ref 136–145)
WBC # BLD AUTO: 12.8 TH/MM3 (ref 4–11)

## 2018-01-16 RX ADMIN — FOLIC ACID SCH MG: 1 TABLET ORAL at 08:01

## 2018-01-16 RX ADMIN — SODIUM CHLORIDE SCH MLS/HR: 900 INJECTION INTRAVENOUS at 04:40

## 2018-01-16 RX ADMIN — ASPIRIN 81 MG SCH MG: 81 TABLET ORAL at 08:01

## 2018-01-16 RX ADMIN — INSULIN ASPART SCH: 100 INJECTION, SOLUTION INTRAVENOUS; SUBCUTANEOUS at 13:56

## 2018-01-16 RX ADMIN — Medication SCH ML: at 20:38

## 2018-01-16 RX ADMIN — METOPROLOL TARTRATE SCH MG: 50 TABLET, FILM COATED ORAL at 08:01

## 2018-01-16 RX ADMIN — STANDARDIZED SENNA CONCENTRATE AND DOCUSATE SODIUM SCH TAB: 8.6; 5 TABLET, FILM COATED ORAL at 20:38

## 2018-01-16 RX ADMIN — FUROSEMIDE SCH MG: 10 INJECTION, SOLUTION INTRAMUSCULAR; INTRAVENOUS at 08:02

## 2018-01-16 RX ADMIN — METOPROLOL TARTRATE SCH MG: 50 TABLET, FILM COATED ORAL at 20:37

## 2018-01-16 RX ADMIN — HEPARIN SODIUM PRN MLS/HR: 10000 INJECTION, SOLUTION INTRAVENOUS at 10:30

## 2018-01-16 RX ADMIN — STANDARDIZED SENNA CONCENTRATE AND DOCUSATE SODIUM SCH TAB: 8.6; 5 TABLET, FILM COATED ORAL at 08:02

## 2018-01-16 RX ADMIN — HYDROCODONE BITARTRATE AND ACETAMINOPHEN PRN TAB: 5; 325 TABLET ORAL at 22:47

## 2018-01-16 RX ADMIN — GABAPENTIN SCH MG: 300 CAPSULE ORAL at 20:37

## 2018-01-16 RX ADMIN — ACYCLOVIR SCH UNITS: 800 TABLET ORAL at 20:57

## 2018-01-16 RX ADMIN — Medication SCH ML: at 08:02

## 2018-01-16 RX ADMIN — INSULIN ASPART SCH: 100 INJECTION, SOLUTION INTRAVENOUS; SUBCUTANEOUS at 18:28

## 2018-01-16 RX ADMIN — AZITHROMYCIN SCH MLS/HR: 500 INJECTION, POWDER, LYOPHILIZED, FOR SOLUTION INTRAVENOUS at 04:40

## 2018-01-16 RX ADMIN — INSULIN ASPART SCH: 100 INJECTION, SOLUTION INTRAVENOUS; SUBCUTANEOUS at 22:48

## 2018-01-16 RX ADMIN — IPRATROPIUM BROMIDE AND ALBUTEROL SULFATE PRN AMPULE: .5; 3 SOLUTION RESPIRATORY (INHALATION) at 22:22

## 2018-01-16 RX ADMIN — INSULIN ASPART SCH: 100 INJECTION, SOLUTION INTRAVENOUS; SUBCUTANEOUS at 08:00

## 2018-01-16 RX ADMIN — PRAVASTATIN SODIUM SCH MG: 40 TABLET ORAL at 20:37

## 2018-01-16 NOTE — EKG
Date Performed: 01/15/2018       Time Performed: 12:14:26

 

PTAGE:      52 years

 

EKG:      Sinus rhythm 

 

. LVH with secondary repolarization abnormality Extensive ST-T changes are probably due to ventricula
r hypertrophy Since previous tracing, no significant change noted Abnormal ECG

 

PREVIOUS TRACING       : 01/15/2018 07.36

 

DOCTOR:   Jimena Casiano  Interpretating Date/Time  01/16/2018 15:17:17

## 2018-01-16 NOTE — EKG
Date Performed: 01/15/2018       Time Performed: 07:36:32

 

PTAGE:      52 years

 

EKG:      Sinus rhythm 

 

. LVH with secondary repolarization abnormality Lateral ST-T changes are probably due to ventricular 
hypertrophy Since previous tracing, no significant change noted Abnormal ECG

 

PREVIOUS TRACING       : 01/15/2018 01.08

 

DOCTOR:   Jimena Casiano  Interpretating Date/Time  01/16/2018 15:16:45

## 2018-01-16 NOTE — HHI.PR
Subjective


Remarks


This is a pleasant 51 y/o Female admitted today she has Hypertension, 

Hyperlipidemia, CHF Echo 8/18/17 with Ejection Fraction 55-60%, 


DM II, CAD, %), DM and CAD, who came to ER with left side chest pain, with 

associated Shortness of breath, Creatinine 2.76, Leukocytosis


CXR with consolidation or atelectasis at bases.  S/p Lasix 40mg IV, Morphine, 

ASA and started on Heparin gtt.


Today seen in her bedroom, she has chronic Tobacco dependence Cardiology 

specialist following, continue antibiotics for Pneumonia. 





1/16: Stable in her bedroom, awaiting final recommendations by Pulmonary 

specialist Improving her respiratory status. no nausea, vomit 


or diarrhea, discussed with nurse Miss Chan.





Objective





Vital Signs








  Date Time  Temp Pulse Resp B/P (MAP) Pulse Ox O2 Delivery O2 Flow Rate FiO2


 


1/16/18 07:15 97.5 85 19 180/93 (122) 100   


 


1/16/18 07:15     100 Nasal Cannula 5.00 





      Humidified  


 


1/16/18 07:00  90      


 


1/16/18 06:08  95      


 


1/16/18 05:00  90      


 


1/16/18 04:30 97.5 73 18 150/75 (100) 98   


 


1/16/18 04:00  87      


 


1/16/18 03:14     97 Nasal Cannula 5.00 





      Humidified  


 


1/16/18 03:00  86      


 


1/16/18 02:00  88      


 


1/16/18 00:00 98.7 88 18 141/72 (95) 97   


 


1/16/18 00:00     97 Nasal Cannula 5.00 





      Humidified  


 


1/15/18 20:00 99.0 102 20 142/79 (100) 100   


 


1/15/18 20:00     98 Nasal Cannula 5.00 





      Humidified  


 


1/15/18 19:45      Nasal Cannula 6.00 


 


1/15/18 19:00  101      


 


1/15/18 18:00  92      


 


1/15/18 17:59    158/81 (106)    


 


1/15/18 17:00  93      


 


1/15/18 16:30   18     


 


1/15/18 16:00  97      


 


1/15/18 15:16  95      


 


1/15/18 15:16     98 Nasal Cannula 6.00 





      Humidified  


 


1/15/18 15:16 99.6 95 18 140/74 (96) 98   


 


1/15/18 14:00 98.6 95 20 165/87 (113) 100   


 


1/15/18 12:33     92 Nasal Cannula 6.00 


 


1/15/18 11:00   18 139/78 (98) 93   














I/O      


 


 1/15/18 1/15/18 1/15/18 1/16/18 1/16/18 1/16/18





 07:00 15:00 23:00 07:00 15:00 23:00


 


Intake Total 350 ml 100 ml 692 ml 420 ml  


 


Output Total  550 ml 1100 ml 750 ml  


 


Balance 350 ml -450 ml -408 ml -330 ml  


 


      


 


Intake Oral  100 ml 480 ml 420 ml  


 


IV Total 350 ml  212 ml   


 


Output Urine Total  550 ml 1100 ml 750 ml  








Result Diagram:  


1/16/18 0705                                                                   

             1/16/18 0705





Imaging





Last Impressions








Chest X-Ray 1/15/18 0125 Signed





Impressions: 





 Service Date/Time:  Monday, January 15, 2018 01:32 - CONCLUSION:  1. 





 Consolidation or atelectasis at the bases being worse on the right. 2. 

Suspected 





 nipple shadows. This could be followup at some point with a chest x-ray with 





 nipple markers.     Severo Weir MD 








Procedures


None


Other Results





Laboratory Tests








Test


  1/15/18


01:35 1/16/18


07:05


 


Prothrombin Time 9.1 SEC  


 


Prothromb Time International


Ratio 0.9 RATIO 


  


 


 


Total Creatine Kinase 106 U/L  


 


Creatine Kinase MB 3.2 NG/ML  


 


Troponin I 1.28 NG/ML  


 


B-Type Natriuretic Peptide 2111 PG/ML  


 


Lipase 348 U/L  


 


White Blood Count  12.8 TH/MM3 


 


Red Blood Count  2.59 MIL/MM3 


 


Hemoglobin  8.6 GM/DL 


 


Hematocrit  26.0 % 


 


Mean Corpuscular Volume  100.4 FL 


 


Mean Corpuscular Hemoglobin  33.1 PG 


 


Mean Corpuscular Hemoglobin


Concent 


  33.0 % 


 


 


Red Cell Distribution Width  13.6 % 


 


Platelet Count  423 TH/MM3 


 


Mean Platelet Volume  8.3 FL 


 


Neutrophils (%) (Auto)  88.4 % 


 


Lymphocytes (%) (Auto)  7.2 % 


 


Monocytes (%) (Auto)  3.3 % 


 


Eosinophils (%) (Auto)  0.6 % 


 


Basophils (%) (Auto)  0.5 % 


 


Neutrophils # (Auto)  11.3 TH/MM3 


 


Lymphocytes # (Auto)  0.9 TH/MM3 


 


Monocytes # (Auto)  0.4 TH/MM3 


 


Eosinophils # (Auto)  0.1 TH/MM3 


 


Basophils # (Auto)  0.1 TH/MM3 


 


CBC Comment  DIFF FINAL 


 


Differential Comment   


 


Blood Urea Nitrogen  43 MG/DL 


 


Creatinine  2.96 MG/DL 


 


Random Glucose  89 MG/DL 


 


Total Protein  7.7 GM/DL 


 


Albumin  2.7 GM/DL 


 


Calcium Level  9.0 MG/DL 


 


Magnesium Level  2.4 MG/DL 


 


Alkaline Phosphatase  168 U/L 


 


Aspartate Amino Transf


(AST/SGOT) 


  23 U/L 


 


 


Alanine Aminotransferase


(ALT/SGPT) 


  33 U/L 


 


 


Total Bilirubin  0.4 MG/DL 


 


Sodium Level  142 MEQ/L 


 


Potassium Level  4.7 MEQ/L 


 


Chloride Level  110 MEQ/L 


 


Carbon Dioxide Level  20.9 MEQ/L 


 


Anion Gap  11 MEQ/L 


 


Estimat Glomerular Filtration


Rate 


  20 ML/MIN 


 








Objective Remarks


GENERAL: No acute distress. 


HEENT: PERRLA, EOMI. No scleral icterus or conjunctival pallor. No lid lag or 

facial droop.  


CARDIOVASCULAR: Regular rate and rhythm.  No obvious murmurs to auscultation. 

No chest tenderness to palpation. 


RESPIRATORY: No obvious rhonchi or wheezing. Clear to auscultation. Breath 

sounds equal bilaterally. 


GASTROINTESTINAL: Abdomen soft, non-tender, nondistended. BS normal. 


MUSCULOSKELETAL: Extremities without clubbing, cyanosis, or edema. No obvious 

deformities. 


NEUROLOGICAL: Awake, alert and oriented x4. No focal neurologic deficits.


Medications and IVs





Current Medications








 Medications


  (Trade)  Dose


 Ordered  Sig/Luis Armando


 Route  Start Time


 Stop Time Status Last Admin


 


  (NS Flush)  2 ml  UNSCH  PRN


 IVF  1/15/18 01:30


    1/15/18 01:42


 


 


 Heparin Sodium/


 Dextrose  250 ml @ 7


 mls/hr  TITRATE  PRN


 IV  1/15/18 02:30


    1/15/18 03:19


 


 


  (D50w (Vial) Inj)  50 ml  UNSCH  PRN


 IV PUSH  1/15/18 04:00


     


 


 


  (Glucagon Inj)  1 mg  UNSCH  PRN


 OTHER  1/15/18 04:00


     


 


 


  (NovoLOG


 SUPPLEMENTAL


 SCALE)  1  ACHS SLIDING  SCALE


 SQ  1/15/18 08:00


    1/15/18 12:00


 


 


  (Lasix Inj)  40 mg  BID@09,18


 IV PUSH  1/15/18 09:00


    1/16/18 08:02


 


 


 Ceftriaxone


 Sodium 1000 mg/


 Sodium Chloride  100 ml @ 


 200 mls/hr  Q24H


 IV  1/15/18 05:00


    1/16/18 04:40


 


 


 Azithromycin 500


 mg/Sodium Chloride  250 ml @ 


 250 mls/hr  Q24H


 IV  1/15/18 04:00


    1/16/18 04:40


 


 


  (Duoneb Neb)  1 ampule  Q4HR NEB  PRN


 NEB  1/15/18 04:00


    1/15/18 12:35


 


 


  (NS Flush)  2 ml  UNSCH  PRN


 IV FLUSH  1/15/18 04:00


     


 


 


  (NS Flush)  2 ml  BID


 IV FLUSH  1/15/18 09:00


    1/16/18 08:02


 


 


  (Zofran Inj)  4 mg  Q6H  PRN


 IVP  1/15/18 04:00


     


 


 


  (Tylenol)  650 mg  Q6H  PRN


 PO  1/15/18 04:00


     


 


 


  (Norco  5-325 Mg)  1 tab  Q4H  PRN


 PO  1/15/18 04:00


    1/15/18 21:59


 


 


  (Morphine Inj)  2 mg  Q3H  PRN


 IV PUSH  1/15/18 04:00


    1/15/18 22:12


 


 


  (Theresa-Colace)  1 tab  BID


 PO  1/15/18 09:00


    1/15/18 20:31


 


 


  (Milk Of


 Magnesia Liq)  30 ml  Q12H  PRN


 PO  1/15/18 04:00


     


 


 


  (Senokot)  17.2 mg  Q12H  PRN


 PO  1/15/18 04:00


     


 


 


  (Dulcolax Supp)  10 mg  DAILY  PRN


 RECTAL  1/15/18 04:00


     


 


 


  (Lactulose Liq)  30 ml  DAILY  PRN


 PO  1/15/18 04:00


     


 


 


  (Aspirin Chew)  81 mg  DAILY


 CHEW  1/15/18 09:00


    1/16/18 08:01


 


 


  (Folate)  1 mg  DAILY


 PO  1/15/18 09:00


    1/16/18 08:01


 


 


  (Neurontin)  300 mg  HS


 PO  1/15/18 21:00


    1/15/18 20:31


 


 


  (Levemir Inj)  15 units  HS


 SQ  1/15/18 21:00


    1/15/18 20:32


 


 


  (Lopressor)  50 mg  Q12HR


 PO  1/15/18 09:00


    1/16/18 08:01


 


 


  (Procardia)  30 mg  TID


 PO  1/15/18 09:00


    1/16/18 08:01


 


 


  (Pravachol)  40 mg  HS


 PO  1/15/18 21:00


    1/15/18 20:31


 


 


  (Flu


  (Quadrivalent)


 Vaccine Inj)  0.5 ml  ONCE ONCE


 IM  1/16/18 10:00


 1/16/18 10:01   


 











A/P


Assessment and Plan


1.  Sepsis:  Temp 99.6, , WBC 12.0, Source-PNA.  Rocephin/Zithro IV. 

continue with Leukocytosis worsening to 12.8 afebrile.


2.  PNA:  CXR w/ consolidation at bases, worse on right, images reviewed by me.

  Start Rocephin/Zithro as above, DuoNeb prn. 


3.  Hypoxia:  O2 sat 83% on RA upon arrival, likely secondary to combination of 

CHF and PNA.  Continue O2 as needed to keep oxygen saturation over 92%. 


4.  CHF:  Acute on Chronic.  Diastolic.  Echo 8/18/17 w/ EF 55-60%, BNP 2111, +

crackles on arrival, s/p Lasix 40mg IV, stopped Lasix due


     to worsening renal function and will continue Lasix 40 mg daily by mouth. 

Pulmonary Hypertension. 


5.  NSTEMI:  Trop 1.28, c/o chest pain, s/p Morphine/NTG, currently chest pain 

free.  Started on Heparin gtt, will continue.  Admit to [facility], Telemetry, check 

serial enzymes for trend, ASA, Statin, B-Blocker. 


     Cardiology recommended procedure but the patient has Chronic kidney 

disease stage IV asked for nephrology. patient known to Doctor Kelton Whiteside.


6.  Renal Insufficiency:  Stage IV asked for nephrology specialist for probable 

Cardiac Cath. 


7.  DM:  Sliding scale w/ Accu-Cheks. stable. 


8.  Tobacco Abuse: Strongly recommended to stop smoking, no NicoDerm to avoid 

vasoconstriction.  Ativan prn if needed





DVT Prophylaxis:  Heparin gtt


Discharge Planning


once cleared by Cardiology and Nephrology specialist.











Pop Jaffe MD Jan 16, 2018 09:28

## 2018-01-17 VITALS
HEART RATE: 98 BPM | SYSTOLIC BLOOD PRESSURE: 152 MMHG | OXYGEN SATURATION: 96 % | RESPIRATION RATE: 16 BRPM | TEMPERATURE: 98.8 F | DIASTOLIC BLOOD PRESSURE: 80 MMHG

## 2018-01-17 VITALS
RESPIRATION RATE: 16 BRPM | SYSTOLIC BLOOD PRESSURE: 152 MMHG | HEART RATE: 89 BPM | DIASTOLIC BLOOD PRESSURE: 87 MMHG | TEMPERATURE: 98.1 F | OXYGEN SATURATION: 97 %

## 2018-01-17 VITALS — HEART RATE: 83 BPM

## 2018-01-17 VITALS — HEART RATE: 88 BPM

## 2018-01-17 VITALS — HEART RATE: 85 BPM

## 2018-01-17 VITALS
HEART RATE: 92 BPM | SYSTOLIC BLOOD PRESSURE: 160 MMHG | RESPIRATION RATE: 20 BRPM | DIASTOLIC BLOOD PRESSURE: 78 MMHG | OXYGEN SATURATION: 99 % | TEMPERATURE: 98.8 F

## 2018-01-17 VITALS
RESPIRATION RATE: 20 BRPM | SYSTOLIC BLOOD PRESSURE: 166 MMHG | HEART RATE: 99 BPM | OXYGEN SATURATION: 98 % | DIASTOLIC BLOOD PRESSURE: 82 MMHG | TEMPERATURE: 98.8 F

## 2018-01-17 VITALS — RESPIRATION RATE: 18 BRPM | OXYGEN SATURATION: 97 % | HEART RATE: 91 BPM

## 2018-01-17 VITALS — HEART RATE: 81 BPM

## 2018-01-17 VITALS
TEMPERATURE: 98.2 F | DIASTOLIC BLOOD PRESSURE: 76 MMHG | RESPIRATION RATE: 20 BRPM | SYSTOLIC BLOOD PRESSURE: 153 MMHG | HEART RATE: 97 BPM

## 2018-01-17 VITALS — HEART RATE: 94 BPM

## 2018-01-17 VITALS — HEART RATE: 86 BPM

## 2018-01-17 VITALS — HEART RATE: 92 BPM

## 2018-01-17 VITALS — HEART RATE: 87 BPM

## 2018-01-17 VITALS — HEART RATE: 90 BPM

## 2018-01-17 VITALS — HEART RATE: 96 BPM

## 2018-01-17 VITALS
OXYGEN SATURATION: 95 % | DIASTOLIC BLOOD PRESSURE: 76 MMHG | SYSTOLIC BLOOD PRESSURE: 142 MMHG | TEMPERATURE: 98.3 F | HEART RATE: 85 BPM | RESPIRATION RATE: 16 BRPM

## 2018-01-17 VITALS — HEART RATE: 95 BPM

## 2018-01-17 VITALS — OXYGEN SATURATION: 97 %

## 2018-01-17 VITALS — HEART RATE: 97 BPM

## 2018-01-17 LAB
BUN SERPL-MCNC: 47 MG/DL (ref 7–18)
CALCIUM SERPL-MCNC: 8.2 MG/DL (ref 8.5–10.1)
CHLORIDE SERPL-SCNC: 108 MEQ/L (ref 98–107)
CREAT SERPL-MCNC: 2.75 MG/DL (ref 0.5–1)
GFR SERPLBLD BASED ON 1.73 SQ M-ARVRAT: 22 ML/MIN (ref 89–?)
GLUCOSE SERPL-MCNC: 86 MG/DL (ref 74–106)
HCO3 BLD-SCNC: 24.2 MEQ/L (ref 21–32)
SODIUM SERPL-SCNC: 140 MEQ/L (ref 136–145)

## 2018-01-17 RX ADMIN — MORPHINE SULFATE PRN MG: 2 INJECTION, SOLUTION INTRAMUSCULAR; INTRAVENOUS at 21:39

## 2018-01-17 RX ADMIN — HEPARIN SODIUM PRN MLS/HR: 10000 INJECTION, SOLUTION INTRAVENOUS at 11:11

## 2018-01-17 RX ADMIN — METOPROLOL TARTRATE SCH MG: 50 TABLET, FILM COATED ORAL at 21:34

## 2018-01-17 RX ADMIN — SODIUM CHLORIDE SCH MLS/HR: 900 INJECTION INTRAVENOUS at 05:50

## 2018-01-17 RX ADMIN — ASPIRIN 81 MG SCH MG: 81 TABLET ORAL at 08:20

## 2018-01-17 RX ADMIN — Medication SCH ML: at 21:34

## 2018-01-17 RX ADMIN — AZITHROMYCIN SCH MLS/HR: 500 INJECTION, POWDER, LYOPHILIZED, FOR SOLUTION INTRAVENOUS at 05:50

## 2018-01-17 RX ADMIN — METOPROLOL TARTRATE SCH MG: 50 TABLET, FILM COATED ORAL at 08:20

## 2018-01-17 RX ADMIN — INSULIN ASPART SCH: 100 INJECTION, SOLUTION INTRAVENOUS; SUBCUTANEOUS at 11:11

## 2018-01-17 RX ADMIN — INSULIN ASPART SCH: 100 INJECTION, SOLUTION INTRAVENOUS; SUBCUTANEOUS at 21:00

## 2018-01-17 RX ADMIN — IPRATROPIUM BROMIDE AND ALBUTEROL SULFATE PRN AMPULE: .5; 3 SOLUTION RESPIRATORY (INHALATION) at 07:32

## 2018-01-17 RX ADMIN — ONDANSETRON PRN MG: 2 INJECTION, SOLUTION INTRAMUSCULAR; INTRAVENOUS at 21:39

## 2018-01-17 RX ADMIN — HYDROCODONE BITARTRATE AND ACETAMINOPHEN PRN TAB: 5; 325 TABLET ORAL at 08:21

## 2018-01-17 RX ADMIN — MORPHINE SULFATE PRN MG: 2 INJECTION, SOLUTION INTRAMUSCULAR; INTRAVENOUS at 00:00

## 2018-01-17 RX ADMIN — GABAPENTIN SCH MG: 300 CAPSULE ORAL at 21:34

## 2018-01-17 RX ADMIN — STANDARDIZED SENNA CONCENTRATE AND DOCUSATE SODIUM SCH TAB: 8.6; 5 TABLET, FILM COATED ORAL at 08:21

## 2018-01-17 RX ADMIN — INSULIN ASPART SCH: 100 INJECTION, SOLUTION INTRAVENOUS; SUBCUTANEOUS at 08:00

## 2018-01-17 RX ADMIN — FOLIC ACID SCH MG: 1 TABLET ORAL at 08:21

## 2018-01-17 RX ADMIN — PRAVASTATIN SODIUM SCH MG: 40 TABLET ORAL at 21:34

## 2018-01-17 RX ADMIN — MORPHINE SULFATE PRN MG: 2 INJECTION, SOLUTION INTRAMUSCULAR; INTRAVENOUS at 06:46

## 2018-01-17 RX ADMIN — ACYCLOVIR SCH UNITS: 800 TABLET ORAL at 21:00

## 2018-01-17 RX ADMIN — STANDARDIZED SENNA CONCENTRATE AND DOCUSATE SODIUM SCH TAB: 8.6; 5 TABLET, FILM COATED ORAL at 21:34

## 2018-01-17 RX ADMIN — Medication SCH ML: at 08:21

## 2018-01-17 RX ADMIN — INSULIN ASPART SCH: 100 INJECTION, SOLUTION INTRAVENOUS; SUBCUTANEOUS at 17:57

## 2018-01-17 NOTE — PD.CONS
HPI


Service


Nephrology


Consult Requested By





Reason for Consult


Hx CKD, may need cardiac cath


Primary Care Physician


Jamie Hill MD


History of Present Illness


This is our 53 y/o AAF CKD patient. Her baseline is stage 3-4, creatinine has 

been around 2.1-2.5 since the Fall of . She was admitted with SOB, CP, 

diagnosed with NSTEMI. Her troponin was elevated over 1. PMH of DM II, HTN, 

anemia, CAD with one stent. She is on a heparin gtt currently. Vital signs are 

stable. We were consulted to assist with management given her possible need for 

cardiac cath this admission. She is not in distress, non oliguric. 


 (Migdalia Camejo)





Review of Systems


Constitutional:  COMPLAINS OF: Fatigue, DENIES: Weight gain, Weight loss


Ears, nose, mouth, throat:  COMPLAINS OF: Nasal discharge


Respiratory:  COMPLAINS OF: Shortness of breath, DENIES: Cough, Sputum 

production


Cardiovascular:  COMPLAINS OF: Chest pain, Dyspnea on Exertion (Migdalia Camejo)





Past Family Social History


Allergies:  


Coded Allergies:  


     shellfish derived (Unverified  Adverse Reaction, Mild, VOMITNG, 1/15/18)


Past Medical History


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


   diabetic nephropathy


Hypertension, essential


DM II x 22 years


GERD, no esophagitis


CAD, hx of MI in  s/p PTCA with stent placement


Hyperlipidemia


Multiple hospitalizations for recurrent abdominal pain


History of pancreatitis , ETOH induced 


History of alcohol abuse


Uterine fibroids


Past Surgical History


Cholecystectomy


Tubal ligation, bilateral


PTCA with stent placement approximately 8 years ago


Cataract surgery


Colonoscopy 14 with Dr. Granados --> 2 sessile polyps of approximately 4 

mm were found in the sigmoid colon, internal and external hemorrhoids.  

Pathology did not show malignancy. 


EGD performed 3/27/14 by Dr. Hurtado --> Normal esophagus, some nodularity in 

the body and antrum of the stomach, mild gastritis.  Pathology did not show 

malignancy


EUS performed by Dr. Wade Uriostegui 14 --> Pancreatic parenchyma was 

isoechoic and homogeneous, common bile duct was normal.


Reported Medications


Zofran (Ondansetron HCl) 4 Mg Tab 4 Mg PO Q6HR PRN


Gas Relief Maximum Streng (Simethicone) 125 Mg Chw 125 Mg PO Q8HR


Lopressor (Metoprolol Tartrate) 50 Mg Tab 50 Mg PO Q12HR


Reported


Fish Oil + D3 (Fish Oil-Cholecalciferol) 1,200-1,000 Mg-Unit Cap 1 Cap PO DAILY


Nifedipine 20 Mg Cap 30 Mg PO TID


Lantus Inj (Insulin Glargine) 1,000 Unit/10 Ml Vial 15 Units SQ HS


Gabapentin 100 Mg Cap 300 Mg PO HS


Simvastatin 20 Mg Tab 20 Mg PO HS


Folic Acid 400 Mcg Tab 1 Mg PO DAILY


Feosol (Ferrous Sulfate) 200 Mg Tab 325 Mg PO BID


Vasotec (Enalapril Maleate) 20 Mg Tab 20 Mg PO BID


Aspirin Children's (Aspirin) 81 Mg Chew 81 Mg CHEW DAILY


Active Ordered Medications





Current Medications








 Medications


  (Trade)  Dose


 Ordered  Sig/Luis Armando


 Route  Start Time


 Stop Time Status Last Admin


 


  (NS Flush)  2 ml  UNSCH  PRN


 IVF  1/15/18 01:30


    1/15/18 01:42


 


 


 Heparin Sodium/


 Dextrose  250 ml @ 7


 mls/hr  TITRATE  PRN


 IV  1/15/18 02:30


    18 11:11


 


 


  (D50w (Vial) Inj)  50 ml  UNSCH  PRN


 IV PUSH  1/15/18 04:00


     


 


 


  (Glucagon Inj)  1 mg  UNSCH  PRN


 OTHER  1/15/18 04:00


     


 


 


  (NovoLOG


 SUPPLEMENTAL


 SCALE)  1  ACHS SLIDING  SCALE


 SQ  1/15/18 08:00


    18 22:48


 


 


 Ceftriaxone


 Sodium 1000 mg/


 Sodium Chloride  100 ml @ 


 200 mls/hr  Q24H


 IV  1/15/18 05:00


    18 05:50


 


 


 Azithromycin 500


 mg/Sodium Chloride  250 ml @ 


 250 mls/hr  Q24H


 IV  1/15/18 04:00


    18 05:50


 


 


  (Duoneb Neb)  1 ampule  Q4HR NEB  PRN


 NEB  1/15/18 04:00


    18 07:32


 


 


  (NS Flush)  2 ml  UNSCH  PRN


 IV FLUSH  1/15/18 04:00


     


 


 


  (NS Flush)  2 ml  BID


 IV FLUSH  1/15/18 09:00


    18 08:21


 


 


  (Zofran Inj)  4 mg  Q6H  PRN


 IVP  1/15/18 04:00


     


 


 


  (Tylenol)  650 mg  Q6H  PRN


 PO  1/15/18 04:00


     


 


 


  (Norco  5-325 Mg)  1 tab  Q4H  PRN


 PO  1/15/18 04:00


    18 08:21


 


 


  (Morphine Inj)  2 mg  Q3H  PRN


 IV PUSH  1/15/18 04:00


    18 06:46


 


 


  (Theresa-Colace)  1 tab  BID


 PO  1/15/18 09:00


    18 08:21


 


 


  (Milk Of


 Magnesia Liq)  30 ml  Q12H  PRN


 PO  1/15/18 04:00


     


 


 


  (Senokot)  17.2 mg  Q12H  PRN


 PO  1/15/18 04:00


     


 


 


  (Dulcolax Supp)  10 mg  DAILY  PRN


 RECTAL  1/15/18 04:00


     


 


 


  (Lactulose Liq)  30 ml  DAILY  PRN


 PO  1/15/18 04:00


     


 


 


  (Aspirin Chew)  81 mg  DAILY


 CHEW  1/15/18 09:00


    18 08:20


 


 


  (Folate)  1 mg  DAILY


 PO  1/15/18 09:00


    18 08:21


 


 


  (Neurontin)  300 mg  HS


 PO  1/15/18 21:00


    18 20:37


 


 


  (Levemir Inj)  15 units  HS


 SQ  1/15/18 21:00


    18 20:57


 


 


  (Lopressor)  50 mg  Q12HR


 PO  1/15/18 09:00


    18 08:20


 


 


  (Procardia)  30 mg  TID


 PO  1/15/18 09:00


    18 08:21


 


 


  (Pravachol)  40 mg  HS


 PO  1/15/18 21:00


    18 20:37


 


 


  (Imdur)  120 mg  DAILY@07


 PO  18 07:00


     


 








Family History


Mother had DM, was on HD, 


Social History


smokes 1/2 PPD for years


former heavy ETOH, quit 5 years ago


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


independent


single, lives with brother


full code 


 (Migdalia Camejo)





Physical Exam


Vital Signs





Vital Signs








  Date Time  Temp Pulse Resp B/P (MAP) Pulse Ox O2 Delivery O2 Flow Rate FiO2


 


18 12:00  92      


 


18 11:00 98.3 85 16 142/76 (98) 95   


 


18 11:00     95 Nasal Cannula 2.00 





      Humidified  


 


18 11:00  86      


 


18 10:00  95      


 


18 09:00  96      


 


18 08:00  97      


 


18 07:00  98      


 


18 07:00 98.8 98 16 152/80 (104) 96   


 


18 07:00     96 Nasal Cannula 2.00 





      Humidified  


 


18 06:00  94      


 


18 05:00  90      


 


18 04:21     98 Nasal Cannula 3.00 





      Humidified  


 


18 04:00  86      


 


18 03:41 98.8 99 20 166/82 (110) 98   


 


18 03:00  81      


 


18 02:00  81      


 


18 01:00  83      


 


18 00:09 98.8 92 20 160/78 (105) 99   


 


18 00:00  83      


 


18 23:01     99 Nasal Cannula 3.00 


 


18 23:00     98 Nasal Cannula 3.00 





      Humidified  


 


18 23:00  90      


 


18 22:00  100      


 


18 21:00  97      


 


18 20:00 99.5 100 20 148/84 (105) 99   


 


18 20:00     98 Nasal Cannula 3.00 





      Humidified  


 


18 20:00  98      


 


18 19:00  100      


 


18 18:01  98      


 


18 17:38     95 Nasal Cannula 6.00 


 


18 17:38    155/78 (103)    


 


18 17:05  94      


 


18 16:01  94      


 


18 15:03     95 Nasal Cannula 3.00 


 


18 15:03 98.6 93 18 123/63 (83) 95   


 


18 15:00  91      


 


18 14:00  92      


 


18 13:57    160/84 (109)    


 


18 13:00  94      








Physical Exam


GENERAL: This is a well-nourished, well-developed AAF patient, in no apparent 

distress. On oxygen 


HEENT: Atraumatic. Normocephalic. No temporal or scalp tenderness. No scleral 

icterus. Airway patent.


NECK: Trachea midline, supple, nontender.


CARDIO: Regular rate, rhythm. no murmurs. 


RESP: No rales. lungs relatively clear, decreased in bases;  no wheezing. 


ABD: +BS, soft, non-tender, nondistended. 


EXT: Extremities without clubbing, cyanosis, or edema. Distal pulses intact 


NEURO: Awake and alert. Motor and sensory grossly within normal limits. Normal 

speech. CN II-XII intact


Laboratory





Laboratory Tests








Test


  18


16:02 18


23:18 18


06:23


 


Activated Partial


Thromboplast Time 32.5 


  35.7 


  38.0 


 


 


Blood Urea Nitrogen   47 


 


Creatinine   2.75 


 


Random Glucose   86 


 


Calcium Level   8.2 


 


Sodium Level   140 


 


Potassium Level   4.7 


 


Chloride Level   108 


 


Carbon Dioxide Level   24.2 


 


Anion Gap   8 


 


Estimat Glomerular Filtration


Rate 


  


  22 


 








 (Migdalia Camejo)


Result Diagram:  


18 0705                                                                   

             18 0623





Imaging





Last 72 hours Impressions








Chest X-Ray 1/15/18 0125 Signed





Impressions: 





 Service Date/Time:  Monday, January 15, 2018 01:32 - CONCLUSION:  1. 





 Consolidation or atelectasis at the bases being worse on the right. 2. 

Suspected 





 nipple shadows. This could be followup at some point with a chest x-ray with 





 nipple markers.     Severo Weir MD 








 (Migdalia Camejo)





Assessment and Plan


Problem List:  


(1) Renal insufficiency


ICD Codes:  N28.9 - Disorder of kidney and ureter, unspecified


Plan:  Baseline CKD 3-4, creatinine 2.1-2.5, GFR 24-30.


Renal function acceptable. 


She may require cardiac catheterization, has underlying diabetic nephropathy. 


The risk of LETI due to contrast exposure is 26%, risk of requiring dialysis is 

1.5%. 


Recommend to start 0.9% NS @ 50 cc/hr 12 hrs prior to procedure. 


Monitor urine output. 


Repeat labs daily, continue supportive care. 


Avoid additional nephrotoxic agents. Minimize contrast exposure if possible. 





(2) NSTEMI (non-ST elevated myocardial infarction)


ICD Codes:  I21.4 - Non-ST elevation (NSTEMI) myocardial infarction


Plan:  Cardiology following, plan for catheterization likely tomorrow


given ASA, heparin gtt to be stopped


Imdur started, appreciate recommendations 





(3) DM (diabetes mellitus)


ICD Codes:  E11.9 - Type 2 diabetes mellitus without complications


Plan:  Insulin as needed, maintain glucose 140-180mg/dL. 


If NPO use D10 @ 15 ml/hr to prevent hyperkalemia 





(4) Anemia


ICD Codes:  D64.9 - Anemia, unspecified


Status:  Chronic


Plan:  Check iron profile


transfuse if needed 





(5) CAD (coronary artery disease)


ICD Codes:  I25.10 - Atherosclerotic heart disease of native coronary artery 

without angina pectoris


Status:  Chronic


Plan:  Hx of stent in the past. New NSTEMI, see above 





(6) PNA (pneumonia)


ICD Codes:  J18.9 - Pneumonia, unspecified organism


Plan:  Low grade fever, chest xray reviewed


On Zithromax and rocephin 


 (Migdalia Camejo)


Assessment and Plan


patient was seen and examined. She has stage IV CKD. Admitted with NSTEMI. Risk 

for contrast nephropathy is about 26% and risk for dialysis is about 1 % if 100 

ml of contrast is used. Recommend hydration with 0.9 % NS before cardiac 

catheterization. 


 (Kelton Whiteside MD)











Migdalia Camejo 2018 12:31


Kelton Whiteside MD 2018 18:43

## 2018-01-17 NOTE — HHI.PR
Subjective


Remarks


This is a pleasant 53 y/o Female admitted today she has Hypertension, 

Hyperlipidemia, CHF Echo 8/18/17 with Ejection Fraction 55-60%, 


DM II, CAD, %), DM and CAD, who came to ER with left side chest pain, with 

associated Shortness of breath, Creatinine 2.76, Leukocytosis


CXR with consolidation or atelectasis at bases.  S/p Lasix 40mg IV, Morphine, 

ASA and started on Heparin gtt.


Today seen in her bedroom, she has chronic Tobacco dependence Cardiology 

specialist following, continue antibiotics for Pneumonia. 





1/16: Stable in her bedroom, awaiting final recommendations by Cardiology 

specialist, asked for Nephrology specialist to be prepared for


Probable, Cardiac Catheterization





01/17: Patient seen early in am and will be followed by nephrology for 

recommendations before Cardiac Cath may be performed, she is 


Improving respiratory wise, will follow CBC by tomorrow, seen by Cardiology 

specialist who is awaiting for Nephrology specialist consult.





Objective





Vital Signs








  Date Time  Temp Pulse Resp B/P (MAP) Pulse Ox O2 Delivery O2 Flow Rate FiO2


 


1/17/18 06:00  94      


 


1/17/18 05:00  90      


 


1/17/18 04:21     98 Nasal Cannula 3.00 





      Humidified  


 


1/17/18 04:00  86      


 


1/17/18 03:41 98.8 99 20 166/82 (110) 98   


 


1/17/18 03:00  81      


 


1/17/18 02:00  81      


 


1/17/18 01:00  83      


 


1/17/18 00:09 98.8 92 20 160/78 (105) 99   


 


1/17/18 00:00  83      


 


1/16/18 23:01     99 Nasal Cannula 3.00 


 


1/16/18 23:00     98 Nasal Cannula 3.00 





      Humidified  


 


1/16/18 23:00  90      


 


1/16/18 22:00  100      


 


1/16/18 21:00  97      


 


1/16/18 20:00 99.5 100 20 148/84 (105) 99   


 


1/16/18 20:00     98 Nasal Cannula 3.00 





      Humidified  


 


1/16/18 20:00  98      


 


1/16/18 19:00  100      


 


1/16/18 18:01  98      


 


1/16/18 17:38     95 Nasal Cannula 6.00 


 


1/16/18 17:38    155/78 (103)    


 


1/16/18 17:05  94      


 


1/16/18 16:01  94      


 


1/16/18 15:03     95 Nasal Cannula 3.00 


 


1/16/18 15:03 98.6 93 18 123/63 (83) 95   


 


1/16/18 15:00  91      


 


1/16/18 14:00  92      


 


1/16/18 13:57    160/84 (109)    


 


1/16/18 13:00  94      


 


1/16/18 12:00  89      


 


1/16/18 11:25      Nasal Cannula 3.00 





      Humidified  


 


1/16/18 11:00  87      


 


1/16/18 11:00     100 Nasal Cannula 4.00 





      Humidified  


 


1/16/18 11:00 97.8 87 18 153/78 (103) 100   


 


1/16/18 10:00  83      


 


1/16/18 09:00  90      














I/O      


 


 1/16/18 1/16/18 1/16/18 1/17/18 1/17/18 1/17/18





 07:00 15:00 23:00 07:00 15:00 23:00


 


Intake Total 420 ml 350 ml 547 ml 547 ml  


 


Output Total 750 ml  1300 ml 550 ml  


 


Balance -330 ml 350 ml -753 ml -3 ml  


 


      


 


Intake Oral 420 ml  480 ml 420 ml  


 


IV Total  350 ml 67 ml 127 ml  


 


Output Urine Total 750 ml  1300 ml 550 ml  


 


# Bowel Movements   0   








Result Diagram:  


1/16/18 0705                                                                   

             1/17/18 0623





Imaging





Last Impressions








Chest X-Ray 1/15/18 0125 Signed





Impressions: 





 Service Date/Time:  Monday, January 15, 2018 01:32 - CONCLUSION:  1. 





 Consolidation or atelectasis at the bases being worse on the right. 2. 

Suspected 





 nipple shadows. This could be followup at some point with a chest x-ray with 





 nipple markers.     Severo Weir MD 








Procedures


None


Other Results





Laboratory Tests








Test


  1/15/18


01:35 1/16/18


07:05 1/17/18


06:23


 


Prothrombin Time 9.1 SEC   


 


Prothromb Time International


Ratio 0.9 RATIO 


  


  


 


 


Total Creatine Kinase 106 U/L   


 


Creatine Kinase MB 3.2 NG/ML   


 


Troponin I 1.28 NG/ML   


 


B-Type Natriuretic Peptide 2111 PG/ML   


 


Lipase 348 U/L   


 


White Blood Count  12.8 TH/MM3  


 


Red Blood Count  2.59 MIL/MM3  


 


Hemoglobin  8.6 GM/DL  


 


Hematocrit  26.0 %  


 


Mean Corpuscular Volume  100.4 FL  


 


Mean Corpuscular Hemoglobin  33.1 PG  


 


Mean Corpuscular Hemoglobin


Concent 


  33.0 % 


  


 


 


Red Cell Distribution Width  13.6 %  


 


Platelet Count  423 TH/MM3  


 


Mean Platelet Volume  8.3 FL  


 


Neutrophils (%) (Auto)  88.4 %  


 


Lymphocytes (%) (Auto)  7.2 %  


 


Monocytes (%) (Auto)  3.3 %  


 


Eosinophils (%) (Auto)  0.6 %  


 


Basophils (%) (Auto)  0.5 %  


 


Neutrophils # (Auto)  11.3 TH/MM3  


 


Lymphocytes # (Auto)  0.9 TH/MM3  


 


Monocytes # (Auto)  0.4 TH/MM3  


 


Eosinophils # (Auto)  0.1 TH/MM3  


 


Basophils # (Auto)  0.1 TH/MM3  


 


CBC Comment  DIFF FINAL  


 


Differential Comment    


 


Blood Urea Nitrogen  43 MG/DL  47 MG/DL 


 


Creatinine  2.96 MG/DL  2.75 MG/DL 


 


Random Glucose  89 MG/DL  86 MG/DL 


 


Total Protein  7.7 GM/DL  


 


Albumin  2.7 GM/DL  


 


Calcium Level  9.0 MG/DL  8.2 MG/DL 


 


Magnesium Level  2.4 MG/DL  


 


Alkaline Phosphatase  168 U/L  


 


Aspartate Amino Transf


(AST/SGOT) 


  23 U/L 


  


 


 


Alanine Aminotransferase


(ALT/SGPT) 


  33 U/L 


  


 


 


Total Bilirubin  0.4 MG/DL  


 


Sodium Level  142 MEQ/L  140 MEQ/L 


 


Potassium Level  4.7 MEQ/L  4.7 MEQ/L 


 


Chloride Level  110 MEQ/L  108 MEQ/L 


 


Carbon Dioxide Level  20.9 MEQ/L  24.2 MEQ/L 


 


Activated Partial


Thromboplast Time 


  


  38.0 SEC 


 


 


Anion Gap   8 MEQ/L 


 


Estimat Glomerular Filtration


Rate 


  


  22 ML/MIN 


 








Objective Remarks


GENERAL: No acute distress. 


HEENT: PERRLA, EOMI. No scleral icterus or conjunctival pallor. No lid lag or 

facial droop.  


CARDIOVASCULAR: Regular rate and rhythm.  No obvious murmurs to auscultation. 

No chest tenderness to palpation. 


RESPIRATORY: No obvious rhonchi or wheezing. Clear to auscultation. Breath 

sounds equal bilaterally. 


GASTROINTESTINAL: Abdomen soft, non-tender, nondistended. BS normal. 


MUSCULOSKELETAL: Extremities without clubbing, cyanosis, or edema. No obvious 

deformities. 


NEUROLOGICAL: Awake, alert and oriented x4. No focal neurologic deficits.


Medications and IVs





Current Medications








 Medications


  (Trade)  Dose


 Ordered  Sig/Luis Armando


 Route  Start Time


 Stop Time Status Last Admin


 


  (NS Flush)  2 ml  UNSCH  PRN


 IVF  1/15/18 01:30


    1/15/18 01:42


 


 


 Heparin Sodium/


 Dextrose  250 ml @ 7


 mls/hr  TITRATE  PRN


 IV  1/15/18 02:30


    1/16/18 10:30


 


 


  (D50w (Vial) Inj)  50 ml  UNSCH  PRN


 IV PUSH  1/15/18 04:00


     


 


 


  (Glucagon Inj)  1 mg  UNSCH  PRN


 OTHER  1/15/18 04:00


     


 


 


  (NovoLOG


 SUPPLEMENTAL


 SCALE)  1  ACHS SLIDING  SCALE


 SQ  1/15/18 08:00


    1/16/18 22:48


 


 


 Ceftriaxone


 Sodium 1000 mg/


 Sodium Chloride  100 ml @ 


 200 mls/hr  Q24H


 IV  1/15/18 05:00


    1/17/18 05:50


 


 


 Azithromycin 500


 mg/Sodium Chloride  250 ml @ 


 250 mls/hr  Q24H


 IV  1/15/18 04:00


    1/17/18 05:50


 


 


  (Duoneb Neb)  1 ampule  Q4HR NEB  PRN


 NEB  1/15/18 04:00


    1/16/18 22:22


 


 


  (NS Flush)  2 ml  UNSCH  PRN


 IV FLUSH  1/15/18 04:00


     


 


 


  (NS Flush)  2 ml  BID


 IV FLUSH  1/15/18 09:00


    1/17/18 08:21


 


 


  (Zofran Inj)  4 mg  Q6H  PRN


 IVP  1/15/18 04:00


     


 


 


  (Tylenol)  650 mg  Q6H  PRN


 PO  1/15/18 04:00


     


 


 


  (Norco  5-325 Mg)  1 tab  Q4H  PRN


 PO  1/15/18 04:00


    1/17/18 08:21


 


 


  (Morphine Inj)  2 mg  Q3H  PRN


 IV PUSH  1/15/18 04:00


    1/17/18 06:46


 


 


  (Theresa-Colace)  1 tab  BID


 PO  1/15/18 09:00


    1/17/18 08:21


 


 


  (Milk Of


 Magnesia Liq)  30 ml  Q12H  PRN


 PO  1/15/18 04:00


     


 


 


  (Senokot)  17.2 mg  Q12H  PRN


 PO  1/15/18 04:00


     


 


 


  (Dulcolax Supp)  10 mg  DAILY  PRN


 RECTAL  1/15/18 04:00


     


 


 


  (Lactulose Liq)  30 ml  DAILY  PRN


 PO  1/15/18 04:00


     


 


 


  (Aspirin Chew)  81 mg  DAILY


 CHEW  1/15/18 09:00


    1/17/18 08:20


 


 


  (Folate)  1 mg  DAILY


 PO  1/15/18 09:00


    1/17/18 08:21


 


 


  (Neurontin)  300 mg  HS


 PO  1/15/18 21:00


    1/16/18 20:37


 


 


  (Levemir Inj)  15 units  HS


 SQ  1/15/18 21:00


    1/16/18 20:57


 


 


  (Lopressor)  50 mg  Q12HR


 PO  1/15/18 09:00


    1/17/18 08:20


 


 


  (Procardia)  30 mg  TID


 PO  1/15/18 09:00


    1/17/18 08:21


 


 


  (Pravachol)  40 mg  HS


 PO  1/15/18 21:00


    1/16/18 20:37


 











A/P


Assessment and Plan


1.  Sepsis:  Temp 99.6, , WBC 12.0, Source-PNA.  Rocephin/Zithro IV. will 

continue present antibiotics and follow 


     WBC tomorrow morning. 


2.  PNA:  CXR w/ consolidation at bases, worse on right, images reviewed by me.

  Start Rocephin/Zithro as above, DuoNeb prn. 


     Follow CXR  in am tomorrow. 


3.  Hypoxia:  O2 sat 83% on RA upon arrival, likely secondary to combination of 

CHF and PNA.  Continue O2 as needed to keep oxygen saturation over 92%. 


     Improving now with nasal Cannula at 2 L/min. 


4.  CHF:  Acute on Chronic.  Diastolic.  Echo 8/18/17 w/ EF 55-60%, BNP 2111, +

crackles on arrival, s/p Lasix 40mg IV, stopped Lasix due


     to worsening renal function and will continue Lasix 40 mg daily by mouth. 

Pulmonary Hypertension. 


5.  NSTEMI:  Trop 1.28, c/o chest pain, s/p Morphine/NTG, currently chest pain 

free.  Started on Heparin gtt, will continue.  Admit to [facility], Telemetry, check 

serial enzymes for trend, ASA, Statin, B-Blocker. 


     Cardiology recommended procedure but the patient has Chronic kidney 

disease stage IV asked for nephrology. patient known to Doctor Kelton Whiteside. 

seen by Nephrology as acceptable renal function


     for procedure, may develop worsening of renal function and the need for 

Dialysis was explained to the patient by specialist recommended IV fluids 12 

hours before procedure will leave this orders to Cardiology


     Once they schedule the patient for procedure. 


6.  Renal Insufficiency:  Stage IV asked for nephrology specialist for probable 

Cardiac Cath. seen #5


7.  DM:  Sliding scale w/ Accu-Cheks. stable. 


8.  Tobacco Abuse: Strongly recommended to stop smoking, no NicoDerm to avoid 

vasoconstriction.  Ativan prn if needed





DVT Prophylaxis:  Heparin gtt


Discharge Planning


once cleared by Cardiology and Nephrology specialist.











Pop Jaffe MD Jan 17, 2018 08:34

## 2018-01-17 NOTE — PD.CARD.PN
Subjective


Subjective Remarks


continues to feel SOB at rest. developed new onset chest pain around 5am this 

morning which has now resolved


 (Blanca Luong)





Objective


Medications





Current Medications








 Medications


  (Trade)  Dose


 Ordered  Sig/Luis Armando


 Route  Start Time


 Stop Time Status Last Admin


 


  (NS Flush)  2 ml  UNSCH  PRN


 IVF  1/15/18 01:30


    1/15/18 01:42


 


 


 Heparin Sodium/


 Dextrose  250 ml @ 7


 mls/hr  TITRATE  PRN


 IV  1/15/18 02:30


    1/16/18 10:30


 


 


  (D50w (Vial) Inj)  50 ml  UNSCH  PRN


 IV PUSH  1/15/18 04:00


     


 


 


  (Glucagon Inj)  1 mg  UNSCH  PRN


 OTHER  1/15/18 04:00


     


 


 


  (NovoLOG


 SUPPLEMENTAL


 SCALE)  1  ACHS SLIDING  SCALE


 SQ  1/15/18 08:00


    1/16/18 22:48


 


 


 Ceftriaxone


 Sodium 1000 mg/


 Sodium Chloride  100 ml @ 


 200 mls/hr  Q24H


 IV  1/15/18 05:00


    1/17/18 05:50


 


 


 Azithromycin 500


 mg/Sodium Chloride  250 ml @ 


 250 mls/hr  Q24H


 IV  1/15/18 04:00


    1/17/18 05:50


 


 


  (Duoneb Neb)  1 ampule  Q4HR NEB  PRN


 NEB  1/15/18 04:00


    1/16/18 22:22


 


 


  (NS Flush)  2 ml  UNSCH  PRN


 IV FLUSH  1/15/18 04:00


     


 


 


  (NS Flush)  2 ml  BID


 IV FLUSH  1/15/18 09:00


    1/17/18 08:21


 


 


  (Zofran Inj)  4 mg  Q6H  PRN


 IVP  1/15/18 04:00


     


 


 


  (Tylenol)  650 mg  Q6H  PRN


 PO  1/15/18 04:00


     


 


 


  (Norco  5-325 Mg)  1 tab  Q4H  PRN


 PO  1/15/18 04:00


    1/17/18 08:21


 


 


  (Morphine Inj)  2 mg  Q3H  PRN


 IV PUSH  1/15/18 04:00


    1/17/18 06:46


 


 


  (Theresa-Colace)  1 tab  BID


 PO  1/15/18 09:00


    1/17/18 08:21


 


 


  (Milk Of


 Magnesia Liq)  30 ml  Q12H  PRN


 PO  1/15/18 04:00


     


 


 


  (Senokot)  17.2 mg  Q12H  PRN


 PO  1/15/18 04:00


     


 


 


  (Dulcolax Supp)  10 mg  DAILY  PRN


 RECTAL  1/15/18 04:00


     


 


 


  (Lactulose Liq)  30 ml  DAILY  PRN


 PO  1/15/18 04:00


     


 


 


  (Aspirin Chew)  81 mg  DAILY


 CHEW  1/15/18 09:00


    1/17/18 08:20


 


 


  (Folate)  1 mg  DAILY


 PO  1/15/18 09:00


    1/17/18 08:21


 


 


  (Neurontin)  300 mg  HS


 PO  1/15/18 21:00


    1/16/18 20:37


 


 


  (Levemir Inj)  15 units  HS


 SQ  1/15/18 21:00


    1/16/18 20:57


 


 


  (Lopressor)  50 mg  Q12HR


 PO  1/15/18 09:00


    1/17/18 08:20


 


 


  (Procardia)  30 mg  TID


 PO  1/15/18 09:00


    1/17/18 08:21


 


 


  (Pravachol)  40 mg  HS


 PO  1/15/18 21:00


    1/16/18 20:37


 








Vital Signs / I&O





Vital Signs








  Date Time  Temp Pulse Resp B/P (MAP) Pulse Ox O2 Delivery O2 Flow Rate FiO2


 


1/17/18 06:00  94      


 


1/17/18 05:00  90      


 


1/17/18 04:21     98 Nasal Cannula 3.00 





      Humidified  


 


1/17/18 04:00  86      


 


1/17/18 03:41 98.8 99 20 166/82 (110) 98   


 


1/17/18 03:00  81      


 


1/17/18 02:00  81      


 


1/17/18 01:00  83      


 


1/17/18 00:09 98.8 92 20 160/78 (105) 99   


 


1/17/18 00:00  83      


 


1/16/18 23:01     99 Nasal Cannula 3.00 


 


1/16/18 23:00     98 Nasal Cannula 3.00 





      Humidified  


 


1/16/18 23:00  90      


 


1/16/18 22:00  100      


 


1/16/18 21:00  97      


 


1/16/18 20:00 99.5 100 20 148/84 (105) 99   


 


1/16/18 20:00     98 Nasal Cannula 3.00 





      Humidified  


 


1/16/18 20:00  98      


 


1/16/18 19:00  100      


 


1/16/18 18:01  98      


 


1/16/18 17:38     95 Nasal Cannula 6.00 


 


1/16/18 17:38    155/78 (103)    


 


1/16/18 17:05  94      


 


1/16/18 16:01  94      


 


1/16/18 15:03     95 Nasal Cannula 3.00 


 


1/16/18 15:03 98.6 93 18 123/63 (83) 95   


 


1/16/18 15:00  91      


 


1/16/18 14:00  92      


 


1/16/18 13:57    160/84 (109)    


 


1/16/18 13:00  94      


 


1/16/18 12:00  89      


 


1/16/18 11:25      Nasal Cannula 3.00 





      Humidified  


 


1/16/18 11:00  87      


 


1/16/18 11:00     100 Nasal Cannula 4.00 





      Humidified  


 


1/16/18 11:00 97.8 87 18 153/78 (103) 100   


 


1/16/18 10:00  83      


 


1/16/18 09:00  90      














I/O      


 


 1/16/18 1/16/18 1/16/18 1/17/18 1/17/18 1/17/18





 07:00 15:00 23:00 07:00 15:00 23:00


 


Intake Total 420 ml 350 ml 547 ml 547 ml  


 


Output Total 750 ml  1300 ml 550 ml  


 


Balance -330 ml 350 ml -753 ml -3 ml  


 


      


 


Intake Oral 420 ml  480 ml 420 ml  


 


IV Total  350 ml 67 ml 127 ml  


 


Output Urine Total 750 ml  1300 ml 550 ml  


 


# Bowel Movements   0   








Physical Exam


GENERAL: 


SKIN: Warm and dry.


HEAD: Atraumatic. Normocephalic. 


EYES: Pupils equal and round. No scleral icterus. No injection or drainage. 


ENT: No nasal bleeding or discharge. .


NECK: Trachea midline. No JVD. 


CARDIOVASCULAR: Regular rate and rhythm.  no murmurs


RESPIRATORY: No accessory muscle use. decreased breath sounds to bilateral 

bases. 


GASTROINTESTINAL: Abdomen soft, non-tender, nondistended. Hepatic and splenic 

margins not palpable. 


MUSCULOSKELETAL: Extremities without clubbing, cyanosis, or edema. No obvious 

deformities. 


NEUROLOGICAL: Awake and alert. No obvious cranial nerve deficits.  Motor 

grossly within normal limits. Five out of 5 muscle strength in the arms and 

legs.  Normal speech.


PSYCHIATRIC: Appropriate mood and affect; insight and judgment normal.


Laboratory





Laboratory Tests








Test


  1/16/18


09:35 1/16/18


16:02 1/16/18


23:18 1/17/18


06:23


 


Activated Partial


Thromboplast Time 31.7 SEC 


  32.5 SEC 


  35.7 SEC 


  38.0 SEC 


 


 


Blood Urea Nitrogen    47 MG/DL 


 


Creatinine    2.75 MG/DL 


 


Random Glucose    86 MG/DL 


 


Calcium Level    8.2 MG/DL 


 


Sodium Level    140 MEQ/L 


 


Potassium Level    4.7 MEQ/L 


 


Chloride Level    108 MEQ/L 


 


Carbon Dioxide Level    24.2 MEQ/L 


 


Anion Gap    8 MEQ/L 


 


Estimat Glomerular Filtration


Rate 


  


  


  22 ML/MIN 


 








 (Blanca Luong)





Assessment and Plan


Problem List:  


(1) NSTEMI (non-ST elevated myocardial infarction)


ICD Codes:  I21.4 - Non-ST elevation (NSTEMI) myocardial infarction


(2) CHF (congestive heart failure)


ICD Codes:  I50.9 - Heart failure, unspecified


Assessment and Plan


 51 yo AAF with history of CAD, cardiac stent placed ~ 10 years ago who is not 

currently followed by cardiologist, CKD, CHF and diabetes admitted for 

progressive SOB and chest pain. 





NSTEMI- likely demand mediated due to CKD vs. ischemic injury


creatinine 2.7


echo normal EF


Hgb= 8


ischemic workup when appropriate


will repeat CXR


start isosorbide


 


respiratory insufficiency- multifactorial CHF vs. PNA, septic.





 (Blanca Luong)


Assessment and Plan


------------------


NSTEMI


CKD


anemia





add isosorbide 120 daily


DC heparin


await nephrology evaluation


options:


medical therapy vs cardiac catheterization.


will need to decide based on nephrology recommendations


 (Edgardo Mercado MD)











Blanca Luong Jan 17, 2018 09:04


Edgardo Mercado MD Jan 17, 2018 09:37

## 2018-01-18 VITALS
SYSTOLIC BLOOD PRESSURE: 165 MMHG | OXYGEN SATURATION: 97 % | RESPIRATION RATE: 18 BRPM | HEART RATE: 89 BPM | DIASTOLIC BLOOD PRESSURE: 94 MMHG

## 2018-01-18 VITALS
HEART RATE: 95 BPM | SYSTOLIC BLOOD PRESSURE: 160 MMHG | RESPIRATION RATE: 22 BRPM | OXYGEN SATURATION: 99 % | DIASTOLIC BLOOD PRESSURE: 77 MMHG | TEMPERATURE: 98.7 F

## 2018-01-18 VITALS
RESPIRATION RATE: 18 BRPM | SYSTOLIC BLOOD PRESSURE: 140 MMHG | OXYGEN SATURATION: 100 % | TEMPERATURE: 98 F | HEART RATE: 87 BPM | DIASTOLIC BLOOD PRESSURE: 78 MMHG

## 2018-01-18 VITALS — SYSTOLIC BLOOD PRESSURE: 153 MMHG | HEART RATE: 88 BPM | DIASTOLIC BLOOD PRESSURE: 87 MMHG

## 2018-01-18 VITALS — SYSTOLIC BLOOD PRESSURE: 145 MMHG | RESPIRATION RATE: 18 BRPM | DIASTOLIC BLOOD PRESSURE: 82 MMHG | HEART RATE: 82 BPM

## 2018-01-18 VITALS
SYSTOLIC BLOOD PRESSURE: 195 MMHG | DIASTOLIC BLOOD PRESSURE: 102 MMHG | RESPIRATION RATE: 18 BRPM | HEART RATE: 91 BPM | OXYGEN SATURATION: 94 % | TEMPERATURE: 98.5 F

## 2018-01-18 VITALS — SYSTOLIC BLOOD PRESSURE: 158 MMHG | DIASTOLIC BLOOD PRESSURE: 87 MMHG

## 2018-01-18 VITALS — DIASTOLIC BLOOD PRESSURE: 87 MMHG | SYSTOLIC BLOOD PRESSURE: 159 MMHG

## 2018-01-18 VITALS — OXYGEN SATURATION: 81 % | SYSTOLIC BLOOD PRESSURE: 197 MMHG | DIASTOLIC BLOOD PRESSURE: 102 MMHG

## 2018-01-18 VITALS
RESPIRATION RATE: 18 BRPM | SYSTOLIC BLOOD PRESSURE: 130 MMHG | OXYGEN SATURATION: 92 % | HEART RATE: 95 BPM | DIASTOLIC BLOOD PRESSURE: 75 MMHG | TEMPERATURE: 99.3 F

## 2018-01-18 VITALS
RESPIRATION RATE: 20 BRPM | DIASTOLIC BLOOD PRESSURE: 97 MMHG | OXYGEN SATURATION: 97 % | HEART RATE: 87 BPM | SYSTOLIC BLOOD PRESSURE: 154 MMHG

## 2018-01-18 VITALS
TEMPERATURE: 98 F | HEART RATE: 87 BPM | RESPIRATION RATE: 19 BRPM | SYSTOLIC BLOOD PRESSURE: 143 MMHG | DIASTOLIC BLOOD PRESSURE: 81 MMHG | OXYGEN SATURATION: 99 %

## 2018-01-18 VITALS
HEART RATE: 92 BPM | TEMPERATURE: 98.5 F | OXYGEN SATURATION: 93 % | SYSTOLIC BLOOD PRESSURE: 163 MMHG | RESPIRATION RATE: 18 BRPM | DIASTOLIC BLOOD PRESSURE: 79 MMHG

## 2018-01-18 VITALS — HEART RATE: 91 BPM

## 2018-01-18 VITALS
RESPIRATION RATE: 18 BRPM | TEMPERATURE: 98.1 F | HEART RATE: 92 BPM | OXYGEN SATURATION: 100 % | SYSTOLIC BLOOD PRESSURE: 203 MMHG | DIASTOLIC BLOOD PRESSURE: 104 MMHG

## 2018-01-18 VITALS
RESPIRATION RATE: 20 BRPM | SYSTOLIC BLOOD PRESSURE: 163 MMHG | DIASTOLIC BLOOD PRESSURE: 97 MMHG | HEART RATE: 87 BPM | OXYGEN SATURATION: 95 %

## 2018-01-18 VITALS — SYSTOLIC BLOOD PRESSURE: 149 MMHG | HEART RATE: 86 BPM | DIASTOLIC BLOOD PRESSURE: 87 MMHG | OXYGEN SATURATION: 97 %

## 2018-01-18 VITALS — HEART RATE: 84 BPM

## 2018-01-18 VITALS
TEMPERATURE: 98 F | SYSTOLIC BLOOD PRESSURE: 142 MMHG | RESPIRATION RATE: 18 BRPM | DIASTOLIC BLOOD PRESSURE: 78 MMHG | HEART RATE: 88 BPM | OXYGEN SATURATION: 100 %

## 2018-01-18 VITALS
SYSTOLIC BLOOD PRESSURE: 144 MMHG | TEMPERATURE: 98 F | DIASTOLIC BLOOD PRESSURE: 85 MMHG | OXYGEN SATURATION: 99 % | HEART RATE: 89 BPM | RESPIRATION RATE: 20 BRPM

## 2018-01-18 VITALS
TEMPERATURE: 98 F | DIASTOLIC BLOOD PRESSURE: 93 MMHG | RESPIRATION RATE: 18 BRPM | OXYGEN SATURATION: 98 % | HEART RATE: 87 BPM | SYSTOLIC BLOOD PRESSURE: 161 MMHG

## 2018-01-18 VITALS
HEART RATE: 94 BPM | TEMPERATURE: 98.5 F | SYSTOLIC BLOOD PRESSURE: 177 MMHG | RESPIRATION RATE: 20 BRPM | DIASTOLIC BLOOD PRESSURE: 91 MMHG | OXYGEN SATURATION: 92 %

## 2018-01-18 VITALS — DIASTOLIC BLOOD PRESSURE: 88 MMHG | SYSTOLIC BLOOD PRESSURE: 148 MMHG

## 2018-01-18 VITALS — HEART RATE: 94 BPM

## 2018-01-18 VITALS — DIASTOLIC BLOOD PRESSURE: 85 MMHG | SYSTOLIC BLOOD PRESSURE: 162 MMHG | OXYGEN SATURATION: 100 % | HEART RATE: 88 BPM

## 2018-01-18 VITALS
SYSTOLIC BLOOD PRESSURE: 174 MMHG | OXYGEN SATURATION: 92 % | RESPIRATION RATE: 20 BRPM | TEMPERATURE: 98 F | DIASTOLIC BLOOD PRESSURE: 94 MMHG | HEART RATE: 174 BPM

## 2018-01-18 VITALS — HEART RATE: 86 BPM

## 2018-01-18 VITALS
TEMPERATURE: 98 F | HEART RATE: 86 BPM | RESPIRATION RATE: 18 BRPM | DIASTOLIC BLOOD PRESSURE: 82 MMHG | OXYGEN SATURATION: 98 % | SYSTOLIC BLOOD PRESSURE: 146 MMHG

## 2018-01-18 VITALS — HEART RATE: 95 BPM

## 2018-01-18 VITALS — OXYGEN SATURATION: 92 %

## 2018-01-18 VITALS — OXYGEN SATURATION: 94 % | SYSTOLIC BLOOD PRESSURE: 191 MMHG | DIASTOLIC BLOOD PRESSURE: 98 MMHG

## 2018-01-18 VITALS — HEART RATE: 98 BPM

## 2018-01-18 VITALS — HEART RATE: 88 BPM

## 2018-01-18 VITALS — HEART RATE: 97 BPM

## 2018-01-18 VITALS — HEART RATE: 90 BPM

## 2018-01-18 VITALS — HEART RATE: 96 BPM

## 2018-01-18 VITALS — OXYGEN SATURATION: 96 %

## 2018-01-18 VITALS — HEART RATE: 92 BPM

## 2018-01-18 LAB
% SATURATION IRON PROFILE: 36.5 % (ref 20–50)
BASOPHILS # BLD AUTO: 0.1 TH/MM3 (ref 0–0.2)
BASOPHILS NFR BLD: 1 % (ref 0–2)
EOSINOPHIL # BLD: 0.3 TH/MM3 (ref 0–0.4)
EOSINOPHIL NFR BLD: 4 % (ref 0–4)
ERYTHROCYTE [DISTWIDTH] IN BLOOD BY AUTOMATED COUNT: 13.5 % (ref 11.6–17.2)
FOLATE SERPL-MCNC: (no result) NG/ML (ref 3.1–17.5)
HCT VFR BLD CALC: 19.7 % (ref 35–46)
HCT VFR BLD CALC: 20.4 % (ref 35–46)
HGB BLD-MCNC: 6.6 GM/DL (ref 11.6–15.3)
HGB BLD-MCNC: 6.9 GM/DL (ref 11.6–15.3)
IRON (FE): 96 MCG/DL (ref 50–170)
LDH SERPL-CCNC: 178 U/L (ref 84–246)
LYMPHOCYTES # BLD AUTO: 1.5 TH/MM3 (ref 1–4.8)
LYMPHOCYTES NFR BLD AUTO: 19 % (ref 9–44)
MCH RBC QN AUTO: 33.9 PG (ref 27–34)
MCHC RBC AUTO-ENTMCNC: 33.8 % (ref 32–36)
MCV RBC AUTO: 100.5 FL (ref 80–100)
MONOCYTE #: 0.5 TH/MM3 (ref 0–0.9)
MONOCYTES NFR BLD: 6.6 % (ref 0–8)
NEUTROPHILS # BLD AUTO: 5.5 TH/MM3 (ref 1.8–7.7)
NEUTROPHILS NFR BLD AUTO: 69.4 % (ref 16–70)
PLATELET # BLD: 335 TH/MM3 (ref 150–450)
PMV BLD AUTO: 8.5 FL (ref 7–11)
RBC # BLD AUTO: 1.96 MIL/MM3 (ref 4–5.3)
TIBC SERPL-MCNC: 263 MCG/DL (ref 250–450)
VIT B12 SERPL-MCNC: 1111 PG/ML (ref 193–986)
WBC # BLD AUTO: 8 TH/MM3 (ref 4–11)

## 2018-01-18 PROCEDURE — 30233N1 TRANSFUSION OF NONAUTOLOGOUS RED BLOOD CELLS INTO PERIPHERAL VEIN, PERCUTANEOUS APPROACH: ICD-10-PCS | Performed by: INTERNAL MEDICINE

## 2018-01-18 RX ADMIN — ACYCLOVIR SCH UNITS: 800 TABLET ORAL at 21:00

## 2018-01-18 RX ADMIN — SODIUM CHLORIDE SCH MLS/HR: 900 INJECTION INTRAVENOUS at 05:00

## 2018-01-18 RX ADMIN — FOLIC ACID SCH MG: 1 TABLET ORAL at 09:01

## 2018-01-18 RX ADMIN — METOPROLOL TARTRATE SCH MG: 50 TABLET, FILM COATED ORAL at 09:00

## 2018-01-18 RX ADMIN — Medication SCH ML: at 20:57

## 2018-01-18 RX ADMIN — PRAVASTATIN SODIUM SCH MG: 40 TABLET ORAL at 20:57

## 2018-01-18 RX ADMIN — INSULIN ASPART SCH: 100 INJECTION, SOLUTION INTRAVENOUS; SUBCUTANEOUS at 08:00

## 2018-01-18 RX ADMIN — Medication SCH ML: at 09:01

## 2018-01-18 RX ADMIN — IPRATROPIUM BROMIDE AND ALBUTEROL SULFATE PRN AMPULE: .5; 3 SOLUTION RESPIRATORY (INHALATION) at 00:04

## 2018-01-18 RX ADMIN — Medication PRN ML: at 00:12

## 2018-01-18 RX ADMIN — GABAPENTIN SCH MG: 300 CAPSULE ORAL at 20:56

## 2018-01-18 RX ADMIN — METOPROLOL TARTRATE SCH MG: 100 TABLET, FILM COATED ORAL at 19:08

## 2018-01-18 RX ADMIN — IPRATROPIUM BROMIDE AND ALBUTEROL SULFATE PRN AMPULE: .5; 3 SOLUTION RESPIRATORY (INHALATION) at 21:20

## 2018-01-18 RX ADMIN — ASPIRIN 81 MG SCH MG: 81 TABLET ORAL at 09:01

## 2018-01-18 RX ADMIN — INSULIN ASPART SCH: 100 INJECTION, SOLUTION INTRAVENOUS; SUBCUTANEOUS at 17:00

## 2018-01-18 RX ADMIN — ISOSORBIDE MONONITRATE SCH MG: 60 TABLET, EXTENDED RELEASE ORAL at 05:55

## 2018-01-18 RX ADMIN — MORPHINE SULFATE PRN MG: 2 INJECTION, SOLUTION INTRAMUSCULAR; INTRAVENOUS at 00:12

## 2018-01-18 RX ADMIN — FERROUS SULFATE TAB 325 MG (65 MG ELEMENTAL FE) SCH MG: 325 (65 FE) TAB at 11:46

## 2018-01-18 RX ADMIN — FERROUS SULFATE TAB 325 MG (65 MG ELEMENTAL FE) SCH MG: 325 (65 FE) TAB at 20:56

## 2018-01-18 RX ADMIN — STANDARDIZED SENNA CONCENTRATE AND DOCUSATE SODIUM SCH TAB: 8.6; 5 TABLET, FILM COATED ORAL at 09:01

## 2018-01-18 RX ADMIN — INSULIN ASPART SCH: 100 INJECTION, SOLUTION INTRAVENOUS; SUBCUTANEOUS at 12:00

## 2018-01-18 RX ADMIN — INSULIN ASPART SCH: 100 INJECTION, SOLUTION INTRAVENOUS; SUBCUTANEOUS at 21:00

## 2018-01-18 RX ADMIN — MORPHINE SULFATE PRN MG: 2 INJECTION, SOLUTION INTRAMUSCULAR; INTRAVENOUS at 20:56

## 2018-01-18 RX ADMIN — AZITHROMYCIN SCH MLS/HR: 500 INJECTION, POWDER, LYOPHILIZED, FOR SOLUTION INTRAVENOUS at 04:45

## 2018-01-18 RX ADMIN — STANDARDIZED SENNA CONCENTRATE AND DOCUSATE SODIUM SCH TAB: 8.6; 5 TABLET, FILM COATED ORAL at 20:57

## 2018-01-18 NOTE — PD.CONS
HPI


History of Present Illness


This is a 52 year old female with DM, CHF, CKD who presented with SOB, chest 

pain.  GI has been consulted for poss GIB.  Pt had sudden drop in hgb from 8.6 

to 6.6, 6.9 on rck.  Her baseline is around 8-10.  SHe admits black tarry stool

, she takes iron.  No red blood in stool, hematemesis, abd pain, prior hx GIB.  

Denies NSAID use.  takes baby ASA at home.  Admits mult episodes nonbloody 

emesis 3 wks ago but none since. Cardiology has evaluated her and is treating 

her for NSTEMI.  She had EGD at [facility] in 2016 with finding erythematous gastritis

, path reactive gastropathy.  Colonoscopy 2014 with Dr Granados and 2 sessile 

polyps found.  


 (Glenis Jolly)





PFSH


Past Medical History


PMH:  HTN, Hyperlipidemia, CHF (Echo 8/18/17 w/ EF 55-60%), DM and CAD


Past Surgical History


PAST SURGICAL HISTORY: Cholecystectomy, Tubal Ligation


 (Glenis Jolly)


Coded Allergies:  


     shellfish derived (Unverified  Adverse Reaction, Mild, VOMITNG, 1/15/18)


Family History


PAST FAMILY HISTORY:  Reviewed.  No h/o DM or CAD


Social History


PAST SOCIAL HISTORY:  History of alcohol abuse, quit 3 years ago.  Positive for 

tobacco smokes 1/2ppd.  Positive for Marijuana.


 (Glenis Jolly)





Review of Systems


Constitutional:  DENIES: Fever


Endocrine:  DENIES: Polydipsia


Eyes:  DENIES: Blurred vision


Ears, nose, mouth, throat:  DENIES: Hearing loss


Respiratory:  COMPLAINS OF: Shortness of breath


Cardiovascular:  COMPLAINS OF: Chest pain


Gastrointestinal:  COMPLAINS OF: Black stools, DENIES: Abdominal pain, Bloody 

stools, Constipation, Diarrhea, Nausea, Vomiting, Hematemesis


Genitourinary:  DENIES: Hematuria


Musculoskeletal:  DENIES: Joint Swelling


Integumentary:  DENIES: Pruritus


Hematologic/lymphatic:  DENIES: Bruising


Neurologic:  DENIES: Abnormal gait


Psychiatric:  DENIES: Confusion (Glenis Jolly)





GI Exam


Vitals I&O





Vital Signs








  Date Time  Temp Pulse Resp B/P (MAP) Pulse Ox O2 Delivery O2 Flow Rate FiO2


 


1/18/18 07:00 98.7 95 22 160/77 (104) 99   


 


1/18/18 06:00  90      


 


1/18/18 05:00  88      


 


1/18/18 04:00  92      


 


1/18/18 04:00 98.5 94 20 177/91 (119) 92   


 


1/18/18 03:00     96 Nasal Cannula 2.00 


 


1/18/18 03:00  84      


 


1/18/18 02:00  86      


 


1/18/18 01:00  92      


 


1/18/18 00:17   18     


 


1/18/18 00:07     96 Nasal Cannula 2.00 


 


1/18/18 00:00 98.5 92 18 163/79 (107) 93   


 


1/18/18 00:00  90      


 


1/17/18 23:00     96 Nasal Cannula 2.00 


 


1/17/18 23:00  96      


 


1/17/18 22:00  92      


 


1/17/18 21:34 98.2 97 20 153/76 (101)    


 


1/17/18 21:00  90      


 


1/17/18 20:00  91      


 


1/17/18 20:00   18  97   


 


1/17/18 19:00     96 Nasal Cannula 2.00 


 


1/17/18 18:00  88      


 


1/17/18 17:43     97 Nasal Cannula 2.00 


 


1/17/18 17:00  85      


 


1/17/18 16:00  87      


 


1/17/18 15:00     97 Nasal Cannula 2.00 





      Humidified  


 


1/17/18 15:00 98.1 89 16 152/87 (108) 97   


 


1/17/18 15:00  89      


 


1/17/18 14:00  88      


 


1/17/18 13:00  88      


 


1/17/18 12:00  92      














I/O      


 


 1/17/18 1/17/18 1/17/18 1/18/18 1/18/18 1/18/18





 07:00 15:00 23:00 07:00 15:00 23:00


 


Intake Total 547 ml 56 ml 600 ml 1240 ml  


 


Output Total 550 ml  500 ml   


 


Balance -3 ml 56 ml 100 ml 1240 ml  


 


      


 


Intake Oral 420 ml  600 ml 240 ml  


 


IV Total 127 ml 56 ml  1000 ml  


 


Output Urine Total 550 ml  500 ml   


 


# Bowel Movements   0   








Imaging





Last Impressions








Chest X-Ray 1/15/18 0125 Signed





Impressions: 





 Service Date/Time:  Monday, January 15, 2018 01:32 - CONCLUSION:  1. 





 Consolidation or atelectasis at the bases being worse on the right. 2. 

Suspected 





 nipple shadows. This could be followup at some point with a chest x-ray with 





 nipple markers.     Severo Weir MD 








Laboratory











Test


  1/17/18


13:54 1/17/18


21:16 1/18/18


05:32 1/18/18


09:05


 


Activated Partial


Thromboplast Time 28.9 SEC 


  38.0 SEC 


  


  


 


 


White Blood Count   8.0 TH/MM3  


 


Red Blood Count   1.96 MIL/MM3  


 


Hemoglobin   6.6 GM/DL  6.9 GM/DL 


 


Hematocrit   19.7 %  20.4 % 


 


Mean Corpuscular Volume   100.5 FL  


 


Mean Corpuscular Hemoglobin   33.9 PG  


 


Mean Corpuscular Hemoglobin


Concent 


  


  33.8 % 


  


 


 


Red Cell Distribution Width   13.5 %  


 


Platelet Count   335 TH/MM3  


 


Mean Platelet Volume   8.5 FL  


 


Neutrophils (%) (Auto)   69.4 %  


 


Lymphocytes (%) (Auto)   19.0 %  


 


Monocytes (%) (Auto)   6.6 %  


 


Eosinophils (%) (Auto)   4.0 %  


 


Basophils (%) (Auto)   1.0 %  


 


Neutrophils # (Auto)   5.5 TH/MM3  


 


Lymphocytes # (Auto)   1.5 TH/MM3  


 


Monocytes # (Auto)   0.5 TH/MM3  


 


Eosinophils # (Auto)   0.3 TH/MM3  


 


Basophils # (Auto)   0.1 TH/MM3  


 


CBC Comment   DIFF FINAL  


 


Differential Comment     








Physical Examination


HEENT: PERRL; normocephalic; atraumatic; no jaundice.  


CHEST:  CTA, c/o pain on deep inspiration


CARDIAC:  RRR


ABDOMEN:  Soft, nondistended, nontender; no hepatosplenomegaly; bowel sounds 

are present in all four quadrants.


EXTREMITIES: No clubbing, cyanosis, or edema.


SKIN:  Normal; no rash; no jaundice.


CNS:  No focal deficits; alert and oriented times three.


 (Glenis Jolly [facility])





Assessment and Plan


Plan


ASSESSMENT


- anemia with sudden drop hgb - anemia multifactoria, pt has CKD.  had drop hgb 

from 8.6 to 6.6 and 6.9 on rck.  2x PRBC pending


    cardiology evaluating for NSTEMI, catheterization on hold until GI 

evaluates for GIB, cleared by cardiology for endoscopy.


   pt admits chronic black tarry stool, on iron.  no prior hx GIB.  last 

colonoscopy 2014 with finding 2 sessile polyps.  EGD 2016


   finding erythematous gastritis path reactive gastropathy.


- NSTEMI, cardiology following





PLAN


- EGD and colonoscopy tomorrow


- obtain consent


- clear liquids today


- GoLytely prep


- NPO after midnight


- monitor HH


- transfuse as needed, per cardiology for hgb <9


- further recs to follow


pt seen by myself and Dr Uriostegui and this note is written on his behalf


 (Glenis Jolly)


Physician Comments


Patient was seen and examined


Agree with above


Continue with current supportive care


Monitor labs


Plan for an EGD and a colonoscopy tomorrow


 (Wade Uriostegui MD)











Glenis Jolly Jan 18, 2018 11:52


Wade Uriostegui MD Jan 18, 2018 22:23

## 2018-01-18 NOTE — HHI.NPPN
Subjective


General Problems:  Anemia


Renal Failure:  Chronic, Acute, Stage III, Stage IV


Interval History


Her cardiac cath was postponed due to worsening anemia. Given 2 units PRBC. Due 

for EGD today.


 (Migdalia Camejo)





Review of Systems


General


Constitutional:  Fatigue


 (Migdalia Camejo)





Cardiovascular


Cardiac:  Chest Pain


 (Migdalia Camejo)





Objective Data


Data





Vital Signs








  Date Time  Temp Pulse Resp B/P (MAP) Pulse Ox O2 Delivery O2 Flow Rate FiO2


 


1/18/18 13:00  90      


 


1/18/18 12:00  92      


 


1/18/18 11:00 99.3 96 18 130/75 (93) 92   


 


1/18/18 11:00  95      


 


1/18/18 10:00  96      


 


1/18/18 09:00  98      


 


1/18/18 08:00  94      


 


1/18/18 07:00 98.7 95 22 160/77 (104) 99   


 


1/18/18 07:00  76      


 


1/18/18 06:00  90      


 


1/18/18 05:00  88      


 


1/18/18 04:00  92      


 


1/18/18 04:00 98.5 94 20 177/91 (119) 92   


 


1/18/18 03:00     96 Nasal Cannula 2.00 


 


1/18/18 03:00  84      


 


1/18/18 02:00  86      


 


1/18/18 01:00  92      


 


1/18/18 00:17   18     


 


1/18/18 00:07     96 Nasal Cannula 2.00 


 


1/18/18 00:00 98.5 92 18 163/79 (107) 93   


 


1/18/18 00:00  90      


 


1/17/18 23:00     96 Nasal Cannula 2.00 


 


1/17/18 23:00  96      


 


1/17/18 22:00  92      


 


1/17/18 21:34 98.2 97 20 153/76 (101)    


 


1/17/18 21:00  90      


 


1/17/18 20:00  91      


 


1/17/18 20:00   18  97   


 


1/17/18 19:00     96 Nasal Cannula 2.00 


 


1/17/18 18:00  88      


 


1/17/18 17:43     97 Nasal Cannula 2.00 


 


1/17/18 17:00  85      


 


1/17/18 16:00  87      


 


1/17/18 15:00     97 Nasal Cannula 2.00 





      Humidified  


 


1/17/18 15:00 98.1 89 16 152/87 (108) 97   


 


1/17/18 15:00  89      


 


1/17/18 14:00  88      








 (Migdalia Camejo)


-:  


1/18/18 0905                                                                   

             1/17/18 0623





Imaging





Last 72 hours Impressions








Chest X-Ray 1/18/18 0000 Signed





Impressions: 





 Service Date/Time:  Thursday, January 18, 2018 10:47 - CONCLUSION:  Improved 





 infiltrates in both lung bases.     Edgardo Mullins MD 








 (Migdalia Camejo)





Physical Exam


General


Appearance:  Well Developed, Well Nourished, Comfortable


 (Migdalia Camejo)





Eyes


Eye Exam:  Pupils Equal


 (Migdalia Camejo)





Throat


Throat Exam:  Oral Mucosa Pink & Moist


 (Migdalia Camejo)





Pulmonary


Resp Exam:  Clear Bilaterally, Breath Sounds Equal


 (Migdalia Camejo)





Cardiology


CV Exam:  Regular, Normal Sinus Rhythm, Good Perfusion


 (Migdalia Camejo)





Gastrointestinal/Abdomen


GI Exam:  Soft, Non-Tender, Bowel Sounds Present, Positive Bowel Movement


 (Migdalia Camejo)





Musculoskeletal


MS Exam:  Joints Intact, Normal Tone


 (Migdalia Camejo)





Integumentary


Skin Exam:  Clear, Warm, Dry, Intact


 (Migdalia Camejo)





Extremeties


Extremities Exam:  No Edema, Pedal Pulses Palpable


 (Migdalia Camejo)





Neurologic


Neuro Exam:  Alert, Awake, Oriented, Speech Clear, Moving All Extremities


 (Migdalia Camejo)





Psychiatric


Psych Exam:  Appropriate Responses


 (Migdalia Camejo)





Assessment/Plan


Discussed Condition With:  Patient


Assessment Summary:  LETI/Acute Renal Failure, Anemia of CKD, Hypertension, 

Diabetes Mellitus, CKD Stage III, CKD Stage IV


Problem List:  


(1) Renal insufficiency


ICD Codes:  N28.9 - Disorder of kidney and ureter, unspecified


Plan:  Baseline CKD 3-4, creatinine 2.1-2.5, GFR 24-30.


LETI due to NSTEMI


Renal function has improved slightly


Tolerating oral fluids, IVF not required at this time. 


Most likely will have cardiac catheterization, has underlying diabetic 

nephropathy. 


The risk of LETI due to contrast exposure is 26%, risk of requiring dialysis is 

1.5%. 


Recommend to start 0.9% NS @ 50 cc/hr 12 hrs prior to catheterization. 


Monitor urine output. 


Repeat labs daily, continue supportive care. 


Avoid additional nephrotoxic agents. Minimize contrast exposure if possible. 





(2) NSTEMI (non-ST elevated myocardial infarction)


ICD Codes:  I21.4 - Non-ST elevation (NSTEMI) myocardial infarction


Plan:  Cardiology following, valerie catheterization was postponed 


given ASA,off  heparin gtt


Imdur started, appreciate recommendations 





(3) DM (diabetes mellitus)


ICD Codes:  E11.9 - Type 2 diabetes mellitus without complications


Plan:  Insulin as needed, maintain glucose 140-180mg/dL. 


If NPO use D10 @ 15 ml/hr to prevent hyperkalemia 





(4) Anemia


ICD Codes:  D64.9 - Anemia, unspecified


Status:  Chronic


Plan:  Ordered 2 units


To have EGD/colonoscopy tomorrow


Iron profile ordered 





(5) CAD (coronary artery disease)


ICD Codes:  I25.10 - Atherosclerotic heart disease of native coronary artery 

without angina pectoris


Status:  Chronic


Plan:  Hx of stent in the past. New NSTEMI, see above 





(6) PNA (pneumonia)


ICD Codes:  J18.9 - Pneumonia, unspecified organism


Plan:  On Zithromax and rocephin 


Follow clinically 


 (Migdalia Camejo)


Plan


patient was seen and examined. Has developed anemia, blood transfusion was 

ordered. Discussed with Dr. Mercado, cardiac catheterization postponed. GI 

workup. Monitor renal function. 


 (Kelton Whiteside MD)











Migdalia Camejo Jan 18, 2018 13:49


Kelton Whiteside MD Jan 18, 2018 21:06

## 2018-01-18 NOTE — HHI.PR
Subjective


Remarks


Follow-up sepsis/pneumonia/CKD stage IV and now anemia OF possible acute blood 

loss


01/18/18-patient seen and examined, she has an acute drop in hemoglobin this 

morning to 6.6 however patient denies any bleeding.  Heart catheterization 

would be postponed until source of GI bleed is addressed. case was discussed 

with cardiology this morning.





Objective


Vitals





Vital Signs








  Date Time  Temp Pulse Resp B/P (MAP) Pulse Ox O2 Delivery O2 Flow Rate FiO2


 


1/18/18 06:00  90      


 


1/18/18 05:00  88      


 


1/18/18 04:00  92      


 


1/18/18 04:00 98.5 94 20 177/91 (119) 92   


 


1/18/18 03:00     96 Nasal Cannula 2.00 


 


1/18/18 03:00  84      


 


1/18/18 02:00  86      


 


1/18/18 01:00  92      


 


1/18/18 00:17   18     


 


1/18/18 00:07     96 Nasal Cannula 2.00 


 


1/18/18 00:00 98.5 92 18 163/79 (107) 93   


 


1/18/18 00:00  90      


 


1/17/18 23:00     96 Nasal Cannula 2.00 


 


1/17/18 23:00  96      


 


1/17/18 22:00  92      


 


1/17/18 21:34 98.2 97 20 153/76 (101)    


 


1/17/18 21:00  90      


 


1/17/18 20:00  91      


 


1/17/18 20:00   18  97   


 


1/17/18 19:00     96 Nasal Cannula 2.00 


 


1/17/18 18:00  88      


 


1/17/18 17:43     97 Nasal Cannula 2.00 


 


1/17/18 17:00  85      


 


1/17/18 16:00  87      


 


1/17/18 15:00     97 Nasal Cannula 2.00 





      Humidified  


 


1/17/18 15:00 98.1 89 16 152/87 (108) 97   


 


1/17/18 15:00  89      


 


1/17/18 14:00  88      


 


1/17/18 13:00  88      


 


1/17/18 12:00  92      


 


1/17/18 11:00 98.3 85 16 142/76 (98) 95   


 


1/17/18 11:00     95 Nasal Cannula 2.00 





      Humidified  


 


1/17/18 11:00  86      


 


1/17/18 11:00     96 Nasal Cannula 2.00 





      Humidified  














I/O      


 


 1/17/18 1/17/18 1/17/18 1/18/18 1/18/18 1/18/18





 07:00 15:00 23:00 07:00 15:00 23:00


 


Intake Total 547 ml 56 ml 600 ml 1240 ml  


 


Output Total 550 ml  500 ml   


 


Balance -3 ml 56 ml 100 ml 1240 ml  


 


      


 


Intake Oral 420 ml  600 ml 240 ml  


 


IV Total 127 ml 56 ml  1000 ml  


 


Output Urine Total 550 ml  500 ml   


 


# Bowel Movements   0   








Result Diagram:  


1/18/18 0905                                                                   

             1/17/18 0623





Imaging





Last Impressions








Chest X-Ray 1/15/18 0125 Signed





Impressions: 





 Service Date/Time:  Monday, January 15, 2018 01:32 - CONCLUSION:  1. 





 Consolidation or atelectasis at the bases being worse on the right. 2. 

Suspected 





 nipple shadows. This could be followup at some point with a chest x-ray with 





 nipple markers.     Severo Weir MD 








Objective Remarks


GENERAL: NAD


SKIN: Warm and dry.


HEAD: Normocephalic.


EYES: No scleral icterus. No injection or drainage. 


NECK: Supple, trachea midline. No JVD or lymphadenopathy.


CARDIOVASCULAR: Regular rate and rhythm without murmurs, gallops, or rubs. 


RESPIRATORY: Breath sounds equal bilaterally. No accessory muscle use.


GASTROINTESTINAL: Abdomen soft, non-tender, nondistended. 


MUSCULOSKELETAL: No cyanosis, or edema. 


BACK: Nontender without obvious deformity. No CVA tenderness.








A/P


Problem List:  


(1) Sepsis


ICD Code:  A41.9 - Sepsis, unspecified organism


(2) PNA (pneumonia)


ICD Code:  J18.9 - Pneumonia, unspecified organism


(3) Hypoxia


ICD Code:  R09.02 - Hypoxemia


(4) NSTEMI (non-ST elevated myocardial infarction)


ICD Code:  I21.4 - Non-ST elevation (NSTEMI) myocardial infarction


(5) CHF (congestive heart failure)


ICD Code:  I50.9 - Heart failure, unspecified


(6) Renal insufficiency


ICD Code:  N28.9 - Disorder of kidney and ureter, unspecified


(7) DM (diabetes mellitus)


ICD Code:  E11.9 - Type 2 diabetes mellitus without complications


(8) GI bleed


ICD Code:  K92.2 - Gastrointestinal hemorrhage, unspecified


Assessment and Plan


52-year-old female with





GI bleed?


Anemia of acute blood loss


   Transfused 2 units packed red blood cell


   Consult GI for possible evaluation for panendoscopy


   Check LDH, haptoglobin, iron study, B-12, folate


   PPI however will hold aspirin and heparin until patient seen by GI





Sepsis


   Resolved, continue with current antibiotics including Rocephin and 

azithromycin





PNA


   Currently on Rocephin and azithromycin


   DuoNeb when necessary


   Maintain oxygen saturation above 92%





Hypoxia


   Continue with treatment as in above





Acute on Chronic diastolic CHF


   Echo 8/18/17 w/ EF 55-60%


   Currently off Lasix secondary to worsening renal function


   Continue with Imdur/BB





NSTEMI:  


   Plan for heart catheterization on hold until patient seen by GI to workup 

anemia and possible GI bleed


   Secondary to possible GI bleed, will hold heparin and aspirin until patient 

seen by GI


   Continue with statin, beta blocker


   Management per cardiology





Stage IV CKD


   Management per nephrology





Hypertension


   Continue with Lopressor, Procardia





DM:  


   Sliding scale w/ Accu-Cheks. stable. 





Tobacco Abuse: 


   Strongly recommended to stop smoking, no NicoDerm to avoid vasoconstriction.

  Ativan prn if needed











Mp Lopez MD Jan 18, 2018 10:47

## 2018-01-18 NOTE — RADRPT
EXAM DATE/TIME:  01/18/2018 10:47 

 

[facility] COMPARISON:     

CHEST SINGLE AP, January 15, 2018, 1:32.

 

                     

INDICATIONS :     

Evaluate for pneumonia.

                     

 

MEDICAL HISTORY :     

Hypertension.       Pancreatitis. Gastroesophageal reflux disease. Myocardial infarction. CAD   

 

SURGICAL HISTORY :        

Cholecystectomy. Tubal ligation.

 

ENCOUNTER:     

Subsequent                                        

 

ACUITY:     

2 days      

 

PAIN SCORE:     

0/10

 

LOCATION:     

Bilateral distal 

 

FINDINGS:     

A single view of the chest demonstrates marked improvement in the bibasilar infiltrates right greater
 than left. Upper lungs are clear..  The cardiomediastinal contours are unremarkable.  Osseous struct
ures are intact.

 

CONCLUSION:     

Improved infiltrates in both lung bases.

 

 

 

 Edgardo Mullins MD on January 18, 2018 at 11:51           

Board Certified Radiologist.

 This report was verified electronically.

## 2018-01-19 VITALS
SYSTOLIC BLOOD PRESSURE: 190 MMHG | DIASTOLIC BLOOD PRESSURE: 106 MMHG | RESPIRATION RATE: 15 BRPM | TEMPERATURE: 98.4 F | OXYGEN SATURATION: 99 % | HEART RATE: 93 BPM

## 2018-01-19 VITALS — HEART RATE: 94 BPM

## 2018-01-19 VITALS
HEART RATE: 72 BPM | OXYGEN SATURATION: 98 % | DIASTOLIC BLOOD PRESSURE: 89 MMHG | RESPIRATION RATE: 18 BRPM | SYSTOLIC BLOOD PRESSURE: 160 MMHG | TEMPERATURE: 98.2 F

## 2018-01-19 VITALS — HEART RATE: 96 BPM | OXYGEN SATURATION: 95 % | SYSTOLIC BLOOD PRESSURE: 211 MMHG | DIASTOLIC BLOOD PRESSURE: 110 MMHG

## 2018-01-19 VITALS — HEART RATE: 90 BPM

## 2018-01-19 VITALS — HEART RATE: 88 BPM

## 2018-01-19 VITALS
TEMPERATURE: 98.3 F | HEART RATE: 88 BPM | SYSTOLIC BLOOD PRESSURE: 205 MMHG | RESPIRATION RATE: 17 BRPM | DIASTOLIC BLOOD PRESSURE: 108 MMHG

## 2018-01-19 VITALS — SYSTOLIC BLOOD PRESSURE: 176 MMHG | DIASTOLIC BLOOD PRESSURE: 91 MMHG

## 2018-01-19 VITALS — HEART RATE: 84 BPM

## 2018-01-19 VITALS
OXYGEN SATURATION: 96 % | TEMPERATURE: 98 F | HEART RATE: 89 BPM | RESPIRATION RATE: 20 BRPM | DIASTOLIC BLOOD PRESSURE: 92 MMHG | SYSTOLIC BLOOD PRESSURE: 179 MMHG

## 2018-01-19 VITALS — HEART RATE: 89 BPM

## 2018-01-19 VITALS — HEART RATE: 77 BPM

## 2018-01-19 VITALS — HEART RATE: 96 BPM

## 2018-01-19 VITALS — HEART RATE: 85 BPM

## 2018-01-19 VITALS — HEART RATE: 78 BPM

## 2018-01-19 VITALS — HEART RATE: 79 BPM

## 2018-01-19 LAB
ALBUMIN SERPL-MCNC: 2.9 GM/DL (ref 3.4–5)
BASOPHILS # BLD AUTO: 0.1 TH/MM3 (ref 0–0.2)
BASOPHILS NFR BLD: 0.6 % (ref 0–2)
BUN SERPL-MCNC: 39 MG/DL (ref 7–18)
CALCIUM SERPL-MCNC: 9 MG/DL (ref 8.5–10.1)
CHLORIDE SERPL-SCNC: 108 MEQ/L (ref 98–107)
CREAT SERPL-MCNC: 2.57 MG/DL (ref 0.5–1)
EOSINOPHIL # BLD: 0.4 TH/MM3 (ref 0–0.4)
EOSINOPHIL NFR BLD: 3.4 % (ref 0–4)
ERYTHROCYTE [DISTWIDTH] IN BLOOD BY AUTOMATED COUNT: 16.4 % (ref 11.6–17.2)
GFR SERPLBLD BASED ON 1.73 SQ M-ARVRAT: 24 ML/MIN (ref 89–?)
GLUCOSE SERPL-MCNC: 120 MG/DL (ref 74–106)
HCO3 BLD-SCNC: 20 MEQ/L (ref 21–32)
HCT VFR BLD CALC: 32.1 % (ref 35–46)
HGB BLD-MCNC: 11.1 GM/DL (ref 11.6–15.3)
LYMPHOCYTES # BLD AUTO: 1.3 TH/MM3 (ref 1–4.8)
LYMPHOCYTES NFR BLD AUTO: 11.5 % (ref 9–44)
MCH RBC QN AUTO: 32.8 PG (ref 27–34)
MCHC RBC AUTO-ENTMCNC: 34.5 % (ref 32–36)
MCV RBC AUTO: 95 FL (ref 80–100)
MONOCYTE #: 0.7 TH/MM3 (ref 0–0.9)
MONOCYTES NFR BLD: 6.4 % (ref 0–8)
NEUTROPHILS # BLD AUTO: 8.8 TH/MM3 (ref 1.8–7.7)
NEUTROPHILS NFR BLD AUTO: 78.1 % (ref 16–70)
PHOSPHATE SERPL-MCNC: 4 MG/DL (ref 2.5–4.9)
PLATELET # BLD: 378 TH/MM3 (ref 150–450)
PMV BLD AUTO: 7.8 FL (ref 7–11)
RBC # BLD AUTO: 3.38 MIL/MM3 (ref 4–5.3)
SODIUM SERPL-SCNC: 140 MEQ/L (ref 136–145)
TROPONIN I SERPL-MCNC: 0.37 NG/ML (ref 0.02–0.05)
TROPONIN I SERPL-MCNC: 0.38 NG/ML (ref 0.02–0.05)
WBC # BLD AUTO: 11.2 TH/MM3 (ref 4–11)

## 2018-01-19 PROCEDURE — 0DB68ZX EXCISION OF STOMACH, VIA NATURAL OR ARTIFICIAL OPENING ENDOSCOPIC, DIAGNOSTIC: ICD-10-PCS | Performed by: INTERNAL MEDICINE

## 2018-01-19 PROCEDURE — 0DBE8ZX EXCISION OF LARGE INTESTINE, VIA NATURAL OR ARTIFICIAL OPENING ENDOSCOPIC, DIAGNOSTIC: ICD-10-PCS | Performed by: INTERNAL MEDICINE

## 2018-01-19 RX ADMIN — ACYCLOVIR SCH UNITS: 800 TABLET ORAL at 21:00

## 2018-01-19 RX ADMIN — STANDARDIZED SENNA CONCENTRATE AND DOCUSATE SODIUM SCH TAB: 8.6; 5 TABLET, FILM COATED ORAL at 21:10

## 2018-01-19 RX ADMIN — SODIUM CHLORIDE SCH MLS/HR: 900 INJECTION INTRAVENOUS at 06:35

## 2018-01-19 RX ADMIN — Medication SCH ML: at 08:11

## 2018-01-19 RX ADMIN — NITROGLYCERIN SCH INCH: 20 OINTMENT TOPICAL at 01:40

## 2018-01-19 RX ADMIN — AZITHROMYCIN SCH MLS/HR: 500 INJECTION, POWDER, LYOPHILIZED, FOR SOLUTION INTRAVENOUS at 03:36

## 2018-01-19 RX ADMIN — INSULIN ASPART SCH: 100 INJECTION, SOLUTION INTRAVENOUS; SUBCUTANEOUS at 21:24

## 2018-01-19 RX ADMIN — MORPHINE SULFATE PRN MG: 2 INJECTION, SOLUTION INTRAMUSCULAR; INTRAVENOUS at 16:49

## 2018-01-19 RX ADMIN — NITROGLYCERIN SCH INCH: 20 OINTMENT TOPICAL at 20:22

## 2018-01-19 RX ADMIN — INSULIN ASPART SCH: 100 INJECTION, SOLUTION INTRAVENOUS; SUBCUTANEOUS at 12:00

## 2018-01-19 RX ADMIN — PRAVASTATIN SODIUM SCH MG: 40 TABLET ORAL at 21:10

## 2018-01-19 RX ADMIN — STANDARDIZED SENNA CONCENTRATE AND DOCUSATE SODIUM SCH TAB: 8.6; 5 TABLET, FILM COATED ORAL at 09:00

## 2018-01-19 RX ADMIN — ONDANSETRON PRN MG: 2 INJECTION, SOLUTION INTRAMUSCULAR; INTRAVENOUS at 16:49

## 2018-01-19 RX ADMIN — INSULIN ASPART SCH: 100 INJECTION, SOLUTION INTRAVENOUS; SUBCUTANEOUS at 17:00

## 2018-01-19 RX ADMIN — MORPHINE SULFATE PRN MG: 2 INJECTION, SOLUTION INTRAMUSCULAR; INTRAVENOUS at 21:18

## 2018-01-19 RX ADMIN — INSULIN ASPART SCH: 100 INJECTION, SOLUTION INTRAVENOUS; SUBCUTANEOUS at 07:57

## 2018-01-19 RX ADMIN — GABAPENTIN SCH MG: 300 CAPSULE ORAL at 21:10

## 2018-01-19 RX ADMIN — FOLIC ACID SCH MG: 1 TABLET ORAL at 08:12

## 2018-01-19 RX ADMIN — ISOSORBIDE MONONITRATE SCH MG: 60 TABLET, EXTENDED RELEASE ORAL at 06:35

## 2018-01-19 RX ADMIN — METOPROLOL TARTRATE SCH MG: 100 TABLET, FILM COATED ORAL at 08:12

## 2018-01-19 RX ADMIN — NITROGLYCERIN SCH INCH: 20 OINTMENT TOPICAL at 06:35

## 2018-01-19 RX ADMIN — NITROGLYCERIN SCH INCH: 20 OINTMENT TOPICAL at 23:45

## 2018-01-19 RX ADMIN — FERROUS SULFATE TAB 325 MG (65 MG ELEMENTAL FE) SCH MG: 325 (65 FE) TAB at 08:12

## 2018-01-19 RX ADMIN — MORPHINE SULFATE PRN MG: 2 INJECTION, SOLUTION INTRAMUSCULAR; INTRAVENOUS at 00:00

## 2018-01-19 RX ADMIN — NITROGLYCERIN SCH INCH: 20 OINTMENT TOPICAL at 12:00

## 2018-01-19 RX ADMIN — BISACODYL SCH MG: 5 TABLET, COATED ORAL at 20:23

## 2018-01-19 RX ADMIN — FERROUS SULFATE TAB 325 MG (65 MG ELEMENTAL FE) SCH MG: 325 (65 FE) TAB at 21:10

## 2018-01-19 RX ADMIN — Medication SCH ML: at 21:10

## 2018-01-19 RX ADMIN — MORPHINE SULFATE PRN MG: 2 INJECTION, SOLUTION INTRAMUSCULAR; INTRAVENOUS at 06:37

## 2018-01-19 RX ADMIN — PANTOPRAZOLE SODIUM SCH MG: 40 INJECTION, POWDER, FOR SOLUTION INTRAVENOUS at 08:11

## 2018-01-19 RX ADMIN — BISACODYL SCH MG: 5 TABLET, COATED ORAL at 16:48

## 2018-01-19 RX ADMIN — METOPROLOL TARTRATE SCH MG: 100 TABLET, FILM COATED ORAL at 21:10

## 2018-01-19 NOTE — PD.PROCEDR
GI Procedure





PROCEDURE PERFORMED


EGD with biopsy followed by colonoscopy with snare polypectomy





INDICATION FOR PROCEDURE


Anemia, possible GI bleed





PROCEDURE:


The procedure, risks and benefits were discussed with Ms. Walker and 

informed


consent was obtained.  Anesthesia sedated her with Diprivan.  She was placed in 

the left lateral decubitus position.





EGD:


The Pentax videoscope was introduced through the oropharynx and advanced to the 

second portion of the duodenum under direct visualization. Retroflexion was 

performed in the stomach.





FINDINGS:


The esophagus this was normal


The stomach there was some patchy erythema in the antrum but no ulcerations no 

erosions no blood or bleeding the rest of the stomach was unremarkable antral 

biopsies were taken for further evaluation


The duodenum this was normal





Colonoscopy:


The Pentax videoscope was introduced through the rectum and advanced to cecum 

where the ileocecal valve and appendiceal orifice were identified. Retroflexion 

was performed in the rectum. Colonic prep was fair





FINDINGS:


Colonic withdrawal time was greater than 6 minutes as the scope was slowly 

withdrawn colonic mucosa was carefully inspected the patient was noted to have 

3 sessile polyps probably hyperplastic but these were excised with cold snare 

technique otherwise colonic examination was unremarkable so as retroflexion and 

rectal examination





ESTIMATED BLOOD LOSS:


None





SPECIMENS REMOVED:


Antral and colon biopsies





COMPLICATIONS:


None





IMPRESSION:


Mild gastritis


Colon polyps





PLAN:


Await biopsies


Recommend small bowel follow-through


Continue with current supportive care and monitor labs and transfuse if needed


Colonoscopy in 5 years











Wade Uriostegui MD Jan 19, 2018 10:17

## 2018-01-19 NOTE — EKG
Date Performed: 01/19/2018       Time Performed: 01:25:12

 

PTAGE:      52 years

 

EKG:      Sinus rhythm 

 

 Left ventricular hypertrophy Nonspecific ST T-wave changes Abnormal ECG Compared to prior electrocar
diogram, ST T-wave changes are less marked.

 

DOCTOR:   Allen Waite  Interpretating Date/Time  01/19/2018 14:04:49

## 2018-01-19 NOTE — PD.CARD.PN
Subjective


Subjective Remarks


Denies any chest pain, shortness breath, or palpitations.  She does feel 

lightheaded.  Going for EGD and colonoscopy with GI today.


 (King Ervin)





Objective


Medications





Current Medications








 Medications


  (Trade)  Dose


 Ordered  Sig/Luis Armando


 Route  Start Time


 Stop Time Status Last Admin


 


  (D50w (Vial) Inj)  50 ml  UNSCH  PRN


 IV PUSH  1/15/18 04:00


     


 


 


  (Glucagon Inj)  1 mg  UNSCH  PRN


 OTHER  1/15/18 04:00


     


 


 


  (NovoLOG


 SUPPLEMENTAL


 SCALE)  1  ACHS SLIDING  SCALE


 SQ  1/15/18 08:00


    1/18/18 12:00


 


 


 Ceftriaxone


 Sodium 1000 mg/


 Sodium Chloride  100 ml @ 


 200 mls/hr  Q24H


 IV  1/15/18 05:00


    1/19/18 06:35


 


 


 Azithromycin 500


 mg/Sodium Chloride  250 ml @ 


 250 mls/hr  Q24H


 IV  1/15/18 04:00


    1/19/18 03:36


 


 


  (Duoneb Neb)  1 ampule  Q4HR NEB  PRN


 NEB  1/15/18 04:00


    1/18/18 21:20


 


 


  (NS Flush)  2 ml  UNSCH  PRN


 IV FLUSH  1/15/18 04:00


    1/18/18 00:12


 


 


  (NS Flush)  2 ml  BID


 IV FLUSH  1/15/18 09:00


    1/18/18 20:57


 


 


  (Zofran Inj)  4 mg  Q6H  PRN


 IVP  1/15/18 04:00


    1/17/18 21:39


 


 


  (Tylenol)  650 mg  Q6H  PRN


 PO  1/15/18 04:00


     


 


 


  (Norco  5-325 Mg)  1 tab  Q4H  PRN


 PO  1/15/18 04:00


    1/17/18 08:21


 


 


  (Morphine Inj)  2 mg  Q3H  PRN


 IV PUSH  1/15/18 04:00


    1/19/18 06:37


 


 


  (Theresa-Colace)  1 tab  BID


 PO  1/15/18 09:00


    1/18/18 09:01


 


 


  (Milk Of


 Magnesia Liq)  30 ml  Q12H  PRN


 PO  1/15/18 04:00


     


 


 


  (Senokot)  17.2 mg  Q12H  PRN


 PO  1/15/18 04:00


     


 


 


  (Dulcolax Supp)  10 mg  DAILY  PRN


 RECTAL  1/15/18 04:00


     


 


 


  (Lactulose Liq)  30 ml  DAILY  PRN


 PO  1/15/18 04:00


     


 


 


  (Aspirin Chew)  81 mg  DAILY


 CHEW  1/15/18 09:00


   Future Hold 1/18/18 09:01


 


 


  (Folate)  1 mg  DAILY


 PO  1/15/18 09:00


    1/18/18 09:01


 


 


  (Neurontin)  300 mg  HS


 PO  1/15/18 21:00


    1/18/18 20:56


 


 


  (Levemir Inj)  15 units  HS


 SQ  1/15/18 21:00


    1/16/18 20:57


 


 


  (Pravachol)  40 mg  HS


 PO  1/15/18 21:00


    1/18/18 20:57


 


 


  (Imdur)  120 mg  DAILY@07


 PO  1/18/18 07:00


    1/19/18 06:35


 


 


 Sodium Chloride  1,000 ml @ 


 50 mls/hr  Q20H


 IV  1/17/18 23:45


   Future Hold 1/17/18 23:45


 


 


  (Lopressor)  100 mg  Q12HR


 PO  1/18/18 21:00


    1/18/18 19:08


 


 


  (Norvasc)  5 mg  DAILY


 PO  1/18/18 13:00


    1/18/18 13:29


 


 


  (Ferrous Sulfate)  325 mg  BID


 PO  1/18/18 10:00


    1/18/18 20:56


 


 


  (Protonix Inj)  40 mg  Q24H


 IV PUSH  1/19/18 09:00


     


 


 


  (Nitroglycerin


 2% Oint)  1 inch  Q6HR


 TOPICAL  1/19/18 01:40


    1/19/18 06:35


 


 


  (Morphine Inj)  2 mg  Q3H  PRN


 IV PUSH  1/19/18 01:30


     


 








Vital Signs / I&O





Vital Signs








  Date Time  Temp Pulse Resp B/P (MAP) Pulse Ox O2 Delivery O2 Flow Rate FiO2


 


1/19/18 07:30 98.4 93 15 190/106 (134) 99   


 


1/19/18 06:50   18     


 


1/19/18 06:27  90      


 


1/19/18 05:28 98.2 72 18 160/89 (112) 98   


 


1/19/18 05:00  84      


 


1/19/18 04:00  77      


 


1/19/18 03:39     95 Nasal Cannula 4.00 


 


1/19/18 03:00  94      


 


1/19/18 02:00  96      


 


1/19/18 01:10  77      


 


1/19/18 00:56  96  211/110 (143) 95   


 


1/19/18 00:00  94      


 


1/18/18 23:46  91      


 


1/18/18 23:10     95 Nasal Cannula 4.00 


 


1/18/18 23:00 98.5 91 18 195/102 (133) 94   


 


1/18/18 22:00  90      


 


1/18/18 21:51    197/102 (133) 91   


 


1/18/18 21:00  97      


 


1/18/18 20:53     92 Nasal Cannula 2.00 


 


1/18/18 20:51    191/98 (129) 94   


 


1/18/18 20:00  95      


 


1/18/18 19:10 98.1 92 18 203/104 (137) 100   


 


1/18/18 19:10     100 Nasal Cannula 2.00 


 


1/18/18 19:00  91      


 


1/18/18 18:00  82  162/85 100   


 


1/18/18 18:00  88      


 


1/18/18 17:55 98.0 174 20 174/94 92   


 


1/18/18 17:50  87 20 163/97 95   


 


1/18/18 17:45  87 20 154/97 97   


 


1/18/18 17:43 98.0 87 18 161/93 98   


 


1/18/18 17:42  89 18 165/94 97   


 


1/18/18 17:35    159/87    


 


1/18/18 17:00    153/87    


 


1/18/18 17:00  88      


 


1/18/18 16:49    158/87    


 


1/18/18 16:30    148/88    


 


1/18/18 16:15  86  149/87 97   


 


1/18/18 16:00  82      


 


1/18/18 16:00   18 145/82    


 


1/18/18 15:45 98.0 86 18 146/82 98   


 


1/18/18 15:30 98.0 87 19 143/81 99   


 


1/18/18 15:16     96 Nasal Cannula 2.00 


 


1/18/18 15:15 98.0 89 20 144/85 99   


 


1/18/18 15:00 98.0 88 18 142/78 100   


 


1/18/18 15:00  82      


 


1/18/18 15:00 98.0 88 18 142/78 (99) 100   


 


1/18/18 14:45 98.0 87 18 140/78 100   


 


1/18/18 14:00  88      


 


1/18/18 13:00  90      


 


1/18/18 12:00  92      


 


1/18/18 11:00 99.3 96 18 130/75 (93) 92   


 


1/18/18 11:00     92 Room Air  


 


1/18/18 11:00  95      


 


1/18/18 10:00  96      


 


1/18/18 09:00  98      














I/O      


 


 1/18/18 1/18/18 1/18/18 1/19/18 1/19/18 1/19/18





 07:00 15:00 23:00 07:00 15:00 23:00


 


Intake Total 1240 ml 10 ml 3921 ml 4600 ml  


 


Output Total   375 ml 2500 ml  


 


Balance 1240 ml 10 ml 3546 ml 2100 ml  


 


      


 


Intake Oral 240 ml  3160 ml 4000 ml  


 


IV Total 1000 ml   350 ml  


 


Packed Cells   701 ml 250 ml  


 


Blood Product IV Normal Saline Flush  10 ml 60 ml   


 


Output Urine Total   375 ml 1000 ml  


 


Stool Total    1500 ml  


 


# Voids   4   








Physical Exam


GENERAL: Well-developed well-nourished.  In no acute distress.


NECK: No carotid bruits.  No JVD.  


CARDIOVASCULAR: Regular rate and rhythm.  No murmur appreciated.


RESPIRATORY: No accessory muscle use. Clear to auscultation. Breath sounds 

equal bilaterally. 


MUSCULOSKELETAL: No clubbing or cyanosis. No edema. 


NEUROLOGICAL: Awake and alert. Normal speech.


Laboratory





Laboratory Tests








Test


  1/18/18


09:05 1/18/18


13:05 1/19/18


01:53


 


Hemoglobin 6.9 GM/DL   11.1 GM/DL 


 


Hematocrit 20.4 %   32.1 % 


 


Haptoglobin  340 MG/DL  


 


Iron Level  96 MCG/DL  


 


Total Iron Binding Capacity  263 MCG/DL  


 


Percent Iron Saturation  36.5 %  


 


Lactate Dehydrogenase  178 U/L  


 


Vitamin B12 Level  1111 PG/ML  


 


Folate


  


  GREATER THAN


20.0 NG/ML 


 


 


White Blood Count   11.2 TH/MM3 


 


Red Blood Count   3.38 MIL/MM3 


 


Mean Corpuscular Volume   95.0 FL 


 


Mean Corpuscular Hemoglobin   32.8 PG 


 


Mean Corpuscular Hemoglobin


Concent 


  


  34.5 % 


 


 


Red Cell Distribution Width   16.4 % 


 


Platelet Count   378 TH/MM3 


 


Mean Platelet Volume   7.8 FL 


 


Neutrophils (%) (Auto)   78.1 % 


 


Lymphocytes (%) (Auto)   11.5 % 


 


Monocytes (%) (Auto)   6.4 % 


 


Eosinophils (%) (Auto)   3.4 % 


 


Basophils (%) (Auto)   0.6 % 


 


Neutrophils # (Auto)   8.8 TH/MM3 


 


Lymphocytes # (Auto)   1.3 TH/MM3 


 


Monocytes # (Auto)   0.7 TH/MM3 


 


Eosinophils # (Auto)   0.4 TH/MM3 


 


Basophils # (Auto)   0.1 TH/MM3 


 


CBC Comment   DIFF FINAL 


 


Differential Comment    


 


Blood Urea Nitrogen   39 MG/DL 


 


Creatinine   2.57 MG/DL 


 


Random Glucose   120 MG/DL 


 


Albumin   2.9 GM/DL 


 


Calcium Level   9.0 MG/DL 


 


Phosphorus Level   4.0 MG/DL 


 


Sodium Level   140 MEQ/L 


 


Potassium Level   4.7 MEQ/L 


 


Chloride Level   108 MEQ/L 


 


Carbon Dioxide Level   20.0 MEQ/L 


 


Anion Gap   12 MEQ/L 


 


Estimat Glomerular Filtration


Rate 


  


  24 ML/MIN 


 


 


Total Creatine Kinase   83 U/L 


 


Troponin I   0.37 NG/ML 








Imaging





Last Impressions








Chest X-Ray 1/18/18 0000 Signed





Impressions: 





 Service Date/Time:  Thursday, January 18, 2018 10:47 - CONCLUSION:  Improved 





 infiltrates in both lung bases.     Edgardo Mullins MD 








 (King Ervin)





Assessment and Plan


Problem List:  


(1) NSTEMI (non-ST elevated myocardial infarction)


ICD Codes:  I21.4 - Non-ST elevation (NSTEMI) myocardial infarction


(2) CHF (congestive heart failure)


ICD Codes:  I50.9 - Heart failure, unspecified


Assessment and Plan


53 yo AAF with history of CAD, cardiac stent placed ~ 10 years ago, CKD IV, CHF 

and diabetes admitted for progressive SOB and chest pain. 





Acute anemia: Hgb dropped to 6.6, no obvious bleeding.  Hemoglobin improved to 

11 today after transfusion.  GI consulted, planning an endoscopy today.  





NSTEMI: likely demand mediated due to CKD vs. ischemic injury.  Hold off on [facility] 

for now pending anemia resolution and depending on renal function.





CKD IV: nephrology following


 


Dyspnea: multifactorial, CHF vs. PNA vs anemia.  Echocardiogram unremarkable.  

Continue antibiotics per primary team.


 (King Ervin)


Assessment and Plan


---------------


NSTEMI - CKD and anemia.  [facility] on hold


colonscopy/EGD - no source.  Await biopsies.  Recommend small bowel follow-

through.


will need GI clearance if patient still agreeable to [facility].


continue current medica therapy


 (Edgardo Mercado MD)











King Ervin Jan 19, 2018 08:17


Edgardo Mercado MD Jan 19, 2018 11:29

## 2018-01-19 NOTE — RADRPT
EXAM DATE/TIME:  01/19/2018 10:44 

 

[facility] COMPARISON:     

No previous studies available for comparison.

 

                     

INDICATIONS :     

Anemia, GI bleed.

                     

 

FLUORO TIME:     

.4 minutes

 

IMAGE COUNT:     

13

                     

 

CONTRAST:     

Entero Vu 24% Barium Sulfate (24% w/v, 20% w/w)

                     

 

IMAGING TIME(S):  

15 min, 30 min, 1 hr, 1.5 hrs, 2 hr, 2.5 hrs3hr., 3.5hr,

 

MEDICAL HISTORY :            

Diabetes mellitus type 2. Cardiovascular disease Myocardial infarction.   

 

SURGICAL HISTORY :        

Coronary artery stent. Cholecystectomy. Tubal ligation.

 

ENCOUNTER:     

Subsequent                                        

 

ACUITY:     

4 - 6 days      

 

PAIN SCORE:     

Not given

 

LOCATION:       

abdomen

 

FINDINGS:     

Preliminary film is unremarkable.

 

The stomach is grossly unremarkable.

 

Examination of the small bowel demonstrates normal mucosal pattern involving the jejunum and ileum.  
There is no evidence of mass or obstruction.  No intraluminal filling defects are identified.  Small 
bowel transit time is normal at 3 hours and 30 minutes.  Fluoroscopy of the abdomen and terminal ileu
m demonstrates no abnormality.

 

CONCLUSION:     Unremarkable small bowel examination.

 

 

 

 Boo Ricardo MD on January 19, 2018 at 14:54           

Board Certified Radiologist.

 This report was verified electronically.

## 2018-01-19 NOTE — HHI.NPPN
Subjective


General Problems:  Anemia


Renal Failure:  Chronic, Acute, Stage III, Stage IV


Interval History


Just returned from colonoscopy/EGD. No bleeding identified. Hypertensive. Renal 

function is better. 


 (Migdalia Camejo)





Review of Systems


General


Constitutional:  Fatigue


 (Migdalia Camejo)





Cardiovascular


Cardiac:  Chest Pain


 (Migdalia Camejo)





Objective Data


Data











 1/19/18 1/20/18





 19:00 07:00


 


Intake Total 200 ml 


 


Balance 200 ml 


 


  


 


Other 200 ml 











Vital Signs








  Date Time  Temp Pulse Resp B/P (MAP) Pulse Ox O2 Delivery O2 Flow Rate FiO2


 


1/19/18 10:04 98.0 84 18 178/80 (112) 99   


 


1/19/18 08:15    176/91 (119)    


 


1/19/18 08:00  78      


 


1/19/18 07:30 98.4 93 15 190/106 (134) 99   


 


1/19/18 07:00  88      


 


1/19/18 07:00     98 Nasal Cannula 4.00 


 


1/19/18 06:50   18     


 


1/19/18 06:27  90      


 


1/19/18 05:28 98.2 72 18 160/89 (112) 98   


 


1/19/18 05:00  84      


 


1/19/18 04:00  77      


 


1/19/18 03:39     95 Nasal Cannula 4.00 


 


1/19/18 03:00  94      


 


1/19/18 02:00  96      


 


1/19/18 01:10  77      


 


1/19/18 00:56  96  211/110 (143) 95   


 


1/19/18 00:00  94      


 


1/18/18 23:46  91      


 


1/18/18 23:10     95 Nasal Cannula 4.00 


 


1/18/18 23:00 98.5 91 18 195/102 (133) 94   


 


1/18/18 22:00  90      


 


1/18/18 21:51    197/102 (133) 91   


 


1/18/18 21:00  97      


 


1/18/18 20:53     92 Nasal Cannula 2.00 


 


1/18/18 20:51    191/98 (129) 94   


 


1/18/18 20:00  95      


 


1/18/18 19:10 98.1 92 18 203/104 (137) 100   


 


1/18/18 19:10     100 Nasal Cannula 2.00 


 


1/18/18 19:00  91      


 


1/18/18 18:00  82  162/85 100   


 


1/18/18 18:00  88      


 


1/18/18 17:55 98.0 174 20 174/94 92   


 


1/18/18 17:50  87 20 163/97 95   


 


1/18/18 17:45  87 20 154/97 97   


 


1/18/18 17:43 98.0 87 18 161/93 98   


 


1/18/18 17:42  89 18 165/94 97   


 


1/18/18 17:35    159/87    


 


1/18/18 17:00    153/87    


 


1/18/18 17:00  88      


 


1/18/18 16:49    158/87    


 


1/18/18 16:30    148/88    


 


1/18/18 16:15  86  149/87 97   








 (Migdalia Camejo)


-:  


1/19/18 0153                                                                   

             1/19/18 0153








Physical Exam


General


Appearance:  Well Developed, Well Nourished, Comfortable


 (Migdalia Camejo)





Eyes


Eye Exam:  Pupils Equal


 (Migdalia Camejo)





Throat


Throat Exam:  Oral Mucosa Pink & Moist


 (Migdalia Camejo)





Pulmonary


Resp Exam:  Clear Bilaterally, Breath Sounds Equal


 (Migdalia Camejo)





Cardiology


CV Exam:  Regular, Normal Sinus Rhythm, Good Perfusion


 (Migdalia Camejo)





Gastrointestinal/Abdomen


GI Exam:  Soft, Non-Tender, Bowel Sounds Present, Positive Bowel Movement


 (Migdalia Camejo)





Musculoskeletal


MS Exam:  Joints Intact, Normal Tone


 (Migdalia Camejo)





Integumentary


Skin Exam:  Clear, Warm, Dry, Intact


 (Migdalia Camejo)





Extremeties


Extremities Exam:  No Edema, Pedal Pulses Palpable


 (Migdalia Camejo)





Neurologic


Neuro Exam:  Alert, Awake, Oriented, Speech Clear, Moving All Extremities


 (Migdalia Camejo)





Psychiatric


Psych Exam:  Appropriate Responses


 (Migdalia Camejo)





Assessment/Plan


Discussed Condition With:  Patient


Assessment Summary:  LETI/Acute Renal Failure, Anemia of CKD, Hypertension, 

Diabetes Mellitus, CKD Stage III, CKD Stage IV


Problem List:  


(1) Renal insufficiency


ICD Codes:  N28.9 - Disorder of kidney and ureter, unspecified


Plan:  Baseline CKD 3-4, creatinine 2.1-2.5, GFR 24-30.


LETI due to NSTEMI


Renal function is better


Tolerating oral fluids, IVF not required at this time. 


Most likely will have cardiac catheterization, has underlying diabetic 

nephropathy. 


The risk of LETI due to contrast exposure is 26%, risk of requiring dialysis is 

1.5%. 


Recommend to start 0.9% NS @ 50 cc/hr 12 hrs prior to catheterization. 


Monitor urine output. 


Repeat labs daily, continue supportive care. 


Avoid additional nephrotoxic agents. Minimize contrast exposure if possible. 





(2) NSTEMI (non-ST elevated myocardial infarction)


ICD Codes:  I21.4 - Non-ST elevation (NSTEMI) myocardial infarction


Plan:  Cardiology following, valerie catheterization was postponed 


given ASA,off  heparin gtt


Imdur started, appreciate recommendations 





(3) DM (diabetes mellitus)


ICD Codes:  E11.9 - Type 2 diabetes mellitus without complications


Plan:  Insulin as needed, maintain glucose 140-180mg/dL. 


If NPO use D10 @ 15 ml/hr to prevent hyperkalemia 





(4) Anemia


ICD Codes:  D64.9 - Anemia, unspecified


Status:  Chronic


Plan:  Hb improved, s/p 2 units 


s/p  EGD/colonoscopy 


no evidence of iron deficiency 





(5) CAD (coronary artery disease)


ICD Codes:  I25.10 - Atherosclerotic heart disease of native coronary artery 

without angina pectoris


Status:  Chronic


Plan:  Hx of stent in the past. New NSTEMI, see above 





(6) PNA (pneumonia)


ICD Codes:  J18.9 - Pneumonia, unspecified organism


Plan:  On Zithromax and rocephin 


Follow clinically 





(7) HTN (hypertension)


ICD Codes:  I10 - Hypertension


Status:  Chronic


Plan:  BP elevated


On Imdur and metoprolol; start clonidine BID , increase Norvasc to 10 mg 


 (Migdalia Camejo)


Plan


patient was seen and examined on the 19th. s/p EGD and colonoscopy. BP was high

, we started Clonidine. Monitor. Renal function is stable. 


 (Kelton Whiteside MD)











Migdalia Camejo Jan 19, 2018 16:04


Kelton Whiteside MD Jan 20, 2018 17:29

## 2018-01-19 NOTE — HHI.PR
Subjective


Remarks


Follow-up sepsis/pneumonia/CKD stage IV and now anemia OF possible acute blood 

loss


01/18/18-patient seen and examined, she has an acute drop in hemoglobin this 

morning to 6.6 however patient denies any bleeding.  Heart catheterization 

would be postponed until source of GI bleed is addressed. case was discussed 

with cardiology this morning.


01/19/18-patient seen and examined, she returned from panendoscopy.  Complain 

of nausea.  She states she had overnight chest pain however currently denies 

any.  H&H improved after blood transfusion.





Objective


Vitals





Vital Signs








  Date Time  Temp Pulse Resp B/P (MAP) Pulse Ox O2 Delivery O2 Flow Rate FiO2


 


1/19/18 10:04 98.0 84 18 178/80 (112) 99   


 


1/19/18 08:15    176/91 (119)    


 


1/19/18 08:00  78      


 


1/19/18 07:30 98.4 93 15 190/106 (134) 99   


 


1/19/18 07:00  88      


 


1/19/18 07:00     98 Nasal Cannula 4.00 


 


1/19/18 06:50   18     


 


1/19/18 06:27  90      


 


1/19/18 05:28 98.2 72 18 160/89 (112) 98   


 


1/19/18 05:00  84      


 


1/19/18 04:00  77      


 


1/19/18 03:39     95 Nasal Cannula 4.00 


 


1/19/18 03:00  94      


 


1/19/18 02:00  96      


 


1/19/18 01:10  77      


 


1/19/18 00:56  96  211/110 (143) 95   


 


1/19/18 00:00  94      


 


1/18/18 23:46  91      


 


1/18/18 23:10     95 Nasal Cannula 4.00 


 


1/18/18 23:00 98.5 91 18 195/102 (133) 94   


 


1/18/18 22:00  90      


 


1/18/18 21:51    197/102 (133) 91   


 


1/18/18 21:00  97      


 


1/18/18 20:53     92 Nasal Cannula 2.00 


 


1/18/18 20:51    191/98 (129) 94   


 


1/18/18 20:00  95      


 


1/18/18 19:10 98.1 92 18 203/104 (137) 100   


 


1/18/18 19:10     100 Nasal Cannula 2.00 


 


1/18/18 19:00  91      


 


1/18/18 18:00  82  162/85 100   


 


1/18/18 18:00  88      


 


1/18/18 17:55 98.0 174 20 174/94 92   


 


1/18/18 17:50  87 20 163/97 95   


 


1/18/18 17:45  87 20 154/97 97   


 


1/18/18 17:43 98.0 87 18 161/93 98   


 


1/18/18 17:42  89 18 165/94 97   


 


1/18/18 17:35    159/87    


 


1/18/18 17:00    153/87    


 


1/18/18 17:00  88      


 


1/18/18 16:49    158/87    


 


1/18/18 16:30    148/88    


 


1/18/18 16:15  86  149/87 97   


 


1/18/18 16:00  82      


 


1/18/18 16:00   18 145/82    


 


1/18/18 15:45 98.0 86 18 146/82 98   


 


1/18/18 15:30 98.0 87 19 143/81 99   


 


1/18/18 15:16     96 Nasal Cannula 2.00 


 


1/18/18 15:15 98.0 89 20 144/85 99   














I/O      


 


 1/18/18 1/18/18 1/18/18 1/19/18 1/19/18 1/19/18





 07:00 15:00 23:00 07:00 15:00 23:00


 


Intake Total 1240 ml 10 ml 3921 ml 4600 ml 200 ml 


 


Output Total   375 ml 2500 ml  


 


Balance 1240 ml 10 ml 3546 ml 2100 ml 200 ml 


 


      


 


Intake Oral 240 ml  3160 ml 4000 ml  


 


IV Total 1000 ml   350 ml  


 


Packed Cells   701 ml 250 ml  


 


Blood Product IV Normal Saline Flush  10 ml 60 ml   


 


Other     200 ml 


 


Output Urine Total   375 ml 1000 ml  


 


Stool Total    1500 ml  


 


# Voids   4   








Result Diagram:  


1/19/18 0153                                                                   

             1/19/18 0153





Objective Remarks


GENERAL: NAD


SKIN: Warm and dry.


HEAD: Normocephalic.


EYES: No scleral icterus. No injection or drainage. 


NECK: Supple, trachea midline. No JVD or lymphadenopathy.


CARDIOVASCULAR: Regular rate and rhythm without murmurs, gallops, or rubs. 


RESPIRATORY: Breath sounds equal bilaterally. No accessory muscle use.


GASTROINTESTINAL: Abdomen soft, non-tender, nondistended. 


MUSCULOSKELETAL: No cyanosis, or edema. 


BACK: Nontender without obvious deformity. No CVA tenderness.








A/P


Problem List:  


(1) Sepsis


ICD Code:  A41.9 - Sepsis, unspecified organism


(2) PNA (pneumonia)


ICD Code:  J18.9 - Pneumonia, unspecified organism


(3) Hypoxia


ICD Code:  R09.02 - Hypoxemia


(4) NSTEMI (non-ST elevated myocardial infarction)


ICD Code:  I21.4 - Non-ST elevation (NSTEMI) myocardial infarction


(5) CHF (congestive heart failure)


ICD Code:  I50.9 - Heart failure, unspecified


(6) Renal insufficiency


ICD Code:  N28.9 - Disorder of kidney and ureter, unspecified


(7) DM (diabetes mellitus)


ICD Code:  E11.9 - Type 2 diabetes mellitus without complications


(8) GI bleed


ICD Code:  K92.2 - Gastrointestinal hemorrhage, unspecified


Assessment and Plan


52-year-old female with





GI bleed?


Anemia of acute blood loss


   Transfused 2 units packed red blood cell


   Appreciate input from GI, status post panendoscopy pending biopsy


   PPI 





Sepsis


   Resolved, continue with current antibiotics including Rocephin and 

azithromycin





PNA


   Currently on Rocephin and azithromycin


   DuoNeb when necessary


   Maintain oxygen saturation above 92%





Hypoxia


   Continue with treatment as in above





Acute on Chronic diastolic CHF


   Echo 8/18/17 w/ EF 55-60%


   Currently off Lasix secondary to worsening renal function


   Continue with Imdur/BB





NSTEMI:  


   Plan for heart catheterization possible this weekend


   Secondary to possible GI bleed, will hold heparin and aspirin until patient 

seen by GI


   Continue with statin, beta blocker


   Management per cardiology





Stage IV CKD


   Management per nephrology





Hypertension


   Continue with Lopressor, Procardia





DM:  


   Sliding scale w/ Accu-Cheks. stable. 





Tobacco Abuse: 


   Strongly recommended to stop smoking, no NicoDerm to avoid vasoconstriction.

  Ativan prn if needed











Mp Lopez MD Jan 19, 2018 15:10

## 2018-01-20 VITALS
DIASTOLIC BLOOD PRESSURE: 68 MMHG | TEMPERATURE: 98.3 F | OXYGEN SATURATION: 97 % | HEART RATE: 82 BPM | SYSTOLIC BLOOD PRESSURE: 149 MMHG | RESPIRATION RATE: 18 BRPM

## 2018-01-20 VITALS
RESPIRATION RATE: 16 BRPM | SYSTOLIC BLOOD PRESSURE: 148 MMHG | DIASTOLIC BLOOD PRESSURE: 84 MMHG | TEMPERATURE: 98.3 F | OXYGEN SATURATION: 97 % | HEART RATE: 77 BPM

## 2018-01-20 VITALS
DIASTOLIC BLOOD PRESSURE: 71 MMHG | HEART RATE: 61 BPM | SYSTOLIC BLOOD PRESSURE: 150 MMHG | TEMPERATURE: 98.1 F | OXYGEN SATURATION: 97 % | RESPIRATION RATE: 20 BRPM

## 2018-01-20 VITALS — HEART RATE: 76 BPM

## 2018-01-20 VITALS — HEART RATE: 78 BPM

## 2018-01-20 VITALS
RESPIRATION RATE: 19 BRPM | OXYGEN SATURATION: 96 % | SYSTOLIC BLOOD PRESSURE: 146 MMHG | TEMPERATURE: 99.1 F | DIASTOLIC BLOOD PRESSURE: 85 MMHG | HEART RATE: 78 BPM

## 2018-01-20 VITALS — HEART RATE: 82 BPM

## 2018-01-20 VITALS — HEART RATE: 71 BPM

## 2018-01-20 VITALS — HEART RATE: 81 BPM

## 2018-01-20 VITALS
HEART RATE: 80 BPM | OXYGEN SATURATION: 95 % | DIASTOLIC BLOOD PRESSURE: 74 MMHG | SYSTOLIC BLOOD PRESSURE: 152 MMHG | TEMPERATURE: 98.4 F | RESPIRATION RATE: 18 BRPM

## 2018-01-20 VITALS
HEART RATE: 82 BPM | TEMPERATURE: 98.4 F | OXYGEN SATURATION: 94 % | DIASTOLIC BLOOD PRESSURE: 73 MMHG | RESPIRATION RATE: 20 BRPM | SYSTOLIC BLOOD PRESSURE: 148 MMHG

## 2018-01-20 VITALS — HEART RATE: 77 BPM

## 2018-01-20 VITALS — HEART RATE: 79 BPM

## 2018-01-20 VITALS — OXYGEN SATURATION: 97 %

## 2018-01-20 VITALS — HEART RATE: 74 BPM

## 2018-01-20 VITALS — HEART RATE: 63 BPM

## 2018-01-20 VITALS — HEART RATE: 75 BPM

## 2018-01-20 VITALS — HEART RATE: 80 BPM

## 2018-01-20 LAB
ERYTHROCYTE [DISTWIDTH] IN BLOOD BY AUTOMATED COUNT: 15.2 % (ref 11.6–17.2)
HCT VFR BLD CALC: 26.6 % (ref 35–46)
HGB BLD-MCNC: 9.3 GM/DL (ref 11.6–15.3)
MCH RBC QN AUTO: 33.3 PG (ref 27–34)
MCHC RBC AUTO-ENTMCNC: 34.9 % (ref 32–36)
MCV RBC AUTO: 95.2 FL (ref 80–100)
PLATELET # BLD: 310 TH/MM3 (ref 150–450)
PMV BLD AUTO: 7.9 FL (ref 7–11)
RBC # BLD AUTO: 2.79 MIL/MM3 (ref 4–5.3)
TROPONIN I SERPL-MCNC: 0.3 NG/ML (ref 0.02–0.05)
WBC # BLD AUTO: 8 TH/MM3 (ref 4–11)

## 2018-01-20 RX ADMIN — ACYCLOVIR SCH UNITS: 800 TABLET ORAL at 20:50

## 2018-01-20 RX ADMIN — NITROGLYCERIN SCH INCH: 20 OINTMENT TOPICAL at 23:57

## 2018-01-20 RX ADMIN — INSULIN ASPART SCH: 100 INJECTION, SOLUTION INTRAVENOUS; SUBCUTANEOUS at 17:00

## 2018-01-20 RX ADMIN — HYDROCODONE BITARTRATE AND ACETAMINOPHEN PRN TAB: 5; 325 TABLET ORAL at 20:51

## 2018-01-20 RX ADMIN — INSULIN ASPART SCH: 100 INJECTION, SOLUTION INTRAVENOUS; SUBCUTANEOUS at 13:42

## 2018-01-20 RX ADMIN — METOPROLOL TARTRATE SCH MG: 100 TABLET, FILM COATED ORAL at 09:35

## 2018-01-20 RX ADMIN — PANTOPRAZOLE SODIUM SCH MG: 40 INJECTION, POWDER, FOR SOLUTION INTRAVENOUS at 09:36

## 2018-01-20 RX ADMIN — GABAPENTIN SCH MG: 300 CAPSULE ORAL at 20:49

## 2018-01-20 RX ADMIN — INSULIN ASPART SCH: 100 INJECTION, SOLUTION INTRAVENOUS; SUBCUTANEOUS at 22:03

## 2018-01-20 RX ADMIN — NITROGLYCERIN SCH INCH: 20 OINTMENT TOPICAL at 05:20

## 2018-01-20 RX ADMIN — PRAVASTATIN SODIUM SCH MG: 40 TABLET ORAL at 20:49

## 2018-01-20 RX ADMIN — AZITHROMYCIN SCH MLS/HR: 500 INJECTION, POWDER, LYOPHILIZED, FOR SOLUTION INTRAVENOUS at 03:27

## 2018-01-20 RX ADMIN — INSULIN ASPART SCH: 100 INJECTION, SOLUTION INTRAVENOUS; SUBCUTANEOUS at 08:00

## 2018-01-20 RX ADMIN — ISOSORBIDE MONONITRATE SCH MG: 60 TABLET, EXTENDED RELEASE ORAL at 05:20

## 2018-01-20 RX ADMIN — FERROUS SULFATE TAB 325 MG (65 MG ELEMENTAL FE) SCH MG: 325 (65 FE) TAB at 09:35

## 2018-01-20 RX ADMIN — FERROUS SULFATE TAB 325 MG (65 MG ELEMENTAL FE) SCH MG: 325 (65 FE) TAB at 20:49

## 2018-01-20 RX ADMIN — NITROGLYCERIN SCH INCH: 20 OINTMENT TOPICAL at 17:38

## 2018-01-20 RX ADMIN — FOLIC ACID SCH MG: 1 TABLET ORAL at 09:35

## 2018-01-20 RX ADMIN — MORPHINE SULFATE PRN MG: 2 INJECTION, SOLUTION INTRAMUSCULAR; INTRAVENOUS at 05:21

## 2018-01-20 RX ADMIN — METOPROLOL TARTRATE SCH MG: 100 TABLET, FILM COATED ORAL at 20:49

## 2018-01-20 RX ADMIN — Medication SCH ML: at 09:36

## 2018-01-20 RX ADMIN — STANDARDIZED SENNA CONCENTRATE AND DOCUSATE SODIUM SCH TAB: 8.6; 5 TABLET, FILM COATED ORAL at 20:49

## 2018-01-20 RX ADMIN — Medication SCH ML: at 20:49

## 2018-01-20 RX ADMIN — NITROGLYCERIN SCH INCH: 20 OINTMENT TOPICAL at 13:42

## 2018-01-20 RX ADMIN — SODIUM CHLORIDE SCH MLS/HR: 900 INJECTION INTRAVENOUS at 04:23

## 2018-01-20 RX ADMIN — MORPHINE SULFATE PRN MG: 2 INJECTION, SOLUTION INTRAMUSCULAR; INTRAVENOUS at 17:39

## 2018-01-20 RX ADMIN — STANDARDIZED SENNA CONCENTRATE AND DOCUSATE SODIUM SCH TAB: 8.6; 5 TABLET, FILM COATED ORAL at 09:35

## 2018-01-20 NOTE — HHI.PR
Subjective


Remarks


Follow-up sepsis/pneumonia/CKD stage IV and now anemia OF possible acute blood 

loss


01/18/18-patient seen and examined, she has an acute drop in hemoglobin this 

morning to 6.6 however patient denies any bleeding.  Heart catheterization 

would be postponed until source of GI bleed is addressed. case was discussed 

with cardiology this morning.


01/19/18-patient seen and examined, she returned from panendoscopy.  Complain 

of nausea.  She states she had overnight chest pain however currently denies 

any.  H&H improved after blood transfusion.


01/20/18-patient seen and examined, she had an endoscopy yesterday.  H&H 

dropped this morning however patient has no bleeding.  Denies any chest pain.  

Overnight BP elevated however currently improved.





Objective


Vitals





Vital Signs








  Date Time  Temp Pulse Resp B/P (MAP) Pulse Ox O2 Delivery O2 Flow Rate FiO2


 


1/20/18 08:00 98.4 84 20 148/73 (98) 94   


 


1/20/18 07:00  82      


 


1/20/18 06:00  71      


 


1/20/18 05:00  77      


 


1/20/18 04:00  82      


 


1/20/18 04:00      Nasal Cannula 2.00 


 


1/20/18 04:00 98.3 82 18 149/68 (95) 97   


 


1/20/18 03:00  76      


 


1/20/18 02:00  74      


 


1/20/18 01:00  63      


 


1/20/18 00:00 98.1 61 20 150/71 (97) 97   


 


1/20/18 00:00      Nasal Cannula 4.00 


 


1/20/18 00:00  61      


 


1/19/18 23:00  85      


 


1/19/18 22:00  79      


 


1/19/18 21:00  88      


 


1/19/18 20:20      Nasal Cannula 2.00 


 


1/19/18 20:00  89      


 


1/19/18 20:00      Nasal Cannula 4.00 


 


1/19/18 20:00 98.0 89 20 179/92 (121) 96   


 


1/19/18 18:00  84      


 


1/19/18 17:04   16     


 


1/19/18 17:00  88      


 


1/19/18 16:09 98.3 88 17 205/108 (140)    


 


1/19/18 16:00  89      


 


1/19/18 16:00     99 Nasal Cannula 4.00 














I/O      


 


 1/19/18 1/19/18 1/19/18 1/20/18 1/20/18 1/20/18





 07:00 15:00 23:00 07:00 15:00 23:00


 


Intake Total 4600 ml 200 ml 720 ml 680 ml  


 


Output Total 2500 ml   800 ml  


 


Balance 2100 ml 200 ml 720 ml -120 ml  


 


      


 


Intake Oral 4000 ml  720 ml 480 ml  


 


IV Total 350 ml   200 ml  


 


Packed Cells 250 ml     


 


Other  200 ml    


 


Output Urine Total 1000 ml   800 ml  


 


Stool Total 1500 ml     


 


# Voids   2 1  


 


# Bowel Movements    1  








Result Diagram:  


1/20/18 0638                                                                   

             1/19/18 0153





Imaging





Last Impressions








Small Bowel X-Ray 1/19/18 0000 Signed





Impressions: 





 Service Date/Time:  Friday, January 19, 2018 10:44 - CONCLUSION: Unremarkable 





 small bowel examination.     Boo Ricardo MD 


 


Chest X-Ray 1/18/18 0000 Signed





Impressions: 





 Service Date/Time:  Thursday, January 18, 2018 10:47 - CONCLUSION:  Improved 





 infiltrates in both lung bases.     Edgardo Mullins MD 








Objective Remarks


GENERAL: NAD


SKIN: Warm and dry.


HEAD: Normocephalic.


EYES: No scleral icterus. No injection or drainage. 


NECK: Supple, trachea midline. No JVD or lymphadenopathy.


CARDIOVASCULAR: Regular rate and rhythm without murmurs, gallops, or rubs. 


RESPIRATORY: Breath sounds equal bilaterally. No accessory muscle use.


GASTROINTESTINAL: Abdomen soft, non-tender, nondistended. 


MUSCULOSKELETAL: No cyanosis, or edema. 


BACK: Nontender without obvious deformity. No CVA tenderness.





Procedures


Panendoscopy 01/19/18





A/P


Problem List:  


(1) Sepsis


ICD Code:  A41.9 - Sepsis, unspecified organism


(2) PNA (pneumonia)


ICD Code:  J18.9 - Pneumonia, unspecified organism


(3) Hypoxia


ICD Code:  R09.02 - Hypoxemia


(4) NSTEMI (non-ST elevated myocardial infarction)


ICD Code:  I21.4 - Non-ST elevation (NSTEMI) myocardial infarction


(5) CHF (congestive heart failure)


ICD Code:  I50.9 - Heart failure, unspecified


(6) Renal insufficiency


ICD Code:  N28.9 - Disorder of kidney and ureter, unspecified


(7) DM (diabetes mellitus)


ICD Code:  E11.9 - Type 2 diabetes mellitus without complications


(8) GI bleed


ICD Code:  K92.2 - Gastrointestinal hemorrhage, unspecified


Assessment and Plan


52-year-old female with





GI bleed?


Anemia of acute blood loss


   Transfused 2 units packed red blood cell


   Appreciate input from GI, status post panendoscopy pending biopsy


   No Evidence of iron deficiency anemia


   PPI and continue to monitor H&H





Sepsis


   Resolved, continue with current antibiotics including Rocephin and 

azithromycin





PNA


   Currently on Rocephin and azithromycin


   DuoNeb when necessary


   Maintain oxygen saturation above 92%





Hypoxia


   Continue with treatment as in above





Acute on Chronic diastolic CHF


   Echo 8/18/17 w/ EF 55-60%


   Currently off Lasix secondary to worsening renal function


   Continue with Imdur/BB





NSTEMI:  


   Plan for heart catheterization possible next week by Cardiology, however per 

nephrology


      The risk of LETI due to contrast exposure is 26%, risk of requiring 

dialysis is 1.5%. 


      Recommend to start 0.9% NS @ 50 cc/hr 12 hrs prior to catheterization. 


   Secondary to possible GI bleed, continue to hold heparin and aspirin 


   Continue with statin, beta blocker


   Management per cardiology





Stage IV CKD


   Management per nephrology





Hypertension


   Continue with Lopressor, Procardia, clonidine 2 times a day





DM:  


   Sliding scale w/ Accu-Cheks. stable. 





Tobacco Abuse: 


   Strongly recommended to stop smoking, no NicoDerm to avoid vasoconstriction.

  Ativan prn if needed











Mp Lopez MD Jan 20, 2018 11:50

## 2018-01-20 NOTE — HHI.NPPN
Subjective


General Problems:  Anemia


Renal Failure:  Chronic, Acute, Stage III, Stage IV


Additional Remarks


Patient is alert, eating the breakfast, no SOB.





Review of Systems


General


Constitutional:  Fatigue





Cardiovascular


Cardiac:  Chest Pain





Objective Data


Data





Vital Signs








  Date Time  Temp Pulse Resp B/P (MAP) Pulse Ox O2 Delivery O2 Flow Rate FiO2


 


1/20/18 08:00 98.4 84 20 148/73 (98) 94   


 


1/20/18 07:00  82      


 


1/20/18 06:00  71      


 


1/20/18 05:00  77      


 


1/20/18 04:00  82      


 


1/20/18 04:00      Nasal Cannula 2.00 


 


1/20/18 04:00 98.3 82 18 149/68 (95) 97   


 


1/20/18 03:00  76      


 


1/20/18 02:00  74      


 


1/20/18 01:00  63      


 


1/20/18 00:00 98.1 61 20 150/71 (97) 97   


 


1/20/18 00:00      Nasal Cannula 4.00 


 


1/20/18 00:00  61      


 


1/19/18 23:00  85      


 


1/19/18 22:00  79      


 


1/19/18 21:00  88      


 


1/19/18 20:20      Nasal Cannula 2.00 


 


1/19/18 20:00  89      


 


1/19/18 20:00      Nasal Cannula 4.00 


 


1/19/18 20:00 98.0 89 20 179/92 (121) 96   


 


1/19/18 18:00  84      


 


1/19/18 17:04   16     


 


1/19/18 17:00  88      


 


1/19/18 16:09 98.3 88 17 205/108 (140)    


 


1/19/18 16:00  89      


 


1/19/18 16:00     99 Nasal Cannula 4.00 








-:  


1/20/18 0638                                                                   

             1/19/18 0153








Physical Exam


General


Appearance:  No Acute Distress, Comfortable





Eyes


Eye Exam:  Pupils Equal





Throat


Throat Exam:  Oral Mucosa [place] & Moist





Pulmonary


Resp Exam:  Breath Sounds Equal, No Distress, Rhonchi, Decreased Bases





Cardiology


CV Exam:  Regular, Normal Sinus Rhythm, Good Perfusion





Gastrointestinal/Abdomen


GI Exam:  Soft, Non-Tender, Bowel Sounds Present, Positive Bowel Movement





Musculoskeletal


MS Exam:  Joints Intact, Normal Tone





Integumentary


Skin Exam:  Clear, Warm, Dry, Intact





Extremeties


Extremities Exam:  No Edema





Neurologic


Neuro Exam:  Alert, Awake, Oriented, Speech Clear, Moving All Extremities





Psychiatric


Psych Exam:  Appropriate Responses





Assessment/Plan


Discussed Condition With:  Patient


Assessment Summary:  LETI/Acute Renal Failure, Anemia of CKD, Hypertension, 

Diabetes Mellitus, CKD Stage III, CKD Stage IV


Problem List:  


(1) Renal insufficiency


ICD Codes:  N28.9 - Disorder of kidney and ureter, unspecified


Plan:  Baseline CKD 3-4, creatinine 2.1-2.5, GFR 24-30.


LETI due to NSTEMI


Renal function is better


Tolerating oral fluids, IVF not required at this time. 


Most likely will have cardiac catheterization, has underlying diabetic 

nephropathy. 


The risk of LETI due to contrast exposure is 26%, risk of requiring dialysis is 

1.5%. 


Recommend to start 0.9% NS @ 50 cc/hr 12 hrs prior to catheterization. 


Monitor urine output. 


Repeat labs daily, continue supportive care. 


Avoid additional nephrotoxic agents. Minimize contrast exposure if possible. 


Creatinine is slightly better, at her baseline.





(2) NSTEMI (non-ST elevated myocardial infarction)


ICD Codes:  I21.4 - Non-ST elevation (NSTEMI) myocardial infarction


Plan:  Cardiology following, valerie catheterization was postponed 


given ASA,off  heparin gtt


Imdur started, appreciate recommendations 





(3) DM (diabetes mellitus)


ICD Codes:  E11.9 - Type 2 diabetes mellitus without complications


Plan:  Insulin as needed, maintain glucose 140-180mg/dL. 


If NPO use D10 @ 15 ml/hr to prevent hyperkalemia 





(4) Anemia


ICD Codes:  D64.9 - Anemia, unspecified


Status:  Chronic


Plan:  Hb improved, s/p 2 units 


s/p  EGD/colonoscopy 


no evidence of iron deficiency 





(5) CAD (coronary artery disease)


ICD Codes:  I25.10 - Atherosclerotic heart disease of native coronary artery 

without angina pectoris


Status:  Chronic


Plan:  Hx of stent in the past. New NSTEMI, see above 





(6) PNA (pneumonia)


ICD Codes:  J18.9 - Pneumonia, unspecified organism


Plan:  On Zithromax and rocephin 


Follow clinically 





(7) HTN (hypertension)


ICD Codes:  I10 - Hypertension


Status:  Chronic


Plan:  BP is better now.


On Imdur and metoprolol; started clonidine BID , and Norvasc to 10 mg, continue 

same , follow the BP.














Jesica Choudhary MD Jan 20, 2018 11:15

## 2018-01-20 NOTE — HHI.GIFU
Subjective


Remarks


Patient sitting in the chair doing well no complaints





Objective


Vitals I&O





Vital Signs








  Date Time  Temp Pulse Resp B/P (MAP) Pulse Ox O2 Delivery O2 Flow Rate FiO2


 


1/20/18 18:00  81      


 


1/20/18 17:02     97   21


 


1/20/18 17:00  76      


 


1/20/18 16:07 98.3 77 16 148/84 (105) 97   


 


1/20/18 16:00  76      


 


1/20/18 16:00     96 Nasal Cannula 2.00 


 


1/20/18 15:00  75      


 


1/20/18 14:00  78      


 


1/20/18 13:00  74      


 


1/20/18 12:00     96 Room Air  


 


1/20/18 12:00  76      


 


1/20/18 11:00  79      


 


1/20/18 11:00 99.1 78 19 146/85 (105) 96   


 


1/20/18 10:00  80      


 


1/20/18 09:00  82      


 


1/20/18 08:00 98.4 84 20 148/73 (98) 94   


 


1/20/18 08:00     98 Nasal Cannula 2.00 


 


1/20/18 08:00  82      


 


1/20/18 07:00  82      


 


1/20/18 06:00  71      


 


1/20/18 05:00  77      


 


1/20/18 04:00  82      


 


1/20/18 04:00      Nasal Cannula 2.00 


 


1/20/18 04:00 98.3 82 18 149/68 (95) 97   


 


1/20/18 03:00  76      


 


1/20/18 02:00  74      


 


1/20/18 01:00  63      


 


1/20/18 00:00 98.1 61 20 150/71 (97) 97   


 


1/20/18 00:00      Nasal Cannula 4.00 


 


1/20/18 00:00  61      


 


1/19/18 23:00  85      














I/O      


 


 1/19/18 1/19/18 1/19/18 1/20/18 1/20/18 1/20/18





 07:00 15:00 23:00 07:00 15:00 23:00


 


Intake Total 4600 ml 200 ml 720 ml 680 ml  


 


Output Total 2500 ml   800 ml  


 


Balance 2100 ml 200 ml 720 ml -120 ml  


 


      


 


Intake Oral 4000 ml  720 ml 480 ml  


 


IV Total 350 ml   200 ml  


 


Packed Cells 250 ml     


 


Other  200 ml    


 


Output Urine Total 1000 ml   800 ml  


 


Stool Total 1500 ml     


 


# Voids   2 1  


 


# Bowel Movements    1  








Laboratory





Laboratory Tests








Test


  1/20/18


00:05 1/20/18


06:38


 


Total Creatine Kinase 51  


 


Troponin I 0.30  


 


White Blood Count  8.0 


 


Red Blood Count  2.79 


 


Hemoglobin  9.3 


 


Hematocrit  26.6 


 


Mean Corpuscular Volume  95.2 


 


Mean Corpuscular Hemoglobin  33.3 


 


Mean Corpuscular Hemoglobin


Concent 


  34.9 


 


 


Red Cell Distribution Width  15.2 


 


Platelet Count  310 


 


Mean Platelet Volume  7.9 








Imaging





Last Impressions








Small Bowel X-Ray 1/19/18 0000 Signed





Impressions: 





 Service Date/Time:  Friday, January 19, 2018 10:44 - CONCLUSION: Unremarkable 





 small bowel examination.     Boo Ricardo MD 


 


Chest X-Ray 1/18/18 0000 Signed





Impressions: 





 Service Date/Time:  Thursday, January 18, 2018 10:47 - CONCLUSION:  Improved 





 infiltrates in both lung bases.     Edgardo Mullins MD 








Physical Exam


HEENT: Normocephalic; atraumatic; no jaundice.  Throat is clear.


NECK: Neck is supple, no JVD.


CHEST:  Chest is clear to auscultation and percussion.


CARDIAC:  Regular rate and rhythm with no murmur gallop or rubs.


ABDOMEN:  Soft, nondistended, nontender; no hepatosplenomegaly; bowel sounds 

are present in all four quadrants.


EXTREMITIES: No clubbing, cyanosis, or edema.


SKIN:  Normal; no rash; no jaundice.


CNS:  No focal deficits; alert and oriented times three.





Assessment and Plan


Plan


ASSESSMENT


- anemia with sudden drop hgb - anemia multifactoria, pt has CKD.  had drop hgb 

from 8.6 to 6.6 and 6.9 on rck.  2x PRBC pending


    cardiology evaluating for NSTEMI, catheterization on hold until GI 

evaluates for GIB, cleared by cardiology for endoscopy.


   pt admits chronic black tarry stool, on iron.  no prior hx GIB.  last 

colonoscopy 2014 with finding 2 sessile polyps.  EGD 2016


   finding erythematous gastritis path reactive gastropathy.


- NSTEMI, cardiology following





PLAN


-EGD showed gastritis colonoscopy showed colon polyp otherwise unremarkable


-Diet as tolerated


-Okay for discharge from a GI standpoint


-Continue PPI


-We will sign off











Wade Uriostegui MD Jan 20, 2018 22:07

## 2018-01-20 NOTE — PD.CARD.PN
Subjective


Subjective Remarks


No chest pain, 


She has headache, and right arm aching.





Objective


Medications





Current Medications








 Medications


  (Trade)  Dose


 Ordered  Sig/Luis Armando


 Route  Start Time


 Stop Time Status Last Admin


 


  (D50w (Vial) Inj)  50 ml  UNSCH  PRN


 IV PUSH  1/15/18 04:00


     


 


 


  (Glucagon Inj)  1 mg  UNSCH  PRN


 OTHER  1/15/18 04:00


     


 


 


  (NovoLOG


 SUPPLEMENTAL


 SCALE)  1  ACHS SLIDING  SCALE


 SQ  1/15/18 08:00


    1/20/18 13:42


 


 


 Ceftriaxone


 Sodium 1000 mg/


 Sodium Chloride  100 ml @ 


 200 mls/hr  Q24H


 IV  1/15/18 05:00


    1/20/18 04:23


 


 


 Azithromycin 500


 mg/Sodium Chloride  250 ml @ 


 250 mls/hr  Q24H


 IV  1/15/18 04:00


    1/20/18 03:27


 


 


  (Duoneb Neb)  1 ampule  Q4HR NEB  PRN


 NEB  1/15/18 04:00


    1/18/18 21:20


 


 


  (NS Flush)  2 ml  UNSCH  PRN


 IV FLUSH  1/15/18 04:00


    1/18/18 00:12


 


 


  (NS Flush)  2 ml  BID


 IV FLUSH  1/15/18 09:00


    1/20/18 09:36


 


 


  (Zofran Inj)  4 mg  Q6H  PRN


 IVP  1/15/18 04:00


    1/19/18 16:49


 


 


  (Tylenol)  650 mg  Q6H  PRN


 PO  1/15/18 04:00


     


 


 


  (Norco  5-325 Mg)  1 tab  Q4H  PRN


 PO  1/15/18 04:00


    1/17/18 08:21


 


 


  (Morphine Inj)  2 mg  Q3H  PRN


 IV PUSH  1/15/18 04:00


    1/20/18 05:21


 


 


  (Theresa-Colace)  1 tab  BID


 PO  1/15/18 09:00


    1/20/18 09:35


 


 


  (Milk Of


 Magnesia Liq)  30 ml  Q12H  PRN


 PO  1/15/18 04:00


     


 


 


  (Senokot)  17.2 mg  Q12H  PRN


 PO  1/15/18 04:00


     


 


 


  (Dulcolax Supp)  10 mg  DAILY  PRN


 RECTAL  1/15/18 04:00


     


 


 


  (Lactulose Liq)  30 ml  DAILY  PRN


 PO  1/15/18 04:00


     


 


 


  (Aspirin Chew)  81 mg  DAILY


 CHEW  1/15/18 09:00


   Future Hold 1/18/18 09:01


 


 


  (Folate)  1 mg  DAILY


 PO  1/15/18 09:00


    1/20/18 09:35


 


 


  (Neurontin)  300 mg  HS


 PO  1/15/18 21:00


    1/19/18 21:10


 


 


  (Levemir Inj)  15 units  HS


 SQ  1/15/18 21:00


    1/16/18 20:57


 


 


  (Pravachol)  40 mg  HS


 PO  1/15/18 21:00


    1/19/18 21:10


 


 


  (Imdur)  120 mg  DAILY@07


 PO  1/18/18 07:00


    1/20/18 05:20


 


 


 Sodium Chloride  1,000 ml @ 


 50 mls/hr  Q20H


 IV  1/17/18 23:45


   Future Hold 1/17/18 23:45


 


 


  (Lopressor)  100 mg  Q12HR


 PO  1/18/18 21:00


    1/20/18 09:35


 


 


  (Ferrous Sulfate)  325 mg  BID


 PO  1/18/18 10:00


    1/20/18 09:35


 


 


  (Protonix Inj)  40 mg  Q24H


 IV PUSH  1/19/18 09:00


    1/20/18 09:36


 


 


  (Nitroglycerin


 2% Oint)  1 inch  Q6HR


 TOPICAL  1/19/18 01:40


    1/20/18 13:42


 


 


  (Morphine Inj)  2 mg  Q3H  PRN


 IV PUSH  1/19/18 01:30


     


 


 


  (Norvasc)  10 mg  DAILY


 PO  1/20/18 09:00


    1/20/18 09:35


 


 


  (Catapres)  0.1 mg  Q12HR


 PO  1/19/18 21:00


    1/20/18 09:36


 








Vital Signs / I&O





Vital Signs








  Date Time  Temp Pulse Resp B/P (MAP) Pulse Ox O2 Delivery O2 Flow Rate FiO2


 


1/20/18 17:02     97   21


 


1/20/18 16:07 98.3 77 16 148/84 (105) 97   


 


1/20/18 14:00  78      


 


1/20/18 13:00  74      


 


1/20/18 12:00  76      


 


1/20/18 11:00  79      


 


1/20/18 10:00  80      


 


1/20/18 09:00  82      


 


1/20/18 08:00 98.4 84 20 148/73 (98) 94   


 


1/20/18 08:00  82      


 


1/20/18 07:00  82      


 


1/20/18 06:00  71      


 


1/20/18 05:00  77      


 


1/20/18 04:00  82      


 


1/20/18 04:00      Nasal Cannula 2.00 


 


1/20/18 04:00 98.3 82 18 149/68 (95) 97   


 


1/20/18 03:00  76      


 


1/20/18 02:00  74      


 


1/20/18 01:00  63      


 


1/20/18 00:00 98.1 61 20 150/71 (97) 97   


 


1/20/18 00:00      Nasal Cannula 4.00 


 


1/20/18 00:00  61      


 


1/19/18 23:00  85      


 


1/19/18 22:00  79      


 


1/19/18 21:00  88      


 


1/19/18 20:20      Nasal Cannula 2.00 


 


1/19/18 20:00  89      


 


1/19/18 20:00      Nasal Cannula 4.00 


 


1/19/18 20:00 98.0 89 20 179/92 (121) 96   


 


1/19/18 18:00  84      














I/O      


 


 1/19/18 1/19/18 1/19/18 1/20/18 1/20/18 1/20/18





 07:00 15:00 23:00 07:00 15:00 23:00


 


Intake Total 4600 ml 200 ml 720 ml 680 ml  


 


Output Total 2500 ml   800 ml  


 


Balance 2100 ml 200 ml 720 ml -120 ml  


 


      


 


Intake Oral 4000 ml  720 ml 480 ml  


 


IV Total 350 ml   200 ml  


 


Packed Cells 250 ml     


 


Other  200 ml    


 


Output Urine Total 1000 ml   800 ml  


 


Stool Total 1500 ml     


 


# Voids   2 1  


 


# Bowel Movements    1  








Physical Exam


GENERAL: Well-developed well-nourished.  In no acute distress.


NECK: No carotid bruits.  No JVD.  


CARDIOVASCULAR: Regular rate and rhythm.  No murmur appreciated.


RESPIRATORY: No accessory muscle use. Clear to auscultation. Breath sounds 

equal bilaterally. 


MUSCULOSKELETAL: No clubbing or cyanosis. No edema. 


NEUROLOGICAL: Awake and alert. Normal speech.


Laboratory





Laboratory Tests








Test


  1/20/18


00:05 1/20/18


06:38


 


Total Creatine Kinase 51 U/L  


 


Troponin I 0.30 NG/ML  


 


White Blood Count  8.0 TH/MM3 


 


Red Blood Count  2.79 MIL/MM3 


 


Hemoglobin  9.3 GM/DL 


 


Hematocrit  26.6 % 


 


Mean Corpuscular Volume  95.2 FL 


 


Mean Corpuscular Hemoglobin  33.3 PG 


 


Mean Corpuscular Hemoglobin


Concent 


  34.9 % 


 


 


Red Cell Distribution Width  15.2 % 


 


Platelet Count  310 TH/MM3 


 


Mean Platelet Volume  7.9 FL 











Assessment and Plan


Problem List:  


(1) NSTEMI (non-ST elevated myocardial infarction)


ICD Codes:  I21.4 - Non-ST elevation (NSTEMI) myocardial infarction


(2) CHF (congestive heart failure)


ICD Codes:  I50.9 - Heart failure, unspecified


(3) Renal insufficiency


ICD Codes:  N28.9 - Renal insufficiency


Status:  Acute


(4) Anemia


ICD Codes:  D64.9 - Anemia, unspecified


Status:  Chronic


(5) DM (diabetes mellitus)


ICD Codes:  E11.9 - Type 2 diabetes mellitus without complications


(6) Renal insufficiency


ICD Codes:  N28.9 - Disorder of kidney and ureter, unspecified


(7) HTN (hypertension)


ICD Codes:  I10 - Hypertension


Status:  Chronic


Assessment and Plan


53 yo AAF with history of CAD,with stenting  ~ 10 years ago, CKD IV, CHF and 

diabetes admitted for progressive SOB and chest pain. 





Acute anemia: Hgb dropped to 6.6, no obvious bleeding.  Hemoglobin improved to 

11 today after transfusion.  GI consulted, s/p EGD and colonoscopy 1/19/18, 

mild gastritis, polyp.    Await biopsies.  Recommend small bowel follow-

through.will need GI clearance if patient still agreeable to [facility] per Dr. Mercado.





NSTEMI: likely demand mediated due to CKD, severe anemia vs. ischemic injury.  

Hold off on [facility] for now pending anemia resolution and depending on renal 

function.





CKD IV: Nephrology recommended "The risk of LETI due to contrast exposure is 26%

, risk of requiring dialysis is 1.5%. 


Recommend to start 0.9% NS @ 50 cc/hr 12 hrs prior to catheterization. "


 


Dyspnea: multifactorial, CHF vs. PNA vs anemia.  Echocardiogram unremarkable.  

Continue antibiotics per primary team.











I am seeing patient this weekend requested by DR.Stephen Mercado.











Wing Serenity Tobar MD Jan 20, 2018 17:39

## 2018-01-20 NOTE — EKG
Date Performed: 2018       Time Performed: 18:22:22

 

PTAGE:      52 years

 

EKG:      Sinus rhythm 

 

 Possible left atrial abnormality LVH with secondary repolarization abnormality Extensive ST-T change
s may be due to hypertrophy and/or ischemia Abnormal ECG Compared to 

 

 PREVIOUS TRACING            , anterolateral ST changes are slightly more prominent. There is perhaps
 minimal inferior ST elevation, though not meeting criteria, would certainly consider ischemia or acu
te injury in the clinical situation wards. PREVIOUS TRACIN2018 01.25

 

DOCTOR:   Esequiel Rai  Interpretating Date/Time  2018 14:05:57

## 2018-01-21 VITALS — HEART RATE: 77 BPM

## 2018-01-21 VITALS — HEART RATE: 78 BPM

## 2018-01-21 VITALS — RESPIRATION RATE: 17 BRPM | OXYGEN SATURATION: 97 % | HEART RATE: 83 BPM | TEMPERATURE: 99.3 F

## 2018-01-21 VITALS
DIASTOLIC BLOOD PRESSURE: 81 MMHG | OXYGEN SATURATION: 96 % | TEMPERATURE: 98.1 F | RESPIRATION RATE: 18 BRPM | SYSTOLIC BLOOD PRESSURE: 150 MMHG | HEART RATE: 75 BPM

## 2018-01-21 VITALS
DIASTOLIC BLOOD PRESSURE: 75 MMHG | RESPIRATION RATE: 17 BRPM | HEART RATE: 70 BPM | SYSTOLIC BLOOD PRESSURE: 144 MMHG | TEMPERATURE: 98.1 F | OXYGEN SATURATION: 99 %

## 2018-01-21 VITALS — HEART RATE: 84 BPM

## 2018-01-21 VITALS
OXYGEN SATURATION: 95 % | HEART RATE: 79 BPM | TEMPERATURE: 98 F | RESPIRATION RATE: 18 BRPM | DIASTOLIC BLOOD PRESSURE: 79 MMHG | SYSTOLIC BLOOD PRESSURE: 159 MMHG

## 2018-01-21 VITALS — SYSTOLIC BLOOD PRESSURE: 162 MMHG | HEART RATE: 74 BPM | DIASTOLIC BLOOD PRESSURE: 83 MMHG

## 2018-01-21 VITALS
OXYGEN SATURATION: 97 % | DIASTOLIC BLOOD PRESSURE: 77 MMHG | RESPIRATION RATE: 16 BRPM | SYSTOLIC BLOOD PRESSURE: 158 MMHG | HEART RATE: 70 BPM | TEMPERATURE: 98.3 F

## 2018-01-21 VITALS
DIASTOLIC BLOOD PRESSURE: 89 MMHG | OXYGEN SATURATION: 98 % | RESPIRATION RATE: 18 BRPM | HEART RATE: 79 BPM | TEMPERATURE: 98.2 F | SYSTOLIC BLOOD PRESSURE: 170 MMHG

## 2018-01-21 VITALS — HEART RATE: 70 BPM

## 2018-01-21 VITALS
HEART RATE: 80 BPM | RESPIRATION RATE: 18 BRPM | SYSTOLIC BLOOD PRESSURE: 171 MMHG | DIASTOLIC BLOOD PRESSURE: 88 MMHG | OXYGEN SATURATION: 96 % | TEMPERATURE: 98 F

## 2018-01-21 VITALS — HEART RATE: 76 BPM

## 2018-01-21 VITALS — HEART RATE: 82 BPM

## 2018-01-21 VITALS — HEART RATE: 74 BPM

## 2018-01-21 VITALS — OXYGEN SATURATION: 97 %

## 2018-01-21 VITALS — HEART RATE: 80 BPM

## 2018-01-21 LAB
ERYTHROCYTE [DISTWIDTH] IN BLOOD BY AUTOMATED COUNT: 15.1 % (ref 11.6–17.2)
HCT VFR BLD CALC: 26.6 % (ref 35–46)
HGB BLD-MCNC: 9.2 GM/DL (ref 11.6–15.3)
MCH RBC QN AUTO: 32.9 PG (ref 27–34)
MCHC RBC AUTO-ENTMCNC: 34.7 % (ref 32–36)
MCV RBC AUTO: 94.7 FL (ref 80–100)
PLATELET # BLD: 303 TH/MM3 (ref 150–450)
PMV BLD AUTO: 7.7 FL (ref 7–11)
RBC # BLD AUTO: 2.81 MIL/MM3 (ref 4–5.3)
WBC # BLD AUTO: 7.3 TH/MM3 (ref 4–11)

## 2018-01-21 RX ADMIN — MORPHINE SULFATE PRN MG: 2 INJECTION, SOLUTION INTRAMUSCULAR; INTRAVENOUS at 01:10

## 2018-01-21 RX ADMIN — SODIUM CHLORIDE SCH MLS/HR: 900 INJECTION INTRAVENOUS at 04:01

## 2018-01-21 RX ADMIN — MORPHINE SULFATE PRN MG: 2 INJECTION, SOLUTION INTRAMUSCULAR; INTRAVENOUS at 08:46

## 2018-01-21 RX ADMIN — NITROGLYCERIN SCH INCH: 20 OINTMENT TOPICAL at 17:52

## 2018-01-21 RX ADMIN — FOLIC ACID SCH MG: 1 TABLET ORAL at 08:39

## 2018-01-21 RX ADMIN — HYDROCODONE BITARTRATE AND ACETAMINOPHEN PRN TAB: 5; 325 TABLET ORAL at 20:44

## 2018-01-21 RX ADMIN — METOPROLOL TARTRATE SCH MG: 100 TABLET, FILM COATED ORAL at 20:44

## 2018-01-21 RX ADMIN — NITROGLYCERIN SCH INCH: 20 OINTMENT TOPICAL at 23:33

## 2018-01-21 RX ADMIN — FERROUS SULFATE TAB 325 MG (65 MG ELEMENTAL FE) SCH MG: 325 (65 FE) TAB at 08:39

## 2018-01-21 RX ADMIN — INSULIN ASPART SCH: 100 INJECTION, SOLUTION INTRAVENOUS; SUBCUTANEOUS at 12:00

## 2018-01-21 RX ADMIN — ACYCLOVIR SCH UNITS: 800 TABLET ORAL at 20:45

## 2018-01-21 RX ADMIN — INSULIN ASPART SCH: 100 INJECTION, SOLUTION INTRAVENOUS; SUBCUTANEOUS at 17:00

## 2018-01-21 RX ADMIN — STANDARDIZED SENNA CONCENTRATE AND DOCUSATE SODIUM SCH TAB: 8.6; 5 TABLET, FILM COATED ORAL at 20:45

## 2018-01-21 RX ADMIN — STANDARDIZED SENNA CONCENTRATE AND DOCUSATE SODIUM SCH TAB: 8.6; 5 TABLET, FILM COATED ORAL at 08:39

## 2018-01-21 RX ADMIN — INSULIN ASPART SCH: 100 INJECTION, SOLUTION INTRAVENOUS; SUBCUTANEOUS at 20:46

## 2018-01-21 RX ADMIN — MORPHINE SULFATE PRN MG: 2 INJECTION, SOLUTION INTRAMUSCULAR; INTRAVENOUS at 05:26

## 2018-01-21 RX ADMIN — INSULIN ASPART SCH: 100 INJECTION, SOLUTION INTRAVENOUS; SUBCUTANEOUS at 08:00

## 2018-01-21 RX ADMIN — Medication SCH ML: at 20:45

## 2018-01-21 RX ADMIN — PANTOPRAZOLE SODIUM SCH MG: 40 INJECTION, POWDER, FOR SOLUTION INTRAVENOUS at 08:38

## 2018-01-21 RX ADMIN — NITROGLYCERIN SCH INCH: 20 OINTMENT TOPICAL at 05:25

## 2018-01-21 RX ADMIN — AZITHROMYCIN SCH MLS/HR: 500 INJECTION, POWDER, LYOPHILIZED, FOR SOLUTION INTRAVENOUS at 04:01

## 2018-01-21 RX ADMIN — MORPHINE SULFATE PRN MG: 2 INJECTION, SOLUTION INTRAMUSCULAR; INTRAVENOUS at 20:47

## 2018-01-21 RX ADMIN — Medication SCH ML: at 08:38

## 2018-01-21 RX ADMIN — FERROUS SULFATE TAB 325 MG (65 MG ELEMENTAL FE) SCH MG: 325 (65 FE) TAB at 20:44

## 2018-01-21 RX ADMIN — PRAVASTATIN SODIUM SCH MG: 40 TABLET ORAL at 20:45

## 2018-01-21 RX ADMIN — METOPROLOL TARTRATE SCH MG: 100 TABLET, FILM COATED ORAL at 08:38

## 2018-01-21 RX ADMIN — ISOSORBIDE MONONITRATE SCH MG: 60 TABLET, EXTENDED RELEASE ORAL at 06:23

## 2018-01-21 RX ADMIN — NITROGLYCERIN SCH INCH: 20 OINTMENT TOPICAL at 13:48

## 2018-01-21 RX ADMIN — GABAPENTIN SCH MG: 300 CAPSULE ORAL at 20:44

## 2018-01-21 NOTE — HHI.NPPN
Subjective


General Problems:  Anemia


Renal Failure:  Chronic, Acute, Stage III, Stage IV


Additional Remarks


Patient is alert, no SOB, no edema


 (Gellermann,Diane M. ARNP)





Review of Systems


General


Constitutional:  Fatigue


 (Gellermann,Diane M. ARNP)





Respiratory


Respiratory Remarks


Mild SOB


 (Gellermann,Diane M. ARNP)





Cardiovascular


Cardiac:  Chest Pain


 (Gellermann,Diane M. ARNP)





Gastrointestinal


GI Remarks


no abdominal pain


 (Gellermann,Diane M. ARNP)





Objective Data


Data





Vital Signs








  Date Time  Temp Pulse Resp B/P (MAP) Pulse Ox O2 Delivery O2 Flow Rate FiO2


 


1/21/18 10:15  76      


 


1/21/18 09:00  76      


 


1/21/18 08:00  82      


 


1/21/18 08:00     96 Room Air  


 


1/21/18 07:30 99.3 83 17  97   


 


1/21/18 07:00  84      


 


1/21/18 06:00  80      


 


1/21/18 05:00  78      


 


1/21/18 04:00 98.0 79 18 159/79 (105) 95   


 


1/21/18 04:00      Room Air  


 


1/21/18 04:00  79      


 


1/21/18 03:00  77      


 


1/21/18 02:00  82      


 


1/21/18 01:00  80      


 


1/21/18 00:00 98.1 75 18 150/81 (104) 96   


 


1/21/18 00:00      Nasal Cannula 2.00 


 


1/21/18 00:00  75      


 


1/20/18 23:00  79      


 


1/20/18 22:00  81      


 


1/20/18 21:00  76      


 


1/20/18 20:00  80      


 


1/20/18 20:00      Nasal Cannula 2.00 


 


1/20/18 20:00 98.4 80 18 152/74 (100) 95   


 


1/20/18 18:00  81      


 


1/20/18 17:02     97   21


 


1/20/18 17:00  76      


 


1/20/18 16:07 98.3 77 16 148/84 (105) 97   


 


1/20/18 16:00  76      


 


1/20/18 16:00     96 Nasal Cannula 2.00 


 


1/20/18 15:00  75      


 


1/20/18 14:00  78      


 


1/20/18 13:00  74      


 


1/20/18 12:00     96 Room Air  


 


1/20/18 12:00  76      








 (Gellermann,Diane M. ARNP)


-:  


1/21/18 0503                                                                   

             1/19/18 0153





Imaging





Last Impressions








Small Bowel X-Ray 1/19/18 0000 Signed





Impressions: 





 Service Date/Time:  Friday, January 19, 2018 10:44 - CONCLUSION: Unremarkable 





 small bowel examination.     Boo Ricardo MD 


 


Chest X-Ray 1/18/18 0000 Signed





Impressions: 





 Service Date/Time:  Thursday, January 18, 2018 10:47 - CONCLUSION:  Improved 





 infiltrates in both lung bases.     Edgardo Mullins MD 








 (GellermannLisa M. ARNP)





Physical Exam


General


Appearance:  No Acute Distress, Comfortable


 (GellermannLisa M. ARNP)





Eyes


Eye Exam:  Pupils Equal


 (GellermannLisa M. ARNP)





Throat


Throat Exam:  Oral Mucosa Pink & Moist


 (Gellermann,Lisa M. ARNP)





Pulmonary


Resp Exam:  Breath Sounds Equal, No Distress, Decreased Bases


 (Gellermann,Lisa M. ARNP)





Cardiology


CV Exam:  Regular, Normal Sinus Rhythm, Good Perfusion


 (GellermannLisa M. ARNP)





Gastrointestinal/Abdomen


GI Exam:  Soft, Non-Tender, Bowel Sounds Present, Positive Bowel Movement


 (GellermannLisa M. ARNP)





Musculoskeletal


MS Exam:  Joints Intact, Normal Tone


 (GellermannLisa M. ARNP)





Integumentary


Skin Exam:  Clear, Warm, Dry, Intact


 (Gellermann,Lisa M. ARNP)





Extremeties


Extremities Exam:  No Edema


 (GellermannLisa M. ARNP)





Neurologic


Neuro Exam:  Alert, Awake, Oriented, Speech Clear, Moving All Extremities


 (GellermannLisa M. ARNP)





Psychiatric


Psych Exam:  Appropriate Responses


 (GellermannLisa M. ARNP)





Assessment/Plan


Discussed Condition With:  Patient


Assessment Summary:  LETI/Acute Renal Failure, Anemia of CKD, Hypertension, 

Diabetes Mellitus, CKD Stage III, CKD Stage IV


Problem List:  


(1) Renal insufficiency


ICD Codes:  N28.9 - Disorder of kidney and ureter, unspecified


Plan:  Baseline CKD 3-4, creatinine 2.1-2.5, GFR 24-30.


LETI due to NSTEMI


Renal function is better


Tolerating oral fluids, IVF not required at this time. 


Most likely will have cardiac catheterization, has underlying diabetic 

nephropathy. 


The risk of LETI due to contrast exposure is 26%, risk of requiring dialysis is 

1.5%. 


Recommend to start 0.9% NS @ 50 cc/hr 12 hrs prior to catheterization. 


Monitor urine output. 


Repeat labs daily, continue supportive care. 


Avoid additional nephrotoxic agents. Minimize contrast exposure if possible. 


Creatinine is slightly better at 2.57 and GFR 24, at her baseline at last check


Will order renal panel in AM


Dr. Whiteside to follow in AM





(2) NSTEMI (non-ST elevated myocardial infarction)


ICD Codes:  I21.4 - Non-ST elevation (NSTEMI) myocardial infarction


Plan:  Cardiology following, heart catheterization was postponed 


given ASA,off  heparin gtt


Continues to complain of mild chest discomfort





(3) DM (diabetes mellitus)


ICD Codes:  E11.9 - Type 2 diabetes mellitus without complications


Plan:  Insulin as needed, maintain glucose 140-180mg/dL. 


If NPO use D10 @ 15 ml/hr to prevent hyperkalemia 





(4) Anemia


ICD Codes:  D64.9 - Anemia, unspecified


Status:  Chronic


Plan:  Hb improved, s/p 2 units 


s/p  EGD/colonoscopy 


no evidence of iron deficiency 


HGB 9.2





(5) CAD (coronary artery disease)


ICD Codes:  I25.10 - Atherosclerotic heart disease of native coronary artery 

without angina pectoris


Status:  Chronic


Plan:  Hx of stent in the past. New NSTEMI, see above 





(6) PNA (pneumonia)


ICD Codes:  J18.9 - Pneumonia, unspecified organism


Plan:  On Zithromax and rocephin 


Follow clinically 





(7) HTN (hypertension)


ICD Codes:  I10 - Hypertension


Status:  Chronic


Plan:  BP is better now.


On Imdur, metoprolol, clonidine, and Norvasc to 10 mg, continue same 


 (Gellermann,Diane M. ARNP)


Problem List:  


(1) Renal insufficiency


ICD Codes:  N28.9 - Disorder of kidney and ureter, unspecified


Plan:  Baseline CKD 3-4, creatinine 2.1-2.5, GFR 24-30.


LETI due to NSTEMI


Renal function is better


Tolerating oral fluids, IVF not required at this time. 


Most likely will have cardiac catheterization, has underlying diabetic 

nephropathy. 


The risk of LETI due to contrast exposure is 26%, risk of requiring dialysis is 

1.5%. 


Recommend to start 0.9% NS @ 50 cc/hr 12 hrs prior to catheterization. 


Monitor urine output. 


Repeat labs daily, continue supportive care. 


Avoid additional nephrotoxic agents. Minimize contrast exposure if possible. 


Creatinine is slightly better at 2.57 and GFR 24, at her baseline at last check.


No new BMP.


To continue PT.


Will order renal panel in AM


Dr. Whiteside to follow in AM





(2) NSTEMI (non-ST elevated myocardial infarction)


ICD Codes:  I21.4 - Non-ST elevation (NSTEMI) myocardial infarction


Plan:  Cardiology following, heart catheterization was postponed 


given ASA,off  heparin gtt


Continues to complain of mild chest discomfort





(3) DM (diabetes mellitus)


ICD Codes:  E11.9 - Type 2 diabetes mellitus without complications


Plan:  Insulin as needed, maintain glucose 140-180mg/dL. 


If NPO use D10 @ 15 ml/hr to prevent hyperkalemia 





(4) Anemia


ICD Codes:  D64.9 - Anemia, unspecified


Status:  Chronic


Plan:  Hb improved, s/p 2 units 


s/p  EGD/colonoscopy 


no evidence of iron deficiency 


HGB 9.2





(5) CAD (coronary artery disease)


ICD Codes:  I25.10 - Atherosclerotic heart disease of native coronary artery 

without angina pectoris


Status:  Chronic


Plan:  Hx of stent in the past. New NSTEMI, see above 





(6) PNA (pneumonia)


ICD Codes:  J18.9 - Pneumonia, unspecified organism


Plan:  On Zithromax and rocephin 


Follow clinically 





(7) HTN (hypertension)


ICD Codes:  I10 - Hypertension


Status:  Chronic


Plan:  BP is better now.


On Imdur, metoprolol, clonidine, and Norvasc to 10 mg, continue same 


 (Jesica Choudhary MD)











Gellermann,Diane M. ARNP Jan 21, 2018 12:01


Jesica Choudhary MD Jan 21, 2018 18:43

## 2018-01-21 NOTE — HHI.PR
Subjective


Remarks


Follow-up sepsis/pneumonia/CKD stage IV and now anemia OF possible acute blood 

loss


01/18/18-patient seen and examined, she has an acute drop in hemoglobin this 

morning to 6.6 however patient denies any bleeding.  Heart catheterization 

would be postponed until source of GI bleed is addressed. case was discussed 

with cardiology this morning.


01/19/18-patient seen and examined, she returned from panendoscopy.  Complain 

of nausea.  She states she had overnight chest pain however currently denies 

any.  H&H improved after blood transfusion.


01/20/18-patient seen and examined, she had an endoscopy yesterday.  H&H 

dropped this morning however patient has no bleeding.  Denies any chest pain.  

Overnight BP elevated however currently improved.


01/21/18-patient seen and examined, still complains of substernal chest pain 

with radiation to left arm.  H&H stable and BP slightly up.  Currently afebrile





Objective


Vitals





Vital Signs








  Date Time  Temp Pulse Resp B/P (MAP) Pulse Ox O2 Delivery O2 Flow Rate FiO2


 


1/21/18 10:15  76      


 


1/21/18 09:00  76      


 


1/21/18 08:00  82      


 


1/21/18 08:00     96 Room Air  


 


1/21/18 07:30 99.3 83 17  97   


 


1/21/18 07:00  84      


 


1/21/18 06:00  80      


 


1/21/18 05:00  78      


 


1/21/18 04:00 98.0 79 18 159/79 (105) 95   


 


1/21/18 04:00      Room Air  


 


1/21/18 04:00  79      


 


1/21/18 03:00  77      


 


1/21/18 02:00  82      


 


1/21/18 01:00  80      


 


1/21/18 00:00 98.1 75 18 150/81 (104) 96   


 


1/21/18 00:00      Nasal Cannula 2.00 


 


1/21/18 00:00  75      


 


1/20/18 23:00  79      


 


1/20/18 22:00  81      


 


1/20/18 21:00  76      


 


1/20/18 20:00  80      


 


1/20/18 20:00      Nasal Cannula 2.00 


 


1/20/18 20:00 98.4 80 18 152/74 (100) 95   


 


1/20/18 18:00  81      


 


1/20/18 17:02     97   21


 


1/20/18 17:00  76      


 


1/20/18 16:07 98.3 77 16 148/84 (105) 97   


 


1/20/18 16:00  76      


 


1/20/18 16:00     96 Nasal Cannula 2.00 


 


1/20/18 15:00  75      


 


1/20/18 14:00  78      


 


1/20/18 13:00  74      


 


1/20/18 12:00     96 Room Air  


 


1/20/18 12:00  76      














I/O      


 


 1/20/18 1/20/18 1/20/18 1/21/18 1/21/18 1/21/18





 07:00 15:00 23:00 07:00 15:00 23:00


 


Intake Total 680 ml   480 ml  


 


Output Total 800 ml   700 ml  


 


Balance -120 ml   -220 ml  


 


      


 


Intake Oral 480 ml   480 ml  


 


IV Total 200 ml     


 


Output Urine Total 800 ml   700 ml  


 


# Voids 1     


 


# Bowel Movements 1   1  








Result Diagram:  


1/21/18 0503                                                                   

             1/19/18 0153





Objective Remarks


GENERAL: NAD


SKIN: Warm and dry.


HEAD: Normocephalic.


EYES: No scleral icterus. No injection or drainage. 


NECK: Supple, trachea midline. No JVD or lymphadenopathy.


CARDIOVASCULAR: Regular rate and rhythm without murmurs, gallops, or rubs. 


RESPIRATORY: Breath sounds equal bilaterally. No accessory muscle use.


GASTROINTESTINAL: Abdomen soft, non-tender, nondistended. 


MUSCULOSKELETAL: No cyanosis, or edema. 


BACK: Nontender without obvious deformity. No CVA tenderness.





Procedures


Panendoscopy 01/19/18





A/P


Problem List:  


(1) Sepsis


ICD Code:  A41.9 - Sepsis, unspecified organism


(2) PNA (pneumonia)


ICD Code:  J18.9 - Pneumonia, unspecified organism


(3) Hypoxia


ICD Code:  R09.02 - Hypoxemia


(4) NSTEMI (non-ST elevated myocardial infarction)


ICD Code:  I21.4 - Non-ST elevation (NSTEMI) myocardial infarction


(5) CHF (congestive heart failure)


ICD Code:  I50.9 - Heart failure, unspecified


(6) Renal insufficiency


ICD Code:  N28.9 - Disorder of kidney and ureter, unspecified


(7) DM (diabetes mellitus)


ICD Code:  E11.9 - Type 2 diabetes mellitus without complications


(8) GI bleed


ICD Code:  K92.2 - Gastrointestinal hemorrhage, unspecified


Assessment and Plan


52-year-old female with





GI bleed-resolved


Anemia of acute blood loss


   Transfused 2 units packed red blood cell


   Appreciate input from GI, status post panendoscopy with finding of gastritis 

EGD and colon polyps on colonoscopy pending biopsy report


   No Evidence of iron deficiency anemia


   Unremarkable small bowel follow-through


   PPI and continue to monitor H&H


   GI signed off





Sepsis


   Resolved, continue with current antibiotics including Rocephin and 

azithromycin





PNA


   Currently on Rocephin and azithromycin, de-escalate antibiotics as patient 

with significant improvement. Therefore will DC Rocephin and continue 

azithromycin


   DuoNeb when necessary


   Maintain oxygen saturation above 92%





Hypoxia-resolved


   Continue with treatment as in above





Acute on Chronic diastolic CHF


   Echo 8/18/17 w/ EF 55-60%


   Currently off Lasix secondary to worsening renal function


   Continue with Imdur/BB





NSTEMI:  


   Plan for heart catheterization possible next week by Cardiology, however per 

nephrology


      The risk of LETI due to contrast exposure is 26%, risk of requiring 

dialysis is 1.5%. 


      Recommend to start 0.9% NS @ 50 cc/hr 12 hrs prior to catheterization. 


   Secondary to possible GI bleed (Now resolved), both aspirin and heparin drip 

well and hold.  However secondary to ongoing chest pain, will resume aspirin 

pending recommendation from GI to resume heparin drip


   Continue with statin, beta blocker


   Management per cardiology





Stage IV CKD


   Management per nephrology





Hypertension


   Continue with Lopressor, Procardia, clonidine 2 times a day





DM:  


   Sliding scale w/ Accu-Cheks. stable. 





Tobacco Abuse: 


   Strongly recommended to stop smoking, no NicoDerm to avoid vasoconstriction.

  Ativan prn if needed











Mp Lopez MD Jan 21, 2018 11:23

## 2018-01-21 NOTE — PD.CARD.PN
Subjective


Subjective Remarks


No chest pain, 


She has headache, and right arm aching.





Objective


Medications





Current Medications








 Medications


  (Trade)  Dose


 Ordered  Sig/Luis Armando


 Route  Start Time


 Stop Time Status Last Admin


 


  (D50w (Vial) Inj)  50 ml  UNSCH  PRN


 IV PUSH  1/15/18 04:00


     


 


 


  (Glucagon Inj)  1 mg  UNSCH  PRN


 OTHER  1/15/18 04:00


     


 


 


  (NovoLOG


 SUPPLEMENTAL


 SCALE)  1  ACHS SLIDING  SCALE


 SQ  1/15/18 08:00


    1/21/18 17:00


 


 


 Ceftriaxone


 Sodium 1000 mg/


 Sodium Chloride  100 ml @ 


 200 mls/hr  Q24H


 IV  1/15/18 05:00


 1/22/18 22:00  1/21/18 04:01


 


 


  (Duoneb Neb)  1 ampule  Q4HR NEB  PRN


 NEB  1/15/18 04:00


    1/18/18 21:20


 


 


  (NS Flush)  2 ml  UNSCH  PRN


 IV FLUSH  1/15/18 04:00


    1/18/18 00:12


 


 


  (NS Flush)  2 ml  BID


 IV FLUSH  1/15/18 09:00


    1/21/18 08:38


 


 


  (Zofran Inj)  4 mg  Q6H  PRN


 IVP  1/15/18 04:00


    1/19/18 16:49


 


 


  (Tylenol)  650 mg  Q6H  PRN


 PO  1/15/18 04:00


     


 


 


  (Norco  5-325 Mg)  1 tab  Q4H  PRN


 PO  1/15/18 04:00


    1/20/18 20:51


 


 


  (Morphine Inj)  2 mg  Q3H  PRN


 IV PUSH  1/15/18 04:00


    1/21/18 08:46


 


 


  (Theresa-Colace)  1 tab  BID


 PO  1/15/18 09:00


    1/21/18 08:39


 


 


  (Milk Of


 Magnesia Liq)  30 ml  Q12H  PRN


 PO  1/15/18 04:00


     


 


 


  (Senokot)  17.2 mg  Q12H  PRN


 PO  1/15/18 04:00


     


 


 


  (Dulcolax Supp)  10 mg  DAILY  PRN


 RECTAL  1/15/18 04:00


     


 


 


  (Lactulose Liq)  30 ml  DAILY  PRN


 PO  1/15/18 04:00


     


 


 


  (Aspirin Chew)  81 mg  DAILY


 CHEW  1/15/18 09:00


   Future hold 1/18/18 09:01


 


 


  (Folate)  1 mg  DAILY


 PO  1/15/18 09:00


    1/21/18 08:39


 


 


  (Neurontin)  300 mg  HS


 PO  1/15/18 21:00


    1/20/18 20:49


 


 


  (Levemir Inj)  15 units  HS


 SQ  1/15/18 21:00


    1/16/18 20:57


 


 


  (Pravachol)  40 mg  HS


 PO  1/15/18 21:00


    1/20/18 20:49


 


 


  (Imdur)  120 mg  DAILY@07


 PO  1/18/18 07:00


    1/21/18 06:23


 


 


 Sodium Chloride  1,000 ml @ 


 50 mls/hr  Q20H


 IV  1/17/18 23:45


   Future Hold 1/17/18 23:45


 


 


  (Lopressor)  100 mg  Q12HR


 PO  1/18/18 21:00


    1/21/18 08:38


 


 


  (Ferrous Sulfate)  325 mg  BID


 PO  1/18/18 10:00


    1/21/18 08:39


 


 


  (Nitroglycerin


 2% Oint)  1 inch  Q6HR


 TOPICAL  1/19/18 01:40


    1/21/18 17:52


 


 


  (Morphine Inj)  2 mg  Q3H  PRN


 IV PUSH  1/19/18 01:30


    1/20/18 18:41


 


 


  (Norvasc)  10 mg  DAILY


 PO  1/20/18 09:00


    1/21/18 08:38


 


 


  (Catapres)  0.1 mg  Q12HR


 PO  1/19/18 21:00


    1/21/18 08:39


 


 


  (Catapres)  0.1 mg  Q6H  PRN


 PO  1/20/18 19:15


     


 


 


  (Protonix)  40 mg  DAILY


 PO  1/22/18 09:00


     


 


 


  (Zithromax)  500 mg  DAILY


 PO  1/22/18 09:00


     


 








Vital Signs / I&O





Vital Signs








  Date Time  Temp Pulse Resp B/P (MAP) Pulse Ox O2 Delivery O2 Flow Rate FiO2


 


1/21/18 19:00  74      


 


1/21/18 18:00  70      


 


1/21/18 17:00  70      


 


1/21/18 16:00 98.1 71 17 144/75 (98) 99   


 


1/21/18 16:00  70      


 


1/21/18 15:00  73      


 


1/21/18 14:00  70      


 


1/21/18 13:00  70      


 


1/21/18 12:00 98.3 84 16 158/77 (104) 97   


 


1/21/18 12:00  70      


 


1/21/18 11:00  70      


 


1/21/18 10:15  76      


 


1/21/18 09:00  76      


 


1/21/18 08:00  82      


 


1/21/18 08:00     96 Room Air  


 


1/21/18 07:30 99.3 83 17  97   


 


1/21/18 07:00  84      


 


1/21/18 06:00  80      


 


1/21/18 05:00  78      


 


1/21/18 04:00 98.0 79 18 159/79 (105) 95   


 


1/21/18 04:00      Room Air  


 


1/21/18 04:00  79      


 


1/21/18 03:00  77      


 


1/21/18 02:00  82      


 


1/21/18 01:00  80      


 


1/21/18 00:00 98.1 75 18 150/81 (104) 96   


 


1/21/18 00:00      Nasal Cannula 2.00 


 


1/21/18 00:00  75      


 


1/20/18 23:00  79      


 


1/20/18 22:00  81      


 


1/20/18 21:00  76      


 


1/20/18 20:00  80      


 


1/20/18 20:00      Nasal Cannula 2.00 


 


1/20/18 20:00 98.4 80 18 152/74 (100) 95   














I/O      


 


 1/20/18 1/20/18 1/20/18 1/21/18 1/21/18 1/21/18





 07:00 15:00 23:00 07:00 15:00 23:00


 


Intake Total 680 ml   480 ml  


 


Output Total 800 ml   700 ml  


 


Balance -120 ml   -220 ml  


 


      


 


Intake Oral 480 ml   480 ml  


 


IV Total 200 ml     


 


Output Urine Total 800 ml   700 ml  


 


# Voids 1     


 


# Bowel Movements 1   1  








Physical Exam


GENERAL: Well-developed well-nourished.  In no acute distress.


NECK: No carotid bruits.  No JVD.  


CARDIOVASCULAR: Regular rate and rhythm.  No murmur appreciated.


RESPIRATORY: No accessory muscle use. Clear to auscultation. Breath sounds 

equal bilaterally. 


MUSCULOSKELETAL: No clubbing or cyanosis. No edema. 


NEUROLOGICAL: Awake and alert. Normal speech.


Laboratory





Laboratory Tests








Test


  1/21/18


05:03


 


White Blood Count 7.3 TH/MM3 


 


Red Blood Count 2.81 MIL/MM3 


 


Hemoglobin 9.2 GM/DL 


 


Hematocrit 26.6 % 


 


Mean Corpuscular Volume 94.7 FL 


 


Mean Corpuscular Hemoglobin 32.9 PG 


 


Mean Corpuscular Hemoglobin


Concent 34.7 % 


 


 


Red Cell Distribution Width 15.1 % 


 


Platelet Count 303 TH/MM3 


 


Mean Platelet Volume 7.7 FL 











Assessment and Plan


Problem List:  


(1) NSTEMI (non-ST elevated myocardial infarction)


ICD Codes:  I21.4 - Non-ST elevation (NSTEMI) myocardial infarction


(2) CHF (congestive heart failure)


ICD Codes:  I50.9 - Heart failure, unspecified


(3) Renal insufficiency


ICD Codes:  N28.9 - Renal insufficiency


Status:  Acute


(4) Anemia


ICD Codes:  D64.9 - Anemia, unspecified


Status:  Chronic


(5) DM (diabetes mellitus)


ICD Codes:  E11.9 - Type 2 diabetes mellitus without complications


(6) Renal insufficiency


ICD Codes:  N28.9 - Disorder of kidney and ureter, unspecified


(7) HTN (hypertension)


ICD Codes:  I10 - Hypertension


Status:  Chronic


Assessment and Plan


51 yo AAF with history of CAD,with stenting  ~ 10 years ago, CKD IV, CHF and 

diabetes admitted for progressive SOB and chest pain. 





Acute anemia: Hgb dropped to 6.6, no obvious bleeding.  Hemoglobin improved to 

11 today after transfusion.  GI consulted, s/p EGD and colonoscopy 1/19/18, 

mild gastritis, polyp.    Await biopsies.  Recommend small bowel follow-

through.will need GI clearance if patient still agreeable to [facility] per Dr. Mercado.





NSTEMI: likely demand mediated due to CKD, severe anemia vs. ischemic injury.  

Hold off on [facility] for now pending anemia resolution and depending on renal 

function.





CKD IV: Nephrology recommended "The risk of LETI due to contrast exposure is 26%

, risk of requiring dialysis is 1.5%. 


Recommend to start 0.9% NS @ 50 cc/hr 12 hrs prior to catheterization. "


 


Dyspnea: multifactorial, CHF vs. PNA vs anemia.  Echocardiogram unremarkable.  

Continue antibiotics per primary team.











I seeing patient this weekend requested by DR.Stephen Mercado. 


Dr Mercado will resume care tomorrow











Wing Serenity Tobar MD Jan 21, 2018 19:51

## 2018-01-22 VITALS — HEART RATE: 80 BPM

## 2018-01-22 VITALS — HEART RATE: 76 BPM

## 2018-01-22 VITALS
HEART RATE: 72 BPM | TEMPERATURE: 98.3 F | SYSTOLIC BLOOD PRESSURE: 163 MMHG | DIASTOLIC BLOOD PRESSURE: 89 MMHG | OXYGEN SATURATION: 100 % | RESPIRATION RATE: 16 BRPM

## 2018-01-22 VITALS — HEART RATE: 72 BPM

## 2018-01-22 VITALS
OXYGEN SATURATION: 97 % | DIASTOLIC BLOOD PRESSURE: 71 MMHG | RESPIRATION RATE: 16 BRPM | HEART RATE: 76 BPM | SYSTOLIC BLOOD PRESSURE: 159 MMHG

## 2018-01-22 VITALS
DIASTOLIC BLOOD PRESSURE: 84 MMHG | RESPIRATION RATE: 18 BRPM | SYSTOLIC BLOOD PRESSURE: 172 MMHG | HEART RATE: 73 BPM | TEMPERATURE: 98.4 F | OXYGEN SATURATION: 97 %

## 2018-01-22 VITALS
OXYGEN SATURATION: 97 % | SYSTOLIC BLOOD PRESSURE: 147 MMHG | HEART RATE: 72 BPM | DIASTOLIC BLOOD PRESSURE: 74 MMHG | RESPIRATION RATE: 16 BRPM | TEMPERATURE: 98.3 F

## 2018-01-22 VITALS
SYSTOLIC BLOOD PRESSURE: 143 MMHG | OXYGEN SATURATION: 98 % | RESPIRATION RATE: 16 BRPM | HEART RATE: 77 BPM | DIASTOLIC BLOOD PRESSURE: 72 MMHG

## 2018-01-22 VITALS — HEART RATE: 78 BPM

## 2018-01-22 VITALS
HEART RATE: 78 BPM | DIASTOLIC BLOOD PRESSURE: 74 MMHG | OXYGEN SATURATION: 98 % | SYSTOLIC BLOOD PRESSURE: 155 MMHG | RESPIRATION RATE: 16 BRPM

## 2018-01-22 VITALS
OXYGEN SATURATION: 96 % | RESPIRATION RATE: 15 BRPM | SYSTOLIC BLOOD PRESSURE: 180 MMHG | DIASTOLIC BLOOD PRESSURE: 88 MMHG | HEART RATE: 88 BPM | TEMPERATURE: 98.1 F

## 2018-01-22 VITALS — SYSTOLIC BLOOD PRESSURE: 157 MMHG | DIASTOLIC BLOOD PRESSURE: 71 MMHG | HEART RATE: 73 BPM | OXYGEN SATURATION: 98 %

## 2018-01-22 VITALS
HEART RATE: 78 BPM | OXYGEN SATURATION: 97 % | DIASTOLIC BLOOD PRESSURE: 76 MMHG | RESPIRATION RATE: 16 BRPM | TEMPERATURE: 97.6 F | SYSTOLIC BLOOD PRESSURE: 163 MMHG

## 2018-01-22 VITALS — HEART RATE: 74 BPM

## 2018-01-22 VITALS — SYSTOLIC BLOOD PRESSURE: 152 MMHG | HEART RATE: 74 BPM | DIASTOLIC BLOOD PRESSURE: 71 MMHG | OXYGEN SATURATION: 98 %

## 2018-01-22 VITALS
RESPIRATION RATE: 12 BRPM | SYSTOLIC BLOOD PRESSURE: 153 MMHG | HEART RATE: 70 BPM | OXYGEN SATURATION: 100 % | DIASTOLIC BLOOD PRESSURE: 80 MMHG | TEMPERATURE: 97.6 F

## 2018-01-22 VITALS — OXYGEN SATURATION: 98 %

## 2018-01-22 VITALS — HEART RATE: 79 BPM

## 2018-01-22 VITALS — HEART RATE: 75 BPM

## 2018-01-22 LAB
BASOPHILS # BLD AUTO: 0.1 TH/MM3 (ref 0–0.2)
BASOPHILS NFR BLD: 0.8 % (ref 0–2)
BUN SERPL-MCNC: 38 MG/DL (ref 7–18)
CALCIUM SERPL-MCNC: 8.9 MG/DL (ref 8.5–10.1)
CHLORIDE SERPL-SCNC: 106 MEQ/L (ref 98–107)
CREAT SERPL-MCNC: 3.05 MG/DL (ref 0.5–1)
EOSINOPHIL # BLD: 0.2 TH/MM3 (ref 0–0.4)
EOSINOPHIL NFR BLD: 3.6 % (ref 0–4)
ERYTHROCYTE [DISTWIDTH] IN BLOOD BY AUTOMATED COUNT: 14.7 % (ref 11.6–17.2)
GFR SERPLBLD BASED ON 1.73 SQ M-ARVRAT: 20 ML/MIN (ref 89–?)
GLUCOSE SERPL-MCNC: 269 MG/DL (ref 74–106)
HCO3 BLD-SCNC: 23.3 MEQ/L (ref 21–32)
HCT VFR BLD CALC: 30 % (ref 35–46)
HGB BLD-MCNC: 10.2 GM/DL (ref 11.6–15.3)
LYMPHOCYTES # BLD AUTO: 0.8 TH/MM3 (ref 1–4.8)
LYMPHOCYTES NFR BLD AUTO: 12.1 % (ref 9–44)
MCH RBC QN AUTO: 32.7 PG (ref 27–34)
MCHC RBC AUTO-ENTMCNC: 33.9 % (ref 32–36)
MCV RBC AUTO: 96.3 FL (ref 80–100)
MONOCYTE #: 0.6 TH/MM3 (ref 0–0.9)
MONOCYTES NFR BLD: 8.6 % (ref 0–8)
NEUTROPHILS # BLD AUTO: 4.9 TH/MM3 (ref 1.8–7.7)
NEUTROPHILS NFR BLD AUTO: 74.9 % (ref 16–70)
PLATELET # BLD: 340 TH/MM3 (ref 150–450)
PMV BLD AUTO: 7.5 FL (ref 7–11)
RBC # BLD AUTO: 3.11 MIL/MM3 (ref 4–5.3)
SODIUM SERPL-SCNC: 137 MEQ/L (ref 136–145)
WBC # BLD AUTO: 6.5 TH/MM3 (ref 4–11)

## 2018-01-22 PROCEDURE — 4A023N7 MEASUREMENT OF CARDIAC SAMPLING AND PRESSURE, LEFT HEART, PERCUTANEOUS APPROACH: ICD-10-PCS | Performed by: INTERNAL MEDICINE

## 2018-01-22 PROCEDURE — B2131ZZ FLUOROSCOPY OF MULTIPLE CORONARY ARTERY BYPASS GRAFTS USING LOW OSMOLAR CONTRAST: ICD-10-PCS | Performed by: INTERNAL MEDICINE

## 2018-01-22 PROCEDURE — B2111ZZ FLUOROSCOPY OF MULTIPLE CORONARY ARTERIES USING LOW OSMOLAR CONTRAST: ICD-10-PCS | Performed by: INTERNAL MEDICINE

## 2018-01-22 RX ADMIN — PRAVASTATIN SODIUM SCH MG: 40 TABLET ORAL at 21:06

## 2018-01-22 RX ADMIN — Medication SCH ML: at 21:07

## 2018-01-22 RX ADMIN — FOLIC ACID SCH MG: 1 TABLET ORAL at 10:43

## 2018-01-22 RX ADMIN — ASPIRIN 81 MG SCH MG: 81 TABLET ORAL at 10:41

## 2018-01-22 RX ADMIN — INSULIN ASPART SCH: 100 INJECTION, SOLUTION INTRAVENOUS; SUBCUTANEOUS at 08:00

## 2018-01-22 RX ADMIN — INSULIN ASPART SCH: 100 INJECTION, SOLUTION INTRAVENOUS; SUBCUTANEOUS at 12:00

## 2018-01-22 RX ADMIN — MORPHINE SULFATE PRN MG: 2 INJECTION, SOLUTION INTRAMUSCULAR; INTRAVENOUS at 03:53

## 2018-01-22 RX ADMIN — INSULIN ASPART SCH: 100 INJECTION, SOLUTION INTRAVENOUS; SUBCUTANEOUS at 17:00

## 2018-01-22 RX ADMIN — INSULIN ASPART SCH: 100 INJECTION, SOLUTION INTRAVENOUS; SUBCUTANEOUS at 21:00

## 2018-01-22 RX ADMIN — METOPROLOL TARTRATE SCH MG: 100 TABLET, FILM COATED ORAL at 10:41

## 2018-01-22 RX ADMIN — FERROUS SULFATE TAB 325 MG (65 MG ELEMENTAL FE) SCH MG: 325 (65 FE) TAB at 10:41

## 2018-01-22 RX ADMIN — METOPROLOL TARTRATE SCH MG: 100 TABLET, FILM COATED ORAL at 21:06

## 2018-01-22 RX ADMIN — PANTOPRAZOLE SCH MG: 40 TABLET, DELAYED RELEASE ORAL at 10:43

## 2018-01-22 RX ADMIN — STANDARDIZED SENNA CONCENTRATE AND DOCUSATE SODIUM SCH TAB: 8.6; 5 TABLET, FILM COATED ORAL at 10:43

## 2018-01-22 RX ADMIN — GABAPENTIN SCH MG: 300 CAPSULE ORAL at 21:07

## 2018-01-22 RX ADMIN — NITROGLYCERIN SCH INCH: 20 OINTMENT TOPICAL at 11:00

## 2018-01-22 RX ADMIN — MORPHINE SULFATE PRN MG: 2 INJECTION, SOLUTION INTRAMUSCULAR; INTRAVENOUS at 21:08

## 2018-01-22 RX ADMIN — NITROGLYCERIN SCH INCH: 20 OINTMENT TOPICAL at 05:13

## 2018-01-22 RX ADMIN — ISOSORBIDE MONONITRATE SCH MG: 60 TABLET, EXTENDED RELEASE ORAL at 06:03

## 2018-01-22 RX ADMIN — Medication SCH ML: at 10:44

## 2018-01-22 RX ADMIN — ACYCLOVIR SCH UNITS: 800 TABLET ORAL at 21:00

## 2018-01-22 RX ADMIN — PHENYTOIN SODIUM SCH MLS/HR: 50 INJECTION INTRAMUSCULAR; INTRAVENOUS at 10:44

## 2018-01-22 RX ADMIN — AZITHROMYCIN SCH MG: 250 TABLET, FILM COATED ORAL at 10:42

## 2018-01-22 RX ADMIN — STANDARDIZED SENNA CONCENTRATE AND DOCUSATE SODIUM SCH TAB: 8.6; 5 TABLET, FILM COATED ORAL at 21:07

## 2018-01-22 RX ADMIN — FERROUS SULFATE TAB 325 MG (65 MG ELEMENTAL FE) SCH MG: 325 (65 FE) TAB at 21:07

## 2018-01-22 RX ADMIN — PHENYTOIN SODIUM SCH MLS/HR: 50 INJECTION INTRAMUSCULAR; INTRAVENOUS at 18:54

## 2018-01-22 RX ADMIN — SODIUM CHLORIDE SCH MLS/HR: 900 INJECTION INTRAVENOUS at 05:13

## 2018-01-22 RX ADMIN — NITROGLYCERIN SCH INCH: 20 OINTMENT TOPICAL at 19:40

## 2018-01-22 NOTE — HHI.NPPN
Subjective


General Problems:  Anemia


Renal Failure:  Chronic, Acute, Stage III, Stage IV


Interval History


Her labs are ordered, unavailable. She informs me she is unsure if she would 

like to attempt medical management vs heart cath. No acute complaints today. 


 (Migdalia Camejo)





Review of Systems


General


Constitutional:  Fatigue


 (Migdalia Camejo)





Respiratory


Respiratory Remarks


Mild SOB


 (Migdalia Camejo)





Cardiovascular


Cardiac:  Chest Pain


Cardiac Remarks


resolved


 (Migdalia Camejo)





Gastrointestinal


GI Remarks


no abdominal pain


 (Migdalia Camejo)





Objective Data


Data





Vital Signs








  Date Time  Temp Pulse Resp B/P (MAP) Pulse Ox O2 Delivery O2 Flow Rate FiO2


 


1/22/18 08:23     96 Room Air  


 


1/22/18 08:00  78      


 


1/22/18 07:55 98.1 88 15 180/88 (118) 96   


 


1/22/18 07:00  76      


 


1/22/18 06:00  76      


 


1/22/18 05:00      Room Air  


 


1/22/18 05:00 98.4 78 18 172/84 (113) 97   


 


1/22/18 05:00  73      


 


1/22/18 04:00  78      


 


1/22/18 03:00  74      


 


1/22/18 02:00  75      


 


1/22/18 01:00  76      


 


1/22/18 00:00  79      


 


1/21/18 23:54      Room Air  


 


1/21/18 23:54 98.2 79 18 170/89 (116) 98   


 


1/21/18 23:00  77      


 


1/21/18 22:09     97   21


 


1/21/18 22:00  78      


 


1/21/18 21:00  74      


 


1/21/18 21:00    162/83 (109)    


 


1/21/18 20:00 98.0 80 18 171/88 (115) 96   


 


1/21/18 20:00  80      


 


1/21/18 20:00      Room Air  


 


1/21/18 19:00  74      


 


1/21/18 18:00  70      


 


1/21/18 17:00  70      


 


1/21/18 16:00 98.1 71 17 144/75 (98) 99   


 


1/21/18 16:00  70      


 


1/21/18 15:00  73      


 


1/21/18 15:00     96 Nasal Cannula 2.00 


 


1/21/18 14:00  70      


 


1/21/18 13:00  70      


 


1/21/18 12:00 98.3 84 16 158/77 (104) 97   


 


1/21/18 12:00  70      


 


1/21/18 11:00     96 Nasal Cannula 2.00 


 


1/21/18 11:00  70      


 


1/21/18 10:15  76      








 (Migdalia Camejo)


-:  


1/21/18 0503                                                                   

             1/19/18 0153








Physical Exam


General


Appearance:  Well Developed, No Acute Distress, Comfortable


 (Migdalia Camejo)





Eyes


Eye Exam:  Pupils Equal


 (Migdalia Camejo)





Throat


Throat Exam:  Oral Mucosa Pink & Moist


 (Migdalia Camejo)





Pulmonary


Resp Exam:  Breath Sounds Equal, No Distress, Decreased Bases


 (Migdalia Camejo)





Cardiology


CV Exam:  Regular, Normal Sinus Rhythm, Good Perfusion


 (Migdalia Camejo)





Gastrointestinal/Abdomen


GI Exam:  Soft, Non-Tender, Bowel Sounds Present, Positive Bowel Movement


 (Migdalia Camejo)





Musculoskeletal


MS Exam:  Joints Intact, Normal Tone


 (Migdalia Camejo)





Integumentary


Skin Exam:  Clear, Warm, Dry, Intact


 (Migdalia Camejo)





Extremeties


Extremities Exam:  No Edema


 (Migdalia Camejo)





Neurologic


Neuro Exam:  Alert, Awake, Oriented, Speech Clear, Moving All Extremities


 (Migdalia Camejo)





Psychiatric


Psych Exam:  Appropriate Responses


 (Migdalia Camejo)





Assessment/Plan


Discussed Condition With:  Patient


Assessment Summary:  LETI/Acute Renal Failure, Anemia of CKD, Hypertension, 

Diabetes Mellitus, CKD Stage III, CKD Stage IV


Problem List:  


(1) Renal insufficiency


ICD Codes:  N28.9 - Disorder of kidney and ureter, unspecified


Plan:  Baseline CKD 3-4, creatinine 2.1-2.5, GFR 24-30.


LETI due to NSTEMI


Renal panel in process


May need cardiac catheterization, has underlying diabetic nephropathy. 


Tolerating oral fluids, IVF to be given 12 hrs pre procedure if LHC is planned, 

0.9% NS


The risk of LETI due to contrast exposure is 26%, risk of requiring dialysis is 

1.5%. D/W patient. 


Monitor urine output. She is non oliguric.


Avoid additional nephrotoxic agents. Minimize contrast exposure if possible. 


Await daily labs. 





(2) NSTEMI (non-ST elevated myocardial infarction)


ICD Codes:  I21.4 - Non-ST elevation (NSTEMI) myocardial infarction


Plan:  Cardiology following, heart catheterization possible, may attempt 

medical management


given ASA,off  heparin gtt





(3) DM (diabetes mellitus)


ICD Codes:  E11.9 - Type 2 diabetes mellitus without complications


Plan:  Insulin as needed, maintain glucose 140-180mg/dL. 


If NPO use D10 @ 15 ml/hr to prevent hyperkalemia 





(4) Anemia


ICD Codes:  D64.9 - Anemia, unspecified


Status:  Chronic


Plan:  Hb improved, s/p 2 units 


s/p  EGD/colonoscopy 


no evidence of iron deficiency 








(5) CAD (coronary artery disease)


ICD Codes:  I25.10 - Atherosclerotic heart disease of native coronary artery 

without angina pectoris


Status:  Chronic


Plan:  Hx of stent in the past. New NSTEMI, see above 





(6) PNA (pneumonia)


ICD Codes:  J18.9 - Pneumonia, unspecified organism


Plan:  On Zithromax and rocephin 


Follow clinically 





(7) HTN (hypertension)


ICD Codes:  I10 - Hypertension


Status:  Chronic


Plan:  BP elevated overnight 


On Imdur, metoprolol, clonidine, and Norvasc


Increase clonidine to 0.2 mg BID if hypertension continues 


 (Migdalia Camejo)


Plan


patient was seen and examined. I will increase Clonidine to 0.2 mg PO BID. 

After cardiac catheterization, will consider ACE inhibitor, ARB 


 (Kelton Whiteside MD)











Migdalia Camejo Jan 22, 2018 09:33


Kelton Whiteside MD Jan 22, 2018 10:03

## 2018-01-22 NOTE — MA
cc:

MILTON GENTILE MD

****

 

 

DATE

1/22/2018

 

TOTAL AMOUNT OF CONTRAST ADMINISTERED

20 cc total

 

INDICATION

Non-ST-elevation MI.

 

PROCEDURES PERFORMED

1. Fluoroscopy with interpretation.

2. Coronary angiography.

3. Left heart catheterization.

 

METHOD

The risks, benefits and alternatives were discussed with the patient. The

patient understood and consented to the procedure.

 

PROCEDURE DETAILS

The patient was brought to the catheterization lab and placed on the

catheterization table.  Right wrist was prepped and draped in sterile fashion.

Right wrist was anesthetized with 2% lidocaine.  Right radial artery was

cannulated and 6-Martiniquais 7 cm sheath was placed out difficulty.

 

Left heart catheterization; intra ventricular hemodynamics measured at 160/15

mmHg.

 

CORONARY ANGIOGRAPHY

1. Left main coronary is angiographically normal.

2. Left anterior descending coronary has moderate calcium present proximally

     but only mild luminal irregularities. In  the proximal to midsegment there

     is any eccentric 40-50% lesion. At the bend towards the descending portion

     there  are two tandem stenoses, the first appears to be about 75% and is

     very eccentric. The second probably about 50% but also has myocardial

     bridge present.  The apical LAD has a 90% discrete stenosis with small

     caliber size.

3. Left circumflex territory is technically codominant. It gives rise to a

     small posterior descending branch.  Circumflex gives rise to several small

     obtuse marginal branches with minor luminal irregularities.

4. The right coronary artery is a dominant vessel giving rise to a posterior

     descending branch. There is a stent to the midsegment. Proximal right

     coronary has 90% stenosis.  In the mid to distal segment just prior to the

     posterior descending and posterolateral branches there is a 50% stenosis.

 

CONCLUSION

1. Severe two-vessel native coronary disease involving the proximal right

   coronary artery and mid left anterior descending coronary artery.

2. Normal left-sided filling pressures.

 

PLAN

Given the patient's creatinine we elected not proceed with attempted

revascularization at this time, especially given that it is two-vessel

involved.  I suspect the right coronary is likely the culprit lesion.  Plan to

discuss with the patient about potential options which would include continued

medical therapy versus high-risk percutaneous intervention versus consideration

of bypass surgery.

 

 

 

 

                              _________________________________

                              MD TANG Contreras/ANA

D:  1/22/2018/2:38 PM

T:  1/22/2018/4:09 PM

Visit #:  [id number]

Job #:  [id number]

## 2018-01-22 NOTE — PD.CARD.PN
Subjective


Subjective Remarks


admits to left-sided chest discomfort overnight with positional changes. mild 

SOB at rest. Hgb improving


 (Blanca Luong)





Objective


Medications





Current Medications








 Medications


  (Trade)  Dose


 Ordered  Sig/Luis Armando


 Route  Start Time


 Stop Time Status Last Admin


 


  (D50w (Vial) Inj)  50 ml  UNSCH  PRN


 IV PUSH  1/15/18 04:00


     


 


 


  (Glucagon Inj)  1 mg  UNSCH  PRN


 OTHER  1/15/18 04:00


     


 


 


  (NovoLOG


 SUPPLEMENTAL


 SCALE)  1  ACHS SLIDING  SCALE


 SQ  1/15/18 08:00


    1/21/18 20:46


 


 


 Ceftriaxone


 Sodium 1000 mg/


 Sodium Chloride  100 ml @ 


 200 mls/hr  Q24H


 IV  1/15/18 05:00


 1/22/18 22:00  1/22/18 05:13


 


 


  (Duoneb Neb)  1 ampule  Q4HR NEB  PRN


 NEB  1/15/18 04:00


    1/18/18 21:20


 


 


  (NS Flush)  2 ml  UNSCH  PRN


 IV FLUSH  1/15/18 04:00


    1/18/18 00:12


 


 


  (NS Flush)  2 ml  BID


 IV FLUSH  1/15/18 09:00


    1/21/18 20:45


 


 


  (Zofran Inj)  4 mg  Q6H  PRN


 IVP  1/15/18 04:00


    1/19/18 16:49


 


 


  (Tylenol)  650 mg  Q6H  PRN


 PO  1/15/18 04:00


     


 


 


  (Norco  5-325 Mg)  1 tab  Q4H  PRN


 PO  1/15/18 04:00


    1/21/18 20:44


 


 


  (Morphine Inj)  2 mg  Q3H  PRN


 IV PUSH  1/15/18 04:00


    1/22/18 03:53


 


 


  (Theresa-Colace)  1 tab  BID


 PO  1/15/18 09:00


    1/21/18 20:45


 


 


  (Milk Of


 Magnesia Liq)  30 ml  Q12H  PRN


 PO  1/15/18 04:00


     


 


 


  (Senokot)  17.2 mg  Q12H  PRN


 PO  1/15/18 04:00


     


 


 


  (Dulcolax Supp)  10 mg  DAILY  PRN


 RECTAL  1/15/18 04:00


     


 


 


  (Lactulose Liq)  30 ml  DAILY  PRN


 PO  1/15/18 04:00


     


 


 


  (Aspirin Chew)  81 mg  DAILY


 CHEW  1/15/18 09:00


   Future hold 1/18/18 09:01


 


 


  (Folate)  1 mg  DAILY


 PO  1/15/18 09:00


    1/21/18 08:39


 


 


  (Neurontin)  300 mg  HS


 PO  1/15/18 21:00


    1/21/18 20:44


 


 


  (Levemir Inj)  15 units  HS


 SQ  1/15/18 21:00


    1/16/18 20:57


 


 


  (Pravachol)  40 mg  HS


 PO  1/15/18 21:00


    1/21/18 20:45


 


 


  (Imdur)  120 mg  DAILY@07


 PO  1/18/18 07:00


    1/22/18 06:03


 


 


 Sodium Chloride  1,000 ml @ 


 50 mls/hr  Q20H


 IV  1/17/18 23:45


   Future Hold 1/17/18 23:45


 


 


  (Lopressor)  100 mg  Q12HR


 PO  1/18/18 21:00


    1/21/18 20:44


 


 


  (Ferrous Sulfate)  325 mg  BID


 PO  1/18/18 10:00


    1/21/18 20:44


 


 


  (Nitroglycerin


 2% Oint)  1 inch  Q6HR


 TOPICAL  1/19/18 01:40


    1/22/18 05:13


 


 


  (Morphine Inj)  2 mg  Q3H  PRN


 IV PUSH  1/19/18 01:30


    1/20/18 18:41


 


 


  (Norvasc)  10 mg  DAILY


 PO  1/20/18 09:00


    1/21/18 08:38


 


 


  (Catapres)  0.1 mg  Q12HR


 PO  1/19/18 21:00


    1/21/18 20:44


 


 


  (Catapres)  0.1 mg  Q6H  PRN


 PO  1/20/18 19:15


    1/22/18 03:53


 


 


  (Protonix)  40 mg  DAILY


 PO  1/22/18 09:00


     


 


 


  (Zithromax)  500 mg  DAILY


 PO  1/22/18 09:00


     


 








Vital Signs / I&O





Vital Signs








  Date Time  Temp Pulse Resp B/P (MAP) Pulse Ox O2 Delivery O2 Flow Rate FiO2


 


1/22/18 06:00  76      


 


1/22/18 05:00      Room Air  


 


1/22/18 05:00 98.4 78 18 172/84 (113) 97   


 


1/22/18 05:00  73      


 


1/22/18 04:00  78      


 


1/22/18 03:00  74      


 


1/22/18 02:00  75      


 


1/22/18 01:00  76      


 


1/22/18 00:00  79      


 


1/21/18 23:54      Room Air  


 


1/21/18 23:54 98.2 79 18 170/89 (116) 98   


 


1/21/18 23:00  77      


 


1/21/18 22:09     97   21


 


1/21/18 22:00  78      


 


1/21/18 21:00  74      


 


1/21/18 21:00    162/83 (109)    


 


1/21/18 20:00 98.0 80 18 171/88 (115) 96   


 


1/21/18 20:00  80      


 


1/21/18 20:00      Room Air  


 


1/21/18 19:00  74      


 


1/21/18 18:00  70      


 


1/21/18 17:00  70      


 


1/21/18 16:00 98.1 71 17 144/75 (98) 99   


 


1/21/18 16:00  70      


 


1/21/18 15:00  73      


 


1/21/18 15:00     96 Nasal Cannula 2.00 


 


1/21/18 14:00  70      


 


1/21/18 13:00  70      


 


1/21/18 12:00 98.3 84 16 158/77 (104) 97   


 


1/21/18 12:00  70      


 


1/21/18 11:00     96 Nasal Cannula 2.00 


 


1/21/18 11:00  70      


 


1/21/18 10:15  76      


 


1/21/18 09:00  76      


 


1/21/18 08:00  82      


 


1/21/18 08:00     96 Room Air  














I/O      


 


 1/21/18 1/21/18 1/21/18 1/22/18 1/22/18 1/22/18





 07:00 15:00 23:00 07:00 15:00 23:00


 


Intake Total 480 ml   480 ml  


 


Output Total 700 ml   800 ml  


 


Balance -220 ml   -320 ml  


 


      


 


Intake Oral 480 ml   480 ml  


 


Output Urine Total 700 ml   800 ml  


 


# Bowel Movements 1     








Physical Exam


GENERAL: 


SKIN: Warm and dry.


HEAD: Atraumatic. Normocephalic. 


EYES: Pupils equal and round. No scleral icterus. No injection or drainage. 


ENT: No nasal bleeding or discharge. .


NECK: Trachea midline. No JVD. 


CARDIOVASCULAR: Regular rate and rhythm.  no murmurs


RESPIRATORY: No accessory muscle use. decreased breath sounds to bilateral 

bases. 


GASTROINTESTINAL: Abdomen soft, non-tender, nondistended. Hepatic and splenic 

margins not palpable. 


MUSCULOSKELETAL: Extremities without clubbing, cyanosis, or edema. No obvious 

deformities. 


NEUROLOGICAL: Awake and alert. No obvious cranial nerve deficits. Normal speech.


PSYCHIATRIC: Appropriate mood and affect; insight and judgment normal.


Laboratory





Laboratory Tests








Test


  1/15/18


01:35 1/16/18


07:05 1/17/18


21:16 1/18/18


13:05


 


Prothrombin Time 9.1 SEC    


 


Prothromb Time International


Ratio 0.9 RATIO 


  


  


  


 


 


Creatine Kinase MB 3.2 NG/ML    


 


B-Type Natriuretic Peptide 2111 PG/ML    


 


Lipase 348 U/L    


 


Blood Urea Nitrogen  43 MG/DL   


 


Creatinine  2.96 MG/DL   


 


Random Glucose  89 MG/DL   


 


Total Protein  7.7 GM/DL   


 


Albumin  2.7 GM/DL   


 


Calcium Level  9.0 MG/DL   


 


Magnesium Level  2.4 MG/DL   


 


Alkaline Phosphatase  168 U/L   


 


Aspartate Amino Transf


(AST/SGOT) 


  23 U/L 


  


  


 


 


Alanine Aminotransferase


(ALT/SGPT) 


  33 U/L 


  


  


 


 


Total Bilirubin  0.4 MG/DL   


 


Sodium Level  142 MEQ/L   


 


Potassium Level  4.7 MEQ/L   


 


Chloride Level  110 MEQ/L   


 


Carbon Dioxide Level  20.9 MEQ/L   


 


Activated Partial


Thromboplast Time 


  


  38.0 SEC 


  


 


 


Haptoglobin    340 MG/DL 


 


Iron Level    96 MCG/DL 


 


Total Iron Binding Capacity    263 MCG/DL 


 


Percent Iron Saturation    36.5 % 


 


Lactate Dehydrogenase    178 U/L 


 


Vitamin B12 Level    1111 PG/ML 


 


Folate


  


  


  


  GREATER THAN


20.0 NG/ML


 


Test


  1/19/18


01:53 1/20/18


00:05 1/21/18


05:03 


 


 


Neutrophils (%) (Auto) 78.1 %    


 


Lymphocytes (%) (Auto) 11.5 %    


 


Monocytes (%) (Auto) 6.4 %    


 


Eosinophils (%) (Auto) 3.4 %    


 


Basophils (%) (Auto) 0.6 %    


 


Neutrophils # (Auto) 8.8 TH/MM3    


 


Lymphocytes # (Auto) 1.3 TH/MM3    


 


Monocytes # (Auto) 0.7 TH/MM3    


 


Eosinophils # (Auto) 0.4 TH/MM3    


 


Basophils # (Auto) 0.1 TH/MM3    


 


CBC Comment DIFF FINAL    


 


Differential Comment     


 


Blood Urea Nitrogen 39 MG/DL    


 


Creatinine 2.57 MG/DL    


 


Random Glucose 120 MG/DL    


 


Albumin 2.9 GM/DL    


 


Calcium Level 9.0 MG/DL    


 


Phosphorus Level 4.0 MG/DL    


 


Sodium Level 140 MEQ/L    


 


Potassium Level 4.7 MEQ/L    


 


Chloride Level 108 MEQ/L    


 


Carbon Dioxide Level 20.0 MEQ/L    


 


Anion Gap 12 MEQ/L    


 


Estimat Glomerular Filtration


Rate 24 ML/MIN 


  


  


  


 


 


Total Creatine Kinase  51 U/L   


 


Troponin I  0.30 NG/ML   


 


White Blood Count   7.3 TH/MM3  


 


Red Blood Count   2.81 MIL/MM3  


 


Hemoglobin   9.2 GM/DL  


 


Hematocrit   26.6 %  


 


Mean Corpuscular Volume   94.7 FL  


 


Mean Corpuscular Hemoglobin   32.9 PG  


 


Mean Corpuscular Hemoglobin


Concent 


  


  34.7 % 


  


 


 


Red Cell Distribution Width   15.1 %  


 


Platelet Count   303 TH/MM3  


 


Mean Platelet Volume   7.7 FL  








 (Blanca Luong)





Assessment and Plan


Problem List:  


(1) NSTEMI (non-ST elevated myocardial infarction)


ICD Codes:  I21.4 - Non-ST elevation (NSTEMI) myocardial infarction


(2) CHF (congestive heart failure)


ICD Codes:  I50.9 - Heart failure, unspecified


(3) Renal insufficiency


ICD Codes:  N28.9 - Renal insufficiency


Status:  Acute


(4) Anemia


ICD Codes:  D64.9 - Anemia, unspecified


Status:  Chronic


(5) DM (diabetes mellitus)


ICD Codes:  E11.9 - Type 2 diabetes mellitus without complications


(6) Renal insufficiency


ICD Codes:  N28.9 - Disorder of kidney and ureter, unspecified


(7) HTN (hypertension)


ICD Codes:  I10 - Hypertension


Status:  Chronic


Assessment and Plan


53 yo AAF with history of CAD, cardiac stent placed ~ 10 years ago, CKD IV, CHF 

and diabetes admitted for progressive SOB and chest pain. 





anemia- Hgb improved; 9.2 after infusion


GI workup shows mild gastritis





NSTEMI- likely demand mediated due to CKD vs. ischemic injury


she will need a LHC once anemia improves





CKD IV- nephrology following, creatinine 2.5





 


respiratory insufficiency- multifactorial CHF vs. PNA vs. anemia





 (Blanca Luong)


Assessment and Plan


-------------


discussed the case with patient in great detail


patient has also discussed her situation extensively with her daughter


they would like to proceed with LHC given recurrent CP and elevated trop


GI cleared for LHC


Hb stable


start gentle hydration. Patient understands risks with LHC.  Will attempt to 

minimize contrast < 50 cc total.


 (Edgardo Mercado MD)











Blanca Luong Jan 22, 2018 07:47


Edgardo Mercado MD Jan 22, 2018 13:42

## 2018-01-23 VITALS
DIASTOLIC BLOOD PRESSURE: 74 MMHG | RESPIRATION RATE: 18 BRPM | SYSTOLIC BLOOD PRESSURE: 140 MMHG | HEART RATE: 65 BPM | OXYGEN SATURATION: 99 % | TEMPERATURE: 97.9 F

## 2018-01-23 VITALS — HEART RATE: 66 BPM

## 2018-01-23 VITALS
OXYGEN SATURATION: 99 % | DIASTOLIC BLOOD PRESSURE: 86 MMHG | RESPIRATION RATE: 17 BRPM | TEMPERATURE: 97.9 F | SYSTOLIC BLOOD PRESSURE: 170 MMHG | HEART RATE: 72 BPM

## 2018-01-23 VITALS — OXYGEN SATURATION: 98 %

## 2018-01-23 VITALS
SYSTOLIC BLOOD PRESSURE: 142 MMHG | OXYGEN SATURATION: 100 % | TEMPERATURE: 98.3 F | DIASTOLIC BLOOD PRESSURE: 71 MMHG | HEART RATE: 70 BPM | RESPIRATION RATE: 14 BRPM

## 2018-01-23 VITALS — HEART RATE: 70 BPM

## 2018-01-23 VITALS
HEART RATE: 63 BPM | TEMPERATURE: 98 F | DIASTOLIC BLOOD PRESSURE: 63 MMHG | SYSTOLIC BLOOD PRESSURE: 123 MMHG | RESPIRATION RATE: 18 BRPM | OXYGEN SATURATION: 97 %

## 2018-01-23 VITALS
DIASTOLIC BLOOD PRESSURE: 70 MMHG | SYSTOLIC BLOOD PRESSURE: 136 MMHG | TEMPERATURE: 98 F | OXYGEN SATURATION: 100 % | HEART RATE: 73 BPM | RESPIRATION RATE: 20 BRPM

## 2018-01-23 VITALS — HEART RATE: 64 BPM

## 2018-01-23 VITALS — HEART RATE: 74 BPM

## 2018-01-23 VITALS — HEART RATE: 62 BPM

## 2018-01-23 VITALS — HEART RATE: 78 BPM

## 2018-01-23 VITALS — HEART RATE: 72 BPM

## 2018-01-23 VITALS — HEART RATE: 68 BPM

## 2018-01-23 LAB
BASOPHILS # BLD AUTO: 0 TH/MM3 (ref 0–0.2)
BASOPHILS NFR BLD: 0.8 % (ref 0–2)
BUN SERPL-MCNC: 40 MG/DL (ref 7–18)
CALCIUM SERPL-MCNC: 8.4 MG/DL (ref 8.5–10.1)
CHLORIDE SERPL-SCNC: 109 MEQ/L (ref 98–107)
CREAT SERPL-MCNC: 2.95 MG/DL (ref 0.5–1)
EOSINOPHIL # BLD: 0.2 TH/MM3 (ref 0–0.4)
EOSINOPHIL NFR BLD: 4 % (ref 0–4)
ERYTHROCYTE [DISTWIDTH] IN BLOOD BY AUTOMATED COUNT: 14.9 % (ref 11.6–17.2)
GFR SERPLBLD BASED ON 1.73 SQ M-ARVRAT: 20 ML/MIN (ref 89–?)
GLUCOSE SERPL-MCNC: 180 MG/DL (ref 74–106)
HCO3 BLD-SCNC: 23.3 MEQ/L (ref 21–32)
HCT VFR BLD CALC: 27.6 % (ref 35–46)
HGB BLD-MCNC: 9.5 GM/DL (ref 11.6–15.3)
LYMPHOCYTES # BLD AUTO: 1.3 TH/MM3 (ref 1–4.8)
LYMPHOCYTES NFR BLD AUTO: 21.2 % (ref 9–44)
MCH RBC QN AUTO: 33 PG (ref 27–34)
MCHC RBC AUTO-ENTMCNC: 34.4 % (ref 32–36)
MCV RBC AUTO: 95.8 FL (ref 80–100)
MONOCYTE #: 0.6 TH/MM3 (ref 0–0.9)
MONOCYTES NFR BLD: 9.2 % (ref 0–8)
NEUTROPHILS # BLD AUTO: 4 TH/MM3 (ref 1.8–7.7)
NEUTROPHILS NFR BLD AUTO: 64.8 % (ref 16–70)
PLATELET # BLD: 324 TH/MM3 (ref 150–450)
PMV BLD AUTO: 8.4 FL (ref 7–11)
RBC # BLD AUTO: 2.88 MIL/MM3 (ref 4–5.3)
SODIUM SERPL-SCNC: 139 MEQ/L (ref 136–145)
WBC # BLD AUTO: 6.2 TH/MM3 (ref 4–11)

## 2018-01-23 RX ADMIN — FERROUS SULFATE TAB 325 MG (65 MG ELEMENTAL FE) SCH MG: 325 (65 FE) TAB at 08:11

## 2018-01-23 RX ADMIN — ACYCLOVIR SCH UNITS: 800 TABLET ORAL at 20:30

## 2018-01-23 RX ADMIN — FOLIC ACID SCH MG: 1 TABLET ORAL at 08:11

## 2018-01-23 RX ADMIN — MAGNESIUM HYDROXIDE PRN ML: 400 SUSPENSION ORAL at 20:37

## 2018-01-23 RX ADMIN — Medication SCH ML: at 20:33

## 2018-01-23 RX ADMIN — MORPHINE SULFATE PRN MG: 2 INJECTION, SOLUTION INTRAMUSCULAR; INTRAVENOUS at 00:44

## 2018-01-23 RX ADMIN — METOPROLOL TARTRATE SCH MG: 100 TABLET, FILM COATED ORAL at 20:32

## 2018-01-23 RX ADMIN — NITROGLYCERIN SCH INCH: 20 OINTMENT TOPICAL at 06:09

## 2018-01-23 RX ADMIN — INSULIN ASPART SCH: 100 INJECTION, SOLUTION INTRAVENOUS; SUBCUTANEOUS at 18:07

## 2018-01-23 RX ADMIN — MORPHINE SULFATE PRN MG: 2 INJECTION, SOLUTION INTRAMUSCULAR; INTRAVENOUS at 06:09

## 2018-01-23 RX ADMIN — NITROGLYCERIN SCH INCH: 20 OINTMENT TOPICAL at 18:06

## 2018-01-23 RX ADMIN — INSULIN ASPART SCH: 100 INJECTION, SOLUTION INTRAVENOUS; SUBCUTANEOUS at 20:31

## 2018-01-23 RX ADMIN — MORPHINE SULFATE PRN MG: 2 INJECTION, SOLUTION INTRAMUSCULAR; INTRAVENOUS at 23:32

## 2018-01-23 RX ADMIN — ISOSORBIDE MONONITRATE SCH MG: 60 TABLET, EXTENDED RELEASE ORAL at 06:09

## 2018-01-23 RX ADMIN — MORPHINE SULFATE PRN MG: 2 INJECTION, SOLUTION INTRAMUSCULAR; INTRAVENOUS at 09:20

## 2018-01-23 RX ADMIN — PRAVASTATIN SODIUM SCH MG: 40 TABLET ORAL at 20:33

## 2018-01-23 RX ADMIN — STANDARDIZED SENNA CONCENTRATE AND DOCUSATE SODIUM SCH TAB: 8.6; 5 TABLET, FILM COATED ORAL at 20:33

## 2018-01-23 RX ADMIN — MAGNESIUM HYDROXIDE PRN ML: 400 SUSPENSION ORAL at 08:07

## 2018-01-23 RX ADMIN — INSULIN ASPART SCH: 100 INJECTION, SOLUTION INTRAVENOUS; SUBCUTANEOUS at 12:00

## 2018-01-23 RX ADMIN — NITROGLYCERIN SCH INCH: 20 OINTMENT TOPICAL at 23:29

## 2018-01-23 RX ADMIN — METOPROLOL TARTRATE SCH MG: 100 TABLET, FILM COATED ORAL at 08:08

## 2018-01-23 RX ADMIN — PHENYTOIN SODIUM SCH MLS/HR: 50 INJECTION INTRAMUSCULAR; INTRAVENOUS at 04:54

## 2018-01-23 RX ADMIN — ASPIRIN 81 MG SCH MG: 81 TABLET ORAL at 08:11

## 2018-01-23 RX ADMIN — NITROGLYCERIN SCH INCH: 20 OINTMENT TOPICAL at 00:43

## 2018-01-23 RX ADMIN — PANTOPRAZOLE SCH MG: 40 TABLET, DELAYED RELEASE ORAL at 08:10

## 2018-01-23 RX ADMIN — PHENYTOIN SODIUM SCH MLS/HR: 50 INJECTION INTRAMUSCULAR; INTRAVENOUS at 14:54

## 2018-01-23 RX ADMIN — INSULIN ASPART SCH: 100 INJECTION, SOLUTION INTRAVENOUS; SUBCUTANEOUS at 08:12

## 2018-01-23 RX ADMIN — AZITHROMYCIN SCH MG: 250 TABLET, FILM COATED ORAL at 08:09

## 2018-01-23 RX ADMIN — Medication PRN ML: at 18:08

## 2018-01-23 RX ADMIN — MORPHINE SULFATE PRN MG: 2 INJECTION, SOLUTION INTRAMUSCULAR; INTRAVENOUS at 18:07

## 2018-01-23 RX ADMIN — GABAPENTIN SCH MG: 300 CAPSULE ORAL at 20:32

## 2018-01-23 RX ADMIN — NITROGLYCERIN SCH INCH: 20 OINTMENT TOPICAL at 14:32

## 2018-01-23 RX ADMIN — FERROUS SULFATE TAB 325 MG (65 MG ELEMENTAL FE) SCH MG: 325 (65 FE) TAB at 20:33

## 2018-01-23 RX ADMIN — STANDARDIZED SENNA CONCENTRATE AND DOCUSATE SODIUM SCH TAB: 8.6; 5 TABLET, FILM COATED ORAL at 08:10

## 2018-01-23 RX ADMIN — Medication SCH ML: at 09:21

## 2018-01-23 NOTE — PD.CARD.PN
Subjective


Subjective Remarks


Has some chest pain with associated shortness of breath when she exerts herself 

to try to get out of bed or go to the restroom.  Currently considering her 

options for medical therapy, high risk PCI, or bypass.


 (King Ervin)





Objective


Medications





Current Medications








 Medications


  (Trade)  Dose


 Ordered  Sig/Luis Armando


 Route  Start Time


 Stop Time Status Last Admin


 


  (D50w (Vial) Inj)  50 ml  UNSCH  PRN


 IV PUSH  1/15/18 04:00


     


 


 


  (Glucagon Inj)  1 mg  UNSCH  PRN


 OTHER  1/15/18 04:00


     


 


 


  (NovoLOG


 SUPPLEMENTAL


 SCALE)  1  ACHS SLIDING  SCALE


 SQ  1/15/18 08:00


    1/22/18 21:00


 


 


  (Duoneb Neb)  1 ampule  Q4HR NEB  PRN


 NEB  1/15/18 04:00


    1/18/18 21:20


 


 


  (NS Flush)  2 ml  UNSCH  PRN


 IV FLUSH  1/15/18 04:00


    1/18/18 00:12


 


 


  (NS Flush)  2 ml  BID


 IV FLUSH  1/15/18 09:00


    1/22/18 21:07


 


 


  (Zofran Inj)  4 mg  Q6H  PRN


 IVP  1/15/18 04:00


    1/19/18 16:49


 


 


  (Tylenol)  650 mg  Q6H  PRN


 PO  1/15/18 04:00


     


 


 


  (Norco  5-325 Mg)  1 tab  Q4H  PRN


 PO  1/15/18 04:00


    1/21/18 20:44


 


 


  (Morphine Inj)  2 mg  Q3H  PRN


 IV PUSH  1/15/18 04:00


    1/23/18 06:09


 


 


  (Theresa-Colace)  1 tab  BID


 PO  1/15/18 09:00


    1/22/18 21:07


 


 


  (Milk Of


 Magnesia Liq)  30 ml  Q12H  PRN


 PO  1/15/18 04:00


     


 


 


  (Senokot)  17.2 mg  Q12H  PRN


 PO  1/15/18 04:00


     


 


 


  (Dulcolax Supp)  10 mg  DAILY  PRN


 RECTAL  1/15/18 04:00


     


 


 


  (Lactulose Liq)  30 ml  DAILY  PRN


 PO  1/15/18 04:00


     


 


 


  (Aspirin Chew)  81 mg  DAILY


 CHEW  1/15/18 09:00


   Future hold 1/22/18 10:41


 


 


  (Folate)  1 mg  DAILY


 PO  1/15/18 09:00


    1/22/18 10:43


 


 


  (Neurontin)  300 mg  HS


 PO  1/15/18 21:00


    1/22/18 21:07


 


 


  (Levemir Inj)  15 units  HS


 SQ  1/15/18 21:00


    1/22/18 21:00


 


 


  (Pravachol)  40 mg  HS


 PO  1/15/18 21:00


    1/22/18 21:06


 


 


  (Imdur)  120 mg  DAILY@07


 PO  1/18/18 07:00


    1/23/18 06:09


 


 


 Sodium Chloride  1,000 ml @ 


 50 mls/hr  Q20H


 IV  1/17/18 23:45


   Future Hold 1/17/18 23:45


 


 


  (Lopressor)  100 mg  Q12HR


 PO  1/18/18 21:00


    1/22/18 21:06


 


 


  (Ferrous Sulfate)  325 mg  BID


 PO  1/18/18 10:00


    1/22/18 21:07


 


 


  (Nitroglycerin


 2% Oint)  1 inch  Q6HR


 TOPICAL  1/19/18 01:40


    1/23/18 06:09


 


 


  (Morphine Inj)  2 mg  Q3H  PRN


 IV PUSH  1/19/18 01:30


    1/20/18 18:41


 


 


  (Norvasc)  10 mg  DAILY


 PO  1/20/18 09:00


    1/22/18 10:43


 


 


  (Catapres)  0.1 mg  Q6H  PRN


 PO  1/20/18 19:15


    1/22/18 03:53


 


 


  (Protonix)  40 mg  DAILY


 PO  1/22/18 09:00


    1/22/18 10:43


 


 


  (Zithromax)  500 mg  DAILY


 PO  1/22/18 09:00


    1/22/18 10:42


 


 


  (Apresoline)  50 mg  Q8HR


 PO  1/22/18 09:00


    1/23/18 06:09


 


 


 Sodium Chloride  1,000 ml @ 


 100 mls/hr  Q10H


 IV  1/22/18 08:54


 1/27/18 08:53  1/22/18 10:44


 


 


  (Catapres)  0.2 mg  Q12HR


 PO  1/22/18 21:00


    1/22/18 21:07


 








Vital Signs / I&O





Vital Signs








  Date Time  Temp Pulse Resp B/P (MAP) Pulse Ox O2 Delivery O2 Flow Rate FiO2


 


1/23/18 03:00 98.3 68 14 142/71 (94) 100   


 


1/22/18 23:00 97.6 70 12 153/80 (104) 100   


 


1/22/18 20:00 98.3 75 16 163/89 (113) 100   


 


1/22/18 19:27     98   21


 


1/22/18 18:00  78      


 


1/22/18 17:00  76      


 


1/22/18 16:30  73  157/71 (99) 98   


 


1/22/18 16:00  75  152/71 (98) 98   


 


1/22/18 16:00  74      


 


1/22/18 15:45  77 16 143/72 (95) 98   


 


1/22/18 15:20     98 Room Air  


 


1/22/18 15:15  78 16 155/74 (101) 98   


 


1/22/18 15:00  72      


 


1/22/18 15:00 97.6 78 16 163/76 (105) 97   


 


1/22/18 15:00  78 15 163/76 (105) 97   


 


1/22/18 14:51  76 16 159/71 (100) 97   


 


1/22/18 12:22     97 Nasal Cannula  


 


1/22/18 12:00  72      


 


1/22/18 11:00  72      


 


1/22/18 11:00 98.3 77 16 147/74 (98) 97   


 


1/22/18 10:00  72      


 


1/22/18 09:00  80      


 


1/22/18 08:23     96 Room Air  


 


1/22/18 08:00  78      


 


1/22/18 07:55 98.1 88 15 180/88 (118) 96   














I/O      


 


 1/22/18 1/22/18 1/22/18 1/23/18 1/23/18 1/23/18





 07:00 15:00 23:00 07:00 15:00 23:00


 


Intake Total 480 ml 100 ml 930 ml   


 


Output Total 800 ml  450 ml   


 


Balance -320 ml 100 ml 480 ml   


 


      


 


Intake Oral 480 ml  480 ml   


 


IV Total  100 ml 450 ml   


 


Output Urine Total 800 ml  450 ml   








Physical Exam


GENERAL: Well-developed well-nourished.  In no acute distress.


NECK: No carotid bruits.  No JVD.  


CARDIOVASCULAR: Regular rate and rhythm.  No murmur appreciated.


RESPIRATORY: No accessory muscle use. Clear to auscultation. Breath sounds 

equal bilaterally. 


MUSCULOSKELETAL: No clubbing or cyanosis. No edema. 


NEUROLOGICAL: Awake and alert. Normal speech.


Laboratory





Laboratory Tests








Test


  1/22/18


11:30 1/23/18


04:39


 


White Blood Count 6.5 TH/MM3  6.2 TH/MM3 


 


Red Blood Count 3.11 MIL/MM3  2.88 MIL/MM3 


 


Hemoglobin 10.2 GM/DL  9.5 GM/DL 


 


Hematocrit 30.0 %  27.6 % 


 


Mean Corpuscular Volume 96.3 FL  95.8 FL 


 


Mean Corpuscular Hemoglobin 32.7 PG  33.0 PG 


 


Mean Corpuscular Hemoglobin


Concent 33.9 % 


  34.4 % 


 


 


Red Cell Distribution Width 14.7 %  14.9 % 


 


Platelet Count 340 TH/MM3  324 TH/MM3 


 


Mean Platelet Volume 7.5 FL  8.4 FL 


 


Neutrophils (%) (Auto) 74.9 %  64.8 % 


 


Lymphocytes (%) (Auto) 12.1 %  21.2 % 


 


Monocytes (%) (Auto) 8.6 %  9.2 % 


 


Eosinophils (%) (Auto) 3.6 %  4.0 % 


 


Basophils (%) (Auto) 0.8 %  0.8 % 


 


Neutrophils # (Auto) 4.9 TH/MM3  4.0 TH/MM3 


 


Lymphocytes # (Auto) 0.8 TH/MM3  1.3 TH/MM3 


 


Monocytes # (Auto) 0.6 TH/MM3  0.6 TH/MM3 


 


Eosinophils # (Auto) 0.2 TH/MM3  0.2 TH/MM3 


 


Basophils # (Auto) 0.1 TH/MM3  0.0 TH/MM3 


 


CBC Comment DIFF FINAL  DIFF FINAL 


 


Differential Comment    


 


Blood Urea Nitrogen 38 MG/DL  40 MG/DL 


 


Creatinine 3.05 MG/DL  2.95 MG/DL 


 


Random Glucose 269 MG/DL  180 MG/DL 


 


Calcium Level 8.9 MG/DL  8.4 MG/DL 


 


Sodium Level 137 MEQ/L  139 MEQ/L 


 


Potassium Level 4.7 MEQ/L  4.3 MEQ/L 


 


Chloride Level 106 MEQ/L  109 MEQ/L 


 


Carbon Dioxide Level 23.3 MEQ/L  23.3 MEQ/L 


 


Anion Gap 8 MEQ/L  7 MEQ/L 


 


Estimat Glomerular Filtration


Rate 20 ML/MIN 


  20 ML/MIN 


 








Imaging





Last Impressions








Small Bowel X-Ray 1/19/18 0000 Signed





Impressions: 





 Service Date/Time:  Friday, January 19, 2018 10:44 - CONCLUSION: Unremarkable 





 small bowel examination.     Boo Ricardo MD 


 


Chest X-Ray 1/18/18 0000 Signed





Impressions: 





 Service Date/Time:  Thursday, January 18, 2018 10:47 - CONCLUSION:  Improved 





 infiltrates in both lung bases.     Edgardo Mullins MD 








 (King Ervin)





Assessment and Plan


Problem List:  


(1) NSTEMI (non-ST elevated myocardial infarction)


ICD Codes:  I21.4 - Non-ST elevation (NSTEMI) myocardial infarction


(2) CHF (congestive heart failure)


ICD Codes:  I50.9 - Heart failure, unspecified


(3) Renal insufficiency


ICD Codes:  N28.9 - Renal insufficiency


Status:  Acute


(4) Anemia


ICD Codes:  D64.9 - Anemia, unspecified


Status:  Chronic


(5) DM (diabetes mellitus)


ICD Codes:  E11.9 - Type 2 diabetes mellitus without complications


(6) Renal insufficiency


ICD Codes:  N28.9 - Disorder of kidney and ureter, unspecified


(7) HTN (hypertension)


ICD Codes:  I10 - Hypertension


Status:  Chronic


Assessment and Plan


51 yo AAF with history of CAD, cardiac stent placed ~ 10 years ago, CKD IV, CHF 

and diabetes admitted for progressive SOB and chest pain. 





Normocytic anemia: Hemoglobin improved after transfusion.  Currently stable.  

GI signed off.





NSTEMI: C 1/22/18 showed severe 2 vessel native coronary artery disease 

involving the right coronary artery and mid left anterior descending.





Coronary artery disease: The patient is currently considering medical therapy 

versus high risk PCI versus consideration of bypass surgery.  Continue Imdur, 

aspirin, statin.





CKD IV: nephrology on board.


 


Dyspnea: multifactorial, CHF vs. PNA vs anemia.  Echocardiogram unremarkable.  

Continue antibiotics per primary team.


 (King Ervin)


Assessment and Plan


--------------------


monitor Cr


discussed with patient and daughter.


they are leaning toward PCI


will see Cr and Hb in am and make decision about attempted PCI Wed or Thurs.


risk of ARF discussed.


 (Edgardo Mercado MD)











King Ervin Jan 23, 2018 07:46


Edgardo Mercado MD Jan 23, 2018 13:18

## 2018-01-23 NOTE — HHI.NPPN
Subjective


General Problems:  Anemia


Renal Failure:  Chronic, Acute, Stage III, Stage IV


Interval History


20 cc IV contrast administered with cath yesterday, no intervention. She fell 

today, left leg pain. PT is at bedside. 


 (Migdalia Camejo)





Review of Systems


General


Constitutional:  Fatigue


 (Migdalia Camejo)





Respiratory


Respiratory Remarks


Mild SOB


 (Migdalia Camejo)





Cardiovascular


Cardiac:  Chest Pain


Cardiac Remarks


resolved


 (Migdalia Camejo)





Gastrointestinal


GI Remarks


no abdominal pain


 (Migdalia Camejo)





Musculoskeletal


MS:  Pain/Stiffness


 (Migdalia Camejo)





Objective Data


Data





Vital Signs








  Date Time  Temp Pulse Resp B/P (MAP) Pulse Ox O2 Delivery O2 Flow Rate FiO2


 


1/23/18 09:22   16     


 


1/23/18 08:04     98 Nasal Cannula 2.00 


 


1/23/18 07:00   16     


 


1/23/18 06:00  72      


 


1/23/18 05:00  70      


 


1/23/18 04:00  70      


 


1/23/18 03:00     100 Nasal Cannula 2.00 


 


1/23/18 03:00 98.3 68 14 142/71 (94) 100   


 


1/23/18 03:00  70      


 


1/23/18 02:00  68      


 


1/23/18 01:00  68      


 


1/23/18 00:00  72      


 


1/22/18 23:00     100 Nasal Cannula 2.00 


 


1/22/18 23:00 97.6 70 12 153/80 (104) 100   


 


1/22/18 23:00  71      


 


1/22/18 22:00  72      


 


1/22/18 21:00  74      


 


1/22/18 20:00  72      


 


1/22/18 20:00 98.3 75 16 163/89 (113) 100   


 


1/22/18 19:27     98   21


 


1/22/18 19:00     100 Nasal Cannula 2.00 


 


1/22/18 19:00  74      


 


1/22/18 18:00  78      


 


1/22/18 17:00  76      


 


1/22/18 16:30  73  157/71 (99) 98   


 


1/22/18 16:00  75  152/71 (98) 98   


 


1/22/18 16:00  74      


 


1/22/18 15:45  77 16 143/72 (95) 98   


 


1/22/18 15:20     98 Room Air  


 


1/22/18 15:15  78 16 155/74 (101) 98   


 


1/22/18 15:00  72      


 


1/22/18 15:00 97.6 78 16 163/76 (105) 97   


 


1/22/18 15:00  78 15 163/76 (105) 97   


 


1/22/18 14:51  76 16 159/71 (100) 97   


 


1/22/18 12:22     97 Nasal Cannula  


 


1/22/18 12:00  72      


 


1/22/18 11:00  72      


 


1/22/18 11:00 98.3 77 16 147/74 (98) 97   








 (Migdalia Camejo)


-:  


1/23/18 0439                                                                   

             1/23/18 0439








Physical Exam


General


Appearance:  Well Developed, No Acute Distress, Comfortable


 (Migdalia CamejoP)





Eyes


Eye Exam:  Pupils Equal


 (Migdalia CamejoP)





Throat


Throat Exam:  Oral Mucosa Pink & Moist


 (Migdalia CamejoP)





Pulmonary


Resp Exam:  Breath Sounds Equal, No Distress, Decreased Bases


 (Migdalia CamejoP)





Cardiology


CV Exam:  Regular, Normal Sinus Rhythm, Good Perfusion


 (Migdalia CamejoP)





Gastrointestinal/Abdomen


GI Exam:  Soft, Non-Tender, Bowel Sounds Present, Positive Bowel Movement


 (Migdalia Camejo BMichael ARNP)





Musculoskeletal


MS Exam:  Joints Intact, Normal Tone


 (Migdalia Camejo B. ARNP)





Integumentary


Skin Exam:  Clear, Warm, Dry, Intact


 (Migdalia CamejoP)





Extremeties


Extremities Exam:  No Edema


 (Migdalia CamejoP)





Neurologic


Neuro Exam:  Alert, Awake, Oriented, Speech Clear, Moving All Extremities


 (Migdalia CamejoP)





Psychiatric


Psych Exam:  Appropriate Responses


 (Migdalia Camejo)





Assessment/Plan


Discussed Condition With:  Patient


Assessment Summary:  LETI/Acute Renal Failure, Anemia of CKD, Hypertension, 

Diabetes Mellitus, CKD Stage III, CKD Stage IV


Problem List:  


(1) Renal insufficiency


ICD Codes:  N28.9 - Disorder of kidney and ureter, unspecified


Plan:  Baseline CKD 3-4, creatinine 2.1-2.5, GFR 24-30.


LETI due to NSTEMI


Renal function slightly better than yesterday


s/p cardiac cath, small amount of contrast


follow renal function daily  


Monitor urine output. She is non oliguric.


Avoid additional nephrotoxic agents. 


Await daily labs. 





Consider ACE-I or ARB when renal function improves. 





(2) NSTEMI (non-ST elevated myocardial infarction)


ICD Codes:  I21.4 - Non-ST elevation (NSTEMI) myocardial infarction


Plan:  Cardiology following, may attempt medical management


given ASA,off  heparin gtt





(3) DM (diabetes mellitus)


ICD Codes:  E11.9 - Type 2 diabetes mellitus without complications


Plan:  Insulin as needed, maintain glucose 140-180mg/dL. 


If NPO use D10 @ 15 ml/hr to prevent hyperkalemia 





(4) Anemia


ICD Codes:  D64.9 - Anemia, unspecified


Status:  Chronic


Plan:  Hb stable


no evidence of iron deficiency 








(5) CAD (coronary artery disease)


ICD Codes:  I25.10 - Atherosclerotic heart disease of native coronary artery 

without angina pectoris


Status:  Chronic


Plan:  Hx of stent in the past. New NSTEMI, see above 





(6) PNA (pneumonia)


ICD Codes:  J18.9 - Pneumonia, unspecified organism


Plan:  On Zithromax and rocephin 


Follow clinically 





(7) HTN (hypertension)


ICD Codes:  I10 - Hypertension


Status:  Chronic


Plan:  On Imdur, metoprolol, clonidine, and Norvasc


 (Migdalia Camejo)


Plan


patient was seen and examined. Agree with above assessment and plan. 


 (Kelton Whiteside MD)











Migdalia Camejo Jan 23, 2018 10:16


Kelton Whiteside MD Jan 23, 2018 11:10

## 2018-01-23 NOTE — HHI.PR
Subjective


Remarks


Follow-up for NSTEMI and acute renal failure


Patient denying any chest pain the moment.  She says intermittent.  Patient 

continues to feel lightheadedness when she gets up.


Otherwise she denies any shortness of breathing.  Her daughters at the bedside 

during interview.  She had a lot of questions about cardiac bypass and high 

risk PCI.





Objective


Vitals





Vital Signs








  Date Time  Temp Pulse Resp B/P (MAP) Pulse Ox O2 Delivery O2 Flow Rate FiO2


 


1/23/18 11:29     97 Room Air  


 


1/23/18 11:29 98.0 63 18 123/63 (83) 97   


 


1/23/18 10:00  66      


 


1/23/18 10:00   17     


 


1/23/18 09:22   16     


 


1/23/18 09:00  66      


 


1/23/18 08:04     98 Nasal Cannula 2.00 


 


1/23/18 08:00 97.9 72 17 170/86 (114) 99   


 


1/23/18 08:00  72      


 


1/23/18 08:00      Nasal Cannula 2.00 


 


1/23/18 07:00  74      


 


1/23/18 06:00  72      


 


1/23/18 05:00  70      


 


1/23/18 04:00  70      


 


1/23/18 03:00     100 Nasal Cannula 2.00 


 


1/23/18 03:00 98.3 68 14 142/71 (94) 100   


 


1/23/18 03:00  70      


 


1/23/18 02:00  68      


 


1/23/18 01:00  68      


 


1/23/18 00:00  72      


 


1/22/18 23:00     100 Nasal Cannula 2.00 


 


1/22/18 23:00 97.6 70 12 153/80 (104) 100   


 


1/22/18 23:00  71      


 


1/22/18 22:00  72      


 


1/22/18 21:00  74      


 


1/22/18 20:00  72      


 


1/22/18 20:00 98.3 75 16 163/89 (113) 100   


 


1/22/18 19:27     98   21


 


1/22/18 19:00     100 Nasal Cannula 2.00 


 


1/22/18 19:00  74      


 


1/22/18 18:00  78      


 


1/22/18 17:00  76      


 


1/22/18 16:30  73  157/71 (99) 98   


 


1/22/18 16:00  75  152/71 (98) 98   


 


1/22/18 16:00  74      


 


1/22/18 15:45  77 16 143/72 (95) 98   


 


1/22/18 15:20     98 Room Air  


 


1/22/18 15:15  78 16 155/74 (101) 98   


 


1/22/18 15:00  72      


 


1/22/18 15:00 97.6 78 16 163/76 (105) 97   


 


1/22/18 15:00  78 15 163/76 (105) 97   














I/O      


 


 1/22/18 1/22/18 1/22/18 1/23/18 1/23/18 1/23/18





 06:59 14:59 22:59 06:59 14:59 22:59


 


Intake Total 480 ml 100 ml 930 ml 480 ml  


 


Output Total 800 ml  450 ml 800 ml  


 


Balance -320 ml 100 ml 480 ml -320 ml  


 


      


 


Intake Oral 480 ml  480 ml 480 ml  


 


IV Total  100 ml 450 ml   


 


Output Urine Total 800 ml  450 ml 800 ml  








Result Diagram:  


1/23/18 0439 1/23/18 0439





Objective Remarks


GENERAL: in NAD


CARDIOVASCULAR: Regular rate and rhythm without murmurs, gallops, or rubs. 


RESPIRATORY: Breath sounds equal bilaterally. No accessory muscle use.


GASTROINTESTINAL: Abdomen soft, non-tender, nondistended. 


MUSCULOSKELETAL: No cyanosis, or edema.


Procedures


Panendoscopy 01/19/18


Medications and IVs





Current Medications


Aspirin (Aspirin Chew) 324 mg ONCE  ONCE PO  Last administered on 1/15/18at 01:

42;  Start 1/15/18 at 01:30;  Stop 1/15/18 at 01:31;  Status DC


Nitroglycerin (Nitroglycerin 2% Oint) 1 inch ONCE  ONCE TOP  Last administered 

on 1/15/18at 01:42;  Start 1/15/18 at 01:30;  Stop 1/15/18 at 01:31;  Status DC


Sodium Chloride (NS Flush) 2 ml UNSCH  PRN IVF FLUSH AFTER USING IV ACCESS Last 

administered on 1/15/18at 01:42;  Start 1/15/18 at 01:30;  Stop 1/18/18 at 09:58

;  Status DC


Nitroglycerin (Nitrostat Sl) 0.4 mg Q5M SL  Last administered on 1/15/18at 01:54

;  Start 1/15/18 at 01:30;  Stop 1/15/18 at 01:41;  Status DC


Heparin Sodium (Porcine) (Heparin Inj) 3,000 units ONCE  ONCE IV  Last 

administered on 1/15/18at 03:17;  Start 1/15/18 at 02:30;  Stop 1/15/18 at 02:31

;  Status DC


Heparin Sodium/ Dextrose 250 ml @ 7 mls/hr TITRATE  PRN IV Coagulation 

Management Last administered on 1/17/18at 11:11;  Start 1/15/18 at 02:30;  Stop 

1/17/18 at 23:52;  Status DC


Furosemide (Lasix Inj) 40 mg ONCE  ONCE IV PUSH  Last administered on 1/15/18at 

03:18;  Start 1/15/18 at 02:30;  Stop 1/15/18 at 02:31;  Status DC


Dextrose (D50w (Vial) Inj) 50 ml UNSCH  PRN IV PUSH HYPOGLYCEMIA-SEE COMMENTS;  

Start 1/15/18 at 04:00


Glucagon (Glucagon Inj) 1 mg UNSCH  PRN OTHER HYPOGLYCEMIA-SEE COMMENTS;  Start 

1/15/18 at 04:00


Insulin Aspart (NovoLOG SUPPLEMENTAL SCALE) 1 ACHS SLIDING  SCALE SQ  Last 

administered on 1/23/18at 08:12;  Start 1/15/18 at 08:00


Furosemide (Lasix Inj) 40 mg BID@09,18 IV PUSH  Last administered on 1/16/18at 

08:02;  Start 1/15/18 at 09:00;  Stop 1/16/18 at 17:09;  Status DC


Ceftriaxone Sodium 1000 mg/ Sodium Chloride 100 ml @  200 mls/hr Q24H IV  Last 

administered on 1/22/18at 05:13;  Start 1/15/18 at 05:00;  Stop 1/22/18 at 22:00

;  Status DC


Azithromycin 500 mg/Sodium Chloride 250 ml @  250 mls/hr Q24H IV  Last 

administered on 1/21/18at 04:01;  Start 1/15/18 at 04:00;  Stop 1/21/18 at 11:25

;  Status DC


Albuterol/ Ipratropium (Duoneb Neb) 1 ampule Q4HR NEB  PRN NEB SOB/WHEEZING 

Last administered on 1/18/18at 21:20;  Start 1/15/18 at 04:00


Sodium Chloride (NS Flush) 2 ml UNSCH  PRN IV FLUSH FLUSH AFTER USING IV ACCESS 

Last administered on 1/18/18at 00:12;  Start 1/15/18 at 04:00


Sodium Chloride (NS Flush) 2 ml BID IV FLUSH  Last administered on 1/23/18at 09:

21;  Start 1/15/18 at 09:00


Ondansetron HCl (Zofran Inj) 4 mg Q6H  PRN IVP NAUSEA OR VOMITING Last 

administered on 1/19/18at 16:49;  Start 1/15/18 at 04:00


Acetaminophen (Tylenol) 650 mg Q6H  PRN PO FEVER/PAIN SCALE 1 TO 2 Last 

administered on 1/23/18at 08:12;  Start 1/15/18 at 04:00


Acetaminophen/ Hydrocodone Bitart (Norco  5-325 Mg) 1 tab Q4H  PRN PO PAIN 

SCALE 3 TO 5 Last administered on 1/21/18at 20:44;  Start 1/15/18 at 04:00


Morphine Sulfate (Morphine Inj) 2 mg Q3H  PRN IV PUSH Pain 6-10 Last 

administered on 1/23/18at 09:20;  Start 1/15/18 at 04:00


Senna/Docusate Sodium (Theresa-Colace) 1 tab BID PO  Last administered on 1/23/ 18at 08:10;  Start 1/15/18 at 09:00


Magnesium Hydroxide (Milk Of Magnesia Liq) 30 ml Q12H  PRN PO Mild constipation 

Last administered on 1/23/18at 08:07;  Start 1/15/18 at 04:00


Sennosides (Senokot) 17.2 mg Q12H  PRN PO Moderate constipation;  Start 1/15/18 

at 04:00


Bisacodyl (Dulcolax Supp) 10 mg DAILY  PRN RECTAL SEVERE CONSITIPATION;  Start 1

/15/18 at 04:00


Lactulose (Lactulose Liq) 30 ml DAILY  PRN PO SEVERE CONSITIPATION;  Start 1/15/

18 at 04:00


Aspirin (Aspirin Chew) 81 mg DAILY CHEW  Last administered on 1/23/18at 08:11;  

Start 1/15/18 at 09:00;  Status Future hold


Folic Acid (Folate) 1 mg DAILY PO  Last administered on 1/23/18at 08:11;  Start 

1/15/18 at 09:00


Gabapentin (Neurontin) 300 mg HS PO  Last administered on 1/22/18at 21:07;  

Start 1/15/18 at 21:00


Insulin Detemir (Levemir Inj) 15 units HS SQ  Last administered on 1/22/18at 21:

00;  Start 1/15/18 at 21:00


Metoprolol Tartrate (Lopressor) 50 mg Q12HR PO  Last administered on 1/18/18at 

09:00;  Start 1/15/18 at 09:00;  Stop 1/18/18 at 09:42;  Status DC


Nifedipine (Procardia) 30 mg TID PO  Last administered on 1/18/18at 09:01;  

Start 1/15/18 at 09:00;  Stop 1/18/18 at 09:42;  Status DC


Pravastatin Sodium (Pravachol) 40 mg HS PO  Last administered on 1/22/18at 21:06

;  Start 1/15/18 at 21:00


Morphine Sulfate (Morphine Inj) 4 mg ONCE  ONCE IV PUSH  Last administered on 1/

15/18at 04:34;  Start 1/15/18 at 04:15;  Stop 1/15/18 at 04:19;  Status DC


Ondansetron HCl (Zofran Inj) 4 mg ONCE  ONCE IV PUSH  Last administered on 1/15/

18at 04:32;  Start 1/15/18 at 04:15;  Stop 1/15/18 at 04:19;  Status DC


Influenza Virus Vaccine (Flu (Quadrivalent) Vaccine Inj) 0.5 ml ONCE ONCE IM ;  

Start 1/16/18 at 10:00;  Stop 1/16/18 at 10:01;  Status DC


Isosorbide Mononitrate (Imdur) 120 mg DAILY@07 PO  Last administered on 1/23/ 18at 06:09;  Start 1/18/18 at 07:00


Sodium Chloride 1,000 ml @  50 mls/hr Q20H IV  Last administered on 1/17/18at 23

:45;  Start 1/17/18 at 23:45;  Status Future Hold


Metoprolol Tartrate (Lopressor) 100 mg Q12HR PO  Last administered on 1/23/18at 

08:08;  Start 1/18/18 at 21:00


Amlodipine Besylate (Norvasc) 5 mg DAILY PO  Last administered on 1/19/18at 08:

12;  Start 1/18/18 at 13:00;  Stop 1/19/18 at 12:46;  Status DC


Ferrous Sulfate (Ferrous Sulfate) 325 mg BID PO  Last administered on 1/23/18at 

08:11;  Start 1/18/18 at 10:00


Polyethylene Glycol/ Electrolytes (Colyte Liq) 4,000 ml ONCE  ONCE PO  Last 

administered on 1/18/18at 16:04;  Start 1/18/18 at 16:00;  Stop 1/18/18 at 16:01

;  Status DC


Pantoprazole Sodium (Protonix Inj) 40 mg Q24H IV PUSH  Last administered on 1/21 /18at 08:38;  Start 1/19/18 at 09:00;  Stop 1/21/18 at 11:16;  Status DC


Furosemide (Lasix Inj) 40 mg ONCE  ONCE IV PUSH  Last administered on 1/18/18at 

23:07;  Start 1/18/18 at 22:15;  Stop 1/18/18 at 22:16;  Status DC


Nitroglycerin (Nitroglycerin 2% Oint) 1 inch Q6HR TOPICAL  Last administered on 

1/23/18at 14:32;  Start 1/19/18 at 01:40


Morphine Sulfate (Morphine Inj) 2 mg Q3H  PRN IV PUSH pain not relieved by ntg 

Last administered on 1/20/18at 18:41;  Start 1/19/18 at 01:30


Clonidine (Catapres) 0.1 mg ONCE  ONCE PO  Last administered on 1/19/18at 01:40

;  Start 1/19/18 at 01:30;  Stop 1/19/18 at 01:31;  Status DC


Magnesium Citrate (Citroma Liq) 300 ml ONCE  ONCE PO ;  Start 1/19/18 at 12:00;

  Stop 1/19/18 at 12:01;  Status DC


Magnesium Citrate (Citroma Liq) 300 ml ONCE  ONCE PO ;  Start 1/19/18 at 18:00;

  Stop 1/19/18 at 18:01;  Status DC


Bisacodyl (Dulcolax Ec) 10 mg DAILY@18,21 PO ;  Start 1/19/18 at 18:00;  Stop 1/ 19/18 at 21:03;  Status DC


Amlodipine Besylate (Norvasc) 10 mg DAILY PO  Last administered on 1/23/18at 08:

09;  Start 1/20/18 at 09:00


Clonidine (Catapres) 0.1 mg ONCE  ONCE PO  Last administered on 1/19/18at 15:26

;  Start 1/19/18 at 15:15;  Stop 1/19/18 at 15:16;  Status DC


Clonidine (Catapres) 0.1 mg Q12HR PO  Last administered on 1/21/18at 20:44;  

Start 1/19/18 at 21:00;  Stop 1/22/18 at 10:01;  Status DC


Clonidine (Catapres) 0.1 mg NOW  ONCE PO  Last administered on 1/19/18at 16:45;

  Start 1/19/18 at 16:45;  Stop 1/19/18 at 16:46;  Status DC


Clonidine (Catapres) 0.1 mg Q6H  PRN PO SBP>160, DBP>90 Last administered on 1/ 22/18at 03:53;  Start 1/20/18 at 19:15


Pantoprazole Sodium (Protonix) 40 mg DAILY PO  Last administered on 1/23/18at 08

:10;  Start 1/22/18 at 09:00


Azithromycin (Zithromax) 500 mg DAILY PO  Last administered on 1/23/18at 08:09;

  Start 1/22/18 at 09:00


Hydralazine HCl (Apresoline) 50 mg Q8HR PO  Last administered on 1/23/18at 14:34

;  Start 1/22/18 at 09:00


Sodium Chloride 1,000 ml @  100 mls/hr Q10H IV  Last administered on 1/22/18at 

10:44;  Start 1/22/18 at 08:54;  Stop 1/27/18 at 08:53


Clonidine (Catapres) 0.2 mg Q12HR PO  Last administered on 1/23/18at 08:09;  

Start 1/22/18 at 21:00


Sodium Chloride 1,000 ml @  75 mls/hr [id number] IV  Last administered on 1/22/ 18at 12:00;  Start 1/22/18 at 12:00;  Stop 1/22/18 at 22:30;  Status DC


Heparin Sodium/ Sodium Chloride 1,000 ml @  As Directed STK-MED ONCE .ROUTE ;  

Start 1/22/18 at 13:50;  Stop 1/22/18 at 13:51;  Status DC


Midazolam HCl (Versed Inj) 2 mg STK-MED ONCE .ROUTE ;  Start 1/22/18 at 13:50;  

Stop 1/22/18 at 13:51;  Status DC


Verapamil HCl (Isoptin Inj) 5 mg STK-MED ONCE .ROUTE ;  Start 1/22/18 at 13:50;

  Stop 1/22/18 at 13:51;  Status DC


Heparin Sodium (Porcine) (Heparin Inj) 10,000 units STK-MED ONCE .ROUTE ;  

Start 1/22/18 at 13:50;  Stop 1/22/18 at 13:51;  Status DC


Nitroglycerin 5 ml @ As Directed STK-MED ONCE .ROUTE ;  Start 1/22/18 at 13:50;

  Stop 1/22/18 at 13:51;  Status DC


Fentanyl Citrate (fentaNYL INJ) 100 mcg STK-MED ONCE .ROUTE ;  Start 1/22/18 at 

13:54;  Stop 1/22/18 at 13:55;  Status DC


Sodium Chloride 1,000 ml @  As Directed STK-MED ONCE IV ;  Start 1/19/18 at 12:

00;  Stop 1/22/18 at 13:55;  Status DC


Propofol (Diprivan  200 Mg/20 ml Inj) 400 mg STK-MED ONCE IV ;  Start 1/19/18 

at 12:00;  Stop 1/22/18 at 13:55;  Status DC


Lidocaine HCl (Xylocaine-Mpf 1% Inj) 5 ml STK-MED ONCE OTHER ;  Start 1/19/18 

at 12:00;  Stop 1/22/18 at 13:55;  Status DC


Miscellaneous Information 1 ONCE  ONCE XX ;  Start 1/22/18 at 14:45;  Stop 1/22/ 18 at 14:56;  Status DC


Bacitracin (Bacitracin Oint Packet) 0.9 gm ONCE  ONCE TOP ;  Start 1/22/18 at 14

:45;  Stop 1/22/18 at 14:54;  Status DC


Iohexol (OMNIPAQUE 350 INJ (Cath Lab)) 50 ml STK-MED ONCE OTHER ;  Start 1/22/ 18 at 15:18;  Stop 1/22/18 at 15:19;  Status DC





A/P


Problem List:  


(1) Sepsis


ICD Code:  A41.9 - Sepsis, unspecified organism


(2) PNA (pneumonia)


ICD Code:  J18.9 - Pneumonia, unspecified organism


(3) Hypoxia


ICD Code:  R09.02 - Hypoxemia


(4) NSTEMI (non-ST elevated myocardial infarction)


ICD Code:  I21.4 - Non-ST elevation (NSTEMI) myocardial infarction


(5) CHF (congestive heart failure)


ICD Code:  I50.9 - Heart failure, unspecified


(6) Renal insufficiency


ICD Code:  N28.9 - Disorder of kidney and ureter, unspecified


(7) DM (diabetes mellitus)


ICD Code:  E11.9 - Type 2 diabetes mellitus without complications


(8) GI bleed


ICD Code:  K92.2 - Gastrointestinal hemorrhage, unspecified


Assessment and Plan


52-year-old female with





GI bleed-resolved


Anemia of acute blood loss


   Status post transfused with 2 units of packed red blood cells.


   Appreciate input from GI, status post panendoscopy with finding of gastritis 

EGD and colon polyps on colonoscopy pending biopsy report


   No Evidence of iron deficiency anemia


   Unremarkable small bowel follow-through


   PPI and continue to monitor H&H


   GI signed off





Sepsis


   Resolved, continue with current antibiotics including Rocephin and 

azithromycin





PNA


   Rocephin and azithromycin discontinued on 1/22 and continues to do well off 

of antibiotics.


   DuoNeb when necessary


   Maintain oxygen saturation above 92%





Hypoxia-resolved


   Continue with treatment as in above





Acute on Chronic diastolic CHF


   Echo 8/18/17 w/ EF 55-60%


   Currently off Lasix secondary to worsening renal function


   Continue with Imdur/BB





NSTEMI:  


   Post cardiac catheterization on 1/22 showing severe two-vessel disease of 

the right coronary artery in the mid LAD.  Recommended high risk PCI versus 

cardiac bypass versus medical management.  At the moment patient is unsure.





Stage IV CKD


   Management per nephrology


   Avoid nephrotoxins.  Patient has good urine output.  Stable.


   Continue strict ins and outs 


    trend creatinine.





Hypertension


   Continue with Lopressor, Procardia, clonidine 2 times a day





DM:  


   Sliding scale w/ Accu-Cheks. stable. 





Tobacco Abuse: 


   Strongly recommended to stop smoking, no NicoDerm to avoid vasoconstriction.

  Ativan prn if needed


Discharge Planning


Patient is deciding on high risk PCI versus cardiac bypass versus medical 

management.











Yany Nathan MD Jan 23, 2018 15:02

## 2018-01-24 VITALS — HEART RATE: 68 BPM

## 2018-01-24 VITALS — HEART RATE: 74 BPM

## 2018-01-24 VITALS — HEART RATE: 76 BPM

## 2018-01-24 VITALS
RESPIRATION RATE: 16 BRPM | SYSTOLIC BLOOD PRESSURE: 121 MMHG | HEART RATE: 68 BPM | DIASTOLIC BLOOD PRESSURE: 68 MMHG | TEMPERATURE: 98.4 F | OXYGEN SATURATION: 98 %

## 2018-01-24 VITALS
HEART RATE: 76 BPM | SYSTOLIC BLOOD PRESSURE: 150 MMHG | TEMPERATURE: 98.8 F | OXYGEN SATURATION: 99 % | DIASTOLIC BLOOD PRESSURE: 79 MMHG | RESPIRATION RATE: 20 BRPM

## 2018-01-24 VITALS — HEART RATE: 72 BPM

## 2018-01-24 VITALS
TEMPERATURE: 98.2 F | SYSTOLIC BLOOD PRESSURE: 141 MMHG | HEART RATE: 76 BPM | RESPIRATION RATE: 18 BRPM | DIASTOLIC BLOOD PRESSURE: 70 MMHG | OXYGEN SATURATION: 100 %

## 2018-01-24 VITALS
HEART RATE: 73 BPM | SYSTOLIC BLOOD PRESSURE: 138 MMHG | OXYGEN SATURATION: 99 % | DIASTOLIC BLOOD PRESSURE: 72 MMHG | TEMPERATURE: 98.3 F | RESPIRATION RATE: 20 BRPM

## 2018-01-24 VITALS — HEART RATE: 77 BPM

## 2018-01-24 VITALS
OXYGEN SATURATION: 99 % | TEMPERATURE: 98.2 F | DIASTOLIC BLOOD PRESSURE: 75 MMHG | SYSTOLIC BLOOD PRESSURE: 141 MMHG | RESPIRATION RATE: 16 BRPM | HEART RATE: 75 BPM

## 2018-01-24 VITALS — HEART RATE: 70 BPM

## 2018-01-24 VITALS — OXYGEN SATURATION: 98 %

## 2018-01-24 VITALS
TEMPERATURE: 98.4 F | RESPIRATION RATE: 16 BRPM | SYSTOLIC BLOOD PRESSURE: 127 MMHG | DIASTOLIC BLOOD PRESSURE: 74 MMHG | OXYGEN SATURATION: 100 % | HEART RATE: 68 BPM

## 2018-01-24 VITALS — OXYGEN SATURATION: 99 %

## 2018-01-24 VITALS — HEART RATE: 80 BPM

## 2018-01-24 VITALS — HEART RATE: 78 BPM

## 2018-01-24 LAB
BUN SERPL-MCNC: 50 MG/DL (ref 7–18)
CALCIUM SERPL-MCNC: 9.1 MG/DL (ref 8.5–10.1)
CHLORIDE SERPL-SCNC: 103 MEQ/L (ref 98–107)
CREAT SERPL-MCNC: 4.11 MG/DL (ref 0.5–1)
ERYTHROCYTE [DISTWIDTH] IN BLOOD BY AUTOMATED COUNT: 15 % (ref 11.6–17.2)
GFR SERPLBLD BASED ON 1.73 SQ M-ARVRAT: 14 ML/MIN (ref 89–?)
GLUCOSE SERPL-MCNC: 157 MG/DL (ref 74–106)
HCO3 BLD-SCNC: 23 MEQ/L (ref 21–32)
HCT VFR BLD CALC: 27.6 % (ref 35–46)
HGB BLD-MCNC: 9.3 GM/DL (ref 11.6–15.3)
MCH RBC QN AUTO: 32.3 PG (ref 27–34)
MCHC RBC AUTO-ENTMCNC: 33.7 % (ref 32–36)
MCV RBC AUTO: 95.9 FL (ref 80–100)
PLATELET # BLD: 320 TH/MM3 (ref 150–450)
PMV BLD AUTO: 7.9 FL (ref 7–11)
RBC # BLD AUTO: 2.87 MIL/MM3 (ref 4–5.3)
SODIUM SERPL-SCNC: 137 MEQ/L (ref 136–145)
WBC # BLD AUTO: 8.2 TH/MM3 (ref 4–11)

## 2018-01-24 RX ADMIN — INSULIN ASPART SCH: 100 INJECTION, SOLUTION INTRAVENOUS; SUBCUTANEOUS at 12:59

## 2018-01-24 RX ADMIN — PRAVASTATIN SODIUM SCH MG: 40 TABLET ORAL at 21:52

## 2018-01-24 RX ADMIN — PANTOPRAZOLE SCH MG: 40 TABLET, DELAYED RELEASE ORAL at 08:49

## 2018-01-24 RX ADMIN — MORPHINE SULFATE PRN MG: 2 INJECTION, SOLUTION INTRAMUSCULAR; INTRAVENOUS at 08:51

## 2018-01-24 RX ADMIN — ISOSORBIDE MONONITRATE SCH MG: 60 TABLET, EXTENDED RELEASE ORAL at 05:59

## 2018-01-24 RX ADMIN — INSULIN ASPART SCH: 100 INJECTION, SOLUTION INTRAVENOUS; SUBCUTANEOUS at 18:11

## 2018-01-24 RX ADMIN — Medication SCH ML: at 08:47

## 2018-01-24 RX ADMIN — INSULIN ASPART SCH: 100 INJECTION, SOLUTION INTRAVENOUS; SUBCUTANEOUS at 21:00

## 2018-01-24 RX ADMIN — NITROGLYCERIN SCH INCH: 20 OINTMENT TOPICAL at 05:59

## 2018-01-24 RX ADMIN — NITROGLYCERIN SCH INCH: 20 OINTMENT TOPICAL at 18:11

## 2018-01-24 RX ADMIN — Medication SCH ML: at 20:12

## 2018-01-24 RX ADMIN — NITROGLYCERIN SCH INCH: 20 OINTMENT TOPICAL at 13:00

## 2018-01-24 RX ADMIN — MORPHINE SULFATE PRN MG: 2 INJECTION, SOLUTION INTRAMUSCULAR; INTRAVENOUS at 20:21

## 2018-01-24 RX ADMIN — STANDARDIZED SENNA CONCENTRATE AND DOCUSATE SODIUM SCH TAB: 8.6; 5 TABLET, FILM COATED ORAL at 21:53

## 2018-01-24 RX ADMIN — ASPIRIN 81 MG SCH MG: 81 TABLET ORAL at 08:50

## 2018-01-24 RX ADMIN — STANDARDIZED SENNA CONCENTRATE AND DOCUSATE SODIUM SCH TAB: 8.6; 5 TABLET, FILM COATED ORAL at 08:49

## 2018-01-24 RX ADMIN — AZITHROMYCIN SCH MG: 250 TABLET, FILM COATED ORAL at 08:47

## 2018-01-24 RX ADMIN — PHENYTOIN SODIUM SCH MLS/HR: 50 INJECTION INTRAMUSCULAR; INTRAVENOUS at 20:08

## 2018-01-24 RX ADMIN — METOPROLOL TARTRATE SCH MG: 100 TABLET, FILM COATED ORAL at 21:53

## 2018-01-24 RX ADMIN — ACYCLOVIR SCH UNITS: 800 TABLET ORAL at 22:11

## 2018-01-24 RX ADMIN — METOPROLOL TARTRATE SCH MG: 100 TABLET, FILM COATED ORAL at 08:49

## 2018-01-24 RX ADMIN — FERROUS SULFATE TAB 325 MG (65 MG ELEMENTAL FE) SCH MG: 325 (65 FE) TAB at 21:52

## 2018-01-24 RX ADMIN — GABAPENTIN SCH MG: 300 CAPSULE ORAL at 21:53

## 2018-01-24 RX ADMIN — INSULIN ASPART SCH: 100 INJECTION, SOLUTION INTRAVENOUS; SUBCUTANEOUS at 08:00

## 2018-01-24 RX ADMIN — MORPHINE SULFATE PRN MG: 2 INJECTION, SOLUTION INTRAMUSCULAR; INTRAVENOUS at 23:18

## 2018-01-24 RX ADMIN — WATER PRN ML: 1 IRRIGANT IRRIGATION at 08:46

## 2018-01-24 RX ADMIN — FOLIC ACID SCH MG: 1 TABLET ORAL at 08:49

## 2018-01-24 RX ADMIN — FERROUS SULFATE TAB 325 MG (65 MG ELEMENTAL FE) SCH MG: 325 (65 FE) TAB at 08:48

## 2018-01-24 RX ADMIN — PHENYTOIN SODIUM SCH MLS/HR: 50 INJECTION INTRAMUSCULAR; INTRAVENOUS at 00:52

## 2018-01-24 NOTE — HHI.NPPN
Subjective


General Problems:  Anemia


Renal Failure:  Chronic, Acute, Stage III, Stage IV


Interval History


Labs are in process. [facility] scheduled for tomorrow AM. 


 (Migdalia Camejo)





Review of Systems


General


Constitutional:  Fatigue


 (Migdalia Camejo)





Respiratory


Respiratory Remarks


Mild SOB


 (Migdalia Camejo)





Cardiovascular


Cardiac:  Chest Pain


Cardiac Remarks


resolved


 (Migdalia Camejo)





Gastrointestinal


GI Remarks


no abdominal pain


 (Migdalia Camejo)





Musculoskeletal


MS:  Pain/Stiffness


 (Migdalia Camejo)





Objective Data


Data





Vital Signs








  Date Time  Temp Pulse Resp B/P (MAP) Pulse Ox O2 Delivery O2 Flow Rate FiO2


 


1/24/18 08:39     99 Nasal Cannula 2.00 


 


1/24/18 08:39 98.2 75 16 141/75 (97) 99   


 


1/24/18 06:00  77      


 


1/24/18 05:00  74      


 


1/24/18 04:00 98.8 76 20 150/79 (102) 99   


 


1/24/18 04:00  72      


 


1/24/18 04:00      Room Air  


 


1/24/18 03:00  72      


 


1/24/18 02:00  72      


 


1/24/18 01:00  70      


 


1/24/18 00:00 98.3 73 20 138/72 (94) 99   


 


1/24/18 00:00  73      


 


1/24/18 00:00      Room Air  


 


1/23/18 23:00  72      


 


1/23/18 22:00  74      


 


1/23/18 21:00  74      


 


1/23/18 20:00  73      


 


1/23/18 20:00      Room Air  


 


1/23/18 20:00 98.0 72 20 136/70 (92) 100   


 


1/23/18 19:00  78      


 


1/23/18 18:00  72      


 


1/23/18 17:00  68      


 


1/23/18 16:00  66      


 


1/23/18 15:00 97.9 65 18 140/74 (96) 99   


 


1/23/18 15:00  68      


 


1/23/18 15:00      Room Air  


 


1/23/18 14:00  64      


 


1/23/18 13:00  64      


 


1/23/18 12:00  62      


 


1/23/18 11:29     97 Room Air  


 


1/23/18 11:29 98.0 63 18 123/63 (83) 97   


 


1/23/18 11:00  62      


 


1/23/18 10:00  66      


 


1/23/18 10:00   17     


 


1/23/18 09:22   16     








 (Migdalia Camejo)


-:  


1/24/18 0500                                                                   

             1/23/18 0439








Physical Exam


General


Appearance:  Well Developed, No Acute Distress, Comfortable


 (Migdalia Camejo)





Eyes


Eye Exam:  Pupils Equal


 (Migdalia Camejo)





Throat


Throat Exam:  Oral Mucosa Pink & Moist


 (Migdalia Camejo)





Pulmonary


Resp Exam:  Breath Sounds Equal, No Distress, Decreased Bases


 (Migdalia Camejo)





Cardiology


CV Exam:  Regular, Normal Sinus Rhythm, Good Perfusion


 (Migdalia Camejo)





Gastrointestinal/Abdomen


GI Exam:  Soft, Non-Tender, Bowel Sounds Present, Positive Bowel Movement


 (Migdalia Camejo)





Musculoskeletal


MS Exam:  Joints Intact, Normal Tone


 (Migdalia Camejo)





Integumentary


Skin Exam:  Clear, Warm, Dry, Intact


 (Migdalia Camejo)





Extremeties


Extremities Exam:  No Edema


 (Migdalia Camejo)





Neurologic


Neuro Exam:  Alert, Awake, Oriented, Speech Clear, Moving All Extremities


 (Migdalia Camejo)





Psychiatric


Psych Exam:  Appropriate Responses


 (Migdalia Camejo)





Assessment/Plan


Discussed Condition With:  Patient


Assessment Summary:  LETI/Acute Renal Failure, Anemia of CKD, Hypertension, 

Diabetes Mellitus, CKD Stage III, CKD Stage IV


Problem List:  


(1) Renal insufficiency


ICD Codes:  N28.9 - Disorder of kidney and ureter, unspecified


Plan:  Baseline CKD 3-4, creatinine 2.1-2.5, GFR 24-30.


LETI due to NSTEMI


Renal panel is in process 


Due for cardiac cath in AM. IVF (0.9% NS to be started tonight)


Repeat labs tomorrow. 


Monitor urine output. She is non oliguric.


Avoid additional nephrotoxic agents. 





Consider ACE-I or ARB when renal function improves. 





(2) NSTEMI (non-ST elevated myocardial infarction)


ICD Codes:  I21.4 - Non-ST elevation (NSTEMI) myocardial infarction


Plan:  Cardiology following, [facility] in AM.


given ASA,off  heparin gtt





(3) DM (diabetes mellitus)


ICD Codes:  E11.9 - Type 2 diabetes mellitus without complications


Plan:  Insulin as needed, maintain glucose 140-180mg/dL. 


If NPO use D10 @ 15 ml/hr to prevent hyperkalemia 





(4) Anemia


ICD Codes:  D64.9 - Anemia, unspecified


Status:  Chronic


Plan:  Hb stable


no evidence of iron deficiency 








(5) CAD (coronary artery disease)


ICD Codes:  I25.10 - Atherosclerotic heart disease of native coronary artery 

without angina pectoris


Status:  Chronic


Plan:  Hx of stent in the past. New NSTEMI, see above 





(6) PNA (pneumonia)


ICD Codes:  J18.9 - Pneumonia, unspecified organism


Plan:  On Zithromax 


Follow clinically 





(7) HTN (hypertension)


ICD Codes:  I10 - Hypertension


Status:  Chronic


Plan:  On Imdur, metoprolol, clonidine, and Norvasc


 (Migdalia Camejo)


Plan


patient was seen and examined in the morning. Her labs were reviewed in the 

evening. GFR has declined significantly. She is at high risk for contrast 

nephropathy. On IVF. Likely will need dialysis soon. 


 (Kelton Whiteside MD)











Migdalia Camejo Jan 24, 2018 09:10


Kelton Whiteside MD Jan 24, 2018 20:03

## 2018-01-24 NOTE — PD.CARD.PN
Subjective


Subjective Remarks


Still having chest pain with associated shortness of breath, unchanged.  Denies 

palpitations.  Would like to proceed with PCI.


 (King Ervin)





Objective


Medications





Current Medications








 Medications


  (Trade)  Dose


 Ordered  Sig/Luis Armando


 Route  Start Time


 Stop Time Status Last Admin


 


  (D50w (Vial) Inj)  50 ml  UNSCH  PRN


 IV PUSH  1/15/18 04:00


     


 


 


  (Glucagon Inj)  1 mg  UNSCH  PRN


 OTHER  1/15/18 04:00


     


 


 


  (NovoLOG


 SUPPLEMENTAL


 SCALE)  1  ACHS SLIDING  SCALE


 SQ  1/15/18 08:00


    1/23/18 20:31


 


 


  (Duoneb Neb)  1 ampule  Q4HR NEB  PRN


 NEB  1/15/18 04:00


    1/18/18 21:20


 


 


  (NS Flush)  2 ml  UNSCH  PRN


 IV FLUSH  1/15/18 04:00


    1/23/18 18:08


 


 


  (NS Flush)  2 ml  BID


 IV FLUSH  1/15/18 09:00


    1/23/18 20:33


 


 


  (Zofran Inj)  4 mg  Q6H  PRN


 IVP  1/15/18 04:00


    1/19/18 16:49


 


 


  (Tylenol)  650 mg  Q6H  PRN


 PO  1/15/18 04:00


    1/23/18 08:12


 


 


  (Norco  5-325 Mg)  1 tab  Q4H  PRN


 PO  1/15/18 04:00


    1/21/18 20:44


 


 


  (Morphine Inj)  2 mg  Q3H  PRN


 IV PUSH  1/15/18 04:00


    1/23/18 23:32


 


 


  (Theresa-Colace)  1 tab  BID


 PO  1/15/18 09:00


    1/23/18 20:33


 


 


  (Milk Of


 Magnesia Liq)  30 ml  Q12H  PRN


 PO  1/15/18 04:00


    1/23/18 20:37


 


 


  (Senokot)  17.2 mg  Q12H  PRN


 PO  1/15/18 04:00


     


 


 


  (Dulcolax Supp)  10 mg  DAILY  PRN


 RECTAL  1/15/18 04:00


     


 


 


  (Lactulose Liq)  30 ml  DAILY  PRN


 PO  1/15/18 04:00


     


 


 


  (Aspirin Chew)  81 mg  DAILY


 CHEW  1/15/18 09:00


   Future hold 1/23/18 08:11


 


 


  (Folate)  1 mg  DAILY


 PO  1/15/18 09:00


    1/23/18 08:11


 


 


  (Neurontin)  300 mg  HS


 PO  1/15/18 21:00


    1/23/18 20:32


 


 


  (Levemir Inj)  15 units  HS


 SQ  1/15/18 21:00


    1/23/18 20:30


 


 


  (Pravachol)  40 mg  HS


 PO  1/15/18 21:00


    1/23/18 20:33


 


 


  (Imdur)  120 mg  DAILY@07


 PO  1/18/18 07:00


    1/24/18 05:59


 


 


 Sodium Chloride  1,000 ml @ 


 50 mls/hr  Q20H


 IV  1/17/18 23:45


   Future Hold 1/17/18 23:45


 


 


  (Lopressor)  100 mg  Q12HR


 PO  1/18/18 21:00


    1/23/18 20:32


 


 


  (Ferrous Sulfate)  325 mg  BID


 PO  1/18/18 10:00


    1/23/18 20:33


 


 


  (Nitroglycerin


 2% Oint)  1 inch  Q6HR


 TOPICAL  1/19/18 01:40


    1/24/18 05:59


 


 


  (Morphine Inj)  2 mg  Q3H  PRN


 IV PUSH  1/19/18 01:30


    1/20/18 18:41


 


 


  (Norvasc)  10 mg  DAILY


 PO  1/20/18 09:00


    1/23/18 08:09


 


 


  (Catapres)  0.1 mg  Q6H  PRN


 PO  1/20/18 19:15


    1/22/18 03:53


 


 


  (Protonix)  40 mg  DAILY


 PO  1/22/18 09:00


    1/23/18 08:10


 


 


  (Zithromax)  500 mg  DAILY


 PO  1/22/18 09:00


    1/23/18 08:09


 


 


  (Apresoline)  50 mg  Q8HR


 PO  1/22/18 09:00


    1/24/18 06:00


 


 


 Sodium Chloride  1,000 ml @ 


 100 mls/hr  Q10H


 IV  1/22/18 08:54


 1/27/18 08:53  1/22/18 10:44


 


 


  (Catapres)  0.2 mg  Q12HR


 PO  1/22/18 21:00


    1/23/18 20:32


 








Vital Signs / I&O





Vital Signs








  Date Time  Temp Pulse Resp B/P (MAP) Pulse Ox O2 Delivery O2 Flow Rate FiO2


 


1/24/18 06:00  77      


 


1/24/18 05:00  74      


 


1/24/18 04:00 98.8 76 20 150/79 (102) 99   


 


1/24/18 04:00  72      


 


1/24/18 04:00      Room Air  


 


1/24/18 03:00  72      


 


1/24/18 02:00  72      


 


1/24/18 01:00  70      


 


1/24/18 00:00 98.3 73 20 138/72 (94) 99   


 


1/24/18 00:00  73      


 


1/24/18 00:00      Room Air  


 


1/23/18 23:00  72      


 


1/23/18 22:00  74      


 


1/23/18 21:00  74      


 


1/23/18 20:00  73      


 


1/23/18 20:00      Room Air  


 


1/23/18 20:00 98.0 72 20 136/70 (92) 100   


 


1/23/18 19:00  78      


 


1/23/18 18:00  72      


 


1/23/18 17:00  68      


 


1/23/18 16:00  66      


 


1/23/18 15:00 97.9 65 18 140/74 (96) 99   


 


1/23/18 15:00  68      


 


1/23/18 15:00      Room Air  


 


1/23/18 14:00  64      


 


1/23/18 13:00  64      


 


1/23/18 12:00  62      


 


1/23/18 11:29     97 Room Air  


 


1/23/18 11:29 98.0 63 18 123/63 (83) 97   


 


1/23/18 11:00  62      


 


1/23/18 10:00  66      


 


1/23/18 10:00   17     


 


1/23/18 09:22   16     


 


1/23/18 09:00  66      


 


1/23/18 08:04     98 Nasal Cannula 2.00 


 


1/23/18 08:00 97.9 72 17 170/86 (114) 99   


 


1/23/18 08:00  72      


 


1/23/18 08:00      Nasal Cannula 2.00 














I/O      


 


 1/23/18 1/23/18 1/23/18 1/24/18 1/24/18 1/24/18





 07:00 15:00 23:00 07:00 15:00 23:00


 


Intake Total 480 ml  910 ml 240 ml  


 


Output Total 800 ml  400 ml 300 ml  


 


Balance -320 ml  510 ml -60 ml  


 


      


 


Intake Oral 480 ml  910 ml 240 ml  


 


Output Urine Total 800 ml  400 ml 300 ml  


 


# Voids  1 1   


 


# Bowel Movements  0 0   








Physical Exam


GENERAL: Well-developed well-nourished.  In no acute distress.


NECK: No carotid bruits.  No JVD.  


CARDIOVASCULAR: Regular rate and rhythm.  No murmur appreciated.


RESPIRATORY: No accessory muscle use. Clear to auscultation. Breath sounds 

equal bilaterally. 


MUSCULOSKELETAL: No clubbing or cyanosis. No edema. 


NEUROLOGICAL: Awake and alert. Normal speech.


Laboratory





Laboratory Tests








Test


  1/24/18


05:00


 


White Blood Count 8.2 TH/MM3 


 


Red Blood Count 2.87 MIL/MM3 


 


Hemoglobin 9.3 GM/DL 


 


Hematocrit 27.6 % 


 


Mean Corpuscular Volume 95.9 FL 


 


Mean Corpuscular Hemoglobin 32.3 PG 


 


Mean Corpuscular Hemoglobin


Concent 33.7 % 


 


 


Red Cell Distribution Width 15.0 % 


 


Platelet Count 320 TH/MM3 


 


Mean Platelet Volume 7.9 FL 








Imaging





Last Impressions








Small Bowel X-Ray 1/19/18 0000 Signed





Impressions: 





 Service Date/Time:  Friday, January 19, 2018 10:44 - CONCLUSION: Unremarkable 





 small bowel examination.     Boo Ricardo MD 


 


Chest X-Ray 1/18/18 0000 Signed





Impressions: 





 Service Date/Time:  Thursday, January 18, 2018 10:47 - CONCLUSION:  Improved 





 infiltrates in both lung bases.     Edgardo Mullins MD 








 (King Ervin)





Assessment and Plan


Problem List:  


(1) NSTEMI (non-ST elevated myocardial infarction)


ICD Codes:  I21.4 - Non-ST elevation (NSTEMI) myocardial infarction


(2) CHF (congestive heart failure)


ICD Codes:  I50.9 - Heart failure, unspecified


(3) Renal insufficiency


ICD Codes:  N28.9 - Renal insufficiency


Status:  Acute


(4) Anemia


ICD Codes:  D64.9 - Anemia, unspecified


Status:  Chronic


(5) DM (diabetes mellitus)


ICD Codes:  E11.9 - Type 2 diabetes mellitus without complications


(6) Renal insufficiency


ICD Codes:  N28.9 - Disorder of kidney and ureter, unspecified


(7) HTN (hypertension)


ICD Codes:  I10 - Hypertension


Status:  Chronic


Assessment and Plan


53 yo AAF with history of CAD, cardiac stent placed ~ 10 years ago, CKD IV, CHF 

and diabetes admitted for progressive SOB and chest pain. 





Normocytic anemia: Hemoglobin improved after transfusion.  Currently stable.  

GI signed off.





NSTEMI: LHC 1/22/18 showed severe 2 vessel native coronary artery disease 

involving the right coronary artery and mid left anterior descending.





Coronary artery disease: The patient wants to proceed with high risk PCI, we'll 

plan for Thursday.  Continue Imdur, aspirin, statin.





CKD IV: nephrology on board.  Monitor creatinine.


 


Dyspnea: multifactorial, CHF vs. PNA vs anemia.  Echocardiogram unremarkable.  

Continue antibiotics per primary team.


 (King Ervin)


Assessment and Plan


---------------


patient would like PCI attempt


R/B/A discussed


start plavix today.  to see if any bleeding.


minimize contrast as much as possible.  RCA culprit


LAD will be staged


NPO p MN


 (Edgardo Mercado MD)











King Ervin Jan 24, 2018 07:39


Edgardo Mercado MD Jan 24, 2018 13:07

## 2018-01-24 NOTE — HHI.PR
Subjective


Remarks


Follow-up for chest pain


Patient continues to complain about chest pain.  Chest pain seems to be more 

when patient tries to get up and ambulate.


She also continues to have lightheadedness.


Otherwise no other complaints.





Objective


Vitals





Vital Signs








  Date Time  Temp Pulse Resp B/P (MAP) Pulse Ox O2 Delivery O2 Flow Rate FiO2


 


1/24/18 15:27 98.4 68 16 127/74 (91) 100   


 


1/24/18 15:27      Nasal Cannula 2.00 


 


1/24/18 13:50     98 Nasal Cannula  


 


1/24/18 11:00 98.4 70 16 121/68 (85) 98   


 


1/24/18 11:00     98 Room Air  


 


1/24/18 09:00   17     


 


1/24/18 08:39     99 Nasal Cannula 2.00 


 


1/24/18 08:39 98.2 75 16 141/75 (97) 99   


 


1/24/18 07:00  76      


 


1/24/18 06:00  77      


 


1/24/18 05:00  74      


 


1/24/18 04:00 98.8 76 20 150/79 (102) 99   


 


1/24/18 04:00  72      


 


1/24/18 04:00      Room Air  


 


1/24/18 03:00  72      


 


1/24/18 02:00  72      


 


1/24/18 01:00  70      


 


1/24/18 00:00 98.3 73 20 138/72 (94) 99   


 


1/24/18 00:00  73      


 


1/24/18 00:00      Room Air  


 


1/23/18 23:00  72      


 


1/23/18 22:00  74      


 


1/23/18 21:00  74      


 


1/23/18 20:00  73      


 


1/23/18 20:00      Room Air  


 


1/23/18 20:00 98.0 72 20 136/70 (92) 100   


 


1/23/18 19:00  78      


 


1/23/18 18:00  72      


 


1/23/18 17:00  68      


 


1/23/18 16:00  66      














I/O      


 


 1/23/18 1/23/18 1/23/18 1/24/18 1/24/18 1/24/18





 07:00 15:00 23:00 07:00 15:00 23:00


 


Intake Total 480 ml  910 ml 240 ml  


 


Output Total 800 ml  400 ml 300 ml  


 


Balance -320 ml  510 ml -60 ml  


 


      


 


Intake Oral 480 ml  910 ml 240 ml  


 


Output Urine Total 800 ml  400 ml 300 ml  


 


# Voids  1 1   


 


# Bowel Movements  0 0   








Result Diagram:  


1/24/18 0500                                                                   

             1/24/18 1215





Imaging





Last Impressions








Small Bowel X-Ray 1/19/18 0000 Signed





Impressions: 





 Service Date/Time:  Friday, January 19, 2018 10:44 - CONCLUSION: Unremarkable 





 small bowel examination.     Boo Ricardo MD 


 


Chest X-Ray 1/18/18 0000 Signed





Impressions: 





 Service Date/Time:  Thursday, January 18, 2018 10:47 - CONCLUSION:  Improved 





 infiltrates in both lung bases.     Edgardo Mullins MD 








Objective Remarks


GENERAL: in NAD


CARDIOVASCULAR: Regular rate and rhythm without murmurs, gallops, or rubs.  

Chest pain reproducible with palpation of the chest wall.


RESPIRATORY: Breath sounds equal bilaterally. No accessory muscle use.


GASTROINTESTINAL: Abdomen soft, non-tender, nondistended. 


MUSCULOSKELETAL: No cyanosis, or edema.


Procedures


Panendoscopy 01/19/18


Medications and IVs





Current Medications


Aspirin (Aspirin Chew) 324 mg ONCE  ONCE PO  Last administered on 1/15/18at 01:

42;  Start 1/15/18 at 01:30;  Stop 1/15/18 at 01:31;  Status DC


Nitroglycerin (Nitroglycerin 2% Oint) 1 inch ONCE  ONCE TOP  Last administered 

on 1/15/18at 01:42;  Start 1/15/18 at 01:30;  Stop 1/15/18 at 01:31;  Status DC


Sodium Chloride (NS Flush) 2 ml UNSCH  PRN IVF FLUSH AFTER USING IV ACCESS Last 

administered on 1/15/18at 01:42;  Start 1/15/18 at 01:30;  Stop 1/18/18 at 09:58

;  Status DC


Nitroglycerin (Nitrostat Sl) 0.4 mg Q5M SL  Last administered on 1/15/18at 01:54

;  Start 1/15/18 at 01:30;  Stop 1/15/18 at 01:41;  Status DC


Heparin Sodium (Porcine) (Heparin Inj) 3,000 units ONCE  ONCE IV  Last 

administered on 1/15/18at 03:17;  Start 1/15/18 at 02:30;  Stop 1/15/18 at 02:31

;  Status DC


Heparin Sodium/ Dextrose 250 ml @ 7 mls/hr TITRATE  PRN IV Coagulation 

Management Last administered on 1/17/18at 11:11;  Start 1/15/18 at 02:30;  Stop 

1/17/18 at 23:52;  Status DC


Furosemide (Lasix Inj) 40 mg ONCE  ONCE IV PUSH  Last administered on 1/15/18at 

03:18;  Start 1/15/18 at 02:30;  Stop 1/15/18 at 02:31;  Status DC


Dextrose (D50w (Vial) Inj) 50 ml UNSCH  PRN IV PUSH HYPOGLYCEMIA-SEE COMMENTS;  

Start 1/15/18 at 04:00


Glucagon (Glucagon Inj) 1 mg UNSCH  PRN OTHER HYPOGLYCEMIA-SEE COMMENTS;  Start 

1/15/18 at 04:00


Insulin Aspart (NovoLOG SUPPLEMENTAL SCALE) 1 ACHS SLIDING  SCALE SQ  Last 

administered on 1/24/18at 12:59;  Start 1/15/18 at 08:00


Furosemide (Lasix Inj) 40 mg BID@09,18 IV PUSH  Last administered on 1/16/18at 

08:02;  Start 1/15/18 at 09:00;  Stop 1/16/18 at 17:09;  Status DC


Ceftriaxone Sodium 1000 mg/ Sodium Chloride 100 ml @  200 mls/hr Q24H IV  Last 

administered on 1/22/18at 05:13;  Start 1/15/18 at 05:00;  Stop 1/22/18 at 22:00

;  Status DC


Azithromycin 500 mg/Sodium Chloride 250 ml @  250 mls/hr Q24H IV  Last 

administered on 1/21/18at 04:01;  Start 1/15/18 at 04:00;  Stop 1/21/18 at 11:25

;  Status DC


Albuterol/ Ipratropium (Duoneb Neb) 1 ampule Q4HR NEB  PRN NEB SOB/WHEEZING 

Last administered on 1/18/18at 21:20;  Start 1/15/18 at 04:00


Sodium Chloride (NS Flush) 2 ml UNSCH  PRN IV FLUSH FLUSH AFTER USING IV ACCESS 

Last administered on 1/23/18at 18:08;  Start 1/15/18 at 04:00


Sodium Chloride (NS Flush) 2 ml BID IV FLUSH  Last administered on 1/24/18at 08:

47;  Start 1/15/18 at 09:00


Ondansetron HCl (Zofran Inj) 4 mg Q6H  PRN IVP NAUSEA OR VOMITING Last 

administered on 1/19/18at 16:49;  Start 1/15/18 at 04:00


Acetaminophen (Tylenol) 650 mg Q6H  PRN PO FEVER/PAIN SCALE 1 TO 2 Last 

administered on 1/23/18at 08:12;  Start 1/15/18 at 04:00


Acetaminophen/ Hydrocodone Bitart (Norco  5-325 Mg) 1 tab Q4H  PRN PO PAIN 

SCALE 3 TO 5 Last administered on 1/21/18at 20:44;  Start 1/15/18 at 04:00


Morphine Sulfate (Morphine Inj) 2 mg Q3H  PRN IV PUSH Pain 6-10 Last 

administered on 1/24/18at 08:51;  Start 1/15/18 at 04:00


Senna/Docusate Sodium (Theresa-Colace) 1 tab BID PO  Last administered on 1/24/ 18at 08:49;  Start 1/15/18 at 09:00


Magnesium Hydroxide (Milk Of Magnesia Liq) 30 ml Q12H  PRN PO Mild constipation 

Last administered on 1/23/18at 20:37;  Start 1/15/18 at 04:00


Sennosides (Senokot) 17.2 mg Q12H  PRN PO Moderate constipation;  Start 1/15/18 

at 04:00


Bisacodyl (Dulcolax Supp) 10 mg DAILY  PRN RECTAL SEVERE CONSITIPATION;  Start 1

/15/18 at 04:00


Lactulose (Lactulose Liq) 30 ml DAILY  PRN PO SEVERE CONSITIPATION Last 

administered on 1/24/18at 08:46;  Start 1/15/18 at 04:00


Aspirin (Aspirin Chew) 81 mg DAILY CHEW  Last administered on 1/24/18at 08:50;  

Start 1/15/18 at 09:00;  Status Future hold


Folic Acid (Folate) 1 mg DAILY PO  Last administered on 1/24/18at 08:49;  Start 

1/15/18 at 09:00


Gabapentin (Neurontin) 300 mg HS PO  Last administered on 1/23/18at 20:32;  

Start 1/15/18 at 21:00


Insulin Detemir (Levemir Inj) 15 units HS SQ  Last administered on 1/23/18at 20:

30;  Start 1/15/18 at 21:00


Metoprolol Tartrate (Lopressor) 50 mg Q12HR PO  Last administered on 1/18/18at 

09:00;  Start 1/15/18 at 09:00;  Stop 1/18/18 at 09:42;  Status DC


Nifedipine (Procardia) 30 mg TID PO  Last administered on 1/18/18at 09:01;  

Start 1/15/18 at 09:00;  Stop 1/18/18 at 09:42;  Status DC


Pravastatin Sodium (Pravachol) 40 mg HS PO  Last administered on 1/23/18at 20:33

;  Start 1/15/18 at 21:00


Morphine Sulfate (Morphine Inj) 4 mg ONCE  ONCE IV PUSH  Last administered on 1/

15/18at 04:34;  Start 1/15/18 at 04:15;  Stop 1/15/18 at 04:19;  Status DC


Ondansetron HCl (Zofran Inj) 4 mg ONCE  ONCE IV PUSH  Last administered on 1/15/

18at 04:32;  Start 1/15/18 at 04:15;  Stop 1/15/18 at 04:19;  Status DC


Influenza Virus Vaccine (Flu (Quadrivalent) Vaccine Inj) 0.5 ml ONCE ONCE IM ;  

Start 1/16/18 at 10:00;  Stop 1/16/18 at 10:01;  Status DC


Isosorbide Mononitrate (Imdur) 120 mg DAILY@07 PO  Last administered on 1/24/ 18at 05:59;  Start 1/18/18 at 07:00


Sodium Chloride 1,000 ml @  50 mls/hr Q20H IV  Last administered on 1/17/18at 23

:45;  Start 1/17/18 at 23:45;  Stop 1/24/18 at 09:11;  Status DC


Metoprolol Tartrate (Lopressor) 100 mg Q12HR PO  Last administered on 1/24/18at 

08:49;  Start 1/18/18 at 21:00


Amlodipine Besylate (Norvasc) 5 mg DAILY PO  Last administered on 1/19/18at 08:

12;  Start 1/18/18 at 13:00;  Stop 1/19/18 at 12:46;  Status DC


Ferrous Sulfate (Ferrous Sulfate) 325 mg BID PO  Last administered on 1/24/18at 

08:48;  Start 1/18/18 at 10:00


Polyethylene Glycol/ Electrolytes (Colyte Liq) 4,000 ml ONCE  ONCE PO  Last 

administered on 1/18/18at 16:04;  Start 1/18/18 at 16:00;  Stop 1/18/18 at 16:01

;  Status DC


Pantoprazole Sodium (Protonix Inj) 40 mg Q24H IV PUSH  Last administered on 1/21 /18at 08:38;  Start 1/19/18 at 09:00;  Stop 1/21/18 at 11:16;  Status DC


Furosemide (Lasix Inj) 40 mg ONCE  ONCE IV PUSH  Last administered on 1/18/18at 

23:07;  Start 1/18/18 at 22:15;  Stop 1/18/18 at 22:16;  Status DC


Nitroglycerin (Nitroglycerin 2% Oint) 1 inch Q6HR TOPICAL  Last administered on 

1/24/18at 13:00;  Start 1/19/18 at 01:40


Morphine Sulfate (Morphine Inj) 2 mg Q3H  PRN IV PUSH pain not relieved by ntg 

Last administered on 1/20/18at 18:41;  Start 1/19/18 at 01:30


Clonidine (Catapres) 0.1 mg ONCE  ONCE PO  Last administered on 1/19/18at 01:40

;  Start 1/19/18 at 01:30;  Stop 1/19/18 at 01:31;  Status DC


Magnesium Citrate (Citroma Liq) 300 ml ONCE  ONCE PO ;  Start 1/19/18 at 12:00;

  Stop 1/19/18 at 12:01;  Status DC


Magnesium Citrate (Citroma Liq) 300 ml ONCE  ONCE PO ;  Start 1/19/18 at 18:00;

  Stop 1/19/18 at 18:01;  Status DC


Bisacodyl (Dulcolax Ec) 10 mg DAILY@18,21 PO ;  Start 1/19/18 at 18:00;  Stop 1/ 19/18 at 21:03;  Status DC


Amlodipine Besylate (Norvasc) 10 mg DAILY PO  Last administered on 1/24/18at 08:

47;  Start 1/20/18 at 09:00


Clonidine (Catapres) 0.1 mg ONCE  ONCE PO  Last administered on 1/19/18at 15:26

;  Start 1/19/18 at 15:15;  Stop 1/19/18 at 15:16;  Status DC


Clonidine (Catapres) 0.1 mg Q12HR PO  Last administered on 1/21/18at 20:44;  

Start 1/19/18 at 21:00;  Stop 1/22/18 at 10:01;  Status DC


Clonidine (Catapres) 0.1 mg NOW  ONCE PO  Last administered on 1/19/18at 16:45;

  Start 1/19/18 at 16:45;  Stop 1/19/18 at 16:46;  Status DC


Clonidine (Catapres) 0.1 mg Q6H  PRN PO SBP>160, DBP>90 Last administered on 1/ 22/18at 03:53;  Start 1/20/18 at 19:15


Pantoprazole Sodium (Protonix) 40 mg DAILY PO  Last administered on 1/24/18at 08

:49;  Start 1/22/18 at 09:00


Azithromycin (Zithromax) 500 mg DAILY PO  Last administered on 1/24/18at 08:47;

  Start 1/22/18 at 09:00


Hydralazine HCl (Apresoline) 50 mg Q8HR PO  Last administered on 1/24/18at 14:04

;  Start 1/22/18 at 09:00


Sodium Chloride 1,000 ml @  100 mls/hr Q10H IV  Last administered on 1/22/18at 

10:44;  Start 1/22/18 at 08:54;  Stop 1/24/18 at 09:27;  Status DC


Clonidine (Catapres) 0.2 mg Q12HR PO  Last administered on 1/24/18at 08:47;  

Start 1/22/18 at 21:00


Sodium Chloride 1,000 ml @  75 mls/hr [id number] IV  Last administered on 1/22/ 18at 12:00;  Start 1/22/18 at 12:00;  Stop 1/22/18 at 22:30;  Status DC


Heparin Sodium/ Sodium Chloride 1,000 ml @  As Directed STK-MED ONCE .ROUTE ;  

Start 1/22/18 at 13:50;  Stop 1/22/18 at 13:51;  Status DC


Midazolam HCl (Versed Inj) 2 mg STK-MED ONCE .ROUTE ;  Start 1/22/18 at 13:50;  

Stop 1/22/18 at 13:51;  Status DC


Verapamil HCl (Isoptin Inj) 5 mg STK-MED ONCE .ROUTE ;  Start 1/22/18 at 13:50;

  Stop 1/22/18 at 13:51;  Status DC


Heparin Sodium (Porcine) (Heparin Inj) 10,000 units STK-MED ONCE .ROUTE ;  

Start 1/22/18 at 13:50;  Stop 1/22/18 at 13:51;  Status DC


Nitroglycerin 5 ml @ As Directed STK-MED ONCE .ROUTE ;  Start 1/22/18 at 13:50;

  Stop 1/22/18 at 13:51;  Status DC


Fentanyl Citrate (fentaNYL INJ) 100 mcg STK-MED ONCE .ROUTE ;  Start 1/22/18 at 

13:54;  Stop 1/22/18 at 13:55;  Status DC


Sodium Chloride 1,000 ml @  As Directed STK-MED ONCE IV ;  Start 1/19/18 at 12:

00;  Stop 1/22/18 at 13:55;  Status DC


Propofol (Diprivan  200 Mg/20 ml Inj) 400 mg STK-MED ONCE IV ;  Start 1/19/18 

at 12:00;  Stop 1/22/18 at 13:55;  Status DC


Lidocaine HCl (Xylocaine-Mpf 1% Inj) 5 ml STK-MED ONCE OTHER ;  Start 1/19/18 

at 12:00;  Stop 1/22/18 at 13:55;  Status DC


Miscellaneous Information 1 ONCE  ONCE XX ;  Start 1/22/18 at 14:45;  Stop 1/22/ 18 at 14:56;  Status DC


Bacitracin (Bacitracin Oint Packet) 0.9 gm ONCE  ONCE TOP ;  Start 1/22/18 at 14

:45;  Stop 1/22/18 at 14:54;  Status DC


Iohexol (OMNIPAQUE 350 INJ (Cath Lab)) 50 ml STK-MED ONCE OTHER ;  Start 1/22/ 18 at 15:18;  Stop 1/22/18 at 15:19;  Status DC


Sodium Chloride 1,000 ml @  50 mls/hr Q20H IV ;  Start 1/24/18 at 20:00





A/P


Problem List:  


(1) Sepsis


ICD Code:  A41.9 - Sepsis, unspecified organism


(2) PNA (pneumonia)


ICD Code:  J18.9 - Pneumonia, unspecified organism


(3) Hypoxia


ICD Code:  R09.02 - Hypoxemia


(4) NSTEMI (non-ST elevated myocardial infarction)


ICD Code:  I21.4 - Non-ST elevation (NSTEMI) myocardial infarction


(5) CHF (congestive heart failure)


ICD Code:  I50.9 - Heart failure, unspecified


(6) Renal insufficiency


ICD Code:  N28.9 - Disorder of kidney and ureter, unspecified


(7) DM (diabetes mellitus)


ICD Code:  E11.9 - Type 2 diabetes mellitus without complications


(8) GI bleed


ICD Code:  K92.2 - Gastrointestinal hemorrhage, unspecified


Assessment and Plan


52-year-old female with





GI bleed-resolved


Anemia of acute blood loss


   Status post transfused with 2 units of packed red blood cells.


   Appreciate input from GI, status post panendoscopy with finding of gastritis 

EGD and colon polyps on colonoscopy pending biopsy report


   No Evidence of iron deficiency anemia


   Unremarkable small bowel follow-through


   PPI and continue to monitor H&H


   GI signed off





Sepsis


   Resolved, continue with current antibiotics including Rocephin and 

azithromycin





PNA


   Rocephin and azithromycin discontinued on 1/22 and continues to do well off 

of antibiotics.


   DuoNeb when necessary


   Maintain oxygen saturation above 92%





Hypoxia-resolved


   Continue with treatment as in above





Acute on Chronic diastolic CHF


   Echo 8/18/17 w/ EF 55-60%


   Currently off Lasix secondary to worsening renal function


   Continue with Imdur/BB





NSTEMI:  


   Post cardiac catheterization on 1/22 showing severe two-vessel disease of 

the right coronary artery in the mid LAD.  Recommended high risk PCI versus 

cardiac bypass versus medical management.  Patient was high risk PCI.  Schedule 

for tomorrow but kidney function did worsen.





Atypical chest pain


   Chest pain occurs only when patient tried to get up.  The same chest pain is 

reproducible with palpation of her chest wall.  Will give her pain medication 

when necessary.  Try to avoid NSAIDs since patient does have a recent NSTEMI 

and worsening kidney function.





Lightheadedness


   Will get orthostatics.





Acute on chronic Stage IV CKD


   Management per nephrology


   Avoid nephrotoxins.  Creatinine worsened now 4.11


   Continue strict ins and outs 


    trend creatinine.





Hypertension


   Continue with Lopressor, Procardia, clonidine 2 times a day





DM:  


   Sliding scale w/ Accu-Cheks. stable. 





Tobacco Abuse: 


   Strongly recommended to stop smoking, no NicoDerm to avoid vasoconstriction.

  Ativan prn if needed


Discharge Planning


Patient scheduled for a cart catheterization tomorrow but kidney function did 

worsen today.











Yany Nathan MD Jan 24, 2018 15:39

## 2018-01-25 VITALS — HEART RATE: 80 BPM

## 2018-01-25 VITALS — DIASTOLIC BLOOD PRESSURE: 80 MMHG | SYSTOLIC BLOOD PRESSURE: 162 MMHG | OXYGEN SATURATION: 95 % | HEART RATE: 84 BPM

## 2018-01-25 VITALS — HEART RATE: 65 BPM

## 2018-01-25 VITALS — HEART RATE: 74 BPM

## 2018-01-25 VITALS — HEART RATE: 70 BPM

## 2018-01-25 VITALS
SYSTOLIC BLOOD PRESSURE: 147 MMHG | RESPIRATION RATE: 17 BRPM | OXYGEN SATURATION: 94 % | DIASTOLIC BLOOD PRESSURE: 76 MMHG | TEMPERATURE: 97.6 F | HEART RATE: 83 BPM

## 2018-01-25 VITALS
RESPIRATION RATE: 20 BRPM | OXYGEN SATURATION: 97 % | HEART RATE: 79 BPM | SYSTOLIC BLOOD PRESSURE: 159 MMHG | DIASTOLIC BLOOD PRESSURE: 79 MMHG

## 2018-01-25 VITALS — DIASTOLIC BLOOD PRESSURE: 86 MMHG | SYSTOLIC BLOOD PRESSURE: 159 MMHG | OXYGEN SATURATION: 96 % | HEART RATE: 83 BPM

## 2018-01-25 VITALS
RESPIRATION RATE: 14 BRPM | OXYGEN SATURATION: 97 % | SYSTOLIC BLOOD PRESSURE: 142 MMHG | HEART RATE: 75 BPM | DIASTOLIC BLOOD PRESSURE: 74 MMHG | TEMPERATURE: 97.6 F

## 2018-01-25 VITALS — HEART RATE: 86 BPM | SYSTOLIC BLOOD PRESSURE: 158 MMHG | DIASTOLIC BLOOD PRESSURE: 87 MMHG | OXYGEN SATURATION: 94 %

## 2018-01-25 VITALS — DIASTOLIC BLOOD PRESSURE: 84 MMHG | SYSTOLIC BLOOD PRESSURE: 128 MMHG | OXYGEN SATURATION: 97 % | HEART RATE: 85 BPM

## 2018-01-25 VITALS — HEART RATE: 84 BPM | SYSTOLIC BLOOD PRESSURE: 159 MMHG | DIASTOLIC BLOOD PRESSURE: 82 MMHG | OXYGEN SATURATION: 96 %

## 2018-01-25 VITALS
TEMPERATURE: 97.8 F | SYSTOLIC BLOOD PRESSURE: 114 MMHG | OXYGEN SATURATION: 98 % | HEART RATE: 67 BPM | DIASTOLIC BLOOD PRESSURE: 58 MMHG | RESPIRATION RATE: 17 BRPM

## 2018-01-25 VITALS — DIASTOLIC BLOOD PRESSURE: 80 MMHG | OXYGEN SATURATION: 94 % | HEART RATE: 84 BPM | SYSTOLIC BLOOD PRESSURE: 151 MMHG

## 2018-01-25 VITALS
OXYGEN SATURATION: 95 % | HEART RATE: 84 BPM | RESPIRATION RATE: 16 BRPM | DIASTOLIC BLOOD PRESSURE: 81 MMHG | SYSTOLIC BLOOD PRESSURE: 159 MMHG

## 2018-01-25 VITALS — HEART RATE: 75 BPM

## 2018-01-25 VITALS — DIASTOLIC BLOOD PRESSURE: 83 MMHG | SYSTOLIC BLOOD PRESSURE: 163 MMHG

## 2018-01-25 VITALS — DIASTOLIC BLOOD PRESSURE: 78 MMHG | SYSTOLIC BLOOD PRESSURE: 155 MMHG

## 2018-01-25 VITALS
RESPIRATION RATE: 16 BRPM | SYSTOLIC BLOOD PRESSURE: 151 MMHG | DIASTOLIC BLOOD PRESSURE: 71 MMHG | OXYGEN SATURATION: 100 % | HEART RATE: 80 BPM | TEMPERATURE: 98.8 F

## 2018-01-25 VITALS — DIASTOLIC BLOOD PRESSURE: 67 MMHG | SYSTOLIC BLOOD PRESSURE: 113 MMHG

## 2018-01-25 VITALS — DIASTOLIC BLOOD PRESSURE: 72 MMHG | SYSTOLIC BLOOD PRESSURE: 149 MMHG

## 2018-01-25 VITALS — SYSTOLIC BLOOD PRESSURE: 155 MMHG | DIASTOLIC BLOOD PRESSURE: 78 MMHG

## 2018-01-25 VITALS — HEART RATE: 84 BPM

## 2018-01-25 VITALS — OXYGEN SATURATION: 93 %

## 2018-01-25 VITALS — HEART RATE: 68 BPM

## 2018-01-25 VITALS — HEART RATE: 78 BPM

## 2018-01-25 VITALS — HEART RATE: 83 BPM

## 2018-01-25 VITALS — HEART RATE: 69 BPM

## 2018-01-25 VITALS — OXYGEN SATURATION: 98 %

## 2018-01-25 VITALS — HEART RATE: 82 BPM

## 2018-01-25 LAB
ALBUMIN SERPL-MCNC: 2.3 GM/DL (ref 3.4–5)
BUN SERPL-MCNC: 58 MG/DL (ref 7–18)
CALCIUM SERPL-MCNC: 8.4 MG/DL (ref 8.5–10.1)
CHLORIDE SERPL-SCNC: 102 MEQ/L (ref 98–107)
CREAT SERPL-MCNC: 4.71 MG/DL (ref 0.5–1)
GFR SERPLBLD BASED ON 1.73 SQ M-ARVRAT: 12 ML/MIN (ref 89–?)
GLUCOSE SERPL-MCNC: 131 MG/DL (ref 74–106)
HCO3 BLD-SCNC: 21.9 MEQ/L (ref 21–32)
PHOSPHATE SERPL-MCNC: 7.5 MG/DL (ref 2.5–4.9)
SODIUM SERPL-SCNC: 133 MEQ/L (ref 136–145)

## 2018-01-25 RX ADMIN — NITROGLYCERIN PRN MG: 0.4 TABLET SUBLINGUAL at 00:08

## 2018-01-25 RX ADMIN — Medication SCH ML: at 20:31

## 2018-01-25 RX ADMIN — NITROGLYCERIN SCH INCH: 20 OINTMENT TOPICAL at 17:44

## 2018-01-25 RX ADMIN — AZITHROMYCIN SCH MG: 250 TABLET, FILM COATED ORAL at 08:55

## 2018-01-25 RX ADMIN — NITROGLYCERIN PRN MG: 0.4 TABLET SUBLINGUAL at 00:18

## 2018-01-25 RX ADMIN — INSULIN ASPART SCH: 100 INJECTION, SOLUTION INTRAVENOUS; SUBCUTANEOUS at 20:31

## 2018-01-25 RX ADMIN — NITROGLYCERIN SCH INCH: 20 OINTMENT TOPICAL at 05:53

## 2018-01-25 RX ADMIN — NITROGLYCERIN PRN MG: 0.4 TABLET SUBLINGUAL at 00:29

## 2018-01-25 RX ADMIN — INSULIN ASPART SCH: 100 INJECTION, SOLUTION INTRAVENOUS; SUBCUTANEOUS at 08:00

## 2018-01-25 RX ADMIN — NITROGLYCERIN SCH INCH: 20 OINTMENT TOPICAL at 00:10

## 2018-01-25 RX ADMIN — FERROUS SULFATE TAB 325 MG (65 MG ELEMENTAL FE) SCH MG: 325 (65 FE) TAB at 08:55

## 2018-01-25 RX ADMIN — ISOSORBIDE MONONITRATE SCH MG: 60 TABLET, EXTENDED RELEASE ORAL at 07:23

## 2018-01-25 RX ADMIN — PRAVASTATIN SODIUM SCH MG: 40 TABLET ORAL at 20:29

## 2018-01-25 RX ADMIN — INSULIN ASPART SCH: 100 INJECTION, SOLUTION INTRAVENOUS; SUBCUTANEOUS at 17:43

## 2018-01-25 RX ADMIN — METOPROLOL TARTRATE SCH MG: 100 TABLET, FILM COATED ORAL at 20:29

## 2018-01-25 RX ADMIN — METOPROLOL TARTRATE SCH MG: 100 TABLET, FILM COATED ORAL at 08:55

## 2018-01-25 RX ADMIN — PANTOPRAZOLE SCH MG: 40 TABLET, DELAYED RELEASE ORAL at 08:55

## 2018-01-25 RX ADMIN — PHENYTOIN SODIUM SCH MLS/HR: 50 INJECTION INTRAMUSCULAR; INTRAVENOUS at 05:00

## 2018-01-25 RX ADMIN — STANDARDIZED SENNA CONCENTRATE AND DOCUSATE SODIUM SCH TAB: 8.6; 5 TABLET, FILM COATED ORAL at 20:29

## 2018-01-25 RX ADMIN — SEVELAMER CARBONATE SCH MG: 800 TABLET, FILM COATED ORAL at 17:44

## 2018-01-25 RX ADMIN — IPRATROPIUM BROMIDE AND ALBUTEROL SULFATE PRN AMPULE: .5; 3 SOLUTION RESPIRATORY (INHALATION) at 01:35

## 2018-01-25 RX ADMIN — ASPIRIN 81 MG SCH MG: 81 TABLET ORAL at 08:55

## 2018-01-25 RX ADMIN — FERROUS SULFATE TAB 325 MG (65 MG ELEMENTAL FE) SCH MG: 325 (65 FE) TAB at 20:29

## 2018-01-25 RX ADMIN — FOLIC ACID SCH MG: 1 TABLET ORAL at 08:55

## 2018-01-25 RX ADMIN — MORPHINE SULFATE PRN MG: 2 INJECTION, SOLUTION INTRAMUSCULAR; INTRAVENOUS at 20:32

## 2018-01-25 RX ADMIN — STANDARDIZED SENNA CONCENTRATE AND DOCUSATE SODIUM SCH TAB: 8.6; 5 TABLET, FILM COATED ORAL at 08:56

## 2018-01-25 RX ADMIN — GABAPENTIN SCH MG: 300 CAPSULE ORAL at 20:29

## 2018-01-25 RX ADMIN — ACYCLOVIR SCH UNITS: 800 TABLET ORAL at 20:30

## 2018-01-25 RX ADMIN — INSULIN ASPART SCH: 100 INJECTION, SOLUTION INTRAVENOUS; SUBCUTANEOUS at 12:00

## 2018-01-25 RX ADMIN — NITROGLYCERIN SCH INCH: 20 OINTMENT TOPICAL at 12:00

## 2018-01-25 RX ADMIN — NITROGLYCERIN SCH INCH: 20 OINTMENT TOPICAL at 23:48

## 2018-01-25 RX ADMIN — Medication SCH ML: at 08:56

## 2018-01-25 NOTE — HHI.NPPN
Subjective


General Problems:  Anemia


Renal Failure:  Chronic, Acute, Stage III, Stage IV


Interval History


Renal function has again declined. She is non oliguric. [facility] postponed. Upset 

about recent setbacks. Her daughter is at bedside. 


 (Migdalia Camejo)





Review of Systems


General


Constitutional:  Fatigue


 (Migdalia Camejo)





Respiratory


Respiratory Remarks


Mild SOB


 (Migdalia Camejo)





Cardiovascular


Cardiac:  Chest Pain


Cardiac Remarks


resolved


 (Migdalia Camejo)





Gastrointestinal


GI Remarks


no abdominal pain


 (Migdalia Camejo)





Musculoskeletal


MS:  Pain/Stiffness


 (Migdalia Camejo)





Psych


Psych:  Depression, Anxiety


 (Migdalia Camejo)





Objective Data


Data





Vital Signs








  Date Time  Temp Pulse Resp B/P (MAP) Pulse Ox O2 Delivery O2 Flow Rate FiO2


 


1/25/18 12:00  75      


 


1/25/18 11:00  79 20 159/79 (105) 97   


 


1/25/18 11:00  73      


 


1/25/18 11:00     97 Room Air  


 


1/25/18 10:00  78      


 


1/25/18 09:00  84      


 


1/25/18 08:25     93   


 


1/25/18 08:00  80      


 


1/25/18 07:15 97.6 83 17 147/76 (99) 94   


 


1/25/18 07:15     94 Room Air  


 


1/25/18 07:00  82      


 


1/25/18 06:00  80      


 


1/25/18 05:48    149/78 (101)    





    146/79 (101)    





    155/78 (103)    


 


1/25/18 05:00  82      


 


1/25/18 04:25     100 Nasal Cannula 2.00 


 


1/25/18 04:25 98.8 80 16 151/71 (97) 100   


 


1/25/18 04:05  80      


 


1/25/18 03:53      Nasal Cannula 2.00 


 


1/25/18 03:00  80      


 


1/25/18 02:00  80      


 


1/25/18 01:00  80      


 


1/25/18 00:39  83  159/86 (110) 96   


 


1/25/18 00:35  85  128/84 (99) 97   


 


1/25/18 00:31    163/83 (109)    


 


1/25/18 00:29  86  158/87 (110) 94   


 


1/25/18 00:25    149/72 (97)    


 


1/25/18 00:21    155/78 (103)    


 


1/25/18 00:20  84  162/80 (107) 95   


 


1/25/18 00:18  84  159/82 (107) 96   


 


1/25/18 00:13  84  151/80 (103) 94   


 


1/25/18 00:08  84 16 159/81 (107) 95   


 


1/25/18 00:01  83      


 


1/24/18 23:00  80      


 


1/24/18 22:00  78      


 


1/24/18 21:00  76      


 


1/24/18 20:41     99  1.00 


 


1/24/18 20:20 98.2 76 18 141/70 (93) 100   


 


1/24/18 20:15     96 Nasal Cannula 2.00 


 


1/24/18 20:09  77      


 


1/24/18 18:00  68      


 


1/24/18 17:00  68      


 


1/24/18 16:00  68      


 


1/24/18 15:27 98.4 68 16 127/74 (91) 100   


 


1/24/18 15:27      Nasal Cannula 2.00 


 


1/24/18 15:00  70      


 


1/24/18 14:00  72      


 


1/24/18 13:50     98 Nasal Cannula  


 


1/24/18 13:00  68      








 (Migdalia Camejo)


-:  


1/24/18 0500                                                                   

             1/25/18 0441








Physical Exam


General


Appearance:  Well Developed, No Acute Distress, Comfortable


 (Migdalia Camejo)





Eyes


Eye Exam:  Pupils Equal


 (Migdalia Camejo)





Throat


Throat Exam:  Oral Mucosa Pink & Moist


 (Migdalia Camejo)





Pulmonary


Resp Exam:  Breath Sounds Equal, No Distress, Decreased Bases


 (Migdalia Camejo)





Cardiology


CV Exam:  Regular, Normal Sinus Rhythm, Good Perfusion


 (Migdalia Camejo)





Gastrointestinal/Abdomen


GI Exam:  Soft, Non-Tender, Bowel Sounds Present, Positive Bowel Movement


 (Migdalia Camejo)





Musculoskeletal


MS Exam:  Joints Intact, Normal Tone


 (Migdalia Camejo)





Integumentary


Skin Exam:  Clear, Warm, Dry, Intact


 (Migdalia Cameoj)





Extremeties


Extremities Exam:  No Edema


 (Migdalia Camejo)





Neurologic


Neuro Exam:  Alert, Awake, Oriented, Speech Clear, Moving All Extremities


 (Migdalia Camejo)





Psychiatric


Psych Exam:  Appropriate Responses


 (Migdalia Camejo)





Assessment/Plan


Discussed Condition With:  Patient


Assessment Summary:  LETI/Acute Renal Failure, Anemia of CKD, Hypertension, 

Diabetes Mellitus, CKD Stage III, CKD Stage IV


Problem List:  


(1) Renal insufficiency


ICD Codes:  N28.9 - Disorder of kidney and ureter, unspecified


Plan:  Baseline CKD 3-4, creatinine 2.1-2.5, GFR 24-30.


LETI due to NSTEMI; also was given small amount (20cc) of IV contrast on 1/22, 

may have developed contrast induced nephrotoxicity.


Renal function is worse. Potassium 5.1. 


Cardiac cath on hold


It is possible if her renal function does not improve she may require dialysis 

this admission


IR consulted for vascath in AM 


Repeat labs tomorrow. 


Stop IVF that is infusing.


Start Renvela with meals for metabolic bone disorder, hyperphosphatemia. 

Discussed dosage/timing with patient. 


Monitor urine output. She is non oliguric.


Avoid additional nephrotoxic agents. 





Consider ACE-I or ARB when renal function improves. 





(2) NSTEMI (non-ST elevated myocardial infarction)


ICD Codes:  I21.4 - Non-ST elevation (NSTEMI) myocardial infarction


Plan:  Cardiology following, [facility] postponed.


given ASA,off  heparin gtt





(3) DM (diabetes mellitus)


ICD Codes:  E11.9 - Type 2 diabetes mellitus without complications


Plan:  Insulin as needed, maintain glucose 140-180mg/dL. 


If NPO use D10 @ 15 ml/hr to prevent hyperkalemia 





(4) Anemia


ICD Codes:  D64.9 - Anemia, unspecified


Status:  Chronic


Plan:  Hb stable


no evidence of iron deficiency 


Transfused this admission.








(5) CAD (coronary artery disease)


ICD Codes:  I25.10 - Atherosclerotic heart disease of native coronary artery 

without angina pectoris


Status:  Chronic


Plan:  Hx of stent in the past. New NSTEMI, see above 





(6) PNA (pneumonia)


ICD Codes:  J18.9 - Pneumonia, unspecified organism


Plan:  On PO Zithromax 


Follow clinically 





(7) HTN (hypertension)


ICD Codes:  I10 - Hypertension


Status:  Chronic


Plan:  On Imdur, metoprolol, clonidine, and Norvasc


BP is better 


 (Migdalia Camejo)


Plan


patient was seen and examined. Renal function is worse. Poor renal function at 

baseline. Cardiac catheterization postponed. Likely needs dialysis. Will 

consult radiology for Vascath placement. 


 (Kelton Whiteside MD)











Migdalia Camejo Jan 25, 2018 12:20


Kelton Whiteside MD Jan 25, 2018 13:41

## 2018-01-25 NOTE — PD.CARD.PN
Subjective


Subjective Remarks


Episode of chest pressure overnight, resolved with meds, no pain currently.  

Creatinine increased yesterday and today.  


 (King Ervin)





Objective


Medications





Current Medications








 Medications


  (Trade)  Dose


 Ordered  Sig/Luis Armando


 Route  Start Time


 Stop Time Status Last Admin


 


  (D50w (Vial) Inj)  50 ml  UNSCH  PRN


 IV PUSH  1/15/18 04:00


     


 


 


  (Glucagon Inj)  1 mg  UNSCH  PRN


 OTHER  1/15/18 04:00


     


 


 


  (NovoLOG


 SUPPLEMENTAL


 SCALE)  1  ACHS SLIDING  SCALE


 SQ  1/15/18 08:00


    1/24/18 18:11


 


 


  (Duoneb Neb)  1 ampule  Q4HR NEB  PRN


 NEB  1/15/18 04:00


    1/25/18 01:35


 


 


  (NS Flush)  2 ml  UNSCH  PRN


 IV FLUSH  1/15/18 04:00


    1/23/18 18:08


 


 


  (NS Flush)  2 ml  BID


 IV FLUSH  1/15/18 09:00


    1/24/18 20:12


 


 


  (Zofran Inj)  4 mg  Q6H  PRN


 IVP  1/15/18 04:00


    1/19/18 16:49


 


 


  (Tylenol)  650 mg  Q6H  PRN


 PO  1/15/18 04:00


    1/23/18 08:12


 


 


  (Norco  5-325 Mg)  1 tab  Q4H  PRN


 PO  1/15/18 04:00


    1/21/18 20:44


 


 


  (Morphine Inj)  2 mg  Q3H  PRN


 IV PUSH  1/15/18 04:00


    1/24/18 23:18


 


 


  (Theresa-Colace)  1 tab  BID


 PO  1/15/18 09:00


    1/24/18 21:53


 


 


  (Milk Of


 Magnesia Liq)  30 ml  Q12H  PRN


 PO  1/15/18 04:00


    1/23/18 20:37


 


 


  (Senokot)  17.2 mg  Q12H  PRN


 PO  1/15/18 04:00


     


 


 


  (Dulcolax Supp)  10 mg  DAILY  PRN


 RECTAL  1/15/18 04:00


     


 


 


  (Lactulose Liq)  30 ml  DAILY  PRN


 PO  1/15/18 04:00


    1/24/18 08:46


 


 


  (Aspirin Chew)  81 mg  DAILY


 CHEW  1/15/18 09:00


   Future hold 1/24/18 08:50


 


 


  (Folate)  1 mg  DAILY


 PO  1/15/18 09:00


    1/24/18 08:49


 


 


  (Neurontin)  300 mg  HS


 PO  1/15/18 21:00


    1/24/18 21:53


 


 


  (Levemir Inj)  15 units  HS


 SQ  1/15/18 21:00


    1/24/18 22:11


 


 


  (Pravachol)  40 mg  HS


 PO  1/15/18 21:00


    1/24/18 21:52


 


 


  (Imdur)  120 mg  DAILY@07


 PO  1/18/18 07:00


    1/25/18 07:23


 


 


  (Lopressor)  100 mg  Q12HR


 PO  1/18/18 21:00


    1/24/18 21:53


 


 


  (Ferrous Sulfate)  325 mg  BID


 PO  1/18/18 10:00


    1/24/18 21:52


 


 


  (Nitroglycerin


 2% Oint)  1 inch  Q6HR


 TOPICAL  1/19/18 01:40


    1/25/18 05:53


 


 


  (Norvasc)  10 mg  DAILY


 PO  1/20/18 09:00


    1/24/18 08:47


 


 


  (Catapres)  0.1 mg  Q6H  PRN


 PO  1/20/18 19:15


    1/22/18 03:53


 


 


  (Protonix)  40 mg  DAILY


 PO  1/22/18 09:00


    1/24/18 08:49


 


 


  (Zithromax)  500 mg  DAILY


 PO  1/22/18 09:00


    1/24/18 08:47


 


 


  (Apresoline)  50 mg  Q8HR


 PO  1/22/18 09:00


    1/25/18 05:53


 


 


  (Catapres)  0.2 mg  Q12HR


 PO  1/22/18 21:00


    1/24/18 21:52


 


 


  (Norco  5-325 Mg)  1 tab  Q4H  PRN


 PO  1/24/18 15:45


     


 


 


 Sodium Chloride  1,000 ml @ 


 100 mls/hr  Q10H


 IV  1/24/18 19:00


    1/24/18 20:08


 


 


  (Nitrostat Sl)  0.4 mg  Q5M  PRN


 SL  1/25/18 00:15


    1/25/18 00:29


 








Vital Signs / I&O





Vital Signs








  Date Time  Temp Pulse Resp B/P (MAP) Pulse Ox O2 Delivery O2 Flow Rate FiO2


 


1/25/18 07:15 97.6 83 17 147/76 (99) 94   


 


1/25/18 07:15     94 Room Air  


 


1/25/18 07:00  82      


 


1/25/18 06:00  80      


 


1/25/18 05:48    149/78 (101)    





    146/79 (101)    





    155/78 (103)    


 


1/25/18 05:00  82      


 


1/25/18 04:25     100 Nasal Cannula 2.00 


 


1/25/18 04:25 98.8 80 16 151/71 (97) 100   


 


1/25/18 04:05  80      


 


1/25/18 03:53      Nasal Cannula 2.00 


 


1/25/18 03:00  80      


 


1/25/18 02:00  80      


 


1/25/18 01:00  80      


 


1/25/18 00:39  83  159/86 (110) 96   


 


1/25/18 00:35  85  128/84 (99) 97   


 


1/25/18 00:31    163/83 (109)    


 


1/25/18 00:29  86  158/87 (110) 94   


 


1/25/18 00:25    149/72 (97)    


 


1/25/18 00:21    155/78 (103)    


 


1/25/18 00:20  84  162/80 (107) 95   


 


1/25/18 00:18  84  159/82 (107) 96   


 


1/25/18 00:13  84  151/80 (103) 94   


 


1/25/18 00:08  84 16 159/81 (107) 95   


 


1/25/18 00:01  83      


 


1/24/18 23:00  80      


 


1/24/18 22:00  78      


 


1/24/18 21:00  76      


 


1/24/18 20:41     99  1.00 


 


1/24/18 20:20 98.2 76 18 141/70 (93) 100   


 


1/24/18 20:15     96 Nasal Cannula 2.00 


 


1/24/18 20:09  77      


 


1/24/18 18:00  68      


 


1/24/18 17:00  68      


 


1/24/18 16:00  68      


 


1/24/18 15:27 98.4 68 16 127/74 (91) 100   


 


1/24/18 15:27      Nasal Cannula 2.00 


 


1/24/18 15:00  70      


 


1/24/18 14:00  72      


 


1/24/18 13:50     98 Nasal Cannula  


 


1/24/18 13:00  68      


 


1/24/18 12:00  68      


 


1/24/18 11:00 98.4 70 16 121/68 (85) 98   


 


1/24/18 11:00     98 Room Air  


 


1/24/18 11:00  68      


 


1/24/18 10:00  77      


 


1/24/18 09:00  74      


 


1/24/18 09:00   17     


 


1/24/18 08:39     99 Nasal Cannula 2.00 


 


1/24/18 08:39 98.2 75 16 141/75 (97) 99   


 


1/24/18 08:00  76      














I/O      


 


 1/24/18 1/24/18 1/24/18 1/25/18 1/25/18 1/25/18





 07:00 15:00 23:00 07:00 15:00 23:00


 


Intake Total 240 ml  900 ml 300 ml  


 


Output Total 300 ml  600 ml 300 ml  


 


Balance -60 ml  300 ml 0 ml  


 


      


 


Intake Oral 240 ml  900 ml 300 ml  


 


Output Urine Total 300 ml  600 ml 300 ml  


 


# Bowel Movements   1   








Physical Exam


GENERAL: Well-developed well-nourished.  In no acute distress.


NECK: No carotid bruits.  No JVD.  


CARDIOVASCULAR: Regular rate and rhythm.  No murmur appreciated.


RESPIRATORY: No accessory muscle use. Clear to auscultation. Breath sounds 

equal bilaterally. 


MUSCULOSKELETAL: No clubbing or cyanosis. No edema. 


NEUROLOGICAL: Awake and alert. Normal speech.


Laboratory





Laboratory Tests








Test


  1/24/18


12:15 1/25/18


04:41


 


Blood Urea Nitrogen 50 MG/DL  58 MG/DL 


 


Creatinine 4.11 MG/DL  4.71 MG/DL 


 


Random Glucose 157 MG/DL  131 MG/DL 


 


Calcium Level 9.1 MG/DL  8.4 MG/DL 


 


Sodium Level 137 MEQ/L  133 MEQ/L 


 


Potassium Level 4.9 MEQ/L  5.1 MEQ/L 


 


Chloride Level 103 MEQ/L  102 MEQ/L 


 


Carbon Dioxide Level 23.0 MEQ/L  21.9 MEQ/L 


 


Anion Gap 11 MEQ/L  9 MEQ/L 


 


Estimat Glomerular Filtration


Rate 14 ML/MIN 


  12 ML/MIN 


 


 


Albumin  2.3 GM/DL 


 


Phosphorus Level  7.5 MG/DL 








 (King Ervin)





Assessment and Plan


Problem List:  


(1) NSTEMI (non-ST elevated myocardial infarction)


ICD Codes:  I21.4 - Non-ST elevation (NSTEMI) myocardial infarction


(2) CHF (congestive heart failure)


ICD Codes:  I50.9 - Heart failure, unspecified


(3) Renal insufficiency


ICD Codes:  N28.9 - Renal insufficiency


Status:  Acute


(4) Anemia


ICD Codes:  D64.9 - Anemia, unspecified


Status:  Chronic


(5) DM (diabetes mellitus)


ICD Codes:  E11.9 - Type 2 diabetes mellitus without complications


(6) Renal insufficiency


ICD Codes:  N28.9 - Disorder of kidney and ureter, unspecified


(7) HTN (hypertension)


ICD Codes:  I10 - Hypertension


Status:  Chronic


Assessment and Plan


53 yo AAF with history of CAD, cardiac stent placed ~ 10 years ago, CKD IV, CHF 

and diabetes admitted for progressive SOB and chest pain. 





Normocytic anemia: Hemoglobin improved after transfusion.  Currently stable.  

GI signed off.





Coronary artery disease: [facility] 1/22/18 showed severe 2 vessel native coronary 

artery disease involving the right coronary artery and mid left anterior 

descending.  Creatinine worsening, risk for catheterization and PCI is too high

, recommend medical management.  Continue Imdur, amlodipine, metoprolol, aspirin

, statin.





CKD IV: Creatinine increased to force.  Nephrology on board.


 


Dyspnea: multifactorial, CHF vs. PNA vs anemia.  Echocardiogram unremarkable.  

Continue antibiotics per primary team.





Cardiology cleared for discharge.


 (King Ervin)


Assessment and Plan


----------------------


Cr worsening.  Now Cr 4.71


20 cc contrast given with diagnostic cath in total


gentle hydration.


no ACE or ARB due to Cr


on/off CP.  on maximal medical therapy.


due to CKD unable to use ranexa


Will need to discuss options.  Any PCI almost certainly will result in HD, 

similarly with CABG.


Hb 9.3


no obvious bleeding


 (Edgardo Mercado MD)











King Ervin Jan 25, 2018 07:47


Edgardo Mercado MD Jan 25, 2018 08:57

## 2018-01-25 NOTE — HHI.PR
Subjective


Remarks


Follow-up for chest pain and acute renal failure


Patient was very tearful this morning.  She stated that she not want to talk 

about anything.


Comforted the patient and she stated that "why didn't take this long for my 

kidneys to get worse?'


Patient stated that chest pain is improving.


She had no other complaints.


She stated that she continues to make good urine output.





Objective


Vitals





Vital Signs








  Date Time  Temp Pulse Resp B/P (MAP) Pulse Ox O2 Delivery O2 Flow Rate FiO2


 


1/25/18 10:00  78      


 


1/25/18 09:00  84      


 


1/25/18 08:25     93   


 


1/25/18 08:00  80      


 


1/25/18 07:15 97.6 83 17 147/76 (99) 94   


 


1/25/18 07:15     94 Room Air  


 


1/25/18 07:00  82      


 


1/25/18 06:00  80      


 


1/25/18 05:48    149/78 (101)    





    146/79 (101)    





    155/78 (103)    


 


1/25/18 05:00  82      


 


1/25/18 04:25     100 Nasal Cannula 2.00 


 


1/25/18 04:25 98.8 80 16 151/71 (97) 100   


 


1/25/18 04:05  80      


 


1/25/18 03:53      Nasal Cannula 2.00 


 


1/25/18 03:00  80      


 


1/25/18 02:00  80      


 


1/25/18 01:00  80      


 


1/25/18 00:39  83  159/86 (110) 96   


 


1/25/18 00:35  85  128/84 (99) 97   


 


1/25/18 00:31    163/83 (109)    


 


1/25/18 00:29  86  158/87 (110) 94   


 


1/25/18 00:25    149/72 (97)    


 


1/25/18 00:21    155/78 (103)    


 


1/25/18 00:20  84  162/80 (107) 95   


 


1/25/18 00:18  84  159/82 (107) 96   


 


1/25/18 00:13  84  151/80 (103) 94   


 


1/25/18 00:08  84 16 159/81 (107) 95   


 


1/25/18 00:01  83      


 


1/24/18 23:00  80      


 


1/24/18 22:00  78      


 


1/24/18 21:00  76      


 


1/24/18 20:41     99  1.00 


 


1/24/18 20:20 98.2 76 18 141/70 (93) 100   


 


1/24/18 20:15     96 Nasal Cannula 2.00 


 


1/24/18 20:09  77      


 


1/24/18 18:00  68      


 


1/24/18 17:00  68      


 


1/24/18 16:00  68      


 


1/24/18 15:27 98.4 68 16 127/74 (91) 100   


 


1/24/18 15:27      Nasal Cannula 2.00 


 


1/24/18 15:00  70      


 


1/24/18 14:00  72      


 


1/24/18 13:50     98 Nasal Cannula  


 


1/24/18 13:00  68      


 


1/24/18 12:00  68      














I/O      


 


 1/24/18 1/24/18 1/24/18 1/25/18 1/25/18 1/25/18





 07:00 15:00 23:00 07:00 15:00 23:00


 


Intake Total 240 ml  900 ml 300 ml  


 


Output Total 300 ml  600 ml 300 ml  


 


Balance -60 ml  300 ml 0 ml  


 


      


 


Intake Oral 240 ml  900 ml 300 ml  


 


Output Urine Total 300 ml  600 ml 300 ml  


 


# Bowel Movements   1   








Result Diagram:  


1/24/18 0500                                                                   

             1/25/18 0441





Objective Remarks


GENERAL: in NAD


CARDIOVASCULAR: Regular rate and rhythm without murmurs, gallops, or rubs.  

Chest pain reproducible with palpation of the chest wall.


RESPIRATORY: Breath sounds equal bilaterally. No accessory muscle use.


GASTROINTESTINAL: Abdomen soft, non-tender, nondistended. 


MUSCULOSKELETAL: No cyanosis, or edema.


Procedures


Panendoscopy 01/19/18


Medications and IVs





Current Medications


Aspirin (Aspirin Chew) 324 mg ONCE  ONCE PO  Last administered on 1/15/18at 01:

42;  Start 1/15/18 at 01:30;  Stop 1/15/18 at 01:31;  Status DC


Nitroglycerin (Nitroglycerin 2% Oint) 1 inch ONCE  ONCE TOP  Last administered 

on 1/15/18at 01:42;  Start 1/15/18 at 01:30;  Stop 1/15/18 at 01:31;  Status DC


Sodium Chloride (NS Flush) 2 ml UNSCH  PRN IVF FLUSH AFTER USING IV ACCESS Last 

administered on 1/15/18at 01:42;  Start 1/15/18 at 01:30;  Stop 1/18/18 at 09:58

;  Status DC


Nitroglycerin (Nitrostat Sl) 0.4 mg Q5M SL  Last administered on 1/15/18at 01:54

;  Start 1/15/18 at 01:30;  Stop 1/15/18 at 01:41;  Status DC


Heparin Sodium (Porcine) (Heparin Inj) 3,000 units ONCE  ONCE IV  Last 

administered on 1/15/18at 03:17;  Start 1/15/18 at 02:30;  Stop 1/15/18 at 02:31

;  Status DC


Heparin Sodium/ Dextrose 250 ml @ 7 mls/hr TITRATE  PRN IV Coagulation 

Management Last administered on 1/17/18at 11:11;  Start 1/15/18 at 02:30;  Stop 

1/17/18 at 23:52;  Status DC


Furosemide (Lasix Inj) 40 mg ONCE  ONCE IV PUSH  Last administered on 1/15/18at 

03:18;  Start 1/15/18 at 02:30;  Stop 1/15/18 at 02:31;  Status DC


Dextrose (D50w (Vial) Inj) 50 ml UNSCH  PRN IV PUSH HYPOGLYCEMIA-SEE COMMENTS;  

Start 1/15/18 at 04:00


Glucagon (Glucagon Inj) 1 mg UNSCH  PRN OTHER HYPOGLYCEMIA-SEE COMMENTS;  Start 

1/15/18 at 04:00


Insulin Aspart (NovoLOG SUPPLEMENTAL SCALE) 1 ACHS SLIDING  SCALE SQ  Last 

administered on 1/24/18at 18:11;  Start 1/15/18 at 08:00


Furosemide (Lasix Inj) 40 mg BID@09,18 IV PUSH  Last administered on 1/16/18at 

08:02;  Start 1/15/18 at 09:00;  Stop 1/16/18 at 17:09;  Status DC


Ceftriaxone Sodium 1000 mg/ Sodium Chloride 100 ml @  200 mls/hr Q24H IV  Last 

administered on 1/22/18at 05:13;  Start 1/15/18 at 05:00;  Stop 1/22/18 at 22:00

;  Status DC


Azithromycin 500 mg/Sodium Chloride 250 ml @  250 mls/hr Q24H IV  Last 

administered on 1/21/18at 04:01;  Start 1/15/18 at 04:00;  Stop 1/21/18 at 11:25

;  Status DC


Albuterol/ Ipratropium (Duoneb Neb) 1 ampule Q4HR NEB  PRN NEB SOB/WHEEZING 

Last administered on 1/25/18at 01:35;  Start 1/15/18 at 04:00


Sodium Chloride (NS Flush) 2 ml UNSCH  PRN IV FLUSH FLUSH AFTER USING IV ACCESS 

Last administered on 1/23/18at 18:08;  Start 1/15/18 at 04:00


Sodium Chloride (NS Flush) 2 ml BID IV FLUSH  Last administered on 1/24/18at 20:

12;  Start 1/15/18 at 09:00


Ondansetron HCl (Zofran Inj) 4 mg Q6H  PRN IVP NAUSEA OR VOMITING Last 

administered on 1/19/18at 16:49;  Start 1/15/18 at 04:00


Acetaminophen (Tylenol) 650 mg Q6H  PRN PO FEVER/PAIN SCALE 1 TO 2 Last 

administered on 1/23/18at 08:12;  Start 1/15/18 at 04:00


Acetaminophen/ Hydrocodone Bitart (Norco  5-325 Mg) 1 tab Q4H  PRN PO PAIN 

SCALE 3 TO 5 Last administered on 1/21/18at 20:44;  Start 1/15/18 at 04:00


Morphine Sulfate (Morphine Inj) 2 mg Q3H  PRN IV PUSH chest pain or pain>3  

Last administered on 1/24/18at 23:18;  Start 1/15/18 at 04:00


Senna/Docusate Sodium (Theresa-Colace) 1 tab BID PO  Last administered on 1/25/ 18at 08:56;  Start 1/15/18 at 09:00


Magnesium Hydroxide (Milk Of Magnesia Liq) 30 ml Q12H  PRN PO Mild constipation 

Last administered on 1/23/18at 20:37;  Start 1/15/18 at 04:00


Sennosides (Senokot) 17.2 mg Q12H  PRN PO Moderate constipation;  Start 1/15/18 

at 04:00


Bisacodyl (Dulcolax Supp) 10 mg DAILY  PRN RECTAL SEVERE CONSITIPATION;  Start 1

/15/18 at 04:00


Lactulose (Lactulose Liq) 30 ml DAILY  PRN PO SEVERE CONSITIPATION Last 

administered on 1/24/18at 08:46;  Start 1/15/18 at 04:00


Aspirin (Aspirin Chew) 81 mg DAILY CHEW  Last administered on 1/25/18at 08:55;  

Start 1/15/18 at 09:00;  Status Future hold


Folic Acid (Folate) 1 mg DAILY PO  Last administered on 1/25/18at 08:55;  Start 

1/15/18 at 09:00


Gabapentin (Neurontin) 300 mg HS PO  Last administered on 1/24/18at 21:53;  

Start 1/15/18 at 21:00


Insulin Detemir (Levemir Inj) 15 units HS SQ  Last administered on 1/24/18at 22:

11;  Start 1/15/18 at 21:00


Metoprolol Tartrate (Lopressor) 50 mg Q12HR PO  Last administered on 1/18/18at 

09:00;  Start 1/15/18 at 09:00;  Stop 1/18/18 at 09:42;  Status DC


Nifedipine (Procardia) 30 mg TID PO  Last administered on 1/18/18at 09:01;  

Start 1/15/18 at 09:00;  Stop 1/18/18 at 09:42;  Status DC


Pravastatin Sodium (Pravachol) 40 mg HS PO  Last administered on 1/24/18at 21:52

;  Start 1/15/18 at 21:00


Morphine Sulfate (Morphine Inj) 4 mg ONCE  ONCE IV PUSH  Last administered on 1/

15/18at 04:34;  Start 1/15/18 at 04:15;  Stop 1/15/18 at 04:19;  Status DC


Ondansetron HCl (Zofran Inj) 4 mg ONCE  ONCE IV PUSH  Last administered on 1/15/

18at 04:32;  Start 1/15/18 at 04:15;  Stop 1/15/18 at 04:19;  Status DC


Influenza Virus Vaccine (Flu (Quadrivalent) Vaccine Inj) 0.5 ml ONCE ONCE IM ;  

Start 1/16/18 at 10:00;  Stop 1/16/18 at 10:01;  Status DC


Isosorbide Mononitrate (Imdur) 120 mg DAILY@07 PO  Last administered on 1/25/ 18at 07:23;  Start 1/18/18 at 07:00


Sodium Chloride 1,000 ml @  50 mls/hr Q20H IV  Last administered on 1/17/18at 23

:45;  Start 1/17/18 at 23:45;  Stop 1/24/18 at 09:11;  Status DC


Metoprolol Tartrate (Lopressor) 100 mg Q12HR PO  Last administered on 1/25/18at 

08:55;  Start 1/18/18 at 21:00


Amlodipine Besylate (Norvasc) 5 mg DAILY PO  Last administered on 1/19/18at 08:

12;  Start 1/18/18 at 13:00;  Stop 1/19/18 at 12:46;  Status DC


Ferrous Sulfate (Ferrous Sulfate) 325 mg BID PO  Last administered on 1/25/18at 

08:55;  Start 1/18/18 at 10:00


Polyethylene Glycol/ Electrolytes (Colyte Liq) 4,000 ml ONCE  ONCE PO  Last 

administered on 1/18/18at 16:04;  Start 1/18/18 at 16:00;  Stop 1/18/18 at 16:01

;  Status DC


Pantoprazole Sodium (Protonix Inj) 40 mg Q24H IV PUSH  Last administered on 1/21 /18at 08:38;  Start 1/19/18 at 09:00;  Stop 1/21/18 at 11:16;  Status DC


Furosemide (Lasix Inj) 40 mg ONCE  ONCE IV PUSH  Last administered on 1/18/18at 

23:07;  Start 1/18/18 at 22:15;  Stop 1/18/18 at 22:16;  Status DC


Nitroglycerin (Nitroglycerin 2% Oint) 1 inch Q6HR TOPICAL  Last administered on 

1/25/18at 05:53;  Start 1/19/18 at 01:40


Morphine Sulfate (Morphine Inj) 2 mg Q3H  PRN IV PUSH pain not relieved by ntg 

Last administered on 1/20/18at 18:41;  Start 1/19/18 at 01:30;  Stop 1/24/18 at 

15:44;  Status DC


Clonidine (Catapres) 0.1 mg ONCE  ONCE PO  Last administered on 1/19/18at 01:40

;  Start 1/19/18 at 01:30;  Stop 1/19/18 at 01:31;  Status DC


Magnesium Citrate (Citroma Liq) 300 ml ONCE  ONCE PO ;  Start 1/19/18 at 12:00;

  Stop 1/19/18 at 12:01;  Status DC


Magnesium Citrate (Citroma Liq) 300 ml ONCE  ONCE PO ;  Start 1/19/18 at 18:00;

  Stop 1/19/18 at 18:01;  Status DC


Bisacodyl (Dulcolax Ec) 10 mg DAILY@18,21 PO ;  Start 1/19/18 at 18:00;  Stop 1/ 19/18 at 21:03;  Status DC


Amlodipine Besylate (Norvasc) 10 mg DAILY PO  Last administered on 1/25/18at 08:

55;  Start 1/20/18 at 09:00


Clonidine (Catapres) 0.1 mg ONCE  ONCE PO  Last administered on 1/19/18at 15:26

;  Start 1/19/18 at 15:15;  Stop 1/19/18 at 15:16;  Status DC


Clonidine (Catapres) 0.1 mg Q12HR PO  Last administered on 1/21/18at 20:44;  

Start 1/19/18 at 21:00;  Stop 1/22/18 at 10:01;  Status DC


Clonidine (Catapres) 0.1 mg NOW  ONCE PO  Last administered on 1/19/18at 16:45;

  Start 1/19/18 at 16:45;  Stop 1/19/18 at 16:46;  Status DC


Clonidine (Catapres) 0.1 mg Q6H  PRN PO SBP>160, DBP>90 Last administered on 1/ 22/18at 03:53;  Start 1/20/18 at 19:15


Pantoprazole Sodium (Protonix) 40 mg DAILY PO  Last administered on 1/25/18at 08

:55;  Start 1/22/18 at 09:00


Azithromycin (Zithromax) 500 mg DAILY PO  Last administered on 1/25/18at 08:55;

  Start 1/22/18 at 09:00


Hydralazine HCl (Apresoline) 50 mg Q8HR PO  Last administered on 1/25/18at 05:53

;  Start 1/22/18 at 09:00


Sodium Chloride 1,000 ml @  100 mls/hr Q10H IV  Last administered on 1/22/18at 

10:44;  Start 1/22/18 at 08:54;  Stop 1/24/18 at 09:27;  Status DC


Clonidine (Catapres) 0.2 mg Q12HR PO  Last administered on 1/25/18at 08:55;  

Start 1/22/18 at 21:00


Sodium Chloride 1,000 ml @  75 mls/hr [id number] IV  Last administered on 1/22/ 18at 12:00;  Start 1/22/18 at 12:00;  Stop 1/22/18 at 22:30;  Status DC


Heparin Sodium/ Sodium Chloride 1,000 ml @  As Directed STK-MED ONCE .ROUTE ;  

Start 1/22/18 at 13:50;  Stop 1/22/18 at 13:51;  Status DC


Midazolam HCl (Versed Inj) 2 mg STK-MED ONCE .ROUTE ;  Start 1/22/18 at 13:50;  

Stop 1/22/18 at 13:51;  Status DC


Verapamil HCl (Isoptin Inj) 5 mg STK-MED ONCE .ROUTE ;  Start 1/22/18 at 13:50;

  Stop 1/22/18 at 13:51;  Status DC


Heparin Sodium (Porcine) (Heparin Inj) 10,000 units STK-MED ONCE .ROUTE ;  

Start 1/22/18 at 13:50;  Stop 1/22/18 at 13:51;  Status DC


Nitroglycerin 5 ml @ As Directed STK-MED ONCE .ROUTE ;  Start 1/22/18 at 13:50;

  Stop 1/22/18 at 13:51;  Status DC


Fentanyl Citrate (fentaNYL INJ) 100 mcg STK-MED ONCE .ROUTE ;  Start 1/22/18 at 

13:54;  Stop 1/22/18 at 13:55;  Status DC


Sodium Chloride 1,000 ml @  As Directed STK-MED ONCE IV ;  Start 1/19/18 at 12:

00;  Stop 1/22/18 at 13:55;  Status DC


Propofol (Diprivan  200 Mg/20 ml Inj) 400 mg STK-MED ONCE IV ;  Start 1/19/18 

at 12:00;  Stop 1/22/18 at 13:55;  Status DC


Lidocaine HCl (Xylocaine-Mpf 1% Inj) 5 ml STK-MED ONCE OTHER ;  Start 1/19/18 

at 12:00;  Stop 1/22/18 at 13:55;  Status DC


Miscellaneous Information 1 ONCE  ONCE XX ;  Start 1/22/18 at 14:45;  Stop 1/22/ 18 at 14:56;  Status DC


Bacitracin (Bacitracin Oint Packet) 0.9 gm ONCE  ONCE TOP ;  Start 1/22/18 at 14

:45;  Stop 1/22/18 at 14:54;  Status DC


Iohexol (OMNIPAQUE 350 INJ (Cath Lab)) 50 ml STK-MED ONCE OTHER ;  Start 1/22/ 18 at 15:18;  Stop 1/22/18 at 15:19;  Status DC


Sodium Chloride 1,000 ml @  50 mls/hr Q20H IV ;  Start 1/24/18 at 20:00;  Stop 1 /24/18 at 20:00;  Status DC


Acetaminophen/ Hydrocodone Bitart (Norco  5-325 Mg) 1 tab Q4H  PRN PO pain>3;  

Start 1/24/18 at 15:45


Sodium Chloride 1,000 ml @  50 mls/hr Q20H IV ;  Start 1/24/18 at 19:00;  Stop 1 /24/18 at 19:00;  Status DC


Sodium Chloride 1,000 ml @  100 mls/hr Q10H IV  Last administered on 1/24/18at 

20:08;  Start 1/24/18 at 19:00


Nitroglycerin (Nitrostat Sl) 0.4 mg Q5M  PRN SL SEE LABEL COMMENTS Last 

administered on 1/25/18at 00:29;  Start 1/25/18 at 00:15


Morphine Sulfate (Morphine Inj) 2 mg ONCE  PRN IV PUSH PAIN 1-10 Last 

administered on 1/25/18at 01:17;  Start 1/25/18 at 00:15;  Stop 1/25/18 at 07:30

;  Status DC


Nitroglycerin (Nitrostat Sl) 0.4 mg STK-MED ONCE SL ;  Start 1/25/18 at 00:04;  

Stop 1/25/18 at 00:05;  Status DC





A/P


Problem List:  


(1) Sepsis


ICD Code:  A41.9 - Sepsis, unspecified organism


(2) PNA (pneumonia)


ICD Code:  J18.9 - Pneumonia, unspecified organism


(3) Hypoxia


ICD Code:  R09.02 - Hypoxemia


(4) NSTEMI (non-ST elevated myocardial infarction)


ICD Code:  I21.4 - Non-ST elevation (NSTEMI) myocardial infarction


(5) CHF (congestive heart failure)


ICD Code:  I50.9 - Heart failure, unspecified


(6) Renal insufficiency


ICD Code:  N28.9 - Disorder of kidney and ureter, unspecified


(7) DM (diabetes mellitus)


ICD Code:  E11.9 - Type 2 diabetes mellitus without complications


(8) GI bleed


ICD Code:  K92.2 - Gastrointestinal hemorrhage, unspecified


Assessment and Plan


52-year-old female with





GI bleed-resolved


Anemia of acute blood loss


   Status post transfused with 2 units of packed red blood cells.


   Appreciate input from GI, status post panendoscopy with finding of gastritis 

EGD and colon polyps on colonoscopy pending biopsy report


   No Evidence of iron deficiency anemia


   Unremarkable small bowel follow-through


   PPI and continue to monitor H&H


   GI signed off





Sepsis


   Resolved, continue with current antibiotics including Rocephin and 

azithromycin





PNA


   Rocephin and azithromycin discontinued on 1/22 and continues to do well off 

of antibiotics.


   DuoNeb when necessary


   Maintain oxygen saturation above 92%





Hypoxia-resolved


   Continue with treatment as in above





Acute on Chronic diastolic CHF


   Echo 8/18/17 w/ EF 55-60%


   Currently off Lasix secondary to worsening renal function


   Continue with Imdur/BB





NSTEMI:  


   Post cardiac catheterization on 1/22 showing severe two-vessel disease of 

the right coronary artery in the mid LAD.  Recommended high risk PCI versus 

cardiac bypass versus medical management.  Creatinine continues to worsen 

cardiologist recommended medical management.





Atypical chest pain


   Chest pain occurs only when patient tried to get up.  The same chest pain is 

reproducible with palpation of her chest wall.  Pain is improving.  On pain 

medication when necessary.  Try to avoid NSAIDs since patient does have a 

recent NSTEMI and worsening kidney function.





Lightheadedness


   Orthostatics is normal.





Acute on chronic Stage IV CKD


   Management per nephrology


   Avoid nephrotoxins.  Creatinine continues to worsen.  Creatinine went to 

4.11 to 4.47 She is making the same urine output.


   Continue strict ins and outs 


    trend creatinine.





Hypertension


   Continue with Lopressor, Procardia, clonidine 2 times a day





DM:  


   Sliding scale w/ Accu-Cheks. stable. 





Tobacco Abuse: 


   Strongly recommended to stop smoking, no NicoDerm to avoid vasoconstriction.

  Ativan prn if needed


Discharge Planning


Due to worsening of kidney function patient is not a candidate for high risk 

PCI.  Will need to continue to monitor her renal function.











Yany Nathan MD Jan 25, 2018 11:07

## 2018-01-25 NOTE — EKG
Date Performed: 01/24/2018       Time Performed: 23:56:10

 

PTAGE:      52 years

 

EKG:      Sinus rhythm 

 

 Possible left atrial abnormality Left ventricular hypertrophy Inferior/lateral ST-T changes may be d
ue to hypertrophy and/or ischemia Abnormal ECG

 

PREVIOUS TRACING       : 01/19/2018 18.22 Since the prior tracing, there has been no significant resendiz


 

DOCTOR:   Colby Thacker  Interpretating Date/Time  01/25/2018 23:47:31

## 2018-01-26 VITALS
HEART RATE: 82 BPM | SYSTOLIC BLOOD PRESSURE: 147 MMHG | RESPIRATION RATE: 17 BRPM | OXYGEN SATURATION: 98 % | DIASTOLIC BLOOD PRESSURE: 73 MMHG

## 2018-01-26 VITALS — HEART RATE: 82 BPM

## 2018-01-26 VITALS
TEMPERATURE: 98.2 F | SYSTOLIC BLOOD PRESSURE: 141 MMHG | HEART RATE: 73 BPM | RESPIRATION RATE: 14 BRPM | OXYGEN SATURATION: 97 % | DIASTOLIC BLOOD PRESSURE: 77 MMHG

## 2018-01-26 VITALS
DIASTOLIC BLOOD PRESSURE: 75 MMHG | SYSTOLIC BLOOD PRESSURE: 144 MMHG | TEMPERATURE: 97 F | HEART RATE: 79 BPM | RESPIRATION RATE: 14 BRPM | OXYGEN SATURATION: 96 %

## 2018-01-26 VITALS — HEART RATE: 71 BPM

## 2018-01-26 VITALS
RESPIRATION RATE: 17 BRPM | SYSTOLIC BLOOD PRESSURE: 155 MMHG | OXYGEN SATURATION: 94 % | DIASTOLIC BLOOD PRESSURE: 75 MMHG | TEMPERATURE: 98.6 F | HEART RATE: 80 BPM

## 2018-01-26 VITALS
OXYGEN SATURATION: 98 % | TEMPERATURE: 98.6 F | SYSTOLIC BLOOD PRESSURE: 156 MMHG | HEART RATE: 77 BPM | RESPIRATION RATE: 19 BRPM | DIASTOLIC BLOOD PRESSURE: 81 MMHG

## 2018-01-26 VITALS — HEART RATE: 79 BPM

## 2018-01-26 VITALS
OXYGEN SATURATION: 96 % | SYSTOLIC BLOOD PRESSURE: 156 MMHG | RESPIRATION RATE: 14 BRPM | DIASTOLIC BLOOD PRESSURE: 76 MMHG | HEART RATE: 83 BPM | TEMPERATURE: 98.1 F

## 2018-01-26 VITALS — HEART RATE: 80 BPM

## 2018-01-26 VITALS — HEART RATE: 76 BPM

## 2018-01-26 VITALS — OXYGEN SATURATION: 94 %

## 2018-01-26 VITALS — HEART RATE: 72 BPM

## 2018-01-26 VITALS — HEART RATE: 83 BPM

## 2018-01-26 VITALS — HEART RATE: 78 BPM

## 2018-01-26 VITALS — HEART RATE: 77 BPM

## 2018-01-26 VITALS — HEART RATE: 74 BPM

## 2018-01-26 VITALS — HEART RATE: 81 BPM

## 2018-01-26 LAB
BUN SERPL-MCNC: 67 MG/DL (ref 7–18)
CALCIUM SERPL-MCNC: 8.4 MG/DL (ref 8.5–10.1)
CHLORIDE SERPL-SCNC: 101 MEQ/L (ref 98–107)
CREAT SERPL-MCNC: 5.32 MG/DL (ref 0.5–1)
ERYTHROCYTE [DISTWIDTH] IN BLOOD BY AUTOMATED COUNT: 14.4 % (ref 11.6–17.2)
GFR SERPLBLD BASED ON 1.73 SQ M-ARVRAT: 10 ML/MIN (ref 89–?)
GLUCOSE SERPL-MCNC: 151 MG/DL (ref 74–106)
HCO3 BLD-SCNC: 20.3 MEQ/L (ref 21–32)
HCT VFR BLD CALC: 27.9 % (ref 35–46)
HGB BLD-MCNC: 9.6 GM/DL (ref 11.6–15.3)
MCH RBC QN AUTO: 32.7 PG (ref 27–34)
MCHC RBC AUTO-ENTMCNC: 34.3 % (ref 32–36)
MCV RBC AUTO: 95.2 FL (ref 80–100)
PHOSPHATE SERPL-MCNC: 7.2 MG/DL (ref 2.5–4.9)
PLATELET # BLD: 341 TH/MM3 (ref 150–450)
PMV BLD AUTO: 8.2 FL (ref 7–11)
RBC # BLD AUTO: 2.93 MIL/MM3 (ref 4–5.3)
SODIUM SERPL-SCNC: 132 MEQ/L (ref 136–145)
WBC # BLD AUTO: 8.4 TH/MM3 (ref 4–11)

## 2018-01-26 PROCEDURE — 5A1D70Z PERFORMANCE OF URINARY FILTRATION, INTERMITTENT, LESS THAN 6 HOURS PER DAY: ICD-10-PCS | Performed by: INTERNAL MEDICINE

## 2018-01-26 PROCEDURE — 02HV33Z INSERTION OF INFUSION DEVICE INTO SUPERIOR VENA CAVA, PERCUTANEOUS APPROACH: ICD-10-PCS | Performed by: RADIOLOGY

## 2018-01-26 RX ADMIN — PHENYTOIN SODIUM PRN MLS/HR: 50 INJECTION INTRAMUSCULAR; INTRAVENOUS at 15:00

## 2018-01-26 RX ADMIN — HEPARIN SODIUM PRN UNITS: 1000 INJECTION, SOLUTION INTRAVENOUS; SUBCUTANEOUS at 15:00

## 2018-01-26 RX ADMIN — NITROGLYCERIN SCH INCH: 20 OINTMENT TOPICAL at 23:11

## 2018-01-26 RX ADMIN — NITROGLYCERIN SCH INCH: 20 OINTMENT TOPICAL at 05:09

## 2018-01-26 RX ADMIN — ISOSORBIDE MONONITRATE SCH MG: 60 TABLET, EXTENDED RELEASE ORAL at 05:09

## 2018-01-26 RX ADMIN — MORPHINE SULFATE PRN MG: 2 INJECTION, SOLUTION INTRAMUSCULAR; INTRAVENOUS at 11:12

## 2018-01-26 RX ADMIN — METOPROLOL TARTRATE SCH MG: 100 TABLET, FILM COATED ORAL at 08:54

## 2018-01-26 RX ADMIN — ASPIRIN 81 MG SCH MG: 81 TABLET ORAL at 08:54

## 2018-01-26 RX ADMIN — GABAPENTIN SCH MG: 300 CAPSULE ORAL at 21:17

## 2018-01-26 RX ADMIN — FOLIC ACID SCH MG: 1 TABLET ORAL at 08:54

## 2018-01-26 RX ADMIN — INSULIN ASPART SCH: 100 INJECTION, SOLUTION INTRAVENOUS; SUBCUTANEOUS at 21:16

## 2018-01-26 RX ADMIN — INSULIN ASPART SCH: 100 INJECTION, SOLUTION INTRAVENOUS; SUBCUTANEOUS at 11:43

## 2018-01-26 RX ADMIN — AZITHROMYCIN SCH MG: 250 TABLET, FILM COATED ORAL at 08:54

## 2018-01-26 RX ADMIN — NITROGLYCERIN SCH INCH: 20 OINTMENT TOPICAL at 17:36

## 2018-01-26 RX ADMIN — MORPHINE SULFATE PRN MG: 2 INJECTION, SOLUTION INTRAMUSCULAR; INTRAVENOUS at 21:22

## 2018-01-26 RX ADMIN — ACYCLOVIR SCH UNITS: 800 TABLET ORAL at 21:16

## 2018-01-26 RX ADMIN — INSULIN ASPART SCH: 100 INJECTION, SOLUTION INTRAVENOUS; SUBCUTANEOUS at 08:54

## 2018-01-26 RX ADMIN — Medication SCH ML: at 21:18

## 2018-01-26 RX ADMIN — MORPHINE SULFATE PRN MG: 2 INJECTION, SOLUTION INTRAMUSCULAR; INTRAVENOUS at 18:29

## 2018-01-26 RX ADMIN — SEVELAMER CARBONATE SCH MG: 800 TABLET, FILM COATED ORAL at 11:43

## 2018-01-26 RX ADMIN — NITROGLYCERIN SCH INCH: 20 OINTMENT TOPICAL at 11:43

## 2018-01-26 RX ADMIN — METOPROLOL TARTRATE SCH MG: 100 TABLET, FILM COATED ORAL at 21:17

## 2018-01-26 RX ADMIN — PRAVASTATIN SODIUM SCH MG: 40 TABLET ORAL at 21:17

## 2018-01-26 RX ADMIN — STANDARDIZED SENNA CONCENTRATE AND DOCUSATE SODIUM SCH TAB: 8.6; 5 TABLET, FILM COATED ORAL at 08:54

## 2018-01-26 RX ADMIN — SEVELAMER CARBONATE SCH MG: 800 TABLET, FILM COATED ORAL at 08:54

## 2018-01-26 RX ADMIN — FERROUS SULFATE TAB 325 MG (65 MG ELEMENTAL FE) SCH MG: 325 (65 FE) TAB at 08:54

## 2018-01-26 RX ADMIN — MORPHINE SULFATE PRN MG: 2 INJECTION, SOLUTION INTRAMUSCULAR; INTRAVENOUS at 04:01

## 2018-01-26 RX ADMIN — PANTOPRAZOLE SCH MG: 40 TABLET, DELAYED RELEASE ORAL at 08:54

## 2018-01-26 RX ADMIN — Medication SCH ML: at 08:56

## 2018-01-26 RX ADMIN — SEVELAMER CARBONATE SCH MG: 800 TABLET, FILM COATED ORAL at 17:36

## 2018-01-26 RX ADMIN — GENTAMICIN SULFATE PRN MG: 10 INJECTION, SOLUTION INTRAMUSCULAR; INTRAVENOUS at 15:00

## 2018-01-26 RX ADMIN — INSULIN ASPART SCH: 100 INJECTION, SOLUTION INTRAVENOUS; SUBCUTANEOUS at 17:36

## 2018-01-26 RX ADMIN — MAGNESIUM HYDROXIDE PRN ML: 400 SUSPENSION ORAL at 05:09

## 2018-01-26 RX ADMIN — STANDARDIZED SENNA CONCENTRATE AND DOCUSATE SODIUM SCH TAB: 8.6; 5 TABLET, FILM COATED ORAL at 21:17

## 2018-01-26 RX ADMIN — FERROUS SULFATE TAB 325 MG (65 MG ELEMENTAL FE) SCH MG: 325 (65 FE) TAB at 21:17

## 2018-01-26 NOTE — HHI.NPPN
Subjective


General Problems:  Anemia


Renal Failure:  Chronic, Acute, Stage III, Stage IV


Interval History


Her renal function is worse. Her urine output has dropped. 


 (Migdalia Camejo)





Review of Systems


General


Constitutional:  Fatigue


 (Migdalia Camejo)





Respiratory


Respiratory Remarks


Mild SOB


 (Migadlia Camejo)





Cardiovascular


Cardiac:  Chest Pain


Cardiac Remarks


resolved


 (Migdalia Camejo)





Gastrointestinal


GI Remarks


no abdominal pain


 (Migdalia Camejo)





Musculoskeletal


MS:  Pain/Stiffness


 (Migdalia Camejo)





Psych


Psych:  Depression, Anxiety


 (Migdalia Camejo)





Objective Data


Data





Vital Signs








  Date Time  Temp Pulse Resp B/P (MAP) Pulse Ox O2 Delivery O2 Flow Rate FiO2


 


1/26/18 08:00  79      


 


1/26/18 07:15     98 Nasal Cannula 2.00 


 


1/26/18 07:15 98.6 77 19 156/81 (106) 98   


 


1/26/18 07:00  71      


 


1/26/18 06:00  74      


 


1/26/18 05:00  80      


 


1/26/18 04:00      Nasal Cannula 2.00 


 


1/26/18 04:00 97.0 79 14 144/75 (98) 96   


 


1/26/18 04:00  80      


 


1/26/18 03:00  82      


 


1/26/18 02:00  74      


 


1/26/18 01:00  72      


 


1/26/18 00:00 98.2 73 14 141/77 (98) 97   


 


1/26/18 00:00      Room Air  


 


1/26/18 00:00  74      


 


1/25/18 23:00  70      


 


1/25/18 22:00  74      


 


1/25/18 21:00  74      


 


1/25/18 20:00  75      


 


1/25/18 20:00 97.6 76 14 142/74 (96) 97   


 


1/25/18 20:00      Room Air  


 


1/25/18 18:00  73      


 


1/25/18 17:22     98   21


 


1/25/18 17:00  69      


 


1/25/18 16:00  65      


 


1/25/18 15:00     98 Room Air  


 


1/25/18 15:00  67      


 


1/25/18 15:00 97.8 67 17 114/58 (76) 98   


 


1/25/18 14:00  68      


 


1/25/18 13:57    113/67 (82)    


 


1/25/18 13:00  70      


 


1/25/18 12:00  75      


 


1/25/18 11:00  79 20 159/79 (105) 97   


 


1/25/18 11:00  73      


 


1/25/18 11:00     97 Room Air  


 


1/25/18 10:00  78      


 


1/25/18 09:00  84      








 (Migdalia Camejo)


-:  


1/26/18 0503                                                                   

             1/26/18 0503








Physical Exam


General


Appearance:  Well Developed, No Acute Distress, Comfortable


 (Migdalia Camejo)





Eyes


Eye Exam:  Pupils Equal


 (Migdalia Camejo)





Throat


Throat Exam:  Oral Mucosa Pink & Moist


 (Migdalia Camejo)





Pulmonary


Resp Exam:  Breath Sounds Equal, No Distress, Crackles, Decreased Bases


 (Migdalia Camejo)





Cardiology


CV Exam:  Regular, Normal Sinus Rhythm, Good Perfusion


 (Migdalia Camejo)





Gastrointestinal/Abdomen


GI Exam:  Soft, Non-Tender, Bowel Sounds Present, Positive Bowel Movement, 

Distended


 (Migdalia Camejo)





Musculoskeletal


MS Exam:  Joints Intact, Normal Tone, Good Strength


 (Migdalia Camejo)





Integumentary


Skin Exam:  Clear, Warm, Dry, Intact


 (Migdalia Camejo)





Extremeties


Extremities Exam:  No Edema


 (Migdalia Camejo)





Neurologic


Neuro Exam:  Alert, Awake, Oriented, Speech Clear, Moving All Extremities


 (Migdalia Camejo)





Psychiatric


Psych Exam:  Appropriate Responses


 (Migdalia Camejo)





Assessment/Plan


Discussed Condition With:  Patient


Assessment Summary:  LETI/Acute Renal Failure, Anemia of CKD, Hypertension, 

Diabetes Mellitus, CKD Stage IV


Problem List:  


(1) Renal insufficiency


ICD Codes:  N28.9 - Disorder of kidney and ureter, unspecified


Plan:  She had underlying diabetic nephropathy, stage 4


Recent NSTEMI with cardiac cath, did receive a small amount of IV contrast on 1/ 22


Renal function is worse, she needs renal replacement therapy


Vascath placement today in IR


Dialysis today for 2 hrs, repeat tomorrow, most likely will be TTS 


Continue to monitor renal function, it is unclear if she needs permanent 

dialysis


Start Renvela with meals for metabolic bone disorder, hyperphosphatemia. 

Discussed indications, dosage, and timing with the patient. 


Monitor urine output, it has declined.


Avoid any nephrotoxic agents, stop non essential medications. 





We discussed options moving forward related to PD vs HD. She needs time to 

consider her options. Will follow up. 


We will also ask  assistance to initiate Medicare application. 





(2) NSTEMI (non-ST elevated myocardial infarction)


ICD Codes:  I21.4 - Non-ST elevation (NSTEMI) myocardial infarction


Plan:  Cardiology following, [facility] postponed.


given ASA,off  heparin gtt





(3) DM (diabetes mellitus)


ICD Codes:  E11.9 - Type 2 diabetes mellitus without complications


Plan:  Insulin as needed, maintain glucose 140-180mg/dL. 


If NPO use D10 @ 15 ml/hr to prevent hyperkalemia 





(4) Anemia


ICD Codes:  D64.9 - Anemia, unspecified


Status:  Chronic


Plan:  Start Epogen with dialysis 


no evidence of iron deficiency 


She was transfused this admission.





(5) CAD (coronary artery disease)


ICD Codes:  I25.10 - Atherosclerotic heart disease of native coronary artery 

without angina pectoris


Status:  Chronic


Plan:  Hx of stent in the past. New NSTEMI, see above 





(6) PNA (pneumonia)


ICD Codes:  J18.9 - Pneumonia, unspecified organism


Plan:  On PO Zithromax 


Follow clinically 





(7) HTN (hypertension)


ICD Codes:  I10 - Hypertension


Status:  Chronic


Plan:  On Imdur, metoprolol, clonidine, and Norvasc


BP will also improve with fluid removal. 


 (Migdalia Camejo)


Plan


patient was seen and examined. Agree with above assessment and plan. Dialysis 

today and tomorrow. Likely needs long term dialysis. She is interested in home 

dialysis. 


 (Kelton Whiteside MD)











Migdalia Camejo Jan 26, 2018 09:05


Kelton Whiteside MD Jan 26, 2018 13:58

## 2018-01-26 NOTE — HHI.PR
Subjective


Remarks


Follow-up for chest pain and acute renal failure


Patient came back from a Vas-Cath placement today.  She has decreased urine 

output.  Patient stated that chest pain is improving.  She had no other 

concerns.





Objective


Vitals





Vital Signs








  Date Time  Temp Pulse Resp B/P (MAP) Pulse Ox O2 Delivery O2 Flow Rate FiO2


 


1/26/18 14:00  72      


 


1/26/18 13:12     94   


 


1/26/18 13:00  76      


 


1/26/18 12:00  71      


 


1/26/18 11:28   19     


 


1/26/18 11:00 98.6 80 17 155/75 (101) 94   


 


1/26/18 11:00  80      


 


1/26/18 11:00     94 Room Air  


 


1/26/18 09:00  81      


 


1/26/18 08:00  79      


 


1/26/18 07:15     98 Nasal Cannula 2.00 


 


1/26/18 07:15 98.6 77 19 156/81 (106) 98   


 


1/26/18 07:00  71      


 


1/26/18 06:00  74      


 


1/26/18 05:00  80      


 


1/26/18 04:00      Nasal Cannula 2.00 


 


1/26/18 04:00 97.0 79 14 144/75 (98) 96   


 


1/26/18 04:00  80      


 


1/26/18 03:00  82      


 


1/26/18 02:00  74      


 


1/26/18 01:00  72      


 


1/26/18 00:00 98.2 73 14 141/77 (98) 97   


 


1/26/18 00:00      Room Air  


 


1/26/18 00:00  74      


 


1/25/18 23:00  70      


 


1/25/18 22:00  74      


 


1/25/18 21:00  74      


 


1/25/18 20:00  75      


 


1/25/18 20:00 97.6 76 14 142/74 (96) 97   


 


1/25/18 20:00      Room Air  


 


1/25/18 18:00  73      


 


1/25/18 17:22     98   21


 


1/25/18 17:00  69      


 


1/25/18 16:00  65      


 


1/25/18 15:00     98 Room Air  


 


1/25/18 15:00  67      


 


1/25/18 15:00 97.8 67 17 114/58 (76) 98   














I/O      


 


 1/25/18 1/25/18 1/25/18 1/26/18 1/26/18 1/26/18





 07:00 15:00 23:00 07:00 15:00 23:00


 


Intake Total 300 ml  720 ml 480 ml  


 


Output Total 300 ml   500 ml  


 


Balance 0 ml  720 ml -20 ml  


 


      


 


Intake Oral 300 ml  720 ml 480 ml  


 


Output Urine Total 300 ml   500 ml  


 


# Voids   3   








Result Diagram:  


1/26/18 0503                                                                   

             1/26/18 0503





Objective Remarks


GENERAL: in NAD


CARDIOVASCULAR: Regular rate and rhythm without murmurs, gallops, or rubs.  

Chest pain reproducible with palpation of the chest wall.  Right-sided Vas-Cath 

in place


RESPIRATORY: Breath sounds equal bilaterally. No accessory muscle use.


GASTROINTESTINAL: Abdomen soft, non-tender, nondistended. 


MUSCULOSKELETAL: No cyanosis, or edema.


Procedures


Panendoscopy 01/19/18


Medications and IVs





Current Medications


Aspirin (Aspirin Chew) 324 mg ONCE  ONCE PO  Last administered on 1/15/18at 01:

42;  Start 1/15/18 at 01:30;  Stop 1/15/18 at 01:31;  Status DC


Nitroglycerin (Nitroglycerin 2% Oint) 1 inch ONCE  ONCE TOP  Last administered 

on 1/15/18at 01:42;  Start 1/15/18 at 01:30;  Stop 1/15/18 at 01:31;  Status DC


Sodium Chloride (NS Flush) 2 ml UNSCH  PRN IVF FLUSH AFTER USING IV ACCESS Last 

administered on 1/15/18at 01:42;  Start 1/15/18 at 01:30;  Stop 1/18/18 at 09:58

;  Status DC


Nitroglycerin (Nitrostat Sl) 0.4 mg Q5M SL  Last administered on 1/15/18at 01:54

;  Start 1/15/18 at 01:30;  Stop 1/15/18 at 01:41;  Status DC


Heparin Sodium (Porcine) (Heparin Inj) 3,000 units ONCE  ONCE IV  Last 

administered on 1/15/18at 03:17;  Start 1/15/18 at 02:30;  Stop 1/15/18 at 02:31

;  Status DC


Heparin Sodium/ Dextrose 250 ml @ 7 mls/hr TITRATE  PRN IV Coagulation 

Management Last administered on 1/17/18at 11:11;  Start 1/15/18 at 02:30;  Stop 

1/17/18 at 23:52;  Status DC


Furosemide (Lasix Inj) 40 mg ONCE  ONCE IV PUSH  Last administered on 1/15/18at 

03:18;  Start 1/15/18 at 02:30;  Stop 1/15/18 at 02:31;  Status DC


Dextrose (D50w (Vial) Inj) 50 ml UNSCH  PRN IV PUSH HYPOGLYCEMIA-SEE COMMENTS;  

Start 1/15/18 at 04:00


Glucagon (Glucagon Inj) 1 mg UNSCH  PRN OTHER HYPOGLYCEMIA-SEE COMMENTS;  Start 

1/15/18 at 04:00


Insulin Aspart (NovoLOG SUPPLEMENTAL SCALE) 1 ACHS SLIDING  SCALE SQ  Last 

administered on 1/26/18at 11:43;  Start 1/15/18 at 08:00


Furosemide (Lasix Inj) 40 mg BID@09,18 IV PUSH  Last administered on 1/16/18at 

08:02;  Start 1/15/18 at 09:00;  Stop 1/16/18 at 17:09;  Status DC


Ceftriaxone Sodium 1000 mg/ Sodium Chloride 100 ml @  200 mls/hr Q24H IV  Last 

administered on 1/22/18at 05:13;  Start 1/15/18 at 05:00;  Stop 1/22/18 at 22:00

;  Status DC


Azithromycin 500 mg/Sodium Chloride 250 ml @  250 mls/hr Q24H IV  Last 

administered on 1/21/18at 04:01;  Start 1/15/18 at 04:00;  Stop 1/21/18 at 11:25

;  Status DC


Albuterol/ Ipratropium (Duoneb Neb) 1 ampule Q4HR NEB  PRN NEB SOB/WHEEZING 

Last administered on 1/25/18at 01:35;  Start 1/15/18 at 04:00


Sodium Chloride (NS Flush) 2 ml UNSCH  PRN IV FLUSH FLUSH AFTER USING IV ACCESS 

Last administered on 1/23/18at 18:08;  Start 1/15/18 at 04:00


Sodium Chloride (NS Flush) 2 ml BID IV FLUSH  Last administered on 1/26/18at 08:

56;  Start 1/15/18 at 09:00


Ondansetron HCl (Zofran Inj) 4 mg Q6H  PRN IVP NAUSEA OR VOMITING Last 

administered on 1/19/18at 16:49;  Start 1/15/18 at 04:00


Acetaminophen (Tylenol) 650 mg Q6H  PRN PO FEVER/PAIN SCALE 1 TO 2 Last 

administered on 1/23/18at 08:12;  Start 1/15/18 at 04:00


Acetaminophen/ Hydrocodone Bitart (Norco  5-325 Mg) 1 tab Q4H  PRN PO PAIN 

SCALE 3 TO 5 Last administered on 1/21/18at 20:44;  Start 1/15/18 at 04:00


Morphine Sulfate (Morphine Inj) 2 mg Q3H  PRN IV PUSH chest pain or pain>3  

Last administered on 1/26/18at 11:12;  Start 1/15/18 at 04:00


Senna/Docusate Sodium (Theresa-Colace) 1 tab BID PO  Last administered on 1/26/ 18at 08:54;  Start 1/15/18 at 09:00


Magnesium Hydroxide (Milk Of Magnesia Liq) 30 ml Q12H  PRN PO Mild constipation 

Last administered on 1/26/18at 05:09;  Start 1/15/18 at 04:00


Sennosides (Senokot) 17.2 mg Q12H  PRN PO Moderate constipation;  Start 1/15/18 

at 04:00


Bisacodyl (Dulcolax Supp) 10 mg DAILY  PRN RECTAL SEVERE CONSITIPATION;  Start 1

/15/18 at 04:00


Lactulose (Lactulose Liq) 30 ml DAILY  PRN PO SEVERE CONSITIPATION Last 

administered on 1/24/18at 08:46;  Start 1/15/18 at 04:00


Aspirin (Aspirin Chew) 81 mg DAILY CHEW  Last administered on 1/26/18at 08:54;  

Start 1/15/18 at 09:00;  Status Future hold


Folic Acid (Folate) 1 mg DAILY PO  Last administered on 1/26/18at 08:54;  Start 

1/15/18 at 09:00


Gabapentin (Neurontin) 300 mg HS PO  Last administered on 1/25/18at 20:29;  

Start 1/15/18 at 21:00


Insulin Detemir (Levemir Inj) 15 units HS SQ  Last administered on 1/25/18at 20:

30;  Start 1/15/18 at 21:00


Metoprolol Tartrate (Lopressor) 50 mg Q12HR PO  Last administered on 1/18/18at 

09:00;  Start 1/15/18 at 09:00;  Stop 1/18/18 at 09:42;  Status DC


Nifedipine (Procardia) 30 mg TID PO  Last administered on 1/18/18at 09:01;  

Start 1/15/18 at 09:00;  Stop 1/18/18 at 09:42;  Status DC


Pravastatin Sodium (Pravachol) 40 mg HS PO  Last administered on 1/25/18at 20:29

;  Start 1/15/18 at 21:00


Morphine Sulfate (Morphine Inj) 4 mg ONCE  ONCE IV PUSH  Last administered on 1/

15/18at 04:34;  Start 1/15/18 at 04:15;  Stop 1/15/18 at 04:19;  Status DC


Ondansetron HCl (Zofran Inj) 4 mg ONCE  ONCE IV PUSH  Last administered on 1/15/

18at 04:32;  Start 1/15/18 at 04:15;  Stop 1/15/18 at 04:19;  Status DC


Influenza Virus Vaccine (Flu (Quadrivalent) Vaccine Inj) 0.5 ml ONCE ONCE IM ;  

Start 1/16/18 at 10:00;  Stop 1/16/18 at 10:01;  Status DC


Isosorbide Mononitrate (Imdur) 120 mg DAILY@07 PO  Last administered on 1/26/ 18at 05:09;  Start 1/18/18 at 07:00


Sodium Chloride 1,000 ml @  50 mls/hr Q20H IV  Last administered on 1/17/18at 23

:45;  Start 1/17/18 at 23:45;  Stop 1/24/18 at 09:11;  Status DC


Metoprolol Tartrate (Lopressor) 100 mg Q12HR PO  Last administered on 1/26/18at 

08:54;  Start 1/18/18 at 21:00


Amlodipine Besylate (Norvasc) 5 mg DAILY PO  Last administered on 1/19/18at 08:

12;  Start 1/18/18 at 13:00;  Stop 1/19/18 at 12:46;  Status DC


Ferrous Sulfate (Ferrous Sulfate) 325 mg BID PO  Last administered on 1/26/18at 

08:54;  Start 1/18/18 at 10:00


Polyethylene Glycol/ Electrolytes (Colyte Liq) 4,000 ml ONCE  ONCE PO  Last 

administered on 1/18/18at 16:04;  Start 1/18/18 at 16:00;  Stop 1/18/18 at 16:01

;  Status DC


Pantoprazole Sodium (Protonix Inj) 40 mg Q24H IV PUSH  Last administered on 1/21 /18at 08:38;  Start 1/19/18 at 09:00;  Stop 1/21/18 at 11:16;  Status DC


Furosemide (Lasix Inj) 40 mg ONCE  ONCE IV PUSH  Last administered on 1/18/18at 

23:07;  Start 1/18/18 at 22:15;  Stop 1/18/18 at 22:16;  Status DC


Nitroglycerin (Nitroglycerin 2% Oint) 1 inch Q6HR TOPICAL  Last administered on 

1/26/18at 11:43;  Start 1/19/18 at 01:40


Morphine Sulfate (Morphine Inj) 2 mg Q3H  PRN IV PUSH pain not relieved by ntg 

Last administered on 1/20/18at 18:41;  Start 1/19/18 at 01:30;  Stop 1/24/18 at 

15:44;  Status DC


Clonidine (Catapres) 0.1 mg ONCE  ONCE PO  Last administered on 1/19/18at 01:40

;  Start 1/19/18 at 01:30;  Stop 1/19/18 at 01:31;  Status DC


Magnesium Citrate (Citroma Liq) 300 ml ONCE  ONCE PO ;  Start 1/19/18 at 12:00;

  Stop 1/19/18 at 12:01;  Status DC


Magnesium Citrate (Citroma Liq) 300 ml ONCE  ONCE PO ;  Start 1/19/18 at 18:00;

  Stop 1/19/18 at 18:01;  Status DC


Bisacodyl (Dulcolax Ec) 10 mg DAILY@18,21 PO ;  Start 1/19/18 at 18:00;  Stop 1/ 19/18 at 21:03;  Status DC


Amlodipine Besylate (Norvasc) 10 mg DAILY PO  Last administered on 1/26/18at 08:

54;  Start 1/20/18 at 09:00


Clonidine (Catapres) 0.1 mg ONCE  ONCE PO  Last administered on 1/19/18at 15:26

;  Start 1/19/18 at 15:15;  Stop 1/19/18 at 15:16;  Status DC


Clonidine (Catapres) 0.1 mg Q12HR PO  Last administered on 1/21/18at 20:44;  

Start 1/19/18 at 21:00;  Stop 1/22/18 at 10:01;  Status DC


Clonidine (Catapres) 0.1 mg NOW  ONCE PO  Last administered on 1/19/18at 16:45;

  Start 1/19/18 at 16:45;  Stop 1/19/18 at 16:46;  Status DC


Clonidine (Catapres) 0.1 mg Q6H  PRN PO SBP>160, DBP>90 Last administered on 1/ 22/18at 03:53;  Start 1/20/18 at 19:15


Pantoprazole Sodium (Protonix) 40 mg DAILY PO  Last administered on 1/26/18at 08

:54;  Start 1/22/18 at 09:00


Azithromycin (Zithromax) 500 mg DAILY PO  Last administered on 1/26/18at 08:54;

  Start 1/22/18 at 09:00


Hydralazine HCl (Apresoline) 50 mg Q8HR PO  Last administered on 1/26/18at 05:08

;  Start 1/22/18 at 09:00


Sodium Chloride 1,000 ml @  100 mls/hr Q10H IV  Last administered on 1/22/18at 

10:44;  Start 1/22/18 at 08:54;  Stop 1/24/18 at 09:27;  Status DC


Clonidine (Catapres) 0.2 mg Q12HR PO  Last administered on 1/26/18at 08:54;  

Start 1/22/18 at 21:00


Sodium Chloride 1,000 ml @  75 mls/hr [id number] IV  Last administered on 1/22/ 18at 12:00;  Start 1/22/18 at 12:00;  Stop 1/22/18 at 22:30;  Status DC


Heparin Sodium/ Sodium Chloride 1,000 ml @  As Directed STK-MED ONCE .ROUTE ;  

Start 1/22/18 at 13:50;  Stop 1/22/18 at 13:51;  Status DC


Midazolam HCl (Versed Inj) 2 mg STK-MED ONCE .ROUTE ;  Start 1/22/18 at 13:50;  

Stop 1/22/18 at 13:51;  Status DC


Verapamil HCl (Isoptin Inj) 5 mg STK-MED ONCE .ROUTE ;  Start 1/22/18 at 13:50;

  Stop 1/22/18 at 13:51;  Status DC


Heparin Sodium (Porcine) (Heparin Inj) 10,000 units STK-MED ONCE .ROUTE ;  

Start 1/22/18 at 13:50;  Stop 1/22/18 at 13:51;  Status DC


Nitroglycerin 5 ml @ As Directed STK-MED ONCE .ROUTE ;  Start 1/22/18 at 13:50;

  Stop 1/22/18 at 13:51;  Status DC


Fentanyl Citrate (fentaNYL INJ) 100 mcg STK-MED ONCE .ROUTE ;  Start 1/22/18 at 

13:54;  Stop 1/22/18 at 13:55;  Status DC


Sodium Chloride 1,000 ml @  As Directed STK-MED ONCE IV ;  Start 1/19/18 at 12:

00;  Stop 1/22/18 at 13:55;  Status DC


Propofol (Diprivan  200 Mg/20 ml Inj) 400 mg STK-MED ONCE IV ;  Start 1/19/18 

at 12:00;  Stop 1/22/18 at 13:55;  Status DC


Lidocaine HCl (Xylocaine-Mpf 1% Inj) 5 ml STK-MED ONCE OTHER ;  Start 1/19/18 

at 12:00;  Stop 1/22/18 at 13:55;  Status DC


Miscellaneous Information 1 ONCE  ONCE XX ;  Start 1/22/18 at 14:45;  Stop 1/22/ 18 at 14:56;  Status DC


Bacitracin (Bacitracin Oint Packet) 0.9 gm ONCE  ONCE TOP ;  Start 1/22/18 at 14

:45;  Stop 1/22/18 at 14:54;  Status DC


Iohexol (OMNIPAQUE 350 INJ (Cath Lab)) 50 ml STK-MED ONCE OTHER ;  Start 1/22/ 18 at 15:18;  Stop 1/22/18 at 15:19;  Status DC


Sodium Chloride 1,000 ml @  50 mls/hr Q20H IV ;  Start 1/24/18 at 20:00;  Stop 1 /24/18 at 20:00;  Status DC


Acetaminophen/ Hydrocodone Bitart (Norco  5-325 Mg) 1 tab Q4H  PRN PO pain>3;  

Start 1/24/18 at 15:45


Sodium Chloride 1,000 ml @  50 mls/hr Q20H IV ;  Start 1/24/18 at 19:00;  Stop 1 /24/18 at 19:00;  Status DC


Sodium Chloride 1,000 ml @  100 mls/hr Q10H IV  Last administered on 1/24/18at 

20:08;  Start 1/24/18 at 19:00;  Stop 1/25/18 at 12:26;  Status DC


Nitroglycerin (Nitrostat Sl) 0.4 mg Q5M  PRN SL SEE LABEL COMMENTS Last 

administered on 1/25/18at 00:29;  Start 1/25/18 at 00:15


Morphine Sulfate (Morphine Inj) 2 mg ONCE  PRN IV PUSH PAIN 1-10 Last 

administered on 1/25/18at 01:17;  Start 1/25/18 at 00:15;  Stop 1/25/18 at 07:30

;  Status DC


Nitroglycerin (Nitrostat Sl) 0.4 mg STK-MED ONCE SL ;  Start 1/25/18 at 00:04;  

Stop 1/25/18 at 00:05;  Status DC


Lorazepam (Ativan) 0.5 mg Q8HR  PRN PO anxiety Last administered on 1/26/18at 09

:38;  Start 1/25/18 at 11:45


Sevelamer Carbonate (Renvela) 800 mg TIDAC PO  Last administered on 1/26/18at 11

:43;  Start 1/25/18 at 17:00


Sodium Chloride 1,000 ml @  0 mls/hr Q0M PRN OTHER For Prime & Rinse Back;  

Start 1/26/18 at 08:51


Heparin Sodium (Porcine) (Heparin Inj) 8,000 units UNSCH  PRN IV FLUSH WITH 

DIALYSIS;  Start 1/26/18 at 09:00


Sodium Chloride 1,000 ml @  200 mls/hr Q5H PRN IV WITH DIALYSIS;  Start 1/26/18 

at 08:51


Sodium Chloride 1,000 ml @  0 mls/hr Q0M PRN OTHER WITH DIALYSIS;  Start 1/26/ 18 at 08:51


Mannitol (Mannitol Inj) 12.5 gm UNSCH  PRN IV WITH DIALYSIS;  Start 1/26/18 at 

09:00


Albumin Human 100 ml @  60 mls/hr UNSCH  PRN IV WITH DIALYSIS;  Start 1/26/18 

at 09:00


Sodium Chloride (NS Flush) 5 ml UNSCH  PRN IV FLUSH WITH DIALYSIS;  Start 1/26/ 18 at 09:00


Heparin Sodium (Porcine) (Heparin Inj)  UNSCH  PRN .XX WITH DIALYSIS;  Start 1/ 26/18 at 09:00


Gentamicin Sulfate (Gentamicin (Dialysis) Inj) 20 mg UNSCH  PRN OTHER WITH 

DIALYSIS;  Start 1/26/18 at 09:00


Ondansetron HCl (Zofran Inj) 4 mg UNSCH  PRN IV PUSH WITH DIALYSIS;  Start 1/26/ 18 at 09:00


Acetaminophen (Tylenol) 650 mg UNSCH  PRN PO for headach, pain, temp > 101F;  

Start 1/26/18 at 09:00


Diphenhydramine HCl (Benadryl) 25 mg UNSCH  PRN PO for hives/itching/anaphylaxis

;  Start 1/26/18 at 09:00


Nitroglycerin (Nitrostat Sl) 0.4 mg UNSCH  PRN SL CHEST PAIN;  Start 1/26/18 at 

09:00


Clonidine (Catapres) 0.1 mg UNSCH  PRN PO for BP > 180/100 X 2 readings;  Start 

1/26/18 at 09:00


Epoetin David (Epogen Inj) 10,000 units UNSCH  PRN IV PUSH WITH DIALYSIS;  Start 

1/26/18 at 09:00


Gelatin (Gelfoam 12 Mm/7 Mm Top) 1 foam UNSCH  PRN TOP SEE LABEL COMMENTS;  

Start 1/26/18 at 09:00


Heparin Sodium (Porcine) (*HEPARIN INJ Periprocedural ONLY) 10,000 units STK-

MED ONCE .ROUTE  Last administered on 1/26/18at 10:48;  Start 1/26/18 at 10:20;

  Stop 1/26/18 at 10:21;  Status DC


Sodium Chloride (NS Flush)  UNSCH  PRN IV FLUSH SEE PROTOCOL;  Start 1/26/18 at 

12:45


Heparin Sodium (Porcine) (Heparin Inj)  UNSCH  PRN IV FLUSH SEE PROTOCOL;  

Start 1/26/18 at 12:45





A/P


Problem List:  


(1) Sepsis


ICD Code:  A41.9 - Sepsis, unspecified organism


(2) PNA (pneumonia)


ICD Code:  J18.9 - Pneumonia, unspecified organism


(3) Hypoxia


ICD Code:  R09.02 - Hypoxemia


(4) NSTEMI (non-ST elevated myocardial infarction)


ICD Code:  I21.4 - Non-ST elevation (NSTEMI) myocardial infarction


(5) CHF (congestive heart failure)


ICD Code:  I50.9 - Heart failure, unspecified


(6) Renal insufficiency


ICD Code:  N28.9 - Disorder of kidney and ureter, unspecified


(7) DM (diabetes mellitus)


ICD Code:  E11.9 - Type 2 diabetes mellitus without complications


(8) GI bleed


ICD Code:  K92.2 - Gastrointestinal hemorrhage, unspecified


Assessment and Plan


52-year-old female with





GI bleed-resolved


Anemia of acute blood loss


   Status post transfused with 2 units of packed red blood cells.


   Appreciate input from GI, status post panendoscopy with finding of gastritis 

EGD and colon polyps on colonoscopy pending biopsy report


   No Evidence of iron deficiency anemia


   Unremarkable small bowel follow-through


   PPI and continue to monitor H&H


   GI signed off





Sepsis


   Resolved, continue with current antibiotics including Rocephin and 

azithromycin





PNA


   Rocephin and azithromycin discontinued on 1/22 and continues to do well off 

of antibiotics.


   DuoNeb when necessary


   Maintain oxygen saturation above 92%





Hypoxia-resolved


   Continue with treatment as in above





Acute on Chronic diastolic CHF


   Echo 8/18/17 w/ EF 55-60%


   Currently off Lasix secondary to worsening renal function


   Continue with Imdur/BB





NSTEMI:  


   Post cardiac catheterization on 1/22 showing severe two-vessel disease of 

the right coronary artery in the mid LAD.  Recommended high risk PCI versus 

cardiac bypass versus medical management.  Creatinine continues to worsen 

cardiologist recommended medical management.





Atypical chest pain


   Chest pain occurs only when patient tried to get up.  The same chest pain is 

reproducible with palpation of her chest wall.  Pain is improving.  On pain 

medication when necessary.  Avoid NSAIDs due to acute renal failure.  Due to 

acute renal failure.





Lightheadedness


   Orthostatics is normal.





Acute on chronic Stage IV CKD


   Management per nephrology


   Avoid nephrotoxins.  Creatinine continues to worsen.  Creatinine went to to 

4.47 to 5.32 .  Decreased urine output.


   Status post Vas-Cath done today on 1/26.  Patient will get dialysis today 

and tomorrow.  She may need a long-term dialysis.


   Continue strict ins and outs 


    trend creatinine.





Hypertension


   Continue with Lopressor, Procardia, clonidine 2 times a day





DM:  


   Sliding scale w/ Accu-Cheks. stable. 





Tobacco Abuse: 


   Strongly recommended to stop smoking, no NicoDerm to avoid vasoconstriction.

  Ativan prn if needed


Discharge Planning


Patient may need long-term dialysis.











Yany Nathan MD Jan 26, 2018 15:04

## 2018-01-26 NOTE — PD.RAD
Post Procedure Progress Note


Pre Procedure Diagnosis:  


(1) Renal insufficiency


Post Procedure Diagnosis:  


(1) Renal insufficiency


Procedure Date:


Jan 26, 2018


Supervising Radiologist:


Ren Stubbs


Proceduralist/Assist:  zAul Schreiber RT(R), RT Darlene(R)(VI)


Anesthesia:  Local


Plan of Activity


Patient to Unit:  Nursing Unit


Patient Condition:  Good


See PACS Report for procedural detail/treatment





Central Venous Access Device


Procedure 1


Right


Internal Jugular


Hemodialysis Catheter Non-Tunneled


Placement


dual lumen


English:  14


PICC Line Length (cm):  15











Ren Stubbs MD Jan 26, 2018 12:34

## 2018-01-26 NOTE — RADRPT
EXAM DATE/TIME:  01/26/2018 11:12 

 

[facility] COMPARISON:  

No previous studies available for comparison.

 

 

INDICATIONS :               

Patient with chronic kidney disease in need of non tunnelled dialysis catheter placement.

                     

 

MEDICAL HISTORY :     

HTN, HLD, Diabetes, CHF, CAD, MI

 

SURGICAL HISTORY :     

Cholecystectomy

 

ENCOUNTER:     

Initial

 

ACUITY:     

2 weeks

 

PAIN SCORE:     

0/10

                    

                     

 

FLUORO TIME:     

0.166 minutes

 

IMAGE SERIES:      

1

                     

 

ACCESS:     

Right internal jugular vein 

 

      

DEVICE(S):      

1.)  14 Nepali dual lumen 15 cm Schon catheter      

 

 

PROCEDURE :     

1.  Ultrasound guided venipuncture.

2.  Fluoroscopic guidance.

3.  Central line placement.

 

The risks, benefits and alternatives to the procedure were explained and verbal and written consent w
as obtained.  The site was prepped in sterile fashion.  Full sterile technique was used, including ca
p, mask, sterile gloves and gown and a large sterile sheet.  Hand hygiene and 2% chlorhexidine prep w
as utilized per protocol for cutaneous antisepsis with appropriate dry time for site.  Sterile gel an
d sterile probe cover were utilized for ultrasound guidance.

 

The skin and subcutaneous tissues were infiltrated with local anesthetic solution.  A suitable site a
louise the vein was selected with ultrasound and fluoroscopic guidance.  A small incision was made.  Th
e vein was accessed under direct ultrasound visualization using the micropuncture technique.  The kody
ropuncture set was exchanged for a 0.035 wire.  The tract was dilated.  The catheter was advanced int
o position under direct fluoroscopic visualization.  The catheter was fixed in place with suture and 
a sterile dressing was applied.

 

The patient tolerated the procedure well and there were no complications.

 

CONCLUSION:     Uncomplicated line placement as above.

 

 

 

 Ren Stubbs MD on January 26, 2018 at 16:38           

Board Certified Radiologist.

 This report was verified electronically.

## 2018-01-26 NOTE — PD.CARD.PN
Subjective


Subjective Remarks


Has some abdominal fullness which makes it hard to take a deep breath.  No 

chest pain overnight.  Vas-Cath ordered for today by nephrology.





Objective


Medications





Current Medications








 Medications


  (Trade)  Dose


 Ordered  Sig/Luis Armando


 Route  Start Time


 Stop Time Status Last Admin


 


  (D50w (Vial) Inj)  50 ml  UNSCH  PRN


 IV PUSH  1/15/18 04:00


     


 


 


  (Glucagon Inj)  1 mg  UNSCH  PRN


 OTHER  1/15/18 04:00


     


 


 


  (NovoLOG


 SUPPLEMENTAL


 SCALE)  1  ACHS SLIDING  SCALE


 SQ  1/15/18 08:00


    1/25/18 20:31


 


 


  (Duoneb Neb)  1 ampule  Q4HR NEB  PRN


 NEB  1/15/18 04:00


    1/25/18 01:35


 


 


  (NS Flush)  2 ml  UNSCH  PRN


 IV FLUSH  1/15/18 04:00


    1/23/18 18:08


 


 


  (NS Flush)  2 ml  BID


 IV FLUSH  1/15/18 09:00


    1/25/18 20:31


 


 


  (Zofran Inj)  4 mg  Q6H  PRN


 IVP  1/15/18 04:00


    1/19/18 16:49


 


 


  (Tylenol)  650 mg  Q6H  PRN


 PO  1/15/18 04:00


    1/23/18 08:12


 


 


  (Norco  5-325 Mg)  1 tab  Q4H  PRN


 PO  1/15/18 04:00


    1/21/18 20:44


 


 


  (Morphine Inj)  2 mg  Q3H  PRN


 IV PUSH  1/15/18 04:00


    1/26/18 04:01


 


 


  (Theresa-Colace)  1 tab  BID


 PO  1/15/18 09:00


    1/25/18 20:29


 


 


  (Milk Of


 Magnesia Liq)  30 ml  Q12H  PRN


 PO  1/15/18 04:00


    1/26/18 05:09


 


 


  (Senokot)  17.2 mg  Q12H  PRN


 PO  1/15/18 04:00


     


 


 


  (Dulcolax Supp)  10 mg  DAILY  PRN


 RECTAL  1/15/18 04:00


     


 


 


  (Lactulose Liq)  30 ml  DAILY  PRN


 PO  1/15/18 04:00


    1/24/18 08:46


 


 


  (Aspirin Chew)  81 mg  DAILY


 CHEW  1/15/18 09:00


   Future hold 1/25/18 08:55


 


 


  (Folate)  1 mg  DAILY


 PO  1/15/18 09:00


    1/25/18 08:55


 


 


  (Neurontin)  300 mg  HS


 PO  1/15/18 21:00


    1/25/18 20:29


 


 


  (Levemir Inj)  15 units  HS


 SQ  1/15/18 21:00


    1/25/18 20:30


 


 


  (Pravachol)  40 mg  HS


 PO  1/15/18 21:00


    1/25/18 20:29


 


 


  (Imdur)  120 mg  DAILY@07


 PO  1/18/18 07:00


    1/26/18 05:09


 


 


  (Lopressor)  100 mg  Q12HR


 PO  1/18/18 21:00


    1/25/18 20:29


 


 


  (Ferrous Sulfate)  325 mg  BID


 PO  1/18/18 10:00


    1/25/18 20:29


 


 


  (Nitroglycerin


 2% Oint)  1 inch  Q6HR


 TOPICAL  1/19/18 01:40


    1/26/18 05:09


 


 


  (Norvasc)  10 mg  DAILY


 PO  1/20/18 09:00


    1/25/18 08:55


 


 


  (Catapres)  0.1 mg  Q6H  PRN


 PO  1/20/18 19:15


    1/22/18 03:53


 


 


  (Protonix)  40 mg  DAILY


 PO  1/22/18 09:00


    1/25/18 08:55


 


 


  (Zithromax)  500 mg  DAILY


 PO  1/22/18 09:00


    1/25/18 08:55


 


 


  (Apresoline)  50 mg  Q8HR


 PO  1/22/18 09:00


    1/26/18 05:08


 


 


  (Catapres)  0.2 mg  Q12HR


 PO  1/22/18 21:00


    1/25/18 20:29


 


 


  (Norco  5-325 Mg)  1 tab  Q4H  PRN


 PO  1/24/18 15:45


     


 


 


  (Nitrostat Sl)  0.4 mg  Q5M  PRN


 SL  1/25/18 00:15


    1/25/18 00:29


 


 


  (Ativan)  0.5 mg  Q8HR  PRN


 PO  1/25/18 11:45


    1/25/18 12:00


 


 


  (Renvela)  800 mg  TIDAC


 PO  1/25/18 17:00


    1/25/18 17:44


 








Vital Signs / I&O





Vital Signs








  Date Time  Temp Pulse Resp B/P (MAP) Pulse Ox O2 Delivery O2 Flow Rate FiO2


 


1/26/18 07:15     98 Nasal Cannula 2.00 


 


1/26/18 07:15 98.6 77 19 156/81 (106) 98   


 


1/26/18 07:00  71      


 


1/26/18 06:00  74      


 


1/26/18 05:00  80      


 


1/26/18 04:00      Nasal Cannula 2.00 


 


1/26/18 04:00 97.0 79 14 144/75 (98) 96   


 


1/26/18 04:00  80      


 


1/26/18 03:00  82      


 


1/26/18 02:00  74      


 


1/26/18 01:00  72      


 


1/26/18 00:00 98.2 73 14 141/77 (98) 97   


 


1/26/18 00:00      Room Air  


 


1/26/18 00:00  74      


 


1/25/18 23:00  70      


 


1/25/18 22:00  74      


 


1/25/18 21:00  74      


 


1/25/18 20:00  75      


 


1/25/18 20:00 97.6 76 14 142/74 (96) 97   


 


1/25/18 20:00      Room Air  


 


1/25/18 18:00  73      


 


1/25/18 17:22     98   21


 


1/25/18 17:00  69      


 


1/25/18 16:00  65      


 


1/25/18 15:00     98 Room Air  


 


1/25/18 15:00  67      


 


1/25/18 15:00 97.8 67 17 114/58 (76) 98   


 


1/25/18 14:00  68      


 


1/25/18 13:57    113/67 (82)    


 


1/25/18 13:00  70      


 


1/25/18 12:00  75      


 


1/25/18 11:00  79 20 159/79 (105) 97   


 


1/25/18 11:00  73      


 


1/25/18 11:00     97 Room Air  


 


1/25/18 10:00  78      


 


1/25/18 09:00  84      


 


1/25/18 08:25     93   


 


1/25/18 08:00  80      














I/O      


 


 1/25/18 1/25/18 1/25/18 1/26/18 1/26/18 1/26/18





 07:00 15:00 23:00 07:00 15:00 23:00


 


Intake Total 300 ml  720 ml 480 ml  


 


Output Total 300 ml   500 ml  


 


Balance 0 ml  720 ml -20 ml  


 


      


 


Intake Oral 300 ml  720 ml 480 ml  


 


Output Urine Total 300 ml   500 ml  


 


# Voids   3   








Physical Exam


GENERAL: Well-developed well-nourished.  In no acute distress.


NECK: No carotid bruits.  No JVD.  


CARDIOVASCULAR: Regular rate and rhythm.  No murmur appreciated.


RESPIRATORY: No accessory muscle use. Clear to auscultation. Breath sounds 

equal bilaterally. 


MUSCULOSKELETAL: No clubbing or cyanosis.  Trace lower extremity edema. 


NEUROLOGICAL: Awake and alert. Normal speech.


Laboratory





Laboratory Tests








Test


  1/26/18


05:03


 


White Blood Count 8.4 TH/MM3 


 


Red Blood Count 2.93 MIL/MM3 


 


Hemoglobin 9.6 GM/DL 


 


Hematocrit 27.9 % 


 


Mean Corpuscular Volume 95.2 FL 


 


Mean Corpuscular Hemoglobin 32.7 PG 


 


Mean Corpuscular Hemoglobin


Concent 34.3 % 


 


 


Red Cell Distribution Width 14.4 % 


 


Platelet Count 341 TH/MM3 


 


Mean Platelet Volume 8.2 FL 


 


Blood Urea Nitrogen 67 MG/DL 


 


Creatinine 5.32 MG/DL 


 


Random Glucose 151 MG/DL 


 


Calcium Level 8.4 MG/DL 


 


Phosphorus Level 7.2 MG/DL 


 


Sodium Level 132 MEQ/L 


 


Potassium Level 5.0 MEQ/L 


 


Chloride Level 101 MEQ/L 


 


Carbon Dioxide Level 20.3 MEQ/L 


 


Anion Gap 11 MEQ/L 


 


Estimat Glomerular Filtration


Rate 10 ML/MIN 


 








Imaging





Last Impressions








Small Bowel X-Ray 1/19/18 0000 Signed





Impressions: 





 Service Date/Time:  Friday, January 19, 2018 10:44 - CONCLUSION: Unremarkable 





 small bowel examination.     Boo Ricardo MD 


 


Chest X-Ray 1/18/18 0000 Signed





Impressions: 





 Service Date/Time:  Thursday, January 18, 2018 10:47 - CONCLUSION:  Improved 





 infiltrates in both lung bases.     Edgardo Mullins MD 











Assessment and Plan


Problem List:  


(1) NSTEMI (non-ST elevated myocardial infarction)


ICD Codes:  I21.4 - Non-ST elevation (NSTEMI) myocardial infarction


(2) CHF (congestive heart failure)


ICD Codes:  I50.9 - Heart failure, unspecified


(3) Renal insufficiency


ICD Codes:  N28.9 - Renal insufficiency


Status:  Acute


(4) Anemia


ICD Codes:  D64.9 - Anemia, unspecified


Status:  Chronic


(5) DM (diabetes mellitus)


ICD Codes:  E11.9 - Type 2 diabetes mellitus without complications


(6) Renal insufficiency


ICD Codes:  N28.9 - Disorder of kidney and ureter, unspecified


(7) HTN (hypertension)


ICD Codes:  I10 - Hypertension


Status:  Chronic


Assessment and Plan


51 yo AAF with history of CAD, cardiac stent placed ~ 10 years ago, CKD IV, CHF 

and diabetes admitted for progressive SOB and chest pain. 





Normocytic anemia: Hemoglobin improved after transfusion.  Currently stable.  

GI signed off.





Coronary artery disease: LHC 1/22/18 showed severe 2 vessel native coronary 

artery disease involving the right coronary artery and mid left anterior 

descending.  Creatinine worsening, will need to discuss options, any PCI almost 

certainly will result in HD, similarly with CABG.  Continue Imdur, amlodipine, 

metoprolol, aspirin, statin.


Echo with normal systolic function and EF 55-60%, Moderate LVH, trace MR, MN, 

and TR.





CKD IV: Creatinine increasing, Vas-Cath ordered by nephrology.  Nephrology on 

board.











King Ervin Jan 26, 2018 07:50

## 2018-01-27 VITALS — HEART RATE: 72 BPM

## 2018-01-27 VITALS
SYSTOLIC BLOOD PRESSURE: 144 MMHG | RESPIRATION RATE: 14 BRPM | HEART RATE: 80 BPM | TEMPERATURE: 98 F | OXYGEN SATURATION: 97 % | DIASTOLIC BLOOD PRESSURE: 75 MMHG

## 2018-01-27 VITALS
DIASTOLIC BLOOD PRESSURE: 78 MMHG | RESPIRATION RATE: 16 BRPM | HEART RATE: 80 BPM | OXYGEN SATURATION: 98 % | TEMPERATURE: 98.5 F | SYSTOLIC BLOOD PRESSURE: 151 MMHG

## 2018-01-27 VITALS — HEART RATE: 77 BPM

## 2018-01-27 VITALS
HEART RATE: 77 BPM | SYSTOLIC BLOOD PRESSURE: 147 MMHG | TEMPERATURE: 98.9 F | OXYGEN SATURATION: 97 % | RESPIRATION RATE: 18 BRPM | DIASTOLIC BLOOD PRESSURE: 73 MMHG

## 2018-01-27 VITALS
OXYGEN SATURATION: 97 % | RESPIRATION RATE: 14 BRPM | DIASTOLIC BLOOD PRESSURE: 81 MMHG | TEMPERATURE: 98.2 F | SYSTOLIC BLOOD PRESSURE: 156 MMHG | HEART RATE: 76 BPM

## 2018-01-27 VITALS — HEART RATE: 80 BPM

## 2018-01-27 VITALS — HEART RATE: 74 BPM

## 2018-01-27 VITALS — HEART RATE: 78 BPM

## 2018-01-27 VITALS — HEART RATE: 70 BPM

## 2018-01-27 VITALS — HEART RATE: 76 BPM

## 2018-01-27 VITALS
SYSTOLIC BLOOD PRESSURE: 133 MMHG | RESPIRATION RATE: 18 BRPM | DIASTOLIC BLOOD PRESSURE: 65 MMHG | OXYGEN SATURATION: 98 % | TEMPERATURE: 98.3 F | HEART RATE: 75 BPM

## 2018-01-27 LAB
ALBUMIN SERPL-MCNC: 2.1 GM/DL (ref 3.4–5)
BUN SERPL-MCNC: 48 MG/DL (ref 7–18)
CALCIUM SERPL-MCNC: 8 MG/DL (ref 8.5–10.1)
CHLORIDE SERPL-SCNC: 104 MEQ/L (ref 98–107)
CREAT SERPL-MCNC: 4.08 MG/DL (ref 0.5–1)
ERYTHROCYTE [DISTWIDTH] IN BLOOD BY AUTOMATED COUNT: 14.8 % (ref 11.6–17.2)
GFR SERPLBLD BASED ON 1.73 SQ M-ARVRAT: 14 ML/MIN (ref 89–?)
GLUCOSE SERPL-MCNC: 83 MG/DL (ref 74–106)
HCO3 BLD-SCNC: 26.7 MEQ/L (ref 21–32)
HCT VFR BLD CALC: 24.9 % (ref 35–46)
HGB BLD-MCNC: 8.7 GM/DL (ref 11.6–15.3)
MCH RBC QN AUTO: 32.8 PG (ref 27–34)
MCHC RBC AUTO-ENTMCNC: 34.8 % (ref 32–36)
MCV RBC AUTO: 94.4 FL (ref 80–100)
PHOSPHATE SERPL-MCNC: 4.5 MG/DL (ref 2.5–4.9)
PLATELET # BLD: 303 TH/MM3 (ref 150–450)
PMV BLD AUTO: 7.9 FL (ref 7–11)
RBC # BLD AUTO: 2.64 MIL/MM3 (ref 4–5.3)
SODIUM SERPL-SCNC: 136 MEQ/L (ref 136–145)
WBC # BLD AUTO: 7.2 TH/MM3 (ref 4–11)

## 2018-01-27 RX ADMIN — PANTOPRAZOLE SCH MG: 40 TABLET, DELAYED RELEASE ORAL at 08:25

## 2018-01-27 RX ADMIN — FOLIC ACID SCH MG: 1 TABLET ORAL at 12:17

## 2018-01-27 RX ADMIN — Medication SCH ML: at 08:26

## 2018-01-27 RX ADMIN — SEVELAMER CARBONATE SCH MG: 800 TABLET, FILM COATED ORAL at 08:00

## 2018-01-27 RX ADMIN — SEVELAMER CARBONATE SCH MG: 800 TABLET, FILM COATED ORAL at 12:21

## 2018-01-27 RX ADMIN — WATER PRN ML: 1 IRRIGANT IRRIGATION at 16:53

## 2018-01-27 RX ADMIN — NITROGLYCERIN SCH INCH: 20 OINTMENT TOPICAL at 16:57

## 2018-01-27 RX ADMIN — ACYCLOVIR SCH UNITS: 800 TABLET ORAL at 21:00

## 2018-01-27 RX ADMIN — ISOSORBIDE MONONITRATE SCH MG: 60 TABLET, EXTENDED RELEASE ORAL at 12:16

## 2018-01-27 RX ADMIN — ISOSORBIDE MONONITRATE SCH MG: 60 TABLET, EXTENDED RELEASE ORAL at 05:08

## 2018-01-27 RX ADMIN — PRAVASTATIN SODIUM SCH MG: 40 TABLET ORAL at 21:32

## 2018-01-27 RX ADMIN — Medication SCH ML: at 21:32

## 2018-01-27 RX ADMIN — INSULIN ASPART SCH: 100 INJECTION, SOLUTION INTRAVENOUS; SUBCUTANEOUS at 16:56

## 2018-01-27 RX ADMIN — INSULIN ASPART SCH: 100 INJECTION, SOLUTION INTRAVENOUS; SUBCUTANEOUS at 07:30

## 2018-01-27 RX ADMIN — GENTAMICIN SULFATE PRN MG: 10 INJECTION, SOLUTION INTRAMUSCULAR; INTRAVENOUS at 12:03

## 2018-01-27 RX ADMIN — HEPARIN SODIUM PRN UNITS: 1000 INJECTION, SOLUTION INTRAVENOUS; SUBCUTANEOUS at 12:02

## 2018-01-27 RX ADMIN — SEVELAMER CARBONATE SCH MG: 800 TABLET, FILM COATED ORAL at 16:52

## 2018-01-27 RX ADMIN — GABAPENTIN SCH MG: 300 CAPSULE ORAL at 21:32

## 2018-01-27 RX ADMIN — STANDARDIZED SENNA CONCENTRATE AND DOCUSATE SODIUM SCH TAB: 8.6; 5 TABLET, FILM COATED ORAL at 21:32

## 2018-01-27 RX ADMIN — AZITHROMYCIN SCH MG: 250 TABLET, FILM COATED ORAL at 12:17

## 2018-01-27 RX ADMIN — PHENYTOIN SODIUM PRN MLS/HR: 50 INJECTION INTRAMUSCULAR; INTRAVENOUS at 12:03

## 2018-01-27 RX ADMIN — INSULIN ASPART SCH: 100 INJECTION, SOLUTION INTRAVENOUS; SUBCUTANEOUS at 21:00

## 2018-01-27 RX ADMIN — FERROUS SULFATE TAB 325 MG (65 MG ELEMENTAL FE) SCH MG: 325 (65 FE) TAB at 21:32

## 2018-01-27 RX ADMIN — METOPROLOL TARTRATE SCH MG: 100 TABLET, FILM COATED ORAL at 12:17

## 2018-01-27 RX ADMIN — NITROGLYCERIN SCH INCH: 20 OINTMENT TOPICAL at 05:08

## 2018-01-27 RX ADMIN — NITROGLYCERIN SCH INCH: 20 OINTMENT TOPICAL at 12:22

## 2018-01-27 RX ADMIN — STANDARDIZED SENNA CONCENTRATE AND DOCUSATE SODIUM SCH TAB: 8.6; 5 TABLET, FILM COATED ORAL at 12:17

## 2018-01-27 RX ADMIN — FERROUS SULFATE TAB 325 MG (65 MG ELEMENTAL FE) SCH MG: 325 (65 FE) TAB at 12:16

## 2018-01-27 RX ADMIN — METOPROLOL TARTRATE SCH MG: 100 TABLET, FILM COATED ORAL at 21:32

## 2018-01-27 RX ADMIN — FUROSEMIDE SCH MG: 20 TABLET ORAL at 16:52

## 2018-01-27 RX ADMIN — ASPIRIN 81 MG SCH MG: 81 TABLET ORAL at 08:26

## 2018-01-27 RX ADMIN — INSULIN ASPART SCH: 100 INJECTION, SOLUTION INTRAVENOUS; SUBCUTANEOUS at 12:00

## 2018-01-27 NOTE — HHI.PR
Subjective


Remarks


Follow-up for acute renal failure and chest pain


Patient denied any chest pain.  States feeling better.  Patient stated that she 

is ambulating.


patient seen dialysis.





Objective


Vitals





Vital Signs








  Date Time  Temp Pulse Resp B/P (MAP) Pulse Ox O2 Delivery O2 Flow Rate FiO2


 


1/27/18 08:00  77      


 


1/27/18 06:00  77      


 


1/27/18 05:00  76      


 


1/27/18 04:00      Room Air  


 


1/27/18 04:00 98.2 76 14 156/81 (106) 97   


 


1/27/18 04:00  76      


 


1/27/18 03:00  74      


 


1/27/18 02:00  76      


 


1/27/18 01:00  76      


 


1/27/18 00:00  79      


 


1/27/18 00:00 98.0 80 14 144/75 (98) 97   


 


1/27/18 00:00      Room Air  


 


1/26/18 23:00  78      


 


1/26/18 22:00  82      


 


1/26/18 21:00  80      


 


1/26/18 20:00      Room Air  


 


1/26/18 20:00 98.1 83 14 156/76 (102) 96   


 


1/26/18 20:00  82      


 


1/26/18 18:35   18     


 


1/26/18 18:00  83      


 


1/26/18 17:00  83      


 


1/26/18 16:26     98 Room Air  


 


1/26/18 16:26  82 17 147/73 (97) 98   


 


1/26/18 16:00  77      


 


1/26/18 15:00  71      


 


1/26/18 14:00  72      


 


1/26/18 13:12     94   


 


1/26/18 13:00  76      














I/O      


 


 1/26/18 1/26/18 1/26/18 1/27/18 1/27/18 1/27/18





 07:00 15:00 23:00 07:00 15:00 23:00


 


Intake Total 480 ml  360 ml 240 ml  


 


Output Total 500 ml  2000 ml 580 ml 2000 ml 


 


Balance -20 ml  -1640 ml -340 ml -2000 ml 


 


      


 


Intake Oral 480 ml  360 ml 240 ml  


 


Output Urine Total 500 ml   580 ml  


 


Hemodialysis   2000 ml  2000 ml 


 


# Voids   2   








Result Diagram:  


1/27/18 0555 1/27/18 0555





Objective Remarks


GENERAL: in NAD


CARDIOVASCULAR: Regular rate and rhythm without murmurs, gallops, or rubs.  

Chest pain reproducible with palpation of the chest wall.  Right-sided Vas-Cath 

in place


RESPIRATORY: Breath sounds equal bilaterally. No accessory muscle use.


GASTROINTESTINAL: Abdomen soft, non-tender, nondistended. 


MUSCULOSKELETAL: No cyanosis, or edema.


Procedures


Panendoscopy 01/19/18


Medications and IVs





Current Medications


Aspirin (Aspirin Chew) 324 mg ONCE  ONCE PO  Last administered on 1/15/18at 01:

42;  Start 1/15/18 at 01:30;  Stop 1/15/18 at 01:31;  Status DC


Nitroglycerin (Nitroglycerin 2% Oint) 1 inch ONCE  ONCE TOP  Last administered 

on 1/15/18at 01:42;  Start 1/15/18 at 01:30;  Stop 1/15/18 at 01:31;  Status DC


Sodium Chloride (NS Flush) 2 ml UNSCH  PRN IVF FLUSH AFTER USING IV ACCESS Last 

administered on 1/15/18at 01:42;  Start 1/15/18 at 01:30;  Stop 1/18/18 at 09:58

;  Status DC


Nitroglycerin (Nitrostat Sl) 0.4 mg Q5M SL  Last administered on 1/15/18at 01:54

;  Start 1/15/18 at 01:30;  Stop 1/15/18 at 01:41;  Status DC


Heparin Sodium (Porcine) (Heparin Inj) 3,000 units ONCE  ONCE IV  Last 

administered on 1/15/18at 03:17;  Start 1/15/18 at 02:30;  Stop 1/15/18 at 02:31

;  Status DC


Heparin Sodium/ Dextrose 250 ml @ 7 mls/hr TITRATE  PRN IV Coagulation 

Management Last administered on 1/17/18at 11:11;  Start 1/15/18 at 02:30;  Stop 

1/17/18 at 23:52;  Status DC


Furosemide (Lasix Inj) 40 mg ONCE  ONCE IV PUSH  Last administered on 1/15/18at 

03:18;  Start 1/15/18 at 02:30;  Stop 1/15/18 at 02:31;  Status DC


Dextrose (D50w (Vial) Inj) 50 ml UNSCH  PRN IV PUSH HYPOGLYCEMIA-SEE COMMENTS;  

Start 1/15/18 at 04:00


Glucagon (Glucagon Inj) 1 mg UNSCH  PRN OTHER HYPOGLYCEMIA-SEE COMMENTS;  Start 

1/15/18 at 04:00


Insulin Aspart (NovoLOG SUPPLEMENTAL SCALE) 1 ACHS SLIDING  SCALE SQ  Last 

administered on 1/26/18at 21:16;  Start 1/15/18 at 08:00


Furosemide (Lasix Inj) 40 mg BID@09,18 IV PUSH  Last administered on 1/16/18at 

08:02;  Start 1/15/18 at 09:00;  Stop 1/16/18 at 17:09;  Status DC


Ceftriaxone Sodium 1000 mg/ Sodium Chloride 100 ml @  200 mls/hr Q24H IV  Last 

administered on 1/22/18at 05:13;  Start 1/15/18 at 05:00;  Stop 1/22/18 at 22:00

;  Status DC


Azithromycin 500 mg/Sodium Chloride 250 ml @  250 mls/hr Q24H IV  Last 

administered on 1/21/18at 04:01;  Start 1/15/18 at 04:00;  Stop 1/21/18 at 11:25

;  Status DC


Albuterol/ Ipratropium (Duoneb Neb) 1 ampule Q4HR NEB  PRN NEB SOB/WHEEZING 

Last administered on 1/25/18at 01:35;  Start 1/15/18 at 04:00


Sodium Chloride (NS Flush) 2 ml UNSCH  PRN IV FLUSH FLUSH AFTER USING IV ACCESS 

Last administered on 1/23/18at 18:08;  Start 1/15/18 at 04:00


Sodium Chloride (NS Flush) 2 ml BID IV FLUSH  Last administered on 1/27/18at 08:

26;  Start 1/15/18 at 09:00


Ondansetron HCl (Zofran Inj) 4 mg Q6H  PRN IVP NAUSEA OR VOMITING Last 

administered on 1/19/18at 16:49;  Start 1/15/18 at 04:00


Acetaminophen (Tylenol) 650 mg Q6H  PRN PO FEVER/PAIN SCALE 1 TO 2 Last 

administered on 1/23/18at 08:12;  Start 1/15/18 at 04:00


Acetaminophen/ Hydrocodone Bitart (Norco  5-325 Mg) 1 tab Q4H  PRN PO PAIN 

SCALE 3 TO 5 Last administered on 1/21/18at 20:44;  Start 1/15/18 at 04:00


Morphine Sulfate (Morphine Inj) 2 mg Q3H  PRN IV PUSH chest pain or pain>3  

Last administered on 1/26/18at 21:22;  Start 1/15/18 at 04:00


Senna/Docusate Sodium (Theresa-Colace) 1 tab BID PO  Last administered on 1/27/ 18at 12:17;  Start 1/15/18 at 09:00


Magnesium Hydroxide (Milk Of Magnesia Liq) 30 ml Q12H  PRN PO Mild constipation 

Last administered on 1/26/18at 05:09;  Start 1/15/18 at 04:00


Sennosides (Senokot) 17.2 mg Q12H  PRN PO Moderate constipation;  Start 1/15/18 

at 04:00


Bisacodyl (Dulcolax Supp) 10 mg DAILY  PRN RECTAL SEVERE CONSITIPATION;  Start 1

/15/18 at 04:00


Lactulose (Lactulose Liq) 30 ml DAILY  PRN PO SEVERE CONSITIPATION Last 

administered on 1/24/18at 08:46;  Start 1/15/18 at 04:00


Aspirin (Aspirin Chew) 81 mg DAILY CHEW  Last administered on 1/27/18at 08:26;  

Start 1/15/18 at 09:00;  Status Future hold


Folic Acid (Folate) 1 mg DAILY PO  Last administered on 1/27/18at 12:17;  Start 

1/15/18 at 09:00


Gabapentin (Neurontin) 300 mg HS PO  Last administered on 1/26/18at 21:17;  

Start 1/15/18 at 21:00


Insulin Detemir (Levemir Inj) 15 units HS SQ  Last administered on 1/26/18at 21:

16;  Start 1/15/18 at 21:00


Metoprolol Tartrate (Lopressor) 50 mg Q12HR PO  Last administered on 1/18/18at 

09:00;  Start 1/15/18 at 09:00;  Stop 1/18/18 at 09:42;  Status DC


Nifedipine (Procardia) 30 mg TID PO  Last administered on 1/18/18at 09:01;  

Start 1/15/18 at 09:00;  Stop 1/18/18 at 09:42;  Status DC


Pravastatin Sodium (Pravachol) 40 mg HS PO  Last administered on 1/26/18at 21:17

;  Start 1/15/18 at 21:00


Morphine Sulfate (Morphine Inj) 4 mg ONCE  ONCE IV PUSH  Last administered on 1/

15/18at 04:34;  Start 1/15/18 at 04:15;  Stop 1/15/18 at 04:19;  Status DC


Ondansetron HCl (Zofran Inj) 4 mg ONCE  ONCE IV PUSH  Last administered on 1/15/

18at 04:32;  Start 1/15/18 at 04:15;  Stop 1/15/18 at 04:19;  Status DC


Influenza Virus Vaccine (Flu (Quadrivalent) Vaccine Inj) 0.5 ml ONCE ONCE IM ;  

Start 1/16/18 at 10:00;  Stop 1/16/18 at 10:01;  Status DC


Isosorbide Mononitrate (Imdur) 120 mg DAILY@07 PO  Last administered on 1/27/ 18at 12:16;  Start 1/18/18 at 07:00


Sodium Chloride 1,000 ml @  50 mls/hr Q20H IV  Last administered on 1/17/18at 23

:45;  Start 1/17/18 at 23:45;  Stop 1/24/18 at 09:11;  Status DC


Metoprolol Tartrate (Lopressor) 100 mg Q12HR PO  Last administered on 1/27/18at 

12:17;  Start 1/18/18 at 21:00


Amlodipine Besylate (Norvasc) 5 mg DAILY PO  Last administered on 1/19/18at 08:

12;  Start 1/18/18 at 13:00;  Stop 1/19/18 at 12:46;  Status DC


Ferrous Sulfate (Ferrous Sulfate) 325 mg BID PO  Last administered on 1/27/18at 

12:16;  Start 1/18/18 at 10:00


Polyethylene Glycol/ Electrolytes (Colyte Liq) 4,000 ml ONCE  ONCE PO  Last 

administered on 1/18/18at 16:04;  Start 1/18/18 at 16:00;  Stop 1/18/18 at 16:01

;  Status DC


Pantoprazole Sodium (Protonix Inj) 40 mg Q24H IV PUSH  Last administered on 1/21 /18at 08:38;  Start 1/19/18 at 09:00;  Stop 1/21/18 at 11:16;  Status DC


Furosemide (Lasix Inj) 40 mg ONCE  ONCE IV PUSH  Last administered on 1/18/18at 

23:07;  Start 1/18/18 at 22:15;  Stop 1/18/18 at 22:16;  Status DC


Nitroglycerin (Nitroglycerin 2% Oint) 1 inch Q6HR TOPICAL  Last administered on 

1/27/18at 12:22;  Start 1/19/18 at 01:40


Morphine Sulfate (Morphine Inj) 2 mg Q3H  PRN IV PUSH pain not relieved by ntg 

Last administered on 1/20/18at 18:41;  Start 1/19/18 at 01:30;  Stop 1/24/18 at 

15:44;  Status DC


Clonidine (Catapres) 0.1 mg ONCE  ONCE PO  Last administered on 1/19/18at 01:40

;  Start 1/19/18 at 01:30;  Stop 1/19/18 at 01:31;  Status DC


Magnesium Citrate (Citroma Liq) 300 ml ONCE  ONCE PO ;  Start 1/19/18 at 12:00;

  Stop 1/19/18 at 12:01;  Status DC


Magnesium Citrate (Citroma Liq) 300 ml ONCE  ONCE PO ;  Start 1/19/18 at 18:00;

  Stop 1/19/18 at 18:01;  Status DC


Bisacodyl (Dulcolax Ec) 10 mg DAILY@18,21 PO ;  Start 1/19/18 at 18:00;  Stop 1/ 19/18 at 21:03;  Status DC


Amlodipine Besylate (Norvasc) 10 mg DAILY PO  Last administered on 1/27/18at 12:

17;  Start 1/20/18 at 09:00


Clonidine (Catapres) 0.1 mg ONCE  ONCE PO  Last administered on 1/19/18at 15:26

;  Start 1/19/18 at 15:15;  Stop 1/19/18 at 15:16;  Status DC


Clonidine (Catapres) 0.1 mg Q12HR PO  Last administered on 1/21/18at 20:44;  

Start 1/19/18 at 21:00;  Stop 1/22/18 at 10:01;  Status DC


Clonidine (Catapres) 0.1 mg NOW  ONCE PO  Last administered on 1/19/18at 16:45;

  Start 1/19/18 at 16:45;  Stop 1/19/18 at 16:46;  Status DC


Clonidine (Catapres) 0.1 mg Q6H  PRN PO SBP>160, DBP>90 Last administered on 1/ 22/18at 03:53;  Start 1/20/18 at 19:15


Pantoprazole Sodium (Protonix) 40 mg DAILY PO  Last administered on 1/27/18at 08

:25;  Start 1/22/18 at 09:00


Azithromycin (Zithromax) 500 mg DAILY PO  Last administered on 1/27/18at 12:17;

  Start 1/22/18 at 09:00


Hydralazine HCl (Apresoline) 50 mg Q8HR PO  Last administered on 1/27/18at 05:08

;  Start 1/22/18 at 09:00


Sodium Chloride 1,000 ml @  100 mls/hr Q10H IV  Last administered on 1/22/18at 

10:44;  Start 1/22/18 at 08:54;  Stop 1/24/18 at 09:27;  Status DC


Clonidine (Catapres) 0.2 mg Q12HR PO  Last administered on 1/27/18at 12:17;  

Start 1/22/18 at 21:00


Sodium Chloride 1,000 ml @  75 mls/hr [id number] IV  Last administered on 1/22/ 18at 12:00;  Start 1/22/18 at 12:00;  Stop 1/22/18 at 22:30;  Status DC


Heparin Sodium/ Sodium Chloride 1,000 ml @  As Directed STK-MED ONCE .ROUTE ;  

Start 1/22/18 at 13:50;  Stop 1/22/18 at 13:51;  Status DC


Midazolam HCl (Versed Inj) 2 mg STK-MED ONCE .ROUTE ;  Start 1/22/18 at 13:50;  

Stop 1/22/18 at 13:51;  Status DC


Verapamil HCl (Isoptin Inj) 5 mg STK-MED ONCE .ROUTE ;  Start 1/22/18 at 13:50;

  Stop 1/22/18 at 13:51;  Status DC


Heparin Sodium (Porcine) (Heparin Inj) 10,000 units STK-MED ONCE .ROUTE ;  

Start 1/22/18 at 13:50;  Stop 1/22/18 at 13:51;  Status DC


Nitroglycerin 5 ml @ As Directed STK-MED ONCE .ROUTE ;  Start 1/22/18 at 13:50;

  Stop 1/22/18 at 13:51;  Status DC


Fentanyl Citrate (fentaNYL INJ) 100 mcg STK-MED ONCE .ROUTE ;  Start 1/22/18 at 

13:54;  Stop 1/22/18 at 13:55;  Status DC


Sodium Chloride 1,000 ml @  As Directed STK-MED ONCE IV ;  Start 1/19/18 at 12:

00;  Stop 1/22/18 at 13:55;  Status DC


Propofol (Diprivan  200 Mg/20 ml Inj) 400 mg STK-MED ONCE IV ;  Start 1/19/18 

at 12:00;  Stop 1/22/18 at 13:55;  Status DC


Lidocaine HCl (Xylocaine-Mpf 1% Inj) 5 ml STK-MED ONCE OTHER ;  Start 1/19/18 

at 12:00;  Stop 1/22/18 at 13:55;  Status DC


Miscellaneous Information 1 ONCE  ONCE XX ;  Start 1/22/18 at 14:45;  Stop 1/22/ 18 at 14:56;  Status DC


Bacitracin (Bacitracin Oint Packet) 0.9 gm ONCE  ONCE TOP ;  Start 1/22/18 at 14

:45;  Stop 1/22/18 at 14:54;  Status DC


Iohexol (OMNIPAQUE 350 INJ (Cath Lab)) 50 ml STK-MED ONCE OTHER ;  Start 1/22/ 18 at 15:18;  Stop 1/22/18 at 15:19;  Status DC


Sodium Chloride 1,000 ml @  50 mls/hr Q20H IV ;  Start 1/24/18 at 20:00;  Stop 1 /24/18 at 20:00;  Status DC


Acetaminophen/ Hydrocodone Bitart (Norco  5-325 Mg) 1 tab Q4H  PRN PO pain>3;  

Start 1/24/18 at 15:45


Sodium Chloride 1,000 ml @  50 mls/hr Q20H IV ;  Start 1/24/18 at 19:00;  Stop 1 /24/18 at 19:00;  Status DC


Sodium Chloride 1,000 ml @  100 mls/hr Q10H IV  Last administered on 1/24/18at 

20:08;  Start 1/24/18 at 19:00;  Stop 1/25/18 at 12:26;  Status DC


Nitroglycerin (Nitrostat Sl) 0.4 mg Q5M  PRN SL SEE LABEL COMMENTS Last 

administered on 1/25/18at 00:29;  Start 1/25/18 at 00:15


Morphine Sulfate (Morphine Inj) 2 mg ONCE  PRN IV PUSH PAIN 1-10 Last 

administered on 1/25/18at 01:17;  Start 1/25/18 at 00:15;  Stop 1/25/18 at 07:30

;  Status DC


Nitroglycerin (Nitrostat Sl) 0.4 mg STK-MED ONCE SL ;  Start 1/25/18 at 00:04;  

Stop 1/25/18 at 00:05;  Status DC


Lorazepam (Ativan) 0.5 mg Q8HR  PRN PO anxiety Last administered on 1/27/18at 08

:25;  Start 1/25/18 at 11:45


Sevelamer Carbonate (Renvela) 800 mg TIDAC PO  Last administered on 1/27/18at 12

:21;  Start 1/25/18 at 17:00


Sodium Chloride 1,000 ml @  0 mls/hr Q0M PRN OTHER For Prime & Rinse Back Last 

administered on 1/27/18at 12:03;  Start 1/26/18 at 08:51


Heparin Sodium (Porcine) (Heparin Inj) 8,000 units UNSCH  PRN IV FLUSH WITH 

DIALYSIS;  Start 1/26/18 at 09:00


Sodium Chloride 1,000 ml @  200 mls/hr Q5H PRN IV WITH DIALYSIS;  Start 1/26/18 

at 08:51


Sodium Chloride 1,000 ml @  0 mls/hr Q0M PRN OTHER WITH DIALYSIS;  Start 1/26/ 18 at 08:51


Mannitol (Mannitol Inj) 12.5 gm UNSCH  PRN IV WITH DIALYSIS;  Start 1/26/18 at 

09:00


Albumin Human 100 ml @  60 mls/hr UNSCH  PRN IV WITH DIALYSIS;  Start 1/26/18 

at 09:00


Sodium Chloride (NS Flush) 5 ml UNSCH  PRN IV FLUSH WITH DIALYSIS;  Start 1/26/ 18 at 09:00


Heparin Sodium (Porcine) (Heparin Inj)  UNSCH  PRN .XX WITH DIALYSIS Last 

administered on 1/27/18at 12:02;  Start 1/26/18 at 09:00


Gentamicin Sulfate (Gentamicin Inj) 20 mg UNSCH  PRN OTHER WITH DIALYSIS Last 

administered on 1/27/18at 12:03;  Start 1/26/18 at 09:00


Ondansetron HCl (Zofran Inj) 4 mg UNSCH  PRN IV PUSH WITH DIALYSIS;  Start 1/26/ 18 at 09:00


Acetaminophen (Tylenol) 650 mg UNSCH  PRN PO for headach, pain, temp > 101F;  

Start 1/26/18 at 09:00


Diphenhydramine HCl (Benadryl) 25 mg UNSCH  PRN PO for hives/itching/anaphylaxis

;  Start 1/26/18 at 09:00


Nitroglycerin (Nitrostat Sl) 0.4 mg UNSCH  PRN SL CHEST PAIN;  Start 1/26/18 at 

09:00


Clonidine (Catapres) 0.1 mg UNSCH  PRN PO for BP > 180/100 X 2 readings;  Start 

1/26/18 at 09:00


Epoetin David (Epogen Inj) 10,000 units UNSCH  PRN IV PUSH WITH DIALYSIS Last 

administered on 1/26/18at 15:00;  Start 1/26/18 at 09:00


Gelatin (Gelfoam 12 Mm/7 Mm Top) 1 foam UNSCH  PRN TOP SEE LABEL COMMENTS;  

Start 1/26/18 at 09:00


Heparin Sodium (Porcine) (*HEPARIN INJ Periprocedural ONLY) 10,000 units STK-

MED ONCE .ROUTE  Last administered on 1/26/18at 10:48;  Start 1/26/18 at 10:20;

  Stop 1/26/18 at 10:21;  Status DC


Sodium Chloride (NS Flush)  UNSCH  PRN IV FLUSH SEE PROTOCOL;  Start 1/26/18 at 

12:45


Heparin Sodium (Porcine) (Heparin Inj)  UNSCH  PRN IV FLUSH SEE PROTOCOL;  

Start 1/26/18 at 12:45





A/P


Problem List:  


(1) Sepsis


ICD Code:  A41.9 - Sepsis, unspecified organism


(2) PNA (pneumonia)


ICD Code:  J18.9 - Pneumonia, unspecified organism


(3) Hypoxia


ICD Code:  R09.02 - Hypoxemia


(4) NSTEMI (non-ST elevated myocardial infarction)


ICD Code:  I21.4 - Non-ST elevation (NSTEMI) myocardial infarction


(5) CHF (congestive heart failure)


ICD Code:  I50.9 - Heart failure, unspecified


(6) Renal insufficiency


ICD Code:  N28.9 - Disorder of kidney and ureter, unspecified


(7) DM (diabetes mellitus)


ICD Code:  E11.9 - Type 2 diabetes mellitus without complications


(8) GI bleed


ICD Code:  K92.2 - Gastrointestinal hemorrhage, unspecified


Assessment and Plan


52-year-old female with





GI bleed-resolved


Anemia of acute blood loss


   Status post transfused with 2 units of packed red blood cells.


   Appreciate input from GI, status post panendoscopy with finding of gastritis 

EGD and colon polyps on colonoscopy pending biopsy report


   No Evidence of iron deficiency anemia


   Unremarkable small bowel follow-through


   PPI and continue to monitor H&H


   GI signed off





Sepsis


   Resolved, continue with current antibiotics including Rocephin and 

azithromycin





PNA


   Rocephin and azithromycin discontinued on 1/22 and continues to do well off 

of antibiotics.


   DuoNeb when necessary


   Maintain oxygen saturation above 92%





Hypoxia-resolved


   Continue with treatment as in above





Acute on Chronic diastolic CHF


   Echo 8/18/17 w/ EF 55-60%


   Currently off Lasix secondary to worsening renal function


   Continue with Imdur/BB





NSTEMI:  


   Post cardiac catheterization on 1/22 showing severe two-vessel disease of 

the right coronary artery in the mid LAD.  Recommended high risk PCI versus 

cardiac bypass versus medical management.  Creatinine continues to worsen 

cardiologist recommended medical management.





Atypical chest pain


   Chest pain occurs only when patient tried to get up.  The same chest pain is 

reproducible with palpation of her chest wall.  Pain is improving.  On pain 

medication when necessary.  Avoid NSAIDs due to acute renal failure.  Due to 

acute renal failure.





Lightheadedness


   Orthostatics is normal.





Acute on chronic Stage IV CKD


   Management per nephrology


   Avoid nephrotoxins.  Creatinine continues to worsen.  Creatinine went to to 

4.47 to 5.32 to 4.08 today .  Decreased urine output.


   Status post Vas-Cath done today on 1/26.  Patient received dialysis on 1/26 

and today.  She may need a long-term dialysis.


   Continue strict ins and outs 


    trend creatinine.





Hypertension


   Continue with Lopressor, Procardia, clonidine 2 times a day





DM:  


   Sliding scale w/ Accu-Cheks. stable. 





Tobacco Abuse: 


   Strongly recommended to stop smoking, no NicoDerm to avoid vasoconstriction.

  Ativan prn if needed


Discharge Planning


Patient may need long-term dialysis.











Yany Nathan MD Jan 27, 2018 12:33

## 2018-01-27 NOTE — HHI.NPPN
Subjective


General Problems:  Anemia


Renal Failure:  Chronic, Acute, Stage III, Stage IV


Interval History


very emotional today. To have dialysis again. Labs were reviewed.





Review of Systems


General


Constitutional:  Fatigue





Respiratory


Respiratory Remarks


Mild SOB





Cardiovascular


Cardiac Remarks


resolved





Gastrointestinal


GI Remarks


no abdominal pain





Psych


Psych:  Depression, Anxiety





Objective Data


Data





Vital Signs








  Date Time  Temp Pulse Resp B/P (MAP) Pulse Ox O2 Delivery O2 Flow Rate FiO2


 


1/27/18 06:00  77      


 


1/27/18 05:00  76      


 


1/27/18 04:00      Room Air  


 


1/27/18 04:00 98.2 76 14 156/81 (106) 97   


 


1/27/18 04:00  76      


 


1/27/18 03:00  74      


 


1/27/18 02:00  76      


 


1/27/18 01:00  76      


 


1/27/18 00:00  79      


 


1/27/18 00:00 98.0 80 14 144/75 (98) 97   


 


1/27/18 00:00      Room Air  


 


1/26/18 23:00  78      


 


1/26/18 22:00  82      


 


1/26/18 21:00  80      


 


1/26/18 20:00      Room Air  


 


1/26/18 20:00 98.1 83 14 156/76 (102) 96   


 


1/26/18 20:00  82      


 


1/26/18 18:35   18     


 


1/26/18 18:00  83      


 


1/26/18 17:00  83      


 


1/26/18 16:26     98 Room Air  


 


1/26/18 16:26  82 17 147/73 (97) 98   


 


1/26/18 16:00  77      


 


1/26/18 15:00  71      


 


1/26/18 14:00  72      


 


1/26/18 13:12     94   


 


1/26/18 13:00  76      


 


1/26/18 12:00  71      


 


1/26/18 11:00 98.6 80 17 155/75 (101) 94   


 


1/26/18 11:00  80      


 


1/26/18 11:00     94 Room Air  


 


1/26/18 09:00  81      








-:  


1/27/18 0555                                                                   

             1/27/18 0555








Physical Exam


General


Appearance:  Well Developed, No Acute Distress, Comfortable





Eyes


Eye Exam:  Pupils Equal





Throat


Throat Exam:  Oral Mucosa [place] & Moist





Pulmonary


Resp Exam:  Clear Bilaterally, Breath Sounds Equal, No Distress, Crackles





Cardiology


CV Exam:  Regular, Normal Sinus Rhythm, Good Perfusion





Gastrointestinal/Abdomen


GI Exam:  Soft, Non-Tender, Bowel Sounds Present, Positive Bowel Movement, 

Distended





Musculoskeletal


MS Exam:  Joints Intact, Normal Tone, Good Strength





Integumentary


Skin Exam:  Clear, Warm, Dry, Intact





Extremeties


Extremities Exam:  No Edema





Neurologic


Neuro Exam:  Alert, Awake, Oriented, Speech Clear, Moving All Extremities





Psychiatric


Psych Exam:  Appropriate Responses





Assessment/Plan


Discussed Condition With:  Patient


Assessment Summary:  LETI/Acute Renal Failure, Anemia of CKD, Hypertension, 

Diabetes Mellitus, CKD Stage IV


Problem List:  


(1) Renal insufficiency


ICD Codes:  N28.9 - Disorder of kidney and ureter, unspecified


Plan:  She had underlying diabetic nephropathy, stage 4


Recent NSTEMI with cardiac cath, did receive a small amount of IV contrast on 1/ 22


Initiated on dialysis on 1/26/18. 


Dialysis again today. 


Continue to monitor renal function, it is unclear if she needs permanent 

dialysis, but most likely she has reached ESRD. 


Start Renvela with meals for metabolic bone disorder, hyperphosphatemia.


Monitor urine output, it has declined.


Avoid any nephrotoxic agents, stop non essential medications. 





We discussed options moving forward related to PD vs HD. She needs time to 

consider her options. Will follow up. 





(2) NSTEMI (non-ST elevated myocardial infarction)


ICD Codes:  I21.4 - Non-ST elevation (NSTEMI) myocardial infarction


Plan:  Cardiology following, [facility] postponed.


given ASA,off  heparin gtt





(3) DM (diabetes mellitus)


ICD Codes:  E11.9 - Type 2 diabetes mellitus without complications


Plan:  Insulin as needed, maintain glucose 140-180mg/dL. 


If NPO use D10 @ 15 ml/hr to prevent hyperkalemia 





(4) Anemia


ICD Codes:  D64.9 - Anemia, unspecified


Status:  Chronic


Plan:  Start Epogen with dialysis 


no evidence of iron deficiency 


She was transfused this admission.





(5) CAD (coronary artery disease)


ICD Codes:  I25.10 - Atherosclerotic heart disease of native coronary artery 

without angina pectoris


Status:  Chronic


Plan:  Hx of stent in the past. New NSTEMI, see above 





(6) PNA (pneumonia)


ICD Codes:  J18.9 - Pneumonia, unspecified organism


Plan:  Improved. 





(7) HTN (hypertension)


ICD Codes:  I10 - Hypertension


Status:  Chronic


Plan:  On Imdur, metoprolol, clonidine, and Norvasc


BP will also improve with fluid removal. 














Kelton Whiteside MD Jan 27, 2018 08:06

## 2018-01-27 NOTE — RADRPT
EXAM DATE/TIME:  01/27/2018 15:09 

 

[facility] COMPARISON:     

CHEST SINGLE AP, January 18, 2018, 10:47.

 

                     

INDICATIONS :     

Cough blood for 1 day.

                     

 

MEDICAL HISTORY :            

HTN, HLD, Diabetes, CHF, CAD, MI   

 

SURGICAL HISTORY :        

Cholecystectomy

 

ENCOUNTER:     

Subsequent                                        

 

ACUITY:     

1 week      

 

PAIN SCORE:     

0/10

 

LOCATION:     

Bilateral chest 

 

FINDINGS:     

AP and lateral views of the chest. Dual-lumen right IJ central venous catheter in place with the tip 
of the catheter at the cavoatrial junction. No evidence of pneumothorax. Mild bilateral lower lung zo
ne pulmonary opacity likely representing pulmonary vascular congestion. Cardiomediastinal silhouette 
within normal limits. Small bilateral pleural effusions.

 

CONCLUSION:     

1. Right IJ dual-lumen central venous catheter in place with tip at the cavoatrial junction. No evide
nce of pneumothorax

2. Mild pulmonary opacity likely representing pulmonary vascular congestion.

3. Small bilateral pleural effusions.

 

 

 

 Bhaskar Thakur MD on January 27, 2018 at 15:47           

Board Certified Radiologist.

 This report was verified electronically.

## 2018-01-27 NOTE — PD.CARD.PN
Subjective


Subjective Remarks


Seen during dialysis.  No chest pain overnight.  Telemetry no arrhythmias. 


 (King Ervin)





Objective


Medications





Current Medications








 Medications


  (Trade)  Dose


 Ordered  Sig/Luis Armando


 Route  Start Time


 Stop Time Status Last Admin


 


  (D50w (Vial) Inj)  50 ml  UNSCH  PRN


 IV PUSH  1/15/18 04:00


     


 


 


  (Glucagon Inj)  1 mg  UNSCH  PRN


 OTHER  1/15/18 04:00


     


 


 


  (NovoLOG


 SUPPLEMENTAL


 SCALE)  1  ACHS SLIDING  SCALE


 SQ  1/15/18 08:00


    1/26/18 21:16


 


 


  (Duoneb Neb)  1 ampule  Q4HR NEB  PRN


 NEB  1/15/18 04:00


    1/25/18 01:35


 


 


  (NS Flush)  2 ml  UNSCH  PRN


 IV FLUSH  1/15/18 04:00


    1/23/18 18:08


 


 


  (NS Flush)  2 ml  BID


 IV FLUSH  1/15/18 09:00


    1/27/18 08:26


 


 


  (Zofran Inj)  4 mg  Q6H  PRN


 IVP  1/15/18 04:00


    1/19/18 16:49


 


 


  (Tylenol)  650 mg  Q6H  PRN


 PO  1/15/18 04:00


    1/23/18 08:12


 


 


  (Norco  5-325 Mg)  1 tab  Q4H  PRN


 PO  1/15/18 04:00


    1/21/18 20:44


 


 


  (Morphine Inj)  2 mg  Q3H  PRN


 IV PUSH  1/15/18 04:00


    1/26/18 21:22


 


 


  (Theresa-Colace)  1 tab  BID


 PO  1/15/18 09:00


    1/26/18 21:17


 


 


  (Milk Of


 Magnesia Liq)  30 ml  Q12H  PRN


 PO  1/15/18 04:00


    1/26/18 05:09


 


 


  (Senokot)  17.2 mg  Q12H  PRN


 PO  1/15/18 04:00


     


 


 


  (Dulcolax Supp)  10 mg  DAILY  PRN


 RECTAL  1/15/18 04:00


     


 


 


  (Lactulose Liq)  30 ml  DAILY  PRN


 PO  1/15/18 04:00


    1/24/18 08:46


 


 


  (Aspirin Chew)  81 mg  DAILY


 CHEW  1/15/18 09:00


   Future hold 1/27/18 08:26


 


 


  (Folate)  1 mg  DAILY


 PO  1/15/18 09:00


    1/26/18 08:54


 


 


  (Neurontin)  300 mg  HS


 PO  1/15/18 21:00


    1/26/18 21:17


 


 


  (Levemir Inj)  15 units  HS


 SQ  1/15/18 21:00


    1/26/18 21:16


 


 


  (Pravachol)  40 mg  HS


 PO  1/15/18 21:00


    1/26/18 21:17


 


 


  (Imdur)  120 mg  DAILY@07


 PO  1/18/18 07:00


    1/27/18 05:08


 


 


  (Lopressor)  100 mg  Q12HR


 PO  1/18/18 21:00


    1/26/18 21:17


 


 


  (Ferrous Sulfate)  325 mg  BID


 PO  1/18/18 10:00


    1/26/18 21:17


 


 


  (Nitroglycerin


 2% Oint)  1 inch  Q6HR


 TOPICAL  1/19/18 01:40


    1/27/18 05:08


 


 


  (Norvasc)  10 mg  DAILY


 PO  1/20/18 09:00


    1/26/18 08:54


 


 


  (Catapres)  0.1 mg  Q6H  PRN


 PO  1/20/18 19:15


    1/22/18 03:53


 


 


  (Protonix)  40 mg  DAILY


 PO  1/22/18 09:00


    1/27/18 08:25


 


 


  (Zithromax)  500 mg  DAILY


 PO  1/22/18 09:00


    1/26/18 08:54


 


 


  (Apresoline)  50 mg  Q8HR


 PO  1/22/18 09:00


    1/27/18 05:08


 


 


  (Catapres)  0.2 mg  Q12HR


 PO  1/22/18 21:00


    1/26/18 21:17


 


 


  (Norco  5-325 Mg)  1 tab  Q4H  PRN


 PO  1/24/18 15:45


     


 


 


  (Nitrostat Sl)  0.4 mg  Q5M  PRN


 SL  1/25/18 00:15


    1/25/18 00:29


 


 


  (Ativan)  0.5 mg  Q8HR  PRN


 PO  1/25/18 11:45


    1/27/18 08:25


 


 


  (Renvela)  800 mg  TIDAC


 PO  1/25/18 17:00


    1/26/18 17:36


 


 


 Sodium Chloride  1,000 ml @ 


 0 mls/hr  Q0M PRN


 OTHER  1/26/18 08:51


    1/26/18 15:00


 


 


  (Heparin Inj)  8,000 units  UNSCH  PRN


 IV FLUSH  1/26/18 09:00


     


 


 


 Sodium Chloride  1,000 ml @ 


 200 mls/hr  Q5H PRN


 IV  1/26/18 08:51


     


 


 


 Sodium Chloride  1,000 ml @ 


 0 mls/hr  Q0M PRN


 OTHER  1/26/18 08:51


     


 


 


  (Mannitol Inj)  12.5 gm  UNSCH  PRN


 IV  1/26/18 09:00


     


 


 


 Albumin Human  100 ml @ 


 60 mls/hr  UNSCH  PRN


 IV  1/26/18 09:00


     


 


 


  (NS Flush)  5 ml  UNSCH  PRN


 IV FLUSH  1/26/18 09:00


     


 


 


  (Heparin Inj)    UNSCH  PRN


 .XX  1/26/18 09:00


    1/26/18 15:00


 


 


  (Gentamicin Inj)  20 mg  UNSCH  PRN


 OTHER  1/26/18 09:00


    1/26/18 15:00


 


 


  (Zofran Inj)  4 mg  UNSCH  PRN


 IV PUSH  1/26/18 09:00


     


 


 


  (Tylenol)  650 mg  UNSCH  PRN


 PO  1/26/18 09:00


     


 


 


  (Benadryl)  25 mg  UNSCH  PRN


 PO  1/26/18 09:00


     


 


 


  (Nitrostat Sl)  0.4 mg  UNSCH  PRN


 SL  1/26/18 09:00


     


 


 


  (Catapres)  0.1 mg  UNSCH  PRN


 PO  1/26/18 09:00


     


 


 


  (Epogen Inj)  10,000 units  UNSCH  PRN


 IV PUSH  1/26/18 09:00


    1/26/18 15:00


 


 


  (Gelfoam 12 Mm/7


 Mm Top)  1 foam  UNSCH  PRN


 TOP  1/26/18 09:00


     


 


 


  (NS Flush)    UNSCH  PRN


 IV FLUSH  1/26/18 12:45


     


 


 


  (Heparin Inj)    UNSCH  PRN


 IV FLUSH  1/26/18 12:45


     


 








Vital Signs / I&O





Vital Signs








  Date Time  Temp Pulse Resp B/P (MAP) Pulse Ox O2 Delivery O2 Flow Rate FiO2


 


1/27/18 06:00  77      


 


1/27/18 05:00  76      


 


1/27/18 04:00      Room Air  


 


1/27/18 04:00 98.2 76 14 156/81 (106) 97   


 


1/27/18 04:00  76      


 


1/27/18 03:00  74      


 


1/27/18 02:00  76      


 


1/27/18 01:00  76      


 


1/27/18 00:00  79      


 


1/27/18 00:00 98.0 80 14 144/75 (98) 97   


 


1/27/18 00:00      Room Air  


 


1/26/18 23:00  78      


 


1/26/18 22:00  82      


 


1/26/18 21:00  80      


 


1/26/18 20:00      Room Air  


 


1/26/18 20:00 98.1 83 14 156/76 (102) 96   


 


1/26/18 20:00  82      


 


1/26/18 18:35   18     


 


1/26/18 18:00  83      


 


1/26/18 17:00  83      


 


1/26/18 16:26     98 Room Air  


 


1/26/18 16:26  82 17 147/73 (97) 98   


 


1/26/18 16:00  77      


 


1/26/18 15:00  71      


 


1/26/18 14:00  72      


 


1/26/18 13:12     94   


 


1/26/18 13:00  76      


 


1/26/18 12:00  71      


 


1/26/18 11:00 98.6 80 17 155/75 (101) 94   


 


1/26/18 11:00  80      


 


1/26/18 11:00     94 Room Air  














I/O      


 


 1/26/18 1/26/18 1/26/18 1/27/18 1/27/18 1/27/18





 07:00 15:00 23:00 07:00 15:00 23:00


 


Intake Total 480 ml  360 ml 240 ml  


 


Output Total 500 ml  2000 ml 580 ml  


 


Balance -20 ml  -1640 ml -340 ml  


 


      


 


Intake Oral 480 ml  360 ml 240 ml  


 


Output Urine Total 500 ml   580 ml  


 


Hemodialysis   2000 ml   


 


# Voids   2   








Physical Exam


GENERAL: Well-developed well-nourished.  In no acute distress.


NECK: No carotid bruits.  No JVD.  


CARDIOVASCULAR: Regular rate and rhythm.  No murmur appreciated.


RESPIRATORY: No accessory muscle use. Clear to auscultation. Breath sounds 

equal bilaterally. 


MUSCULOSKELETAL: No clubbing or cyanosis.  Trace lower extremity edema. 


NEUROLOGICAL: Awake and alert. Normal speech.


Laboratory





Laboratory Tests








Test


  1/26/18


16:00 1/27/18


05:55


 


White Blood Count  7.2 TH/MM3 


 


Red Blood Count  2.64 MIL/MM3 


 


Hemoglobin  8.7 GM/DL 


 


Hematocrit  24.9 % 


 


Mean Corpuscular Volume  94.4 FL 


 


Mean Corpuscular Hemoglobin  32.8 PG 


 


Mean Corpuscular Hemoglobin


Concent 


  34.8 % 


 


 


Red Cell Distribution Width  14.8 % 


 


Platelet Count  303 TH/MM3 


 


Mean Platelet Volume  7.9 FL 


 


Blood Urea Nitrogen  48 MG/DL 


 


Creatinine  4.08 MG/DL 


 


Random Glucose  83 MG/DL 


 


Albumin  2.1 GM/DL 


 


Calcium Level  8.0 MG/DL 


 


Phosphorus Level  4.5 MG/DL 


 


Sodium Level  136 MEQ/L 


 


Potassium Level  4.6 MEQ/L 


 


Chloride Level  104 MEQ/L 


 


Carbon Dioxide Level  26.7 MEQ/L 


 


Anion Gap  5 MEQ/L 


 


Estimat Glomerular Filtration


Rate 


  14 ML/MIN 


 








Imaging





Last Impressions








Catheter Placement X-Ray 1/26/18 0900 Signed





Impressions: 





 Service Date/Time:  Friday, January 26, 2018 11:12 - CONCLUSION: Uncomplicated 





 line placement as above.     Ren Stubbs MD 


 


Small Bowel X-Ray 1/19/18 0000 Signed





Impressions: 





 Service Date/Time:  Friday, January 19, 2018 10:44 - CONCLUSION: Unremarkable 





 small bowel examination.     Boo Ricardo MD 


 


Chest X-Ray 1/18/18 0000 Signed





Impressions: 





 Service Date/Time:  Thursday, January 18, 2018 10:47 - CONCLUSION:  Improved 





 infiltrates in both lung bases.     Edgardo Mullins MD 








 (King Ervin)





Assessment and Plan


Problem List:  


(1) NSTEMI (non-ST elevated myocardial infarction)


ICD Codes:  I21.4 - Non-ST elevation (NSTEMI) myocardial infarction


(2) CHF (congestive heart failure)


ICD Codes:  I50.9 - Heart failure, unspecified


(3) Renal insufficiency


ICD Codes:  N28.9 - Renal insufficiency


Status:  Acute


(4) Anemia


ICD Codes:  D64.9 - Anemia, unspecified


Status:  Chronic


(5) DM (diabetes mellitus)


ICD Codes:  E11.9 - Type 2 diabetes mellitus without complications


(6) Renal insufficiency


ICD Codes:  N28.9 - Disorder of kidney and ureter, unspecified


(7) HTN (hypertension)


ICD Codes:  I10 - Hypertension


Status:  Chronic


Assessment and Plan


53 yo AAF with history of CAD, cardiac stent placed ~ 10 years ago, CKD IV, CHF 

and diabetes admitted for progressive SOB and chest pain. 





Normocytic anemia: Hemoglobin improved after transfusion.  Currently stable.  

GI signed off.





Coronary artery disease: [facility] 1/22/18 showed severe 2 vessel native coronary 

artery disease involving the right coronary artery and mid left anterior 

descending.  If HD permanent, will need to discuss options. Any PCI currently 

will almost certainly will result in permanent HD, similarly with CABG.  

Continue Imdur, amlodipine, metoprolol, aspirin, statin.


Echo with normal systolic function and EF 55-60%, Moderate LVH, trace MR, MN, 

and TR.





CKD IV/V: Started on HD 1/28/18.  D/W Nephrology, unclear yet if HD will be 

permanent.


 (King Ervin)


Assessment and Plan


-----------------


started on HD


Patient considering PCI monday.  At this point, if ESRD on HD, then best to 

proceed with intervention to avoid sizeable MI


will discuss further with nephrology and patient


NPO p MN javier night


 (Edgardo Mercado MD)











King Ervin Jan 27, 2018 09:32


Edgardo Mercado MD Jan 27, 2018 15:41

## 2018-01-28 VITALS — HEART RATE: 74 BPM

## 2018-01-28 VITALS
OXYGEN SATURATION: 100 % | TEMPERATURE: 98.2 F | RESPIRATION RATE: 14 BRPM | HEART RATE: 96 BPM | DIASTOLIC BLOOD PRESSURE: 57 MMHG | SYSTOLIC BLOOD PRESSURE: 149 MMHG

## 2018-01-28 VITALS — HEART RATE: 97 BPM

## 2018-01-28 VITALS
TEMPERATURE: 98.4 F | SYSTOLIC BLOOD PRESSURE: 142 MMHG | HEART RATE: 77 BPM | RESPIRATION RATE: 16 BRPM | DIASTOLIC BLOOD PRESSURE: 70 MMHG | OXYGEN SATURATION: 96 %

## 2018-01-28 VITALS
DIASTOLIC BLOOD PRESSURE: 64 MMHG | SYSTOLIC BLOOD PRESSURE: 127 MMHG | RESPIRATION RATE: 14 BRPM | TEMPERATURE: 98.1 F | HEART RATE: 67 BPM | OXYGEN SATURATION: 98 %

## 2018-01-28 VITALS
TEMPERATURE: 98.6 F | DIASTOLIC BLOOD PRESSURE: 60 MMHG | SYSTOLIC BLOOD PRESSURE: 130 MMHG | OXYGEN SATURATION: 100 % | RESPIRATION RATE: 16 BRPM | HEART RATE: 75 BPM

## 2018-01-28 VITALS
SYSTOLIC BLOOD PRESSURE: 147 MMHG | OXYGEN SATURATION: 97 % | TEMPERATURE: 98.6 F | DIASTOLIC BLOOD PRESSURE: 74 MMHG | HEART RATE: 76 BPM | RESPIRATION RATE: 16 BRPM

## 2018-01-28 VITALS — HEART RATE: 69 BPM

## 2018-01-28 VITALS
DIASTOLIC BLOOD PRESSURE: 70 MMHG | SYSTOLIC BLOOD PRESSURE: 143 MMHG | TEMPERATURE: 97.7 F | HEART RATE: 77 BPM | RESPIRATION RATE: 16 BRPM | OXYGEN SATURATION: 96 %

## 2018-01-28 VITALS — HEART RATE: 75 BPM

## 2018-01-28 VITALS — HEART RATE: 88 BPM

## 2018-01-28 VITALS — HEART RATE: 76 BPM

## 2018-01-28 VITALS — HEART RATE: 100 BPM

## 2018-01-28 VITALS — HEART RATE: 90 BPM

## 2018-01-28 VITALS — HEART RATE: 66 BPM

## 2018-01-28 VITALS — OXYGEN SATURATION: 97 %

## 2018-01-28 LAB
BASOPHILS # BLD AUTO: 0.1 TH/MM3 (ref 0–0.2)
BASOPHILS NFR BLD: 0.8 % (ref 0–2)
BUN SERPL-MCNC: 32 MG/DL (ref 7–18)
CALCIUM SERPL-MCNC: 8.5 MG/DL (ref 8.5–10.1)
CHLORIDE SERPL-SCNC: 103 MEQ/L (ref 98–107)
CREAT SERPL-MCNC: 3.34 MG/DL (ref 0.5–1)
EOSINOPHIL # BLD: 0.2 TH/MM3 (ref 0–0.4)
EOSINOPHIL NFR BLD: 2.3 % (ref 0–4)
ERYTHROCYTE [DISTWIDTH] IN BLOOD BY AUTOMATED COUNT: 14.7 % (ref 11.6–17.2)
GFR SERPLBLD BASED ON 1.73 SQ M-ARVRAT: 18 ML/MIN (ref 89–?)
GLUCOSE SERPL-MCNC: 82 MG/DL (ref 74–106)
HCO3 BLD-SCNC: 31 MEQ/L (ref 21–32)
HCT VFR BLD CALC: 28.7 % (ref 35–46)
HGB BLD-MCNC: 9.7 GM/DL (ref 11.6–15.3)
LYMPHOCYTES # BLD AUTO: 1.5 TH/MM3 (ref 1–4.8)
LYMPHOCYTES NFR BLD AUTO: 20.8 % (ref 9–44)
MCH RBC QN AUTO: 32.5 PG (ref 27–34)
MCHC RBC AUTO-ENTMCNC: 33.9 % (ref 32–36)
MCV RBC AUTO: 95.8 FL (ref 80–100)
MONOCYTE #: 0.7 TH/MM3 (ref 0–0.9)
MONOCYTES NFR BLD: 10.2 % (ref 0–8)
NEUTROPHILS # BLD AUTO: 4.7 TH/MM3 (ref 1.8–7.7)
NEUTROPHILS NFR BLD AUTO: 65.9 % (ref 16–70)
PLATELET # BLD: 375 TH/MM3 (ref 150–450)
PMV BLD AUTO: 7.8 FL (ref 7–11)
RBC # BLD AUTO: 3 MIL/MM3 (ref 4–5.3)
SODIUM SERPL-SCNC: 139 MEQ/L (ref 136–145)
WBC # BLD AUTO: 7.1 TH/MM3 (ref 4–11)

## 2018-01-28 RX ADMIN — PANTOPRAZOLE SCH MG: 40 TABLET, DELAYED RELEASE ORAL at 09:03

## 2018-01-28 RX ADMIN — MORPHINE SULFATE PRN MG: 2 INJECTION, SOLUTION INTRAMUSCULAR; INTRAVENOUS at 05:53

## 2018-01-28 RX ADMIN — SEVELAMER CARBONATE SCH MG: 800 TABLET, FILM COATED ORAL at 09:02

## 2018-01-28 RX ADMIN — Medication SCH ML: at 09:03

## 2018-01-28 RX ADMIN — NITROGLYCERIN SCH INCH: 20 OINTMENT TOPICAL at 13:10

## 2018-01-28 RX ADMIN — INSULIN ASPART SCH: 100 INJECTION, SOLUTION INTRAVENOUS; SUBCUTANEOUS at 08:00

## 2018-01-28 RX ADMIN — NITROGLYCERIN SCH INCH: 20 OINTMENT TOPICAL at 05:53

## 2018-01-28 RX ADMIN — ISOSORBIDE MONONITRATE SCH MG: 60 TABLET, EXTENDED RELEASE ORAL at 09:02

## 2018-01-28 RX ADMIN — ASPIRIN 81 MG SCH MG: 81 TABLET ORAL at 09:02

## 2018-01-28 RX ADMIN — PRAVASTATIN SODIUM SCH MG: 40 TABLET ORAL at 21:30

## 2018-01-28 RX ADMIN — AZITHROMYCIN SCH MG: 250 TABLET, FILM COATED ORAL at 09:02

## 2018-01-28 RX ADMIN — GABAPENTIN SCH MG: 300 CAPSULE ORAL at 21:30

## 2018-01-28 RX ADMIN — STANDARDIZED SENNA CONCENTRATE AND DOCUSATE SODIUM SCH TAB: 8.6; 5 TABLET, FILM COATED ORAL at 21:30

## 2018-01-28 RX ADMIN — FUROSEMIDE SCH MG: 20 TABLET ORAL at 09:03

## 2018-01-28 RX ADMIN — MORPHINE SULFATE PRN MG: 2 INJECTION, SOLUTION INTRAMUSCULAR; INTRAVENOUS at 21:52

## 2018-01-28 RX ADMIN — FOLIC ACID SCH MG: 1 TABLET ORAL at 09:03

## 2018-01-28 RX ADMIN — INSULIN ASPART SCH: 100 INJECTION, SOLUTION INTRAVENOUS; SUBCUTANEOUS at 12:00

## 2018-01-28 RX ADMIN — NITROGLYCERIN SCH INCH: 20 OINTMENT TOPICAL at 18:00

## 2018-01-28 RX ADMIN — STANDARDIZED SENNA CONCENTRATE AND DOCUSATE SODIUM SCH TAB: 8.6; 5 TABLET, FILM COATED ORAL at 09:02

## 2018-01-28 RX ADMIN — Medication SCH ML: at 21:30

## 2018-01-28 RX ADMIN — INSULIN ASPART SCH: 100 INJECTION, SOLUTION INTRAVENOUS; SUBCUTANEOUS at 21:51

## 2018-01-28 RX ADMIN — SEVELAMER CARBONATE SCH MG: 800 TABLET, FILM COATED ORAL at 16:32

## 2018-01-28 RX ADMIN — INSULIN ASPART SCH: 100 INJECTION, SOLUTION INTRAVENOUS; SUBCUTANEOUS at 18:00

## 2018-01-28 RX ADMIN — NITROGLYCERIN SCH INCH: 20 OINTMENT TOPICAL at 00:34

## 2018-01-28 RX ADMIN — FERROUS SULFATE TAB 325 MG (65 MG ELEMENTAL FE) SCH MG: 325 (65 FE) TAB at 09:02

## 2018-01-28 RX ADMIN — METOPROLOL TARTRATE SCH MG: 100 TABLET, FILM COATED ORAL at 09:02

## 2018-01-28 RX ADMIN — ACYCLOVIR SCH UNITS: 800 TABLET ORAL at 21:51

## 2018-01-28 RX ADMIN — SEVELAMER CARBONATE SCH MG: 800 TABLET, FILM COATED ORAL at 12:00

## 2018-01-28 RX ADMIN — SEVELAMER CARBONATE SCH MG: 800 TABLET, FILM COATED ORAL at 13:10

## 2018-01-28 RX ADMIN — METOPROLOL TARTRATE SCH MG: 100 TABLET, FILM COATED ORAL at 21:30

## 2018-01-28 RX ADMIN — HYDROCODONE BITARTRATE AND ACETAMINOPHEN PRN TAB: 5; 325 TABLET ORAL at 16:32

## 2018-01-28 RX ADMIN — FERROUS SULFATE TAB 325 MG (65 MG ELEMENTAL FE) SCH MG: 325 (65 FE) TAB at 21:30

## 2018-01-28 NOTE — HHI.PR
Subjective


Remarks


52 YOAA female with ESRD,HTN,DM


No hemoptysis


Occ cough


Mild sob





Objective


Vital Signs





Vital Signs








  Date Time  Temp Pulse Resp B/P (MAP) Pulse Ox O2 Delivery O2 Flow Rate FiO2


 


1/28/18 17:35   16     


 


1/28/18 17:00  76      


 


1/28/18 16:00  75      


 


1/28/18 15:00     100 Room Air  


 


1/28/18 15:00 98.6 75 16 130/60 (83) 100   


 


1/28/18 15:00  70      


 


1/28/18 14:00  76      


 


1/28/18 13:00  69      


 


1/28/18 12:00 98.1 67 14 127/64 (85) 98   


 


1/28/18 12:00  70      


 


1/28/18 11:00  66      


 


1/28/18 11:00     95 Room Air  


 


1/28/18 09:16     97   21


 


1/28/18 09:00 98.6 76 16 147/74 (98) 97   


 


1/28/18 05:00  74      


 


1/28/18 04:00  77      


 


1/28/18 04:00 98.4 74 16 142/70 (94) 96   


 


1/28/18 04:00      Room Air  


 


1/28/18 03:00  74      


 


1/28/18 02:00  76      


 


1/28/18 01:00  76      


 


1/28/18 00:00 97.7 77 16 143/70 (94) 96   


 


1/28/18 00:00      Room Air  


 


1/28/18 00:00  77      


 


1/27/18 23:00  80      


 


1/27/18 22:00  80      


 


1/27/18 21:00  78      


 


1/27/18 20:00  78      


 


1/27/18 20:00      Room Air  


 


1/27/18 20:00 98.5 80 16 151/78 (102) 98   


 


1/27/18 19:55        21


 


1/27/18 19:00  74      














I/O      


 


 1/27/18 1/27/18 1/27/18 1/28/18 1/28/18 1/28/18





 07:00 15:00 23:00 07:00 15:00 23:00


 


Intake Total 240 ml  600 ml 480 ml  960 ml


 


Output Total 580 ml 2000 ml 800 ml 500 ml  


 


Balance -340 ml -2000 ml -200 ml -20 ml  960 ml


 


      


 


Intake Oral 240 ml  600 ml 480 ml  960 ml


 


Output Urine Total 580 ml  800 ml 500 ml  


 


Hemodialysis  2000 ml    


 


# Voids      4


 


# Bowel Movements    1  








Result Diagram:  


1/28/18 1015                                                                   

             1/28/18 1015





Objective Remarks


GENERAL: MBMN AA female, NAD


SKIN: Warm and dry.


HEAD: Normocephalic.


EYES: No scleral icterus. No injection or drainage. 


NECK: Supple, trachea midline. No JVD or lymphadenopathy.


CARDIOVASCULAR: Regular rate and rhythm without murmurs, gallops, or rubs. 


RESPIRATORY: Breath sounds equal bilaterally. No accessory muscle use.


GASTROINTESTINAL: Abdomen soft, non-tender, nondistended. 


MUSCULOSKELETAL: No cyanosis, or edema. 


BACK: Nontender without obvious deformity. No CVA tenderness.








A/P


Assessment and Plan


? Mild hemoptysis, resolved


ESRD


DM


HTN





PLAN:





Will get PFT


Monitor hemoptysis


Stable on RA











Karl Delatorre MD Jan 28, 2018 18:24

## 2018-01-28 NOTE — HHI.PR
Subjective


Remarks


Follow-up for chest pain and acute renal failure


Continues to have good urine output


Patient stated that she had one episode chest pain last night at rest but that 

resolved quickly.  Deny any coughing up blood.  Deny any shortness of 

breathing.  Should no other complaints.  Deny any pain.





Objective


Vitals





Vital Signs








  Date Time  Temp Pulse Resp B/P (MAP) Pulse Ox O2 Delivery O2 Flow Rate FiO2


 


1/28/18 16:00  75      


 


1/28/18 15:00     100 Room Air  


 


1/28/18 15:00 98.6 75 16 130/60 (83) 100   


 


1/28/18 15:00  70      


 


1/28/18 14:00  76      


 


1/28/18 13:00  69      


 


1/28/18 12:00 98.1 67 14 127/64 (85) 98   


 


1/28/18 12:00  70      


 


1/28/18 11:00  66      


 


1/28/18 11:00     95 Room Air  


 


1/28/18 09:16     97   21


 


1/28/18 09:00 98.6 76 16 147/74 (98) 97   


 


1/28/18 05:00  74      


 


1/28/18 04:00  77      


 


1/28/18 04:00 98.4 74 16 142/70 (94) 96   


 


1/28/18 04:00      Room Air  


 


1/28/18 03:00  74      


 


1/28/18 02:00  76      


 


1/28/18 01:00  76      


 


1/28/18 00:00 97.7 77 16 143/70 (94) 96   


 


1/28/18 00:00      Room Air  


 


1/28/18 00:00  77      


 


1/27/18 23:00  80      


 


1/27/18 22:00  80      


 


1/27/18 21:00  78      


 


1/27/18 20:00  78      


 


1/27/18 20:00      Room Air  


 


1/27/18 20:00 98.5 80 16 151/78 (102) 98   


 


1/27/18 19:55        21


 


1/27/18 19:00  74      


 


1/27/18 18:00  74      


 


1/27/18 17:00  74      














I/O      


 


 1/27/18 1/27/18 1/27/18 1/28/18 1/28/18 1/28/18





 07:00 15:00 23:00 07:00 15:00 23:00


 


Intake Total 240 ml  600 ml 480 ml  


 


Output Total 580 ml 2000 ml 800 ml 500 ml  


 


Balance -340 ml -2000 ml -200 ml -20 ml  


 


      


 


Intake Oral 240 ml  600 ml 480 ml  


 


Output Urine Total 580 ml  800 ml 500 ml  


 


Hemodialysis  2000 ml    


 


# Bowel Movements    1  








Result Diagram:  


1/28/18 1015                                                                   

             1/28/18 1015





Objective Remarks


GENERAL: in NAD


CARDIOVASCULAR: Regular rate and rhythm without murmurs, gallops, or rubs.  

Chest pain reproducible with palpation of the chest wall but improved.  Right-

sided Vas-Cath in place


RESPIRATORY: Breath sounds equal bilaterally. No accessory muscle use.


GASTROINTESTINAL: Abdomen soft, non-tender, nondistended. 


MUSCULOSKELETAL: No cyanosis, or edema.


Procedures


Panendoscopy 01/19/18


Medications and IVs





Current Medications


Aspirin (Aspirin Chew) 324 mg ONCE  ONCE PO  Last administered on 1/15/18at 01:

42;  Start 1/15/18 at 01:30;  Stop 1/15/18 at 01:31;  Status DC


Nitroglycerin (Nitroglycerin 2% Oint) 1 inch ONCE  ONCE TOP  Last administered 

on 1/15/18at 01:42;  Start 1/15/18 at 01:30;  Stop 1/15/18 at 01:31;  Status DC


Sodium Chloride (NS Flush) 2 ml UNSCH  PRN IVF FLUSH AFTER USING IV ACCESS Last 

administered on 1/15/18at 01:42;  Start 1/15/18 at 01:30;  Stop 1/18/18 at 09:58

;  Status DC


Nitroglycerin (Nitrostat Sl) 0.4 mg Q5M SL  Last administered on 1/15/18at 01:54

;  Start 1/15/18 at 01:30;  Stop 1/15/18 at 01:41;  Status DC


Heparin Sodium (Porcine) (Heparin Inj) 3,000 units ONCE  ONCE IV  Last 

administered on 1/15/18at 03:17;  Start 1/15/18 at 02:30;  Stop 1/15/18 at 02:31

;  Status DC


Heparin Sodium/ Dextrose 250 ml @ 7 mls/hr TITRATE  PRN IV Coagulation 

Management Last administered on 1/17/18at 11:11;  Start 1/15/18 at 02:30;  Stop 

1/17/18 at 23:52;  Status DC


Furosemide (Lasix Inj) 40 mg ONCE  ONCE IV PUSH  Last administered on 1/15/18at 

03:18;  Start 1/15/18 at 02:30;  Stop 1/15/18 at 02:31;  Status DC


Dextrose (D50w (Vial) Inj) 50 ml UNSCH  PRN IV PUSH HYPOGLYCEMIA-SEE COMMENTS;  

Start 1/15/18 at 04:00


Glucagon (Glucagon Inj) 1 mg UNSCH  PRN OTHER HYPOGLYCEMIA-SEE COMMENTS;  Start 

1/15/18 at 04:00


Insulin Aspart (NovoLOG SUPPLEMENTAL SCALE) 1 ACHS SLIDING  SCALE SQ  Last 

administered on 1/27/18at 21:00;  Start 1/15/18 at 08:00


Furosemide (Lasix Inj) 40 mg BID@09,18 IV PUSH  Last administered on 1/16/18at 

08:02;  Start 1/15/18 at 09:00;  Stop 1/16/18 at 17:09;  Status DC


Ceftriaxone Sodium 1000 mg/ Sodium Chloride 100 ml @  200 mls/hr Q24H IV  Last 

administered on 1/22/18at 05:13;  Start 1/15/18 at 05:00;  Stop 1/22/18 at 22:00

;  Status DC


Azithromycin 500 mg/Sodium Chloride 250 ml @  250 mls/hr Q24H IV  Last 

administered on 1/21/18at 04:01;  Start 1/15/18 at 04:00;  Stop 1/21/18 at 11:25

;  Status DC


Albuterol/ Ipratropium (Duoneb Neb) 1 ampule Q4HR NEB  PRN NEB SOB/WHEEZING 

Last administered on 1/25/18at 01:35;  Start 1/15/18 at 04:00


Sodium Chloride (NS Flush) 2 ml UNSCH  PRN IV FLUSH FLUSH AFTER USING IV ACCESS 

Last administered on 1/23/18at 18:08;  Start 1/15/18 at 04:00


Sodium Chloride (NS Flush) 2 ml BID IV FLUSH  Last administered on 1/28/18at 09:

03;  Start 1/15/18 at 09:00


Ondansetron HCl (Zofran Inj) 4 mg Q6H  PRN IVP NAUSEA OR VOMITING Last 

administered on 1/19/18at 16:49;  Start 1/15/18 at 04:00


Acetaminophen (Tylenol) 650 mg Q6H  PRN PO FEVER/PAIN SCALE 1 TO 2 Last 

administered on 1/23/18at 08:12;  Start 1/15/18 at 04:00


Acetaminophen/ Hydrocodone Bitart (Norco  5-325 Mg) 1 tab Q4H  PRN PO PAIN 

SCALE 3 TO 5 Last administered on 1/28/18at 16:32;  Start 1/15/18 at 04:00


Morphine Sulfate (Morphine Inj) 2 mg Q3H  PRN IV PUSH chest pain or pain>3  

Last administered on 1/28/18at 05:53;  Start 1/15/18 at 04:00


Senna/Docusate Sodium (Theresa-Colace) 1 tab BID PO  Last administered on 1/28/ 18at 09:02;  Start 1/15/18 at 09:00


Magnesium Hydroxide (Milk Of Magnesia Liq) 30 ml Q12H  PRN PO Mild constipation 

Last administered on 1/26/18at 05:09;  Start 1/15/18 at 04:00


Sennosides (Senokot) 17.2 mg Q12H  PRN PO Moderate constipation;  Start 1/15/18 

at 04:00


Bisacodyl (Dulcolax Supp) 10 mg DAILY  PRN RECTAL SEVERE CONSITIPATION;  Start 1

/15/18 at 04:00


Lactulose (Lactulose Liq) 30 ml DAILY  PRN PO SEVERE CONSITIPATION Last 

administered on 1/27/18at 16:53;  Start 1/15/18 at 04:00


Aspirin (Aspirin Chew) 81 mg DAILY CHEW  Last administered on 1/28/18at 09:02;  

Start 1/15/18 at 09:00;  Status Future hold


Folic Acid (Folate) 1 mg DAILY PO  Last administered on 1/28/18at 09:03;  Start 

1/15/18 at 09:00


Gabapentin (Neurontin) 300 mg HS PO  Last administered on 1/27/18at 21:32;  

Start 1/15/18 at 21:00


Insulin Detemir (Levemir Inj) 15 units HS SQ  Last administered on 1/27/18at 21:

00;  Start 1/15/18 at 21:00


Metoprolol Tartrate (Lopressor) 50 mg Q12HR PO  Last administered on 1/18/18at 

09:00;  Start 1/15/18 at 09:00;  Stop 1/18/18 at 09:42;  Status DC


Nifedipine (Procardia) 30 mg TID PO  Last administered on 1/18/18at 09:01;  

Start 1/15/18 at 09:00;  Stop 1/18/18 at 09:42;  Status DC


Pravastatin Sodium (Pravachol) 40 mg HS PO  Last administered on 1/27/18at 21:32

;  Start 1/15/18 at 21:00


Morphine Sulfate (Morphine Inj) 4 mg ONCE  ONCE IV PUSH  Last administered on 1/

15/18at 04:34;  Start 1/15/18 at 04:15;  Stop 1/15/18 at 04:19;  Status DC


Ondansetron HCl (Zofran Inj) 4 mg ONCE  ONCE IV PUSH  Last administered on 1/15/

18at 04:32;  Start 1/15/18 at 04:15;  Stop 1/15/18 at 04:19;  Status DC


Influenza Virus Vaccine (Flu (Quadrivalent) Vaccine Inj) 0.5 ml ONCE ONCE IM ;  

Start 1/16/18 at 10:00;  Stop 1/16/18 at 10:01;  Status DC


Isosorbide Mononitrate (Imdur) 120 mg DAILY@07 PO  Last administered on 1/28/ 18at 09:02;  Start 1/18/18 at 07:00


Sodium Chloride 1,000 ml @  50 mls/hr Q20H IV  Last administered on 1/17/18at 23

:45;  Start 1/17/18 at 23:45;  Stop 1/24/18 at 09:11;  Status DC


Metoprolol Tartrate (Lopressor) 100 mg Q12HR PO  Last administered on 1/28/18at 

09:02;  Start 1/18/18 at 21:00


Amlodipine Besylate (Norvasc) 5 mg DAILY PO  Last administered on 1/19/18at 08:

12;  Start 1/18/18 at 13:00;  Stop 1/19/18 at 12:46;  Status DC


Ferrous Sulfate (Ferrous Sulfate) 325 mg BID PO  Last administered on 1/28/18at 

09:02;  Start 1/18/18 at 10:00


Polyethylene Glycol/ Electrolytes (Colyte Liq) 4,000 ml ONCE  ONCE PO  Last 

administered on 1/18/18at 16:04;  Start 1/18/18 at 16:00;  Stop 1/18/18 at 16:01

;  Status DC


Pantoprazole Sodium (Protonix Inj) 40 mg Q24H IV PUSH  Last administered on 1/21 /18at 08:38;  Start 1/19/18 at 09:00;  Stop 1/21/18 at 11:16;  Status DC


Furosemide (Lasix Inj) 40 mg ONCE  ONCE IV PUSH  Last administered on 1/18/18at 

23:07;  Start 1/18/18 at 22:15;  Stop 1/18/18 at 22:16;  Status DC


Nitroglycerin (Nitroglycerin 2% Oint) 1 inch Q6HR TOPICAL  Last administered on 

1/28/18at 13:10;  Start 1/19/18 at 01:40


Morphine Sulfate (Morphine Inj) 2 mg Q3H  PRN IV PUSH pain not relieved by ntg 

Last administered on 1/20/18at 18:41;  Start 1/19/18 at 01:30;  Stop 1/24/18 at 

15:44;  Status DC


Clonidine (Catapres) 0.1 mg ONCE  ONCE PO  Last administered on 1/19/18at 01:40

;  Start 1/19/18 at 01:30;  Stop 1/19/18 at 01:31;  Status DC


Magnesium Citrate (Citroma Liq) 300 ml ONCE  ONCE PO ;  Start 1/19/18 at 12:00;

  Stop 1/19/18 at 12:01;  Status DC


Magnesium Citrate (Citroma Liq) 300 ml ONCE  ONCE PO ;  Start 1/19/18 at 18:00;

  Stop 1/19/18 at 18:01;  Status DC


Bisacodyl (Dulcolax Ec) 10 mg DAILY@18,21 PO ;  Start 1/19/18 at 18:00;  Stop 1/ 19/18 at 21:03;  Status DC


Amlodipine Besylate (Norvasc) 10 mg DAILY PO  Last administered on 1/28/18at 09:

02;  Start 1/20/18 at 09:00


Clonidine (Catapres) 0.1 mg ONCE  ONCE PO  Last administered on 1/19/18at 15:26

;  Start 1/19/18 at 15:15;  Stop 1/19/18 at 15:16;  Status DC


Clonidine (Catapres) 0.1 mg Q12HR PO  Last administered on 1/21/18at 20:44;  

Start 1/19/18 at 21:00;  Stop 1/22/18 at 10:01;  Status DC


Clonidine (Catapres) 0.1 mg NOW  ONCE PO  Last administered on 1/19/18at 16:45;

  Start 1/19/18 at 16:45;  Stop 1/19/18 at 16:46;  Status DC


Clonidine (Catapres) 0.1 mg Q6H  PRN PO SBP>160, DBP>90 Last administered on 1/ 22/18at 03:53;  Start 1/20/18 at 19:15


Pantoprazole Sodium (Protonix) 40 mg DAILY PO  Last administered on 1/28/18at 09

:03;  Start 1/22/18 at 09:00


Azithromycin (Zithromax) 500 mg DAILY PO  Last administered on 1/28/18at 09:02;

  Start 1/22/18 at 09:00


Hydralazine HCl (Apresoline) 50 mg Q8HR PO  Last administered on 1/28/18at 14:11

;  Start 1/22/18 at 09:00


Sodium Chloride 1,000 ml @  100 mls/hr Q10H IV  Last administered on 1/22/18at 

10:44;  Start 1/22/18 at 08:54;  Stop 1/24/18 at 09:27;  Status DC


Clonidine (Catapres) 0.2 mg Q12HR PO  Last administered on 1/28/18at 09:02;  

Start 1/22/18 at 21:00


Sodium Chloride 1,000 ml @  75 mls/hr [id number] IV  Last administered on 1/22/ 18at 12:00;  Start 1/22/18 at 12:00;  Stop 1/22/18 at 22:30;  Status DC


Heparin Sodium/ Sodium Chloride 1,000 ml @  As Directed STK-MED ONCE .ROUTE ;  

Start 1/22/18 at 13:50;  Stop 1/22/18 at 13:51;  Status DC


Midazolam HCl (Versed Inj) 2 mg STK-MED ONCE .ROUTE ;  Start 1/22/18 at 13:50;  

Stop 1/22/18 at 13:51;  Status DC


Verapamil HCl (Isoptin Inj) 5 mg STK-MED ONCE .ROUTE ;  Start 1/22/18 at 13:50;

  Stop 1/22/18 at 13:51;  Status DC


Heparin Sodium (Porcine) (Heparin Inj) 10,000 units STK-MED ONCE .ROUTE ;  

Start 1/22/18 at 13:50;  Stop 1/22/18 at 13:51;  Status DC


Nitroglycerin 5 ml @ As Directed STK-MED ONCE .ROUTE ;  Start 1/22/18 at 13:50;

  Stop 1/22/18 at 13:51;  Status DC


Fentanyl Citrate (fentaNYL INJ) 100 mcg STK-MED ONCE .ROUTE ;  Start 1/22/18 at 

13:54;  Stop 1/22/18 at 13:55;  Status DC


Sodium Chloride 1,000 ml @  As Directed STK-MED ONCE IV ;  Start 1/19/18 at 12:

00;  Stop 1/22/18 at 13:55;  Status DC


Propofol (Diprivan  200 Mg/20 ml Inj) 400 mg STK-MED ONCE IV ;  Start 1/19/18 

at 12:00;  Stop 1/22/18 at 13:55;  Status DC


Lidocaine HCl (Xylocaine-Mpf 1% Inj) 5 ml STK-MED ONCE OTHER ;  Start 1/19/18 

at 12:00;  Stop 1/22/18 at 13:55;  Status DC


Miscellaneous Information 1 ONCE  ONCE XX ;  Start 1/22/18 at 14:45;  Stop 1/22/ 18 at 14:56;  Status DC


Bacitracin (Bacitracin Oint Packet) 0.9 gm ONCE  ONCE TOP ;  Start 1/22/18 at 14

:45;  Stop 1/22/18 at 14:54;  Status DC


Iohexol (OMNIPAQUE 350 INJ (Cath Lab)) 50 ml STK-MED ONCE OTHER ;  Start 1/22/ 18 at 15:18;  Stop 1/22/18 at 15:19;  Status DC


Sodium Chloride 1,000 ml @  50 mls/hr Q20H IV ;  Start 1/24/18 at 20:00;  Stop 1 /24/18 at 20:00;  Status DC


Acetaminophen/ Hydrocodone Bitart (Norco  5-325 Mg) 1 tab Q4H  PRN PO pain>3;  

Start 1/24/18 at 15:45


Sodium Chloride 1,000 ml @  50 mls/hr Q20H IV ;  Start 1/24/18 at 19:00;  Stop 1 /24/18 at 19:00;  Status DC


Sodium Chloride 1,000 ml @  100 mls/hr Q10H IV  Last administered on 1/24/18at 

20:08;  Start 1/24/18 at 19:00;  Stop 1/25/18 at 12:26;  Status DC


Nitroglycerin (Nitrostat Sl) 0.4 mg Q5M  PRN SL SEE LABEL COMMENTS Last 

administered on 1/25/18at 00:29;  Start 1/25/18 at 00:15


Morphine Sulfate (Morphine Inj) 2 mg ONCE  PRN IV PUSH PAIN 1-10 Last 

administered on 1/25/18at 01:17;  Start 1/25/18 at 00:15;  Stop 1/25/18 at 07:30

;  Status DC


Nitroglycerin (Nitrostat Sl) 0.4 mg STK-MED ONCE SL ;  Start 1/25/18 at 00:04;  

Stop 1/25/18 at 00:05;  Status DC


Lorazepam (Ativan) 0.5 mg Q8HR  PRN PO anxiety Last administered on 1/27/18at 21

:32;  Start 1/25/18 at 11:45


Sevelamer Carbonate (Renvela) 800 mg TIDAC PO  Last administered on 1/28/18at 16

:32;  Start 1/25/18 at 17:00


Sodium Chloride 1,000 ml @  0 mls/hr Q0M PRN OTHER For Prime & Rinse Back Last 

administered on 1/27/18at 12:03;  Start 1/26/18 at 08:51


Heparin Sodium (Porcine) (Heparin Inj) 8,000 units UNSCH  PRN IV FLUSH WITH 

DIALYSIS;  Start 1/26/18 at 09:00


Sodium Chloride 1,000 ml @  200 mls/hr Q5H PRN IV WITH DIALYSIS;  Start 1/26/18 

at 08:51


Sodium Chloride 1,000 ml @  0 mls/hr Q0M PRN OTHER WITH DIALYSIS;  Start 1/26/ 18 at 08:51


Mannitol (Mannitol Inj) 12.5 gm UNSCH  PRN IV WITH DIALYSIS;  Start 1/26/18 at 

09:00


Albumin Human 100 ml @  60 mls/hr UNSCH  PRN IV WITH DIALYSIS;  Start 1/26/18 

at 09:00


Sodium Chloride (NS Flush) 5 ml UNSCH  PRN IV FLUSH WITH DIALYSIS;  Start 1/26/ 18 at 09:00


Heparin Sodium (Porcine) (Heparin Inj)  UNSCH  PRN .XX WITH DIALYSIS Last 

administered on 1/27/18at 12:02;  Start 1/26/18 at 09:00


Gentamicin Sulfate (Gentamicin Inj) 20 mg UNSCH  PRN OTHER WITH DIALYSIS Last 

administered on 1/27/18at 12:03;  Start 1/26/18 at 09:00


Ondansetron HCl (Zofran Inj) 4 mg UNSCH  PRN IV PUSH WITH DIALYSIS;  Start 1/26/ 18 at 09:00


Acetaminophen (Tylenol) 650 mg UNSCH  PRN PO for headach, pain, temp > 101F;  

Start 1/26/18 at 09:00


Diphenhydramine HCl (Benadryl) 25 mg UNSCH  PRN PO for hives/itching/anaphylaxis

;  Start 1/26/18 at 09:00


Nitroglycerin (Nitrostat Sl) 0.4 mg UNSCH  PRN SL CHEST PAIN;  Start 1/26/18 at 

09:00


Clonidine (Catapres) 0.1 mg UNSCH  PRN PO for BP > 180/100 X 2 readings;  Start 

1/26/18 at 09:00


Epoetin David (Epogen Inj) 10,000 units UNSCH  PRN IV PUSH WITH DIALYSIS Last 

administered on 1/26/18at 15:00;  Start 1/26/18 at 09:00


Gelatin (Gelfoam 12 Mm/7 Mm Top) 1 foam UNSCH  PRN TOP SEE LABEL COMMENTS;  

Start 1/26/18 at 09:00


Heparin Sodium (Porcine) (*HEPARIN INJ Periprocedural ONLY) 10,000 units STK-

MED ONCE .ROUTE  Last administered on 1/26/18at 10:48;  Start 1/26/18 at 10:20;

  Stop 1/26/18 at 10:21;  Status DC


Sodium Chloride (NS Flush)  UNSCH  PRN IV FLUSH SEE PROTOCOL;  Start 1/26/18 at 

12:45


Heparin Sodium (Porcine) (Heparin Inj)  UNSCH  PRN IV FLUSH SEE PROTOCOL;  

Start 1/26/18 at 12:45


Furosemide (Lasix) 20 mg DAILY PO  Last administered on 1/28/18at 09:03;  Start 

1/27/18 at 16:15;  Stop 1/28/18 at 13:06;  Status DC


Clopidogrel Bisulfate (Plavix) 600 mg ONCE  ONCE PO  Last administered on 1/28/ 18at 14:11;  Start 1/28/18 at 13:15;  Stop 1/28/18 at 13:16;  Status DC


Clopidogrel Bisulfate (Plavix) 75 mg DAILY PO ;  Start 1/29/18 at 09:00





A/P


Problem List:  


(1) Sepsis


ICD Code:  A41.9 - Sepsis, unspecified organism


(2) PNA (pneumonia)


ICD Code:  J18.9 - Pneumonia, unspecified organism


(3) Hypoxia


ICD Code:  R09.02 - Hypoxemia


(4) NSTEMI (non-ST elevated myocardial infarction)


ICD Code:  I21.4 - Non-ST elevation (NSTEMI) myocardial infarction


(5) CHF (congestive heart failure)


ICD Code:  I50.9 - Heart failure, unspecified


(6) Renal insufficiency


ICD Code:  N28.9 - Disorder of kidney and ureter, unspecified


(7) DM (diabetes mellitus)


ICD Code:  E11.9 - Type 2 diabetes mellitus without complications


(8) GI bleed


ICD Code:  K92.2 - Gastrointestinal hemorrhage, unspecified


Assessment and Plan


52-year-old female with presented with GI bleed and chest pain





Acute on chronic Stage IV CKD


   Management per nephrology


   Avoid nephrotoxins.  Creatinine continues to worsen.  Creatinine went to to 

4.47 to 5.32 to 4.08 today .  Decreased urine output.


   Status post Vas-Cath done today on 1/26.  Patient received dialysis on 1/26 

and on 1/27.  


   Per nephrologist most likely patient has reached end-stage renal disease.


   Neurologist discussed options moving forward related.  Peritoneal dialysis 

versus hemodialysis.  Patient stated that she will consider options.


   Continue strict ins and outs 


    trend creatinine.


   On Renvela for hyperphosphatemia. 


   Avoid any nephrotoxic agents, stop non essential medications. 





NSTEMI:  


   Post cardiac catheterization on 1/22 showing severe two-vessel disease of 

the right coronary artery in the mid LAD.  


   Recommended high risk PCI versus cardiac bypass versus medical management.  

Creatinine continues to worsen so PCI was held.





Hemoptysis on 1/27


   Patient has not had any episodes since yesterday.  She is also asymptomatic.


   Pulmonologist consulted and following.





GI bleed-resolved


Anemia of acute blood loss


   Status post transfused with 2 units of packed red blood cells.


   Appreciate input from GI, status post panendoscopy with finding of gastritis 

EGD and colon polyps on colonoscopy pending biopsy report


   No Evidence of iron deficiency anemia


   Unremarkable small bowel follow-through


   PPI and continue to monitor H&H


   GI signed off





Sepsis


   Resolved, continue with current antibiotics including Rocephin and 

azithromycin





PNA


   Rocephin and azithromycin discontinued on 1/22 and continues to do well off 

of antibiotics.


   DuoNeb when necessary


   Maintain oxygen saturation above 92%





Hypoxia-resolved


   Continue with treatment as in above





Acute on Chronic diastolic CHF


   Echo 8/18/17 w/ EF 55-60%


   Currently off Lasix secondary to worsening renal function


   Continue with Imdur/BB





Atypical chest pain


   Chest pain occurs only when patient tried to get up.  The same chest pain is 

reproducible with palpation of her chest wall.  Pain is improving.  On pain 

medication when necessary.  Avoid NSAIDs due to acute renal failure.  Due to 

acute renal failure.





Lightheadedness


   Orthostatics is normal.








Hypertension


   Continue with Lopressor, Procardia, clonidine 2 times a day





DM:  


   Sliding scale w/ Accu-Cheks. stable. 





Tobacco Abuse: 


   Strongly recommended to stop smoking, no NicoDerm to avoid vasoconstriction.

  Ativan prn if needed


Discharge Planning


Patient may need long-term dialysis.


Discussed and MDR.











Yany Nathan MD Jan 28, 2018 16:50

## 2018-01-28 NOTE — HHI.NPPN
Subjective


General Problems:  Anemia


Renal Failure:  Chronic, Acute, Stage III, Stage IV


Interval History


Had dialysis yesterday. Remains non oliguric. Seen by pulmonary medicine 

specialist for hemoptysis and shortness of breath. Today she is comfortable.





Review of Systems


General


Constitutional:  Fatigue





Respiratory


Respiratory Remarks


Mild SOB





Cardiovascular


Cardiac Remarks


resolved





Gastrointestinal


GI Remarks


no abdominal pain





Psych


Psych:  Depression





Objective Data


Data





Vital Signs








  Date Time  Temp Pulse Resp B/P (MAP) Pulse Ox O2 Delivery O2 Flow Rate FiO2


 


1/28/18 05:00  74      


 


1/28/18 04:00  77      


 


1/28/18 04:00 98.4 74 16 142/70 (94) 96   


 


1/28/18 04:00      Room Air  


 


1/28/18 03:00  74      


 


1/28/18 02:00  76      


 


1/28/18 01:00  76      


 


1/28/18 00:00 97.7 77 16 143/70 (94) 96   


 


1/28/18 00:00      Room Air  


 


1/28/18 00:00  77      


 


1/27/18 23:00  80      


 


1/27/18 22:00  80      


 


1/27/18 21:00  78      


 


1/27/18 20:00  78      


 


1/27/18 20:00      Room Air  


 


1/27/18 20:00 98.5 80 16 151/78 (102) 98   


 


1/27/18 19:55        21


 


1/27/18 19:00  74      


 


1/27/18 18:00  74      


 


1/27/18 17:00  74      


 


1/27/18 16:00  72      


 


1/27/18 16:00 98.3 75 18 133/65 (87) 98   


 


1/27/18 14:00  70      


 


1/27/18 13:00  74      


 


1/27/18 12:00  77      


 


1/27/18 12:00 98.9 77 18 147/73 (97) 97   


 


1/27/18 11:00  72      


 


1/27/18 10:00  72      


 


1/27/18 09:00  72      








-:  


1/27/18 0555                                                                   

             1/27/18 0555








Physical Exam


General


Appearance:  Well Developed, No Acute Distress, Comfortable





Eyes


Eye Exam:  Pupils Equal





Throat


Throat Exam:  Oral Mucosa [place] & Moist





Pulmonary


Resp Exam:  Clear Bilaterally, Breath Sounds Equal, No Distress, Crackles





Cardiology


CV Exam:  Regular, Normal Sinus Rhythm, Good Perfusion





Gastrointestinal/Abdomen


GI Exam:  Soft, Non-Tender, Bowel Sounds Present, Positive Bowel Movement, 

Distended





Musculoskeletal


MS Exam:  Joints Intact, Normal Tone, Good Strength





Integumentary


Skin Exam:  Clear, Warm, Dry, Intact





Extremeties


Extremities Exam:  No Edema





Neurologic


Neuro Exam:  Alert, Awake, Oriented, Speech Clear, Moving All Extremities





Psychiatric


Psych Exam:  Appropriate Responses





Assessment/Plan


Discussed Condition With:  Patient


Assessment Summary:  LETI/Acute Renal Failure, Anemia of CKD, Hypertension, 

Diabetes Mellitus, CKD Stage IV


Problem List:  


(1) Renal insufficiency


ICD Codes:  N28.9 - Disorder of kidney and ureter, unspecified


Plan:  She had underlying diabetic nephropathy, stage 4


Recent NSTEMI with cardiac cath, did receive a small amount of IV contrast on 1/ 22


Initiated on dialysis on 1/26/18. 


Dialyzed on 1/27 as well. 


Continue to monitor renal function, it is unclear if she needs permanent 

dialysis, but most likely she has reached ESRD. 


On Renvela for hyperphosphatemia. 


Avoid any nephrotoxic agents, stop non essential medications. 





We have discussed options moving forward related to PD vs HD. She needs time to 

consider her options. Will follow up. 





(2) NSTEMI (non-ST elevated myocardial infarction)


ICD Codes:  I21.4 - Non-ST elevation (NSTEMI) myocardial infarction


Plan:  Cardiology following, [facility] postponed.


given ASA,off  heparin gtt





(3) DM (diabetes mellitus)


ICD Codes:  E11.9 - Type 2 diabetes mellitus without complications


Plan:  Insulin as needed, maintain glucose 140-180mg/dL. 


If NPO use D10 @ 15 ml/hr to prevent hyperkalemia 





(4) Anemia


ICD Codes:  D64.9 - Anemia, unspecified


Status:  Chronic


Plan:  Start Epogen with dialysis 


no evidence of iron deficiency 


She was transfused this admission.





(5) CAD (coronary artery disease)


ICD Codes:  I25.10 - Atherosclerotic heart disease of native coronary artery 

without angina pectoris


Status:  Chronic


Plan:  Hx of stent in the past. New NSTEMI, see above 





(6) PNA (pneumonia)


ICD Codes:  J18.9 - Pneumonia, unspecified organism


Plan:  Improved. 





(7) HTN (hypertension)


ICD Codes:  I10 - Hypertension


Status:  Chronic


Plan:  On Imdur, metoprolol, clonidine, and Norvasc


BP has improved. 














Kelton Whiteside MD Jan 28, 2018 08:14

## 2018-01-28 NOTE — MB
cc:

BRANDAN MO

****

 

 

DATE OF CONSULTATION:  1/27/2018

 

REASON FOR CONSULTATION:

Shortness of breath.

 

HISTORY OF PRESENT ILLNESS

Ms. Walker is a 52 year-old -American female with a history of

congestive heart failure, with kidney disease.  The patient was admitted with

shortness of breath with GI bleed.  She had a workup done over here.  She also

developed renal failure and she had vas cath placed and she was undergoing

hemodialysis.

 

Today she had an episode of coughing up sputum, also one time she had blood in

it.  She cannot quantitate it.  No fever, chills, night sweats.

 

LABORATORY DATA:

WBC 7.3, hemoglobin 8.7, hematocrit 24.9, MCV 94, platelet count 303.  Sodium

136, potassium 4.6, chloride 107, CO2 25, BUN 47, creatinine 2.76.

 

PAST SURGICAL HISTORY/PAST MEDICAL HISTORY:

1. History of hypertension.

2. Diabetes mellitus.

3. Chronic kidney disease. Now she has end-stage renal disease.

4. History of gallbladder surgery.

5. History of tubal ligation.

 

MEDICATIONS:

1. Lasix 20 mg a day.

2. Nitroglycerin 0.4 milligrams p.r.n.

3. Epogen.

4. Lorazepam 0.5 milligrams p.r.n.

5. Clonidine 0.2 milligrams twice a day.

6. Zithromax 500 milligrams a day.

7. Hydralazine 50 milligrams q8 hours.

8. Clonidine 0.1 milligrams p.r.n.

9. Ferrous sulfate 825 mg twice a day.

10. isosorbide 120 mg a day.

11. Neurontin 300 milligrams at nighttime.

12. She is on insulin.

13. Albuterol/Atrovent nebulizer treatment.

14. Morphine for pain.

 

ALLERGIES:

SHELLFISH

 

SOCIAL HISTORY

She is .  Lives with someone.  History of smoking, continues to smoke

half pack of cigarettes.  No alcohol use.  She worked as a nurse in Hospice.

 

FAMILY HISTORY:

Noncontributory.

 

REVIEW OF SYSTEMS

No history of DVT, pulmonary embolism, seizure, stroke.

VITAL SIGNS: Blood pressure is 133/65, heart rate 75, respirations 16,

temperature 98.3.

HEENT:  Pupils are equal and reactive.  Nasal mucosa normal.  Neck: JVP not

raised.

Chest:  Equal bilaterally, no rhonchi.

CV: S1-S2 normal.

Abdomen: Benign.

Extremities:  No edema.

 

IMAGING STUDIES:

Chest x-ray shows the right IJ dual-lumen central venous catheter in place with

tip at the cavoatrial junction.

 

IMPRESSION

1. Mild hemoptysis.

2. Hypertension.

3. Coronary artery disease.

 

PLAN:

I discussed with the patient we will quantitate her hemoptysis.  Will get a CT

scan and possible bronchoscopy.  Will start on antibiotics, aerosol treatment.

Advised her to quit smoking.

 

Further treatment will depend on the course in the hospital.

 

Thank you for the consultation.

 

 

 

                              _________________________________

                              Brandan Mo MD

 

 

 

ADA/JULIO

D:  1/27/2018/8:12 PM

T:  1/28/2018/6:58 AM

Visit #:  [id number]

Job #:  [id number]

## 2018-01-28 NOTE — PD.CARD.PN
Subjective


Subjective Remarks


Today she has some right-sided chest discomfort that is exacerbated by movement 

and palpation.  No shortness of breath.


 (King Ervin)





Objective


Medications





Current Medications








 Medications


  (Trade)  Dose


 Ordered  Sig/Luis Armando


 Route  Start Time


 Stop Time Status Last Admin


 


  (D50w (Vial) Inj)  50 ml  UNSCH  PRN


 IV PUSH  1/15/18 04:00


     


 


 


  (Glucagon Inj)  1 mg  UNSCH  PRN


 OTHER  1/15/18 04:00


     


 


 


  (NovoLOG


 SUPPLEMENTAL


 SCALE)  1  ACHS SLIDING  SCALE


 SQ  1/15/18 08:00


    1/27/18 21:00


 


 


  (Duoneb Neb)  1 ampule  Q4HR NEB  PRN


 NEB  1/15/18 04:00


    1/25/18 01:35


 


 


  (NS Flush)  2 ml  UNSCH  PRN


 IV FLUSH  1/15/18 04:00


    1/23/18 18:08


 


 


  (NS Flush)  2 ml  BID


 IV FLUSH  1/15/18 09:00


    1/28/18 09:03


 


 


  (Zofran Inj)  4 mg  Q6H  PRN


 IVP  1/15/18 04:00


    1/19/18 16:49


 


 


  (Tylenol)  650 mg  Q6H  PRN


 PO  1/15/18 04:00


    1/23/18 08:12


 


 


  (Norco  5-325 Mg)  1 tab  Q4H  PRN


 PO  1/15/18 04:00


    1/21/18 20:44


 


 


  (Morphine Inj)  2 mg  Q3H  PRN


 IV PUSH  1/15/18 04:00


    1/28/18 05:53


 


 


  (Theresa-Colace)  1 tab  BID


 PO  1/15/18 09:00


    1/28/18 09:02


 


 


  (Milk Of


 Magnesia Liq)  30 ml  Q12H  PRN


 PO  1/15/18 04:00


    1/26/18 05:09


 


 


  (Senokot)  17.2 mg  Q12H  PRN


 PO  1/15/18 04:00


     


 


 


  (Dulcolax Supp)  10 mg  DAILY  PRN


 RECTAL  1/15/18 04:00


     


 


 


  (Lactulose Liq)  30 ml  DAILY  PRN


 PO  1/15/18 04:00


    1/27/18 16:53


 


 


  (Aspirin Chew)  81 mg  DAILY


 CHEW  1/15/18 09:00


   Future hold 1/28/18 09:02


 


 


  (Folate)  1 mg  DAILY


 PO  1/15/18 09:00


    1/28/18 09:03


 


 


  (Neurontin)  300 mg  HS


 PO  1/15/18 21:00


    1/27/18 21:32


 


 


  (Levemir Inj)  15 units  HS


 SQ  1/15/18 21:00


    1/27/18 21:00


 


 


  (Pravachol)  40 mg  HS


 PO  1/15/18 21:00


    1/27/18 21:32


 


 


  (Imdur)  120 mg  DAILY@07


 PO  1/18/18 07:00


    1/28/18 09:02


 


 


  (Lopressor)  100 mg  Q12HR


 PO  1/18/18 21:00


    1/28/18 09:02


 


 


  (Ferrous Sulfate)  325 mg  BID


 PO  1/18/18 10:00


    1/28/18 09:02


 


 


  (Nitroglycerin


 2% Oint)  1 inch  Q6HR


 TOPICAL  1/19/18 01:40


    1/28/18 05:53


 


 


  (Norvasc)  10 mg  DAILY


 PO  1/20/18 09:00


    1/28/18 09:02


 


 


  (Catapres)  0.1 mg  Q6H  PRN


 PO  1/20/18 19:15


    1/22/18 03:53


 


 


  (Protonix)  40 mg  DAILY


 PO  1/22/18 09:00


    1/28/18 09:03


 


 


  (Zithromax)  500 mg  DAILY


 PO  1/22/18 09:00


    1/28/18 09:02


 


 


  (Apresoline)  50 mg  Q8HR


 PO  1/22/18 09:00


    1/28/18 05:53


 


 


  (Catapres)  0.2 mg  Q12HR


 PO  1/22/18 21:00


    1/28/18 09:02


 


 


  (Norco  5-325 Mg)  1 tab  Q4H  PRN


 PO  1/24/18 15:45


     


 


 


  (Nitrostat Sl)  0.4 mg  Q5M  PRN


 SL  1/25/18 00:15


    1/25/18 00:29


 


 


  (Ativan)  0.5 mg  Q8HR  PRN


 PO  1/25/18 11:45


    1/27/18 21:32


 


 


  (Renvela)  800 mg  TIDAC


 PO  1/25/18 17:00


    1/28/18 09:02


 


 


 Sodium Chloride  1,000 ml @ 


 0 mls/hr  Q0M PRN


 OTHER  1/26/18 08:51


    1/27/18 12:03


 


 


  (Heparin Inj)  8,000 units  UNSCH  PRN


 IV FLUSH  1/26/18 09:00


     


 


 


 Sodium Chloride  1,000 ml @ 


 200 mls/hr  Q5H PRN


 IV  1/26/18 08:51


     


 


 


 Sodium Chloride  1,000 ml @ 


 0 mls/hr  Q0M PRN


 OTHER  1/26/18 08:51


     


 


 


  (Mannitol Inj)  12.5 gm  UNSCH  PRN


 IV  1/26/18 09:00


     


 


 


 Albumin Human  100 ml @ 


 60 mls/hr  UNSCH  PRN


 IV  1/26/18 09:00


     


 


 


  (NS Flush)  5 ml  UNSCH  PRN


 IV FLUSH  1/26/18 09:00


     


 


 


  (Heparin Inj)    UNSCH  PRN


 .XX  1/26/18 09:00


    1/27/18 12:02


 


 


  (Gentamicin Inj)  20 mg  UNSCH  PRN


 OTHER  1/26/18 09:00


    1/27/18 12:03


 


 


  (Zofran Inj)  4 mg  UNSCH  PRN


 IV PUSH  1/26/18 09:00


     


 


 


  (Tylenol)  650 mg  UNSCH  PRN


 PO  1/26/18 09:00


     


 


 


  (Benadryl)  25 mg  UNSCH  PRN


 PO  1/26/18 09:00


     


 


 


  (Nitrostat Sl)  0.4 mg  UNSCH  PRN


 SL  1/26/18 09:00


     


 


 


  (Catapres)  0.1 mg  UNSCH  PRN


 PO  1/26/18 09:00


     


 


 


  (Epogen Inj)  10,000 units  UNSCH  PRN


 IV PUSH  1/26/18 09:00


    1/26/18 15:00


 


 


  (Gelfoam 12 Mm/7


 Mm Top)  1 foam  UNSCH  PRN


 TOP  1/26/18 09:00


     


 


 


  (NS Flush)    UNSCH  PRN


 IV FLUSH  1/26/18 12:45


     


 


 


  (Heparin Inj)    UNSCH  PRN


 IV FLUSH  1/26/18 12:45


     


 


 


  (Lasix)  20 mg  DAILY


 PO  1/27/18 16:15


    1/28/18 09:03


 








Vital Signs / I&O





Vital Signs








  Date Time  Temp Pulse Resp B/P (MAP) Pulse Ox O2 Delivery O2 Flow Rate FiO2


 


1/28/18 05:00  74      


 


1/28/18 04:00  77      


 


1/28/18 04:00 98.4 74 16 142/70 (94) 96   


 


1/28/18 04:00      Room Air  


 


1/28/18 03:00  74      


 


1/28/18 02:00  76      


 


1/28/18 01:00  76      


 


1/28/18 00:00 97.7 77 16 143/70 (94) 96   


 


1/28/18 00:00      Room Air  


 


1/28/18 00:00  77      


 


1/27/18 23:00  80      


 


1/27/18 22:00  80      


 


1/27/18 21:00  78      


 


1/27/18 20:00  78      


 


1/27/18 20:00      Room Air  


 


1/27/18 20:00 98.5 80 16 151/78 (102) 98   


 


1/27/18 19:55        21


 


1/27/18 19:00  74      


 


1/27/18 18:00  74      


 


1/27/18 17:00  74      


 


1/27/18 16:00  72      


 


1/27/18 16:00 98.3 75 18 133/65 (87) 98   


 


1/27/18 14:00  70      


 


1/27/18 13:00  74      


 


1/27/18 12:00  77      


 


1/27/18 12:00 98.9 77 18 147/73 (97) 97   


 


1/27/18 11:00  72      


 


1/27/18 10:00  72      














I/O      


 


 1/27/18 1/27/18 1/27/18 1/28/18 1/28/18 1/28/18





 07:00 15:00 23:00 07:00 15:00 23:00


 


Intake Total 240 ml  600 ml 480 ml  


 


Output Total 580 ml 2000 ml 800 ml 500 ml  


 


Balance -340 ml -2000 ml -200 ml -20 ml  


 


      


 


Intake Oral 240 ml  600 ml 480 ml  


 


Output Urine Total 580 ml  800 ml 500 ml  


 


Hemodialysis  2000 ml    


 


# Bowel Movements    1  








Physical Exam


GENERAL: Well-developed well-nourished.  In no acute distress.


NECK: No carotid bruits.  No JVD.  


CARDIOVASCULAR: Regular rate and rhythm.  No murmur appreciated.  Reproducible 

right-sided chest wall discomfort.


RESPIRATORY: No accessory muscle use. Clear to auscultation. Breath sounds 

equal bilaterally. 


MUSCULOSKELETAL: No clubbing or cyanosis.  Trace lower extremity edema. 


NEUROLOGICAL: Awake and alert. Normal speech.


Imaging





Last Impressions








Chest X-Ray 1/27/18 0000 Signed





Impressions: 





 Service Date/Time:  Saturday, January 27, 2018 15:09 - CONCLUSION:  1. Right 

IJ 





 dual-lumen central venous catheter in place with tip at the cavoatrial 

junction. 





 No evidence of pneumothorax 2. Mild pulmonary opacity likely representing 





 pulmonary vascular congestion. 3. Small bilateral pleural effusions.     

Bhaskar Thakur MD 


 


Catheter Placement X-Ray 1/26/18 0900 Signed





Impressions: 





 Service Date/Time:  Friday, January 26, 2018 11:12 - CONCLUSION: Uncomplicated 





 line placement as above.     Ren Stubbs MD 


 


Small Bowel X-Ray 1/19/18 0000 Signed





Impressions: 





 Service Date/Time:  Friday, January 19, 2018 10:44 - CONCLUSION: Unremarkable 





 small bowel examination.     Boo Ricardo MD 








 (King Ervin)





Assessment and Plan


Problem List:  


(1) NSTEMI (non-ST elevated myocardial infarction)


ICD Codes:  I21.4 - Non-ST elevation (NSTEMI) myocardial infarction


(2) CHF (congestive heart failure)


ICD Codes:  I50.9 - Heart failure, unspecified


(3) Renal insufficiency


ICD Codes:  N28.9 - Renal insufficiency


Status:  Acute


(4) Anemia


ICD Codes:  D64.9 - Anemia, unspecified


Status:  Chronic


(5) DM (diabetes mellitus)


ICD Codes:  E11.9 - Type 2 diabetes mellitus without complications


(6) Renal insufficiency


ICD Codes:  N28.9 - Disorder of kidney and ureter, unspecified


(7) HTN (hypertension)


ICD Codes:  I10 - Hypertension


Status:  Chronic


Assessment and Plan


53 yo AAF with history of CAD, cardiac stent placed ~ 10 years ago, CKD IV, CHF 

and diabetes admitted for progressive SOB and chest pain. 





Normocytic anemia: Hemoglobin improved after transfusion.  Currently stable.  

GI signed off.





Coronary artery disease: [facility] 1/22/18 showed severe 2 vessel native coronary 

artery disease involving the right coronary artery and mid left anterior 

descending.  If HD permanent, will need to discuss options, considering PCI 

Monday if ESRD.  Continue Imdur, amlodipine, metoprolol, aspirin, statin.


Echo with normal systolic function and EF 55-60%, Moderate LVH, trace MR, RI, 

and TR.





CKD IV/V: LETI vs ESRD. Started on HD 1/28/18.  Per nephrology "Continue to 

monitor renal function, it is unclear if she needs permanent dialysis, but most 

likely she has reached ESRD."


 (King Ervin)


Assessment and Plan


---------------


need to discuss situation with nephrology and decide +/- PCI tomorrow.


coughed up blood tinged sputum.  


plavix load to see how she tolerates "pre-PCI".  Maintain Hb >9 with HD.


 (Edgardo Mercado MD)











King Ervin Jan 28, 2018 09:11


Edgardo Mercado MD Jan 28, 2018 13:06

## 2018-01-29 VITALS
HEART RATE: 70 BPM | RESPIRATION RATE: 16 BRPM | DIASTOLIC BLOOD PRESSURE: 60 MMHG | TEMPERATURE: 98.1 F | SYSTOLIC BLOOD PRESSURE: 138 MMHG | OXYGEN SATURATION: 100 %

## 2018-01-29 VITALS
HEART RATE: 71 BPM | DIASTOLIC BLOOD PRESSURE: 74 MMHG | OXYGEN SATURATION: 100 % | SYSTOLIC BLOOD PRESSURE: 146 MMHG | RESPIRATION RATE: 18 BRPM | TEMPERATURE: 97.9 F

## 2018-01-29 VITALS
TEMPERATURE: 97.7 F | HEART RATE: 66 BPM | DIASTOLIC BLOOD PRESSURE: 66 MMHG | SYSTOLIC BLOOD PRESSURE: 120 MMHG | RESPIRATION RATE: 19 BRPM | OXYGEN SATURATION: 99 %

## 2018-01-29 VITALS
TEMPERATURE: 98.2 F | HEART RATE: 73 BPM | DIASTOLIC BLOOD PRESSURE: 75 MMHG | OXYGEN SATURATION: 99 % | SYSTOLIC BLOOD PRESSURE: 162 MMHG | RESPIRATION RATE: 19 BRPM

## 2018-01-29 VITALS
DIASTOLIC BLOOD PRESSURE: 71 MMHG | TEMPERATURE: 98.3 F | OXYGEN SATURATION: 100 % | SYSTOLIC BLOOD PRESSURE: 129 MMHG | RESPIRATION RATE: 18 BRPM | HEART RATE: 72 BPM

## 2018-01-29 VITALS — HEART RATE: 66 BPM

## 2018-01-29 VITALS
OXYGEN SATURATION: 99 % | RESPIRATION RATE: 19 BRPM | HEART RATE: 65 BPM | SYSTOLIC BLOOD PRESSURE: 115 MMHG | DIASTOLIC BLOOD PRESSURE: 63 MMHG | TEMPERATURE: 97.6 F

## 2018-01-29 VITALS — HEART RATE: 70 BPM

## 2018-01-29 VITALS — HEART RATE: 68 BPM

## 2018-01-29 VITALS — HEART RATE: 65 BPM

## 2018-01-29 VITALS — HEART RATE: 72 BPM

## 2018-01-29 VITALS — HEART RATE: 73 BPM

## 2018-01-29 VITALS — HEART RATE: 74 BPM

## 2018-01-29 VITALS — OXYGEN SATURATION: 99 %

## 2018-01-29 VITALS — HEART RATE: 71 BPM

## 2018-01-29 LAB
BUN SERPL-MCNC: 32 MG/DL (ref 7–18)
CALCIUM SERPL-MCNC: 9.3 MG/DL (ref 8.5–10.1)
CHLORIDE SERPL-SCNC: 102 MEQ/L (ref 98–107)
CREAT SERPL-MCNC: 3.66 MG/DL (ref 0.5–1)
ERYTHROCYTE [DISTWIDTH] IN BLOOD BY AUTOMATED COUNT: 14.6 % (ref 11.6–17.2)
GFR SERPLBLD BASED ON 1.73 SQ M-ARVRAT: 16 ML/MIN (ref 89–?)
GLUCOSE SERPL-MCNC: 115 MG/DL (ref 74–106)
HCO3 BLD-SCNC: 27.5 MEQ/L (ref 21–32)
HCT VFR BLD CALC: 29.1 % (ref 35–46)
HEPATITIS A AB IGM: NEGATIVE
HEPATITIS B CORE AB IGM: NEGATIVE
HEPATITIS B SURFACE ANTIGEN: NEGATIVE
HEPATITIS C AB IGG: NEGATIVE
HGB BLD-MCNC: 9.9 GM/DL (ref 11.6–15.3)
MCH RBC QN AUTO: 32.4 PG (ref 27–34)
MCHC RBC AUTO-ENTMCNC: 34 % (ref 32–36)
MCV RBC AUTO: 95.4 FL (ref 80–100)
PLATELET # BLD: 420 TH/MM3 (ref 150–450)
PMV BLD AUTO: 7.9 FL (ref 7–11)
RBC # BLD AUTO: 3.05 MIL/MM3 (ref 4–5.3)
SODIUM SERPL-SCNC: 138 MEQ/L (ref 136–145)
WBC # BLD AUTO: 9 TH/MM3 (ref 4–11)

## 2018-01-29 RX ADMIN — ACYCLOVIR SCH UNITS: 800 TABLET ORAL at 21:26

## 2018-01-29 RX ADMIN — GABAPENTIN SCH MG: 300 CAPSULE ORAL at 21:07

## 2018-01-29 RX ADMIN — STANDARDIZED SENNA CONCENTRATE AND DOCUSATE SODIUM SCH TAB: 8.6; 5 TABLET, FILM COATED ORAL at 21:07

## 2018-01-29 RX ADMIN — METOPROLOL TARTRATE SCH MG: 100 TABLET, FILM COATED ORAL at 08:55

## 2018-01-29 RX ADMIN — Medication SCH ML: at 09:01

## 2018-01-29 RX ADMIN — FERROUS SULFATE TAB 325 MG (65 MG ELEMENTAL FE) SCH MG: 325 (65 FE) TAB at 08:55

## 2018-01-29 RX ADMIN — INSULIN ASPART SCH: 100 INJECTION, SOLUTION INTRAVENOUS; SUBCUTANEOUS at 08:00

## 2018-01-29 RX ADMIN — NITROGLYCERIN SCH INCH: 20 OINTMENT TOPICAL at 05:18

## 2018-01-29 RX ADMIN — METOPROLOL TARTRATE SCH MG: 100 TABLET, FILM COATED ORAL at 21:07

## 2018-01-29 RX ADMIN — INSULIN ASPART SCH: 100 INJECTION, SOLUTION INTRAVENOUS; SUBCUTANEOUS at 17:00

## 2018-01-29 RX ADMIN — ASPIRIN 81 MG SCH MG: 81 TABLET ORAL at 08:56

## 2018-01-29 RX ADMIN — NITROGLYCERIN SCH INCH: 20 OINTMENT TOPICAL at 01:18

## 2018-01-29 RX ADMIN — SEVELAMER CARBONATE SCH MG: 800 TABLET, FILM COATED ORAL at 18:36

## 2018-01-29 RX ADMIN — STANDARDIZED SENNA CONCENTRATE AND DOCUSATE SODIUM SCH TAB: 8.6; 5 TABLET, FILM COATED ORAL at 08:55

## 2018-01-29 RX ADMIN — FOLIC ACID SCH MG: 1 TABLET ORAL at 08:55

## 2018-01-29 RX ADMIN — PRAVASTATIN SODIUM SCH MG: 40 TABLET ORAL at 21:07

## 2018-01-29 RX ADMIN — INSULIN ASPART SCH: 100 INJECTION, SOLUTION INTRAVENOUS; SUBCUTANEOUS at 12:00

## 2018-01-29 RX ADMIN — AZITHROMYCIN SCH MG: 250 TABLET, FILM COATED ORAL at 08:54

## 2018-01-29 RX ADMIN — PANTOPRAZOLE SCH MG: 40 TABLET, DELAYED RELEASE ORAL at 08:55

## 2018-01-29 RX ADMIN — SEVELAMER CARBONATE SCH MG: 800 TABLET, FILM COATED ORAL at 13:16

## 2018-01-29 RX ADMIN — NITROGLYCERIN SCH INCH: 20 OINTMENT TOPICAL at 18:37

## 2018-01-29 RX ADMIN — CLOPIDOGREL BISULFATE SCH MG: 75 TABLET, FILM COATED ORAL at 08:56

## 2018-01-29 RX ADMIN — Medication SCH ML: at 21:07

## 2018-01-29 RX ADMIN — SEVELAMER CARBONATE SCH MG: 800 TABLET, FILM COATED ORAL at 08:00

## 2018-01-29 RX ADMIN — INSULIN ASPART SCH: 100 INJECTION, SOLUTION INTRAVENOUS; SUBCUTANEOUS at 21:26

## 2018-01-29 RX ADMIN — NITROGLYCERIN SCH INCH: 20 OINTMENT TOPICAL at 13:16

## 2018-01-29 NOTE — HHI.NPPN
Subjective


General Problems:  Anemia


Renal Failure:  Chronic, Acute, Stage III, Stage IV


Interval History


Sitting up awake and alert. It seems her urine output is improving. She had 

questions about heart cath and renal function. 


 (Migdalia Camejo)





Review of Systems


General


Constitutional:  Fatigue


 (Migdalia Camejo)





Respiratory


Respiratory Remarks


Mild SOB


 (Migdalia Camejo)





Cardiovascular


Cardiac Remarks


resolved


 (Migdalia Camejo)





Gastrointestinal


GI Remarks


no abdominal pain


 (Migdalia Camejo)





Psych


Psych:  Depression


 (Migdalia Camejo)





Objective Data


Data





Vital Signs








  Date Time  Temp Pulse Resp B/P (MAP) Pulse Ox O2 Delivery O2 Flow Rate FiO2


 


1/29/18 07:18     99   21


 


1/29/18 06:00  71      


 


1/29/18 05:00  74      


 


1/29/18 04:00 98.3 74 18 129/71 (90) 100   


 


1/29/18 04:00  72      


 


1/29/18 03:00     100 Room Air  


 


1/29/18 03:00  72      


 


1/29/18 02:00  73      


 


1/29/18 01:00  70      


 


1/29/18 00:00  70      


 


1/29/18 00:00 98.1 70 16 138/60 (86) 100   


 


1/28/18 23:00  90      


 


1/28/18 23:00     100 Room Air  


 


1/28/18 22:00  88      


 


1/28/18 21:00  100      


 


1/28/18 20:00  97      


 


1/28/18 19:00  96      


 


1/28/18 19:00     100 Room Air  


 


1/28/18 19:00 98.2 73 14 149/57 (87) 100   


 


1/28/18 18:00  76      


 


1/28/18 17:35   16     


 


1/28/18 17:00  76      


 


1/28/18 16:00  75      


 


1/28/18 15:00     100 Room Air  


 


1/28/18 15:00 98.6 75 16 130/60 (83) 100   


 


1/28/18 15:00  70      


 


1/28/18 14:00  76      


 


1/28/18 13:00  69      


 


1/28/18 12:00 98.1 67 14 127/64 (85) 98   


 


1/28/18 12:00  70      


 


1/28/18 11:00  66      


 


1/28/18 11:00     95 Room Air  








 (Migdalia Camejo)


-:  


1/29/18 0530                                                                   

             1/29/18 0530





Imaging





Last 72 hours Impressions








Chest X-Ray 1/27/18 0000 Signed





Impressions: 





 Service Date/Time:  Saturday, January 27, 2018 15:09 - CONCLUSION:  1. Right 

IJ 





 dual-lumen central venous catheter in place with tip at the cavoatrial 

junction. 





 No evidence of pneumothorax 2. Mild pulmonary opacity likely representing 





 pulmonary vascular congestion. 3. Small bilateral pleural effusions.     

Bhaskar Thakur MD 








 (Migdalia Camejo)





Physical Exam


General


Appearance:  Well Developed, No Acute Distress, Comfortable


 (Migdalia Camejo)





Eyes


Eye Exam:  Pupils Equal


 (Migdalia Camejo)





Throat


Throat Exam:  Oral Mucosa Pink & Moist


 (Migdalia CamejoP)





Pulmonary


Resp Exam:  Clear Bilaterally, Breath Sounds Equal, No Distress, Crackles


 (Migdalia Camejo)





Cardiology


CV Exam:  Regular, Normal Sinus Rhythm, Good Perfusion


 (Migdalia Camejo)





Gastrointestinal/Abdomen


GI Exam:  Soft, Non-Tender, Bowel Sounds Present, Positive Bowel Movement, 

Distended


 (Migdalia Camejo)





Musculoskeletal


MS Exam:  Joints Intact, Normal Tone, Good Strength


 (Migdalia Camejo)





Integumentary


Skin Exam:  Clear, Warm, Dry, Intact


 (Migdalia Camejo)





Extremeties


Extremities Exam:  No Edema


 (Migdalia Camejo)





Neurologic


Neuro Exam:  Alert, Awake, Oriented, Speech Clear, Moving All Extremities


 (Migdalia CamejoP)





Psychiatric


Psych Exam:  Appropriate Responses


 (Migdalia Camejo)





Assessment/Plan


Discussed Condition With:  Patient


Assessment Summary:  LETI/Acute Renal Failure, Anemia of CKD, Hypertension, 

Diabetes Mellitus, CKD Stage IV


Problem List:  


(1) Renal insufficiency


ICD Codes:  N28.9 - Disorder of kidney and ureter, unspecified


Plan:  She had underlying diabetic nephropathy, stage 4


Recent NSTEMI with cardiac cath, did receive a small amount of IV contrast on 1/

22


Initiated on dialysis on 1/26 and 1/27. 


Renal function stable, she is making a good amount of urine. 


Repeat labs tomorrow, HD if needed. 


It is unclear if she needs permanent dialysis. 


Avoid any nephrotoxic agents, stop non essential medications. If we proceed 

with the cardiac cath at this time she most likely will be on dialysis 

permanently.


On Renvela for hyperphosphatemia. 





We have discussed options moving forward related to PD vs HD. She is still 

unsure of how to proceed if long term dialysis is needed. We have asked the 

[facility] to meet the patient and family to discuss the modalities. 





(2) NSTEMI (non-ST elevated myocardial infarction)


ICD Codes:  I21.4 - Non-ST elevation (NSTEMI) myocardial infarction


Plan:  Cardiology following, C postponed. 


given ASA,off  heparin gtt





(3) DM (diabetes mellitus)


ICD Codes:  E11.9 - Type 2 diabetes mellitus without complications


Plan:  Insulin as needed, maintain glucose 140-180mg/dL. 


If NPO use D10 @ 15 ml/hr to prevent hyperkalemia 





(4) Anemia


ICD Codes:  D64.9 - Anemia, unspecified


Status:  Chronic


Plan:  On Epogen with dialysis 


no evidence of iron deficiency, reduce ferrous sulfate to once daily. 


She was transfused this admission.





(5) CAD (coronary artery disease)


ICD Codes:  I25.10 - Atherosclerotic heart disease of native coronary artery 

without angina pectoris


Status:  Chronic


Plan:  Hx of stent in the past. New NSTEMI, see above 





(6) PNA (pneumonia)


ICD Codes:  J18.9 - Pneumonia, unspecified organism


Plan:  Improved. PO Zithromax continues. 





(7) HTN (hypertension)


ICD Codes:  I10 - Hypertension


Status:  Chronic


Plan:  On Imdur, metoprolol, clonidine, and Norvasc


BP has improved. 


 (Migdalia Camejo)


Plan


patient was seen and examined. Agree with above assessment and plan. She is non 

oliguric. Repeat labs tomorrow, monitor for renal recovery. It is not yet clear 

if she has reached ESRD


 (Kelton Whiteside MD)











Migdalia Camejo Jan 29, 2018 09:47


Kelton Whiteside MD Jan 29, 2018 15:30

## 2018-01-29 NOTE — HHI.PR
Subjective


Remarks


The patient was seen earlier today.  She is seen in the chair appears in not 

acute distress.  Feels very tired some nausea but was able to eat.  Decreased 

appetite she is not eating very well.  CTA postponed due to abnormal kidney 

function.  Not clear by nephrology for further imaging.  Possible in 1 or 2 

days she will go for CT scan than CI by cardiology.





Objective


Vitals





Vital Signs








  Date Time  Temp Pulse Resp B/P (MAP) Pulse Ox O2 Delivery O2 Flow Rate FiO2


 


1/29/18 15:37     99 Room Air  


 


1/29/18 15:21 97.7 66 19 120/66 (84) 99   


 


1/29/18 15:00  68      


 


1/29/18 14:00  66      


 


1/29/18 13:00  66      


 


1/29/18 12:30 97.6 65 19 115/63 (80) 99   


 


1/29/18 12:15     99 Room Air  


 


1/29/18 12:00  66      


 


1/29/18 11:00  68      


 


1/29/18 10:00  70      


 


1/29/18 09:00  72      


 


1/29/18 08:30 98.2 73 19 162/75 (104) 99   


 


1/29/18 08:30     99 Room Air  


 


1/29/18 08:00  70      


 


1/29/18 07:18     99   21


 


1/29/18 07:00  73      


 


1/29/18 06:00  71      


 


1/29/18 05:00  74      


 


1/29/18 04:00 98.3 74 18 129/71 (90) 100   


 


1/29/18 04:00  72      


 


1/29/18 03:00     100 Room Air  


 


1/29/18 03:00  72      


 


1/29/18 02:00  73      


 


1/29/18 01:00  70      


 


1/29/18 00:00  70      


 


1/29/18 00:00 98.1 70 16 138/60 (86) 100   


 


1/28/18 23:00  90      


 


1/28/18 23:00     100 Room Air  


 


1/28/18 22:00  88      


 


1/28/18 21:00  100      


 


1/28/18 20:00  97      


 


1/28/18 19:00  96      


 


1/28/18 19:00     100 Room Air  


 


1/28/18 19:00 98.2 73 14 149/57 (87) 100   


 


1/28/18 18:00  76      


 


1/28/18 17:35   16     


 


1/28/18 17:00  76      














I/O      


 


 1/28/18 1/28/18 1/28/18 1/29/18 1/29/18 1/29/18





 07:00 15:00 23:00 07:00 15:00 23:00


 


Intake Total 480 ml  960 ml 480 ml  


 


Output Total 500 ml   1800 ml  


 


Balance -20 ml  960 ml -1320 ml  


 


      


 


Intake Oral 480 ml  960 ml 480 ml  


 


Output Urine Total 500 ml   1800 ml  


 


# Voids   4   


 


# Bowel Movements 1   0  








Result Diagram:  


1/29/18 0530                                                                   

             1/29/18 0530





Imaging





Last Impressions








Chest X-Ray 1/27/18 0000 Signed





Impressions: 





 Service Date/Time:  Saturday, January 27, 2018 15:09 - CONCLUSION:  1. Right 

IJ 





 dual-lumen central venous catheter in place with tip at the cavoatrial 

junction. 





 No evidence of pneumothorax 2. Mild pulmonary opacity likely representing 





 pulmonary vascular congestion. 3. Small bilateral pleural effusions.     

Bhaskar Thakur MD 


 


Catheter Placement X-Ray 1/26/18 0900 Signed





Impressions: 





 Service Date/Time:  Friday, January 26, 2018 11:12 - CONCLUSION: Uncomplicated 





 line placement as above.     Ren Stubbs MD 


 


Small Bowel X-Ray 1/19/18 0000 Signed





Impressions: 





 Service Date/Time:  Friday, January 19, 2018 10:44 - CONCLUSION: Unremarkable 





 small bowel examination.     Boo Ricardo MD 








Objective Remarks


GENERAL: Very pleasant 52-year-old female , well-nourished well-

developed, appears in NAD


CARDIOVASCULAR: Regular rate and rhythm without murmurs, gallops, or rubs.  

Chest pain reproducible with palpation of the chest wall but improved.  Right-

sided Vas-Cath in place


RESPIRATORY: Breath sounds equal bilaterally. No accessory muscle use.


GASTROINTESTINAL: Abdomen soft, non-tender, nondistended. 


MUSCULOSKELETAL: No cyanosis, or edema.





Procedures


Panendoscopy 01/19/18





A/P


Problem List:  


(1) Sepsis


ICD Code:  A41.9 - Sepsis, unspecified organism


(2) PNA (pneumonia)


ICD Code:  J18.9 - Pneumonia, unspecified organism


(3) Hypoxia


ICD Code:  R09.02 - Hypoxemia


(4) NSTEMI (non-ST elevated myocardial infarction)


ICD Code:  I21.4 - Non-ST elevation (NSTEMI) myocardial infarction


(5) CHF (congestive heart failure)


ICD Code:  I50.9 - Heart failure, unspecified


(6) Renal insufficiency


ICD Code:  N28.9 - Disorder of kidney and ureter, unspecified


(7) DM (diabetes mellitus)


ICD Code:  E11.9 - Type 2 diabetes mellitus without complications


(8) GI bleed


ICD Code:  K92.2 - Gastrointestinal hemorrhage, unspecified


Assessment and Plan


52-year-old female with presented with GI bleed and chest pain





Acute on chronic Stage IV CKD


   Management per nephrology


   Avoid nephrotoxins.  Creatinine continues to worsen.  Creatinine went to to 

4.47 to 5.32 to 4.08 today .  Decreased urine output.


   Status post Vas-Cath done today on 1/26.  Patient received dialysis on 1/26 

and on 1/27.  


   Per nephrologist most likely patient has reached end-stage renal disease.


   Neurologist discussed options moving forward related.  Peritoneal dialysis 

versus hemodialysis.  Patient stated that she will consider options.


   Continue strict ins and outs 


    trend creatinine.


   On Renvela for hyperphosphatemia. 


   Avoid any nephrotoxic agents, stop non essential medications. 





NSTEMI:  


   Post cardiac catheterization on 1/22 showing severe two-vessel disease of 

the right coronary artery in the mid LAD.  


   Recommended high risk PCI versus cardiac bypass versus medical management. 

PCI is  held until cleared by nephrology .





Hemoptysis on 1/27


   Patient has not had any episodes since yesterday.  She is also asymptomatic.


   Pulmonologist consulted and following.





GI bleed-resolved


Anemia of acute blood loss


   Status post transfused with 2 units of packed red blood cells.


   Appreciate input from GI, status post panendoscopy with finding of gastritis 

EGD and colon polyps on colonoscopy pending biopsy report


   No Evidence of iron deficiency anemia


   Unremarkable small bowel follow-through


   PPI and continue to monitor H&H


   GI signed off





Sepsis


   Resolved, continue with current antibiotics including Rocephin and 

azithromycin





PNA


   Rocephin and azithromycin discontinued on 1/22 and continues to do well off 

of antibiotics.


   DuoNeb when necessary


   Maintain oxygen saturation above 92%





Hypoxia-resolved


   Continue with treatment as in above





Acute on Chronic diastolic CHF


   Echo 8/18/17 w/ EF 55-60%


   Currently off Lasix secondary to worsening renal function


   Continue with Imdur/BB





Atypical chest pain


   Chest pain occurs only when patient tried to get up.  The same chest pain is 

reproducible with palpation of her chest wall.  Pain is improving.  On pain 

medication when necessary.  Avoid NSAIDs due to acute renal failure.  Due to 

acute renal failure.





Lightheadedness


   Orthostatics is normal.








Hypertension


   Continue with Lopressor, Procardia, clonidine 2 times a day





DM:  


   Sliding scale w/ Accu-Cheks. stable. 





Tobacco Abuse: 


   Strongly recommended to stop smoking, no NicoDerm to avoid vasoconstriction.

  Ativan prn if needed


Discharge Planning


Patient may need long-term dialysis.However for now per nephro no HD, monitor 

for imprpvement 1-2 days poss CTA in 1-2 days then plan for PCI. Will go for 

CTA and PCI when cleared by nephrology 





Discussed with the patient, nurse











Juliette Hodges MD Jan 29, 2018 16:05

## 2018-01-29 NOTE — PD.CARD.PN
Subjective


Subjective Remarks


Had some mild midsternal chest discomfort yesterday, relieved by pain 

medication.  Denies any shortness of breath.  Telemetry unremarkable.  Nothing 

by mouth for possible catheterization today.  


 (King Ervin)





Objective


Medications





Current Medications








 Medications


  (Trade)  Dose


 Ordered  Sig/Luis Armando


 Route  Start Time


 Stop Time Status Last Admin


 


  (D50w (Vial) Inj)  50 ml  UNSCH  PRN


 IV PUSH  1/15/18 04:00


     


 


 


  (Glucagon Inj)  1 mg  UNSCH  PRN


 OTHER  1/15/18 04:00


     


 


 


  (NovoLOG


 SUPPLEMENTAL


 SCALE)  1  ACHS SLIDING  SCALE


 SQ  1/15/18 08:00


    1/28/18 21:51


 


 


  (Duoneb Neb)  1 ampule  Q4HR NEB  PRN


 NEB  1/15/18 04:00


    1/25/18 01:35


 


 


  (NS Flush)  2 ml  UNSCH  PRN


 IV FLUSH  1/15/18 04:00


    1/23/18 18:08


 


 


  (NS Flush)  2 ml  BID


 IV FLUSH  1/15/18 09:00


    1/28/18 21:30


 


 


  (Zofran Inj)  4 mg  Q6H  PRN


 IVP  1/15/18 04:00


    1/19/18 16:49


 


 


  (Tylenol)  650 mg  Q6H  PRN


 PO  1/15/18 04:00


    1/23/18 08:12


 


 


  (Norco  5-325 Mg)  1 tab  Q4H  PRN


 PO  1/15/18 04:00


    1/28/18 16:32


 


 


  (Morphine Inj)  2 mg  Q3H  PRN


 IV PUSH  1/15/18 04:00


    1/28/18 21:52


 


 


  (Theresa-Colace)  1 tab  BID


 PO  1/15/18 09:00


    1/28/18 21:30


 


 


  (Milk Of


 Magnesia Liq)  30 ml  Q12H  PRN


 PO  1/15/18 04:00


    1/26/18 05:09


 


 


  (Senokot)  17.2 mg  Q12H  PRN


 PO  1/15/18 04:00


     


 


 


  (Dulcolax Supp)  10 mg  DAILY  PRN


 RECTAL  1/15/18 04:00


     


 


 


  (Lactulose Liq)  30 ml  DAILY  PRN


 PO  1/15/18 04:00


    1/27/18 16:53


 


 


  (Aspirin Chew)  81 mg  DAILY


 CHEW  1/15/18 09:00


   Future hold 1/28/18 09:02


 


 


  (Folate)  1 mg  DAILY


 PO  1/15/18 09:00


    1/28/18 09:03


 


 


  (Neurontin)  300 mg  HS


 PO  1/15/18 21:00


    1/28/18 21:30


 


 


  (Levemir Inj)  15 units  HS


 SQ  1/15/18 21:00


    1/28/18 21:51


 


 


  (Pravachol)  40 mg  HS


 PO  1/15/18 21:00


    1/28/18 21:30


 


 


  (Imdur)  120 mg  DAILY@07


 PO  1/18/18 07:00


    1/28/18 09:02


 


 


  (Lopressor)  100 mg  Q12HR


 PO  1/18/18 21:00


    1/28/18 21:30


 


 


  (Ferrous Sulfate)  325 mg  BID


 PO  1/18/18 10:00


    1/28/18 21:30


 


 


  (Nitroglycerin


 2% Oint)  1 inch  Q6HR


 TOPICAL  1/19/18 01:40


    1/29/18 05:18


 


 


  (Norvasc)  10 mg  DAILY


 PO  1/20/18 09:00


    1/28/18 09:02


 


 


  (Catapres)  0.1 mg  Q6H  PRN


 PO  1/20/18 19:15


    1/22/18 03:53


 


 


  (Protonix)  40 mg  DAILY


 PO  1/22/18 09:00


    1/28/18 09:03


 


 


  (Zithromax)  500 mg  DAILY


 PO  1/22/18 09:00


    1/28/18 09:02


 


 


  (Apresoline)  50 mg  Q8HR


 PO  1/22/18 09:00


    1/29/18 05:20


 


 


  (Catapres)  0.2 mg  Q12HR


 PO  1/22/18 21:00


    1/28/18 21:30


 


 


  (Norco  5-325 Mg)  1 tab  Q4H  PRN


 PO  1/24/18 15:45


     


 


 


  (Nitrostat Sl)  0.4 mg  Q5M  PRN


 SL  1/25/18 00:15


    1/25/18 00:29


 


 


  (Ativan)  0.5 mg  Q8HR  PRN


 PO  1/25/18 11:45


    1/27/18 21:32


 


 


  (Renvela)  800 mg  TIDAC


 PO  1/25/18 17:00


    1/28/18 16:32


 


 


 Sodium Chloride  1,000 ml @ 


 0 mls/hr  Q0M PRN


 OTHER  1/26/18 08:51


    1/27/18 12:03


 


 


  (Heparin Inj)  8,000 units  UNSCH  PRN


 IV FLUSH  1/26/18 09:00


     


 


 


 Sodium Chloride  1,000 ml @ 


 200 mls/hr  Q5H PRN


 IV  1/26/18 08:51


     


 


 


 Sodium Chloride  1,000 ml @ 


 0 mls/hr  Q0M PRN


 OTHER  1/26/18 08:51


     


 


 


  (Mannitol Inj)  12.5 gm  UNSCH  PRN


 IV  1/26/18 09:00


     


 


 


 Albumin Human  100 ml @ 


 60 mls/hr  UNSCH  PRN


 IV  1/26/18 09:00


     


 


 


  (NS Flush)  5 ml  UNSCH  PRN


 IV FLUSH  1/26/18 09:00


     


 


 


  (Heparin Inj)    UNSCH  PRN


 .XX  1/26/18 09:00


    1/27/18 12:02


 


 


  (Gentamicin Inj)  20 mg  UNSCH  PRN


 OTHER  1/26/18 09:00


    1/27/18 12:03


 


 


  (Zofran Inj)  4 mg  UNSCH  PRN


 IV PUSH  1/26/18 09:00


     


 


 


  (Tylenol)  650 mg  UNSCH  PRN


 PO  1/26/18 09:00


     


 


 


  (Benadryl)  25 mg  UNSCH  PRN


 PO  1/26/18 09:00


     


 


 


  (Nitrostat Sl)  0.4 mg  UNSCH  PRN


 SL  1/26/18 09:00


     


 


 


  (Catapres)  0.1 mg  UNSCH  PRN


 PO  1/26/18 09:00


     


 


 


  (Epogen Inj)  10,000 units  UNSCH  PRN


 IV PUSH  1/26/18 09:00


    1/26/18 15:00


 


 


  (Gelfoam 12 Mm/7


 Mm Top)  1 foam  UNSCH  PRN


 TOP  1/26/18 09:00


     


 


 


  (NS Flush)    UNSCH  PRN


 IV FLUSH  1/26/18 12:45


     


 


 


  (Heparin Inj)    UNSCH  PRN


 IV FLUSH  1/26/18 12:45


     


 


 


  (Plavix)  75 mg  DAILY


 PO  1/29/18 09:00


     


 








Vital Signs / I&O





Vital Signs








  Date Time  Temp Pulse Resp B/P (MAP) Pulse Ox O2 Delivery O2 Flow Rate FiO2


 


1/29/18 07:18     99   21


 


1/29/18 06:00  71      


 


1/29/18 05:00  74      


 


1/29/18 04:00 98.3 74 18 129/71 (90) 100   


 


1/29/18 04:00  72      


 


1/29/18 03:00     100 Room Air  


 


1/29/18 03:00  72      


 


1/29/18 02:00  73      


 


1/29/18 01:00  70      


 


1/29/18 00:00  70      


 


1/29/18 00:00 98.1 70 16 138/60 (86) 100   


 


1/28/18 23:00  90      


 


1/28/18 23:00     100 Room Air  


 


1/28/18 22:00  88      


 


1/28/18 21:00  100      


 


1/28/18 20:00  97      


 


1/28/18 19:00  96      


 


1/28/18 19:00     100 Room Air  


 


1/28/18 19:00 98.2 73 14 149/57 (87) 100   


 


1/28/18 18:00  76      


 


1/28/18 17:35   16     


 


1/28/18 17:00  76      


 


1/28/18 16:00  75      


 


1/28/18 15:00     100 Room Air  


 


1/28/18 15:00 98.6 75 16 130/60 (83) 100   


 


1/28/18 15:00  70      


 


1/28/18 14:00  76      


 


1/28/18 13:00  69      


 


1/28/18 12:00 98.1 67 14 127/64 (85) 98   


 


1/28/18 12:00  70      


 


1/28/18 11:00  66      


 


1/28/18 11:00     95 Room Air  


 


1/28/18 09:16     97   21


 


1/28/18 09:00 98.6 76 16 147/74 (98) 97   














I/O      


 


 1/28/18 1/28/18 1/28/18 1/29/18 1/29/18 1/29/18





 07:00 15:00 23:00 07:00 15:00 23:00


 


Intake Total 480 ml  960 ml 480 ml  


 


Output Total 500 ml   1800 ml  


 


Balance -20 ml  960 ml -1320 ml  


 


      


 


Intake Oral 480 ml  960 ml 480 ml  


 


Output Urine Total 500 ml   1800 ml  


 


# Voids   4   


 


# Bowel Movements 1   0  








Physical Exam


GENERAL: Well-developed well-nourished.  In no acute distress.


NECK: No carotid bruits.  No JVD.  


CARDIOVASCULAR: Regular rate and rhythm.  No murmur appreciated.


RESPIRATORY: No accessory muscle use. Clear to auscultation. Breath sounds 

equal bilaterally. 


MUSCULOSKELETAL: No clubbing or cyanosis.  Trace lower extremity edema. 


NEUROLOGICAL: Awake and alert. Normal speech.


Laboratory





Laboratory Tests








Test


  1/28/18


10:15 1/29/18


05:30


 


White Blood Count 7.1 TH/MM3  9.0 TH/MM3 


 


Red Blood Count 3.00 MIL/MM3  3.05 MIL/MM3 


 


Hemoglobin 9.7 GM/DL  9.9 GM/DL 


 


Hematocrit 28.7 %  29.1 % 


 


Mean Corpuscular Volume 95.8 FL  95.4 FL 


 


Mean Corpuscular Hemoglobin 32.5 PG  32.4 PG 


 


Mean Corpuscular Hemoglobin


Concent 33.9 % 


  34.0 % 


 


 


Red Cell Distribution Width 14.7 %  14.6 % 


 


Platelet Count 375 TH/MM3  420 TH/MM3 


 


Mean Platelet Volume 7.8 FL  7.9 FL 


 


Neutrophils (%) (Auto) 65.9 %  


 


Lymphocytes (%) (Auto) 20.8 %  


 


Monocytes (%) (Auto) 10.2 %  


 


Eosinophils (%) (Auto) 2.3 %  


 


Basophils (%) (Auto) 0.8 %  


 


Neutrophils # (Auto) 4.7 TH/MM3  


 


Lymphocytes # (Auto) 1.5 TH/MM3  


 


Monocytes # (Auto) 0.7 TH/MM3  


 


Eosinophils # (Auto) 0.2 TH/MM3  


 


Basophils # (Auto) 0.1 TH/MM3  


 


CBC Comment DIFF FINAL  


 


Differential Comment   


 


Blood Urea Nitrogen 32 MG/DL  32 MG/DL 


 


Creatinine 3.34 MG/DL  3.66 MG/DL 


 


Random Glucose 82 MG/DL  115 MG/DL 


 


Calcium Level 8.5 MG/DL  9.3 MG/DL 


 


Sodium Level 139 MEQ/L  138 MEQ/L 


 


Potassium Level 4.0 MEQ/L  4.1 MEQ/L 


 


Chloride Level 103 MEQ/L  102 MEQ/L 


 


Carbon Dioxide Level 31.0 MEQ/L  27.5 MEQ/L 


 


Anion Gap 5 MEQ/L  9 MEQ/L 


 


Estimat Glomerular Filtration


Rate 18 ML/MIN 


  16 ML/MIN 


 








Imaging





Last Impressions








Chest X-Ray 1/27/18 0000 Signed





Impressions: 





 Service Date/Time:  Saturday, January 27, 2018 15:09 - CONCLUSION:  1. Right 

IJ 





 dual-lumen central venous catheter in place with tip at the cavoatrial 

junction. 





 No evidence of pneumothorax 2. Mild pulmonary opacity likely representing 





 pulmonary vascular congestion. 3. Small bilateral pleural effusions.     

Bhaskar Thakur MD 


 


Catheter Placement X-Ray 1/26/18 0900 Signed





Impressions: 





 Service Date/Time:  Friday, January 26, 2018 11:12 - CONCLUSION: Uncomplicated 





 line placement as above.     Ren Stubbs MD 


 


Small Bowel X-Ray 1/19/18 0000 Signed





Impressions: 





 Service Date/Time:  Friday, January 19, 2018 10:44 - CONCLUSION: Unremarkable 





 small bowel examination.     Boo Ricardo MD 








 (King Ervin)





Assessment and Plan


Problem List:  


(1) NSTEMI (non-ST elevated myocardial infarction)


ICD Codes:  I21.4 - Non-ST elevation (NSTEMI) myocardial infarction


(2) CHF (congestive heart failure)


ICD Codes:  I50.9 - Heart failure, unspecified


(3) Renal insufficiency


ICD Codes:  N28.9 - Renal insufficiency


Status:  Acute


(4) Anemia


ICD Codes:  D64.9 - Anemia, unspecified


Status:  Chronic


(5) DM (diabetes mellitus)


ICD Codes:  E11.9 - Type 2 diabetes mellitus without complications


(6) Renal insufficiency


ICD Codes:  N28.9 - Disorder of kidney and ureter, unspecified


(7) HTN (hypertension)


ICD Codes:  I10 - Hypertension


Status:  Chronic


Assessment and Plan


53 yo AAF with history of CAD, cardiac stent placed ~ 10 years ago, CKD IV, CHF 

and diabetes admitted for progressive SOB and chest pain. 





Normocytic anemia: Hemoglobin improved after transfusion.  Currently stable.  

GI signed off.  Given Plavix load to see how she tolerates.





Coronary artery disease: [facility] 1/22/18 showed severe 2 vessel native coronary 

artery disease involving the right coronary artery and mid left anterior 

descending.  Need to discuss situation with nephrology and decide +/- PCI 

today.  Continue Imdur, amlodipine, metoprolol, aspirin, statin.


Echo with normal systolic function and EF 55-60%, Moderate LVH, trace MR, AK, 

and TR.





CKD IV/V: LETI vs ESRD. Started on HD 1/28/18.  Per nephrology "Continue to 

monitor renal function, it is unclear if she needs permanent dialysis, but most 

likely she has reached ESRD.


 (King Ervin)


Assessment and Plan


--------------------


will personally discuss kidney issue with nephrologist today and decide +/- PCI 

or continue to "wait and see".


 (Edgardo Mercado MD)











King Ervin Jan 29, 2018 07:34


Edgardo Mercado MD Jan 29, 2018 07:59

## 2018-01-30 VITALS
HEART RATE: 75 BPM | SYSTOLIC BLOOD PRESSURE: 136 MMHG | OXYGEN SATURATION: 100 % | RESPIRATION RATE: 18 BRPM | DIASTOLIC BLOOD PRESSURE: 71 MMHG | TEMPERATURE: 97.9 F

## 2018-01-30 VITALS
OXYGEN SATURATION: 100 % | DIASTOLIC BLOOD PRESSURE: 67 MMHG | TEMPERATURE: 97.3 F | SYSTOLIC BLOOD PRESSURE: 125 MMHG | HEART RATE: 69 BPM | RESPIRATION RATE: 17 BRPM

## 2018-01-30 VITALS
OXYGEN SATURATION: 100 % | DIASTOLIC BLOOD PRESSURE: 67 MMHG | SYSTOLIC BLOOD PRESSURE: 136 MMHG | RESPIRATION RATE: 18 BRPM | TEMPERATURE: 97.8 F | HEART RATE: 70 BPM

## 2018-01-30 VITALS — HEART RATE: 68 BPM

## 2018-01-30 VITALS — HEART RATE: 78 BPM

## 2018-01-30 VITALS
TEMPERATURE: 98.5 F | OXYGEN SATURATION: 97 % | HEART RATE: 71 BPM | SYSTOLIC BLOOD PRESSURE: 139 MMHG | RESPIRATION RATE: 18 BRPM | DIASTOLIC BLOOD PRESSURE: 68 MMHG

## 2018-01-30 VITALS
HEART RATE: 74 BPM | TEMPERATURE: 98.8 F | DIASTOLIC BLOOD PRESSURE: 78 MMHG | OXYGEN SATURATION: 98 % | RESPIRATION RATE: 20 BRPM | SYSTOLIC BLOOD PRESSURE: 163 MMHG

## 2018-01-30 VITALS
OXYGEN SATURATION: 100 % | DIASTOLIC BLOOD PRESSURE: 64 MMHG | SYSTOLIC BLOOD PRESSURE: 131 MMHG | TEMPERATURE: 97.7 F | RESPIRATION RATE: 18 BRPM | HEART RATE: 73 BPM

## 2018-01-30 VITALS — HEART RATE: 82 BPM

## 2018-01-30 VITALS — HEART RATE: 72 BPM

## 2018-01-30 VITALS — HEART RATE: 67 BPM

## 2018-01-30 VITALS — OXYGEN SATURATION: 99 %

## 2018-01-30 VITALS — HEART RATE: 76 BPM

## 2018-01-30 VITALS — HEART RATE: 74 BPM

## 2018-01-30 VITALS — HEART RATE: 73 BPM

## 2018-01-30 LAB
ALBUMIN SERPL-MCNC: 2.3 GM/DL (ref 3.4–5)
BUN SERPL-MCNC: 35 MG/DL (ref 7–18)
CALCIUM SERPL-MCNC: 8.7 MG/DL (ref 8.5–10.1)
CHLORIDE SERPL-SCNC: 105 MEQ/L (ref 98–107)
CREAT SERPL-MCNC: 3.61 MG/DL (ref 0.5–1)
ERYTHROCYTE [DISTWIDTH] IN BLOOD BY AUTOMATED COUNT: 14.9 % (ref 11.6–17.2)
GFR SERPLBLD BASED ON 1.73 SQ M-ARVRAT: 16 ML/MIN (ref 89–?)
GLUCOSE SERPL-MCNC: 70 MG/DL (ref 74–106)
HCO3 BLD-SCNC: 28.3 MEQ/L (ref 21–32)
HCT VFR BLD CALC: 28 % (ref 35–46)
HGB BLD-MCNC: 9.5 GM/DL (ref 11.6–15.3)
MCH RBC QN AUTO: 32.3 PG (ref 27–34)
MCHC RBC AUTO-ENTMCNC: 33.8 % (ref 32–36)
MCV RBC AUTO: 95.7 FL (ref 80–100)
PHOSPHATE SERPL-MCNC: 4.7 MG/DL (ref 2.5–4.9)
PLATELET # BLD: 425 TH/MM3 (ref 150–450)
PMV BLD AUTO: 8 FL (ref 7–11)
RBC # BLD AUTO: 2.93 MIL/MM3 (ref 4–5.3)
SODIUM SERPL-SCNC: 140 MEQ/L (ref 136–145)
WBC # BLD AUTO: 7.4 TH/MM3 (ref 4–11)

## 2018-01-30 RX ADMIN — INSULIN ASPART SCH: 100 INJECTION, SOLUTION INTRAVENOUS; SUBCUTANEOUS at 12:00

## 2018-01-30 RX ADMIN — SEVELAMER CARBONATE SCH MG: 800 TABLET, FILM COATED ORAL at 14:21

## 2018-01-30 RX ADMIN — NITROGLYCERIN SCH INCH: 20 OINTMENT TOPICAL at 06:09

## 2018-01-30 RX ADMIN — NITROGLYCERIN SCH INCH: 20 OINTMENT TOPICAL at 12:19

## 2018-01-30 RX ADMIN — ISOSORBIDE MONONITRATE SCH MG: 60 TABLET, EXTENDED RELEASE ORAL at 06:09

## 2018-01-30 RX ADMIN — METOPROLOL TARTRATE SCH MG: 100 TABLET, FILM COATED ORAL at 10:56

## 2018-01-30 RX ADMIN — STANDARDIZED SENNA CONCENTRATE AND DOCUSATE SODIUM SCH TAB: 8.6; 5 TABLET, FILM COATED ORAL at 10:55

## 2018-01-30 RX ADMIN — Medication SCH ML: at 23:33

## 2018-01-30 RX ADMIN — FOLIC ACID SCH MG: 1 TABLET ORAL at 10:55

## 2018-01-30 RX ADMIN — NITROGLYCERIN SCH INCH: 20 OINTMENT TOPICAL at 18:07

## 2018-01-30 RX ADMIN — INSULIN ASPART SCH: 100 INJECTION, SOLUTION INTRAVENOUS; SUBCUTANEOUS at 08:00

## 2018-01-30 RX ADMIN — SEVELAMER CARBONATE SCH MG: 800 TABLET, FILM COATED ORAL at 18:07

## 2018-01-30 RX ADMIN — ASPIRIN 81 MG SCH MG: 81 TABLET ORAL at 10:55

## 2018-01-30 RX ADMIN — MORPHINE SULFATE PRN MG: 2 INJECTION, SOLUTION INTRAMUSCULAR; INTRAVENOUS at 00:36

## 2018-01-30 RX ADMIN — Medication SCH ML: at 09:00

## 2018-01-30 RX ADMIN — METOPROLOL TARTRATE SCH MG: 100 TABLET, FILM COATED ORAL at 23:34

## 2018-01-30 RX ADMIN — INSULIN ASPART SCH: 100 INJECTION, SOLUTION INTRAVENOUS; SUBCUTANEOUS at 21:00

## 2018-01-30 RX ADMIN — MORPHINE SULFATE PRN MG: 2 INJECTION, SOLUTION INTRAMUSCULAR; INTRAVENOUS at 23:33

## 2018-01-30 RX ADMIN — NITROGLYCERIN SCH INCH: 20 OINTMENT TOPICAL at 00:25

## 2018-01-30 RX ADMIN — PANTOPRAZOLE SCH MG: 40 TABLET, DELAYED RELEASE ORAL at 10:56

## 2018-01-30 RX ADMIN — NITROGLYCERIN SCH INCH: 20 OINTMENT TOPICAL at 23:37

## 2018-01-30 RX ADMIN — SEVELAMER CARBONATE SCH MG: 800 TABLET, FILM COATED ORAL at 08:00

## 2018-01-30 RX ADMIN — SEVELAMER CARBONATE SCH MG: 800 TABLET, FILM COATED ORAL at 10:53

## 2018-01-30 RX ADMIN — STANDARDIZED SENNA CONCENTRATE AND DOCUSATE SODIUM SCH TAB: 8.6; 5 TABLET, FILM COATED ORAL at 23:35

## 2018-01-30 RX ADMIN — PRAVASTATIN SODIUM SCH MG: 40 TABLET ORAL at 23:35

## 2018-01-30 RX ADMIN — FERROUS SULFATE TAB 325 MG (65 MG ELEMENTAL FE) SCH MG: 325 (65 FE) TAB at 10:56

## 2018-01-30 RX ADMIN — INSULIN ASPART SCH: 100 INJECTION, SOLUTION INTRAVENOUS; SUBCUTANEOUS at 17:00

## 2018-01-30 RX ADMIN — GABAPENTIN SCH MG: 300 CAPSULE ORAL at 23:34

## 2018-01-30 RX ADMIN — ACYCLOVIR SCH UNITS: 800 TABLET ORAL at 21:00

## 2018-01-30 RX ADMIN — CLOPIDOGREL BISULFATE SCH MG: 75 TABLET, FILM COATED ORAL at 12:19

## 2018-01-30 NOTE — PD.CARD.PN
Subjective


Subjective Remarks


No chest pain, shortness breath, or palpitations overnight.  Telemetry 

unremarkable overnight.  Cardiac catheterization delayed per nephrology.


 (King Ervin)





Objective


Medications





Current Medications








 Medications


  (Trade)  Dose


 Ordered  Sig/Luis Armando


 Route  Start Time


 Stop Time Status Last Admin


 


  (D50w (Vial) Inj)  50 ml  UNSCH  PRN


 IV PUSH  1/15/18 04:00


     


 


 


  (Glucagon Inj)  1 mg  UNSCH  PRN


 OTHER  1/15/18 04:00


     


 


 


  (NovoLOG


 SUPPLEMENTAL


 SCALE)  1  ACHS SLIDING  SCALE


 SQ  1/15/18 08:00


    1/29/18 21:26


 


 


  (Duoneb Neb)  1 ampule  Q4HR NEB  PRN


 NEB  1/15/18 04:00


    1/25/18 01:35


 


 


  (NS Flush)  2 ml  UNSCH  PRN


 IV FLUSH  1/15/18 04:00


    1/23/18 18:08


 


 


  (NS Flush)  2 ml  BID


 IV FLUSH  1/15/18 09:00


    1/29/18 21:07


 


 


  (Zofran Inj)  4 mg  Q6H  PRN


 IVP  1/15/18 04:00


    1/19/18 16:49


 


 


  (Tylenol)  650 mg  Q6H  PRN


 PO  1/15/18 04:00


    1/23/18 08:12


 


 


  (Norco  5-325 Mg)  1 tab  Q4H  PRN


 PO  1/15/18 04:00


    1/28/18 16:32


 


 


  (Morphine Inj)  2 mg  Q3H  PRN


 IV PUSH  1/15/18 04:00


    1/30/18 00:36


 


 


  (Theresa-Colace)  1 tab  BID


 PO  1/15/18 09:00


    1/29/18 21:07


 


 


  (Milk Of


 Magnesia Liq)  30 ml  Q12H  PRN


 PO  1/15/18 04:00


    1/26/18 05:09


 


 


  (Senokot)  17.2 mg  Q12H  PRN


 PO  1/15/18 04:00


     


 


 


  (Dulcolax Supp)  10 mg  DAILY  PRN


 RECTAL  1/15/18 04:00


     


 


 


  (Lactulose Liq)  30 ml  DAILY  PRN


 PO  1/15/18 04:00


    1/27/18 16:53


 


 


  (Aspirin Chew)  81 mg  DAILY


 CHEW  1/15/18 09:00


   Future hold 1/29/18 08:56


 


 


  (Folate)  1 mg  DAILY


 PO  1/15/18 09:00


    1/29/18 08:55


 


 


  (Neurontin)  300 mg  HS


 PO  1/15/18 21:00


    1/29/18 21:07


 


 


  (Levemir Inj)  15 units  HS


 SQ  1/15/18 21:00


    1/29/18 21:26


 


 


  (Pravachol)  40 mg  HS


 PO  1/15/18 21:00


    1/29/18 21:07


 


 


  (Imdur)  120 mg  DAILY@07


 PO  1/18/18 07:00


    1/30/18 06:09


 


 


  (Lopressor)  100 mg  Q12HR


 PO  1/18/18 21:00


    1/29/18 21:07


 


 


  (Nitroglycerin


 2% Oint)  1 inch  Q6HR


 TOPICAL  1/19/18 01:40


    1/30/18 06:09


 


 


  (Norvasc)  10 mg  DAILY


 PO  1/20/18 09:00


    1/29/18 08:56


 


 


  (Catapres)  0.1 mg  Q6H  PRN


 PO  1/20/18 19:15


    1/22/18 03:53


 


 


  (Protonix)  40 mg  DAILY


 PO  1/22/18 09:00


    1/29/18 08:55


 


 


  (Zithromax)  500 mg  DAILY


 PO  1/22/18 09:00


    1/29/18 08:54


 


 


  (Apresoline)  50 mg  Q8HR


 PO  1/22/18 09:00


    1/30/18 06:09


 


 


  (Catapres)  0.2 mg  Q12HR


 PO  1/22/18 21:00


    1/29/18 21:07


 


 


  (Norco  5-325 Mg)  1 tab  Q4H  PRN


 PO  1/24/18 15:45


     


 


 


  (Nitrostat Sl)  0.4 mg  Q5M  PRN


 SL  1/25/18 00:15


    1/25/18 00:29


 


 


  (Ativan)  0.5 mg  Q8HR  PRN


 PO  1/25/18 11:45


    1/29/18 21:26


 


 


  (Renvela)  800 mg  TIDAC


 PO  1/25/18 17:00


    1/29/18 18:36


 


 


 Sodium Chloride  1,000 ml @ 


 0 mls/hr  Q0M PRN


 OTHER  1/26/18 08:51


    1/27/18 12:03


 


 


  (Heparin Inj)  8,000 units  UNSCH  PRN


 IV FLUSH  1/26/18 09:00


     


 


 


 Sodium Chloride  1,000 ml @ 


 200 mls/hr  Q5H PRN


 IV  1/26/18 08:51


     


 


 


 Sodium Chloride  1,000 ml @ 


 0 mls/hr  Q0M PRN


 OTHER  1/26/18 08:51


     


 


 


  (Mannitol Inj)  12.5 gm  UNSCH  PRN


 IV  1/26/18 09:00


     


 


 


 Albumin Human  100 ml @ 


 60 mls/hr  UNSCH  PRN


 IV  1/26/18 09:00


     


 


 


  (NS Flush)  5 ml  UNSCH  PRN


 IV FLUSH  1/26/18 09:00


     


 


 


  (Heparin Inj)    UNSCH  PRN


 .XX  1/26/18 09:00


    1/27/18 12:02


 


 


  (Gentamicin Inj)  20 mg  UNSCH  PRN


 OTHER  1/26/18 09:00


    1/27/18 12:03


 


 


  (Zofran Inj)  4 mg  UNSCH  PRN


 IV PUSH  1/26/18 09:00


     


 


 


  (Tylenol)  650 mg  UNSCH  PRN


 PO  1/26/18 09:00


     


 


 


  (Benadryl)  25 mg  UNSCH  PRN


 PO  1/26/18 09:00


     


 


 


  (Nitrostat Sl)  0.4 mg  UNSCH  PRN


 SL  1/26/18 09:00


     


 


 


  (Catapres)  0.1 mg  UNSCH  PRN


 PO  1/26/18 09:00


     


 


 


  (Epogen Inj)  10,000 units  UNSCH  PRN


 IV PUSH  1/26/18 09:00


    1/26/18 15:00


 


 


  (Gelfoam 12 Mm/7


 Mm Top)  1 foam  UNSCH  PRN


 TOP  1/26/18 09:00


     


 


 


  (NS Flush)    UNSCH  PRN


 IV FLUSH  1/26/18 12:45


     


 


 


  (Heparin Inj)    UNSCH  PRN


 IV FLUSH  1/26/18 12:45


     


 


 


  (Plavix)  75 mg  DAILY


 PO  1/29/18 09:00


    1/29/18 08:56


 


 


  (Ferrous Sulfate)  325 mg  DAILY


 PO  1/30/18 09:00


     


 








Vital Signs / I&O





Vital Signs








  Date Time  Temp Pulse Resp B/P (MAP) Pulse Ox O2 Delivery O2 Flow Rate FiO2


 


1/30/18 05:00 98.5 71 18 139/68 (91) 97   


 


1/30/18 04:00  67      


 


1/30/18 00:41   18     


 


1/30/18 00:00 97.9 75 18 136/71 (92) 100   


 


1/30/18 00:00  75      


 


1/29/18 20:00 97.9 73 18 146/74 (98) 100   


 


1/29/18 20:00  71      


 


1/29/18 18:01  68      


 


1/29/18 17:00  68      


 


1/29/18 16:05  65      


 


1/29/18 15:37     99 Room Air  


 


1/29/18 15:21 97.7 66 19 120/66 (84) 99   


 


1/29/18 15:00  68      


 


1/29/18 14:00  66      


 


1/29/18 13:00  66      


 


1/29/18 12:30 97.6 65 19 115/63 (80) 99   


 


1/29/18 12:15     99 Room Air  


 


1/29/18 12:00  66      


 


1/29/18 11:00  68      


 


1/29/18 10:00  70      


 


1/29/18 09:00  72      


 


1/29/18 08:30 98.2 73 19 162/75 (104) 99   


 


1/29/18 08:30     99 Room Air  


 


1/29/18 08:00  70      














I/O      


 


 1/29/18 1/29/18 1/29/18 1/30/18 1/30/18 1/30/18





 07:00 15:00 23:00 07:00 15:00 23:00


 


Intake Total 480 ml  720 ml 640 ml  


 


Output Total 1800 ml  1500 ml 900 ml  


 


Balance -1320 ml  -780 ml -260 ml  


 


      


 


Intake Oral 480 ml  720 ml 640 ml  


 


Output Urine Total 1800 ml  1500 ml 900 ml  


 


# Bowel Movements 0     








Physical Exam


GENERAL: Well-developed well-nourished.  In no acute distress.


NECK: No carotid bruits.  No JVD.  


CARDIOVASCULAR: Regular rate and rhythm.  No murmur appreciated.


RESPIRATORY: No accessory muscle use. Clear to auscultation. Breath sounds 

equal bilaterally. 


MUSCULOSKELETAL: No clubbing or cyanosis.  Trace lower extremity edema. 


NEUROLOGICAL: Awake and alert. Normal speech.


Laboratory





Laboratory Tests








Test


  1/30/18


05:14


 


White Blood Count 7.4 TH/MM3 


 


Red Blood Count 2.93 MIL/MM3 


 


Hemoglobin 9.5 GM/DL 


 


Hematocrit 28.0 % 


 


Mean Corpuscular Volume 95.7 FL 


 


Mean Corpuscular Hemoglobin 32.3 PG 


 


Mean Corpuscular Hemoglobin


Concent 33.8 % 


 


 


Red Cell Distribution Width 14.9 % 


 


Platelet Count 425 TH/MM3 


 


Mean Platelet Volume 8.0 FL 


 


Blood Urea Nitrogen 35 MG/DL 


 


Creatinine 3.61 MG/DL 


 


Random Glucose 70 MG/DL 


 


Albumin 2.3 GM/DL 


 


Calcium Level 8.7 MG/DL 


 


Phosphorus Level 4.7 MG/DL 


 


Sodium Level 140 MEQ/L 


 


Potassium Level 4.0 MEQ/L 


 


Chloride Level 105 MEQ/L 


 


Carbon Dioxide Level 28.3 MEQ/L 


 


Anion Gap 7 MEQ/L 


 


Estimat Glomerular Filtration


Rate 16 ML/MIN 


 








Imaging





Last Impressions








Chest X-Ray 1/27/18 0000 Signed





Impressions: 





 Service Date/Time:  Saturday, January 27, 2018 15:09 - CONCLUSION:  1. Right 

IJ 





 dual-lumen central venous catheter in place with tip at the cavoatrial 

junction. 





 No evidence of pneumothorax 2. Mild pulmonary opacity likely representing 





 pulmonary vascular congestion. 3. Small bilateral pleural effusions.     

Bhaskar Thakur MD 


 


Catheter Placement X-Ray 1/26/18 0900 Signed





Impressions: 





 Service Date/Time:  Friday, January 26, 2018 11:12 - CONCLUSION: Uncomplicated 





 line placement as above.     Ren Stubbs MD 


 


Small Bowel X-Ray 1/19/18 0000 Signed





Impressions: 





 Service Date/Time:  Friday, January 19, 2018 10:44 - CONCLUSION: Unremarkable 





 small bowel examination.     Boo Ricardo MD 








 (King Ervin)





Assessment and Plan


Problem List:  


(1) NSTEMI (non-ST elevated myocardial infarction)


ICD Codes:  I21.4 - Non-ST elevation (NSTEMI) myocardial infarction


(2) CHF (congestive heart failure)


ICD Codes:  I50.9 - Heart failure, unspecified


(3) Renal insufficiency


ICD Codes:  N28.9 - Renal insufficiency


Status:  Acute


(4) Anemia


ICD Codes:  D64.9 - Anemia, unspecified


Status:  Chronic


(5) DM (diabetes mellitus)


ICD Codes:  E11.9 - Type 2 diabetes mellitus without complications


(6) Renal insufficiency


ICD Codes:  N28.9 - Disorder of kidney and ureter, unspecified


(7) HTN (hypertension)


ICD Codes:  I10 - Hypertension


Status:  Chronic


Assessment and Plan


51 yo AAF with history of CAD, cardiac stent placed ~ 10 years ago, CKD IV, CHF 

and diabetes admitted for progressive SOB and chest pain. 





Normocytic anemia: Hemoglobin improved after transfusion.  Currently stable.  

GI signed off.  Given Plavix load to see how she tolerates.





Coronary artery disease: C 1/22/18 showed severe 2 vessel native coronary 

artery disease involving the right coronary artery and mid left anterior 

descending.  Nephrology recommended holding off on cath for now to see if 

patient can avoid permanent dialysis. Continue Imdur, amlodipine, metoprolol, 

aspirin, statin.


Echo with normal systolic function and EF 55-60%, Moderate LVH, trace MR, VT, 

and TR.





CKD IV/V: LETI vs ESRD. Started on HD 1/28/18.  Per nephrology "It is not yet 

clear if she has reached ESRD"





Please let us know if the patient is on permanent dialysis and cleared by 

nephrology to proceed with cardiac catheterization.


 (King Ervin)


Assessment and Plan


----------------------


at this point, there is a chance of recovery of renal function back to baseline 

without need for permanent dialysis 


When comitted to permanent dialysis, call me and we will schedule [facility]


no further CP


continue current medical regimen


call with questions


 (Edgardo Mercado MD)











King Ervin Jan 30, 2018 07:56


Edgardo Mercado MD Jan 30, 2018 11:26

## 2018-01-30 NOTE — HHI.PR
Subjective


Remarks


In the chair says she feels tired. Has sob at times. No chest pain. No n/v/d/c. 

Denies fever or chills.





Objective


Vitals





Vital Signs








  Date Time  Temp Pulse Resp B/P (MAP) Pulse Ox O2 Delivery O2 Flow Rate FiO2


 


1/30/18 05:00 98.5 71 18 139/68 (91) 97   


 


1/30/18 04:00  67      


 


1/30/18 00:41   18     


 


1/30/18 00:00 97.9 75 18 136/71 (92) 100   


 


1/30/18 00:00  75      


 


1/29/18 20:00 97.9 73 18 146/74 (98) 100   


 


1/29/18 20:00  71      


 


1/29/18 18:01  68      


 


1/29/18 17:00  68      


 


1/29/18 16:05  65      


 


1/29/18 15:37     99 Room Air  


 


1/29/18 15:21 97.7 66 19 120/66 (84) 99   


 


1/29/18 15:00  68      


 


1/29/18 14:00  66      


 


1/29/18 13:00  66      


 


1/29/18 12:30 97.6 65 19 115/63 (80) 99   


 


1/29/18 12:15     99 Room Air  


 


1/29/18 12:00  66      


 


1/29/18 11:00  68      


 


1/29/18 10:00  70      


 


1/29/18 09:00  72      














I/O      


 


 1/29/18 1/29/18 1/29/18 1/30/18 1/30/18 1/30/18





 07:00 15:00 23:00 07:00 15:00 23:00


 


Intake Total 480 ml  720 ml 640 ml  


 


Output Total 1800 ml  1500 ml 900 ml  


 


Balance -1320 ml  -780 ml -260 ml  


 


      


 


Intake Oral 480 ml  720 ml 640 ml  


 


Output Urine Total 1800 ml  1500 ml 900 ml  


 


# Bowel Movements 0     








Result Diagram:  


1/30/18 0514                                                                   

             1/30/18 0514





Imaging





Last Impressions








Chest X-Ray 1/27/18 0000 Signed





Impressions: 





 Service Date/Time:  Saturday, January 27, 2018 15:09 - CONCLUSION:  1. Right 

IJ 





 dual-lumen central venous catheter in place with tip at the cavoatrial 

junction. 





 No evidence of pneumothorax 2. Mild pulmonary opacity likely representing 





 pulmonary vascular congestion. 3. Small bilateral pleural effusions.     

Bhaskar Thakur MD 


 


Catheter Placement X-Ray 1/26/18 0900 Signed





Impressions: 





 Service Date/Time:  Friday, January 26, 2018 11:12 - CONCLUSION: Uncomplicated 





 line placement as above.     Ren Stubbs MD 


 


Small Bowel X-Ray 1/19/18 0000 Signed





Impressions: 





 Service Date/Time:  Friday, January 19, 2018 10:44 - CONCLUSION: Unremarkable 





 small bowel examination.     Boo Ricardo MD 








Objective Remarks


GENERAL: Very pleasant 52-year-old female , well-nourished well-

developed, appears in NAD


CARDIOVASCULAR: Regular rate and rhythm without murmurs, gallops, or rubs.  

Chest pain reproducible with palpation of the chest wall but improved.  Right-

sided Vas-Cath in place


RESPIRATORY: Breath sounds equal bilaterally. No accessory muscle use.


GASTROINTESTINAL: Abdomen soft, non-tender, nondistended. 


MUSCULOSKELETAL: No cyanosis, or edema.





Procedures


Panendoscopy 01/19/18





A/P


Problem List:  


(1) Sepsis


ICD Code:  A41.9 - Sepsis, unspecified organism


(2) PNA (pneumonia)


ICD Code:  J18.9 - Pneumonia, unspecified organism


(3) Hypoxia


ICD Code:  R09.02 - Hypoxemia


(4) NSTEMI (non-ST elevated myocardial infarction)


ICD Code:  I21.4 - Non-ST elevation (NSTEMI) myocardial infarction


(5) CHF (congestive heart failure)


ICD Code:  I50.9 - Heart failure, unspecified


(6) Renal insufficiency


ICD Code:  N28.9 - Disorder of kidney and ureter, unspecified


(7) DM (diabetes mellitus)


ICD Code:  E11.9 - Type 2 diabetes mellitus without complications


(8) GI bleed


ICD Code:  K92.2 - Gastrointestinal hemorrhage, unspecified


Assessment and Plan


52-year-old female with presented with GI bleed and chest pain





Acute on chronic Stage IV CKD


   Management per nephrology


   Avoid nephrotoxins.  Creatinine continues to worsen.  Creatinine went to to 

4.47 to 5.32 to 4.08 today .  Decreased urine output.


   Status post Vas-Cath done today on 1/26.  Patient received dialysis on 1/26 

and on 1/27.  


   Per nephrologist most likely patient has reached end-stage renal disease.


   Neurologist discussed options moving forward related.  Peritoneal dialysis 

versus hemodialysis.  Patient stated that she will consider options.


   Continue strict ins and outs 


    trend creatinine.


   On Renvela for hyperphosphatemia. 


   Avoid any nephrotoxic agents, stop non essential medications. 





NSTEMI:  


   Post cardiac catheterization on 1/22 showing severe two-vessel disease of 

the right coronary artery in the mid LAD.  


   Recommended high risk PCI versus cardiac bypass versus medical management. 

PCI is  held until cleared by nephrology .





Hemoptysis on 1/27


   Patient has not had any episodes since yesterday.  She is also asymptomatic.


   Pulmonologist consulted and following.





GI bleed-resolved


Anemia of acute blood loss


   Status post transfused with 2 units of packed red blood cells.


   Appreciate input from GI, status post panendoscopy with finding of gastritis 

EGD and colon polyps on colonoscopy pending biopsy report


   No Evidence of iron deficiency anemia


   Unremarkable small bowel follow-through


   PPI and continue to monitor H&H


   GI signed off





Sepsis


   Resolved, continue with current antibiotics including Rocephin and 

azithromycin





PNA


   Rocephin and azithromycin discontinued on 1/22 and continues to do well off 

of antibiotics.


   DuoNeb when necessary


   Maintain oxygen saturation above 92%





Hypoxia-resolved


   Continue with treatment as in above





Acute on Chronic diastolic CHF


   Echo 8/18/17 w/ EF 55-60%


   Currently off Lasix secondary to worsening renal function


   Continue with Imdur/BB





Atypical chest pain


   Chest pain occurs only when patient tried to get up.  The same chest pain is 

reproducible with palpation of her chest wall.  Pain is improving.  On pain 

medication when necessary.  Avoid NSAIDs due to acute renal failure.  Due to 

acute renal failure.





Lightheadedness


   Orthostatics is normal.








Hypertension


   Continue with Lopressor, Procardia, clonidine 2 times a day





DM:  


   Sliding scale w/ Accu-Cheks. stable. 





Tobacco Abuse: 


   Strongly recommended to stop smoking, no NicoDerm to avoid vasoconstriction.

  Ativan prn if needed


Discharge Planning


Patient may need long-term dialysis.However for now per nephro no HD, monitor 

for imprpvement 1-2 days poss CTA in 1-2 days then plan for PCI. Will go for 

CTA and PCI when cleared by nephrology 





Discussed with the patient, nurse, Juliette Herrera MD Jan 30, 2018 08:56

## 2018-01-30 NOTE — HHI.NPPN
Subjective


General Problems:  Anemia


Renal Failure:  Chronic, Acute, Stage III, Stage IV


Interval History


Sitting up, eating breakfast. Renal function is stable, urine output is 

improving. 


 (Migdalia Camejo)





Review of Systems


General


Constitutional:  Fatigue


 (Migdalia Camejo)





Respiratory


Respiratory Remarks


Mild SOB


 (Migdalia Camejo)





Cardiovascular


Cardiac Remarks


resolved


 (Migdalia Camejo)





Gastrointestinal


GI Remarks


no abdominal pain


 (Migdalia Camejo)





Psych


Psych:  Depression


 (Migdalia Camejo)





Objective Data


Data





Vital Signs








  Date Time  Temp Pulse Resp B/P (MAP) Pulse Ox O2 Delivery O2 Flow Rate FiO2


 


1/30/18 05:00 98.5 71 18 139/68 (91) 97   


 


1/30/18 04:00  67      


 


1/30/18 00:41   18     


 


1/30/18 00:00 97.9 75 18 136/71 (92) 100   


 


1/30/18 00:00  75      


 


1/29/18 20:00 97.9 73 18 146/74 (98) 100   


 


1/29/18 20:00  71      


 


1/29/18 18:01  68      


 


1/29/18 17:00  68      


 


1/29/18 16:05  65      


 


1/29/18 15:37     99 Room Air  


 


1/29/18 15:21 97.7 66 19 120/66 (84) 99   


 


1/29/18 15:00  68      


 


1/29/18 14:00  66      


 


1/29/18 13:00  66      


 


1/29/18 12:30 97.6 65 19 115/63 (80) 99   


 


1/29/18 12:15     99 Room Air  


 


1/29/18 12:00  66      


 


1/29/18 11:00  68      


 


1/29/18 10:00  70      








 (Migdalia Camejo)


-:  


1/30/18 0514                                                                   

             1/30/18 0514





Tubes & Lines:  Vas-Cath


 (Migdalia Camejo)





Physical Exam


General


Appearance:  Well Developed, Well Nourished, No Acute Distress, Comfortable


 (Migdalia Camejo)





Eyes


Eye Exam:  Pupils Equal


 (Migdalia Camejo)





Throat


Throat Exam:  Oral Mucosa Pink & Moist


 (Migdalia Camejo. ARNP)





Pulmonary


Resp Exam:  Clear Bilaterally, Breath Sounds Equal, No Distress, Crackles


 (Migdalia Camejo B. ARNP)





Cardiology


CV Exam:  Regular, Normal Sinus Rhythm, Good Perfusion


 (Migdalia Camejo B. ARNP)





Gastrointestinal/Abdomen


GI Exam:  Soft, Non-Tender, Bowel Sounds Present, Positive Bowel Movement, 

Distended


 (Migdalia Camejo. ARNP)





Musculoskeletal


MS Exam:  Joints Intact, Normal Tone, Good Strength


 (Migdalia Camejo. ARNP)





Integumentary


Skin Exam:  Clear, Warm, Dry, Intact


 (Migdalia Camejo. ARNP)





Extremeties


Extremities Exam:  No Edema, Pedal Pulses Palpable


 (Migdalia Camejo. ARNP)





Neurologic


Neuro Exam:  Alert, Awake, Oriented, Speech Clear, Moving All Extremities


 (Migdalia Camejo. ARNP)





Psychiatric


Psych Exam:  Appropriate Responses


 (Migdalia Camejo ARNP)





Assessment/Plan


Discussed Condition With:  Patient


Assessment Summary:  LETI/Acute Renal Failure, Anemia of CKD, Hypertension, 

Diabetes Mellitus, CKD Stage IV


Problem List:  


(1) Renal insufficiency


ICD Codes:  N28.9 - Disorder of kidney and ureter, unspecified


Plan:  She had underlying diabetic nephropathy, stage 4


Recent NSTEMI with cardiac cath, did receive a small amount of IV contrast on 1/ 22


Initiated on dialysis on 1/26 and 1/27. 


Renal function is stable, slightly improved. 


Non oliguric with excellent urine output. 


Repeat labs daily, HD if needed. 


It is unclear if she needs permanent dialysis. Proceeding with the cardiac cath 

will increase the risk of LETI requiring HD. 


Avoid any nephrotoxic agents, stop non essential medications. 


On Renvela for hyperphosphatemia. 





We have discussed options moving forward related to PD vs HD. She is still 

unsure of how to proceed if long term dialysis is needed. We have asked the 

[facility] to meet the patient and family to discuss the modalities. 





(2) NSTEMI (non-ST elevated myocardial infarction)


ICD Codes:  I21.4 - Non-ST elevation (NSTEMI) myocardial infarction


Plan:  Cardiology following, LHC may be required vs medical management, 

appreciate cardiology recommendations. 


On Plavix, NTG paste, Imdur





(3) DM (diabetes mellitus)


ICD Codes:  E11.9 - Type 2 diabetes mellitus without complications


Plan:  Insulin as needed, maintain glucose 140-180mg/dL. 


If NPO use D10 @ 15 ml/hr to prevent hyperkalemia 





(4) Anemia


ICD Codes:  D64.9 - Anemia, unspecified


Status:  Chronic


Plan:  On Epogen with dialysis 


no evidence of iron deficiency, reduce ferrous sulfate to once daily. 


She was transfused this admission.





(5) CAD (coronary artery disease)


ICD Codes:  I25.10 - Atherosclerotic heart disease of native coronary artery 

without angina pectoris


Status:  Chronic


Plan:  Hx of stent in the past. New NSTEMI, see above 





(6) PNA (pneumonia)


ICD Codes:  J18.9 - Pneumonia, unspecified organism


Plan:  Improved. PO Zithromax continues. Stop date unclear. 





(7) HTN (hypertension)


ICD Codes:  I10 - Hypertension


Status:  Chronic


Plan:  On Imdur, metoprolol, clonidine, and Norvasc


BP has improved. 


 (Migdalia Camejo)


Plan


patient was seen and examined. Non oliguric, renal function appears to be 

recovering. Avoid nephrotoxic agents.


 (Kelton Whiteside MD)











Migdalia Camejo Jan 30, 2018 09:16


Kelton Whiteside MD Jan 31, 2018 08:24

## 2018-01-30 NOTE — HHI.PR
Subjective


Remarks


52 YOAA female with ESRD,HTN,DM


No hemoptysis


Occ cough


Mild sob


Up in chair





Objective


Vital Signs





Vital Signs








  Date Time  Temp Pulse Resp B/P (MAP) Pulse Ox O2 Delivery O2 Flow Rate FiO2


 


1/30/18 17:22     99   21


 


1/30/18 15:00  70      


 


1/30/18 11:00 97.3 69 17 125/67 (86) 100   


 


1/30/18 11:00  77      


 


1/30/18 11:00     100 Room Air  


 


1/30/18 08:30     100 Room Air  


 


1/30/18 07:00  70      


 


1/30/18 07:00 97.7 73 18 131/64 (86) 100   


 


1/30/18 05:00 98.5 71 18 139/68 (91) 97   


 


1/30/18 04:00  67      


 


1/30/18 00:41   18     


 


1/30/18 00:00 97.9 75 18 136/71 (92) 100   


 


1/30/18 00:00  75      


 


1/29/18 20:00 97.9 73 18 146/74 (98) 100   


 


1/29/18 20:00  71      














I/O      


 


 1/29/18 1/29/18 1/29/18 1/30/18 1/30/18 1/30/18





 07:00 15:00 23:00 07:00 15:00 23:00


 


Intake Total 480 ml  720 ml 640 ml  


 


Output Total 1800 ml  1500 ml 900 ml  


 


Balance -1320 ml  -780 ml -260 ml  


 


      


 


Intake Oral 480 ml  720 ml 640 ml  


 


Output Urine Total 1800 ml  1500 ml 900 ml  


 


# Bowel Movements 0     








Result Diagram:  


1/30/18 0514                                                                   

             1/30/18 0514





Objective Remarks


GENERAL: MBMN AA female, NAD


SKIN: Warm and dry.


HEAD: Normocephalic.


EYES: No scleral icterus. No injection or drainage. 


NECK: Supple, trachea midline. No JVD or lymphadenopathy.


CARDIOVASCULAR: Regular rate and rhythm without murmurs, gallops, or rubs. 


RESPIRATORY: Breath sounds equal bilaterally. No accessory muscle use.


GASTROINTESTINAL: Abdomen soft, non-tender, nondistended. 


MUSCULOSKELETAL: No cyanosis, or edema. 


BACK: Nontender without obvious deformity. No CVA tenderness.








A/P


Assessment and Plan


? Mild hemoptysis, resolved


ESRD


DM


HTN





PLAN:








Monitor hemoptysis


Stable on RA


monitor BS


DW pt and family











Karl Delatorre MD Jan 30, 2018 19:11

## 2018-01-31 VITALS
OXYGEN SATURATION: 97 % | RESPIRATION RATE: 16 BRPM | HEART RATE: 76 BPM | TEMPERATURE: 98.3 F | DIASTOLIC BLOOD PRESSURE: 68 MMHG | SYSTOLIC BLOOD PRESSURE: 131 MMHG

## 2018-01-31 VITALS — HEART RATE: 84 BPM

## 2018-01-31 VITALS — HEART RATE: 72 BPM

## 2018-01-31 VITALS
SYSTOLIC BLOOD PRESSURE: 111 MMHG | HEART RATE: 68 BPM | DIASTOLIC BLOOD PRESSURE: 57 MMHG | RESPIRATION RATE: 16 BRPM | TEMPERATURE: 97.7 F | OXYGEN SATURATION: 96 %

## 2018-01-31 VITALS
HEART RATE: 78 BPM | RESPIRATION RATE: 20 BRPM | SYSTOLIC BLOOD PRESSURE: 158 MMHG | OXYGEN SATURATION: 99 % | DIASTOLIC BLOOD PRESSURE: 77 MMHG | TEMPERATURE: 98.6 F

## 2018-01-31 VITALS
SYSTOLIC BLOOD PRESSURE: 150 MMHG | DIASTOLIC BLOOD PRESSURE: 64 MMHG | OXYGEN SATURATION: 99 % | HEART RATE: 72 BPM | TEMPERATURE: 98.9 F

## 2018-01-31 VITALS — HEART RATE: 74 BPM

## 2018-01-31 VITALS
SYSTOLIC BLOOD PRESSURE: 148 MMHG | OXYGEN SATURATION: 95 % | DIASTOLIC BLOOD PRESSURE: 68 MMHG | TEMPERATURE: 98.5 F | HEART RATE: 82 BPM

## 2018-01-31 VITALS
DIASTOLIC BLOOD PRESSURE: 71 MMHG | SYSTOLIC BLOOD PRESSURE: 156 MMHG | HEART RATE: 82 BPM | OXYGEN SATURATION: 96 % | TEMPERATURE: 98.7 F | RESPIRATION RATE: 20 BRPM

## 2018-01-31 VITALS — HEART RATE: 76 BPM

## 2018-01-31 VITALS
OXYGEN SATURATION: 99 % | SYSTOLIC BLOOD PRESSURE: 129 MMHG | TEMPERATURE: 98.1 F | HEART RATE: 75 BPM | DIASTOLIC BLOOD PRESSURE: 69 MMHG | RESPIRATION RATE: 16 BRPM

## 2018-01-31 VITALS — HEART RATE: 78 BPM

## 2018-01-31 VITALS — HEART RATE: 68 BPM

## 2018-01-31 VITALS — HEART RATE: 70 BPM

## 2018-01-31 VITALS — HEART RATE: 80 BPM

## 2018-01-31 VITALS — OXYGEN SATURATION: 96 %

## 2018-01-31 VITALS — OXYGEN SATURATION: 99 %

## 2018-01-31 LAB
ALBUMIN SERPL-MCNC: 2.4 GM/DL (ref 3.4–5)
BUN SERPL-MCNC: 43 MG/DL (ref 7–18)
CALCIUM SERPL-MCNC: 8.2 MG/DL (ref 8.5–10.1)
CHLORIDE SERPL-SCNC: 104 MEQ/L (ref 98–107)
CREAT SERPL-MCNC: 3.61 MG/DL (ref 0.5–1)
GFR SERPLBLD BASED ON 1.73 SQ M-ARVRAT: 16 ML/MIN (ref 89–?)
GLUCOSE SERPL-MCNC: 145 MG/DL (ref 74–106)
HCO3 BLD-SCNC: 26.1 MEQ/L (ref 21–32)
PHOSPHATE SERPL-MCNC: 3.9 MG/DL (ref 2.5–4.9)
SODIUM SERPL-SCNC: 138 MEQ/L (ref 136–145)

## 2018-01-31 RX ADMIN — FERROUS SULFATE TAB 325 MG (65 MG ELEMENTAL FE) SCH MG: 325 (65 FE) TAB at 09:09

## 2018-01-31 RX ADMIN — STANDARDIZED SENNA CONCENTRATE AND DOCUSATE SODIUM SCH TAB: 8.6; 5 TABLET, FILM COATED ORAL at 09:09

## 2018-01-31 RX ADMIN — WATER PRN ML: 1 IRRIGANT IRRIGATION at 10:49

## 2018-01-31 RX ADMIN — ACYCLOVIR SCH UNITS: 800 TABLET ORAL at 21:42

## 2018-01-31 RX ADMIN — METOPROLOL TARTRATE SCH MG: 100 TABLET, FILM COATED ORAL at 21:42

## 2018-01-31 RX ADMIN — NITROGLYCERIN SCH INCH: 20 OINTMENT TOPICAL at 13:33

## 2018-01-31 RX ADMIN — MORPHINE SULFATE PRN MG: 2 INJECTION, SOLUTION INTRAMUSCULAR; INTRAVENOUS at 06:03

## 2018-01-31 RX ADMIN — METOPROLOL TARTRATE SCH MG: 100 TABLET, FILM COATED ORAL at 09:10

## 2018-01-31 RX ADMIN — Medication SCH ML: at 09:11

## 2018-01-31 RX ADMIN — Medication SCH ML: at 21:43

## 2018-01-31 RX ADMIN — SEVELAMER CARBONATE SCH MG: 800 TABLET, FILM COATED ORAL at 09:08

## 2018-01-31 RX ADMIN — INSULIN ASPART SCH: 100 INJECTION, SOLUTION INTRAVENOUS; SUBCUTANEOUS at 17:34

## 2018-01-31 RX ADMIN — NITROGLYCERIN SCH INCH: 20 OINTMENT TOPICAL at 05:59

## 2018-01-31 RX ADMIN — PRAVASTATIN SODIUM SCH MG: 40 TABLET ORAL at 21:42

## 2018-01-31 RX ADMIN — GABAPENTIN SCH MG: 300 CAPSULE ORAL at 21:42

## 2018-01-31 RX ADMIN — Medication PRN ML: at 06:04

## 2018-01-31 RX ADMIN — FOLIC ACID SCH MG: 1 TABLET ORAL at 09:08

## 2018-01-31 RX ADMIN — STANDARDIZED SENNA CONCENTRATE AND DOCUSATE SODIUM SCH TAB: 8.6; 5 TABLET, FILM COATED ORAL at 21:42

## 2018-01-31 RX ADMIN — PANTOPRAZOLE SCH MG: 40 TABLET, DELAYED RELEASE ORAL at 09:08

## 2018-01-31 RX ADMIN — INSULIN ASPART SCH: 100 INJECTION, SOLUTION INTRAVENOUS; SUBCUTANEOUS at 08:00

## 2018-01-31 RX ADMIN — CLOPIDOGREL BISULFATE SCH MG: 75 TABLET, FILM COATED ORAL at 09:10

## 2018-01-31 RX ADMIN — ISOSORBIDE MONONITRATE SCH MG: 60 TABLET, EXTENDED RELEASE ORAL at 06:02

## 2018-01-31 RX ADMIN — INSULIN ASPART SCH: 100 INJECTION, SOLUTION INTRAVENOUS; SUBCUTANEOUS at 22:16

## 2018-01-31 RX ADMIN — NITROGLYCERIN SCH INCH: 20 OINTMENT TOPICAL at 17:34

## 2018-01-31 RX ADMIN — ASPIRIN 81 MG SCH MG: 81 TABLET ORAL at 09:10

## 2018-01-31 RX ADMIN — INSULIN ASPART SCH: 100 INJECTION, SOLUTION INTRAVENOUS; SUBCUTANEOUS at 13:33

## 2018-01-31 NOTE — HHI.PR
Subjective


Remarks


No chest pain. SOB at baseline. No n/v/d/c.





Objective


Vitals





Vital Signs








  Date Time  Temp Pulse Resp B/P (MAP) Pulse Ox O2 Delivery O2 Flow Rate FiO2


 


1/31/18 14:00  72      


 


1/31/18 13:00  68      


 


1/31/18 12:03     96   


 


1/31/18 12:00 97.7 72 16 111/57 (75) 96   


 


1/31/18 12:00  68      


 


1/31/18 11:01  70      


 


1/31/18 11:00  68      


 


1/31/18 11:00     96 Room Air  


 


1/31/18 10:00  68      


 


1/31/18 09:00  72      


 


1/31/18 08:00  72      


 


1/31/18 07:30 98.3 76 16 131/68 (89) 97   


 


1/31/18 07:30     98 Room Air  


 


1/31/18 07:00  76      


 


1/31/18 07:00  74      


 


1/31/18 06:00  78      


 


1/31/18 05:00  76      


 


1/31/18 04:00  80      


 


1/31/18 03:00 98.6 79 20 158/77 (104) 99   


 


1/31/18 03:00     96 Room Air  


 


1/31/18 03:00  78      


 


1/31/18 02:00  78      


 


1/31/18 01:00  84      


 


1/31/18 00:00 98.7 82 20 156/71 (99) 96   


 


1/31/18 00:00  88      


 


1/30/18 23:00     96 Room Air  


 


1/30/18 23:00  82      


 


1/30/18 22:00  78      


 


1/30/18 21:00  76      


 


1/30/18 20:00  74      


 


1/30/18 19:00 98.8 86 20 163/78 (106) 98   


 


1/30/18 19:00  74      


 


1/30/18 19:00     96 Room Air  


 


1/30/18 18:00  72      


 


1/30/18 17:22     99   21


 


1/30/18 17:00  72      


 


1/30/18 16:00  68      


 


1/30/18 15:00     100 Room Air  


 


1/30/18 15:00 97.8 69 18 136/67 (90) 100   


 


1/30/18 15:00  70      














I/O      


 


 1/30/18 1/30/18 1/30/18 1/31/18 1/31/18 1/31/18





 07:00 15:00 23:00 07:00 15:00 23:00


 


Intake Total 640 ml  1170 ml 480 ml  


 


Output Total 900 ml  1000 ml 800 ml  


 


Balance -260 ml  170 ml -320 ml  


 


      


 


Intake Oral 640 ml  1170 ml 480 ml  


 


Output Urine Total 900 ml  1000 ml 800 ml  








Result Diagram:  


1/30/18 0514                                                                   

             1/31/18 0532





Imaging





Last Impressions








Chest X-Ray 1/27/18 0000 Signed





Impressions: 





 Service Date/Time:  Saturday, January 27, 2018 15:09 - CONCLUSION:  1. Right 

IJ 





 dual-lumen central venous catheter in place with tip at the cavoatrial 

junction. 





 No evidence of pneumothorax 2. Mild pulmonary opacity likely representing 





 pulmonary vascular congestion. 3. Small bilateral pleural effusions.     

Bhaskar Thakur MD 


 


Catheter Placement X-Ray 1/26/18 0900 Signed





Impressions: 





 Service Date/Time:  Friday, January 26, 2018 11:12 - CONCLUSION: Uncomplicated 





 line placement as above.     Ren Stubbs MD 


 


Small Bowel X-Ray 1/19/18 0000 Signed





Impressions: 





 Service Date/Time:  Friday, January 19, 2018 10:44 - CONCLUSION: Unremarkable 





 small bowel examination.     Boo Ricardo MD 








Objective Remarks


GENERAL: Very pleasant 52-year-old female , well-nourished well-

developed, appears in NAD


CARDIOVASCULAR: Regular rate and rhythm without murmurs, gallops, or rubs.  

Chest pain reproducible with palpation of the chest wall but improved.  Right-

sided Vas-Cath in place


RESPIRATORY: Breath sounds equal bilaterally. No accessory muscle use.


GASTROINTESTINAL: Abdomen soft, non-tender, nondistended. 


MUSCULOSKELETAL: No cyanosis, or edema.





Procedures


Panendoscopy 01/19/18





A/P


Problem List:  


(1) Sepsis


ICD Code:  A41.9 - Sepsis, unspecified organism


(2) PNA (pneumonia)


ICD Code:  J18.9 - Pneumonia, unspecified organism


(3) Hypoxia


ICD Code:  R09.02 - Hypoxemia


(4) NSTEMI (non-ST elevated myocardial infarction)


ICD Code:  I21.4 - Non-ST elevation (NSTEMI) myocardial infarction


(5) CHF (congestive heart failure)


ICD Code:  I50.9 - Heart failure, unspecified


(6) Renal insufficiency


ICD Code:  N28.9 - Disorder of kidney and ureter, unspecified


(7) DM (diabetes mellitus)


ICD Code:  E11.9 - Type 2 diabetes mellitus without complications


(8) GI bleed


ICD Code:  K92.2 - Gastrointestinal hemorrhage, unspecified


Assessment and Plan


52-year-old female with presented with GI bleed and chest pain





Acute on chronic Stage IV CKD


   Management per nephrology


   Avoid nephrotoxins.  Creatinine continues to worsen.  Creatinine went to to 

4.47 to 5.32 to 4.08 today .  Decreased urine output.


   Status post Vas-Cath done today on 1/26.  Patient received dialysis on 1/26 

and on 1/27.  


   Per nephrologist most likely patient has reached end-stage renal disease.


   Neurologist discussed options moving forward related.  Peritoneal dialysis 

versus hemodialysis.  Patient stated that she will consider options.


   Continue strict ins and outs 


    trend creatinine.


   On Renvela for hyperphosphatemia. 


   Avoid any nephrotoxic agents, stop non essential medications. 





NSTEMI:  


   Post cardiac catheterization on 1/22 showing severe two-vessel disease of 

the right coronary artery in the mid LAD.  


   Recommended high risk PCI versus cardiac bypass versus medical management. 

PCI is  held until cleared by nephrology .





Hemoptysis on 1/27


   Patient has not had any episodes since yesterday.  She is also asymptomatic.


   Pulmonologist consulted and following.





GI bleed-resolved


Anemia of acute blood loss


   Status post transfused with 2 units of packed red blood cells.


   Appreciate input from GI, status post panendoscopy with finding of gastritis 

EGD and colon polyps on colonoscopy pending biopsy report


   No Evidence of iron deficiency anemia


   Unremarkable small bowel follow-through


   PPI and continue to monitor H&H


   GI signed off





Sepsis


   Resolved, continue with current antibiotics including Rocephin and 

azithromycin





PNA


   Rocephin and azithromycin discontinued on 1/22 and continues to do well off 

of antibiotics.


   DuoNeb when necessary


   Maintain oxygen saturation above 92%





Hypoxia-resolved


   Continue with treatment as in above





Acute on Chronic diastolic CHF


   Echo 8/18/17 w/ EF 55-60%


   Currently off Lasix secondary to worsening renal function


   Continue with Imdur/BB





Atypical chest pain


   Chest pain occurs only when patient tried to get up.  The same chest pain is 

reproducible with palpation of her chest wall.  Pain is improving.  On pain 

medication when necessary.  Avoid NSAIDs due to acute renal failure.  Due to 

acute renal failure.





Lightheadedness


   Orthostatics is normal.








Hypertension


   Continue with Lopressor, Procardia, clonidine 2 times a day





DM:  


   Sliding scale w/ Accu-Cheks. stable. 





Tobacco Abuse: 


   Strongly recommended to stop smoking, no NicoDerm to avoid vasoconstriction.

  Ativan prn if needed


Discharge Planning


Patient may need long-term dialysis.However for now per nephro no HD, monitor 

for imprpvement 1-2 days poss CTA in 1-2 days then plan for PCI. Will go for 

CTA and PCI when cleared by nephrology 





Discussed with the patient, nurse, Juliette Herrera MD Jan 31, 2018 14:57

## 2018-01-31 NOTE — HHI.PR
Subjective


Remarks


52 YOAA female with ESRD,HTN,DM


No hemoptysis


Occ cough


Mild sob


Up in chair


No new complaint





Objective


Vital Signs





Vital Signs








  Date Time  Temp Pulse Resp B/P (MAP) Pulse Ox O2 Delivery O2 Flow Rate FiO2


 


1/31/18 18:02  74      


 


1/31/18 17:00  72      


 


1/31/18 16:00  70      


 


1/31/18 15:05     99 Room Air  


 


1/31/18 15:05 98.1 75 16 129/69 (89) 99   


 


1/31/18 15:00  74      


 


1/31/18 14:00  72      


 


1/31/18 13:00  68      


 


1/31/18 12:03     96   


 


1/31/18 12:00 97.7 72 16 111/57 (75) 96   


 


1/31/18 12:00  68      


 


1/31/18 11:01  70      


 


1/31/18 11:00  68      


 


1/31/18 11:00     96 Room Air  


 


1/31/18 10:00  68      


 


1/31/18 09:00  72      


 


1/31/18 08:00  72      


 


1/31/18 07:30 98.3 76 16 131/68 (89) 97   


 


1/31/18 07:30     98 Room Air  


 


1/31/18 07:00  76      


 


1/31/18 07:00  74      


 


1/31/18 06:00  78      


 


1/31/18 05:00  76      


 


1/31/18 04:00  80      


 


1/31/18 03:00 98.6 79 20 158/77 (104) 99   


 


1/31/18 03:00     96 Room Air  


 


1/31/18 03:00  78      


 


1/31/18 02:00  78      


 


1/31/18 01:00  84      


 


1/31/18 00:00 98.7 82 20 156/71 (99) 96   


 


1/31/18 00:00  88      


 


1/30/18 23:00     96 Room Air  


 


1/30/18 23:00  82      


 


1/30/18 22:00  78      


 


1/30/18 21:00  76      














I/O      


 


 1/30/18 1/30/18 1/30/18 1/31/18 1/31/18 1/31/18





 07:00 15:00 23:00 07:00 15:00 23:00


 


Intake Total 640 ml  1170 ml 480 ml  1980 ml


 


Output Total 900 ml  1000 ml 800 ml  1600 ml


 


Balance -260 ml  170 ml -320 ml  380 ml


 


      


 


Intake Oral 640 ml  1170 ml 480 ml  1980 ml


 


Output Urine Total 900 ml  1000 ml 800 ml  1600 ml








Result Diagram:  


1/30/18 0514                                                                   

             1/31/18 0532





Objective Remarks


GENERAL: MBMN AA female, NAD


SKIN: Warm and dry.


HEAD: Normocephalic.


EYES: No scleral icterus. No injection or drainage. 


NECK: Supple, trachea midline. No JVD or lymphadenopathy.


CARDIOVASCULAR: Regular rate and rhythm without murmurs, gallops, or rubs. 


RESPIRATORY: Breath sounds equal bilaterally. No accessory muscle use.


GASTROINTESTINAL: Abdomen soft, non-tender, nondistended. 


MUSCULOSKELETAL: No cyanosis, or edema. 


BACK: Nontender without obvious deformity. No CVA tenderness.








A/P


Assessment and Plan


? Mild hemoptysis, resolved


ESRD


DM


HTN





PLAN:








Monitor hemoptysis


Stable on RA


monitor BS


DW pt and family


Stable from pulm standpoint











Karl Delatorre MD Jan 31, 2018 20:26

## 2018-01-31 NOTE — HHI.NPPN
Subjective


General Problems:  Anemia


Renal Failure:  Chronic, Acute, Stage III, Stage IV


Interval History


Renal function has remained stable. No new developments. 


 (Migdalia Camejo)





Review of Systems


General


Constitutional:  Fatigue


 (Migdalia Camejo)





Respiratory


Respiratory Remarks


Mild SOB


 (Migdalia Camejo)





Cardiovascular


Cardiac Remarks


resolved


 (Migdalia Camejo)





Gastrointestinal


GI Remarks


no abdominal pain


 (Migdalia Camejo)





Psych


Psych:  Depression


 (Migdalia Camejo)





Objective Data


Data





Vital Signs








  Date Time  Temp Pulse Resp B/P (MAP) Pulse Ox O2 Delivery O2 Flow Rate FiO2


 


1/31/18 12:03     96   


 


1/31/18 12:00 97.7 72 16 111/57 (75) 96   


 


1/31/18 11:01  70      


 


1/31/18 10:00  68      


 


1/31/18 09:00  72      


 


1/31/18 08:00  72      


 


1/31/18 07:30 98.3 76 16 131/68 (89) 97   


 


1/31/18 07:00  76      


 


1/31/18 07:00  74      


 


1/31/18 06:00  78      


 


1/31/18 05:00  76      


 


1/31/18 04:00  80      


 


1/31/18 03:00 98.6 79 20 158/77 (104) 99   


 


1/31/18 03:00     96 Room Air  


 


1/31/18 03:00  78      


 


1/31/18 02:00  78      


 


1/31/18 01:00  84      


 


1/31/18 00:00 98.7 82 20 156/71 (99) 96   


 


1/31/18 00:00  88      


 


1/30/18 23:00     96 Room Air  


 


1/30/18 23:00  82      


 


1/30/18 22:00  78      


 


1/30/18 21:00  76      


 


1/30/18 20:00  74      


 


1/30/18 19:00 98.8 86 20 163/78 (106) 98   


 


1/30/18 19:00  74      


 


1/30/18 19:00     96 Room Air  


 


1/30/18 18:00  72      


 


1/30/18 17:22     99   21


 


1/30/18 17:00  72      


 


1/30/18 16:00  68      


 


1/30/18 15:00     100 Room Air  


 


1/30/18 15:00 97.8 69 18 136/67 (90) 100   


 


1/30/18 15:00  70      


 


1/30/18 14:00  68      


 


1/30/18 13:00  68      








 (Migdalia Camejo)


-:  


1/30/18 0514                                                                   

             1/31/18 0532





Tubes & Lines:  Vas-Cath


 (Migdalia Camejo)





Physical Exam


General


Appearance:  Well Developed, Well Nourished, No Acute Distress, Comfortable


 (Migdalia Camejo)





Eyes


Eye Exam:  Pupils Equal


 (Migdalia Camejo)





Throat


Throat Exam:  Oral Mucosa Pink & Moist


 (Migdalia Camejo)





Pulmonary


Resp Exam:  Clear Bilaterally, Breath Sounds Equal, No Distress, Crackles


 (Migdalia Camejo)





Cardiology


CV Exam:  Regular, Normal Sinus Rhythm, Good Perfusion


 (Migdalia Camejo)





Gastrointestinal/Abdomen


GI Exam:  Soft, Non-Tender, Bowel Sounds Present, Positive Bowel Movement, 

Distended


 (Migdalia Camejo)





Musculoskeletal


MS Exam:  Joints Intact, Normal Tone, Good Strength


 (Migdalia Camejo)





Integumentary


Skin Exam:  Clear, Warm, Dry, Intact


 (Migdalia Camejo)





Extremeties


Extremities Exam:  No Edema, Pedal Pulses Palpable


 (Migdalia Camejo)





Neurologic


Neuro Exam:  Alert, Awake, Oriented, Speech Clear, Moving All Extremities


 (Migdalia Camejo)





Psychiatric


Psych Exam:  Appropriate Responses


 (Migdalia Camejo)





Assessment/Plan


Discussed Condition With:  Patient


Assessment Summary:  LETI/Acute Renal Failure, Anemia of CKD, Hypertension, 

Diabetes Mellitus, CKD Stage IV


Problem List:  


(1) Renal insufficiency


ICD Codes:  N28.9 - Disorder of kidney and ureter, unspecified


Plan:  She has underlying diabetic nephropathy, stage 4


Recent NSTEMI with cardiac cath,  small amount of IV contrast given on 1/22


Dialyzed twice, on 1/26 and 1/27. 


Renal function  has improved and remained stable, 


Non oliguric with excellent urine output. 


Repeat labs daily, HD if needed. 


It is unclear if she needs permanent dialysis. If renal function continues to 

be stable, we can follow up outpatient. 


Leave carlos manuel in for now.


Avoid any nephrotoxic agents. 


Hold Renvela and monitor. 





She has no funding at this time. CM assisting. 





(2) NSTEMI (non-ST elevated myocardial infarction)


ICD Codes:  I21.4 - Non-ST elevation (NSTEMI) myocardial infarction


Plan:  Cardiology following, [facility] postponed, now to continue with medical 

management


On Plavix, NTG paste, Imdur





(3) DM (diabetes mellitus)


ICD Codes:  E11.9 - Type 2 diabetes mellitus without complications


Plan:  Insulin as needed, maintain glucose 140-180mg/dL. 





(4) Anemia


ICD Codes:  D64.9 - Anemia, unspecified


Status:  Chronic


Plan:  On Epogen with dialysis 


no evidence of iron deficiency, reduce ferrous sulfate to once daily. 


She was transfused this admission.





(5) CAD (coronary artery disease)


ICD Codes:  I25.10 - Atherosclerotic heart disease of native coronary artery 

without angina pectoris


Status:  Chronic


Plan:  Hx of stent in the past. New NSTEMI, see above 





(6) PNA (pneumonia)


ICD Codes:  J18.9 - Pneumonia, unspecified organism


Plan:  Improved. Off Zithromax. 





(7) HTN (hypertension)


ICD Codes:  I10 - Hypertension


Status:  Chronic


Plan:  On Imdur, metoprolol, clonidine, and Norvasc


BP has improved. 


 (Migdalia Camejo)


Plan


patient was seen and examined. Will hold dialysis. Monitor urine output and 

renal function. 


 (Kelton Whiteside MD)











Migdalia Camejo Jan 31, 2018 12:16


Kelton Whiteside MD Feb 1, 2018 09:37

## 2018-02-01 VITALS — HEART RATE: 78 BPM

## 2018-02-01 VITALS
SYSTOLIC BLOOD PRESSURE: 148 MMHG | TEMPERATURE: 98.3 F | DIASTOLIC BLOOD PRESSURE: 71 MMHG | HEART RATE: 79 BPM | OXYGEN SATURATION: 99 % | RESPIRATION RATE: 18 BRPM

## 2018-02-01 VITALS — HEART RATE: 75 BPM

## 2018-02-01 VITALS — HEART RATE: 68 BPM

## 2018-02-01 VITALS
RESPIRATION RATE: 18 BRPM | SYSTOLIC BLOOD PRESSURE: 120 MMHG | DIASTOLIC BLOOD PRESSURE: 71 MMHG | TEMPERATURE: 98.3 F | OXYGEN SATURATION: 99 % | HEART RATE: 74 BPM

## 2018-02-01 VITALS — HEART RATE: 70 BPM

## 2018-02-01 VITALS
DIASTOLIC BLOOD PRESSURE: 74 MMHG | SYSTOLIC BLOOD PRESSURE: 142 MMHG | TEMPERATURE: 98.1 F | RESPIRATION RATE: 18 BRPM | HEART RATE: 70 BPM | OXYGEN SATURATION: 100 %

## 2018-02-01 VITALS — HEART RATE: 82 BPM

## 2018-02-01 VITALS
SYSTOLIC BLOOD PRESSURE: 153 MMHG | OXYGEN SATURATION: 99 % | DIASTOLIC BLOOD PRESSURE: 73 MMHG | TEMPERATURE: 98.4 F | HEART RATE: 80 BPM

## 2018-02-01 VITALS — HEART RATE: 80 BPM

## 2018-02-01 VITALS — HEART RATE: 71 BPM

## 2018-02-01 VITALS — HEART RATE: 81 BPM

## 2018-02-01 VITALS — HEART RATE: 74 BPM

## 2018-02-01 LAB
ALBUMIN SERPL-MCNC: 2.3 GM/DL (ref 3.4–5)
BUN SERPL-MCNC: 49 MG/DL (ref 7–18)
CALCIUM SERPL-MCNC: 8 MG/DL (ref 8.5–10.1)
CHLORIDE SERPL-SCNC: 107 MEQ/L (ref 98–107)
CREAT SERPL-MCNC: 3.52 MG/DL (ref 0.5–1)
GFR SERPLBLD BASED ON 1.73 SQ M-ARVRAT: 17 ML/MIN (ref 89–?)
GLUCOSE SERPL-MCNC: 126 MG/DL (ref 74–106)
HCO3 BLD-SCNC: 24.2 MEQ/L (ref 21–32)
PHOSPHATE SERPL-MCNC: 5.1 MG/DL (ref 2.5–4.9)
SODIUM SERPL-SCNC: 140 MEQ/L (ref 136–145)

## 2018-02-01 RX ADMIN — SEVELAMER CARBONATE SCH MG: 800 TABLET, FILM COATED ORAL at 12:00

## 2018-02-01 RX ADMIN — STANDARDIZED SENNA CONCENTRATE AND DOCUSATE SODIUM SCH TAB: 8.6; 5 TABLET, FILM COATED ORAL at 08:32

## 2018-02-01 RX ADMIN — ISOSORBIDE MONONITRATE SCH MG: 60 TABLET, EXTENDED RELEASE ORAL at 06:19

## 2018-02-01 RX ADMIN — MORPHINE SULFATE PRN MG: 2 INJECTION, SOLUTION INTRAMUSCULAR; INTRAVENOUS at 04:12

## 2018-02-01 RX ADMIN — NITROGLYCERIN SCH INCH: 20 OINTMENT TOPICAL at 06:19

## 2018-02-01 RX ADMIN — Medication SCH ML: at 08:33

## 2018-02-01 RX ADMIN — FOLIC ACID SCH MG: 1 TABLET ORAL at 08:32

## 2018-02-01 RX ADMIN — FERROUS SULFATE TAB 325 MG (65 MG ELEMENTAL FE) SCH MG: 325 (65 FE) TAB at 08:32

## 2018-02-01 RX ADMIN — INSULIN ASPART SCH: 100 INJECTION, SOLUTION INTRAVENOUS; SUBCUTANEOUS at 08:00

## 2018-02-01 RX ADMIN — METOPROLOL TARTRATE SCH MG: 100 TABLET, FILM COATED ORAL at 12:29

## 2018-02-01 RX ADMIN — NITROGLYCERIN SCH INCH: 20 OINTMENT TOPICAL at 00:01

## 2018-02-01 RX ADMIN — MORPHINE SULFATE PRN MG: 2 INJECTION, SOLUTION INTRAMUSCULAR; INTRAVENOUS at 09:17

## 2018-02-01 RX ADMIN — NITROGLYCERIN SCH INCH: 20 OINTMENT TOPICAL at 12:29

## 2018-02-01 RX ADMIN — PANTOPRAZOLE SCH MG: 40 TABLET, DELAYED RELEASE ORAL at 08:32

## 2018-02-01 RX ADMIN — CLOPIDOGREL BISULFATE SCH MG: 75 TABLET, FILM COATED ORAL at 08:32

## 2018-02-01 RX ADMIN — INSULIN ASPART SCH: 100 INJECTION, SOLUTION INTRAVENOUS; SUBCUTANEOUS at 12:00

## 2018-02-01 RX ADMIN — SEVELAMER CARBONATE SCH MG: 800 TABLET, FILM COATED ORAL at 12:29

## 2018-02-01 RX ADMIN — ASPIRIN 81 MG SCH MG: 81 TABLET ORAL at 08:33

## 2018-02-01 NOTE — HHI.NPPN
Subjective


General Problems:  Anemia


Renal Failure:  Chronic, Acute, Stage III, Stage IV


Interval History


Doing well, renal function is better. Creatinine is 3.52 today. 


 (Migdalia Camejo)





Review of Systems


General


Constitutional:  Fatigue


 (Migdalia Camejo)





Respiratory


Respiratory Remarks


Mild SOB


 (Migdalia Camejo)





Cardiovascular


Cardiac Remarks


resolved


 (Migdalia Camejo)





Gastrointestinal


GI Remarks


no abdominal pain


 (Migdalia Camejo)





Psych


Psych:  Depression


 (Migdalia Camejo)





Objective Data


Data





Vital Signs








  Date Time  Temp Pulse Resp B/P (MAP) Pulse Ox O2 Delivery O2 Flow Rate FiO2


 


2/1/18 10:03        21


 


2/1/18 08:30 98.3 79 18 148/71 (96) 99   


 


2/1/18 07:01  80      


 


2/1/18 06:00  81      


 


2/1/18 05:00  75      


 


2/1/18 04:00  80      


 


2/1/18 03:00  80      


 


2/1/18 03:00 98.4 80  153/73 (99) 99   


 


2/1/18 02:00  80      


 


2/1/18 01:00  80      


 


2/1/18 00:00  82      


 


1/31/18 23:00 98.5 80  148/68 (94) 95   


 


1/31/18 23:00  82      


 


1/31/18 22:00  78      


 


1/31/18 21:00  78      


 


1/31/18 20:55     99   21


 


1/31/18 20:00  76      


 


1/31/18 19:00 98.9 77  150/64 (92) 99   


 


1/31/18 19:00  72      


 


1/31/18 18:02  74      


 


1/31/18 17:00  72      


 


1/31/18 16:00  70      


 


1/31/18 15:05     99 Room Air  


 


1/31/18 15:05 98.1 75 16 129/69 (89) 99   


 


1/31/18 15:00  74      


 


1/31/18 14:00  72      


 


1/31/18 13:00  68      


 


1/31/18 12:03     96   


 


1/31/18 12:00 97.7 72 16 111/57 (75) 96   


 


1/31/18 12:00  68      


 


1/31/18 11:01  70      


 


1/31/18 11:00  68      


 


1/31/18 11:00     96 Room Air  








 (Migdalia Camejo)


-:  


1/30/18 0514                                                                   

             2/1/18 0545





Tubes & Lines:  Vas-Cath


 (Migdalia Camejo)





Physical Exam


General


Appearance:  Well Developed, Well Nourished, No Acute Distress, Comfortable


 (Migdalia Camejo)





Eyes


Eye Exam:  Pupils Equal


 (Migdalia Camejo)





Throat


Throat Exam:  Oral Mucosa Pink & Moist


 (Migdalia Camejo)





Pulmonary


Resp Exam:  Clear Bilaterally, Breath Sounds Equal, No Distress, Crackles


 (Migdalia Camejo)





Cardiology


CV Exam:  Regular, Normal Sinus Rhythm, Good Perfusion


 (Migdalia Camejo)





Gastrointestinal/Abdomen


GI Exam:  Soft, Non-Tender, Bowel Sounds Present, Positive Bowel Movement, 

Distended


 (Migdalia Camejo)





Musculoskeletal


MS Exam:  Joints Intact, Normal Tone, Good Strength


 (Migdalia Camejo)





Integumentary


Skin Exam:  Clear, Warm, Dry, Intact


 (Migdalia Camejo)





Extremeties


Extremities Exam:  No Edema, Pedal Pulses Palpable


 (Migdalia Camejo)





Neurologic


Neuro Exam:  Alert, Awake, Oriented, Speech Clear, Moving All Extremities


 (Migdalia Camejo)





Psychiatric


Psych Exam:  Appropriate Responses


 (Migdalia Camejo)





Assessment/Plan


Discussed Condition With:  Patient


Assessment Summary:  LETI/Acute Renal Failure, Anemia of CKD, Hypertension, 

Diabetes Mellitus, CKD Stage IV


Problem List:  


(1) Renal insufficiency


ICD Codes:  N28.9 - Disorder of kidney and ureter, unspecified


Plan:  She has underlying diabetic nephropathy, stage 4


Recent NSTEMI with cardiac cath,  small amount of IV contrast given on 1/22


Dialyzed twice, on 1/26 and 1/27. 


Renal function  has improved 


Excellent urine output


She can be discharged from our perspective


Given slip for lab work, we will follow in the next several weeks


Okay to remove Vascath


Advised to have adequate water intake, avoid nephrotoxic agents 





(2) NSTEMI (non-ST elevated myocardial infarction)


ICD Codes:  I21.4 - Non-ST elevation (NSTEMI) myocardial infarction


Plan:  Cardiology following, [facility] postponed, now to continue with medical 

management


On Plavix, NTG paste, Imdur





(3) DM (diabetes mellitus)


ICD Codes:  E11.9 - Type 2 diabetes mellitus without complications


Plan:  Insulin as needed, maintain glucose 140-180mg/dL. 





(4) Anemia


ICD Codes:  D64.9 - Anemia, unspecified


Status:  Chronic


Plan:  no evidence of iron deficiency, reduce ferrous sulfate to once daily. 


She was transfused this admission.





(5) CAD (coronary artery disease)


ICD Codes:  I25.10 - Atherosclerotic heart disease of native coronary artery 

without angina pectoris


Status:  Chronic


Plan:  Hx of stent in the past. New NSTEMI, see above 





(6) PNA (pneumonia)


ICD Codes:  J18.9 - Pneumonia, unspecified organism


Plan:  Improved. Off Zithromax. 





(7) HTN (hypertension)


ICD Codes:  I10 - Hypertension


Status:  Chronic


Plan:  On Imdur, metoprolol, clonidine, and Norvasc


BP has improved. 


 (Migdalia Camejo)


Problem List:  


(1) Renal insufficiency


ICD Codes:  N28.9 - Disorder of kidney and ureter, unspecified


Plan:  She has underlying diabetic nephropathy, stage 4


Recent NSTEMI with cardiac cath,  small amount of IV contrast given on 1/22


Dialyzed twice, on 1/26 and 1/27. 


Renal function  has improved 


Excellent urine output


She can be discharged from our perspective


Given slip for lab work, we will follow in the next several weeks


Okay to remove Vascath


Advised to have adequate water intake, avoid nephrotoxic agents 





(2) NSTEMI (non-ST elevated myocardial infarction)


ICD Codes:  I21.4 - Non-ST elevation (NSTEMI) myocardial infarction


Plan:  Cardiology following, [facility] postponed, now to continue with medical 

management


On Plavix, NTG paste, Imdur





(3) DM (diabetes mellitus)


ICD Codes:  E11.9 - Type 2 diabetes mellitus without complications


Plan:  Insulin as needed, maintain glucose 140-180mg/dL. 





(4) Anemia


ICD Codes:  D64.9 - Anemia, unspecified


Status:  Chronic


Plan:  no evidence of iron deficiency, reduce ferrous sulfate to once daily. 


She was transfused this admission.





(5) CAD (coronary artery disease)


ICD Codes:  I25.10 - Atherosclerotic heart disease of native coronary artery 

without angina pectoris


Status:  Chronic


Plan:  Hx of stent in the past. New NSTEMI, see above 





(6) PNA (pneumonia)


ICD Codes:  J18.9 - Pneumonia, unspecified organism


Plan:  Improved. Off Zithromax. 





(7) HTN (hypertension)


ICD Codes:  I10 - Hypertension


Status:  Chronic


Plan:  On Imdur, metoprolol, clonidine, and Norvasc


BP has improved. 





Plan


patient was seen and examined. Agree with above assessment and plan. No need 

for dialysis at this time. 


 (Kelton Whiteside MD)











Migdalia Camejo Feb 1, 2018 10:24


Kelton Whiteside MD Feb 1, 2018 14:36

## 2018-02-01 NOTE — HHI.DS
__________________________________________________





Discharge Summary


Admission Date


Kerwin 15, 2018 at 03:32


Discharge Date:  Feb 1, 2018


Admitting Diagnosis





NonSTEMI, CHF





(1) Sepsis


ICD Code:  A41.9 - Sepsis, unspecified organism


(2) PNA (pneumonia)


ICD Code:  J18.9 - Pneumonia, unspecified organism


(3) Hypoxia


ICD Code:  R09.02 - Hypoxemia


(4) NSTEMI (non-ST elevated myocardial infarction)


ICD Code:  I21.4 - Non-ST elevation (NSTEMI) myocardial infarction


(5) CHF (congestive heart failure)


ICD Code:  I50.9 - Heart failure, unspecified


(6) Renal insufficiency


ICD Code:  N28.9 - Disorder of kidney and ureter, unspecified


(7) DM (diabetes mellitus)


ICD Code:  E11.9 - Type 2 diabetes mellitus without complications


(8) GI bleed


ICD Code:  K92.2 - Gastrointestinal hemorrhage, unspecified


Procedures


Panendoscopy 01/19/18


Brief History - From Admission


This is a 52-year-old female with a PMH of HTN, Hyperlipidemia, CHF (Echo 8/18/ 17 w/ EF 55-60%), DM and CAD who presented to the ER w/ complaints of chest 

pain and SOB starting last night.  Reports left-sided chest pain, constant, 

severe, pressure-like, 9/10, non-radiating w/ associated SOB.  Denies fever, 

chills or sick contacts.  On arrival, /81, , O2 sat 83% on RA, Temp 

99.6.  WBC 12.0.  Creatinine 2.76, producing 2.12 on 10/21/17.  Troponin 1.28.  

BNP 2111.  INR 0.9.  CXR with consolidation or atelectasis at bases.  S/p Lasix 

40mg IV, Morphine, ASA and started on Heparin gtt.


CBC/BMP:  


1/30/18 0514                                                                   

             2/1/18 0545





Significant Findings





Laboratory Tests








Test


  1/30/18


05:14 1/31/18


05:32 2/1/18


05:45


 


Red Blood Count


  2.93 MIL/MM3


(4.00-5.30) 


  


 


 


Hemoglobin


  9.5 GM/DL


(11.6-15.3) 


  


 


 


Hematocrit


  28.0 %


(35.0-46.0) 


  


 


 


Blood Urea Nitrogen


  35 MG/DL


(7-18) 43 MG/DL


(7-18) 49 MG/DL


(7-18)


 


Creatinine


  3.61 MG/DL


(0.50-1.00) 3.61 MG/DL


(0.50-1.00) 3.52 MG/DL


(0.50-1.00)


 


Random Glucose


  70 MG/DL


() 145 MG/DL


() 126 MG/DL


()


 


Albumin


  2.3 GM/DL


(3.4-5.0) 2.4 GM/DL


(3.4-5.0) 2.3 GM/DL


(3.4-5.0)


 


Estimat Glomerular Filtration


Rate 16 ML/MIN


(>89) 16 ML/MIN


(>89) 17 ML/MIN


(>89)


 


Calcium Level


  


  8.2 MG/DL


(8.5-10.1) 8.0 MG/DL


(8.5-10.1)


 


Phosphorus Level


  


  


  5.1 MG/DL


(2.5-4.9)








Imaging





Last Impressions








Chest X-Ray 1/27/18 0000 Signed





Impressions: 





 Service Date/Time:  Saturday, January 27, 2018 15:09 - CONCLUSION:  1. Right 

IJ 





 dual-lumen central venous catheter in place with tip at the cavoatrial 

junction. 





 No evidence of pneumothorax 2. Mild pulmonary opacity likely representing 





 pulmonary vascular congestion. 3. Small bilateral pleural effusions.     

Bhaskar Thakur MD 


 


Catheter Placement X-Ray 1/26/18 0900 Signed





Impressions: 





 Service Date/Time:  Friday, January 26, 2018 11:12 - CONCLUSION: Uncomplicated 





 line placement as above.     Ren Stubbs MD 


 


Small Bowel X-Ray 1/19/18 0000 Signed





Impressions: 





 Service Date/Time:  Friday, January 19, 2018 10:44 - CONCLUSION: Unremarkable 





 small bowel examination.     Boo Ricardo MD 








PE at Discharge


GENERAL: Very pleasant 52-year-old female , well-nourished well-

developed, appears in NAD


CARDIOVASCULAR: Regular rate and rhythm without murmurs, gallops, or rubs.  

Chest pain reproducible with palpation of the chest wall but improved.  Right-

sided Vas-Cath in place


RESPIRATORY: Breath sounds equal bilaterally. No accessory muscle use.


GASTROINTESTINAL: Abdomen soft, non-tender, nondistended. 


MUSCULOSKELETAL: No cyanosis, or edema.





Hospital Course


52-year-old female with presented with GI bleed and chest pain





Acute on chronic Stage IV CKD


   Management per nephrology


   Avoid nephrotoxins.  Creatinine continues to worsen.  Creatinine went to to 

4.47 to 5.32 to 4.08 today .  Decreased urine output.


   Status post Vas-Cath done today on 1/26.  Patient received dialysis on 1/26 

and on 1/27.  


   Per nephrologist most likely patient has reached end-stage renal disease.


   Neurologist discussed options moving forward related.  Peritoneal dialysis 

versus hemodialysis.  Patient stated that she will consider options. However 

patient kidney function imprpved and no need for HD. Patient can' have further 

cardiac interventions as might put her in ESRD requiring HD. Patient is opting 

for medical management. She will follow up as OP with nephrology and with 

cardiology.


   Continue strict ins and outs 


    trend creatinine.


   On Renvela for hyperphosphatemia. 


   Avoid any nephrotoxic agents, stop non essential medications. 





NSTEMI:  


   Post cardiac catheterization on 1/22 showing severe two-vessel disease of 

the right coronary artery in the mid LAD.  


   Recommended high risk PCI versus cardiac bypass versus medical management. 

PCI is  held until cleared by nephrology .





Hemoptysis on 1/27


   Patient has not had any episodes since yesterday.  She is also asymptomatic.


   Pulmonologist consulted and following.





GI bleed-resolved


Anemia of acute blood loss


   Status post transfused with 2 units of packed red blood cells.


   Appreciate input from GI, status post panendoscopy with finding of gastritis 

EGD and colon polyps on colonoscopy pending biopsy report


   No Evidence of iron deficiency anemia


   Unremarkable small bowel follow-through


   PPI and continue to monitor H&H


   GI signed off





Sepsis


   Resolved, continue with current antibiotics including Rocephin and 

azithromycin





PNA


   Rocephin and azithromycin discontinued on 1/22 and continues to do well off 

of antibiotics.


   DuoNeb when necessary


   Maintain oxygen saturation above 92%





Hypoxia-resolved


   Continue with treatment as in above





Acute on Chronic diastolic CHF


   Echo 8/18/17 w/ EF 55-60%


   Currently off Lasix secondary to worsening renal function


   Continue with Imdur/BB





Atypical chest pain


   Chest pain occurs only when patient tried to get up.  The same chest pain is 

reproducible with palpation of her chest wall.  Pain is improving.  On pain 

medication when necessary.  Avoid NSAIDs due to acute renal failure.  Due to 

acute renal failure.





Lightheadedness


   Orthostatics is normal.








Hypertension


   Continue with Lopressor, Procardia, clonidine 2 times a day





DM:  


   Sliding scale w/ Accu-Cheks. stable. 





Tobacco Abuse: 


   Strongly recommended to stop smoking, no NicoDerm to avoid vasoconstriction.

  Ativan prn if needed








Patient's  kidney function improved and no need for HD. Patient can' have 

further cardiac interventions as might put her in ESRD requiring HD. Patient is 

opting for medical management. She will follow up as OP with nephrology and 

with cardiology. Was discharged in stable condition to follow up as OP with pCP 

and consultants


Pt Condition on Discharge:  Stable


Discharge Disposition:  Disch w/ Home Health Serv


Discharge Time:  > 30 minutes


Discharge Instructions


DIET: Follow Instructions for:  Renal Failure Diet


Activities you can perform:  Regular-No Restrictions


Follow up Referrals:  


Cardiology with Edgardo Mercado MD


Nephrology - 1 Week with Kelton Whiteside MD


PCP Follow-up





New Medications:  


Misc. Devices (Roller Walker) 1 Mis Mis


EA .XX NOW, #1 0 Refills





Amlodipine (Norvasc) 10 Mg Tab


10 MG PO DAILY for Blood Pressure Management, #30 TAB





Clopidogrel (Plavix) 75 Mg Tab


75 MG PO DAILY for Blood Clot Prevention, #30 TAB





Hydralazine HCl (Hydralazine HCl) 50 Mg Tablet


50 MG PO Q8HR for Blood Pressure Management, #90 MG





Isosorbide Mononitrate ER (Isosorbide Mononitrate ER) 60 Mg Tab


120 MG PO DAILY@07 for Blood Pressure Management, #30 TAB





Metoprolol Tartrate (Lopressor) 100 Mg Tab


100 MG PO Q12HR for Blood Pressure Management, #60 TAB





Nitroglycerin SL (Nitrostat SL) 0.4 Mg Subl


0.4 MG SL Q5M PRN for SEE LABEL COMMENTS, #30 TAB





Pravastatin (Pravachol) 40 Mg Tab


40 MG PO HS for Cholesterol Management, #30 TAB





 


Continued Medications:  


Aspirin (Aspirin Children's) 81 Mg Chew


81 MG CHEW DAILY, TAB 0 Refills





Enalapril (Vasotec) 20 Mg Tab


20 MG PO BID, #30 TAB 0 Refills





Ferrous Sulfate (Feosol) 200 Mg Tab


325 MG PO BID for Nutritional Supplement, #60 TAB 0 Refills





Fish Oil-Cholecalciferol (Fish Oil + D3) 1,200-1,000 Mg-Unit Cap


1 CAP PO DAILY for Nutritional Supplement, #30 CAP 0 Refills





Folic Acid (Folic Acid) 400 Mcg Tab


1 MG PO DAILY for Nutritional Supplement, TAB 0 Refills





Gabapentin (Gabapentin) 100 Mg Cap


300 MG PO HS, #90 CAP 0 Refills





Insulin Glargine Inj (Lantus Inj) 1,000 Unit/10 Ml Vial


15 UNITS SQ HS for Blood Sugar Management, VIAL 0 Refills





Ondansetron (Zofran) 4 Mg Tab


4 MG PO Q6HR PRN for NAUSEA OR VOMITING, #15 TAB 0 Refills





Simethicone (Gas Relief Maximum Streng) 125 Mg Chw


125 MG PO Q8HR for gas pain, #60 EA 0 Refills





 


Discontinued Medications:  


Metoprolol Tartrate (Lopressor) 50 Mg Tab


50 MG PO Q12HR for htn, #60 TAB 0 Refills





Nifedipine (Nifedipine) 20 Mg Cap


30 MG PO TID for Chest Pain, #90 CAP 0 Refills





Simvastatin (Simvastatin) 20 Mg Tab


20 MG PO HS for Cholesterol Management, #30 TAB 0 Refills

















Juliette Hodges MD Feb 1, 2018 15:36

## 2018-02-01 NOTE — HHI.FF
Face to Face Verification


Diagnosis:  


(1) CAD (coronary artery disease)


(2) HTN (hypertension)


(3) Renal insufficiency


(4) NSTEMI (non-ST elevated myocardial infarction)


(5) Acute kidney injury superimposed on CKD


Physical Therapy


Order:  Evaluate and Treat





Home Health Nursing








Order: Medical education





 Signs/symptoms of disease process





 Medication education-adverse effect





 Nursing assessment with vital signs

















I have seen patient Eleazar Walker on 2/1/18. My clinical findings 

support the need for the requested home health care services because:








 Ltd mobility - disease progression





 Patient has SOB














I certify that my clinical findings support that this patient is homebound 

because:








 Post-op weakness





 Unsteady gait/balance

















Juliette Hodges MD Feb 1, 2018 15:43

## 2018-02-02 NOTE — RSPPFT
DATE OF PROCEDURE:  1/29/18



COMMENTS:   



Spirometry shows FVC of 1.5 at 46% of predicted, FEV1 of 0.8 at 29%, FEV1/FVC ratio is 
decreased. Flow is decreased at FEF 25, FEF 50, FEF 75 and FEF 25-75. There is no response 
after bronchodilator treatment. Flow volume loop indicates an obstructive pattern.  



IMPRESSION:    

   

1.    Severe obstructive lung disease.

2.    No response after bronchodilator treatment.

## 2018-02-04 ENCOUNTER — HOSPITAL ENCOUNTER (INPATIENT)
Dept: HOSPITAL 17 - NEPC | Age: 53
LOS: 19 days | Discharge: HOME | DRG: 248 | End: 2018-02-23
Attending: HOSPITALIST | Admitting: HOSPITALIST
Payer: COMMERCIAL

## 2018-02-04 VITALS — OXYGEN SATURATION: 92 %

## 2018-02-04 VITALS — HEART RATE: 96 BPM

## 2018-02-04 VITALS — BODY MASS INDEX: 23.34 KG/M2 | WEIGHT: 136.69 LBS | HEIGHT: 64 IN

## 2018-02-04 VITALS
DIASTOLIC BLOOD PRESSURE: 76 MMHG | RESPIRATION RATE: 19 BRPM | SYSTOLIC BLOOD PRESSURE: 177 MMHG | HEART RATE: 96 BPM | TEMPERATURE: 100.6 F | OXYGEN SATURATION: 100 %

## 2018-02-04 VITALS
HEART RATE: 91 BPM | RESPIRATION RATE: 16 BRPM | OXYGEN SATURATION: 98 % | TEMPERATURE: 99.5 F | DIASTOLIC BLOOD PRESSURE: 88 MMHG | SYSTOLIC BLOOD PRESSURE: 175 MMHG

## 2018-02-04 VITALS
DIASTOLIC BLOOD PRESSURE: 91 MMHG | SYSTOLIC BLOOD PRESSURE: 204 MMHG | RESPIRATION RATE: 18 BRPM | OXYGEN SATURATION: 100 % | HEART RATE: 97 BPM

## 2018-02-04 VITALS
HEART RATE: 89 BPM | DIASTOLIC BLOOD PRESSURE: 87 MMHG | SYSTOLIC BLOOD PRESSURE: 172 MMHG | OXYGEN SATURATION: 96 % | RESPIRATION RATE: 16 BRPM

## 2018-02-04 VITALS
RESPIRATION RATE: 24 BRPM | TEMPERATURE: 98.1 F | DIASTOLIC BLOOD PRESSURE: 88 MMHG | HEART RATE: 93 BPM | SYSTOLIC BLOOD PRESSURE: 194 MMHG | OXYGEN SATURATION: 90 %

## 2018-02-04 VITALS
RESPIRATION RATE: 18 BRPM | SYSTOLIC BLOOD PRESSURE: 198 MMHG | OXYGEN SATURATION: 97 % | HEART RATE: 88 BPM | DIASTOLIC BLOOD PRESSURE: 88 MMHG

## 2018-02-04 VITALS — HEART RATE: 99 BPM

## 2018-02-04 VITALS — HEART RATE: 94 BPM

## 2018-02-04 VITALS — HEART RATE: 90 BPM

## 2018-02-04 DIAGNOSIS — N17.0: ICD-10-CM

## 2018-02-04 DIAGNOSIS — D63.1: ICD-10-CM

## 2018-02-04 DIAGNOSIS — M19.90: ICD-10-CM

## 2018-02-04 DIAGNOSIS — K21.9: ICD-10-CM

## 2018-02-04 DIAGNOSIS — D62: ICD-10-CM

## 2018-02-04 DIAGNOSIS — E11.40: ICD-10-CM

## 2018-02-04 DIAGNOSIS — I25.110: ICD-10-CM

## 2018-02-04 DIAGNOSIS — T82.855A: ICD-10-CM

## 2018-02-04 DIAGNOSIS — I25.2: ICD-10-CM

## 2018-02-04 DIAGNOSIS — Z79.4: ICD-10-CM

## 2018-02-04 DIAGNOSIS — F10.11: ICD-10-CM
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
## 2018-02-04 DIAGNOSIS — E78.5: ICD-10-CM

## 2018-02-04 DIAGNOSIS — I13.2: Primary | ICD-10-CM

## 2018-02-04 DIAGNOSIS — Z79.899: ICD-10-CM

## 2018-02-04 DIAGNOSIS — Z95.5: ICD-10-CM

## 2018-02-04 DIAGNOSIS — I50.33: ICD-10-CM

## 2018-02-04 DIAGNOSIS — E11.21: ICD-10-CM

## 2018-02-04 DIAGNOSIS — F17.210: ICD-10-CM

## 2018-02-04 DIAGNOSIS — E61.1: ICD-10-CM

## 2018-02-04 DIAGNOSIS — N18.6: ICD-10-CM

## 2018-02-04 DIAGNOSIS — E11.22: ICD-10-CM

## 2018-02-04 DIAGNOSIS — I25.5: ICD-10-CM

## 2018-02-04 DIAGNOSIS — D25.9: ICD-10-CM

## 2018-02-04 LAB
ALBUMIN SERPL-MCNC: 3.2 GM/DL (ref 3.4–5)
ALP SERPL-CCNC: 267 U/L (ref 45–117)
ALT SERPL-CCNC: 168 U/L (ref 10–53)
AST SERPL-CCNC: 175 U/L (ref 15–37)
BASOPHILS # BLD AUTO: 0.1 TH/MM3 (ref 0–0.2)
BASOPHILS NFR BLD: 1.2 % (ref 0–2)
BILIRUB SERPL-MCNC: 0.6 MG/DL (ref 0.2–1)
BUN SERPL-MCNC: 54 MG/DL (ref 7–18)
CALCIUM SERPL-MCNC: 9 MG/DL (ref 8.5–10.1)
CHLORIDE SERPL-SCNC: 107 MEQ/L (ref 98–107)
CREAT SERPL-MCNC: 3.75 MG/DL (ref 0.5–1)
EOSINOPHIL # BLD: 0 TH/MM3 (ref 0–0.4)
EOSINOPHIL NFR BLD: 0.4 % (ref 0–4)
ERYTHROCYTE [DISTWIDTH] IN BLOOD BY AUTOMATED COUNT: 15.1 % (ref 11.6–17.2)
GFR SERPLBLD BASED ON 1.73 SQ M-ARVRAT: 15 ML/MIN (ref 89–?)
GLUCOSE SERPL-MCNC: 85 MG/DL (ref 74–106)
HCO3 BLD-SCNC: 20.5 MEQ/L (ref 21–32)
HCT VFR BLD CALC: 28.9 % (ref 35–46)
HGB BLD-MCNC: 9.7 GM/DL (ref 11.6–15.3)
INR PPP: 1 RATIO
LYMPHOCYTES # BLD AUTO: 0.8 TH/MM3 (ref 1–4.8)
LYMPHOCYTES NFR BLD AUTO: 6.9 % (ref 9–44)
MAGNESIUM SERPL-MCNC: 3 MG/DL (ref 1.5–2.5)
MCH RBC QN AUTO: 32.4 PG (ref 27–34)
MCHC RBC AUTO-ENTMCNC: 33.6 % (ref 32–36)
MCV RBC AUTO: 96.6 FL (ref 80–100)
MONOCYTE #: 0.8 TH/MM3 (ref 0–0.9)
MONOCYTES NFR BLD: 7.2 % (ref 0–8)
NEUTROPHILS # BLD AUTO: 9.7 TH/MM3 (ref 1.8–7.7)
NEUTROPHILS NFR BLD AUTO: 84.3 % (ref 16–70)
PLATELET # BLD: 394 TH/MM3 (ref 150–450)
PMV BLD AUTO: 7.9 FL (ref 7–11)
PROT SERPL-MCNC: 7.8 GM/DL (ref 6.4–8.2)
PROTHROMBIN TIME: 10.1 SEC (ref 9.8–11.6)
RBC # BLD AUTO: 2.99 MIL/MM3 (ref 4–5.3)
SODIUM SERPL-SCNC: 137 MEQ/L (ref 136–145)
TROPONIN I SERPL-MCNC: (no result) NG/ML (ref 0.02–0.05)
WBC # BLD AUTO: 11.5 TH/MM3 (ref 4–11)

## 2018-02-04 PROCEDURE — 93970 EXTREMITY STUDY: CPT

## 2018-02-04 PROCEDURE — C1725 CATH, TRANSLUMIN NON-LASER: HCPCS

## 2018-02-04 PROCEDURE — 92929: CPT

## 2018-02-04 PROCEDURE — 93005 ELECTROCARDIOGRAM TRACING: CPT

## 2018-02-04 PROCEDURE — 83540 ASSAY OF IRON: CPT

## 2018-02-04 PROCEDURE — C1887 CATHETER, GUIDING: HCPCS

## 2018-02-04 PROCEDURE — C1769 GUIDE WIRE: HCPCS

## 2018-02-04 PROCEDURE — 51702 INSERT TEMP BLADDER CATH: CPT

## 2018-02-04 PROCEDURE — C1876 STENT, NON-COA/NON-COV W/DEL: HCPCS

## 2018-02-04 PROCEDURE — 80061 LIPID PANEL: CPT

## 2018-02-04 PROCEDURE — 87040 BLOOD CULTURE FOR BACTERIA: CPT

## 2018-02-04 PROCEDURE — 82948 REAGENT STRIP/BLOOD GLUCOSE: CPT

## 2018-02-04 PROCEDURE — C1760 CLOSURE DEV, VASC: HCPCS

## 2018-02-04 PROCEDURE — 96375 TX/PRO/DX INJ NEW DRUG ADDON: CPT

## 2018-02-04 PROCEDURE — 80053 COMPREHEN METABOLIC PANEL: CPT

## 2018-02-04 PROCEDURE — 36430 TRANSFUSION BLD/BLD COMPNT: CPT

## 2018-02-04 PROCEDURE — 96376 TX/PRO/DX INJ SAME DRUG ADON: CPT

## 2018-02-04 PROCEDURE — 93998 UNLISTD NONINVAS VASC DX STD: CPT

## 2018-02-04 PROCEDURE — C1750 CATH, HEMODIALYSIS,LONG-TERM: HCPCS

## 2018-02-04 PROCEDURE — 83550 IRON BINDING TEST: CPT

## 2018-02-04 PROCEDURE — 85610 PROTHROMBIN TIME: CPT

## 2018-02-04 PROCEDURE — C1893 INTRO/SHEATH, FIXED,NON-PEEL: HCPCS

## 2018-02-04 PROCEDURE — 80048 BASIC METABOLIC PNL TOTAL CA: CPT

## 2018-02-04 PROCEDURE — 36558 INSERT TUNNELED CV CATH: CPT

## 2018-02-04 PROCEDURE — 85730 THROMBOPLASTIN TIME PARTIAL: CPT

## 2018-02-04 PROCEDURE — G0269 OCCLUSIVE DEVICE IN VEIN ART: HCPCS

## 2018-02-04 PROCEDURE — 93458 L HRT ARTERY/VENTRICLE ANGIO: CPT

## 2018-02-04 PROCEDURE — 77001 FLUOROGUIDE FOR VEIN DEVICE: CPT

## 2018-02-04 PROCEDURE — 83880 ASSAY OF NATRIURETIC PEPTIDE: CPT

## 2018-02-04 PROCEDURE — 96374 THER/PROPH/DIAG INJ IV PUSH: CPT

## 2018-02-04 PROCEDURE — 85014 HEMATOCRIT: CPT

## 2018-02-04 PROCEDURE — 76937 US GUIDE VASCULAR ACCESS: CPT

## 2018-02-04 PROCEDURE — 81001 URINALYSIS AUTO W/SCOPE: CPT

## 2018-02-04 PROCEDURE — 99152 MOD SED SAME PHYS/QHP 5/>YRS: CPT

## 2018-02-04 PROCEDURE — 82550 ASSAY OF CK (CPK): CPT

## 2018-02-04 PROCEDURE — 86901 BLOOD TYPING SEROLOGIC RH(D): CPT

## 2018-02-04 PROCEDURE — 90935 HEMODIALYSIS ONE EVALUATION: CPT

## 2018-02-04 PROCEDURE — 80074 ACUTE HEPATITIS PANEL: CPT

## 2018-02-04 PROCEDURE — 85018 HEMOGLOBIN: CPT

## 2018-02-04 PROCEDURE — 86850 RBC ANTIBODY SCREEN: CPT

## 2018-02-04 PROCEDURE — 82552 ASSAY OF CPK IN BLOOD: CPT

## 2018-02-04 PROCEDURE — 84100 ASSAY OF PHOSPHORUS: CPT

## 2018-02-04 PROCEDURE — 71045 X-RAY EXAM CHEST 1 VIEW: CPT

## 2018-02-04 PROCEDURE — 83735 ASSAY OF MAGNESIUM: CPT

## 2018-02-04 PROCEDURE — 80069 RENAL FUNCTION PANEL: CPT

## 2018-02-04 PROCEDURE — 86920 COMPATIBILITY TEST SPIN: CPT

## 2018-02-04 PROCEDURE — 36556 INSERT NON-TUNNEL CV CATH: CPT

## 2018-02-04 PROCEDURE — 94664 DEMO&/EVAL PT USE INHALER: CPT

## 2018-02-04 PROCEDURE — 85025 COMPLETE CBC W/AUTO DIFF WBC: CPT

## 2018-02-04 PROCEDURE — P9016 RBC LEUKOCYTES REDUCED: HCPCS

## 2018-02-04 PROCEDURE — 85027 COMPLETE CBC AUTOMATED: CPT

## 2018-02-04 PROCEDURE — 86900 BLOOD TYPING SEROLOGIC ABO: CPT

## 2018-02-04 PROCEDURE — 99153 MOD SED SAME PHYS/QHP EA: CPT

## 2018-02-04 PROCEDURE — 84484 ASSAY OF TROPONIN QUANT: CPT

## 2018-02-04 PROCEDURE — 74176 CT ABD & PELVIS W/O CONTRAST: CPT

## 2018-02-04 PROCEDURE — 83605 ASSAY OF LACTIC ACID: CPT

## 2018-02-04 PROCEDURE — 82043 UR ALBUMIN QUANTITATIVE: CPT

## 2018-02-04 PROCEDURE — 92928 PRQ TCAT PLMT NTRAC ST 1 LES: CPT

## 2018-02-04 RX ADMIN — ACYCLOVIR SCH UNITS: 800 TABLET ORAL at 21:24

## 2018-02-04 RX ADMIN — INSULIN ASPART SCH: 100 INJECTION, SOLUTION INTRAVENOUS; SUBCUTANEOUS at 17:00

## 2018-02-04 RX ADMIN — INSULIN ASPART SCH: 100 INJECTION, SOLUTION INTRAVENOUS; SUBCUTANEOUS at 21:00

## 2018-02-04 RX ADMIN — STANDARDIZED SENNA CONCENTRATE AND DOCUSATE SODIUM SCH TAB: 8.6; 5 TABLET, FILM COATED ORAL at 21:23

## 2018-02-04 RX ADMIN — SIMETHICONE SCH MG: 125 TABLET, CHEWABLE ORAL at 14:17

## 2018-02-04 RX ADMIN — SIMETHICONE SCH MG: 125 TABLET, CHEWABLE ORAL at 21:24

## 2018-02-04 RX ADMIN — FERROUS SULFATE TAB 325 MG (65 MG ELEMENTAL FE) SCH MG: 325 (65 FE) TAB at 21:23

## 2018-02-04 RX ADMIN — GABAPENTIN SCH MG: 300 CAPSULE ORAL at 21:23

## 2018-02-04 RX ADMIN — METOPROLOL TARTRATE SCH MG: 100 TABLET, FILM COATED ORAL at 21:23

## 2018-02-04 RX ADMIN — HEPARIN SODIUM SCH UNITS: 10000 INJECTION, SOLUTION INTRAVENOUS; SUBCUTANEOUS at 21:24

## 2018-02-04 RX ADMIN — ACETAMINOPHEN PRN MG: 325 TABLET ORAL at 15:33

## 2018-02-04 RX ADMIN — PRAVASTATIN SODIUM SCH MG: 40 TABLET ORAL at 21:23

## 2018-02-04 RX ADMIN — FUROSEMIDE SCH MG: 10 INJECTION, SOLUTION INTRAMUSCULAR; INTRAVENOUS at 17:46

## 2018-02-04 RX ADMIN — Medication SCH ML: at 21:23

## 2018-02-04 NOTE — PD
HPI


Chief Complaint:  Respiratory Symptoms


Time Seen by Provider:  08:10


Travel History


International Travel<30 days:  No


Contact w/Intl Traveler<30days:  No


Traveled to known affect area:  No





History of Present Illness


HPI


52-year-old female presents with shortness of breath since she was discharged 

Thursday.  She states she is also having intermittent chest tightness.  She did 

not take aspirin today she takes Plavix.  She states she cannot lay flat and is 

having difficulty sleeping.  She denies any other concurrent complaints.  Her 

family member helps answer questions for her.  She denies other specific 

modifying factors other than movement.  Quality is hard to catch breath.  

Severity is progressive.  Duration is since Thursday. dr riggins is her 

cardiologist





[facility]


Past Medical History


Hx Anticoagulant Therapy:  No


Arthritis:  Yes


Blood Disorders:  No


Anxiety:  No


Depression:  No


Cancer:  No


Cardiac Catheterization:  Yes


Cardiovascular Problems:  Yes


High Cholesterol:  Yes


Chemotherapy:  No


Chest Pain:  Yes


Congestive Heart Failure:  No


Cerebrovascular Accident:  No


Coronary Artery Disease:  Yes


Diabetes:  Yes


Patient Takes Glucophage:  No


Diminished Hearing:  No


Endocrine:  Yes


Gastrointestinal Disorders:  Yes (HX PANCREATITIS)


GERD:  Yes


Genitourinary:  No


Hypertension:  Yes


Immune Disorder:  No


Implanted Vascular Access Dvce:  Yes


Kidney Stones:  No


Musculoskeletal:  No


Neurologic:  No


Psychiatric:  No


Reproductive:  No


Respiratory:  Yes (pna)


Migraines:  Yes


Pancreatitis:  Yes


Pneumonia:  Yes


Renal Failure:  No


Thyroid Disease:  No


Pregnant?:  Not Pregnant


Menopausal:  Yes


:  2


Para:  3


Miscarriage:  0


:  0


Tubal Ligation:  Yes





Past Surgical History


Abdominal Surgery:  No


Cardiac Surgery:  Yes (stent)


Cholecystectomy:  Yes


Coronary Stent:  Yes


Ear Surgery:  No


Endocrine Surgery:  No


Eye Surgery:  No


Genitourinary Surgery:  Yes (gallblaller)


Gynecologic Surgery:  No


Hysterectomy:  No


Neurologic Surgery:  No


Oral Surgery:  Yes (abcessed toolth with drain 4 years ago)


Pacemaker:  No


Thoracic Surgery:  No


Other Surgery:  Yes





Social History


Alcohol Use:  No (use to abuse; quit 3 years ago. )


Tobacco Use:  Yes (0.5 ppd)


Substance Use:  Yes (weed occasionally)





Allergies-Medications


(Allergen,Severity, Reaction):  


Coded Allergies:  


     shellfish derived (Unverified  Adverse Reaction, Mild, VOMITNG, 1/15/18)


Reported Meds & Prescriptions





Reported Meds & Active Scripts


Active


Roller Walker (Misc. Devices) 1 Mis Mis Ea .XX NOW


Norvasc (Amlodipine Besylate) 10 Mg Tab 10 Mg PO DAILY


Lopressor (Metoprolol Tartrate) 100 Mg Tab 100 Mg PO Q12HR


Nitrostat SL (Nitroglycerin) 0.4 Mg Subl 0.4 Mg SL Q5M PRN


Isosorbide Mononitrate ER (Isosorbide Mononitrate) 60 Mg Tab 120 Mg PO DAILY@07


Hydralazine HCl 50 Mg Tablet 50 Mg PO Q8HR


Pravachol (Pravastatin) 40 Mg Tab 40 Mg PO HS


Plavix (Clopidogrel Bisulfate) 75 Mg Tab 75 Mg PO DAILY


Zofran (Ondansetron HCl) 4 Mg Tab 4 Mg PO Q6HR PRN


Gas Relief Maximum Streng (Simethicone) 125 Mg Chw 125 Mg PO Q8HR


Reported


Fish Oil + D3 (Fish Oil-Cholecalciferol) 1,200-1,000 Mg-Unit Cap 1 Cap PO DAILY


Lantus Inj (Insulin Glargine) 1,000 Unit/10 Ml Vial 15 Units SQ HS


Gabapentin 100 Mg Cap 300 Mg PO HS


Folic Acid 400 Mcg Tab 1 Mg PO DAILY


Feosol (Ferrous Sulfate) 200 Mg Tab 325 Mg PO BID


Vasotec (Enalapril Maleate) 20 Mg Tab 20 Mg PO BID


Aspirin Children's (Aspirin) 81 Mg Chew 81 Mg CHEW DAILY








Review of Systems


Except as stated in HPI:  all other systems reviewed are Neg





Physical Exam


Narrative


GENERAL: 51 y/o female who appears short of breath, leaning forward in bed


SKIN: Focused skin assessment warm/dry.


HEAD: Atraumatic. Normocephalic. 


EYES: Pupils equal and round. No scleral icterus. No injection or drainage. 


ENT: No nasal bleeding or discharge.  Mucous membranes pink and moist.


NECK: Trachea midline. No JVD. 


CARDIOVASCULAR: Regular rate and rhythm.  No murmur appreciated.


RESPIRATORY: No accessory muscle use.  Crackles.  Decreased breath sounds at 

bases bilaterally. 


GASTROINTESTINAL: Abdomen soft, non-tender, nondistended. Hepatic and splenic 

margins not palpable. 


MUSCULOSKELETAL: No obvious deformities. No clubbing.  No cyanosis.  


NEUROLOGICAL: Awake and alert. No obvious cranial nerve deficits.  Moves all 

extremities. Normal speech.


PSYCHIATRIC: Appropriate mood and affect; insight and judgment normal.





Data


Data


Last Documented VS





Vital Signs








  Date Time  Temp Pulse Resp B/P (MAP) Pulse Ox O2 Delivery O2 Flow Rate FiO2


 


18 11:17  97 18 204/91 (128) 100 Room Air  


 


18 10:02       2.00 


 


18 08:01 98.1       








Orders





 Orders


Electrocardiogram (18 08:10)


B-Type Natriuretic Peptide (18 08:10)


Ckmb (Isoenzyme) Profile (18 08:10)


Complete Blood Count With Diff (18 08:10)


Comprehensive Metabolic Panel (18 08:10)


Magnesium (Mg) (18 08:10)


Prothrombin Time / Inr (Pt) (18 08:10)


Act Partial Throm Time (Ptt) (18 08:10)


Troponin I (18 08:10)


Chest, Single Ap (18 08:10)


Ecg Monitoring (18 08:10)


Bilateral Bp Monitoring (18 08:10)


Iv Access Insert/Monitor (18 08:10)


Oximetry (18 08:10)


Oxygen Administration (18 08:10)


Sodium Chloride 0.9% Flush (Ns Flush) (18 08:15)


CKMB (18 08:34)


CKMB% (18 08:34)


Lactic Acid (18 09:19)


Blood Culture (18 09:19)


Furosemide Inj (Lasix Inj) (18 09:30)


Furosemide Inj (Lasix Inj) (18 11:00)


Urinary Catheter Insert/Apply (18 10:55)


Morphine Inj (Morphine Inj) (18 11:15)


Admit Order (Ed Use Only) (18 11:23)





Labs





Laboratory Tests








Test


  18


08:34 18


09:29 18


09:45


 


White Blood Count 11.5 TH/MM3   


 


Red Blood Count 2.99 MIL/MM3   


 


Hemoglobin 9.7 GM/DL   


 


Hematocrit 28.9 %   


 


Mean Corpuscular Volume 96.6 FL   


 


Mean Corpuscular Hemoglobin 32.4 PG   


 


Mean Corpuscular Hemoglobin


Concent 33.6 % 


  


  


 


 


Red Cell Distribution Width 15.1 %   


 


Platelet Count 394 TH/MM3   


 


Mean Platelet Volume 7.9 FL   


 


Neutrophils (%) (Auto) 84.3 %   


 


Lymphocytes (%) (Auto) 6.9 %   


 


Monocytes (%) (Auto) 7.2 %   


 


Eosinophils (%) (Auto) 0.4 %   


 


Basophils (%) (Auto) 1.2 %   


 


Neutrophils # (Auto) 9.7 TH/MM3   


 


Lymphocytes # (Auto) 0.8 TH/MM3   


 


Monocytes # (Auto) 0.8 TH/MM3   


 


Eosinophils # (Auto) 0.0 TH/MM3   


 


Basophils # (Auto) 0.1 TH/MM3   


 


CBC Comment DIFF FINAL   


 


Differential Comment    


 


Blood Urea Nitrogen 54 MG/DL   


 


Creatinine 3.75 MG/DL   


 


Random Glucose 85 MG/DL   


 


Total Protein 7.8 GM/DL   


 


Albumin 3.2 GM/DL   


 


Calcium Level 9.0 MG/DL   


 


Magnesium Level 3.0 MG/DL   


 


Alkaline Phosphatase 267 U/L   


 


Aspartate Amino Transf


(AST/SGOT) 175 U/L 


  


  


 


 


Alanine Aminotransferase


(ALT/SGPT) 168 U/L 


  


  


 


 


Total Bilirubin 0.6 MG/DL   


 


Sodium Level 137 MEQ/L   


 


Potassium Level 5.0 MEQ/L   


 


Chloride Level 107 MEQ/L   


 


Carbon Dioxide Level 20.5 MEQ/L   


 


Anion Gap 10 MEQ/L   


 


Estimat Glomerular Filtration


Rate 15 ML/MIN 


  


  


 


 


Total Creatine Kinase 144 U/L   


 


Creatine Kinase MB 1.0 NG/ML   


 


Troponin I


  LESS THAN 0.02


NG/ML 


  


 


 


Lactic Acid Level  0.7 mmol/L  


 


Prothrombin Time   10.1 SEC 


 


Prothromb Time International


Ratio 


  


  1.0 RATIO 


 


 


Activated Partial


Thromboplast Time 


  


  24.8 SEC 


 


 


B-Type Natriuretic Peptide   2447 PG/ML 











MDM


Medical Decision Making


Medical Screen Exam Complete:  Yes


Emergency Medical Condition:  Yes


Medical Record Reviewed:  Yes (Past history confirmed, recent hospitalization 

with temporary dialysis, non-STEMI with medical management, pneumonia with 

antibiotics given in hospital)


Interpretation(s)


EKG limited with wandering baseline but no obvious STEMI


CBC & BMP Diagram


18 08:34








Total Protein 7.8, Albumin 3.2 L, Calcium Level 9.0, Magnesium Level 3.0 H, 

Alkaline Phosphatase 267 H, Aspartate Amino Transf (AST/SGOT) 175 H, Alanine 

Aminotransferase (ALT/SGPT) 168 H, Total Bilirubin 0.6








Last 24 hours Impressions








Chest X-Ray 18 0810 Signed





Impressions: 





 Service Date/Time:  2018 08:50 - CONCLUSION:  Bibasilar 





 consolidation slightly worse in the right lower lobe.     Mp Dang MD 





bnp significantly elevated


Differential Diagnosis


Renal failure, CHF, pleural effusion, pneumonia, anemia


Narrative Course


We will check blood work, chest x-ray, EKG and closely monitor





Discussed with patient I recommended BiPAP.  When RT went to place BiPAP 

patient declined this.  I went to talk with patient and she states she does not 

want to do it now.  We will dose with Lasix and await other testing





Patient has not urinated after first dose of Lasix.  Will repeat and agrees to 

Rhodes catheter.  She will need to be admitted to the hospital for further care 

to help remove the fluid and for symptom management





Physician Communication


Physician Communication


dr arteaga agrees to admit





Diagnosis





 Primary Impression:  


 CHF (congestive heart failure)


 Qualified Codes:  I50.9 - Heart failure, unspecified


 Additional Impressions:  


 Renal insufficiency


 Anemia


 Qualified Codes:  D64.9 - Anemia, unspecified


 Hypoxia





Admitting Information


Admitting Physician Requests:  Admit











Ping Raygoza MD 2018 08:57

## 2018-02-04 NOTE — RADRPT
EXAM DATE/TIME:  02/04/2018 08:50 

 

[facility] COMPARISON:     

CHEST SINGLE AP, January 18, 2018, 10:47.

 

                     

INDICATIONS :     

Chest pain.

                     

 

MEDICAL HISTORY :     

Congestive heart failure.          

 

SURGICAL HISTORY :     

None.   

 

ENCOUNTER:     

Initial                                        

 

ACUITY:     

1 day      

 

PAIN SCORE:     

8/10

 

LOCATION:     

Bilateral chest 

 

FINDINGS:     

A single view of the chest demonstrates bibasilar consolidation greater right lower lobe. Cardiomegal
y with increased pulmonary vascularity. The cardiomediastinal contours are unremarkable.  Osseous str
uctures are intact.

 

CONCLUSION:     

Bibasilar consolidation slightly worse in the right lower lobe.

 

 

 

 Mp Dang MD on February 04, 2018 at 9:10           

Board Certified Radiologist.

 This report was verified electronically.

## 2018-02-04 NOTE — HHI.HP
__________________________________________________





HPI


Service


[facility]


Primary Care Physician


Unknown


Admission Diagnosis





chf exacerbation, chest pain


Diagnoses:  


Chief Complaint:  


sob


Travel History


International Travel<30 Days:  No


Contact w/Intl Traveler <30 Da:  No


Traveled to Known Affected Are:  No


History of Present Illness


 Very pleasant 52-year-old AA female with a PMH of HTN, Hyperlipidemia, CHF (

Echo w/ EF 55-60%), IDDM with diabetic neuropathy,  and CAD, ischemic 

cardiomyopathy who presented to the ER with complaints of sob. 


Patient with  increase shortness of breath, orthopnea, feeling tired lethargic 

and creatinine was 3.7,  she was previously dialyzed for 2 days and then sent 

home after she decided to defer PCI for her heart disease.


She is now back again feeling tired,  with chest tightness and weak and with 

increasing dyspnea. 








Review of Systems


Except as stated in HPI:  all other systems reviewed are Neg





Past Family Social History


Past Medical History


HTN, Hyperlipidemia, CHF (Echo 17 w/ EF 55-60%), IDDM, neuropathy, CKD3,  

HLD, CAD, h/o pancreatitis , EtOH use, uterine fibroids


Past Surgical History


Cholecystectomy


Tubal ligation, bilateral


PTCA with stent placement approximately 8 years ago


Cataract surgery


Colonoscopy 14 with Dr. Granados --> 2 sessile polyps of approximately 4 

mm were found in the sigmoid colon, internal and external hemorrhoids.  

Pathology did not show malignancy. 


EGD performed 3/27/14 by Dr. Hurtado --> Normal esophagus, some nodularity in 

the body and antrum of the stomach, mild gastritis.  Pathology did not show 

malignancy


EUS performed by Dr. Wade Uriostegui 14 --> Pancreatic parenchyma was 

isoechoic and homogeneous, common bile duct was normal.





Reported Medications





Reported Meds & Active Scripts


Active


Roller Walker (Misc. Devices) 1 Mis Mis Ea .XX NOW


Norvasc (Amlodipine Besylate) 10 Mg Tab 10 Mg PO DAILY


Lopressor (Metoprolol Tartrate) 100 Mg Tab 100 Mg PO Q12HR


Nitrostat SL (Nitroglycerin) 0.4 Mg Subl 0.4 Mg SL Q5M PRN


Isosorbide Mononitrate ER (Isosorbide Mononitrate) 60 Mg Tab 120 Mg PO DAILY@07


Hydralazine HCl 50 Mg Tablet 50 Mg PO Q8HR


Pravachol (Pravastatin) 40 Mg Tab 40 Mg PO HS


Plavix (Clopidogrel Bisulfate) 75 Mg Tab 75 Mg PO DAILY


Zofran (Ondansetron HCl) 4 Mg Tab 4 Mg PO Q6HR PRN


Gas Relief Maximum Streng (Simethicone) 125 Mg Chw 125 Mg PO Q8HR


Reported


Fish Oil + D3 (Fish Oil-Cholecalciferol) 1,200-1,000 Mg-Unit Cap 1 Cap PO DAILY


Lantus Inj (Insulin Glargine) 1,000 Unit/10 Ml Vial 15 Units SQ HS


Gabapentin 100 Mg Cap 300 Mg PO HS


Folic Acid 400 Mcg Tab 1 Mg PO DAILY


Feosol (Ferrous Sulfate) 200 Mg Tab 325 Mg PO BID


Vasotec (Enalapril Maleate) 20 Mg Tab 20 Mg PO BID


Aspirin Children's (Aspirin) 81 Mg Chew 81 Mg CHEW DAILY


Allergies:  


Coded Allergies:  


     shellfish derived (Unverified  Adverse Reaction, Mild, VOMITNG, 1/15/18)


Family History


No h/o DM or CAD


Mother had diabetes and was on hemodialysis she is 


Social History


History of alcohol abuse, quit 3 years ago. 


Tobacco use: Half pack per day


Works as a CNA





Physical Exam


Vital Signs





Vital Signs








  Date Time  Temp Pulse Resp B/P (MAP) Pulse Ox O2 Delivery O2 Flow Rate FiO2


 


18 11:17  97 18 204/91 (128) 100 Room Air  


 


18 10:02  79 18  95 Nasal Cannula 2.00 


 


18 08:30     92 Nasal Cannula 2.00 


 


18 08:29     92 Nasal Cannula 2.00 


 


18 08:01 98.1 93 24 194/88 (123) 90   








Physical Exam


GENERAL: This is a well-nourished, well-developed patient, in no apparent 

distress.


SKIN: No rashes, ecchymoses or lesions. Cool and dry.


HEAD: Atraumatic. Normocephalic. No temporal or scalp tenderness.


EYES: Pupils equal round and reactive. Extraocular motions intact. No scleral 

icterus. No injection or drainage. 


ENT: Nose without bleeding, purulent drainage or septal hematoma. Throat 

without erythema, tonsillar hypertrophy or exudate. Uvula midline. Airway 

patent.


NECK: Trachea midline. No JVD or lymphadenopathy. Supple, nontender, no 

meningeal signs.


CARDIOVASCULAR: Regular rate and rhythm without murmurs, gallops, or rubs. 


RESPIRATORY: Clear to auscultation. Breath sounds equal bilaterally. No wheezes

, rales, or rhonchi.  


GASTROINTESTINAL: Abdomen soft, non-tender, nondistended. No hepato-splenomegaly

, or palpable masses. No guarding.


MUSCULOSKELETAL: Extremities without clubbing, cyanosis, or edema. No joint 

tenderness, effusion, or edema noted. No calf tenderness. Negative Homans sign 

bilaterally.


NEUROLOGICAL: Awake and alert. Cranial nerves II through XII intact.  Motor and 

sensory grossly within normal limits. Five out of 5 muscle strength in all 

muscle groups.  Normal speech.


Laboratory





Laboratory Tests








Test


  18


08:34 18


09:29 18


09:45


 


White Blood Count 11.5   


 


Red Blood Count 2.99   


 


Hemoglobin 9.7   


 


Hematocrit 28.9   


 


Mean Corpuscular Volume 96.6   


 


Mean Corpuscular Hemoglobin 32.4   


 


Mean Corpuscular Hemoglobin


Concent 33.6 


  


  


 


 


Red Cell Distribution Width 15.1   


 


Platelet Count 394   


 


Mean Platelet Volume 7.9   


 


Neutrophils (%) (Auto) 84.3   


 


Lymphocytes (%) (Auto) 6.9   


 


Monocytes (%) (Auto) 7.2   


 


Eosinophils (%) (Auto) 0.4   


 


Basophils (%) (Auto) 1.2   


 


Neutrophils # (Auto) 9.7   


 


Lymphocytes # (Auto) 0.8   


 


Monocytes # (Auto) 0.8   


 


Eosinophils # (Auto) 0.0   


 


Basophils # (Auto) 0.1   


 


CBC Comment DIFF FINAL   


 


Differential Comment    


 


Blood Urea Nitrogen 54   


 


Creatinine 3.75   


 


Random Glucose 85   


 


Total Protein 7.8   


 


Albumin 3.2   


 


Calcium Level 9.0   


 


Magnesium Level 3.0   


 


Alkaline Phosphatase 267   


 


Aspartate Amino Transf


(AST/SGOT) 175 


  


  


 


 


Alanine Aminotransferase


(ALT/SGPT) 168 


  


  


 


 


Total Bilirubin 0.6   


 


Sodium Level 137   


 


Potassium Level 5.0   


 


Chloride Level 107   


 


Carbon Dioxide Level 20.5   


 


Anion Gap 10   


 


Estimat Glomerular Filtration


Rate 15 


  


  


 


 


Total Creatine Kinase 144   


 


Creatine Kinase MB 1.0   


 


Troponin I LESS THAN 0.02   


 


Lactic Acid Level  0.7  


 


Prothrombin Time   10.1 


 


Prothromb Time International


Ratio 


  


  1.0 


 


 


Activated Partial


Thromboplast Time 


  


  24.8 


 


 


B-Type Natriuretic Peptide   2447 














 Date/Time


Source Procedure


Growth Status


 


 


 18 09:29


Blood Peripheral Aerobic Blood Culture


Pending Received


 


 18 09:29


Blood Peripheral Anaerobic Blood Culture


Pending Received








Result Diagram:  


18 0834                                                                    

            18 0834





Imaging





Last Impressions








Chest X-Ray 18 0810 Signed





Impressions: 





 Service Date/Time:  2018 08:50 - CONCLUSION:  Bibasilar 





 consolidation slightly worse in the right lower lobe.     Eric F. Tocci, MD Caprini VTE Risk Assessment


Caprini VTE Risk Assessment:  Mod/High Risk (score >= 2)


Caprini Risk Assessment Model











 Point Value = 1          Point Value = 2  Point Value = 3  Point Value = 5


 


Age 41-60


Minor surgery


BMI > 25 kg/m2


Swollen legs


Varicose veins


Pregnancy or postpartum


History of unexplained or recurrent


   spontaneous 


Oral contraceptives or hormone


   replacement


Sepsis (< 1 month)


Serious lung disease, including


   pneumonia (< 1 month)


Abnormal pulmonary function


Acute myocardial infarction


Congestive heart failure (< 1 month)


History of inflammatory bowel disease


Medical patient at bed rest Age 61-74


Arthroscopic surgery


Major open surgery (> 45 min)


Laparoscopic surgery (> 45 min)


Malignancy


Confined to bed (> 72 hours)


Immobilizing plaster cast


Central venous access Age >= 75


History of VTE


Family history of VTE


Factor V Leiden


Prothrombin 35841I


Lupus anticoagulant


Anticardiolipin antibodies


Elevated serum homocysteine


Heparin-induced thrombocytopenia


Other congenital or acquired


   thrombophilia Stroke (< 1 month)


Elective arthroplasty


Hip, pelvis, or leg fracture


Acute spinal cord injury (< 1 month)








Prophylaxis Regimen











   Total Risk


Factor Score Risk Level Prophylaxis Regimen


 


0-1      Low Early ambulation


 


2 Moderate Order ONE of the following:


*Sequential Compression Device (SCD)


*Heparin 5000 units SQ BID


 


3-4 Higher Order ONE of the following medications:


*Heparin 5000 units SQ TID


*Enoxaparin/Lovenox 40 mg SQ daily (WT < 150 kg, CrCl > 30 mL/min)


*Enoxaparin/Lovenox 30 mg SQ daily (WT < 150 kg, CrCl > 10-29 mL/min)


*Enoxaparin/Lovenox 30 mg SQ BID (WT < 150 kg, CrCl > 30 mL/min)


AND/OR


*Sequential Compression Device (SCD)


 


5 or more Highest Order ONE of the following medications:


*Heparin 5000 units SQ TID (Preferred with Epidurals)


*Enoxaparin/Lovenox 40 mg SQ daily (WT < 150 kg, CrCl > 30 mL/min)


*Enoxaparin/Lovenox 30 mg SQ daily (WT < 150 kg, CrCl > 10-29 mL/min)


*Enoxaparin/Lovenox 30 mg SQ BID (WT < 150 kg, CrCl > 30 mL/min)


AND


*Sequential Compression Device (SCD)











Assessment and Plan


Assessment and Plan


52-year-old female presented with sob, lethargy








Acute on Chronic diastolic CHF


Cardiomyopathy 


   Echo with EF 55-60%


   Discussed with Dr García nephrology, start Lasix 40 mg BID


   Continue with Imdur/BB








Acute on chronic Stage IV CKD


   Consult nephrology was seen by Dr Whiteside. Management per nephrology


   Avoid nephrotoxins.  


   Had Vas-Cath  on .  Patient received dialysis on  and on .  


   Per nephrologist most likely patient has reached end-stage renal disease.


   Nephro discussed options moving forward related.  Peritoneal dialysis versus 

hemodialysis.  Patient stated that she will consider options. However patient 

kidney function improved and no need for HD. Patient can' have further cardiac 

interventions as might put her in ESRD requiring HD. 


   Per nephrology patient might need HD


   Continue strict ins and outs 


   Trend creatinine.


   Avoid any nephrotoxic agents, stop non essential medications.  





Recent NSTEMI:  


   Post cardiac catheterization on  showing severe two-vessel disease of 

the right coronary artery in the mid LAD.  


   Recommended high risk PCI versus cardiac bypass versus medical management. 

PCI is  held until cleared by nephrology. Patient is not on HD at this time 

however cant have procedure because kidney function will worsen and will need HD








Hemoptysis on 


   Patient has not had any episodes.  She is also asymptomatic. Was seen by pulm





GI bleed-resolved


Anemia of acute blood loss


   Was  transfused with 2 units of packed red blood cells last admission. H/H 

stable so far continue iron supplement 


   Was seen by GI last admission and had panendoscopy with finding of gastritis 

EGD and colon polyps on colonoscopy pending biopsy report


   Unremarkable small bowel follow-through


   PPI and continue to monitor H&H





Had Sepsis/ PNA Resolved Received IV abx, Rocephin and azithromycin 

discontinued on  





Hypoxia-resolved


   Continue with treatment as in above








Hypertension- Continue homemeds, Lopressor, Procardia, metoprolol, hydralazine, 

hold ACEI/ARBs





IDDM:  Sliding scale w/ Accu-Cheks. Restart home insulin. 





Tobacco Abuse:  Strongly recommended to stop smoking, no NicoDerm to avoid 

vasoconstriction.  





Code Status


Full code


Discussed Condition With


patient, nurse, ED physician Dr Raygoza, Dr García nephrology





Physician Certification


2 Midnight Certification Type:  Admission for Inpatient Services


Order for Inpatient Services


The services are ordered in accordance with Medicare regulations or non-

Medicare payer requirements, as applicable.  In the case of services not 

specified as inpatient-only, they are appropriately provided as inpatient 

services in accordance with the 2-midnight benchmark.


Estimated LOS (days):  3


 days is the estimated time the patient will need to remain in the hospital, 

assuming treatment plan goals are met and no additional complications.


Post-Hospital Plan:  Home











Juliette Hodges MD 2018 11:41

## 2018-02-04 NOTE — EKG
Date Performed: 02/04/2018       Time Performed: 08:18:42

 

PTAGE:      52 years

 

EKG:      Borderline atrial abnormality Somewhat prominent precordial voltage Diffuse nonspecific ST-
T change anterolaterally Since previous tracing, no significant change noted ABNORMAL ECG

 

PREVIOUS TRACING       : 01/24/2018 23.56

 

DOCTOR:   Vik Gary  Interpretating Date/Time  02/04/2018 13:03:04

## 2018-02-04 NOTE — PD.CONS
HPI


Service


Nephrology


Consult Requested By


Dr. Hodges


Reason for Consult


ARF/CKD


Primary Care Physician


Unknown


History of Present Illness


Patient is a 52-year-old  female with history of chronic kidney 

disease, diabetes, hypertension, congestive heart failure, ischemic 

cardiomyopathy.  Presented with the increase shortness of breath, orthopnea, 

feeling tired lethargic and creatinine was 3.7, review of old records showed 

she was previously dialyzed for 2 days and then sent home after she decided to 

defer PCI for her heart disease.


She is now back again feeling tired,  Chest tightness and weak and with 

increasing dyspnea





Review of Systems


Constitutional:  COMPLAINS OF: Fatigue


Respiratory:  COMPLAINS OF: Shortness of breath


Cardiovascular:  COMPLAINS OF: Chest pain, Dyspnea on Exertion, Lower Extremity 

Edema


Psychiatric:  COMPLAINS OF: Anxiety





Past Family Social History


Allergies:  


Coded Allergies:  


     shellfish derived (Unverified  Adverse Reaction, Mild, VOMITNG, 1/15/18)


Past Medical History


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


   diabetic nephropathy


Hypertension, essential


DM II x 22 years


GERD, no esophagitis


CAD, hx of MI in  s/p PTCA with stent placement


Hyperlipidemia


Multiple hospitalizations for recurrent abdominal pain


History of pancreatitis , ETOH induced 


History of alcohol abuse


Uterine fibroids


Past Surgical History





Cholecystectomy


Tubal ligation, bilateral


PTCA with stent placement approximately 8 years ago


Cataract surgery


Colonoscopy 14 with Dr. Granados --> 2 sessile polyps of approximately 4 

mm were found in the sigmoid colon, internal and external hemorrhoids.  

Pathology did not show malignancy. 


EGD performed 3/27/14 by Dr. Hurtado --> Normal esophagus, some nodularity in 

the body and antrum of the stomach, mild gastritis.  Pathology did not show 

malignancy


EUS performed by Dr. Wade Uriostegui 14 --> Pancreatic parenchyma was 

isoechoic and homogeneous, common bile duct was normal.


Reported Medications





Reported Meds & Active Scripts


Active


Roller Walker (Misc. Devices) 1 Mis Mis Ea .XX NOW


Norvasc (Amlodipine Besylate) 10 Mg Tab 10 Mg PO DAILY


Lopressor (Metoprolol Tartrate) 100 Mg Tab 100 Mg PO Q12HR


Nitrostat SL (Nitroglycerin) 0.4 Mg Subl 0.4 Mg SL Q5M PRN


Isosorbide Mononitrate ER (Isosorbide Mononitrate) 60 Mg Tab 120 Mg PO DAILY@07


Hydralazine HCl 50 Mg Tablet 50 Mg PO Q8HR


Pravachol (Pravastatin) 40 Mg Tab 40 Mg PO HS


Plavix (Clopidogrel Bisulfate) 75 Mg Tab 75 Mg PO DAILY


Zofran (Ondansetron HCl) 4 Mg Tab 4 Mg PO Q6HR PRN


Gas Relief Maximum Streng (Simethicone) 125 Mg Chw 125 Mg PO Q8HR


Reported


Fish Oil + D3 (Fish Oil-Cholecalciferol) 1,200-1,000 Mg-Unit Cap 1 Cap PO DAILY


Lantus Inj (Insulin Glargine) 1,000 Unit/10 Ml Vial 15 Units SQ HS


Gabapentin 100 Mg Cap 300 Mg PO HS


Folic Acid 400 Mcg Tab 1 Mg PO DAILY


Feosol (Ferrous Sulfate) 200 Mg Tab 325 Mg PO BID


Vasotec (Enalapril Maleate) 20 Mg Tab 20 Mg PO BID


Aspirin Children's (Aspirin) 81 Mg Chew 81 Mg CHEW DAILY


Active Ordered Medications





Current Medications








 Medications


  (Trade)  Dose


 Ordered  Sig/Luis Armando


 Route  Start Time


 Stop Time Status Last Admin


 


  (NS Flush)  2 ml  UNSCH  PRN


 IVF  18 08:15


     


 


 


  (NS Flush)  2 ml  UNSCH  PRN


 IV FLUSH  18 11:30


     


 


 


  (NS Flush)  2 ml  BID


 IV FLUSH  18 21:00


     


 


 


  (Tylenol)  650 mg  Q4H  PRN


 PO  18 11:30


     


 


 


  (Zofran Inj)  4 mg  Q6H  PRN


 IVP  18 11:30


     


 


 


  (Restoril)  15 mg  HS  PRN


 PO  18 11:30


     


 


 


  (Narcan Inj)  0.4 mg  UNSCH  PRN


 IV PUSH  18 11:30


     


 


 


  (Theresa-Colace)  1 tab  BID


 PO  18 21:00


     


 


 


  (Milk Of


 Magnesia Liq)  30 ml  Q12H  PRN


 PO  18 11:30


     


 


 


  (Senokot)  17.2 mg  Q12H  PRN


 PO  18 11:30


     


 


 


  (Dulcolax Supp)  10 mg  DAILY  PRN


 RECTAL  18 11:30


     


 


 


  (Lactulose Liq)  30 ml  DAILY  PRN


 PO  18 11:30


     


 


 


  (Norvasc)  10 mg  DAILY


 PO  18 09:00


     


 


 


  (Aspirin Chew)  81 mg  DAILY


 CHEW  18 09:00


     


 


 


  (Plavix)  75 mg  DAILY


 PO  18 09:00


     


 


 


  (Ferrous Sulfate)  325 mg  BID


 PO  18 21:00


     


 


 


  (Neurontin)  300 mg  HS


 PO  18 21:00


     


 


 


  (Apresoline)  50 mg  Q8HR


 PO  18 14:00


    18 14:17


 


 


  (Levemir Inj)  15 units  HS


 SQ  18 21:00


     


 


 


  (Imdur)  120 mg  DAILY@07


 PO  18 07:00


     


 


 


  (Lopressor)  100 mg  Q12HR


 PO  18 21:00


     


 


 


  (Nitrostat Sl)  0.4 mg  Q5M  PRN


 SL  18 11:30


     


 


 


  (Pravachol)  40 mg  HS


 PO  18 21:00


     


 


 


  (Phazyme Chew)  125 mg  Q8HR


 PO  18 14:00


    18 14:17


 


 


  (Heparin Inj)  5,000 units  Q12HR


 SQ  18 21:00


     


 


 


  (Lasix Inj)  20 mg  BID@,18


 IV PUSH  18 18:00


     


 


 


  (Trandate Inj)  10 mg  Q20M  PRN


 IV PUSH  18 11:45


     


 


 


  (Apresoline Inj)  20 mg  Q4H  PRN


 IV PUSH  18 11:45


    18 15:09


 








Family History


Mother had diabetes and was on hemodialysis she is 


Social History


Half pack per day


Use of alcohol in the past


Works as a CNA





Physical Exam


Vital Signs





Vital Signs








  Date Time  Temp Pulse Resp B/P (MAP) Pulse Ox O2 Delivery O2 Flow Rate FiO2


 


18 15:04  88 18 208/88 (128) 97 Nasal Cannula 2.00 


 


18 11:17  97 18 204/91 (128) 100 Room Air  


 


18 10:02  79 18  95 Nasal Cannula 2.00 


 


18 08:30     92 Nasal Cannula 2.00 


 


18 08:29     92 Nasal Cannula 2.00 


 


18 08:01 98.1 93 24 194/88 (123) 90   








Physical Exam


GENERAL: Well-nourished, well-developed patient.


SKIN: Warm and dry.


HEAD: Normocephalic.


EYES: No scleral icterus. No injection or drainage. 


NECK: Supple, trachea midline. No JVD or lymphadenopathy.


CARDIOVASCULAR: Regular rate and rhythm without murmurs, gallops, or rubs. 


RESPIRATORY: Breath sounds equal bilaterally. No accessory muscle use.


GASTROINTESTINAL: Abdomen soft, non-tender, nondistended. 


EXTREMITIES: No cyanosis, positive edema. 


NEUROLOGICAL: Awake, alert, and oriented x 3. Non-focal.


Laboratory





Laboratory Tests








Test


  18


08:34 18


09:29 18


09:45


 


White Blood Count 11.5   


 


Red Blood Count 2.99   


 


Hemoglobin 9.7   


 


Hematocrit 28.9   


 


Mean Corpuscular Volume 96.6   


 


Mean Corpuscular Hemoglobin 32.4   


 


Mean Corpuscular Hemoglobin


Concent 33.6 


  


  


 


 


Red Cell Distribution Width 15.1   


 


Platelet Count 394   


 


Mean Platelet Volume 7.9   


 


Neutrophils (%) (Auto) 84.3   


 


Lymphocytes (%) (Auto) 6.9   


 


Monocytes (%) (Auto) 7.2   


 


Eosinophils (%) (Auto) 0.4   


 


Basophils (%) (Auto) 1.2   


 


Neutrophils # (Auto) 9.7   


 


Lymphocytes # (Auto) 0.8   


 


Monocytes # (Auto) 0.8   


 


Eosinophils # (Auto) 0.0   


 


Basophils # (Auto) 0.1   


 


CBC Comment DIFF FINAL   


 


Differential Comment    


 


Blood Urea Nitrogen 54   


 


Creatinine 3.75   


 


Random Glucose 85   


 


Total Protein 7.8   


 


Albumin 3.2   


 


Calcium Level 9.0   


 


Magnesium Level 3.0   


 


Alkaline Phosphatase 267   


 


Aspartate Amino Transf


(AST/SGOT) 175 


  


  


 


 


Alanine Aminotransferase


(ALT/SGPT) 168 


  


  


 


 


Total Bilirubin 0.6   


 


Sodium Level 137   


 


Potassium Level 5.0   


 


Chloride Level 107   


 


Carbon Dioxide Level 20.5   


 


Anion Gap 10   


 


Estimat Glomerular Filtration


Rate 15 


  


  


 


 


Total Creatine Kinase 144   


 


Creatine Kinase MB 1.0   


 


Troponin I LESS THAN 0.02   


 


Lactic Acid Level  0.7  


 


Prothrombin Time   10.1 


 


Prothromb Time International


Ratio 


  


  1.0 


 


 


Activated Partial


Thromboplast Time 


  


  24.8 


 


 


B-Type Natriuretic Peptide   2447 














 Date/Time


Source Procedure


Growth Status


 


 


 18 09:29


Blood Peripheral Aerobic Blood Culture


Pending Received


 


 18 09:29


Blood Peripheral Anaerobic Blood Culture


Pending Received








Result Diagram:  


18 0834                                                                    

            18 0834





Imaging





Last Impressions








Chest X-Ray 18 0810 Signed





Impressions: 





 Service Date/Time:  2018 08:50 - CONCLUSION:  Bibasilar 





 consolidation slightly worse in the right lower lobe.     Mp Dang MD 











Assessment and Plan


Problem List:  


(1) Acute kidney injury superimposed on CKD


ICD Codes:  N17.9 - Acute kidney failure, unspecified; N18.9 - Chronic kidney 

disease, unspecified


Status:  Acute


Plan:  Patient was on dialysis and it was stopped as her renal function 

improved discharging creatinine was around 3.5


Her condition got worse since Thursday


She is more short of breath


Creatinine is 3.7


May consider dialysis again


Dr. Whiteside to follow in the morning





(2) CHF (congestive heart failure)


ICD Codes:  I50.9 - Heart failure, unspecified


Plan:  Try diuretic discussed Lasix 40 mg BID


with Dr. Hodges





(3) DM (diabetes mellitus)


ICD Codes:  E11.9 - Type 2 diabetes mellitus without complications


Plan:  Monitor blood glucose





(4) HTN (hypertension)


ICD Codes:  I10 - Hypertension


Status:  Chronic


Plan:  Continue to monitor








Problem Qualifiers





(1) CHF (congestive heart failure):  


Qualified Codes:  I50.9 - Heart failure, unspecified


(2) DM (diabetes mellitus):  








Valentin García MD 2018 15:16

## 2018-02-05 VITALS — HEART RATE: 80 BPM

## 2018-02-05 VITALS — HEART RATE: 82 BPM

## 2018-02-05 VITALS
HEART RATE: 81 BPM | SYSTOLIC BLOOD PRESSURE: 159 MMHG | OXYGEN SATURATION: 99 % | DIASTOLIC BLOOD PRESSURE: 88 MMHG | RESPIRATION RATE: 18 BRPM

## 2018-02-05 VITALS
HEART RATE: 82 BPM | OXYGEN SATURATION: 99 % | RESPIRATION RATE: 14 BRPM | SYSTOLIC BLOOD PRESSURE: 125 MMHG | DIASTOLIC BLOOD PRESSURE: 69 MMHG | TEMPERATURE: 98.6 F

## 2018-02-05 VITALS — HEART RATE: 92 BPM

## 2018-02-05 VITALS
HEART RATE: 78 BPM | TEMPERATURE: 98.7 F | OXYGEN SATURATION: 98 % | DIASTOLIC BLOOD PRESSURE: 77 MMHG | SYSTOLIC BLOOD PRESSURE: 145 MMHG | RESPIRATION RATE: 16 BRPM

## 2018-02-05 VITALS
DIASTOLIC BLOOD PRESSURE: 74 MMHG | HEART RATE: 75 BPM | RESPIRATION RATE: 16 BRPM | SYSTOLIC BLOOD PRESSURE: 136 MMHG | OXYGEN SATURATION: 98 %

## 2018-02-05 VITALS — HEART RATE: 70 BPM

## 2018-02-05 VITALS
RESPIRATION RATE: 16 BRPM | DIASTOLIC BLOOD PRESSURE: 69 MMHG | TEMPERATURE: 98.7 F | OXYGEN SATURATION: 99 % | SYSTOLIC BLOOD PRESSURE: 150 MMHG | HEART RATE: 83 BPM

## 2018-02-05 VITALS — HEART RATE: 74 BPM

## 2018-02-05 VITALS — OXYGEN SATURATION: 98 %

## 2018-02-05 VITALS — HEART RATE: 95 BPM

## 2018-02-05 VITALS — HEART RATE: 76 BPM

## 2018-02-05 VITALS — HEART RATE: 77 BPM

## 2018-02-05 VITALS — HEART RATE: 78 BPM

## 2018-02-05 VITALS — HEART RATE: 87 BPM

## 2018-02-05 VITALS — HEART RATE: 75 BPM

## 2018-02-05 VITALS — OXYGEN SATURATION: 99 %

## 2018-02-05 VITALS — HEART RATE: 84 BPM

## 2018-02-05 VITALS — HEART RATE: 86 BPM

## 2018-02-05 LAB
BASOPHILS # BLD AUTO: 0.1 TH/MM3 (ref 0–0.2)
BASOPHILS NFR BLD: 1.4 % (ref 0–2)
BUN SERPL-MCNC: 54 MG/DL (ref 7–18)
CALCIUM SERPL-MCNC: 8.2 MG/DL (ref 8.5–10.1)
CHLORIDE SERPL-SCNC: 107 MEQ/L (ref 98–107)
COLOR UR: (no result)
CREAT SERPL-MCNC: 3.68 MG/DL (ref 0.5–1)
EOSINOPHIL # BLD: 0 TH/MM3 (ref 0–0.4)
EOSINOPHIL NFR BLD: 0.6 % (ref 0–4)
ERYTHROCYTE [DISTWIDTH] IN BLOOD BY AUTOMATED COUNT: 14.8 % (ref 11.6–17.2)
GFR SERPLBLD BASED ON 1.73 SQ M-ARVRAT: 16 ML/MIN (ref 89–?)
GLUCOSE SERPL-MCNC: 115 MG/DL (ref 74–106)
GLUCOSE UR STRIP-MCNC: (no result) MG/DL
HCO3 BLD-SCNC: 25 MEQ/L (ref 21–32)
HCT VFR BLD CALC: 26.7 % (ref 35–46)
HGB BLD-MCNC: 9.1 GM/DL (ref 11.6–15.3)
HGB UR QL STRIP: (no result)
HYALINE CASTS #/AREA URNS LPF: 1 /LPF
KETONES UR STRIP-MCNC: (no result) MG/DL
LYMPHOCYTES # BLD AUTO: 0.6 TH/MM3 (ref 1–4.8)
LYMPHOCYTES NFR BLD AUTO: 8.8 % (ref 9–44)
MCH RBC QN AUTO: 32.9 PG (ref 27–34)
MCHC RBC AUTO-ENTMCNC: 34.1 % (ref 32–36)
MCV RBC AUTO: 96.3 FL (ref 80–100)
MONOCYTE #: 0.6 TH/MM3 (ref 0–0.9)
MONOCYTES NFR BLD: 8.4 % (ref 0–8)
MUCOUS THREADS #/AREA URNS LPF: (no result) /LPF
NEUTROPHILS # BLD AUTO: 5.9 TH/MM3 (ref 1.8–7.7)
NEUTROPHILS NFR BLD AUTO: 80.8 % (ref 16–70)
NITRITE UR QL STRIP: (no result)
PLATELET # BLD: 333 TH/MM3 (ref 150–450)
PMV BLD AUTO: 7.8 FL (ref 7–11)
RBC # BLD AUTO: 2.77 MIL/MM3 (ref 4–5.3)
SODIUM SERPL-SCNC: 141 MEQ/L (ref 136–145)
SP GR UR STRIP: 1.01 (ref 1–1.03)
SQUAMOUS #/AREA URNS HPF: 1 /HPF (ref 0–5)
URINE LEUKOCYTE ESTERASE: (no result)
WBC # BLD AUTO: 7.3 TH/MM3 (ref 4–11)

## 2018-02-05 RX ADMIN — HEPARIN SODIUM SCH UNITS: 10000 INJECTION, SOLUTION INTRAVENOUS; SUBCUTANEOUS at 10:15

## 2018-02-05 RX ADMIN — INSULIN ASPART SCH: 100 INJECTION, SOLUTION INTRAVENOUS; SUBCUTANEOUS at 21:39

## 2018-02-05 RX ADMIN — FERROUS SULFATE TAB 325 MG (65 MG ELEMENTAL FE) SCH MG: 325 (65 FE) TAB at 10:16

## 2018-02-05 RX ADMIN — STANDARDIZED SENNA CONCENTRATE AND DOCUSATE SODIUM SCH TAB: 8.6; 5 TABLET, FILM COATED ORAL at 10:16

## 2018-02-05 RX ADMIN — FERROUS SULFATE TAB 325 MG (65 MG ELEMENTAL FE) SCH MG: 325 (65 FE) TAB at 21:37

## 2018-02-05 RX ADMIN — ISOSORBIDE MONONITRATE SCH MG: 60 TABLET, EXTENDED RELEASE ORAL at 06:03

## 2018-02-05 RX ADMIN — ACYCLOVIR SCH UNITS: 800 TABLET ORAL at 21:38

## 2018-02-05 RX ADMIN — PRAVASTATIN SODIUM SCH MG: 40 TABLET ORAL at 21:38

## 2018-02-05 RX ADMIN — METOPROLOL TARTRATE SCH MG: 100 TABLET, FILM COATED ORAL at 21:38

## 2018-02-05 RX ADMIN — FUROSEMIDE SCH MG: 10 INJECTION, SOLUTION INTRAMUSCULAR; INTRAVENOUS at 18:18

## 2018-02-05 RX ADMIN — INSULIN ASPART SCH: 100 INJECTION, SOLUTION INTRAVENOUS; SUBCUTANEOUS at 18:18

## 2018-02-05 RX ADMIN — Medication SCH ML: at 10:17

## 2018-02-05 RX ADMIN — STANDARDIZED SENNA CONCENTRATE AND DOCUSATE SODIUM SCH TAB: 8.6; 5 TABLET, FILM COATED ORAL at 21:00

## 2018-02-05 RX ADMIN — GABAPENTIN SCH MG: 300 CAPSULE ORAL at 21:37

## 2018-02-05 RX ADMIN — INSULIN ASPART SCH: 100 INJECTION, SOLUTION INTRAVENOUS; SUBCUTANEOUS at 08:30

## 2018-02-05 RX ADMIN — SIMETHICONE SCH MG: 125 TABLET, CHEWABLE ORAL at 06:04

## 2018-02-05 RX ADMIN — ASPIRIN 81 MG SCH MG: 81 TABLET ORAL at 10:16

## 2018-02-05 RX ADMIN — SIMETHICONE SCH MG: 125 TABLET, CHEWABLE ORAL at 13:16

## 2018-02-05 RX ADMIN — METOPROLOL TARTRATE SCH MG: 100 TABLET, FILM COATED ORAL at 10:15

## 2018-02-05 RX ADMIN — CLOPIDOGREL BISULFATE SCH MG: 75 TABLET, FILM COATED ORAL at 10:17

## 2018-02-05 RX ADMIN — INSULIN ASPART SCH: 100 INJECTION, SOLUTION INTRAVENOUS; SUBCUTANEOUS at 13:16

## 2018-02-05 RX ADMIN — FUROSEMIDE SCH MG: 10 INJECTION, SOLUTION INTRAMUSCULAR; INTRAVENOUS at 10:15

## 2018-02-05 RX ADMIN — Medication SCH ML: at 21:37

## 2018-02-05 RX ADMIN — SIMETHICONE SCH MG: 125 TABLET, CHEWABLE ORAL at 21:38

## 2018-02-05 RX ADMIN — HEPARIN SODIUM SCH UNITS: 10000 INJECTION, SOLUTION INTRAVENOUS; SUBCUTANEOUS at 21:38

## 2018-02-05 NOTE — PD.CONS
HPI


Service


cardiology


Consult Requested By





Reason for Consult


CAD, CHF


Primary Care Physician


Unknown


History of Present Illness


53 yo AAF recently discharged from this facility 3 days ago after prolonged 

hospitalization for chest pain, CHF and CKD. She has a history of CAD with 

stenting ~ 10 years ago and recent LHC on 18 showing severe 2 vessel 

disease to RCA and mid-LAD. Renal function had declined requiring hemodialysis. 

PCI has been on hold due to renal function and the plan was to wait for renal 

status to improve before pursuing intervention. Since patient was discharged on 

18 she has been experiencing progressive SOB, orthopnea and mild non-

exertional chest tightness. She has been readmitted for progression of symptoms.


 (Blanca Luong)





Review of Systems


Consitutional:  COMPLAINS OF: Chills, DENIES: Fever, Weight gain, Weight loss


Respiratory:  DENIES: Cough, Snoring, Wheezing, Sputum production


Cardiovascular:  DENIES: Palpitations, Syncope, Tachycardia


Gastrointestinal:  DENIES: Nausea, Vomiting, Change in bowel habits, Reflux, 

Bloody stools, Melena (Blanca Luong)





Past Family Social History


Allergies:  


Coded Allergies:  


     shellfish derived (Unverified  Adverse Reaction, Mild, VOMITNG, 1/15/18)


Past Medical History





HTN, Hyperlipidemia, CHF (Echo 17 w/ EF 55-60%), IDDM, neuropathy, CKD3,  

HLD, CAD, h/o pancreatitis , EtOH use, uterine fibroids


Past Surgical History





Cholecystectomy


Tubal ligation, bilateral


PTCA with stent placement approximately 8 years ago


Cataract surgery


Colonoscopy 14 with Dr. Granados --> 2 sessile polyps of approximately 4 

mm were found in the sigmoid colon, internal and external hemorrhoids.  

Pathology did not show malignancy. 


EGD performed 3/27/14 by Dr. Hurtado --> Normal esophagus, some nodularity in 

the body and antrum of the stomach, mild gastritis.  Pathology did not show 

malignancy


EUS performed by Dr. Wade Uriostegui 14 --> Pancreatic parenchyma was 

isoechoic and homogeneous, common bile duct was normal.


Reported Medications





Reported Meds & Active Scripts


Active


Roller Walker (Misc. Devices) 1 Mis Mis Ea .XX NOW


Norvasc (Amlodipine Besylate) 10 Mg Tab 10 Mg PO DAILY


Lopressor (Metoprolol Tartrate) 100 Mg Tab 100 Mg PO Q12HR


Nitrostat SL (Nitroglycerin) 0.4 Mg Subl 0.4 Mg SL Q5M PRN


Isosorbide Mononitrate ER (Isosorbide Mononitrate) 60 Mg Tab 120 Mg PO DAILY@07


Hydralazine HCl 50 Mg Tablet 50 Mg PO Q8HR


Pravachol (Pravastatin) 40 Mg Tab 40 Mg PO HS


Plavix (Clopidogrel Bisulfate) 75 Mg Tab 75 Mg PO DAILY


Zofran (Ondansetron HCl) 4 Mg Tab 4 Mg PO Q6HR PRN


Gas Relief Maximum Streng (Simethicone) 125 Mg Chw 125 Mg PO Q8HR


Reported


Fish Oil + D3 (Fish Oil-Cholecalciferol) 1,200-1,000 Mg-Unit Cap 1 Cap PO DAILY


Lantus Inj (Insulin Glargine) 1,000 Unit/10 Ml Vial 15 Units SQ HS


Gabapentin 100 Mg Cap 300 Mg PO HS


Folic Acid 400 Mcg Tab 1 Mg PO DAILY


Feosol (Ferrous Sulfate) 200 Mg Tab 325 Mg PO BID


Vasotec (Enalapril Maleate) 20 Mg Tab 20 Mg PO BID


Aspirin Children's (Aspirin) 81 Mg Chew 81 Mg CHEW DAILY


Active Ordered Medications





Current Medications








 Medications


  (Trade)  Dose


 Ordered  Sig/Luis Armando


 Route  Start Time


 Stop Time Status Last Admin


 


  (NS Flush)  2 ml  UNSCH  PRN


 IVF  18 08:15


     


 


 


  (NS Flush)  2 ml  UNSCH  PRN


 IV FLUSH  18 11:30


    18 17:46


 


 


  (NS Flush)  2 ml  BID


 IV FLUSH  18 21:00


    18 21:23


 


 


  (Tylenol)  650 mg  Q4H  PRN


 PO  18 11:30


    18 15:33


 


 


  (Zofran Inj)  4 mg  Q6H  PRN


 IVP  18 11:30


     


 


 


  (Restoril)  15 mg  HS  PRN


 PO  18 11:30


     


 


 


  (Narcan Inj)  0.4 mg  UNSCH  PRN


 IV PUSH  18 11:30


     


 


 


  (Theresa-Colace)  1 tab  BID


 PO  18 21:00


    18 21:23


 


 


  (Milk Of


 Magnesia Liq)  30 ml  Q12H  PRN


 PO  18 11:30


     


 


 


  (Senokot)  17.2 mg  Q12H  PRN


 PO  18 11:30


     


 


 


  (Dulcolax Supp)  10 mg  DAILY  PRN


 RECTAL  18 11:30


     


 


 


  (Lactulose Liq)  30 ml  DAILY  PRN


 PO  18 11:30


     


 


 


  (Norvasc)  10 mg  DAILY


 PO  18 09:00


     


 


 


  (Aspirin Chew)  81 mg  DAILY


 CHEW  18 09:00


     


 


 


  (Plavix)  75 mg  DAILY


 PO  18 09:00


     


 


 


  (Ferrous Sulfate)  325 mg  BID


 PO  18 21:00


    18 21:23


 


 


  (Neurontin)  300 mg  HS


 PO  18 21:00


    18 21:23


 


 


  (Apresoline)  50 mg  Q8HR


 PO  18 14:00


    18 06:03


 


 


  (Levemir Inj)  15 units  HS


 SQ  18 21:00


    18 21:24


 


 


  (Imdur)  120 mg  DAILY@07


 PO  18 07:00


    18 06:03


 


 


  (Lopressor)  100 mg  Q12HR


 PO  18 21:00


    18 21:23


 


 


  (Nitrostat Sl)  0.4 mg  Q5M  PRN


 SL  18 11:30


     


 


 


  (Pravachol)  40 mg  HS


 PO  18 21:00


    18 21:23


 


 


  (Phazyme Chew)  125 mg  Q8HR


 PO  18 14:00


    18 06:04


 


 


  (Heparin Inj)  5,000 units  Q12HR


 SQ  18 21:00


    18 21:24


 


 


  (Trandate Inj)  10 mg  Q20M  PRN


 IV PUSH  18 11:45


     


 


 


  (Apresoline Inj)  20 mg  Q4H  PRN


 IV PUSH  18 11:45


    18 15:09


 


 


  (Lasix Inj)  40 mg  BID@,18


 IV PUSH  18 18:00


    18 17:46


 


 


  (Morphine Inj)  2 mg  Q4H  PRN


 IV PUSH  18 16:15


    18 16:26


 


 


  (Duoneb Neb)  1 ampule  Q4HR NEB  PRN


 NEB  18 16:15


     


 


 


  (Ativan)  0.5 mg  Q8HR  PRN


 PO  18 16:15


    18 16:22


 


 


  (D50w (Vial) Inj)  50 ml  UNSCH  PRN


 IV PUSH  18 16:15


    18 04:20


 


 


  (Glucagon Inj)  1 mg  UNSCH  PRN


 OTHER  18 16:15


     


 


 


  (NovoLOG


 SUPPLEMENTAL


 SCALE)  1  ACHS SLIDING  SCALE


 SQ  18 17:00


     


 








Family History





No h/o DM or CAD


Mother had diabetes and was on hemodialysis she is 


Social History





History of alcohol abuse, quit 3 years ago. 


Tobacco use: Half pack per day


Works as a CNA


 (Blanca Luong)





Physical Exam


Vital Signs





Vital Signs








  Date Time  Temp Pulse Resp B/P (MAP) Pulse Ox O2 Delivery O2 Flow Rate FiO2


 


18 06:00  78      


 


18 05:00  70      


 


18 04:00  82      


 


18 03:42  81 18 159/88 (111) 99   


 


18 03:00  87      


 


18 02:00  80      


 


18 01:02     99 Nasal Cannula 2.00 


 


18 01:00  82      


 


18 00:00  86      


 


18 23:42  89 16 172/87 (115) 96   


 


18 23:00  90      


 


18 22:00  96      


 


18 21:00  96      


 


18 20:00  88      


 


18 20:00 99.5 91 16 175/88 (117) 98   


 


18 19:00  96      


 


18 18:22  99      


 


18 17:00  94      


 


18 16:00  96      


 


18 15:16 100.6 96 19 177/76 (109) 100   


 


18 15:04  88 18 208/88 (128) 97 Nasal Cannula 2.00 


 


18 15:00  96      


 


18 11:17  97 18 204/91 (128) 100 Room Air  


 


18 10:02  79 18  95 Nasal Cannula 2.00 








Physical Exam


GENERAL: 


SKIN: Warm and dry.


HEAD: Atraumatic. Normocephalic. 


EYES: Pupils equal and round. No scleral icterus. No injection or drainage. 


ENT: No nasal bleeding or discharge. 


NECK: Trachea midline. No JVD. 


CARDIOVASCULAR: Regular rate and rhythm.  no murmurs


RESPIRATORY: No accessory muscle use. Clear to auscultation. Breath sounds 

equal bilaterally. 


GASTROINTESTINAL: Abdomen soft, non-tender, nondistended. Hepatic and splenic 

margins not palpable. 


MUSCULOSKELETAL: Extremities without clubbing, cyanosis, or edema. No obvious 

deformities. 


NEUROLOGICAL: Awake and alert. No obvious cranial nerve deficits..  Normal 

speech.


PSYCHIATRIC: Appropriate mood and affect; insight and judgment normal.


Laboratory





Laboratory Tests








Test


  18


09:29 18


09:45 18


07:10


 


Lactic Acid Level 0.7   


 


Prothrombin Time  10.1  


 


Prothromb Time International


Ratio 


  1.0 


  


 


 


Activated Partial


Thromboplast Time 


  24.8 


  


 


 


B-Type Natriuretic Peptide  2447  


 


White Blood Count   7.3 


 


Red Blood Count   2.77 


 


Hemoglobin   9.1 


 


Hematocrit   26.7 


 


Mean Corpuscular Volume   96.3 


 


Mean Corpuscular Hemoglobin   32.9 


 


Mean Corpuscular Hemoglobin


Concent 


  


  34.1 


 


 


Red Cell Distribution Width   14.8 


 


Platelet Count   333 


 


Mean Platelet Volume   7.8 


 


Neutrophils (%) (Auto)   80.8 


 


Lymphocytes (%) (Auto)   8.8 


 


Monocytes (%) (Auto)   8.4 


 


Eosinophils (%) (Auto)   0.6 


 


Basophils (%) (Auto)   1.4 


 


Neutrophils # (Auto)   5.9 


 


Lymphocytes # (Auto)   0.6 


 


Monocytes # (Auto)   0.6 


 


Eosinophils # (Auto)   0.0 


 


Basophils # (Auto)   0.1 


 


CBC Comment   DIFF FINAL 


 


Differential Comment    


 


Blood Urea Nitrogen   54 


 


Creatinine   3.68 


 


Random Glucose   115 


 


Calcium Level   8.2 


 


Sodium Level   141 


 


Potassium Level   3.8 


 


Chloride Level   107 


 


Carbon Dioxide Level   25.0 


 


Anion Gap   9 


 


Estimat Glomerular Filtration


Rate 


  


  16 


 














 Date/Time


Source Procedure


Growth Status


 


 


 18 09:29


Blood Peripheral Aerobic Blood Culture


Pending Received


 


 18 09:29


Blood Peripheral Anaerobic Blood Culture


Pending Received








 (Blanca Luong)


Result Diagram:  


18 0710                                                                    

            18 0710





Imaging





Last 48 hours Impressions








Chest X-Ray 18 0810 Signed





Impressions: 





 Service Date/Time:  2018 08:50 - CONCLUSION:  Bibasilar 





 consolidation slightly worse in the right lower lobe.     Mp Dang MD 








 (Blanca Luong)





Assessment and Plan


Problem List:  


(1) CAD (coronary artery disease)


ICD Codes:  I25.10 - Atherosclerotic heart disease of native coronary artery 

without angina pectoris


Status:  Chronic


(2) CHF (congestive heart failure)


ICD Codes:  I50.9 - Heart failure, unspecified


(3) Acute kidney injury superimposed on CKD


ICD Codes:  N17.9 - Acute kidney failure, unspecified; N18.9 - Chronic kidney 

disease, unspecified


Status:  Acute


Assessment and Plan


53 yo AAF recently discharged from this facility 3 days ago after prolonged 

hospitalization for chest pain, CHF and CKD. She has a history of CAD with 

stenting ~ 10 years ago and recent LHC on 18 showing severe 2 vessel 

disease to RCA and mid-LAD. Renal function had declined requiring hemodialysis. 

PCI has been on hold due to renal function and the plan was to wait for renal 

status to improve before pursuing intervention. Since patient was discharged on 

18 she has been experiencing progressive SOB, orthopnea and mild non-

exertional chest tightness. She has been readmitted for progression of symptoms.





CAD- no chest pain currently. ECG shows no concerning ST changes. Echo 55-60%


consider PCI.


nephrology to follow for LETI, consider resuming HD. 


creatinine 3.68


 (Blanca Luong)


Assessment and Plan


-------------


gentle diuresis


monitor Cr


neph following


consider [facility] when permanent HD


 (Edgardo Mercado MD)





Problem Qualifiers





(1) CHF (congestive heart failure):  


Qualified Codes:  I50.9 - Heart failure, unspecified








Balnca Luong 2018 08:58


Edgardo Mercado MD 2018 14:41

## 2018-02-05 NOTE — HHI.PR
Subjective


Remarks


Feel improved some. She is upset as she might require HD. No chest pain 

overnight. No n/v/d/c. Denies fever or  chills. 


Feels tired. SOB improved





Objective


Vitals





Vital Signs








  Date Time  Temp Pulse Resp B/P (MAP) Pulse Ox O2 Delivery O2 Flow Rate FiO2


 


2/5/18 09:57     98 Nasal Cannula 2.00 


 


2/5/18 06:00  78      


 


2/5/18 05:00  70      


 


2/5/18 04:00  82      


 


2/5/18 03:42  81 18 159/88 (111) 99   


 


2/5/18 03:00  87      


 


2/5/18 02:00  80      


 


2/5/18 01:02     99 Nasal Cannula 2.00 


 


2/5/18 01:00  82      


 


2/5/18 00:00  86      


 


2/4/18 23:42  89 16 172/87 (115) 96   


 


2/4/18 23:00  90      


 


2/4/18 22:00  96      


 


2/4/18 21:00  96      


 


2/4/18 20:00  88      


 


2/4/18 20:00 99.5 91 16 175/88 (117) 98   


 


2/4/18 19:00  96      


 


2/4/18 18:22  99      


 


2/4/18 17:00  94      


 


2/4/18 16:00  96      


 


2/4/18 15:16 100.6 96 19 177/76 (109) 100   


 


2/4/18 15:04  88 18 208/88 (128) 97 Nasal Cannula 2.00 


 


2/4/18 15:00  96      


 


2/4/18 11:17  97 18 204/91 (128) 100 Room Air  














I/O      


 


 2/4/18 2/4/18 2/4/18 2/5/18 2/5/18 2/5/18





 07:00 15:00 23:00 07:00 15:00 23:00


 


Intake Total    380 ml  


 


Output Total  1000 ml 1000 ml 1200 ml  


 


Balance  -1000 ml -1000 ml -820 ml  


 


      


 


Intake Oral    380 ml  


 


Output Urine Total  1000 ml 1000 ml 1200 ml  


 


# Voids  1    


 


# Bowel Movements    0  








Result Diagram:  


2/5/18 0710                                                                    

            2/5/18 0710





Imaging





Last Impressions








Chest X-Ray 2/4/18 0810 Signed





Impressions: 





 Service Date/Time:  [place], February 4, 2018 08:50 - CONCLUSION:  Bibasilar 





 consolidation slightly worse in the right lower lobe.     Mp Dang MD 








Objective Remarks


GENERAL: This is a well-nourished, well-developed patient, in no apparent 

distress.


CARDIOVASCULAR: Regular rate and rhythm without murmurs, gallops, or rubs. 


RESPIRATORY: Clear to auscultation. Breath sounds equal bilaterally. No wheezes

, rales, or rhonchi.  


GASTROINTESTINAL: Abdomen soft, non-tender, nondistended. No hepato-splenomegaly

, or palpable masses. No guarding.


MUSCULOSKELETAL: Extremities without clubbing, cyanosis, or edema. No joint 

tenderness, effusion, or edema noted. No calf tenderness. Negative Homans sign 

bilaterally.


NEUROLOGICAL: Awake and alert. Cranial nerves II through XII intact.  Motor and 

sensory grossly within normal limits. Five out of 5 muscle strength in all 

muscle groups.  Normal speech.








A/P


Assessment and Plan


52-year-old female presented with sob, lethargy








Acute on Chronic diastolic CHF


Cardiomyopathy 


   Echo with EF 55-60%


   Discussed with Dr García nephrology, start Lasix 40 mg BID


   Continue with Imdur/BB








Acute on chronic Stage IV CKD


   Consult nephrology was seen by Dr Whiteside. Management per nephrology


   Avoid nephrotoxins.  


   Had Vas-Cath  on 1/26.  Patient received dialysis on 1/26 and on 1/27.  


   Per nephrologist most likely patient has reached end-stage renal disease.


   Nephro discussed options moving forward related.  Peritoneal dialysis versus 

hemodialysis.  Patient stated that she will consider options. However patient 

kidney function improved and no need for HD. Patient can' have further cardiac 

interventions as might put her in ESRD requiring HD. 


   Per nephrology patient might need HD. However it is unclear if she will need 

to restart dialysis this admission, however currently she is comfortable and 

creatinine is slightly better. Monitor. 


   Continue strict ins and outs 


   Trend creatinine.


   Avoid any nephrotoxic agents, stop non essential medications.  





Recent NSTEMI:  


   Post cardiac catheterization on 1/22 showing severe two-vessel disease of 

the right coronary artery in the mid LAD.  


   Recommended high risk PCI versus cardiac bypass versus medical management. 

PCI is  held until cleared by nephrology. Patient is not on HD at this time 

however cant have procedure because kidney function will worsen and will need 

HD. Cardiology consulted. EKG without ST changes. No chest pain at this time. 








Hemoptysis on 1/27


   Patient has not had any episodes.  She is also asymptomatic. Was seen by pulm





GI bleed-resolved


Anemia of acute blood loss


   Was  transfused with 2 units of packed red blood cells last admission. H/H 

stable so far continue iron supplement 


   Was seen by GI last admission and had panendoscopy with finding of gastritis 

EGD and colon polyps on colonoscopy pending biopsy report


   Unremarkable small bowel follow-through


   PPI and continue to monitor H&H





Had Sepsis/ PNA Resolved Received IV abx, Rocephin and azithromycin 

discontinued on 1/22 





Hypoxia-resolved


   Continue with treatment as in above








Hypertension- Continue homemeds, Lopressor, Procardia, metoprolol, hydralazine, 

hold ACEI/ARBs





IDDM:  Sliding scale w/ Accu-Cheks. Restart home insulin. 





Tobacco Abuse:  Strongly recommended to stop smoking, no NicoDerm to avoid 

vasoconstriction.  





Code Status


Full code


Discussed Condition With


patient, nurse











Juliette Hodges MD Feb 5, 2018 11:15

## 2018-02-05 NOTE — HHI.NPPN
Subjective


Complaints:  Shortness of Breath


General Problems:  Anemia


Renal Failure:  Chronic, Acute, Stage IV


Interval History


Diuresing well. Renal function stable. She is currently comfortable.


 (Migdalia Camejo)





Review of Systems


General


Constitutional:  Fatigue


 (Migdalia Camejo)





Respiratory


Lungs:  SOB


 (Migdalia Camejo)





Objective Data


Data





Vital Signs








  Date Time  Temp Pulse Resp B/P (MAP) Pulse Ox O2 Delivery O2 Flow Rate FiO2


 


2/5/18 09:57     98 Nasal Cannula 2.00 


 


2/5/18 06:00  78      


 


2/5/18 05:00  70      


 


2/5/18 04:00  82      


 


2/5/18 03:42  81 18 159/88 (111) 99   


 


2/5/18 03:00  87      


 


2/5/18 02:00  80      


 


2/5/18 01:02     99 Nasal Cannula 2.00 


 


2/5/18 01:00  82      


 


2/5/18 00:00  86      


 


2/4/18 23:42  89 16 172/87 (115) 96   


 


2/4/18 23:00  90      


 


2/4/18 22:00  96      


 


2/4/18 21:00  96      


 


2/4/18 20:00  88      


 


2/4/18 20:00 99.5 91 16 175/88 (117) 98   


 


2/4/18 19:00  96      


 


2/4/18 18:22  99      


 


2/4/18 17:00  94      


 


2/4/18 16:00  96      


 


2/4/18 15:16 100.6 96 19 177/76 (109) 100   


 


2/4/18 15:04  88 18 208/88 (128) 97 Nasal Cannula 2.00 


 


2/4/18 15:00  96      


 


2/4/18 11:17  97 18 204/91 (128) 100 Room Air  








 (Migdalia Camejo)


-:  


2/5/18 0710                                                                    

            2/5/18 0710





Imaging





Last 72 hours Impressions








Chest X-Ray 2/4/18 0810 Signed





Impressions: 





 Service Date/Time:  Sunday, February 4, 2018 08:50 - CONCLUSION:  Bibasilar 





 consolidation slightly worse in the right lower lobe.     Mp Dang MD 








 (Migdalia Camejo)





Physical Exam


General


Appearance:  Well Developed, No Acute Distress, Comfortable


 (Migdalia Camejo ARNP)





Throat


Throat Exam:  Oral Mucosa Pink & Moist


 (Migdalia Camejo ARNP)





Pulmonary


Resp Exam:  No Distress, Crackles, Decreased Bases


 (Migdalia Camejo ARNP)





Cardiology


CV Exam:  Regular, Normal Sinus Rhythm


 (Migdalia Camejo ARNP)





Gastrointestinal/Abdomen


GI Exam:  Soft, Non-Tender, Bowel Sounds Present, Positive Bowel Movement


 (Migdalia Camejo ARNP)





Musculoskeletal


MS Exam:  Normal Gait, Normal Tone


 (Migdalia Camejo ARNP)





Integumentary


Skin Exam:  Warm, Dry, Intact


 (Migdalia Camejo ARNP)





Extremeties


Extremities Exam:  No Edema, Pedal Pulses Palpable


 (Migdalia Camejo ARNP)





Neurologic


Neuro Exam:  Alert, Awake, Oriented, Speech Clear, Moving All Extremities


 (Migdalia Camejo ARNP)





Psychiatric


Psych Exam:  Appropriate Responses


 (Migdalia Camejo)





Assessment/Plan


Discussed Condition With:  Patient, Relative


Assessment Summary:  Acute Tubular Necrosis, Fluid/Volume Overload, Hypertension

, Diabetes Mellitus, CKD Stage IV


Problem List:  


(1) Acute kidney injury superimposed on CKD


ICD Codes:  N17.9 - Acute kidney failure, unspecified; N18.9 - Chronic kidney 

disease, unspecified


Status:  Acute


Plan:  History of CKD 4, required HD x 2 last admission, creatinine was 3.5 at 

discharge


Needs continued diuresis, on Lasix 40 BID IV


It is unclear if she will need to restart dialysis this admission, however 

currently she is comfortable and creatinine is slightly better.


Repeat labs tomorrow


Avoid nephrotoxic agents, may require cardiac cath in the near future.


Obtain UA, quantify proteinuria, check phosphorus levels 


Follow urine output, made over 3 liters of urine in past 24 hrs. 





(2) CHF (congestive heart failure)


ICD Codes:  I50.9 - Heart failure, unspecified


Plan:  Continue diuresis


Follow fluid status


Fluid restriction discussed with the patient. 





(3) HTN (hypertension)


ICD Codes:  I10 - Hypertension


Status:  Chronic


Plan:  Needs better blood pressure control


On Imdur 120 mg daily


Norvasc 10 mg daily


Metoprolol 100 mg BID


Hydralazine 50 mg Q8h


Start clonidine 0.1 mg BID


Continue to monitor





(4) DM (diabetes mellitus)


ICD Codes:  E11.9 - Type 2 diabetes mellitus without complications


Plan:  Monitor blood glucose, maintain 140-180 mg/dL.


 (Migdalia Camejo)


Plan


patient was seen and examined. Agree with above assessment and plan. Long 

discussion with patient and her family at the bedside. It is unclear if she is 

going to need dialysis. Continue Lasix. May need PCI. 


 (Kelton Whiteside MD)





Problem Qualifiers





(1) CHF (congestive heart failure):  


Qualified Codes:  I50.9 - Heart failure, unspecified


(2) DM (diabetes mellitus):  








Migdalia Camejo Feb 5, 2018 10:25


Kelton Whiteside MD Feb 5, 2018 12:32

## 2018-02-06 VITALS
SYSTOLIC BLOOD PRESSURE: 142 MMHG | HEART RATE: 76 BPM | RESPIRATION RATE: 16 BRPM | OXYGEN SATURATION: 99 % | TEMPERATURE: 99 F | DIASTOLIC BLOOD PRESSURE: 73 MMHG

## 2018-02-06 VITALS — HEART RATE: 72 BPM

## 2018-02-06 VITALS
SYSTOLIC BLOOD PRESSURE: 154 MMHG | RESPIRATION RATE: 16 BRPM | OXYGEN SATURATION: 100 % | DIASTOLIC BLOOD PRESSURE: 78 MMHG | HEART RATE: 76 BPM | TEMPERATURE: 98.8 F

## 2018-02-06 VITALS
TEMPERATURE: 97.8 F | HEART RATE: 71 BPM | SYSTOLIC BLOOD PRESSURE: 112 MMHG | DIASTOLIC BLOOD PRESSURE: 56 MMHG | RESPIRATION RATE: 18 BRPM | OXYGEN SATURATION: 98 %

## 2018-02-06 VITALS — HEART RATE: 76 BPM

## 2018-02-06 VITALS
RESPIRATION RATE: 16 BRPM | TEMPERATURE: 98.8 F | OXYGEN SATURATION: 97 % | HEART RATE: 81 BPM | DIASTOLIC BLOOD PRESSURE: 73 MMHG | SYSTOLIC BLOOD PRESSURE: 144 MMHG

## 2018-02-06 VITALS — SYSTOLIC BLOOD PRESSURE: 139 MMHG | DIASTOLIC BLOOD PRESSURE: 73 MMHG

## 2018-02-06 VITALS
HEART RATE: 63 BPM | SYSTOLIC BLOOD PRESSURE: 109 MMHG | OXYGEN SATURATION: 97 % | DIASTOLIC BLOOD PRESSURE: 57 MMHG | RESPIRATION RATE: 16 BRPM | TEMPERATURE: 97.7 F

## 2018-02-06 VITALS — HEART RATE: 77 BPM

## 2018-02-06 VITALS — DIASTOLIC BLOOD PRESSURE: 72 MMHG | SYSTOLIC BLOOD PRESSURE: 139 MMHG

## 2018-02-06 VITALS
HEART RATE: 74 BPM | DIASTOLIC BLOOD PRESSURE: 67 MMHG | OXYGEN SATURATION: 100 % | RESPIRATION RATE: 16 BRPM | TEMPERATURE: 98.2 F | SYSTOLIC BLOOD PRESSURE: 123 MMHG

## 2018-02-06 VITALS — OXYGEN SATURATION: 98 %

## 2018-02-06 VITALS
DIASTOLIC BLOOD PRESSURE: 66 MMHG | SYSTOLIC BLOOD PRESSURE: 138 MMHG | OXYGEN SATURATION: 99 % | TEMPERATURE: 98.2 F | RESPIRATION RATE: 14 BRPM | HEART RATE: 73 BPM

## 2018-02-06 VITALS — HEART RATE: 66 BPM

## 2018-02-06 VITALS — HEART RATE: 70 BPM

## 2018-02-06 VITALS — HEART RATE: 89 BPM

## 2018-02-06 VITALS — SYSTOLIC BLOOD PRESSURE: 152 MMHG | DIASTOLIC BLOOD PRESSURE: 80 MMHG

## 2018-02-06 VITALS — HEART RATE: 73 BPM

## 2018-02-06 VITALS — SYSTOLIC BLOOD PRESSURE: 142 MMHG | DIASTOLIC BLOOD PRESSURE: 71 MMHG

## 2018-02-06 VITALS — OXYGEN SATURATION: 97 %

## 2018-02-06 VITALS — HEART RATE: 74 BPM

## 2018-02-06 VITALS — HEART RATE: 63 BPM

## 2018-02-06 VITALS — HEART RATE: 78 BPM

## 2018-02-06 VITALS — HEART RATE: 82 BPM

## 2018-02-06 LAB
ALBUMIN SERPL-MCNC: 2.6 GM/DL (ref 3.4–5)
BASOPHILS # BLD AUTO: 0 TH/MM3 (ref 0–0.2)
BASOPHILS NFR BLD: 0.2 % (ref 0–2)
BUN SERPL-MCNC: 63 MG/DL (ref 7–18)
CALCIUM SERPL-MCNC: 7.7 MG/DL (ref 8.5–10.1)
CHLORIDE SERPL-SCNC: 105 MEQ/L (ref 98–107)
CREAT SERPL-MCNC: 3.74 MG/DL (ref 0.5–1)
EOSINOPHIL # BLD: 0.2 TH/MM3 (ref 0–0.4)
EOSINOPHIL NFR BLD: 3 % (ref 0–4)
ERYTHROCYTE [DISTWIDTH] IN BLOOD BY AUTOMATED COUNT: 14.4 % (ref 11.6–17.2)
GFR SERPLBLD BASED ON 1.73 SQ M-ARVRAT: 15 ML/MIN (ref 89–?)
GLUCOSE SERPL-MCNC: 176 MG/DL (ref 74–106)
HCO3 BLD-SCNC: 25.2 MEQ/L (ref 21–32)
HCT VFR BLD CALC: 24.3 % (ref 35–46)
HGB BLD-MCNC: 8.4 GM/DL (ref 11.6–15.3)
LYMPHOCYTES # BLD AUTO: 1.4 TH/MM3 (ref 1–4.8)
LYMPHOCYTES NFR BLD AUTO: 20.8 % (ref 9–44)
MCH RBC QN AUTO: 33.6 PG (ref 27–34)
MCHC RBC AUTO-ENTMCNC: 34.8 % (ref 32–36)
MCV RBC AUTO: 96.5 FL (ref 80–100)
MONOCYTE #: 0.8 TH/MM3 (ref 0–0.9)
MONOCYTES NFR BLD: 11.4 % (ref 0–8)
NEUTROPHILS # BLD AUTO: 4.4 TH/MM3 (ref 1.8–7.7)
NEUTROPHILS NFR BLD AUTO: 64.6 % (ref 16–70)
PHOSPHATE SERPL-MCNC: 4.8 MG/DL (ref 2.5–4.9)
PLATELET # BLD: 335 TH/MM3 (ref 150–450)
PMV BLD AUTO: 7.8 FL (ref 7–11)
RBC # BLD AUTO: 2.52 MIL/MM3 (ref 4–5.3)
SODIUM SERPL-SCNC: 138 MEQ/L (ref 136–145)
TROPONIN I SERPL-MCNC: (no result) NG/ML (ref 0.02–0.05)
WBC # BLD AUTO: 6.9 TH/MM3 (ref 4–11)

## 2018-02-06 RX ADMIN — FERROUS SULFATE TAB 325 MG (65 MG ELEMENTAL FE) SCH MG: 325 (65 FE) TAB at 21:30

## 2018-02-06 RX ADMIN — SIMETHICONE SCH MG: 125 TABLET, CHEWABLE ORAL at 14:23

## 2018-02-06 RX ADMIN — Medication SCH ML: at 08:51

## 2018-02-06 RX ADMIN — ONDANSETRON PRN MG: 2 INJECTION, SOLUTION INTRAMUSCULAR; INTRAVENOUS at 08:43

## 2018-02-06 RX ADMIN — GABAPENTIN SCH MG: 300 CAPSULE ORAL at 21:31

## 2018-02-06 RX ADMIN — HEPARIN SODIUM SCH UNITS: 10000 INJECTION, SOLUTION INTRAVENOUS; SUBCUTANEOUS at 21:31

## 2018-02-06 RX ADMIN — SIMETHICONE SCH MG: 125 TABLET, CHEWABLE ORAL at 06:33

## 2018-02-06 RX ADMIN — METOPROLOL TARTRATE SCH MG: 100 TABLET, FILM COATED ORAL at 21:31

## 2018-02-06 RX ADMIN — STANDARDIZED SENNA CONCENTRATE AND DOCUSATE SODIUM SCH TAB: 8.6; 5 TABLET, FILM COATED ORAL at 08:48

## 2018-02-06 RX ADMIN — CLOPIDOGREL BISULFATE SCH MG: 75 TABLET, FILM COATED ORAL at 08:51

## 2018-02-06 RX ADMIN — INSULIN ASPART SCH: 100 INJECTION, SOLUTION INTRAVENOUS; SUBCUTANEOUS at 21:00

## 2018-02-06 RX ADMIN — ISOSORBIDE MONONITRATE SCH MG: 60 TABLET, EXTENDED RELEASE ORAL at 06:34

## 2018-02-06 RX ADMIN — STANDARDIZED SENNA CONCENTRATE AND DOCUSATE SODIUM SCH TAB: 8.6; 5 TABLET, FILM COATED ORAL at 21:00

## 2018-02-06 RX ADMIN — PRAVASTATIN SODIUM SCH MG: 40 TABLET ORAL at 21:31

## 2018-02-06 RX ADMIN — ASPIRIN 81 MG SCH MG: 81 TABLET ORAL at 08:48

## 2018-02-06 RX ADMIN — METOPROLOL TARTRATE SCH MG: 100 TABLET, FILM COATED ORAL at 08:46

## 2018-02-06 RX ADMIN — SIMETHICONE SCH MG: 125 TABLET, CHEWABLE ORAL at 21:31

## 2018-02-06 RX ADMIN — INSULIN ASPART SCH: 100 INJECTION, SOLUTION INTRAVENOUS; SUBCUTANEOUS at 12:00

## 2018-02-06 RX ADMIN — FERROUS SULFATE TAB 325 MG (65 MG ELEMENTAL FE) SCH MG: 325 (65 FE) TAB at 08:46

## 2018-02-06 RX ADMIN — FUROSEMIDE SCH MG: 10 INJECTION, SOLUTION INTRAMUSCULAR; INTRAVENOUS at 18:00

## 2018-02-06 RX ADMIN — MORPHINE SULFATE PRN MG: 2 INJECTION, SOLUTION INTRAMUSCULAR; INTRAVENOUS at 01:20

## 2018-02-06 RX ADMIN — HEPARIN SODIUM SCH UNITS: 10000 INJECTION, SOLUTION INTRAVENOUS; SUBCUTANEOUS at 08:49

## 2018-02-06 RX ADMIN — FUROSEMIDE SCH MG: 10 INJECTION, SOLUTION INTRAMUSCULAR; INTRAVENOUS at 08:48

## 2018-02-06 RX ADMIN — NITROGLYCERIN PRN MG: 0.4 TABLET SUBLINGUAL at 01:45

## 2018-02-06 RX ADMIN — Medication SCH ML: at 21:30

## 2018-02-06 RX ADMIN — NITROGLYCERIN PRN MG: 0.4 TABLET SUBLINGUAL at 01:42

## 2018-02-06 RX ADMIN — INSULIN ASPART SCH: 100 INJECTION, SOLUTION INTRAVENOUS; SUBCUTANEOUS at 17:00

## 2018-02-06 RX ADMIN — NITROGLYCERIN PRN MG: 0.4 TABLET SUBLINGUAL at 01:39

## 2018-02-06 RX ADMIN — INSULIN ASPART SCH: 100 INJECTION, SOLUTION INTRAVENOUS; SUBCUTANEOUS at 08:00

## 2018-02-06 NOTE — EKG
Date Performed: 02/06/2018       Time Performed: 02:09:16

 

PTAGE:      52 years

 

EKG:      Sinus rhythm 

 

. LVH with secondary repolarization abnormality Lateral ST-T changes are probably due to ventricular 
hypertrophy Abnormal ECG Since the prior tracing, there has been no significant change 

 

 PREVIOUS TRACING            : 02/04/2018 08.18

 

DOCTOR:   Tatiana Joseph  Interpretating Date/Time  02/06/2018 16:13:03

## 2018-02-06 NOTE — HHI.NPPN
Subjective


Complaints:  Shortness of Breath


General Problems:  Anemia


Renal Failure:  Chronic, Acute, Stage IV


Interval History


Renal function is slightly worse. She is complaining of nausea. Otherwise no 

new symptoms. 


 (Migdalia Camejo)





Review of Systems


General


Constitutional:  Fatigue


 (Migdalia Camejo)





Respiratory


Lungs:  SOB


 (Migdalia Camejo)





Gastrointestinal


Gastrointestinal:  Nausea & Vomiting


 (Migdalia Camejo)





Objective Data


Data





Vital Signs








  Date Time  Temp Pulse Resp B/P (MAP) Pulse Ox O2 Delivery O2 Flow Rate FiO2


 


2/6/18 10:00  89      


 


2/6/18 08:44     97   21


 


2/6/18 07:00  73      


 


2/6/18 07:00 98.2 73 14 138/66 (90) 99   


 


2/6/18 06:00  73      


 


2/6/18 05:00  78      


 


2/6/18 04:00  78      


 


2/6/18 03:00  76      


 


2/6/18 03:00 98.8 76 16 154/78 (103) 100   


 


2/6/18 02:19    152/80 (104)    


 


2/6/18 02:00  76      


 


2/6/18 01:55     98 Nasal Cannula 2.00 


 


2/6/18 01:45    139/72 (94)    


 


2/6/18 01:38    142/71 (94)    


 


2/6/18 01:25   16     


 


2/6/18 01:22    139/73 (95)    


 


2/6/18 01:00  76      


 


2/6/18 00:00  76      


 


2/6/18 00:00 99.0 79 16 142/73 (96) 99   


 


2/5/18 23:00  75      


 


2/5/18 22:00  84      


 


2/5/18 21:00  80      


 


2/5/18 20:00  78      


 


2/5/18 20:00 98.7 78 16 145/77 (99) 98   


 


2/5/18 19:00  76      


 


2/5/18 18:00  95      


 


2/5/18 17:00  92      


 


2/5/18 16:00  78      


 


2/5/18 15:00  76      


 


2/5/18 15:00  75 16 136/74 (94) 98   


 


2/5/18 14:00  77      


 


2/5/18 13:00  76      


 


2/5/18 12:00  74      


 


2/5/18 11:00 98.6 76 14 125/69 (87) 99   


 


2/5/18 11:00  82      








 (Migdalia Camejo)


-:  


2/6/18 0256                                                                    

            2/6/18 0256





Imaging





Last 72 hours Impressions








Chest X-Ray 2/4/18 0810 Signed





Impressions: 





 Service Date/Time:  Sunday, February 4, 2018 08:50 - CONCLUSION:  Bibasilar 





 consolidation slightly worse in the right lower lobe.     Mp Dang MD 








 (Migdlaia Camejo)





Physical Exam


General


Appearance:  Well Developed, No Acute Distress, Comfortable


 (Migdalia Camejo)





Throat


Throat Exam:  Oral Mucosa Pink & Moist


 (Migdalia Camejo)





Pulmonary


Resp Exam:  No Distress, Crackles, Decreased Bases


 (Migdalia Camejo)





Cardiology


CV Exam:  Regular, Normal Sinus Rhythm


 (Migdalia Camejo)





Gastrointestinal/Abdomen


GI Exam:  Soft, Non-Tender, Bowel Sounds Present, Positive Bowel Movement


 (Migdalia Camejo)





Musculoskeletal


MS Exam:  Normal Gait, Normal Tone


 (Migdalia Camejo)





Integumentary


Skin Exam:  Warm, Dry, Intact


 (Migdalia Camejo)





Extremeties


Extremities Exam:  No Edema, Pedal Pulses Palpable


 (Migdalia Camejo)





Neurologic


Neuro Exam:  Alert, Awake, Oriented, Speech Clear, Moving All Extremities


 (Migdalia Camejo)





Psychiatric


Psych Exam:  Appropriate Responses


 (Migdalia Camejo)





Assessment/Plan


Discussed Condition With:  Patient, Relative


Assessment Summary:  Acute Tubular Necrosis, Fluid/Volume Overload, Hypertension

, Diabetes Mellitus, CKD Stage IV


Problem List:  


(1) Acute kidney injury superimposed on CKD


ICD Codes:  N17.9 - Acute kidney failure, unspecified; N18.9 - Chronic kidney 

disease, unspecified


Status:  Acute


Plan:  History of CKD 4, required HD x 2 last admission, creatinine was 3.5 at 

discharge


Continued diuresis with Lasix 40 BID IV


It is still unclear if she will need to restart dialysis this admission, wait 

one more day and repeat labs. 


Avoid IVF.


Avoid nephrotoxic agents, may require cardiac cath in the near future.


She does have heavy proteinuria.


Follow urine output, currently non oliguric. 





(2) CHF (congestive heart failure)


ICD Codes:  I50.9 - Heart failure, unspecified


Plan:  Continue diuresis


Follow fluid status


Fluid restriction discussed with the patient. 





(3) HTN (hypertension)


ICD Codes:  I10 - Hypertension


Status:  Chronic


Plan:  BP is better. 


On Imdur 120 mg daily


Norvasc 10 mg daily


Metoprolol 100 mg BID


Hydralazine 50 mg Q8h


Clonidine 0.1 mg BID


Continue to monitor





(4) DM (diabetes mellitus)


ICD Codes:  E11.9 - Type 2 diabetes mellitus without complications


Plan:  Monitor blood glucose, maintain 140-180 mg/dL.


 (Migdalia Camejo)


Plan


patient was seen and examined. Agree with above assessment and plan. Renal 

function is slightly worse, may need dialysis during this admission. 


 (Kelton Whiteside MD)





Problem Qualifiers





(1) CHF (congestive heart failure):  


Qualified Codes:  I50.9 - Heart failure, unspecified


(2) DM (diabetes mellitus):  








Migdalia Camejo Feb 6, 2018 10:44


Kelton Whiteside MD Feb 6, 2018 14:24

## 2018-02-06 NOTE — PD.CARD.PN
Subjective


Subjective Remarks


admits to orthopnea over night, difficulty sleeping and feels very fatigued. 

continued intermittent chest pain. diuresing well


 (Blanca Luong)





Objective


Medications





Current Medications








 Medications


  (Trade)  Dose


 Ordered  Sig/Luis Armando


 Route  Start Time


 Stop Time Status Last Admin


 


  (NS Flush)  2 ml  UNSCH  PRN


 IVF  2/4/18 08:15


     


 


 


  (NS Flush)  2 ml  UNSCH  PRN


 IV FLUSH  2/4/18 11:30


    2/4/18 17:46


 


 


  (NS Flush)  2 ml  BID


 IV FLUSH  2/4/18 21:00


    2/5/18 21:37


 


 


  (Tylenol)  650 mg  Q4H  PRN


 PO  2/4/18 11:30


    2/4/18 15:33


 


 


  (Zofran Inj)  4 mg  Q6H  PRN


 IVP  2/4/18 11:30


     


 


 


  (Restoril)  15 mg  HS  PRN


 PO  2/4/18 11:30


     


 


 


  (Narcan Inj)  0.4 mg  UNSCH  PRN


 IV PUSH  2/4/18 11:30


     


 


 


  (Theresa-Colace)  1 tab  BID


 PO  2/4/18 21:00


    2/5/18 10:16


 


 


  (Milk Of


 Magnesia Liq)  30 ml  Q12H  PRN


 PO  2/4/18 11:30


     


 


 


  (Senokot)  17.2 mg  Q12H  PRN


 PO  2/4/18 11:30


     


 


 


  (Dulcolax Supp)  10 mg  DAILY  PRN


 RECTAL  2/4/18 11:30


     


 


 


  (Lactulose Liq)  30 ml  DAILY  PRN


 PO  2/4/18 11:30


     


 


 


  (Norvasc)  10 mg  DAILY


 PO  2/5/18 09:00


    2/5/18 10:16


 


 


  (Aspirin Chew)  81 mg  DAILY


 CHEW  2/5/18 09:00


    2/5/18 10:16


 


 


  (Plavix)  75 mg  DAILY


 PO  2/5/18 09:00


    2/5/18 10:17


 


 


  (Ferrous Sulfate)  325 mg  BID


 PO  2/4/18 21:00


    2/5/18 21:37


 


 


  (Neurontin)  300 mg  HS


 PO  2/4/18 21:00


    2/5/18 21:37


 


 


  (Apresoline)  50 mg  Q8HR


 PO  2/4/18 14:00


    2/6/18 06:34


 


 


  (Levemir Inj)  15 units  HS


 SQ  2/4/18 21:00


    2/5/18 21:38


 


 


  (Imdur)  120 mg  DAILY@07


 PO  2/5/18 07:00


    2/6/18 06:34


 


 


  (Lopressor)  100 mg  Q12HR


 PO  2/4/18 21:00


    2/5/18 21:38


 


 


  (Nitrostat Sl)  0.4 mg  Q5M  PRN


 SL  2/4/18 11:30


    2/6/18 01:45


 


 


  (Pravachol)  40 mg  HS


 PO  2/4/18 21:00


    2/5/18 21:38


 


 


  (Phazyme Chew)  125 mg  Q8HR


 PO  2/4/18 14:00


    2/6/18 06:33


 


 


  (Heparin Inj)  5,000 units  Q12HR


 SQ  2/4/18 21:00


    2/5/18 21:38


 


 


  (Trandate Inj)  10 mg  Q20M  PRN


 IV PUSH  2/4/18 11:45


     


 


 


  (Apresoline Inj)  20 mg  Q4H  PRN


 IV PUSH  2/4/18 11:45


    2/4/18 15:09


 


 


  (Lasix Inj)  40 mg  BID@09,18


 IV PUSH  2/4/18 18:00


    2/5/18 18:18


 


 


  (Duoneb Neb)  1 ampule  Q4HR NEB  PRN


 NEB  2/4/18 16:15


    2/6/18 01:51


 


 


  (Ativan)  0.5 mg  Q8HR  PRN


 PO  2/4/18 16:15


    2/5/18 23:50


 


 


  (D50w (Vial) Inj)  50 ml  UNSCH  PRN


 IV PUSH  2/4/18 16:15


    2/5/18 04:20


 


 


  (Glucagon Inj)  1 mg  UNSCH  PRN


 OTHER  2/4/18 16:15


     


 


 


  (NovoLOG


 SUPPLEMENTAL


 SCALE)  1  ACHS SLIDING  SCALE


 SQ  2/4/18 17:00


    2/5/18 21:39


 


 


  (Catapres)  0.1 mg  Q12HR


 PO  2/5/18 10:00


    2/5/18 21:38


 


 


  (Morphine Inj)  2 mg  Q4H  PRN


 IV PUSH  2/6/18 01:00


    2/6/18 01:20


 








Vital Signs / I&O





Vital Signs








  Date Time  Temp Pulse Resp B/P (MAP) Pulse Ox O2 Delivery O2 Flow Rate FiO2


 


2/6/18 06:00  73      


 


2/6/18 05:00  78      


 


2/6/18 04:00  78      


 


2/6/18 03:00  76      


 


2/6/18 03:00 98.8 76 16 154/78 (103) 100   


 


2/6/18 02:19    152/80 (104)    


 


2/6/18 02:00  76      


 


2/6/18 01:55     98 Nasal Cannula 2.00 


 


2/6/18 01:45    139/72 (94)    


 


2/6/18 01:38    142/71 (94)    


 


2/6/18 01:25   16     


 


2/6/18 01:22    139/73 (95)    


 


2/6/18 01:00  76      


 


2/6/18 00:00  76      


 


2/6/18 00:00 99.0 79 16 142/73 (96) 99   


 


2/5/18 23:00  75      


 


2/5/18 22:00  84      


 


2/5/18 21:00  80      


 


2/5/18 20:00  78      


 


2/5/18 20:00 98.7 78 16 145/77 (99) 98   


 


2/5/18 19:00  76      


 


2/5/18 18:00  95      


 


2/5/18 17:00  92      


 


2/5/18 16:00  78      


 


2/5/18 15:00  76      


 


2/5/18 15:00  75 16 136/74 (94) 98   


 


2/5/18 14:00  77      


 


2/5/18 13:00  76      


 


2/5/18 12:00  74      


 


2/5/18 11:00 98.6 76 14 125/69 (87) 99   


 


2/5/18 11:00  82      


 


2/5/18 10:00  82      


 


2/5/18 09:57     98 Nasal Cannula 2.00 


 


2/5/18 09:00  78      














I/O      


 


 2/5/18 2/5/18 2/5/18 2/6/18 2/6/18 2/6/18





 07:00 15:00 23:00 07:00 15:00 23:00


 


Intake Total 380 ml  480 ml 480 ml  


 


Output Total 1200 ml  400 ml 700 ml  


 


Balance -820 ml  80 ml -220 ml  


 


      


 


Intake Oral 380 ml  480 ml 480 ml  


 


Output Urine Total 1200 ml  400 ml 700 ml  


 


# Voids    1  


 


# Bowel Movements 0   1  








Physical Exam


GENERAL: 


SKIN: Warm and dry.


HEAD: Atraumatic. Normocephalic. 


EYES: Pupils equal and round. No scleral icterus. No injection or drainage. 


ENT: No nasal bleeding or discharge.  .


NECK: Trachea midline. No JVD. 


CARDIOVASCULAR: Regular rate and rhythm.  no murmurs


RESPIRATORY: No accessory muscle use. Clear to auscultation. Breath sounds 

equal bilaterally. 


GASTROINTESTINAL: Abdomen soft, non-tender, nondistended. 


MUSCULOSKELETAL: Extremities without clubbing, cyanosis, or edema. No obvious 

deformities. 


NEUROLOGICAL: Awake and alert. No obvious cranial nerve deficits.   Normal 

speech.


PSYCHIATRIC: Appropriate mood and affect; insight and judgment normal.


Laboratory





Laboratory Tests








Test


  2/5/18


13:30 2/5/18


14:08 2/6/18


02:56


 


Urine Color LIGHT-YELLOW   


 


Urine Turbidity CLEAR   


 


Urine pH 6.0   


 


Urine Specific Gravity 1.011   


 


Urine Protein 300 mg/dL   


 


Urine Glucose (UA) NEG mg/dL   


 


Urine Ketones NEG mg/dL   


 


Urine Occult Blood NEG   


 


Urine Nitrite NEG   


 


Urine Bilirubin NEG   


 


Urine Urobilinogen


  LESS THAN 2.0


MG/DL 


  


 


 


Urine Leukocyte Esterase NEG   


 


Urine RBC 1 /hpf   


 


Urine WBC 2 /hpf   


 


Urine Squamous Epithelial


Cells 1 /hpf 


  


  


 


 


Urine Hyaline Casts 1 /lpf   


 


Urine Mucus FEW /lpf   


 


Microscopic Urinalysis Comment


  CULT NOT


INDICATED 


  


 


 


Urine Random Creatinine  61 MG/DL  


 


Urine Microalbumin/Creatinine


Ratio 


  4672 MG/G CRE 


  


 


 


White Blood Count   6.9 TH/MM3 


 


Red Blood Count   2.52 MIL/MM3 


 


Hemoglobin   8.4 GM/DL 


 


Hematocrit   24.3 % 


 


Mean Corpuscular Volume   96.5 FL 


 


Mean Corpuscular Hemoglobin   33.6 PG 


 


Mean Corpuscular Hemoglobin


Concent 


  


  34.8 % 


 


 


Red Cell Distribution Width   14.4 % 


 


Platelet Count   335 TH/MM3 


 


Mean Platelet Volume   7.8 FL 


 


Neutrophils (%) (Auto)   64.6 % 


 


Lymphocytes (%) (Auto)   20.8 % 


 


Monocytes (%) (Auto)   11.4 % 


 


Eosinophils (%) (Auto)   3.0 % 


 


Basophils (%) (Auto)   0.2 % 


 


Neutrophils # (Auto)   4.4 TH/MM3 


 


Lymphocytes # (Auto)   1.4 TH/MM3 


 


Monocytes # (Auto)   0.8 TH/MM3 


 


Eosinophils # (Auto)   0.2 TH/MM3 


 


Basophils # (Auto)   0.0 TH/MM3 


 


CBC Comment   DIFF FINAL 


 


Differential Comment    


 


Blood Urea Nitrogen   63 MG/DL 


 


Creatinine   3.74 MG/DL 


 


Random Glucose   176 MG/DL 


 


Albumin   2.6 GM/DL 


 


Calcium Level   7.7 MG/DL 


 


Phosphorus Level   4.8 MG/DL 


 


Sodium Level   138 MEQ/L 


 


Potassium Level   3.9 MEQ/L 


 


Chloride Level   105 MEQ/L 


 


Carbon Dioxide Level   25.2 MEQ/L 


 


Anion Gap   8 MEQ/L 


 


Estimat Glomerular Filtration


Rate 


  


  15 ML/MIN 


 


 


Total Creatine Kinase   55 U/L 


 


Troponin I


  


  


  LESS THAN 0.02


NG/ML








Imaging





Last 48 hours Impressions








Chest X-Ray 2/4/18 0810 Signed





Impressions: 





 Service Date/Time:  Sunday, February 4, 2018 08:50 - CONCLUSION:  Bibasilar 





 consolidation slightly worse in the right lower lobe.     Mp Dang MD 








 (Blanca Luong)





Assessment and Plan


Problem List:  


(1) CAD (coronary artery disease)


ICD Codes:  I25.10 - Atherosclerotic heart disease of native coronary artery 

without angina pectoris


Status:  Chronic


(2) CHF (congestive heart failure)


ICD Codes:  I50.9 - Heart failure, unspecified


(3) Acute kidney injury superimposed on CKD


ICD Codes:  N17.9 - Acute kidney failure, unspecified; N18.9 - Chronic kidney 

disease, unspecified


Status:  Acute


Assessment and Plan


51 yo AAF recently discharged from this facility 3 days ago after prolonged 

hospitalization for chest pain, CHF and CKD. She has a history of CAD with 

stenting ~ 10 years ago and recent LHC on 1/22/18 showing severe 2 vessel 

disease to RCA and mid-LAD. Renal function had declined requiring hemodialysis. 

PCI has been on hold due to renal function and the plan was to wait for renal 

status to improve before pursuing intervention. Since patient was discharged on 

2/1/18 she has been experiencing progressive SOB, orthopnea and mild non-

exertional chest tightness. She has been readmitted for progression of symptoms.





CAD- severe 2 vessel disease to  RCA and mid-LAD. Echo 55-60%.


diuresing well. 


isosorbide for chest pain


 


CKD IV- nephro following. considering HD





consider cardiac cath when permanent HD.


 (Blanca Luong)


Assessment and Plan


-----------------


no change


borderline ESRD


possible [facility] PCI at some point


 (Edgardo Mercado MD)





Problem Qualifiers





(1) CHF (congestive heart failure):  


Qualified Codes:  I50.9 - Heart failure, unspecified








Blanca Luong Feb 6, 2018 08:09


Edgardo Mercado MD Feb 6, 2018 11:02

## 2018-02-06 NOTE — HHI.PR
Subjective


Remarks


Feels improving. No much sob, improved. However still nauseated and not able to 

eat much .Did not vomit in the morning. 


No diarrhea or constipation.





Objective


Vitals





Vital Signs








  Date Time  Temp Pulse Resp B/P (MAP) Pulse Ox O2 Delivery O2 Flow Rate FiO2


 


2/6/18 10:00  89      


 


2/6/18 08:44     97   21


 


2/6/18 07:00  73      


 


2/6/18 07:00 98.2 73 14 138/66 (90) 99   


 


2/6/18 06:00  73      


 


2/6/18 05:00  78      


 


2/6/18 04:00  78      


 


2/6/18 03:00  76      


 


2/6/18 03:00 98.8 76 16 154/78 (103) 100   


 


2/6/18 02:19    152/80 (104)    


 


2/6/18 02:00  76      


 


2/6/18 01:55     98 Nasal Cannula 2.00 


 


2/6/18 01:45    139/72 (94)    


 


2/6/18 01:38    142/71 (94)    


 


2/6/18 01:25   16     


 


2/6/18 01:22    139/73 (95)    


 


2/6/18 01:00  76      


 


2/6/18 00:00  76      


 


2/6/18 00:00 99.0 79 16 142/73 (96) 99   


 


2/5/18 23:00  75      


 


2/5/18 22:00  84      


 


2/5/18 21:00  80      


 


2/5/18 20:00  78      


 


2/5/18 20:00 98.7 78 16 145/77 (99) 98   


 


2/5/18 19:00  76      


 


2/5/18 18:00  95      


 


2/5/18 17:00  92      


 


2/5/18 16:00  78      


 


2/5/18 15:00  76      


 


2/5/18 15:00  75 16 136/74 (94) 98   


 


2/5/18 14:00  77      


 


2/5/18 13:00  76      


 


2/5/18 12:00  74      














I/O      


 


 2/5/18 2/5/18 2/5/18 2/6/18 2/6/18 2/6/18





 07:00 15:00 23:00 07:00 15:00 23:00


 


Intake Total 380 ml  480 ml 480 ml  


 


Output Total 1200 ml  400 ml 700 ml  


 


Balance -820 ml  80 ml -220 ml  


 


      


 


Intake Oral 380 ml  480 ml 480 ml  


 


Output Urine Total 1200 ml  400 ml 700 ml  


 


# Voids    1  


 


# Bowel Movements 0   1  








Result Diagram:  


2/6/18 0256                                                                    

            2/6/18 0256





Imaging





Last Impressions








Chest X-Ray 2/4/18 0810 Signed





Impressions: 





 Service Date/Time:  Sunday, February 4, 2018 08:50 - CONCLUSION:  Bibasilar 





 consolidation slightly worse in the right lower lobe.     Mp Dang MD 








Objective Remarks


GENERAL: This is a well-nourished, well-developed patient, in no apparent 

distress.


CARDIOVASCULAR: Regular rate and rhythm without murmurs, gallops, or rubs. 


RESPIRATORY: Clear to auscultation. Breath sounds equal bilaterally. No wheezes

, rales, or rhonchi.  


GASTROINTESTINAL: Abdomen soft, non-tender, nondistended. No hepato-splenomegaly

, or palpable masses. No guarding.


MUSCULOSKELETAL: Extremities without clubbing, cyanosis, or edema. No joint 

tenderness, effusion, or edema noted. No calf tenderness. Negative Homans sign 

bilaterally.


NEUROLOGICAL: Awake and alert. Cranial nerves II through XII intact.  Motor and 

sensory grossly within normal limits. Five out of 5 muscle strength in all 

muscle groups.  Normal speech.








A/P


Assessment and Plan


52-year-old female presented with sob, lethargy








Acute on Chronic diastolic CHF


Cardiomyopathy 


   Echo with EF 55-60%


   Discussed with Dr García nephrology, start Lasix 40 mg BID


   Continue with Imdur/BB








Acute on chronic Stage IV CKD


   Consult nephrology was seen by Dr Wihteside. Management per nephrology


   Avoid nephrotoxins.  


   Had Vas-Cath  on 1/26.  Patient received dialysis on 1/26 and on 1/27.  


   Per nephrologist most likely patient has reached end-stage renal disease.


   Nephro discussed options moving forward related.  Peritoneal dialysis versus 

hemodialysis.  Patient stated that she will consider options. However patient 

kidney function improved and no need for HD. Patient can' have further cardiac 

interventions as might put her in ESRD requiring HD. 


   Per nephrology patient might need HD. However it is unclear if she will need 

to restart dialysis this admission, however currently she is comfortable and 

creatinine is slightly better. Monitor. 


   Continue strict ins and outs 


   Trend creatinine.


   Avoid any nephrotoxic agents, stop non essential medications.  





Recent NSTEMI:  


   Post cardiac catheterization on 1/22 showing severe two-vessel disease of 

the right coronary artery in the mid LAD.  


   Recommended high risk PCI versus cardiac bypass versus medical management. 

PCI is  held until cleared by nephrology. Patient is not on HD at this time 

however cant have procedure because kidney function will worsen and will need 

HD. Cardiology consulted. EKG without ST changes. No chest pain at this time. 








Hemoptysis on 1/27


   Patient has not had any episodes.  She is also asymptomatic. Was seen by pulm





GI bleed-resolved


Anemia of acute blood loss


   Was  transfused with 2 units of packed red blood cells last admission. H/H 

stable so far continue iron supplement 


   Was seen by GI last admission and had panendoscopy with finding of gastritis 

EGD and colon polyps on colonoscopy pending biopsy report


   Unremarkable small bowel follow-through


   PPI and continue to monitor H&H





Had Sepsis/ PNA Resolved Received IV abx, Rocephin and azithromycin 

discontinued on 1/22 





Hypoxia-resolved


   Continue with treatment as in above








Hypertension- Continue homemeds, Lopressor, Procardia, metoprolol, hydralazine, 

hold ACEI/ARBs





IDDM:  Sliding scale w/ Accu-Cheks. Restart home insulin. 





Tobacco Abuse:  Strongly recommended to stop smoking, no NicoDerm to avoid 

vasoconstriction.  





Code Status


Full code


Discussed Condition With


patient, nurse











Juliette Hodges MD Feb 6, 2018 11:46

## 2018-02-07 VITALS
SYSTOLIC BLOOD PRESSURE: 126 MMHG | DIASTOLIC BLOOD PRESSURE: 62 MMHG | OXYGEN SATURATION: 100 % | HEART RATE: 70 BPM | RESPIRATION RATE: 16 BRPM | TEMPERATURE: 97.9 F

## 2018-02-07 VITALS — DIASTOLIC BLOOD PRESSURE: 82 MMHG | SYSTOLIC BLOOD PRESSURE: 174 MMHG

## 2018-02-07 VITALS — DIASTOLIC BLOOD PRESSURE: 71 MMHG | SYSTOLIC BLOOD PRESSURE: 147 MMHG

## 2018-02-07 VITALS — HEART RATE: 74 BPM

## 2018-02-07 VITALS
SYSTOLIC BLOOD PRESSURE: 151 MMHG | HEART RATE: 80 BPM | TEMPERATURE: 98.8 F | RESPIRATION RATE: 16 BRPM | DIASTOLIC BLOOD PRESSURE: 75 MMHG | OXYGEN SATURATION: 98 %

## 2018-02-07 VITALS — DIASTOLIC BLOOD PRESSURE: 78 MMHG | SYSTOLIC BLOOD PRESSURE: 148 MMHG

## 2018-02-07 VITALS — HEART RATE: 82 BPM

## 2018-02-07 VITALS — DIASTOLIC BLOOD PRESSURE: 71 MMHG | SYSTOLIC BLOOD PRESSURE: 135 MMHG

## 2018-02-07 VITALS — HEART RATE: 80 BPM

## 2018-02-07 VITALS
DIASTOLIC BLOOD PRESSURE: 58 MMHG | SYSTOLIC BLOOD PRESSURE: 102 MMHG | OXYGEN SATURATION: 97 % | TEMPERATURE: 98.1 F | HEART RATE: 82 BPM | RESPIRATION RATE: 16 BRPM

## 2018-02-07 VITALS — SYSTOLIC BLOOD PRESSURE: 147 MMHG | DIASTOLIC BLOOD PRESSURE: 75 MMHG

## 2018-02-07 VITALS — HEART RATE: 84 BPM

## 2018-02-07 VITALS — SYSTOLIC BLOOD PRESSURE: 142 MMHG | HEART RATE: 76 BPM | DIASTOLIC BLOOD PRESSURE: 72 MMHG

## 2018-02-07 VITALS — DIASTOLIC BLOOD PRESSURE: 78 MMHG | SYSTOLIC BLOOD PRESSURE: 145 MMHG

## 2018-02-07 VITALS — HEART RATE: 77 BPM

## 2018-02-07 VITALS — SYSTOLIC BLOOD PRESSURE: 156 MMHG | DIASTOLIC BLOOD PRESSURE: 74 MMHG

## 2018-02-07 VITALS
HEART RATE: 84 BPM | RESPIRATION RATE: 18 BRPM | SYSTOLIC BLOOD PRESSURE: 140 MMHG | OXYGEN SATURATION: 100 % | DIASTOLIC BLOOD PRESSURE: 87 MMHG

## 2018-02-07 VITALS — HEART RATE: 87 BPM

## 2018-02-07 VITALS — HEART RATE: 71 BPM

## 2018-02-07 VITALS — HEART RATE: 78 BPM

## 2018-02-07 VITALS — DIASTOLIC BLOOD PRESSURE: 69 MMHG | SYSTOLIC BLOOD PRESSURE: 122 MMHG

## 2018-02-07 VITALS — HEART RATE: 76 BPM

## 2018-02-07 VITALS — HEART RATE: 85 BPM

## 2018-02-07 VITALS — HEART RATE: 72 BPM

## 2018-02-07 LAB
BASOPHILS # BLD AUTO: 0.1 TH/MM3 (ref 0–0.2)
BASOPHILS NFR BLD: 1 % (ref 0–2)
BUN SERPL-MCNC: 60 MG/DL (ref 7–18)
CALCIUM SERPL-MCNC: 8.2 MG/DL (ref 8.5–10.1)
CHLORIDE SERPL-SCNC: 104 MEQ/L (ref 98–107)
CREAT SERPL-MCNC: 3.97 MG/DL (ref 0.5–1)
EOSINOPHIL # BLD: 0.2 TH/MM3 (ref 0–0.4)
EOSINOPHIL NFR BLD: 3 % (ref 0–4)
ERYTHROCYTE [DISTWIDTH] IN BLOOD BY AUTOMATED COUNT: 14.5 % (ref 11.6–17.2)
GFR SERPLBLD BASED ON 1.73 SQ M-ARVRAT: 14 ML/MIN (ref 89–?)
GLUCOSE SERPL-MCNC: 221 MG/DL (ref 74–106)
HCO3 BLD-SCNC: 24.9 MEQ/L (ref 21–32)
HCT VFR BLD CALC: 24.4 % (ref 35–46)
HGB BLD-MCNC: 8.3 GM/DL (ref 11.6–15.3)
LYMPHOCYTES # BLD AUTO: 1.2 TH/MM3 (ref 1–4.8)
LYMPHOCYTES NFR BLD AUTO: 18.2 % (ref 9–44)
MCH RBC QN AUTO: 32.7 PG (ref 27–34)
MCHC RBC AUTO-ENTMCNC: 34.1 % (ref 32–36)
MCV RBC AUTO: 95.8 FL (ref 80–100)
MONOCYTE #: 0.7 TH/MM3 (ref 0–0.9)
MONOCYTES NFR BLD: 10.4 % (ref 0–8)
NEUTROPHILS # BLD AUTO: 4.4 TH/MM3 (ref 1.8–7.7)
NEUTROPHILS NFR BLD AUTO: 67.4 % (ref 16–70)
PLATELET # BLD: 340 TH/MM3 (ref 150–450)
PMV BLD AUTO: 7.9 FL (ref 7–11)
RBC # BLD AUTO: 2.55 MIL/MM3 (ref 4–5.3)
SODIUM SERPL-SCNC: 137 MEQ/L (ref 136–145)
WBC # BLD AUTO: 6.6 TH/MM3 (ref 4–11)

## 2018-02-07 PROCEDURE — 5A1D70Z PERFORMANCE OF URINARY FILTRATION, INTERMITTENT, LESS THAN 6 HOURS PER DAY: ICD-10-PCS | Performed by: INTERNAL MEDICINE

## 2018-02-07 PROCEDURE — 05HM33Z INSERTION OF INFUSION DEVICE INTO RIGHT INTERNAL JUGULAR VEIN, PERCUTANEOUS APPROACH: ICD-10-PCS | Performed by: RADIOLOGY

## 2018-02-07 RX ADMIN — NITROGLYCERIN PRN MG: 0.4 TABLET SUBLINGUAL at 10:25

## 2018-02-07 RX ADMIN — HEPARIN SODIUM PRN UNITS: 1000 INJECTION, SOLUTION INTRAVENOUS; SUBCUTANEOUS at 18:12

## 2018-02-07 RX ADMIN — NITROGLYCERIN PRN MG: 0.4 TABLET SUBLINGUAL at 10:32

## 2018-02-07 RX ADMIN — CLOPIDOGREL BISULFATE SCH MG: 75 TABLET, FILM COATED ORAL at 09:55

## 2018-02-07 RX ADMIN — ASPIRIN 81 MG SCH MG: 81 TABLET ORAL at 09:55

## 2018-02-07 RX ADMIN — ACYCLOVIR SCH UNITS: 800 TABLET ORAL at 21:00

## 2018-02-07 RX ADMIN — FERROUS SULFATE TAB 325 MG (65 MG ELEMENTAL FE) SCH MG: 325 (65 FE) TAB at 21:28

## 2018-02-07 RX ADMIN — STANDARDIZED SENNA CONCENTRATE AND DOCUSATE SODIUM SCH TAB: 8.6; 5 TABLET, FILM COATED ORAL at 09:54

## 2018-02-07 RX ADMIN — STANDARDIZED SENNA CONCENTRATE AND DOCUSATE SODIUM SCH TAB: 8.6; 5 TABLET, FILM COATED ORAL at 21:28

## 2018-02-07 RX ADMIN — FERROUS SULFATE TAB 325 MG (65 MG ELEMENTAL FE) SCH MG: 325 (65 FE) TAB at 09:54

## 2018-02-07 RX ADMIN — SIMETHICONE SCH MG: 125 TABLET, CHEWABLE ORAL at 14:00

## 2018-02-07 RX ADMIN — MORPHINE SULFATE PRN MG: 2 INJECTION, SOLUTION INTRAMUSCULAR; INTRAVENOUS at 10:05

## 2018-02-07 RX ADMIN — Medication SCH ML: at 10:05

## 2018-02-07 RX ADMIN — INSULIN ASPART SCH: 100 INJECTION, SOLUTION INTRAVENOUS; SUBCUTANEOUS at 12:00

## 2018-02-07 RX ADMIN — SIMETHICONE SCH MG: 125 TABLET, CHEWABLE ORAL at 21:28

## 2018-02-07 RX ADMIN — SIMETHICONE SCH MG: 125 TABLET, CHEWABLE ORAL at 07:11

## 2018-02-07 RX ADMIN — MORPHINE SULFATE PRN MG: 2 INJECTION, SOLUTION INTRAMUSCULAR; INTRAVENOUS at 09:56

## 2018-02-07 RX ADMIN — ISOSORBIDE MONONITRATE SCH MG: 60 TABLET, EXTENDED RELEASE ORAL at 07:12

## 2018-02-07 RX ADMIN — MORPHINE SULFATE PRN MG: 2 INJECTION, SOLUTION INTRAMUSCULAR; INTRAVENOUS at 18:51

## 2018-02-07 RX ADMIN — INSULIN ASPART SCH: 100 INJECTION, SOLUTION INTRAVENOUS; SUBCUTANEOUS at 21:00

## 2018-02-07 RX ADMIN — MORPHINE SULFATE PRN MG: 2 INJECTION, SOLUTION INTRAMUSCULAR; INTRAVENOUS at 21:27

## 2018-02-07 RX ADMIN — Medication SCH ML: at 21:28

## 2018-02-07 RX ADMIN — METOPROLOL TARTRATE SCH MG: 100 TABLET, FILM COATED ORAL at 21:28

## 2018-02-07 RX ADMIN — GABAPENTIN SCH MG: 300 CAPSULE ORAL at 21:28

## 2018-02-07 RX ADMIN — HEPARIN SODIUM SCH UNITS: 10000 INJECTION, SOLUTION INTRAVENOUS; SUBCUTANEOUS at 09:54

## 2018-02-07 RX ADMIN — INSULIN ASPART SCH: 100 INJECTION, SOLUTION INTRAVENOUS; SUBCUTANEOUS at 08:00

## 2018-02-07 RX ADMIN — NITROGLYCERIN PRN MG: 0.4 TABLET SUBLINGUAL at 09:41

## 2018-02-07 RX ADMIN — FUROSEMIDE SCH MG: 10 INJECTION, SOLUTION INTRAMUSCULAR; INTRAVENOUS at 09:56

## 2018-02-07 RX ADMIN — NITROGLYCERIN PRN MG: 0.4 TABLET SUBLINGUAL at 09:46

## 2018-02-07 RX ADMIN — METOPROLOL TARTRATE SCH MG: 100 TABLET, FILM COATED ORAL at 09:54

## 2018-02-07 RX ADMIN — GENTAMICIN SULFATE PRN MG: 10 INJECTION, SOLUTION INTRAMUSCULAR; INTRAVENOUS at 18:12

## 2018-02-07 RX ADMIN — NITROGLYCERIN PRN MG: 0.4 TABLET SUBLINGUAL at 10:12

## 2018-02-07 RX ADMIN — INSULIN ASPART SCH: 100 INJECTION, SOLUTION INTRAVENOUS; SUBCUTANEOUS at 17:00

## 2018-02-07 RX ADMIN — NITROGLYCERIN PRN MG: 0.4 TABLET SUBLINGUAL at 09:35

## 2018-02-07 NOTE — HHI.PR
Subjective


Remarks


No chest pain when seen, but patient had chest pain this morning.  She is 

started on a nitroglycerin drip.





Objective





Vital Signs








  Date Time  Temp Pulse Resp B/P (MAP) Pulse Ox O2 Delivery O2 Flow Rate FiO2


 


2/7/18 14:00  74      


 


2/7/18 13:20  72      


 


2/7/18 12:00  71      


 


2/7/18 11:00  78      


 


2/7/18 11:00 97.9 70 16 126/62 (83) 100   


 


2/7/18 10:38  80      


 


2/7/18 09:00  74      


 


2/7/18 08:00  82      


 


2/7/18 07:40 98.8 80 16 151/75 (100) 98   


 


2/7/18 07:30  76      


 


2/7/18 07:00  80      


 


2/7/18 06:00  85      


 


2/7/18 05:00  82      


 


2/7/18 04:00  87      


 


2/7/18 03:00 98.1 82 16 102/58 (73) 97   


 


2/7/18 03:00  77      


 


2/7/18 02:00  75      


 


2/7/18 01:00  77      


 


2/7/18 00:00  82      


 


2/6/18 23:58 98.8 81 16 144/73 (96) 97   


 


2/6/18 23:00  82      


 


2/6/18 22:00  78      


 


2/6/18 21:00  70      


 


2/6/18 20:00  74      


 


2/6/18 20:00 98.2 74 16 123/67 (85) 100   


 


2/6/18 19:00  74      


 


2/6/18 18:00  74      


 


2/6/18 17:23     98   21


 


2/6/18 17:00  66      














I/O      


 


 2/6/18 2/6/18 2/6/18 2/7/18 2/7/18 2/7/18





 07:00 15:00 23:00 07:00 15:00 23:00


 


Intake Total 480 ml  240 ml 480 ml  


 


Output Total 700 ml  1000 ml 900 ml  


 


Balance -220 ml  -760 ml -420 ml  


 


      


 


Intake Oral 480 ml  240 ml 480 ml  


 


Output Urine Total 700 ml  1000 ml 900 ml  


 


# Voids 1  2   


 


# Bowel Movements 1   0  








Result Diagram:  


2/7/18 0531 2/7/18 0531





Objective Remarks


GENERAL: NAD, A&Ox3


HEAD: Normocephalic. 


NECK: Supple, trachea midline. No lymphadenopathy.


EYES: No scleral icterus. No injection or drainage. 


CARDIOVASCULAR: Regular rate and rhythm without murmurs, gallops, or rubs. 


RESPIRATORY: Breath sounds equal bilaterally. No accessory muscle use.


GASTROINTESTINAL: Abdomen soft, non-tender, nondistended. 


MUSCULOSKELETAL: No cyanosis, or edema. 


SKIN: Warm and dry.


NEURO:  No focal neurological deficitis.





A/P


Problem List:  


(1) Renal insufficiency


ICD Code:  N28.9 - Disorder of kidney and ureter, unspecified


(2) Acute kidney injury superimposed on CKD


ICD Code:  N17.9 - Acute kidney failure, unspecified; N18.9 - Chronic kidney 

disease, unspecified


Status:  Acute


(3) DM (diabetes mellitus)


ICD Code:  E11.9 - Type 2 diabetes mellitus without complications


(4) HTN (hypertension)


ICD Code:  I10 - Hypertension


Status:  Chronic


(5) CAD (coronary artery disease)


ICD Code:  I25.10 - Atherosclerotic heart disease of native coronary artery 

without angina pectoris


Status:  Chronic


Assessment and Plan


52-year-old female presented with sob, lethargy





Acute on Chronic diastolic CHF


Cardiomyopathy 


Continue twice a day Lasix


Continue imdur


Continue beta blocker





Acute on chronic Stage IV CKD


Nephrology following


Avoiding nephrotoxins


Plan for Vas-Cath


Dialysis as needed





Chest pain


Recent an STEMI


Follow cardiac enzymes


Cardiology following


Nitroglycerin drip


Plan for heart catheter tomorrow








Hemoptysis on 1/27


No recurrence 





GI bleed-resolved


Anemia of acute blood loss


2 units transfused at time of admit


Hemoglobin has remained stable since


Gastritis found on GI workup


Continue PPI


Follow hemoglobin





Hypertension


Continue Lopressor


Continue Procardia


Continue hydralazine


Ace inhibitors on hold





Diabetes mellitus type 2


Follow blood sugars


Insulin sliding scale


Diabetic diet





Tobacco abuse


Cessation recommended





Problem Qualifiers





(1) DM (diabetes mellitus):  








Pepe Treadwell MD Feb 7, 2018 16:35

## 2018-02-07 NOTE — PD.CARD.PN
Subjective


Subjective Remarks


feeling better; able to rest overnight without orthopnea. no chest pain, 

diuresing well.


 (Blanca Luong)





Objective


Medications





Current Medications








 Medications


  (Trade)  Dose


 Ordered  Sig/Luis Armando


 Route  Start Time


 Stop Time Status Last Admin


 


  (NS Flush)  2 ml  UNSCH  PRN


 IVF  2/4/18 08:15


     


 


 


  (NS Flush)  2 ml  UNSCH  PRN


 IV FLUSH  2/4/18 11:30


    2/4/18 17:46


 


 


  (NS Flush)  2 ml  BID


 IV FLUSH  2/4/18 21:00


    2/6/18 21:30


 


 


  (Tylenol)  650 mg  Q4H  PRN


 PO  2/4/18 11:30


    2/4/18 15:33


 


 


  (Zofran Inj)  4 mg  Q6H  PRN


 IVP  2/4/18 11:30


    2/6/18 08:43


 


 


  (Restoril)  15 mg  HS  PRN


 PO  2/4/18 11:30


     


 


 


  (Narcan Inj)  0.4 mg  UNSCH  PRN


 IV PUSH  2/4/18 11:30


     


 


 


  (Theresa-Colace)  1 tab  BID


 PO  2/4/18 21:00


    2/6/18 08:48


 


 


  (Milk Of


 Magnesia Liq)  30 ml  Q12H  PRN


 PO  2/4/18 11:30


     


 


 


  (Senokot)  17.2 mg  Q12H  PRN


 PO  2/4/18 11:30


     


 


 


  (Dulcolax Supp)  10 mg  DAILY  PRN


 RECTAL  2/4/18 11:30


     


 


 


  (Lactulose Liq)  30 ml  DAILY  PRN


 PO  2/4/18 11:30


     


 


 


  (Norvasc)  10 mg  DAILY


 PO  2/5/18 09:00


    2/6/18 08:47


 


 


  (Aspirin Chew)  81 mg  DAILY


 CHEW  2/5/18 09:00


    2/6/18 08:48


 


 


  (Plavix)  75 mg  DAILY


 PO  2/5/18 09:00


    2/6/18 08:51


 


 


  (Ferrous Sulfate)  325 mg  BID


 PO  2/4/18 21:00


    2/6/18 21:30


 


 


  (Neurontin)  300 mg  HS


 PO  2/4/18 21:00


    2/6/18 21:31


 


 


  (Apresoline)  50 mg  Q8HR


 PO  2/4/18 14:00


    2/7/18 07:11


 


 


  (Imdur)  120 mg  DAILY@07


 PO  2/5/18 07:00


    2/7/18 07:12


 


 


  (Lopressor)  100 mg  Q12HR


 PO  2/4/18 21:00


    2/6/18 21:31


 


 


  (Nitrostat Sl)  0.4 mg  Q5M  PRN


 SL  2/4/18 11:30


    2/6/18 01:45


 


 


  (Pravachol)  40 mg  HS


 PO  2/4/18 21:00


    2/6/18 21:31


 


 


  (Phazyme Chew)  125 mg  Q8HR


 PO  2/4/18 14:00


    2/7/18 07:11


 


 


  (Heparin Inj)  5,000 units  Q12HR


 SQ  2/4/18 21:00


    2/6/18 21:31


 


 


  (Trandate Inj)  10 mg  Q20M  PRN


 IV PUSH  2/4/18 11:45


     


 


 


  (Apresoline Inj)  20 mg  Q4H  PRN


 IV PUSH  2/4/18 11:45


    2/4/18 15:09


 


 


  (Lasix Inj)  40 mg  BID@09,18


 IV PUSH  2/4/18 18:00


    2/6/18 18:00


 


 


  (Duoneb Neb)  1 ampule  Q4HR NEB  PRN


 NEB  2/4/18 16:15


    2/6/18 01:51


 


 


  (Ativan)  0.5 mg  Q8HR  PRN


 PO  2/4/18 16:15


    2/6/18 10:01


 


 


  (D50w (Vial) Inj)  50 ml  UNSCH  PRN


 IV PUSH  2/4/18 16:15


    2/5/18 04:20


 


 


  (Glucagon Inj)  1 mg  UNSCH  PRN


 OTHER  2/4/18 16:15


     


 


 


  (NovoLOG


 SUPPLEMENTAL


 SCALE)  1  ACHS SLIDING  SCALE


 SQ  2/4/18 17:00


    2/6/18 21:00


 


 


  (Catapres)  0.1 mg  Q12HR


 PO  2/5/18 10:00


    2/6/18 21:31


 


 


  (Morphine Inj)  2 mg  Q4H  PRN


 IV PUSH  2/6/18 01:00


    2/6/18 01:20


 


 


  (Levemir Inj)  8 units  HS


 SQ  2/7/18 21:00


     


 








Vital Signs / I&O





Vital Signs








  Date Time  Temp Pulse Resp B/P (MAP) Pulse Ox O2 Delivery O2 Flow Rate FiO2


 


2/7/18 07:40 98.8 80 16 151/75 (100) 98   


 


2/7/18 06:00  85      


 


2/7/18 05:00  82      


 


2/7/18 04:00  87      


 


2/7/18 03:00 98.1 82 16 102/58 (73) 97   


 


2/7/18 03:00  77      


 


2/7/18 02:00  75      


 


2/7/18 01:00  77      


 


2/7/18 00:00  82      


 


2/6/18 23:58 98.8 81 16 144/73 (96) 97   


 


2/6/18 23:00  82      


 


2/6/18 22:00  78      


 


2/6/18 21:00  70      


 


2/6/18 20:00  74      


 


2/6/18 20:00 98.2 74 16 123/67 (85) 100   


 


2/6/18 19:00  74      


 


2/6/18 18:00  74      


 


2/6/18 17:23     98   21


 


2/6/18 17:00  66      


 


2/6/18 16:00  72      


 


2/6/18 15:30 97.8 71 18 112/56 (74) 98   


 


2/6/18 15:00  66      


 


2/6/18 14:00  77      


 


2/6/18 13:00  66      


 


2/6/18 12:42  63      


 


2/6/18 11:50  63      


 


2/6/18 11:00 97.7 63 16 109/57 (74) 97   


 


2/6/18 10:00  89      














I/O      


 


 2/6/18 2/6/18 2/6/18 2/7/18 2/7/18 2/7/18





 07:00 15:00 23:00 07:00 15:00 23:00


 


Intake Total 480 ml  240 ml 480 ml  


 


Output Total 700 ml  1000 ml 900 ml  


 


Balance -220 ml  -760 ml -420 ml  


 


      


 


Intake Oral 480 ml  240 ml 480 ml  


 


Output Urine Total 700 ml  1000 ml 900 ml  


 


# Voids 1  2   


 


# Bowel Movements 1   0  








Physical Exam


GENERAL: 


SKIN: Warm and dry.


HEAD: Atraumatic. Normocephalic. 


EYES: Pupils equal and round. No scleral icterus. No injection or drainage. 


ENT: No nasal bleeding or discharge.  .


NECK: Trachea midline. No JVD. 


CARDIOVASCULAR: Regular rate and rhythm.  no murmurs


RESPIRATORY: No accessory muscle use. Clear to auscultation. Breath sounds 

equal bilaterally. 


GASTROINTESTINAL: Abdomen soft, non-tender, nondistended. 


MUSCULOSKELETAL: Extremities without clubbing, cyanosis, or edema. No obvious 

deformities. 


NEUROLOGICAL: Awake and alert. No obvious cranial nerve deficits.   Normal 

speech.


PSYCHIATRIC: Appropriate mood and affect; insight and judgment normal.


Laboratory





Laboratory Tests








Test


  2/7/18


05:31


 


White Blood Count 6.6 TH/MM3 


 


Red Blood Count 2.55 MIL/MM3 


 


Hemoglobin 8.3 GM/DL 


 


Hematocrit 24.4 % 


 


Mean Corpuscular Volume 95.8 FL 


 


Mean Corpuscular Hemoglobin 32.7 PG 


 


Mean Corpuscular Hemoglobin


Concent 34.1 % 


 


 


Red Cell Distribution Width 14.5 % 


 


Platelet Count 340 TH/MM3 


 


Mean Platelet Volume 7.9 FL 


 


Neutrophils (%) (Auto) 67.4 % 


 


Lymphocytes (%) (Auto) 18.2 % 


 


Monocytes (%) (Auto) 10.4 % 


 


Eosinophils (%) (Auto) 3.0 % 


 


Basophils (%) (Auto) 1.0 % 


 


Neutrophils # (Auto) 4.4 TH/MM3 


 


Lymphocytes # (Auto) 1.2 TH/MM3 


 


Monocytes # (Auto) 0.7 TH/MM3 


 


Eosinophils # (Auto) 0.2 TH/MM3 


 


Basophils # (Auto) 0.1 TH/MM3 


 


CBC Comment DIFF FINAL 


 


Differential Comment  


 


Blood Urea Nitrogen 60 MG/DL 


 


Creatinine 3.97 MG/DL 


 


Random Glucose 221 MG/DL 


 


Calcium Level 8.2 MG/DL 


 


Sodium Level 137 MEQ/L 


 


Potassium Level 4.2 MEQ/L 


 


Chloride Level 104 MEQ/L 


 


Carbon Dioxide Level 24.9 MEQ/L 


 


Anion Gap 8 MEQ/L 


 


Estimat Glomerular Filtration


Rate 14 ML/MIN 


 








 (Blanca Luong)





Assessment and Plan


Problem List:  


(1) CAD (coronary artery disease)


ICD Codes:  I25.10 - Atherosclerotic heart disease of native coronary artery 

without angina pectoris


Status:  Chronic


(2) CHF (congestive heart failure)


ICD Codes:  I50.9 - Heart failure, unspecified


(3) Acute kidney injury superimposed on CKD


ICD Codes:  N17.9 - Acute kidney failure, unspecified; N18.9 - Chronic kidney 

disease, unspecified


Status:  Acute


Assessment and Plan


53 yo AAF recently discharged from this facility after prolonged 

hospitalization for chest pain, CHF and CKD. Readmitted for progression of 

symptoms





CAD- severe 2 vessel disease to RCA and mid-LAD. Echo 55-60%. considering high 

risk PCI. 


diuresing well. 


creatinine increasing, borderline ESRD


isosorbide for chest pain


 


CKD IV- nephro following. considering HD


 (Blanca Luong)


Assessment and Plan


-----------------


recurrent unstable angina.  nitro gtt started


discussed with nephrology.  Plan for permanent HD.


NPO p [facility] tomorrow with PCI


 (Edgardo Mercado MD)





Problem Qualifiers





(1) CHF (congestive heart failure):  


Qualified Codes:  I50.9 - Heart failure, unspecified








Blanca Luong Feb 7, 2018 09:21


Edgardo Mercado MD Feb 7, 2018 12:25

## 2018-02-07 NOTE — EKG
Date Performed: 02/07/2018       Time Performed: 10:14:22

 

PT[age]:      52 years

 

EKG:      Sinus rhythm 

 

 LVH with secondary repolarization abnormality Lateral ST-T changes may be due to hypertrophy and/or 
ischemia Abnormal ECG

 

PREVIOUS TRACING       : 02/06/2018 02.09

 

DOCTOR:   Edgardo Mercado  Interpretating Date/Time  02/07/2018 12:46:35

## 2018-02-07 NOTE — HHI.NPPN
Subjective


Complaints:  Shortness of Breath


General Problems:  Anemia


Renal Failure:  Chronic, Acute, Stage IV


Interval History


She has been having persistent chest pain all morning despite 6 NTG tabs. 

Cardiology aware. Will have cardiac catheterization, possibly today. 


Also renal function is worse, although non oliguric. 


 (Migdalia Camejo)





Review of Systems


General


Constitutional:  Fatigue


 (Migdalia Camejo)





Respiratory


Lungs:  SOB


 (Migdalia Camejo)





Cardiovascular


Cardiac:  Chest Pain


 (Migdalia Camejo)





Gastrointestinal


Gastrointestinal:  Nausea & Vomiting


 (Migdalia Camejo)





Objective Data


Data





Vital Signs








  Date Time  Temp Pulse Resp B/P (MAP) Pulse Ox O2 Delivery O2 Flow Rate FiO2


 


2/7/18 10:38  80      


 


2/7/18 09:00  74      


 


2/7/18 08:00  82      


 


2/7/18 07:40 98.8 80 16 151/75 (100) 98   


 


2/7/18 07:00  80      


 


2/7/18 06:00  85      


 


2/7/18 05:00  82      


 


2/7/18 04:00  87      


 


2/7/18 03:00 98.1 82 16 102/58 (73) 97   


 


2/7/18 03:00  77      


 


2/7/18 02:00  75      


 


2/7/18 01:00  77      


 


2/7/18 00:00  82      


 


2/6/18 23:58 98.8 81 16 144/73 (96) 97   


 


2/6/18 23:00  82      


 


2/6/18 22:00  78      


 


2/6/18 21:00  70      


 


2/6/18 20:00  74      


 


2/6/18 20:00 98.2 74 16 123/67 (85) 100   


 


2/6/18 19:00  74      


 


2/6/18 18:00  74      


 


2/6/18 17:23     98   21


 


2/6/18 17:00  66      


 


2/6/18 16:00  72      


 


2/6/18 15:30 97.8 71 18 112/56 (74) 98   


 


2/6/18 15:00  66      


 


2/6/18 14:00  77      


 


2/6/18 13:00  66      


 


2/6/18 12:42  63      


 


2/6/18 11:50  63      








 (Migdalia Camejo)


-:  


2/7/18 0531                                                                    

            2/7/18 0531








Physical Exam


General


Appearance:  Well Developed, No Acute Distress, Comfortable


 (Migdalia Camejo)





Throat


Throat Exam:  Oral Mucosa Pink & Moist


 (Migdalia Camejo)





Pulmonary


Resp Exam:  No Distress, Crackles, Decreased Bases


 (Migdalia CamejoP)





Cardiology


CV Exam:  Regular, Normal Sinus Rhythm


 (Migdalia Camejo)





Gastrointestinal/Abdomen


GI Exam:  Soft, Non-Tender, Bowel Sounds Present, Positive Bowel Movement


 (Migdalia Camejo)





Musculoskeletal


MS Exam:  Normal Gait, Normal Tone


 (Migdalia Camejo)





Integumentary


Skin Exam:  Warm, Dry, Intact


 (Migdalia Camejo)





Extremeties


Extremities Exam:  No Edema, Pedal Pulses Palpable


 (Migdalia Camejo)





Neurologic


Neuro Exam:  Alert, Awake, Oriented, Speech Clear, Moving All Extremities


 (Migdalia Camejo)





Psychiatric


Psych Exam:  Appropriate Responses


 (Migdalia Camejo)





Assessment/Plan


Discussed Condition With:  Patient, Relative


Assessment Summary:  Acute Tubular Necrosis, Fluid/Volume Overload, Hypertension

, Diabetes Mellitus, CKD Stage IV


Problem List:  


(1) Acute kidney injury superimposed on CKD


ICD Codes:  N17.9 - Acute kidney failure, unspecified; N18.9 - Chronic kidney 

disease, unspecified


Status:  Acute


Plan:  History of CKD 4, required HD x 2 last admission, creatinine was 3.5 at 

discharge


Renal function is worse. Now CKD 5. Needs to start dialysis.


Plan is for PermCath placement after cardiac cath today. If radiologist is 

unwilling to place due to recent ASA and Plavix administration, vascath is okay


HD today and tomorrow


Plan will be for eventual conversion to PD. Will need PD catheter placed at 

some point. 


Repeat labs tomorrow 


Follow urine output, currently non oliguric. 





(2) CAD (coronary artery disease)


ICD Codes:  I25.10 - Atherosclerotic heart disease of native coronary artery 

without angina pectoris


Status:  Chronic


Plan:  To have cardiac cath with stent placement today


Dr. Mercado is following. 





(3) CHF (congestive heart failure)


ICD Codes:  I50.9 - Heart failure, unspecified


Plan:  Fluid removal as tolerated. Change lasix to PO.


Follow fluid status


Fluid restriction discussed with the patient. 





(4) HTN (hypertension)


ICD Codes:  I10 - Hypertension


Status:  Chronic


Plan:  BP should improve with dialysis. 


On Imdur 120 mg daily


Norvasc 10 mg daily


Metoprolol 100 mg BID


Hydralazine 50 mg Q8h


Clonidine 0.1 mg BID


Continue to monitor





(5) DM (diabetes mellitus)


ICD Codes:  E11.9 - Type 2 diabetes mellitus without complications


Plan:  Monitor blood glucose, maintain 140-180 mg/dL.


 (Migdalia Camejo)


Problem List:  


(1) Acute kidney injury superimposed on CKD


ICD Codes:  N17.9 - Acute kidney failure, unspecified; N18.9 - Chronic kidney 

disease, unspecified


Status:  Acute


Plan:  History of CKD 4, required HD x 2 last admission, creatinine was 3.5 at 

discharge


Renal function is worse. Now CKD 5. Needs to start dialysis.


Plan is for PermCath placement after cardiac cath today. If radiologist is 

unwilling to place due to recent ASA and Plavix administration, vascath is okay


HD today and tomorrow


Plan will be for eventual conversion to PD. Will need PD catheter placed at 

some point. 


Repeat labs tomorrow 


Follow urine output, currently non oliguric. 





(2) CAD (coronary artery disease)


ICD Codes:  I25.10 - Atherosclerotic heart disease of native coronary artery 

without angina pectoris


Status:  Chronic


Plan:  To have cardiac cath with stent placement today


Dr. Mercado is following. 





(3) CHF (congestive heart failure)


ICD Codes:  I50.9 - Heart failure, unspecified


Plan:  Fluid removal as tolerated. Change lasix to PO.


Follow fluid status


Fluid restriction discussed with the patient. 





(4) HTN (hypertension)


ICD Codes:  I10 - Hypertension


Status:  Chronic


Plan:  BP should improve with dialysis. 


On Imdur 120 mg daily


Norvasc 10 mg daily


Metoprolol 100 mg BID


Hydralazine 50 mg Q8h


Clonidine 0.1 mg BID


Continue to monitor





(5) DM (diabetes mellitus)


ICD Codes:  E11.9 - Type 2 diabetes mellitus without complications


Plan:  Monitor blood glucose, maintain 140-180 mg/dL.





Plan


patient was seen and examined. Agree with above assessment and plan. Discussed 

with Dr. Mercado. Cardiac cath tomorrow. Her renal function is worse, she needs 

to start dialysis. She is interested in PD, remains to be seen if surgery can 

be performed after cardiac cath. If stent is placed, Plavix may not be stopped 

for several weeks. 


 (Kelton Whiteside MD)





Problem Qualifiers





(1) CHF (congestive heart failure):  


Qualified Codes:  I50.9 - Heart failure, unspecified


(2) DM (diabetes mellitus):  








Migdalia Camejo Feb 7, 2018 11:22


Kelton Whiteside MD Feb 7, 2018 17:51

## 2018-02-07 NOTE — PD.RAD
Post Procedure Progress Note


Pre Procedure Diagnosis:  


(1) Acute kidney injury superimposed on CKD


Post Procedure Diagnosis:  


(1) Acute kidney injury superimposed on CKD


Procedure Date:


Feb 7, 2018


Supervising Radiologist:


Cali Hodges


Proceduralist/Assist:  Alex Ferro, RT(R), Denia Guevara RT(R)


Anesthesia:  Local


Plan of Activity


Patient to Unit:  Nursing Unit


Patient Condition:  Fair


See PACS Report for procedural detail/treatment





Central Venous Access Device


Procedure 1


Right


Internal Jugular


Hemodialysis Catheter Non-Tunneled


Placement


dual lumen











Cali Hodges MD Feb 7, 2018 15:43

## 2018-02-08 VITALS
HEART RATE: 78 BPM | OXYGEN SATURATION: 99 % | SYSTOLIC BLOOD PRESSURE: 134 MMHG | RESPIRATION RATE: 16 BRPM | DIASTOLIC BLOOD PRESSURE: 78 MMHG

## 2018-02-08 VITALS — HEART RATE: 82 BPM

## 2018-02-08 VITALS — HEART RATE: 76 BPM

## 2018-02-08 VITALS — HEART RATE: 74 BPM

## 2018-02-08 VITALS
OXYGEN SATURATION: 98 % | SYSTOLIC BLOOD PRESSURE: 185 MMHG | HEART RATE: 82 BPM | RESPIRATION RATE: 20 BRPM | DIASTOLIC BLOOD PRESSURE: 81 MMHG

## 2018-02-08 VITALS
TEMPERATURE: 98.7 F | OXYGEN SATURATION: 100 % | HEART RATE: 78 BPM | DIASTOLIC BLOOD PRESSURE: 79 MMHG | RESPIRATION RATE: 17 BRPM | SYSTOLIC BLOOD PRESSURE: 155 MMHG

## 2018-02-08 VITALS
SYSTOLIC BLOOD PRESSURE: 152 MMHG | TEMPERATURE: 99 F | HEART RATE: 79 BPM | DIASTOLIC BLOOD PRESSURE: 73 MMHG | RESPIRATION RATE: 16 BRPM | OXYGEN SATURATION: 97 %

## 2018-02-08 VITALS — HEART RATE: 68 BPM

## 2018-02-08 VITALS
OXYGEN SATURATION: 97 % | DIASTOLIC BLOOD PRESSURE: 69 MMHG | RESPIRATION RATE: 16 BRPM | HEART RATE: 73 BPM | TEMPERATURE: 98.7 F | SYSTOLIC BLOOD PRESSURE: 136 MMHG

## 2018-02-08 VITALS
TEMPERATURE: 97.6 F | RESPIRATION RATE: 16 BRPM | HEART RATE: 78 BPM | OXYGEN SATURATION: 98 % | DIASTOLIC BLOOD PRESSURE: 80 MMHG | SYSTOLIC BLOOD PRESSURE: 143 MMHG

## 2018-02-08 VITALS
DIASTOLIC BLOOD PRESSURE: 81 MMHG | HEART RATE: 81 BPM | SYSTOLIC BLOOD PRESSURE: 185 MMHG | OXYGEN SATURATION: 98 % | RESPIRATION RATE: 20 BRPM

## 2018-02-08 VITALS — HEART RATE: 78 BPM

## 2018-02-08 VITALS — HEART RATE: 70 BPM

## 2018-02-08 VITALS — HEART RATE: 73 BPM

## 2018-02-08 VITALS — HEART RATE: 75 BPM

## 2018-02-08 VITALS
RESPIRATION RATE: 16 BRPM | TEMPERATURE: 98 F | DIASTOLIC BLOOD PRESSURE: 70 MMHG | OXYGEN SATURATION: 98 % | SYSTOLIC BLOOD PRESSURE: 135 MMHG | HEART RATE: 70 BPM

## 2018-02-08 LAB
ALBUMIN SERPL-MCNC: 2.6 GM/DL (ref 3.4–5)
ALP SERPL-CCNC: 197 U/L (ref 45–117)
ALT SERPL-CCNC: 58 U/L (ref 10–53)
AST SERPL-CCNC: 22 U/L (ref 15–37)
BASOPHILS # BLD AUTO: 0.1 TH/MM3 (ref 0–0.2)
BASOPHILS NFR BLD: 1 % (ref 0–2)
BILIRUB SERPL-MCNC: 0.3 MG/DL (ref 0.2–1)
BUN SERPL-MCNC: 36 MG/DL (ref 7–18)
CALCIUM SERPL-MCNC: 8.5 MG/DL (ref 8.5–10.1)
CHLORIDE SERPL-SCNC: 104 MEQ/L (ref 98–107)
CREAT SERPL-MCNC: 2.9 MG/DL (ref 0.5–1)
EOSINOPHIL # BLD: 0.2 TH/MM3 (ref 0–0.4)
EOSINOPHIL NFR BLD: 2.9 % (ref 0–4)
ERYTHROCYTE [DISTWIDTH] IN BLOOD BY AUTOMATED COUNT: 14.2 % (ref 11.6–17.2)
GFR SERPLBLD BASED ON 1.73 SQ M-ARVRAT: 21 ML/MIN (ref 89–?)
GLUCOSE SERPL-MCNC: 175 MG/DL (ref 74–106)
HCO3 BLD-SCNC: 28 MEQ/L (ref 21–32)
HCT VFR BLD CALC: 26.9 % (ref 35–46)
HEPATITIS A AB IGM: NEGATIVE
HEPATITIS B CORE AB IGM: NEGATIVE
HEPATITIS B SURFACE ANTIGEN: NEGATIVE
HEPATITIS C AB IGG: NEGATIVE
HGB BLD-MCNC: 9.1 GM/DL (ref 11.6–15.3)
LYMPHOCYTES # BLD AUTO: 1.5 TH/MM3 (ref 1–4.8)
LYMPHOCYTES NFR BLD AUTO: 22.6 % (ref 9–44)
MCH RBC QN AUTO: 32.6 PG (ref 27–34)
MCHC RBC AUTO-ENTMCNC: 34 % (ref 32–36)
MCV RBC AUTO: 95.8 FL (ref 80–100)
MONOCYTE #: 0.8 TH/MM3 (ref 0–0.9)
MONOCYTES NFR BLD: 11.9 % (ref 0–8)
NEUTROPHILS # BLD AUTO: 4.2 TH/MM3 (ref 1.8–7.7)
NEUTROPHILS NFR BLD AUTO: 61.6 % (ref 16–70)
PLATELET # BLD: 348 TH/MM3 (ref 150–450)
PMV BLD AUTO: 7.3 FL (ref 7–11)
PROT SERPL-MCNC: 6.7 GM/DL (ref 6.4–8.2)
RBC # BLD AUTO: 2.81 MIL/MM3 (ref 4–5.3)
SODIUM SERPL-SCNC: 139 MEQ/L (ref 136–145)
WBC # BLD AUTO: 6.8 TH/MM3 (ref 4–11)

## 2018-02-08 RX ADMIN — PRAVASTATIN SODIUM SCH MG: 40 TABLET ORAL at 00:10

## 2018-02-08 RX ADMIN — SIMETHICONE SCH MG: 125 TABLET, CHEWABLE ORAL at 22:01

## 2018-02-08 RX ADMIN — FERROUS SULFATE TAB 325 MG (65 MG ELEMENTAL FE) SCH MG: 325 (65 FE) TAB at 12:08

## 2018-02-08 RX ADMIN — INSULIN ASPART SCH: 100 INJECTION, SOLUTION INTRAVENOUS; SUBCUTANEOUS at 17:00

## 2018-02-08 RX ADMIN — INSULIN ASPART SCH: 100 INJECTION, SOLUTION INTRAVENOUS; SUBCUTANEOUS at 21:00

## 2018-02-08 RX ADMIN — STANDARDIZED SENNA CONCENTRATE AND DOCUSATE SODIUM SCH TAB: 8.6; 5 TABLET, FILM COATED ORAL at 09:00

## 2018-02-08 RX ADMIN — METOPROLOL TARTRATE SCH MG: 100 TABLET, FILM COATED ORAL at 12:08

## 2018-02-08 RX ADMIN — MORPHINE SULFATE PRN MG: 2 INJECTION, SOLUTION INTRAMUSCULAR; INTRAVENOUS at 07:31

## 2018-02-08 RX ADMIN — Medication SCH ML: at 09:00

## 2018-02-08 RX ADMIN — INSULIN ASPART SCH: 100 INJECTION, SOLUTION INTRAVENOUS; SUBCUTANEOUS at 12:00

## 2018-02-08 RX ADMIN — SIMETHICONE SCH MG: 125 TABLET, CHEWABLE ORAL at 04:45

## 2018-02-08 RX ADMIN — STANDARDIZED SENNA CONCENTRATE AND DOCUSATE SODIUM SCH TAB: 8.6; 5 TABLET, FILM COATED ORAL at 22:01

## 2018-02-08 RX ADMIN — INSULIN ASPART SCH: 100 INJECTION, SOLUTION INTRAVENOUS; SUBCUTANEOUS at 08:00

## 2018-02-08 RX ADMIN — PRAVASTATIN SODIUM SCH MG: 40 TABLET ORAL at 22:00

## 2018-02-08 RX ADMIN — ISOSORBIDE MONONITRATE SCH MG: 60 TABLET, EXTENDED RELEASE ORAL at 04:45

## 2018-02-08 RX ADMIN — FUROSEMIDE SCH MG: 40 TABLET ORAL at 12:09

## 2018-02-08 RX ADMIN — ACYCLOVIR SCH UNITS: 800 TABLET ORAL at 22:02

## 2018-02-08 RX ADMIN — Medication SCH ML: at 22:02

## 2018-02-08 RX ADMIN — FERROUS SULFATE TAB 325 MG (65 MG ELEMENTAL FE) SCH MG: 325 (65 FE) TAB at 22:01

## 2018-02-08 RX ADMIN — GABAPENTIN SCH MG: 300 CAPSULE ORAL at 22:00

## 2018-02-08 RX ADMIN — METOPROLOL TARTRATE SCH MG: 100 TABLET, FILM COATED ORAL at 22:01

## 2018-02-08 RX ADMIN — SIMETHICONE SCH MG: 125 TABLET, CHEWABLE ORAL at 15:38

## 2018-02-08 NOTE — EKG
Date Performed: 02/08/2018       Time Performed: 09:38:06

 

PTAGE:      52 years

 

EKG:      Sinus rhythm 

 

 Lateral ST-T changes may be due to myocardial ischemia Since the prior tracing, there has been no si
gnificant change Abnormal ECG

 

PREVIOUS TRACING       : 02/07/2018 10.14

 

DOCTOR:   Esequiel Rai  Interpretating Date/Time  02/08/2018 20:22:20

## 2018-02-08 NOTE — HHI.PR
Subjective


Remarks


No further chest pain from the patient.  Cardiac catheter is planned for early 

next week.  Patient is deciding whether or not she wants to do hemodialysis or 

peritoneal dialysis.  Respective catheter will be placed early next week also.  

No complaints from the patient today.





Objective





Vital Signs








  Date Time  Temp Pulse Resp B/P (MAP) Pulse Ox O2 Delivery O2 Flow Rate FiO2


 


2/8/18 06:04  73      


 


2/8/18 05:00  76      


 


2/8/18 04:57  78 16 134/78 (96) 99   


 


2/8/18 04:09  75      


 


2/8/18 04:00  74      


 


2/8/18 03:00  74      


 


2/8/18 01:38   16     


 


2/8/18 01:00  78      


 


2/8/18 00:20  81      


 


2/8/18 00:20  81 20 185/81 (115) 98   


 


2/8/18 00:00  81 20 185/81 (115) 98   


 


2/8/18 00:00  81      


 


2/8/18 00:00  82      


 


2/7/18 23:42   16     


 


2/7/18 23:00  84      


 


2/7/18 22:00  82      


 


2/7/18 21:00  80      


 


2/7/18 20:30  84 18 140/87 (104) 100   


 


2/7/18 20:30  82      


 


2/7/18 20:30  82      


 


2/7/18 20:00  82      


 


2/7/18 18:45  78      














I/O      


 


 2/7/18 2/7/18 2/7/18 2/8/18 2/8/18 2/8/18





 07:00 15:00 23:00 07:00 15:00 23:00


 


Intake Total 480 ml 480 ml 480 ml 240 ml  


 


Output Total 900 ml  2600 ml 800 ml  


 


Balance -420 ml 480 ml -2120 ml -560 ml  


 


      


 


Intake Oral 480 ml 480 ml 480 ml 240 ml  


 


Output Urine Total 900 ml  600 ml 800 ml  


 


Hemodialysis   2000 ml   


 


# Bowel Movements 0     








Result Diagram:  


2/8/18 0732                                                                    

            2/8/18 0732





Objective Remarks


GENERAL: NAD, A&Ox3


HEAD: Normocephalic. 


NECK: Supple, trachea midline. No lymphadenopathy.


EYES: No scleral icterus. No injection or drainage. 


CARDIOVASCULAR: Regular rate and rhythm without murmurs, gallops, or rubs. 


RESPIRATORY: Breath sounds equal bilaterally. No accessory muscle use.


GASTROINTESTINAL: Abdomen soft, non-tender, nondistended. 


MUSCULOSKELETAL: No cyanosis, or edema. 


SKIN: Warm and dry.


NEURO:  No focal neurological deficitis.





A/P


Problem List:  


(1) Renal insufficiency


ICD Code:  N28.9 - Disorder of kidney and ureter, unspecified


(2) Acute kidney injury superimposed on CKD


ICD Code:  N17.9 - Acute kidney failure, unspecified; N18.9 - Chronic kidney 

disease, unspecified


Status:  Acute


(3) DM (diabetes mellitus)


ICD Code:  E11.9 - Type 2 diabetes mellitus without complications


(4) HTN (hypertension)


ICD Code:  I10 - Hypertension


Status:  Chronic


(5) CAD (coronary artery disease)


ICD Code:  I25.10 - Atherosclerotic heart disease of native coronary artery 

without angina pectoris


Status:  Chronic


Assessment and Plan


52-year-old female presented with sob, lethargy





Labs reviewed.  Electrolytes and renal function studies are remaining 

relatively stable.  Continue to monitor labs.  Labs ordered for further 

monitoring.  No reports of chest pain today.





Acute on Chronic diastolic CHF


Cardiomyopathy 


Continue twice a day Lasix


Continue imdur


Continue beta blocker





Acute on chronic Stage IV CKD


Nephrology following


Avoiding nephrotoxins


Plan for Vas-Cath


Dialysis as needed





Chest pain


Recent an STEMI


Follow cardiac enzymes


Cardiology following


Nitroglycerin drip


Plan for heart catheter tomorrow








Hemoptysis on 1/27


No recurrence 





GI bleed-resolved


Anemia of acute blood loss


2 units transfused at time of admit


Hemoglobin has remained stable since


Gastritis found on GI workup


Continue PPI


Follow hemoglobin





Hypertension


Continue Lopressor


Continue Procardia


Continue hydralazine


Ace inhibitors on hold





Diabetes mellitus type 2


Follow blood sugars


Insulin sliding scale


Diabetic diet





Tobacco abuse


Cessation recommended





Problem Qualifiers





(1) DM (diabetes mellitus):  








Pepe Treadwell MD Feb 8, 2018 14:13

## 2018-02-08 NOTE — RADRPT
EXAM DATE/TIME:  02/08/2018 21:19 

 

[facility] COMPARISON:     

No previous studies available for comparison.

        

 

 

INDICATIONS :                

Preop for arteriovenous fistula. 

            

 

MEDICAL HISTORY :     

Myocardial infarction. Hypercholesterolemia. Pancreatitis. Coronary artery disease. Migraines. Chest 
pain. HTN. Pneumonia. Dyspnea. Renal disease. Arthritis. Diabetes. Anxiety. Substance use.  

 

SURGICAL HISTORY :     

Coronary artery stent.Cholecystectomy. Tubal ligation.Abscessed tooth. Cardiac cath. 

 

ENCOUNTER:     

Initial

 

ACUITY:     

1 month

 

PAIN SCORE:      

3/10

 

LOCATION:      

Bilateral  arm.

                       

 

FINDINGS:     

 

RIGHT UPPER EXTREMITY:     

There is spontaneous flow documented in the brachial, basilic, cephalic, axillary, and subclavian vei
ns.  The vessels are compressible and augmentation response is documented.  No filling defects are se
en.  The flow is phasic with respiration.  Direction of flow in the jugular vein is caudal.  

     

 

LEFT UPPER EXTREMITY:     

There is spontaneous flow documented in the brachial, basilic, cephalic, axillary, and subclavian vei
ns.  The vessels are compressible and augmentation response is documented.  No filling defects are se
en.  The flow is phasic with respiration.  Direction of flow in the jugular vein is caudal.  

 

CONCLUSION:     

Normal examination.  

 

 

 

 Valdez Colunga MD on February 08, 2018 at 23:42           

Board Certified Radiologist.

 This report was verified electronically.

## 2018-02-08 NOTE — HHI.NPPN
Subjective


Complaints:  Shortness of Breath


General Problems:  Anemia


Renal Failure:  Chronic, Acute, Stage IV


Interval History


Vascath placed right IJ. She has bleeding and pain at the site. Radiology nurse 

at the bedside to assist. Chest pain is not present currently. On Nitro gtt. 


Discussed with Dr. Mercado and the radiologist today.


 (Migdalia Camejo)





Review of Systems


General


Constitutional:  Fatigue


 (Migdalia Camejo)





Respiratory


Lungs:  SOB


 (Migdalia Camejo)





Cardiovascular


Cardiac:  Chest Pain


 (Migdalia Camejo)





Gastrointestinal


Gastrointestinal:  Nausea & Vomiting


 (Migdalia Camejo)





Objective Data


Data





Vital Signs








  Date Time  Temp Pulse Resp B/P (MAP) Pulse Ox O2 Delivery O2 Flow Rate FiO2


 


2/8/18 06:04  73      


 


2/8/18 05:00  76      


 


2/8/18 04:57  78 16 134/78 (96) 99   


 


2/8/18 04:09  75      


 


2/8/18 04:00  74      


 


2/8/18 03:00  74      


 


2/8/18 01:38   16     


 


2/8/18 01:00  78      


 


2/8/18 00:20  81      


 


2/8/18 00:20  81 20 185/81 (115) 98   


 


2/8/18 00:00  81 20 185/81 (115) 98   


 


2/8/18 00:00  81      


 


2/8/18 00:00  82      


 


2/7/18 23:42   16     


 


2/7/18 23:00  84      


 


2/7/18 22:00  82      


 


2/7/18 21:00  80      


 


2/7/18 20:30  84 18 140/87 (104) 100   


 


2/7/18 20:30  82      


 


2/7/18 20:30  82      


 


2/7/18 20:00  82      


 


2/7/18 18:45  78      


 


2/7/18 14:00  74      


 


2/7/18 13:20  72      


 


2/7/18 12:00  71      


 


2/7/18 11:41    147/71 (96)    


 


2/7/18 11:36    145/78 (100)    


 


2/7/18 11:28    147/75 (99)    


 


2/7/18 11:00  78      


 


2/7/18 11:00 97.9 70 16 126/62 (83) 100   


 


2/7/18 10:38  80      


 


2/7/18 10:30    148/78 (101)    


 


2/7/18 10:25    156/74 (101)    


 


2/7/18 10:14    174/82 (112)    


 


2/7/18 09:45    135/71 (92)    


 


2/7/18 09:40    122/69 (86)    


 


2/7/18 09:34  76  142/72 (95)    








 (Migdalia Camejo)


-:  


2/8/18 0732                                                                    

            2/8/18 0732





Imaging





Last 72 hours Impressions








Catheter Placement X-Ray 2/7/18 0000 Signed





Impressions: 





 Service Date/Time:  Wednesday, February 7, 2018 14:29 - CONCLUSION: 





 Uncomplicated line placement as above.     Pepe Swift MD 








Tubes & Lines:  Vas-Cath


Drip Comment


Nitro 


 (Migdalia Camejo)





Physical Exam


General


Appearance:  Well Developed, No Acute Distress, Comfortable


 (Migdalia Camejo)





Throat


Throat Exam:  Oral Mucosa Pink & Moist


 (Migdalia Camejo)





Neck


Neck Remarks


tenderness and minor bleeding at vascath site. 


 (Migdalia Camejo)





Pulmonary


Resp Exam:  No Distress, Crackles, Decreased Bases


 (Migdalia Camejo)





Cardiology


CV Exam:  Regular, Normal Sinus Rhythm


 (Migdalia Camejo)





Gastrointestinal/Abdomen


GI Exam:  Soft, Non-Tender, Bowel Sounds Present, Positive Bowel Movement


 (Migdalia Camejo)





Musculoskeletal


MS Exam:  Normal Gait, Normal Tone


 (Migdalia Camejo)





Integumentary


Skin Exam:  Warm, Dry, Intact


 (Migdalia Camejo)





Extremeties


Extremities Exam:  No Edema, Pedal Pulses Palpable


 (Migdalia Camejo)





Neurologic


Neuro Exam:  Alert, Awake, Oriented, Speech Clear, Moving All Extremities


 (Migdalia Camejo)





Psychiatric


Psych Exam:  Appropriate Responses


 (Migdalia Camejo)





Assessment/Plan


Discussed Condition With:  Patient, Relative


Assessment Summary:  Acute Tubular Necrosis, Fluid/Volume Overload, Hypertension

, Diabetes Mellitus, CKD Stage IV


Problem List:  


(1) Acute kidney injury superimposed on CKD


ICD Codes:  N17.9 - Acute kidney failure, unspecified; N18.9 - Chronic kidney 

disease, unspecified


Status:  Acute


Plan:  She has reached CKD 5. Vascath placed yesterday, dialyzed with 2L fluid 

removal


Will need to hold Plavix and ASA until Monday. At that time either she will 

have Permcath exchange then cardiac cath. She would like to do PD but we will 

have to consider that once these issues are resolved. 


Change HD to MWF, we will dialyze after cardiac cath that is planned for 

Monday. 


Radiology RN currently at bedside to assist with the bleeding. 


Repeat labs tomorrow 


She still makes urine


Consult vascular for AVF placement. 


Case management assistance with Medicare application is appreciated. 





(2) CAD (coronary artery disease)


ICD Codes:  I25.10 - Atherosclerotic heart disease of native coronary artery 

without angina pectoris


Status:  Chronic


Plan:  To have cardiac cath with stent placement most likely Monday.


D/W Dr. Mercado. 





(3) CHF (congestive heart failure)


ICD Codes:  I50.9 - Heart failure, unspecified


Plan:  Fluid removal as tolerated. Change lasix to PO.


Follow fluid status


Fluid restriction discussed with the patient. 





(4) HTN (hypertension)


ICD Codes:  I10 - Hypertension


Status:  Chronic


Plan:  BP acceptable. 


On Imdur 120 mg daily


Norvasc 10 mg daily


Metoprolol 100 mg BID


Hydralazine 50 mg Q8h


Clonidine 0.1 mg BID


Continue to monitor





(5) DM (diabetes mellitus)


ICD Codes:  E11.9 - Type 2 diabetes mellitus without complications


Plan:  Monitor blood glucose, maintain 140-180 mg/dL.


 (Migdalia Camejo)


Plan


patient was seen and examined. PermCath on Monday. Will plan on AVF placement 

during this admission. Dialysis MWF. Agree with above assessment and plan. 


 (Kelton Whiteside MD)





Problem Qualifiers





(1) CHF (congestive heart failure):  


Qualified Codes:  I50.9 - Heart failure, unspecified


(2) DM (diabetes mellitus):  








Migdalia Camejo Feb 8, 2018 09:41


Kelton Whiteside MD Feb 8, 2018 16:42

## 2018-02-08 NOTE — PD.CARD.PN
Subjective


Subjective Remarks


Had dialysis catheter placed yesterday with some bleeding from around catheter 

overnight, hemostasis currently achieved.  Patient denies any chest pain or 

shortness of breath.  Going for cardiac catheterization today, all questions 

answered.


 (King Ervin)





Objective


Medications





Current Medications








 Medications


  (Trade)  Dose


 Ordered  Sig/Luis Armando


 Route  Start Time


 Stop Time Status Last Admin


 


  (NS Flush)  2 ml  UNSCH  PRN


 IV FLUSH  2/4/18 11:30


    2/4/18 17:46


 


 


  (NS Flush)  2 ml  BID


 IV FLUSH  2/4/18 21:00


    2/7/18 21:28


 


 


  (Tylenol)  650 mg  Q4H  PRN


 PO  2/4/18 11:30


    2/4/18 15:33


 


 


  (Zofran Inj)  4 mg  Q6H  PRN


 IVP  2/4/18 11:30


    2/6/18 08:43


 


 


  (Restoril)  15 mg  HS  PRN


 PO  2/4/18 11:30


     


 


 


  (Narcan Inj)  0.4 mg  UNSCH  PRN


 IV PUSH  2/4/18 11:30


     


 


 


  (Theresa-Colace)  1 tab  BID


 PO  2/4/18 21:00


    2/7/18 21:28


 


 


  (Milk Of


 Magnesia Liq)  30 ml  Q12H  PRN


 PO  2/4/18 11:30


     


 


 


  (Senokot)  17.2 mg  Q12H  PRN


 PO  2/4/18 11:30


     


 


 


  (Dulcolax Supp)  10 mg  DAILY  PRN


 RECTAL  2/4/18 11:30


     


 


 


  (Lactulose Liq)  30 ml  DAILY  PRN


 PO  2/4/18 11:30


     


 


 


  (Norvasc)  10 mg  DAILY


 PO  2/5/18 09:00


    2/7/18 09:54


 


 


  (Aspirin Chew)  81 mg  DAILY


 CHEW  2/5/18 09:00


    2/7/18 09:55


 


 


  (Plavix)  75 mg  DAILY


 PO  2/5/18 09:00


    2/7/18 09:55


 


 


  (Ferrous Sulfate)  325 mg  BID


 PO  2/4/18 21:00


    2/7/18 21:28


 


 


  (Neurontin)  300 mg  HS


 PO  2/4/18 21:00


    2/7/18 21:28


 


 


  (Apresoline)  50 mg  Q8HR


 PO  2/4/18 14:00


    2/8/18 04:45


 


 


  (Imdur)  120 mg  DAILY@07


 PO  2/5/18 07:00


    2/8/18 04:45


 


 


  (Lopressor)  100 mg  Q12HR


 PO  2/4/18 21:00


    2/7/18 21:28


 


 


  (Nitrostat Sl)  0.4 mg  Q5M  PRN


 SL  2/4/18 11:30


    2/7/18 10:32


 


 


  (Pravachol)  40 mg  HS


 PO  2/4/18 21:00


    2/8/18 00:10


 


 


  (Phazyme Chew)  125 mg  Q8HR


 PO  2/4/18 14:00


    2/7/18 21:28


 


 


  (Heparin Inj)  5,000 units  Q12HR


 SQ  2/4/18 21:00


   Future Hold 2/7/18 09:54


 


 


  (Trandate Inj)  10 mg  Q20M  PRN


 IV PUSH  2/4/18 11:45


     


 


 


  (Apresoline Inj)  20 mg  Q4H  PRN


 IV PUSH  2/4/18 11:45


    2/4/18 15:09


 


 


  (Duoneb Neb)  1 ampule  Q4HR NEB  PRN


 NEB  2/4/18 16:15


    2/6/18 01:51


 


 


  (Ativan)  0.5 mg  Q8HR  PRN


 PO  2/4/18 16:15


    2/8/18 00:10


 


 


  (D50w (Vial) Inj)  50 ml  UNSCH  PRN


 IV PUSH  2/4/18 16:15


    2/5/18 04:20


 


 


  (Glucagon Inj)  1 mg  UNSCH  PRN


 OTHER  2/4/18 16:15


     


 


 


  (NovoLOG


 SUPPLEMENTAL


 SCALE)  1  ACHS SLIDING  SCALE


 SQ  2/4/18 17:00


    2/7/18 08:00


 


 


  (Catapres)  0.1 mg  Q12HR


 PO  2/5/18 10:00


    2/7/18 21:28


 


 


  (Morphine Inj)  2 mg  Q4H  PRN


 IV PUSH  2/6/18 01:00


    2/8/18 07:31


 


 


  (Levemir Inj)  8 units  HS


 SQ  2/7/18 21:00


     


 


 


 Sodium Chloride  1,000 ml @ 


 0 mls/hr  Q0M PRN


 OTHER  2/7/18 10:20


     


 


 


  (Heparin Inj)  8,000 units  UNSCH  PRN


 IV FLUSH  2/7/18 10:30


     


 


 


 Sodium Chloride  1,000 ml @ 


 200 mls/hr  Q5H PRN


 IV  2/7/18 10:20


     


 


 


 Sodium Chloride  1,000 ml @ 


 0 mls/hr  Q0M PRN


 OTHER  2/7/18 10:20


     


 


 


  (Mannitol Inj)  12.5 gm  UNSCH  PRN


 IV  2/7/18 10:30


     


 


 


 Albumin Human  100 ml @ 


 60 mls/hr  UNSCH  PRN


 IV  2/7/18 10:30


     


 


 


  (NS Flush)  5 ml  UNSCH  PRN


 IV FLUSH  2/7/18 10:30


     


 


 


  (Heparin Inj)    UNSCH  PRN


 .XX  2/7/18 10:30


    2/7/18 18:12


 


 


  (Gentamicin Inj)  20 mg  UNSCH  PRN


 OTHER  2/7/18 10:30


    2/7/18 18:12


 


 


  (Zofran Inj)  4 mg  UNSCH  PRN


 IV PUSH  2/7/18 10:30


     


 


 


  (Tylenol)  650 mg  UNSCH  PRN


 PO  2/7/18 10:30


     


 


 


  (Benadryl)  25 mg  UNSCH  PRN


 PO  2/7/18 10:30


     


 


 


  (Nitrostat Sl)  0.4 mg  UNSCH  PRN


 SL  2/7/18 10:30


     


 


 


  (Catapres)  0.1 mg  UNSCH  PRN


 PO  2/7/18 10:30


     


 


 


  (Epogen Inj)  10,000 units  UNSCH  PRN


 IV PUSH  2/7/18 10:30


     


 


 


  (Gelfoam 12 Mm/7


 Mm Top)  1 foam  UNSCH  PRN


 TOP  2/7/18 10:30


     


 


 


  (Lasix)  40 mg  DAILY


 PO  2/8/18 09:00


     


 


 


 Nitroglycerin/


 Dextrose  250 ml @ 


 1.5 mls/hr  TITRATE  PRN


 IV  2/7/18 12:00


     


 


 


  (NS Flush)    UNSCH  PRN


 IV FLUSH  2/7/18 15:45


     


 


 


  (Heparin Inj)    UNSCH  PRN


 IV FLUSH  2/7/18 15:45


     


 








Vital Signs / I&O





Vital Signs








  Date Time  Temp Pulse Resp B/P (MAP) Pulse Ox O2 Delivery O2 Flow Rate FiO2


 


2/8/18 06:04  73      


 


2/8/18 05:00  76      


 


2/8/18 04:57  78 16 134/78 (96) 99   


 


2/8/18 04:09  75      


 


2/8/18 04:00  74      


 


2/8/18 03:00  74      


 


2/8/18 01:38   16     


 


2/8/18 01:00  78      


 


2/8/18 00:20  81      


 


2/8/18 00:20  81 20 185/81 (115) 98   


 


2/8/18 00:00  81 20 185/81 (115) 98   


 


2/8/18 00:00  81      


 


2/8/18 00:00  82      


 


2/7/18 23:42   16     


 


2/7/18 23:00  84      


 


2/7/18 22:00  82      


 


2/7/18 21:00  80      


 


2/7/18 20:30  84 18 140/87 (104) 100   


 


2/7/18 20:30  82      


 


2/7/18 20:30  82      


 


2/7/18 20:00  82      


 


2/7/18 18:45  78      


 


2/7/18 14:00  74      


 


2/7/18 13:20  72      


 


2/7/18 12:00  71      


 


2/7/18 11:41    147/71 (96)    


 


2/7/18 11:36    145/78 (100)    


 


2/7/18 11:28    147/75 (99)    


 


2/7/18 11:00  78      


 


2/7/18 11:00 97.9 70 16 126/62 (83) 100   


 


2/7/18 10:38  80      


 


2/7/18 10:30    148/78 (101)    


 


2/7/18 10:25    156/74 (101)    


 


2/7/18 10:14    174/82 (112)    


 


2/7/18 09:45    135/71 (92)    


 


2/7/18 09:40    122/69 (86)    


 


2/7/18 09:34  76  142/72 (95)    


 


2/7/18 09:00  74      














I/O      


 


 2/7/18 2/7/18 2/7/18 2/8/18 2/8/18 2/8/18





 06:59 14:59 22:59 06:59 14:59 22:59


 


Intake Total 480 ml 480 ml 480 ml 240 ml  


 


Output Total 900 ml  2600 ml 800 ml  


 


Balance -420 ml 480 ml -2120 ml -560 ml  


 


      


 


Intake Oral 480 ml 480 ml 480 ml 240 ml  


 


Output Urine Total 900 ml  600 ml 800 ml  


 


Hemodialysis   2000 ml   


 


# Bowel Movements 0     








Physical Exam


GENERAL: Well-developed well-nourished.  In no acute distress.


NECK: No carotid bruits.  No JVD.  Dialysis catheter in place in right jugular, 

with some oozing.


CARDIOVASCULAR: Regular rate and rhythm.  No murmur appreciated.


RESPIRATORY: No accessory muscle use. Clear to auscultation. Breath sounds 

equal bilaterally. 


MUSCULOSKELETAL: No clubbing or cyanosis. No edema. 


NEUROLOGICAL: Awake and alert. Normal speech.


Laboratory





Laboratory Tests








Test


  2/7/18


16:25 2/8/18


07:32


 


White Blood Count  6.8 TH/MM3 


 


Red Blood Count  2.81 MIL/MM3 


 


Hemoglobin  9.1 GM/DL 


 


Hematocrit  26.9 % 


 


Mean Corpuscular Volume  95.8 FL 


 


Mean Corpuscular Hemoglobin  32.6 PG 


 


Mean Corpuscular Hemoglobin


Concent 


  34.0 % 


 


 


Red Cell Distribution Width  14.2 % 


 


Platelet Count  348 TH/MM3 


 


Mean Platelet Volume  7.3 FL 


 


Neutrophils (%) (Auto)  61.6 % 


 


Lymphocytes (%) (Auto)  22.6 % 


 


Monocytes (%) (Auto)  11.9 % 


 


Eosinophils (%) (Auto)  2.9 % 


 


Basophils (%) (Auto)  1.0 % 


 


Neutrophils # (Auto)  4.2 TH/MM3 


 


Lymphocytes # (Auto)  1.5 TH/MM3 


 


Monocytes # (Auto)  0.8 TH/MM3 


 


Eosinophils # (Auto)  0.2 TH/MM3 


 


Basophils # (Auto)  0.1 TH/MM3 


 


CBC Comment  DIFF FINAL 


 


Differential Comment   


 


Blood Urea Nitrogen  36 MG/DL 


 


Creatinine  2.90 MG/DL 


 


Random Glucose  175 MG/DL 


 


Total Protein  6.7 GM/DL 


 


Albumin  2.6 GM/DL 


 


Calcium Level  8.5 MG/DL 


 


Alkaline Phosphatase  197 U/L 


 


Aspartate Amino Transf


(AST/SGOT) 


  22 U/L 


 


 


Alanine Aminotransferase


(ALT/SGPT) 


  58 U/L 


 


 


Total Bilirubin  0.3 MG/DL 


 


Sodium Level  139 MEQ/L 


 


Potassium Level  3.7 MEQ/L 


 


Chloride Level  104 MEQ/L 


 


Carbon Dioxide Level  28.0 MEQ/L 


 


Anion Gap  7 MEQ/L 


 


Estimat Glomerular Filtration


Rate 


  21 ML/MIN 


 








Imaging





Last Impressions








Catheter Placement X-Ray 2/7/18 0000 Signed





Impressions: 





 Service Date/Time:  Wednesday, February 7, 2018 14:29 - CONCLUSION: 





 Uncomplicated line placement as above.     Pepe Swift MD 


 


Chest X-Ray 2/4/18 0810 Signed





Impressions: 





 Service Date/Time:  Sunday, February 4, 2018 08:50 - CONCLUSION:  Bibasilar 





 consolidation slightly worse in the right lower lobe.     Mp Dang MD 








 (King Ervin)





Assessment and Plan


Problem List:  


(1) CAD (coronary artery disease)


ICD Codes:  I25.10 - Atherosclerotic heart disease of native coronary artery 

without angina pectoris


Status:  Chronic


(2) CHF (congestive heart failure)


ICD Codes:  I50.9 - Heart failure, unspecified


(3) Acute kidney injury superimposed on CKD


ICD Codes:  N17.9 - Acute kidney failure, unspecified; N18.9 - Chronic kidney 

disease, unspecified


Status:  Acute


Assessment and Plan


51 yo AAF recently discharged from this facility after prolonged 

hospitalization for chest pain, CAD, CHF, and CKD. Readmitted for progression 

of symptoms





CAD with angina: Severe 2 vessel disease to RCA and mid-LAD seen on Last 

admission. Echo 55-60%.  Nephrology plans on permanent dialysis, plan for C w

/ PCI today.  Continue aspirin, Plavix, Imdur, metoprolol, amlodipine.





CKD stage V, likely ESRD.: Nephrology on board. 


 (King Ervin)


Assessment and Plan


--------------------


hold plavix


plan monday for permcath or peritoneal cath for permanent HD


tues or wed - [facility] with PCI


cont nitro gtt


 (Edgardo Mercado MD)





Problem Qualifiers





(1) CHF (congestive heart failure):  


Qualified Codes:  I50.9 - Heart failure, unspecified








King Ervin Feb 8, 2018 08:22


Edgardo Mercado MD Feb 8, 2018 12:11

## 2018-02-08 NOTE — PD.CAR.PN
CVT Progress Note


Subjective/Hospital Course:


2/8/2018


Patient multiple medical problems including renal insufficiency and need for 

dialysis


Patient is currently in the process of workup and when cardiology and medical 

workup is completed we will decide whether patient is going to have an AV 

fistula or peritoneal dialysis catheter as far as I am concerned


Cardiology workup as to be completed first and patient can be on Plavix or any 

other platelet inhibitor for that will not hinder future surgery or catheter 

placement


Will follow patient


Thanks


J


Objective:





Vital Signs








  Date Time  Temp Pulse Resp B/P (MAP) Pulse Ox O2 Delivery O2 Flow Rate FiO2


 


2/8/18 14:00  74      


 


2/8/18 13:00  76      


 


2/8/18 12:00  76      


 


2/8/18 11:00  78      


 


2/8/18 10:00  82      


 


2/8/18 09:00  82      


 


2/8/18 08:00  76      


 


2/8/18 06:04  73      


 


2/8/18 05:00  76      


 


2/8/18 04:57  78 16 134/78 (96) 99   


 


2/8/18 04:09  75      


 


2/8/18 04:00  74      


 


2/8/18 03:00  74      


 


2/8/18 01:38   16     


 


2/8/18 01:00  78      


 


2/8/18 00:20  81      


 


2/8/18 00:20  81 20 185/81 (115) 98   


 


2/8/18 00:00  81 20 185/81 (115) 98   


 


2/8/18 00:00  81      


 


2/8/18 00:00  82      


 


2/7/18 23:42   16     


 


2/7/18 23:00  84      


 


2/7/18 22:00  82      


 


2/7/18 21:00  80      


 


2/7/18 20:30  84 18 140/87 (104) 100   


 


2/7/18 20:30  82      


 


2/7/18 20:30  82      


 


2/7/18 20:00  82      


 


2/7/18 18:45  78      








Labs:





Laboratory Tests








Test


  2/8/18


07:32


 


White Blood Count


  6.8 TH/MM3


(4.0-11.0)


 


Red Blood Count


  2.81 MIL/MM3


(4.00-5.30)


 


Hemoglobin


  9.1 GM/DL


(11.6-15.3)


 


Hematocrit


  26.9 %


(35.0-46.0)


 


Mean Corpuscular Volume


  95.8 FL


(80.0-100.0)


 


Mean Corpuscular Hemoglobin


  32.6 PG


(27.0-34.0)


 


Mean Corpuscular Hemoglobin


Concent 34.0 %


(32.0-36.0)


 


Red Cell Distribution Width


  14.2 %


(11.6-17.2)


 


Platelet Count


  348 TH/MM3


(150-450)


 


Mean Platelet Volume


  7.3 FL


(7.0-11.0)


 


Neutrophils (%) (Auto)


  61.6 %


(16.0-70.0)


 


Lymphocytes (%) (Auto)


  22.6 %


(9.0-44.0)


 


Monocytes (%) (Auto)


  11.9 %


(0.0-8.0)


 


Eosinophils (%) (Auto)


  2.9 %


(0.0-4.0)


 


Basophils (%) (Auto)


  1.0 %


(0.0-2.0)


 


Neutrophils # (Auto)


  4.2 TH/MM3


(1.8-7.7)


 


Lymphocytes # (Auto)


  1.5 TH/MM3


(1.0-4.8)


 


Monocytes # (Auto)


  0.8 TH/MM3


(0-0.9)


 


Eosinophils # (Auto)


  0.2 TH/MM3


(0-0.4)


 


Basophils # (Auto)


  0.1 TH/MM3


(0-0.2)


 


CBC Comment DIFF FINAL 


 


Differential Comment  


 


Blood Urea Nitrogen


  36 MG/DL


(7-18)


 


Creatinine


  2.90 MG/DL


(0.50-1.00)


 


Random Glucose


  175 MG/DL


()


 


Total Protein


  6.7 GM/DL


(6.4-8.2)


 


Albumin


  2.6 GM/DL


(3.4-5.0)


 


Calcium Level


  8.5 MG/DL


(8.5-10.1)


 


Alkaline Phosphatase


  197 U/L


()


 


Aspartate Amino Transf


(AST/SGOT) 22 U/L (15-37) 


 


 


Alanine Aminotransferase


(ALT/SGPT) 58 U/L (10-53) 


 


 


Total Bilirubin


  0.3 MG/DL


(0.2-1.0)


 


Sodium Level


  139 MEQ/L


(136-145)


 


Potassium Level


  3.7 MEQ/L


(3.5-5.1)


 


Chloride Level


  104 MEQ/L


()


 


Carbon Dioxide Level


  28.0 MEQ/L


(21.0-32.0)


 


Anion Gap 7 MEQ/L (5-15) 


 


Estimat Glomerular Filtration


Rate 21 ML/MIN


(>89)


 


Phosphorus Level


  3.7 MG/DL


(2.5-4.9)








Result Diagram:  


2/8/18 0732 2/8/18 0732








(1) CAD (coronary artery disease)


(2) CHF (congestive heart failure)


(3) Acute kidney injury superimposed on CKD





Problem Qualifiers





(1) CHF (congestive heart failure):  


Qualified Codes:  I50.9 - Heart failure, unspecified








Homer Sepulveda MD Feb 8, 2018 16:30

## 2018-02-08 NOTE — RADRPT
EXAM DATE/TIME:  02/07/2018 14:29 

 

[facility] COMPARISON:  

TEMP DIALYSIS CATHETER [facility] W/US, RIGHT, January 26, 2018, 11:12.

 

 

INDICATIONS :               

Patient presents with fluid/volume overload in need of temporary dualysis catheter.

                     

 

MEDICAL HISTORY :     

HTN

Hyperlipidemia

CHF

IDDM

Neuropathy

CKD3

HLD

CAD

h/o Pancreatitis

EtOH

Uterine Fibroids

 

SURGICAL HISTORY :     

Cholecystectomy

Tubal Ligation

PTCA Stent Placement 

Cataract surgery

Colonsocopy

 

ENCOUNTER:     

Initial

 

ACUITY:     

4-6 days

 

PAIN SCORE:     

0/10

                    

                     

 

FLUORO TIME:     

.1 minutes

 

IMAGE SERIES:      

1

                     

 

ACCESS:     

Right internal jugular vein 

 

      

DEVICE(S):      

1.)  14 [place]oan  15 cm Schon catheter      

 

 

PROCEDURE :     

1.  Ultrasound guided venipuncture.

2.  Fluoroscopic guidance.

3.  Central line placement.

 

The risks, benefits and alternatives to the procedure were explained and verbal and written consent w
as obtained.  The site was prepped in sterile fashion.  Full sterile technique was used, including ca
p, mask, sterile gloves and gown and a large sterile sheet.  Hand hygiene and 2% chlorhexidine prep w
as utilized per protocol for cutaneous antisepsis with appropriate dry time for site.  Sterile gel an
d sterile probe cover were utilized for ultrasound guidance.

 

The skin and subcutaneous tissues were infiltrated with local anesthetic solution.  A suitable site a
louise the vein was selected with ultrasound and fluoroscopic guidance.  A small incision was made.  Th
e vein was accessed under direct ultrasound visualization using the micropuncture technique.  The kody
ropuncture set was exchanged for a 0.035 wire.  The tract was dilated.  The catheter was advanced int
o position under direct fluoroscopic visualization.  The catheter was fixed in place with suture and 
a sterile dressing was applied.

 

The patient tolerated the procedure well and there were no complications.

 

CONCLUSION:     Uncomplicated line placement as above.

 

 

 

 Pepe Swift MD on February 08, 2018 at 7:45           

Board Certified Radiologist.

 This report was verified electronically.

## 2018-02-09 VITALS
TEMPERATURE: 98.6 F | RESPIRATION RATE: 18 BRPM | HEART RATE: 70 BPM | OXYGEN SATURATION: 100 % | DIASTOLIC BLOOD PRESSURE: 64 MMHG | SYSTOLIC BLOOD PRESSURE: 121 MMHG

## 2018-02-09 VITALS
OXYGEN SATURATION: 95 % | SYSTOLIC BLOOD PRESSURE: 146 MMHG | HEART RATE: 71 BPM | RESPIRATION RATE: 16 BRPM | DIASTOLIC BLOOD PRESSURE: 72 MMHG

## 2018-02-09 VITALS — HEART RATE: 70 BPM

## 2018-02-09 VITALS — HEART RATE: 66 BPM

## 2018-02-09 VITALS — HEART RATE: 80 BPM

## 2018-02-09 VITALS
SYSTOLIC BLOOD PRESSURE: 177 MMHG | DIASTOLIC BLOOD PRESSURE: 85 MMHG | TEMPERATURE: 98.6 F | OXYGEN SATURATION: 100 % | RESPIRATION RATE: 18 BRPM | HEART RATE: 74 BPM

## 2018-02-09 VITALS
OXYGEN SATURATION: 98 % | DIASTOLIC BLOOD PRESSURE: 73 MMHG | TEMPERATURE: 98.7 F | RESPIRATION RATE: 18 BRPM | SYSTOLIC BLOOD PRESSURE: 149 MMHG | HEART RATE: 81 BPM

## 2018-02-09 VITALS — HEART RATE: 79 BPM

## 2018-02-09 VITALS
DIASTOLIC BLOOD PRESSURE: 75 MMHG | SYSTOLIC BLOOD PRESSURE: 150 MMHG | TEMPERATURE: 99.2 F | OXYGEN SATURATION: 100 % | HEART RATE: 79 BPM | RESPIRATION RATE: 16 BRPM

## 2018-02-09 VITALS
HEART RATE: 76 BPM | RESPIRATION RATE: 16 BRPM | OXYGEN SATURATION: 97 % | DIASTOLIC BLOOD PRESSURE: 72 MMHG | SYSTOLIC BLOOD PRESSURE: 137 MMHG | TEMPERATURE: 98.7 F

## 2018-02-09 VITALS — HEART RATE: 78 BPM

## 2018-02-09 VITALS — HEART RATE: 92 BPM

## 2018-02-09 VITALS — HEART RATE: 68 BPM

## 2018-02-09 VITALS — HEART RATE: 76 BPM

## 2018-02-09 VITALS — HEART RATE: 74 BPM

## 2018-02-09 VITALS — HEART RATE: 72 BPM

## 2018-02-09 VITALS — HEART RATE: 77 BPM

## 2018-02-09 LAB
ALBUMIN SERPL-MCNC: 2.5 GM/DL (ref 3.4–5)
BASOPHILS # BLD AUTO: 0.1 TH/MM3 (ref 0–0.2)
BASOPHILS NFR BLD: 1 % (ref 0–2)
BUN SERPL-MCNC: 42 MG/DL (ref 7–18)
CALCIUM SERPL-MCNC: 8.2 MG/DL (ref 8.5–10.1)
CHLORIDE SERPL-SCNC: 104 MEQ/L (ref 98–107)
CREAT SERPL-MCNC: 3.17 MG/DL (ref 0.5–1)
EOSINOPHIL # BLD: 0.2 TH/MM3 (ref 0–0.4)
EOSINOPHIL NFR BLD: 3.2 % (ref 0–4)
ERYTHROCYTE [DISTWIDTH] IN BLOOD BY AUTOMATED COUNT: 14.5 % (ref 11.6–17.2)
GFR SERPLBLD BASED ON 1.73 SQ M-ARVRAT: 19 ML/MIN (ref 89–?)
GLUCOSE SERPL-MCNC: 175 MG/DL (ref 74–106)
HCO3 BLD-SCNC: 27 MEQ/L (ref 21–32)
HCT VFR BLD CALC: 23.6 % (ref 35–46)
HGB BLD-MCNC: 8.2 GM/DL (ref 11.6–15.3)
LYMPHOCYTES # BLD AUTO: 1.5 TH/MM3 (ref 1–4.8)
LYMPHOCYTES NFR BLD AUTO: 19.9 % (ref 9–44)
MCH RBC QN AUTO: 33.3 PG (ref 27–34)
MCHC RBC AUTO-ENTMCNC: 34.7 % (ref 32–36)
MCV RBC AUTO: 95.9 FL (ref 80–100)
MONOCYTE #: 0.9 TH/MM3 (ref 0–0.9)
MONOCYTES NFR BLD: 11.8 % (ref 0–8)
NEUTROPHILS # BLD AUTO: 4.7 TH/MM3 (ref 1.8–7.7)
NEUTROPHILS NFR BLD AUTO: 64.1 % (ref 16–70)
PHOSPHATE SERPL-MCNC: 3.9 MG/DL (ref 2.5–4.9)
PLATELET # BLD: 342 TH/MM3 (ref 150–450)
PMV BLD AUTO: 8.2 FL (ref 7–11)
RBC # BLD AUTO: 2.46 MIL/MM3 (ref 4–5.3)
SODIUM SERPL-SCNC: 139 MEQ/L (ref 136–145)
WBC # BLD AUTO: 7.4 TH/MM3 (ref 4–11)

## 2018-02-09 RX ADMIN — GABAPENTIN SCH MG: 300 CAPSULE ORAL at 20:46

## 2018-02-09 RX ADMIN — SIMETHICONE SCH MG: 125 TABLET, CHEWABLE ORAL at 05:36

## 2018-02-09 RX ADMIN — Medication SCH ML: at 20:46

## 2018-02-09 RX ADMIN — SIMETHICONE SCH MG: 125 TABLET, CHEWABLE ORAL at 17:39

## 2018-02-09 RX ADMIN — ACYCLOVIR SCH UNITS: 800 TABLET ORAL at 20:50

## 2018-02-09 RX ADMIN — STANDARDIZED SENNA CONCENTRATE AND DOCUSATE SODIUM SCH TAB: 8.6; 5 TABLET, FILM COATED ORAL at 20:47

## 2018-02-09 RX ADMIN — FERROUS SULFATE TAB 325 MG (65 MG ELEMENTAL FE) SCH MG: 325 (65 FE) TAB at 09:04

## 2018-02-09 RX ADMIN — INSULIN ASPART SCH: 100 INJECTION, SOLUTION INTRAVENOUS; SUBCUTANEOUS at 20:50

## 2018-02-09 RX ADMIN — METOPROLOL TARTRATE SCH MG: 100 TABLET, FILM COATED ORAL at 20:46

## 2018-02-09 RX ADMIN — HEPARIN SODIUM PRN UNITS: 1000 INJECTION, SOLUTION INTRAVENOUS; SUBCUTANEOUS at 16:50

## 2018-02-09 RX ADMIN — PRAVASTATIN SODIUM SCH MG: 40 TABLET ORAL at 20:47

## 2018-02-09 RX ADMIN — FERROUS SULFATE TAB 325 MG (65 MG ELEMENTAL FE) SCH MG: 325 (65 FE) TAB at 20:46

## 2018-02-09 RX ADMIN — Medication SCH ML: at 09:00

## 2018-02-09 RX ADMIN — ACETAMINOPHEN PRN MG: 325 TABLET ORAL at 19:50

## 2018-02-09 RX ADMIN — GENTAMICIN SULFATE PRN MG: 10 INJECTION, SOLUTION INTRAMUSCULAR; INTRAVENOUS at 16:50

## 2018-02-09 RX ADMIN — MORPHINE SULFATE PRN MG: 2 INJECTION, SOLUTION INTRAMUSCULAR; INTRAVENOUS at 22:30

## 2018-02-09 RX ADMIN — ISOSORBIDE MONONITRATE SCH MG: 60 TABLET, EXTENDED RELEASE ORAL at 07:00

## 2018-02-09 RX ADMIN — EPOETIN ALFA PRN UNITS: 10000 SOLUTION INTRAVENOUS; SUBCUTANEOUS at 16:50

## 2018-02-09 RX ADMIN — STANDARDIZED SENNA CONCENTRATE AND DOCUSATE SODIUM SCH TAB: 8.6; 5 TABLET, FILM COATED ORAL at 09:00

## 2018-02-09 RX ADMIN — METOPROLOL TARTRATE SCH MG: 100 TABLET, FILM COATED ORAL at 09:05

## 2018-02-09 RX ADMIN — INSULIN ASPART SCH: 100 INJECTION, SOLUTION INTRAVENOUS; SUBCUTANEOUS at 08:00

## 2018-02-09 RX ADMIN — INSULIN ASPART SCH: 100 INJECTION, SOLUTION INTRAVENOUS; SUBCUTANEOUS at 12:00

## 2018-02-09 RX ADMIN — FUROSEMIDE SCH MG: 40 TABLET ORAL at 09:05

## 2018-02-09 RX ADMIN — INSULIN ASPART SCH: 100 INJECTION, SOLUTION INTRAVENOUS; SUBCUTANEOUS at 17:00

## 2018-02-09 RX ADMIN — SIMETHICONE SCH MG: 125 TABLET, CHEWABLE ORAL at 20:47

## 2018-02-09 NOTE — HHI.PR
Subjective


Remarks


Downward trend in hemoglobin from 9.1 down to 8.2.  Creatinine has trended 

upward from 2.9-3.17 today.  Patient has no new complaints today.





Objective





Vital Signs








  Date Time  Temp Pulse Resp B/P (MAP) Pulse Ox O2 Delivery O2 Flow Rate FiO2


 


2/9/18 11:15 98.6 70 18 121/64 (83) 100   


 


2/9/18 08:45 98.6 74 18 177/85 (115) 100   


 


2/9/18 07:01  78      


 


2/9/18 06:00  76      


 


2/9/18 05:00  77      


 


2/9/18 04:00 98.7 74 16 137/72 (93) 97   


 


2/9/18 04:00  76      


 


2/9/18 03:00  79      


 


2/9/18 02:00  76      


 


2/9/18 01:00  78      


 


2/9/18 00:00  74      


 


2/8/18 23:00  79      


 


2/8/18 23:00 99.0 79 16 152/73 (99) 97   


 


2/8/18 22:00  74      


 


2/8/18 21:00  78      


 


2/8/18 20:00 98.7 73 16 136/69 (91) 97   


 


2/8/18 20:00  76      


 


2/8/18 19:00  74      


 


2/8/18 18:00  78      


 


2/8/18 17:50        21


 


2/8/18 17:00  68      


 


2/8/18 16:00  70      


 


2/8/18 15:00 98.0 73 16 135/70 (91) 98   


 


2/8/18 15:00  70      














I/O      


 


 2/8/18 2/8/18 2/8/18 2/9/18 2/9/18 2/9/18





 06:59 14:59 22:59 06:59 14:59 22:59


 


Intake Total 240 ml  800 ml 240 ml  


 


Output Total 800 ml  1200 ml 600 ml  


 


Balance -560 ml  -400 ml -360 ml  


 


      


 


Intake Oral 240 ml  800 ml 240 ml  


 


Output Urine Total 800 ml  1200 ml 600 ml  








Result Diagram:  


2/9/18 0524 2/9/18 0524





Objective Remarks


GENERAL: NAD, A&Ox3


HEAD: Normocephalic. 


NECK: Supple, trachea midline. No lymphadenopathy.


EYES: No scleral icterus. No injection or drainage. 


CARDIOVASCULAR: Regular rate and rhythm without murmurs, gallops, or rubs. 


RESPIRATORY: Breath sounds equal bilaterally. No accessory muscle use.


GASTROINTESTINAL: Abdomen soft, non-tender, nondistended. 


MUSCULOSKELETAL: No cyanosis, or edema. 


SKIN: Warm and dry.


NEURO:  No focal neurological deficitis.





A/P


Problem List:  


(1) Renal insufficiency


ICD Code:  N28.9 - Disorder of kidney and ureter, unspecified


(2) Acute kidney injury superimposed on CKD


ICD Code:  N17.9 - Acute kidney failure, unspecified; N18.9 - Chronic kidney 

disease, unspecified


Status:  Acute


(3) DM (diabetes mellitus)


ICD Code:  E11.9 - Type 2 diabetes mellitus without complications


(4) HTN (hypertension)


ICD Code:  I10 - Hypertension


Status:  Chronic


(5) CAD (coronary artery disease)


ICD Code:  I25.10 - Atherosclerotic heart disease of native coronary artery 

without angina pectoris


Status:  Chronic


Assessment and Plan


52-year-old female presented with sob, lethargy





Labs reviewed.  Electrolytes and renal function studies are remaining 

relatively stable.  Continue to monitor labs.  Labs ordered for further 

monitoring.  No reports of chest pain today.  Continue to follow renal 

function.  Continue to monitor hemoglobin levels.  Vas-Cath had some bleeding 

yesterday but no evidence of significantly today.





Acute on Chronic diastolic CHF


Cardiomyopathy 


Continue twice a day Lasix


Continue imdur


Continue beta blocker





Acute on chronic Stage IV CKD


Nephrology following


Avoiding nephrotoxins


Plan for Vas-Cath


Dialysis as needed





Chest pain


Recent an STEMI


Follow cardiac enzymes


Cardiology following


Nitroglycerin drip


Plan for heart catheter tomorrow








Hemoptysis on 1/27


No recurrence 





GI bleed-resolved


Anemia of acute blood loss


2 units transfused at time of admit


Hemoglobin has remained stable since


Gastritis found on GI workup


Continue PPI


Follow hemoglobin





Hypertension


Continue Lopressor


Continue Procardia


Continue hydralazine


Ace inhibitors on hold





Diabetes mellitus type 2


Follow blood sugars


Insulin sliding scale


Diabetic diet





Tobacco abuse


Cessation recommended





Problem Qualifiers





(1) DM (diabetes mellitus):  








Pepe Treadwell MD Feb 9, 2018 14:16

## 2018-02-09 NOTE — RADRPT
EXAM DATE/TIME:  02/08/2018 22:08 

 

[facility] COMPARISON:     

No previous studies available for comparison.

 

 

INDICATIONS :                

Preop for arteriovenous fistula. 

              

 

MEDICAL HISTORY :      

Myocardial infarction. Hypercholesterolemia. Pancreatitis. Coronary artery disease. Migraines. Chest 
pain. HTN. Pneumonia. Dyspnea. Renal disease. Arthritis. Diabetes. Anxiety. Substance use.  

 

SURGICAL HISTORY :     

Coronary artery stent. Cholecystectomy. Tubal ligation. Abscessed tooth. Cardiac cath. 

 

ENCOUNTER:        

Initial

 

ACUITY:        

1 month

 

PAIN SCORE:        

3/10

 

LOCATION:         

Bilateral arm.

 

CEPHALIC:                                                           

 

ORIGIN:     

Right  3 mm         Left   2 mm

 

MID-ARM:     

Right  3 mm         Left   2 mm

 

ELBOW:     

Right  3 mm         Left   2 mm

                            

 

BASILIC:           

 

ORIGIN:     

Right  6 mm         Left   7 mm

 

MID-ARM:     

Right  3 mm         Left   5 mm

 

ELBOW:     

Right  3 mm         Left   4 mm

                            

 

ARTERIES:          

 

BRACHIAL:     

Right  5 mm         Left   5 mm

 

ULNAR:     

Right  3 mm         Left   3 mm

 

RADIAL:     

Right  3 mm         Left   2 mm

                            

 

VEINS:          

 

RADIAL:     

Right  2 mm         Left   2 mm

 

ULNAR:     

Right  2 mm         Left   1 mm

 

 

FINDINGS:     

The venous system of the upper extremities are patent by color Doppler imaging.  Measurements of the 
arm veins (in mm) are listed above.  

 

CONCLUSION:     Normal examination.  

 

 

 

 Valdez Colunga MD on February 08, 2018 at 23:58           

Board Certified Radiologist.

 This report was verified electronically.

## 2018-02-09 NOTE — HHI.NPPN
Subjective


Complaints:  Shortness of Breath


General Problems:  Anemia


Renal Failure:  Chronic, Acute, Stage IV


Interval History


Sitting up in bed. Appears depressed today. On Nitro gtt, chest pain free. 


 (Migdalia Camejo)





Review of Systems


General


Constitutional:  Fatigue


 (Migdalia Camejo)





Respiratory


Lungs:  SOB


 (Migdalia Camejo)





Gastrointestinal


Gastrointestinal:  Nausea & Vomiting


 (Migdalia Camejo)





Objective Data


Data





Vital Signs








  Date Time  Temp Pulse Resp B/P (MAP) Pulse Ox O2 Delivery O2 Flow Rate FiO2


 


2/9/18 08:45 98.6 74 18 177/85 (115) 100   


 


2/9/18 07:01  78      


 


2/9/18 06:00  76      


 


2/9/18 05:00  77      


 


2/9/18 04:00 98.7 74 16 137/72 (93) 97   


 


2/9/18 04:00  76      


 


2/9/18 03:00  79      


 


2/9/18 02:00  76      


 


2/9/18 01:00  78      


 


2/9/18 00:00  74      


 


2/8/18 23:00  79      


 


2/8/18 23:00 99.0 79 16 152/73 (99) 97   


 


2/8/18 22:00  74      


 


2/8/18 21:00  78      


 


2/8/18 20:00 98.7 73 16 136/69 (91) 97   


 


2/8/18 20:00  76      


 


2/8/18 19:00  74      


 


2/8/18 18:00  78      


 


2/8/18 17:50        21


 


2/8/18 17:00  68      


 


2/8/18 16:00  70      


 


2/8/18 15:00 98.0 73 16 135/70 (91) 98   


 


2/8/18 15:00  70      


 


2/8/18 14:00  74      


 


2/8/18 13:00  76      


 


2/8/18 12:00  76      


 


2/8/18 11:00  78      


 


2/8/18 11:00 98.7 78 17 155/79 (104) 100   


 


2/8/18 10:00  82      








 (Migdalia Camejo)


-:  


2/9/18 0524                                                                    

            2/9/18 0524





Imaging





Last 72 hours Impressions








Upper Extremity Ultrasound 2/8/18 0000 Signed





Impressions: 





 Service Date/Time:  Thursday, February 8, 2018 21:19 - CONCLUSION:  Normal 





 examination.       Valdez Colunga MD 


 


Upper Extremity Ultrasound 2/8/18 0000 Signed





Impressions: 





 Service Date/Time:  Thursday, February 8, 2018 22:08 - CONCLUSION: Normal 





 examination.       Valdez Colunga MD 


 


Catheter Placement X-Ray 2/7/18 0000 Signed





Impressions: 





 Service Date/Time:  Wednesday, February 7, 2018 14:29 - CONCLUSION: 





 Uncomplicated line placement as above.     Pepe Swift MD 








Tubes & Lines:  Vas-Cath


Drip Comment


Nitro 


 (Nell,Migdalia B. ARNP)





Physical Exam


General


Appearance:  Well Developed, No Acute Distress, Comfortable


 (Nell,Migdalia B. ARNP)





Throat


Throat Exam:  Oral Mucosa Pink & Moist


 (Nell,Migdalia B. ARNP)





Neck


Neck Remarks


tenderness  at vascath site. 


 (Nell,Migdalia B. ARNP)





Pulmonary


Resp Exam:  No Distress, Crackles, Decreased Bases


 (Nell,Migdalia B. ARNP)





Cardiology


CV Exam:  Regular, Normal Sinus Rhythm


 (Nell,Migdalia B. ARNP)





Gastrointestinal/Abdomen


GI Exam:  Soft, Non-Tender, Bowel Sounds Present, Positive Bowel Movement


 (Nell,Migadlia B. ARNP)





Musculoskeletal


MS Exam:  Normal Gait, Normal Tone


 (Nell,Migdalia B. ARNP)





Integumentary


Skin Exam:  Warm, Dry, Intact


 (Nell,Migdalia B. ARNP)





Extremeties


Extremities Exam:  No Edema, Pedal Pulses Palpable


 (Nell,Migdalia B. ARNP)





Neurologic


Neuro Exam:  Alert, Awake, Oriented, Speech Clear, Moving All Extremities


 (Nell,Migdalia B. ARNP)





Psychiatric


Psych Exam:  Appropriate Responses


 (NellMigdalia B. ARNP)





Assessment/Plan


Discussed Condition With:  Patient, Relative


Assessment Summary:  Acute Tubular Necrosis, Fluid/Volume Overload, Hypertension

, Diabetes Mellitus, CKD Stage IV


Problem List:  


(1) Acute kidney injury superimposed on CKD


ICD Codes:  N17.9 - Acute kidney failure, unspecified; N18.9 - Chronic kidney 

disease, unspecified


Status:  Acute


Plan:  She has reached CKD 5. 


Vascath placed 2/7. 


HD MWF, due today


Holding Plavix,  ASA, heparin until Monday. Planned Permcath exchange,  cardiac 

cath, then HD on Monday.


Bleeding at [facility] site has improved. 


Repeat labs daily. 


She still makes urine


Vascular has evaluated for AVF placement. Vein mapping done. Hopefully this can 

be done this admission. 


Case management is assisting with Medicare application is appreciated. She will 

be [place] in [place] [facility]





 She would like to do PD in the future but we will have to consider that at a 

later date.





(2) CAD (coronary artery disease)


ICD Codes:  I25.10 - Atherosclerotic heart disease of native coronary artery 

without angina pectoris


Status:  Chronic


Plan:  To have cardiac cath with stent placement most likely Monday.


D/W Dr. Mercado. 





(3) CHF (congestive heart failure)


ICD Codes:  I50.9 - Heart failure, unspecified


Plan:  Fluid removal as tolerated. Continue PO lasix.


Follow fluid status


Fluid restriction discussed with the patient. 





(4) HTN (hypertension)


ICD Codes:  I10 - Hypertension


Status:  Chronic


Plan:  BP acceptable. 


On Imdur 120 mg daily


Norvasc 10 mg daily


Metoprolol 100 mg BID


Hydralazine 50 mg Q8h


Clonidine 0.1 mg BID


Continue to monitor





(5) DM (diabetes mellitus)


ICD Codes:  E11.9 - Type 2 diabetes mellitus without complications


Plan:  Monitor blood glucose, maintain 140-180 mg/dL.


 (Migdalia Camejo)


Plan


patient was seen and examined. She is non oliguric. Dialysis today. Cardiac cath

, PermCath on Monday. Monitor urine output and renal function. 


 (Kelton Whiteside MD)





Problem Qualifiers





(1) CHF (congestive heart failure):  


Qualified Codes:  I50.9 - Heart failure, unspecified


(2) DM (diabetes mellitus):  








Migdalia Caemjo Feb 9, 2018 09:50


Kelton Whiteside MD Feb 9, 2018 14:53

## 2018-02-10 VITALS
HEART RATE: 73 BPM | TEMPERATURE: 98.5 F | SYSTOLIC BLOOD PRESSURE: 145 MMHG | RESPIRATION RATE: 16 BRPM | DIASTOLIC BLOOD PRESSURE: 75 MMHG | OXYGEN SATURATION: 100 %

## 2018-02-10 VITALS — HEART RATE: 72 BPM

## 2018-02-10 VITALS
HEART RATE: 70 BPM | DIASTOLIC BLOOD PRESSURE: 67 MMHG | SYSTOLIC BLOOD PRESSURE: 126 MMHG | OXYGEN SATURATION: 100 % | RESPIRATION RATE: 17 BRPM | TEMPERATURE: 98.3 F

## 2018-02-10 VITALS
RESPIRATION RATE: 18 BRPM | TEMPERATURE: 98.7 F | SYSTOLIC BLOOD PRESSURE: 134 MMHG | DIASTOLIC BLOOD PRESSURE: 65 MMHG | HEART RATE: 68 BPM | OXYGEN SATURATION: 100 %

## 2018-02-10 VITALS — HEART RATE: 73 BPM

## 2018-02-10 VITALS
TEMPERATURE: 99 F | DIASTOLIC BLOOD PRESSURE: 75 MMHG | SYSTOLIC BLOOD PRESSURE: 168 MMHG | RESPIRATION RATE: 18 BRPM | HEART RATE: 78 BPM | OXYGEN SATURATION: 98 %

## 2018-02-10 VITALS
OXYGEN SATURATION: 97 % | DIASTOLIC BLOOD PRESSURE: 72 MMHG | SYSTOLIC BLOOD PRESSURE: 151 MMHG | HEART RATE: 70 BPM | RESPIRATION RATE: 16 BRPM

## 2018-02-10 VITALS — HEART RATE: 70 BPM

## 2018-02-10 VITALS — HEART RATE: 74 BPM

## 2018-02-10 VITALS
SYSTOLIC BLOOD PRESSURE: 129 MMHG | RESPIRATION RATE: 16 BRPM | OXYGEN SATURATION: 100 % | HEART RATE: 72 BPM | DIASTOLIC BLOOD PRESSURE: 63 MMHG

## 2018-02-10 VITALS — HEART RATE: 68 BPM

## 2018-02-10 VITALS — HEART RATE: 76 BPM

## 2018-02-10 VITALS — HEART RATE: 71 BPM

## 2018-02-10 LAB
ALBUMIN SERPL-MCNC: 2.5 GM/DL (ref 3.4–5)
ALP SERPL-CCNC: 160 U/L (ref 45–117)
ALT SERPL-CCNC: 43 U/L (ref 10–53)
AST SERPL-CCNC: 20 U/L (ref 15–37)
BASOPHILS # BLD AUTO: 0.1 TH/MM3 (ref 0–0.2)
BASOPHILS NFR BLD: 0.7 % (ref 0–2)
BILIRUB SERPL-MCNC: 0.3 MG/DL (ref 0.2–1)
BUN SERPL-MCNC: 26 MG/DL (ref 7–18)
CALCIUM SERPL-MCNC: 8 MG/DL (ref 8.5–10.1)
CHLORIDE SERPL-SCNC: 102 MEQ/L (ref 98–107)
CREAT SERPL-MCNC: 2.5 MG/DL (ref 0.5–1)
EOSINOPHIL # BLD: 0.3 TH/MM3 (ref 0–0.4)
EOSINOPHIL NFR BLD: 3.5 % (ref 0–4)
ERYTHROCYTE [DISTWIDTH] IN BLOOD BY AUTOMATED COUNT: 14.4 % (ref 11.6–17.2)
GFR SERPLBLD BASED ON 1.73 SQ M-ARVRAT: 25 ML/MIN (ref 89–?)
GLUCOSE SERPL-MCNC: 174 MG/DL (ref 74–106)
HCO3 BLD-SCNC: 29.4 MEQ/L (ref 21–32)
HCT VFR BLD CALC: 23.5 % (ref 35–46)
HGB BLD-MCNC: 8.1 GM/DL (ref 11.6–15.3)
LYMPHOCYTES # BLD AUTO: 1.7 TH/MM3 (ref 1–4.8)
LYMPHOCYTES NFR BLD AUTO: 21.1 % (ref 9–44)
MCH RBC QN AUTO: 32.9 PG (ref 27–34)
MCHC RBC AUTO-ENTMCNC: 34.3 % (ref 32–36)
MCV RBC AUTO: 96 FL (ref 80–100)
MONOCYTE #: 0.9 TH/MM3 (ref 0–0.9)
MONOCYTES NFR BLD: 10.8 % (ref 0–8)
NEUTROPHILS # BLD AUTO: 5 TH/MM3 (ref 1.8–7.7)
NEUTROPHILS NFR BLD AUTO: 63.9 % (ref 16–70)
PLATELET # BLD: 331 TH/MM3 (ref 150–450)
PMV BLD AUTO: 7.6 FL (ref 7–11)
PROT SERPL-MCNC: 6.3 GM/DL (ref 6.4–8.2)
RBC # BLD AUTO: 2.45 MIL/MM3 (ref 4–5.3)
SODIUM SERPL-SCNC: 138 MEQ/L (ref 136–145)
WBC # BLD AUTO: 7.9 TH/MM3 (ref 4–11)

## 2018-02-10 RX ADMIN — TEMAZEPAM PRN MG: 15 CAPSULE ORAL at 23:22

## 2018-02-10 RX ADMIN — Medication SCH ML: at 21:00

## 2018-02-10 RX ADMIN — Medication SCH ML: at 08:54

## 2018-02-10 RX ADMIN — INSULIN ASPART SCH: 100 INJECTION, SOLUTION INTRAVENOUS; SUBCUTANEOUS at 13:02

## 2018-02-10 RX ADMIN — SIMETHICONE SCH MG: 125 TABLET, CHEWABLE ORAL at 13:03

## 2018-02-10 RX ADMIN — STANDARDIZED SENNA CONCENTRATE AND DOCUSATE SODIUM SCH TAB: 8.6; 5 TABLET, FILM COATED ORAL at 08:52

## 2018-02-10 RX ADMIN — INSULIN ASPART SCH: 100 INJECTION, SOLUTION INTRAVENOUS; SUBCUTANEOUS at 21:05

## 2018-02-10 RX ADMIN — FERROUS SULFATE TAB 325 MG (65 MG ELEMENTAL FE) SCH MG: 325 (65 FE) TAB at 08:50

## 2018-02-10 RX ADMIN — METOPROLOL TARTRATE SCH MG: 100 TABLET, FILM COATED ORAL at 08:50

## 2018-02-10 RX ADMIN — WATER PRN ML: 1 IRRIGANT IRRIGATION at 08:53

## 2018-02-10 RX ADMIN — INSULIN ASPART SCH: 100 INJECTION, SOLUTION INTRAVENOUS; SUBCUTANEOUS at 18:45

## 2018-02-10 RX ADMIN — SIMETHICONE SCH MG: 125 TABLET, CHEWABLE ORAL at 05:30

## 2018-02-10 RX ADMIN — INSULIN ASPART SCH: 100 INJECTION, SOLUTION INTRAVENOUS; SUBCUTANEOUS at 08:50

## 2018-02-10 RX ADMIN — PRAVASTATIN SODIUM SCH MG: 40 TABLET ORAL at 21:02

## 2018-02-10 RX ADMIN — GABAPENTIN SCH MG: 300 CAPSULE ORAL at 21:02

## 2018-02-10 RX ADMIN — STANDARDIZED SENNA CONCENTRATE AND DOCUSATE SODIUM SCH TAB: 8.6; 5 TABLET, FILM COATED ORAL at 21:02

## 2018-02-10 RX ADMIN — FERROUS SULFATE TAB 325 MG (65 MG ELEMENTAL FE) SCH MG: 325 (65 FE) TAB at 21:02

## 2018-02-10 RX ADMIN — FUROSEMIDE SCH MG: 40 TABLET ORAL at 08:51

## 2018-02-10 RX ADMIN — SIMETHICONE SCH MG: 125 TABLET, CHEWABLE ORAL at 21:03

## 2018-02-10 RX ADMIN — MORPHINE SULFATE PRN MG: 2 INJECTION, SOLUTION INTRAMUSCULAR; INTRAVENOUS at 16:51

## 2018-02-10 RX ADMIN — ISOSORBIDE MONONITRATE SCH MG: 60 TABLET, EXTENDED RELEASE ORAL at 05:30

## 2018-02-10 RX ADMIN — ACYCLOVIR SCH UNITS: 800 TABLET ORAL at 21:05

## 2018-02-10 RX ADMIN — METOPROLOL TARTRATE SCH MG: 100 TABLET, FILM COATED ORAL at 21:02

## 2018-02-10 RX ADMIN — MORPHINE SULFATE PRN MG: 2 INJECTION, SOLUTION INTRAMUSCULAR; INTRAVENOUS at 05:34

## 2018-02-10 NOTE — HHI.PR
Subjective


Remarks


Hemoglobin is relatively stable today.  No new complaints from the patient 

today.  No nausea or vomiting or diarrhea.





Objective





Vital Signs








  Date Time  Temp Pulse Resp B/P (MAP) Pulse Ox O2 Delivery O2 Flow Rate FiO2


 


2/10/18 11:45 98.3 70 17 126/67 (86) 100   


 


2/10/18 08:55 99.0 78 18 168/75 (106) 98   


 


2/10/18 08:00  76      


 


2/10/18 07:00  78  168/75    


 


2/10/18 07:00  73      


 


2/10/18 06:00  74      


 


2/10/18 05:00  74      


 


2/10/18 04:24  70 16 151/72 (98) 97   


 


2/10/18 04:00  76      


 


2/10/18 03:00  73      


 


2/10/18 02:00  72      


 


2/10/18 01:00  74      


 


2/10/18 00:00  72      


 


2/9/18 23:00  75 16 146/72 (96) 95   


 


2/9/18 23:00  71      


 


2/9/18 22:00  70      


 


2/9/18 21:00  76      


 


2/9/18 20:00  80      


 


2/9/18 19:40 99.2 79 16 150/75 (100) 100   


 


2/9/18 19:00  78      


 


2/9/18 18:01  92      


 


2/9/18 17:30 98.7 81 18 149/73 (98) 98   


 


2/9/18 17:01  80      














I/O      


 


 2/9/18 2/9/18 2/9/18 2/10/18 2/10/18 2/10/18





 07:00 15:00 23:00 07:00 15:00 23:00


 


Intake Total 240 ml   480 ml  


 


Output Total 600 ml  300 ml 750 ml  


 


Balance -360 ml  -300 ml -270 ml  


 


      


 


Intake Oral 240 ml   480 ml  


 


Output Urine Total 600 ml   750 ml  


 


Hemodialysis   300 ml   


 


# Bowel Movements    0  








Result Diagram:  


2/10/18 0611                                                                   

             2/10/18 0611





Objective Remarks


GENERAL: NAD, A&Ox3


HEAD: Normocephalic. 


NECK: Supple, trachea midline. No lymphadenopathy.


EYES: No scleral icterus. No injection or drainage. 


CARDIOVASCULAR: Regular rate and rhythm without murmurs, gallops, or rubs. 


RESPIRATORY: Breath sounds equal bilaterally. No accessory muscle use.


GASTROINTESTINAL: Abdomen soft, non-tender, nondistended. 


MUSCULOSKELETAL: No cyanosis, or edema. 


SKIN: Warm and dry.


NEURO:  No focal neurological deficitis.





A/P


Problem List:  


(1) Renal insufficiency


ICD Code:  N28.9 - Disorder of kidney and ureter, unspecified


(2) Acute kidney injury superimposed on CKD


ICD Code:  N17.9 - Acute kidney failure, unspecified; N18.9 - Chronic kidney 

disease, unspecified


Status:  Acute


(3) DM (diabetes mellitus)


ICD Code:  E11.9 - Type 2 diabetes mellitus without complications


(4) HTN (hypertension)


ICD Code:  I10 - Hypertension


Status:  Chronic


(5) CAD (coronary artery disease)


ICD Code:  I25.10 - Atherosclerotic heart disease of native coronary artery 

without angina pectoris


Status:  Chronic


Assessment and Plan


52-year-old female presented with sob, lethargy





Labs reviewed.  Electrolytes and renal function studies are remaining 

relatively stable.  CBC has a mild downward trend.  Continue to monitor 

hemoglobin.  Continue to monitor labs.  Labs ordered for further monitoring.  

No reports of chest pain today.  Continue to follow renal function.  Continue 

to monitor hemoglobin levels.  Heart catheter Pending for 02/11/18.





Acute on Chronic diastolic CHF


Cardiomyopathy 


Continue twice a day Lasix


Continue imdur


Continue beta blocker





Acute on chronic Stage IV CKD


Nephrology following


Avoiding nephrotoxins


Plan for Vas-Cath


Dialysis as needed





Chest pain


Recent an STEMI


Follow cardiac enzymes


Cardiology following


Nitroglycerin drip


Plan for heart catheter tomorrow








Hemoptysis on 1/27


No recurrence 





GI bleed-resolved


Anemia of acute blood loss


2 units transfused at time of admit


Hemoglobin has remained stable since


Gastritis found on GI workup


Continue PPI


Follow hemoglobin





Hypertension


Continue Lopressor


Continue Procardia


Continue hydralazine


Ace inhibitors on hold





Diabetes mellitus type 2


Follow blood sugars


Insulin sliding scale


Diabetic diet





Tobacco abuse


Cessation recommended





Problem Qualifiers





(1) DM (diabetes mellitus):  








Pepe Treadwell MD Feb 10, 2018 15:10

## 2018-02-10 NOTE — PD.CARD.PN
Subjective


Subjective Remarks


Resting comfortably. No dyspnea





Objective


Medications





Current Medications








 Medications


  (Trade)  Dose


 Ordered  Sig/Luis Armando


 Route  Start Time


 Stop Time Status Last Admin


 


  (NS Flush)  2 ml  UNSCH  PRN


 IV FLUSH  2/4/18 11:30


    2/4/18 17:46


 


 


  (NS Flush)  2 ml  BID


 IV FLUSH  2/4/18 21:00


    2/9/18 09:00


 


 


  (Tylenol)  650 mg  Q4H  PRN


 PO  2/4/18 11:30


    2/4/18 15:33


 


 


  (Zofran Inj)  4 mg  Q6H  PRN


 IVP  2/4/18 11:30


    2/6/18 08:43


 


 


  (Restoril)  15 mg  HS  PRN


 PO  2/4/18 11:30


     


 


 


  (Narcan Inj)  0.4 mg  UNSCH  PRN


 IV PUSH  2/4/18 11:30


     


 


 


  (Theresa-Colace)  1 tab  BID


 PO  2/4/18 21:00


    2/9/18 20:47


 


 


  (Milk Of


 Magnesia Liq)  30 ml  Q12H  PRN


 PO  2/4/18 11:30


     


 


 


  (Senokot)  17.2 mg  Q12H  PRN


 PO  2/4/18 11:30


     


 


 


  (Dulcolax Supp)  10 mg  DAILY  PRN


 RECTAL  2/4/18 11:30


     


 


 


  (Lactulose Liq)  30 ml  DAILY  PRN


 PO  2/4/18 11:30


     


 


 


  (Norvasc)  10 mg  DAILY


 PO  2/5/18 09:00


    2/9/18 09:04


 


 


  (Aspirin Chew)  81 mg  DAILY


 CHEW  2/5/18 09:00


   Future Hold 2/7/18 09:55


 


 


  (Plavix)  75 mg  DAILY


 PO  2/5/18 09:00


   Future Hold 2/7/18 09:55


 


 


  (Ferrous Sulfate)  325 mg  BID


 PO  2/4/18 21:00


    2/9/18 20:46


 


 


  (Neurontin)  300 mg  HS


 PO  2/4/18 21:00


    2/9/18 20:46


 


 


  (Apresoline)  50 mg  Q8HR


 PO  2/4/18 14:00


    2/10/18 05:30


 


 


  (Imdur)  120 mg  DAILY@07


 PO  2/5/18 07:00


    2/10/18 05:30


 


 


  (Lopressor)  100 mg  Q12HR


 PO  2/4/18 21:00


    2/9/18 20:46


 


 


  (Nitrostat Sl)  0.4 mg  Q5M  PRN


 SL  2/4/18 11:30


    2/7/18 10:32


 


 


  (Pravachol)  40 mg  HS


 PO  2/4/18 21:00


    2/9/18 20:47


 


 


  (Phazyme Chew)  125 mg  Q8HR


 PO  2/4/18 14:00


    2/10/18 05:30


 


 


  (Heparin Inj)  5,000 units  Q12HR


 SQ  2/4/18 21:00


   Future Hold 2/7/18 09:54


 


 


  (Trandate Inj)  10 mg  Q20M  PRN


 IV PUSH  2/4/18 11:45


     


 


 


  (Apresoline Inj)  20 mg  Q4H  PRN


 IV PUSH  2/4/18 11:45


    2/4/18 15:09


 


 


  (Duoneb Neb)  1 ampule  Q4HR NEB  PRN


 NEB  2/4/18 16:15


    2/6/18 01:51


 


 


  (Ativan)  0.5 mg  Q8HR  PRN


 PO  2/4/18 16:15


    2/8/18 00:10


 


 


  (D50w (Vial) Inj)  50 ml  UNSCH  PRN


 IV PUSH  2/4/18 16:15


    2/5/18 04:20


 


 


  (Glucagon Inj)  1 mg  UNSCH  PRN


 OTHER  2/4/18 16:15


     


 


 


  (NovoLOG


 SUPPLEMENTAL


 SCALE)  1  ACHS SLIDING  SCALE


 SQ  2/4/18 17:00


    2/9/18 20:50


 


 


  (Catapres)  0.1 mg  Q12HR


 PO  2/5/18 10:00


    2/9/18 20:46


 


 


  (Morphine Inj)  2 mg  Q4H  PRN


 IV PUSH  2/6/18 01:00


    2/10/18 05:34


 


 


  (Levemir Inj)  8 units  HS


 SQ  2/7/18 21:00


    2/9/18 20:50


 


 


 Sodium Chloride  1,000 ml @ 


 0 mls/hr  Q0M PRN


 OTHER  2/7/18 10:20


     


 


 


  (Heparin Inj)  8,000 units  UNSCH  PRN


 IV FLUSH  2/7/18 10:30


     


 


 


 Sodium Chloride  1,000 ml @ 


 200 mls/hr  Q5H PRN


 IV  2/7/18 10:20


     


 


 


 Sodium Chloride  1,000 ml @ 


 0 mls/hr  Q0M PRN


 OTHER  2/7/18 10:20


     


 


 


  (Mannitol Inj)  12.5 gm  UNSCH  PRN


 IV  2/7/18 10:30


     


 


 


 Albumin Human  100 ml @ 


 60 mls/hr  UNSCH  PRN


 IV  2/7/18 10:30


     


 


 


  (NS Flush)  5 ml  UNSCH  PRN


 IV FLUSH  2/7/18 10:30


     


 


 


  (Heparin Inj)    UNSCH  PRN


 .XX  2/7/18 10:30


    2/9/18 16:50


 


 


  (Gentamicin Inj)  20 mg  UNSCH  PRN


 OTHER  2/7/18 10:30


    2/9/18 16:50


 


 


  (Zofran Inj)  4 mg  UNSCH  PRN


 IV PUSH  2/7/18 10:30


     


 


 


  (Tylenol)  650 mg  UNSCH  PRN


 PO  2/7/18 10:30


    2/9/18 19:50


 


 


  (Benadryl)  25 mg  UNSCH  PRN


 PO  2/7/18 10:30


     


 


 


  (Nitrostat Sl)  0.4 mg  UNSCH  PRN


 SL  2/7/18 10:30


     


 


 


  (Catapres)  0.1 mg  UNSCH  PRN


 PO  2/7/18 10:30


     


 


 


  (Epogen Inj)  10,000 units  UNSCH  PRN


 IV PUSH  2/7/18 10:30


    2/9/18 16:50


 


 


  (Gelfoam 12 Mm/7


 Mm Top)  1 foam  UNSCH  PRN


 TOP  2/7/18 10:30


     


 


 


  (Lasix)  40 mg  DAILY


 PO  2/8/18 09:00


    2/9/18 09:05


 


 


 Nitroglycerin/


 Dextrose  250 ml @ 


 1.5 mls/hr  TITRATE  PRN


 IV  2/7/18 12:00


     


 


 


  (NS Flush)    UNSCH  PRN


 IV FLUSH  2/7/18 15:45


     


 


 


  (Heparin Inj)    UNSCH  PRN


 IV FLUSH  2/7/18 15:45


     


 








Vital Signs / I&O





Vital Signs








  Date Time  Temp Pulse Resp B/P (MAP) Pulse Ox O2 Delivery O2 Flow Rate FiO2


 


2/10/18 08:00  76      


 


2/10/18 07:00  73      


 


2/10/18 06:00  74      


 


2/10/18 05:00  74      


 


2/10/18 04:24  70 16 151/72 (98) 97   


 


2/10/18 04:00  76      


 


2/10/18 03:00  73      


 


2/10/18 02:00  72      


 


2/10/18 01:00  74      


 


2/10/18 00:00  72      


 


2/9/18 23:00  75 16 146/72 (96) 95   


 


2/9/18 23:00  71      


 


2/9/18 22:00  70      


 


2/9/18 21:00  76      


 


2/9/18 20:00  80      


 


2/9/18 19:40 99.2 79 16 150/75 (100) 100   


 


2/9/18 19:00  78      


 


2/9/18 18:01  92      


 


2/9/18 17:30 98.7 81 18 149/73 (98) 98   


 


2/9/18 17:01  80      


 


2/9/18 14:00  66      


 


2/9/18 13:00  66      


 


2/9/18 12:00  68      


 


2/9/18 11:15 98.6 70 18 121/64 (83) 100   


 


2/9/18 11:00  78      


 


2/9/18 10:00  72      


 


2/9/18 09:00  74      


 


2/9/18 08:45 98.6 74 18 177/85 (115) 100   














I/O      


 


 2/9/18 2/9/18 2/9/18 2/10/18 2/10/18 2/10/18





 07:00 15:00 23:00 07:00 15:00 23:00


 


Intake Total 240 ml   480 ml  


 


Output Total 600 ml  300 ml 750 ml  


 


Balance -360 ml  -300 ml -270 ml  


 


      


 


Intake Oral 240 ml   480 ml  


 


Output Urine Total 600 ml   750 ml  


 


Hemodialysis   300 ml   


 


# Bowel Movements    0  








Physical Exam


Lungs clear


RRR


Laboratory





Laboratory Tests








Test


  2/10/18


06:11


 


White Blood Count 7.9 TH/MM3 


 


Red Blood Count 2.45 MIL/MM3 


 


Hemoglobin 8.1 GM/DL 


 


Hematocrit 23.5 % 


 


Mean Corpuscular Volume 96.0 FL 


 


Mean Corpuscular Hemoglobin 32.9 PG 


 


Mean Corpuscular Hemoglobin


Concent 34.3 % 


 


 


Red Cell Distribution Width 14.4 % 


 


Platelet Count 331 TH/MM3 


 


Mean Platelet Volume 7.6 FL 


 


Neutrophils (%) (Auto) 63.9 % 


 


Lymphocytes (%) (Auto) 21.1 % 


 


Monocytes (%) (Auto) 10.8 % 


 


Eosinophils (%) (Auto) 3.5 % 


 


Basophils (%) (Auto) 0.7 % 


 


Neutrophils # (Auto) 5.0 TH/MM3 


 


Lymphocytes # (Auto) 1.7 TH/MM3 


 


Monocytes # (Auto) 0.9 TH/MM3 


 


Eosinophils # (Auto) 0.3 TH/MM3 


 


Basophils # (Auto) 0.1 TH/MM3 


 


CBC Comment DIFF FINAL 


 


Differential Comment  


 


Blood Urea Nitrogen 26 MG/DL 


 


Creatinine 2.50 MG/DL 


 


Random Glucose 174 MG/DL 


 


Total Protein 6.3 GM/DL 


 


Albumin 2.5 GM/DL 


 


Calcium Level 8.0 MG/DL 


 


Alkaline Phosphatase 160 U/L 


 


Aspartate Amino Transf


(AST/SGOT) 20 U/L 


 


 


Alanine Aminotransferase


(ALT/SGPT) 43 U/L 


 


 


Total Bilirubin 0.3 MG/DL 


 


Sodium Level 138 MEQ/L 


 


Potassium Level 3.4 MEQ/L 


 


Chloride Level 102 MEQ/L 


 


Carbon Dioxide Level 29.4 MEQ/L 


 


Anion Gap 7 MEQ/L 


 


Estimat Glomerular Filtration


Rate 25 ML/MIN 


 











Assessment and Plan


Problem List:  


(1) CAD (coronary artery disease)


ICD Codes:  I25.10 - Atherosclerotic heart disease of native coronary artery 

without angina pectoris


Status:  Chronic


Plan:  Tentatively permacath Monday then cardiac cath. Stable at present





(2) CHF (congestive heart failure)


ICD Codes:  I50.9 - Heart failure, unspecified


(3) Acute kidney injury superimposed on CKD


ICD Codes:  N17.9 - Acute kidney failure, unspecified; N18.9 - Chronic kidney 

disease, unspecified


Status:  Acute





Problem Qualifiers





(1) CHF (congestive heart failure):  


Qualified Codes:  I50.9 - Heart failure, unspecified








Cali Huerta MD Feb 10, 2018 08:34

## 2018-02-10 NOTE — HHI.NPPN
Subjective


Complaints:  Shortness of Breath


General Problems:  Anemia


Renal Failure:  Chronic, Acute, Stage IV


Additional Remarks


Patient is alert, eating breakfast, mild SOB, not in distress.





Review of Systems


General


Constitutional:  Fatigue





Respiratory


Lungs:  SOB





Gastrointestinal


Gastrointestinal:  Nausea & Vomiting





Objective Data


Data





Vital Signs








  Date Time  Temp Pulse Resp B/P (MAP) Pulse Ox O2 Delivery O2 Flow Rate FiO2


 


2/10/18 08:55 99.0 78 18 168/75 (106) 98   


 


2/10/18 08:00  76      


 


2/10/18 07:00  78  168/75    


 


2/10/18 07:00  73      


 


2/10/18 06:00  74      


 


2/10/18 05:00  74      


 


2/10/18 04:24  70 16 151/72 (98) 97   


 


2/10/18 04:00  76      


 


2/10/18 03:00  73      


 


2/10/18 02:00  72      


 


2/10/18 01:00  74      


 


2/10/18 00:00  72      


 


2/9/18 23:00  75 16 146/72 (96) 95   


 


2/9/18 23:00  71      


 


2/9/18 22:00  70      


 


2/9/18 21:00  76      


 


2/9/18 20:00  80      


 


2/9/18 19:40 99.2 79 16 150/75 (100) 100   


 


2/9/18 19:00  78      


 


2/9/18 18:01  92      


 


2/9/18 17:30 98.7 81 18 149/73 (98) 98   


 


2/9/18 17:01  80      


 


2/9/18 14:00  66      


 


2/9/18 13:00  66      


 


2/9/18 12:00  68      


 


2/9/18 11:15 98.6 70 18 121/64 (83) 100   


 


2/9/18 11:00  78      








-:  


2/10/18 0611                                                                   

             2/10/18 0611





Tubes & Lines:  Vas-Cath


Drip Comment


Nitro





Physical Exam


General


Appearance:  Well Developed, No Acute Distress, Comfortable





Throat


Throat Exam:  Oral Mucosa [place] & Moist





Pulmonary


Resp Exam:  No Distress, Crackles, Decreased Bases





Cardiology


CV Exam:  Regular, Normal Sinus Rhythm





Gastrointestinal/Abdomen


GI Exam:  Soft, Non-Tender, Bowel Sounds Present, Positive Bowel Movement





Musculoskeletal


MS Exam:  Normal Gait, Normal Tone





Integumentary


Skin Exam:  Warm, Dry





Extremeties


Extremities Exam:  Trace Edema





Neurologic


Neuro Exam:  Alert, Awake, Oriented, Speech Clear, Moving All Extremities





Psychiatric


Psych Exam:  Appropriate Responses





Assessment/Plan


Discussed Condition With:  Patient, Relative


Assessment Summary:  Acute Tubular Necrosis, Fluid/Volume Overload, Hypertension

, Diabetes Mellitus, CKD Stage IV


Problem List:  


(1) Acute kidney injury superimposed on CKD


ICD Codes:  N17.9 - Acute kidney failure, unspecified; N18.9 - Chronic kidney 

disease, unspecified


Status:  Acute


Plan:  She has reached CKD 5. 


Vascath placed 2/7. 


HD MWF, done Friday.


Holding Plavix,  ASA, heparin until Monday. Planned Permcath exchange,  cardiac 

cath, then HD on Monday.


Bleeding at Vascath site has improved. 


Repeat labs daily. 


She still makes urine


Vascular has evaluated for AVF placement. Vein mapping done. Hopefully this can 

be done this admission. 


Case management is assisting with Medicare application is appreciated. [place] 

[place] [facility] [place] would like to do PD in the future but we will have to consider that at a 

later date.


Hgb. is low, on Epogen, possibly to go for PD.


HD again on Monday.





(2) CAD (coronary artery disease)


ICD Codes:  I25.10 - Atherosclerotic heart disease of native coronary artery 

without angina pectoris


Status:  Chronic


Plan:  To have cardiac cath with stent placement most likely Monday.


D/W Dr. Mercado. 





(3) CHF (congestive heart failure)


ICD Codes:  I50.9 - Heart failure, unspecified


Plan:  Fluid removal as tolerated. Continue PO lasix.


Follow fluid status


Fluid restriction discussed with the patient. 





(4) HTN (hypertension)


ICD Codes:  I10 - Hypertension


Status:  Chronic


Plan:  BP acceptable. 


On Imdur 120 mg daily


Norvasc 10 mg daily


Metoprolol 100 mg BID


Hydralazine 50 mg Q8h


Clonidine 0.1 mg BID


Continue to monitor





(5) DM (diabetes mellitus)


ICD Codes:  E11.9 - Type 2 diabetes mellitus without complications


Plan:  Monitor blood glucose, maintain 140-180 mg/dL.





Plan


patient was seen and examined. She is non oliguric. Dialysis today. Cardiac cath

, PermCath on Monday. Monitor urine output and renal function.





Problem Qualifiers





(1) CHF (congestive heart failure):  


Qualified Codes:  I50.9 - Heart failure, unspecified


(2) DM (diabetes mellitus):  








Jesica Choudhary MD Feb 10, 2018 10:43

## 2018-02-11 VITALS
HEART RATE: 69 BPM | OXYGEN SATURATION: 99 % | SYSTOLIC BLOOD PRESSURE: 124 MMHG | TEMPERATURE: 98.7 F | RESPIRATION RATE: 16 BRPM | DIASTOLIC BLOOD PRESSURE: 71 MMHG

## 2018-02-11 VITALS — HEART RATE: 74 BPM

## 2018-02-11 VITALS
OXYGEN SATURATION: 96 % | DIASTOLIC BLOOD PRESSURE: 58 MMHG | RESPIRATION RATE: 16 BRPM | HEART RATE: 82 BPM | SYSTOLIC BLOOD PRESSURE: 126 MMHG

## 2018-02-11 VITALS
RESPIRATION RATE: 18 BRPM | OXYGEN SATURATION: 100 % | TEMPERATURE: 98.5 F | DIASTOLIC BLOOD PRESSURE: 68 MMHG | SYSTOLIC BLOOD PRESSURE: 135 MMHG | HEART RATE: 78 BPM

## 2018-02-11 VITALS — HEART RATE: 72 BPM

## 2018-02-11 VITALS — HEART RATE: 69 BPM

## 2018-02-11 VITALS
SYSTOLIC BLOOD PRESSURE: 145 MMHG | OXYGEN SATURATION: 99 % | HEART RATE: 79 BPM | RESPIRATION RATE: 17 BRPM | TEMPERATURE: 98.9 F | DIASTOLIC BLOOD PRESSURE: 71 MMHG

## 2018-02-11 VITALS
HEART RATE: 74 BPM | TEMPERATURE: 99 F | DIASTOLIC BLOOD PRESSURE: 79 MMHG | RESPIRATION RATE: 17 BRPM | SYSTOLIC BLOOD PRESSURE: 126 MMHG | OXYGEN SATURATION: 99 %

## 2018-02-11 VITALS
OXYGEN SATURATION: 100 % | SYSTOLIC BLOOD PRESSURE: 137 MMHG | DIASTOLIC BLOOD PRESSURE: 63 MMHG | RESPIRATION RATE: 16 BRPM | HEART RATE: 75 BPM

## 2018-02-11 VITALS — HEART RATE: 82 BPM

## 2018-02-11 VITALS — HEART RATE: 70 BPM

## 2018-02-11 VITALS — HEART RATE: 79 BPM

## 2018-02-11 VITALS — HEART RATE: 76 BPM

## 2018-02-11 VITALS — HEART RATE: 75 BPM

## 2018-02-11 VITALS — HEART RATE: 80 BPM

## 2018-02-11 VITALS — HEART RATE: 78 BPM

## 2018-02-11 LAB
BUN SERPL-MCNC: 32 MG/DL (ref 7–18)
CALCIUM SERPL-MCNC: 8.4 MG/DL (ref 8.5–10.1)
CHLORIDE SERPL-SCNC: 103 MEQ/L (ref 98–107)
CREAT SERPL-MCNC: 3.19 MG/DL (ref 0.5–1)
ERYTHROCYTE [DISTWIDTH] IN BLOOD BY AUTOMATED COUNT: 14.5 % (ref 11.6–17.2)
GFR SERPLBLD BASED ON 1.73 SQ M-ARVRAT: 19 ML/MIN (ref 89–?)
GLUCOSE SERPL-MCNC: 125 MG/DL (ref 74–106)
HCO3 BLD-SCNC: 30.3 MEQ/L (ref 21–32)
HCT VFR BLD CALC: 24.7 % (ref 35–46)
HGB BLD-MCNC: 8.5 GM/DL (ref 11.6–15.3)
MCH RBC QN AUTO: 33.3 PG (ref 27–34)
MCHC RBC AUTO-ENTMCNC: 34.3 % (ref 32–36)
MCV RBC AUTO: 96.9 FL (ref 80–100)
PLATELET # BLD: 348 TH/MM3 (ref 150–450)
PMV BLD AUTO: 7.9 FL (ref 7–11)
RBC # BLD AUTO: 2.55 MIL/MM3 (ref 4–5.3)
SODIUM SERPL-SCNC: 139 MEQ/L (ref 136–145)
WBC # BLD AUTO: 7.6 TH/MM3 (ref 4–11)

## 2018-02-11 RX ADMIN — Medication SCH ML: at 21:00

## 2018-02-11 RX ADMIN — FUROSEMIDE SCH MG: 40 TABLET ORAL at 09:02

## 2018-02-11 RX ADMIN — INSULIN ASPART SCH: 100 INJECTION, SOLUTION INTRAVENOUS; SUBCUTANEOUS at 17:44

## 2018-02-11 RX ADMIN — GABAPENTIN SCH MG: 300 CAPSULE ORAL at 21:02

## 2018-02-11 RX ADMIN — PRAVASTATIN SODIUM SCH MG: 40 TABLET ORAL at 21:02

## 2018-02-11 RX ADMIN — SIMETHICONE SCH MG: 125 TABLET, CHEWABLE ORAL at 13:16

## 2018-02-11 RX ADMIN — INSULIN ASPART SCH: 100 INJECTION, SOLUTION INTRAVENOUS; SUBCUTANEOUS at 21:00

## 2018-02-11 RX ADMIN — FERROUS SULFATE TAB 325 MG (65 MG ELEMENTAL FE) SCH MG: 325 (65 FE) TAB at 09:03

## 2018-02-11 RX ADMIN — SIMETHICONE SCH MG: 125 TABLET, CHEWABLE ORAL at 06:13

## 2018-02-11 RX ADMIN — INSULIN ASPART SCH: 100 INJECTION, SOLUTION INTRAVENOUS; SUBCUTANEOUS at 08:00

## 2018-02-11 RX ADMIN — METOPROLOL TARTRATE SCH MG: 100 TABLET, FILM COATED ORAL at 09:03

## 2018-02-11 RX ADMIN — STANDARDIZED SENNA CONCENTRATE AND DOCUSATE SODIUM SCH TAB: 8.6; 5 TABLET, FILM COATED ORAL at 21:00

## 2018-02-11 RX ADMIN — FERROUS SULFATE TAB 325 MG (65 MG ELEMENTAL FE) SCH MG: 325 (65 FE) TAB at 21:02

## 2018-02-11 RX ADMIN — STANDARDIZED SENNA CONCENTRATE AND DOCUSATE SODIUM SCH TAB: 8.6; 5 TABLET, FILM COATED ORAL at 09:03

## 2018-02-11 RX ADMIN — MORPHINE SULFATE PRN MG: 2 INJECTION, SOLUTION INTRAMUSCULAR; INTRAVENOUS at 04:29

## 2018-02-11 RX ADMIN — Medication SCH ML: at 09:00

## 2018-02-11 RX ADMIN — ISOSORBIDE MONONITRATE SCH MG: 60 TABLET, EXTENDED RELEASE ORAL at 06:13

## 2018-02-11 RX ADMIN — INSULIN ASPART SCH: 100 INJECTION, SOLUTION INTRAVENOUS; SUBCUTANEOUS at 13:15

## 2018-02-11 RX ADMIN — ACYCLOVIR SCH UNITS: 800 TABLET ORAL at 21:00

## 2018-02-11 RX ADMIN — METOPROLOL TARTRATE SCH MG: 100 TABLET, FILM COATED ORAL at 21:02

## 2018-02-11 RX ADMIN — SIMETHICONE SCH MG: 125 TABLET, CHEWABLE ORAL at 21:02

## 2018-02-11 NOTE — HHI.NPPN
Subjective


Complaints:  Shortness of Breath


General Problems:  Anemia


Renal Failure:  Chronic, Acute, Stage IV


Additional Remarks


Patient is alert, , mild SOB, no chest pain now, not in distress.





Review of Systems


General


Constitutional:  Fatigue





Respiratory


Lungs:  SOB





Gastrointestinal


Gastrointestinal:  Nausea & Vomiting





Objective Data


Data





Vital Signs








  Date Time  Temp Pulse Resp B/P (MAP) Pulse Ox O2 Delivery O2 Flow Rate FiO2


 


2/11/18 07:57 98.9 79 17 145/71 (95) 99   


 


2/11/18 06:00  76      


 


2/11/18 05:00  76      


 


2/11/18 04:35  75 16 137/63 (87) 100   


 


2/11/18 04:00  72      


 


2/11/18 03:00  74      


 


2/11/18 02:00  74      


 


2/11/18 01:00  74      


 


2/11/18 00:00  72      


 


2/10/18 23:30  72 16 129/63 (85) 100   


 


2/10/18 23:00  73      


 


2/10/18 22:00  70      


 


2/10/18 21:00  72      


 


2/10/18 20:00  74      


 


2/10/18 19:30 98.5 73 16 145/75 (98) 100   


 


2/10/18 19:00  73      


 


2/10/18 18:00  72      


 


2/10/18 17:27   17     


 


2/10/18 17:00  70      


 


2/10/18 16:00  68      


 


2/10/18 16:00 98.7 69 18 134/65 (88) 100   


 


2/10/18 15:00  71      


 


2/10/18 14:00  68      


 


2/10/18 13:00  68      


 


2/10/18 12:00  70      


 


2/10/18 11:45 98.3 70 17 126/67 (86) 100   








-:  


2/11/18 0645                                                                   

             2/11/18 0645





Tubes & Lines:  Vas-Cath


Drip Comment


Nitro





Physical Exam


General


Appearance:  Well Developed, No Acute Distress, Comfortable





Throat


Throat Exam:  Oral Mucosa [place] & Moist





Pulmonary


Resp Exam:  No Distress, Crackles, Decreased Bases





Cardiology


CV Exam:  Regular, Normal Sinus Rhythm





Gastrointestinal/Abdomen


GI Exam:  Soft, Non-Tender, Bowel Sounds Present, Positive Bowel Movement





Musculoskeletal


MS Exam:  Normal Gait, Normal Tone





Integumentary


Skin Exam:  Warm, Dry





Extremeties


Extremities Exam:  Trace Edema





Neurologic


Neuro Exam:  Alert, Awake, Oriented, Speech Clear, Moving All Extremities





Psychiatric


Psych Exam:  Appropriate Responses





Assessment/Plan


Discussed Condition With:  Patient, Relative


Assessment Summary:  Acute Tubular Necrosis, Fluid/Volume Overload, Hypertension

, Diabetes Mellitus, CKD Stage IV


Problem List:  


(1) Acute kidney injury superimposed on CKD


ICD Codes:  N17.9 - Acute kidney failure, unspecified; N18.9 - Chronic kidney 

disease, unspecified


Status:  Acute


Plan:  She has reached CKD 5. 


Vascath placed 2/7. 


HD MWF, done Friday.


Holding Plavix,  ASA, heparin until Monday. Planned Permcath exchange,  cardiac 

cath, then HD on Monday.


Bleeding at Vascath site has improved. 


Repeat labs daily. 


She still makes urine


Vascular has evaluated for AVF placement. Vein mapping done. Hopefully this can 

be done this admission. 


Case management is assisting with Medicare application is appreciated. She will 

be [place] in [place] [facility] [place]





 She would like to do PD in the future but we will have to consider that at a 

later date.


Hgb. is low, on Epogen, possibly to go for PD.


HD again on Monday.


Hgb. is stable, on NTG and has no chest pain.


Dr. Avila will follow from AM.





(2) CAD (coronary artery disease)


ICD Codes:  I25.10 - Atherosclerotic heart disease of native coronary artery 

without angina pectoris


Status:  Chronic


Plan:  To have cardiac cath with stent placement most likely Monday.


D/W Dr. Mercado. 





(3) CHF (congestive heart failure)


ICD Codes:  I50.9 - Heart failure, unspecified


Plan:  Fluid removal as tolerated. Continue PO lasix.


Follow fluid status


Fluid restriction discussed with the patient. 





(4) HTN (hypertension)


ICD Codes:  I10 - Hypertension


Status:  Chronic


Plan:  BP acceptable. 


On Imdur 120 mg daily


Norvasc 10 mg daily


Metoprolol 100 mg BID


Hydralazine 50 mg Q8h


Clonidine 0.1 mg BID


Continue to monitor





(5) DM (diabetes mellitus)


ICD Codes:  E11.9 - Type 2 diabetes mellitus without complications


Plan:  Monitor blood glucose, maintain 140-180 mg/dL.





Plan


patient was seen and examined. She is non oliguric. Dialysis today. Cardiac cath

, PermCath on Monday. Monitor urine output and renal function.





Problem Qualifiers





(1) CHF (congestive heart failure):  


Qualified Codes:  I50.9 - Heart failure, unspecified


(2) DM (diabetes mellitus):  








Jesica Choudhary MD Feb 11, 2018 11:26

## 2018-02-11 NOTE — HHI.PR
Subjective


Remarks


No complaints from the patient.  No chest pain.  She will have a heart catheter 

tomorrow and dialysis tomorrow.





Objective





Vital Signs








  Date Time  Temp Pulse Resp B/P (MAP) Pulse Ox O2 Delivery O2 Flow Rate FiO2


 


2/11/18 07:57 98.9 79 17 145/71 (95) 99   


 


2/11/18 06:00  76      


 


2/11/18 05:00  76      


 


2/11/18 04:35  75 16 137/63 (87) 100   


 


2/11/18 04:00  72      


 


2/11/18 03:00  74      


 


2/11/18 02:00  74      


 


2/11/18 01:00  74      


 


2/11/18 00:00  72      


 


2/10/18 23:30  72 16 129/63 (85) 100   


 


2/10/18 23:00  73      


 


2/10/18 22:00  70      


 


2/10/18 21:00  72      


 


2/10/18 20:00  74      


 


2/10/18 19:30 98.5 73 16 145/75 (98) 100   


 


2/10/18 19:00  73      


 


2/10/18 18:00  72      


 


2/10/18 17:27   17     


 


2/10/18 17:00  70      


 


2/10/18 16:00  68      


 


2/10/18 16:00 98.7 69 18 134/65 (88) 100   


 


2/10/18 15:00  71      


 


2/10/18 14:00  68      


 


2/10/18 13:00  68      


 


2/10/18 12:00  70      


 


2/10/18 11:45 98.3 70 17 126/67 (86) 100   


 


2/10/18 11:00  73      














I/O      


 


 2/10/18 2/10/18 2/10/18 2/11/18 2/11/18 2/11/18





 07:00 15:00 23:00 07:00 15:00 23:00


 


Intake Total 498 ml  600 ml 498 ml  


 


Output Total 750 ml  500 ml 900 ml  


 


Balance -252 ml  100 ml -402 ml  


 


      


 


Intake Oral 480 ml  600 ml 480 ml  


 


IV Total 18 ml   18 ml  


 


Output Urine Total 750 ml  500 ml 900 ml  


 


# Bowel Movements 0  1 1  








Result Diagram:  


2/11/18 0645                                                                   

             2/11/18 0645





Objective Remarks


GENERAL: NAD, A&Ox3


HEAD: Normocephalic. 


NECK: Supple, trachea midline. No lymphadenopathy.


EYES: No scleral icterus. No injection or drainage. 


CARDIOVASCULAR: Regular rate and rhythm without murmurs, gallops, or rubs. 


RESPIRATORY: Breath sounds equal bilaterally. No accessory muscle use.


GASTROINTESTINAL: Abdomen soft, non-tender, nondistended. 


MUSCULOSKELETAL: No cyanosis, or edema. 


SKIN: Warm and dry.


NEURO:  No focal neurological deficitis.





A/P


Problem List:  


(1) Renal insufficiency


ICD Code:  N28.9 - Disorder of kidney and ureter, unspecified


(2) Acute kidney injury superimposed on CKD


ICD Code:  N17.9 - Acute kidney failure, unspecified; N18.9 - Chronic kidney 

disease, unspecified


Status:  Acute


(3) DM (diabetes mellitus)


ICD Code:  E11.9 - Type 2 diabetes mellitus without complications


(4) HTN (hypertension)


ICD Code:  I10 - Hypertension


Status:  Chronic


(5) CAD (coronary artery disease)


ICD Code:  I25.10 - Atherosclerotic heart disease of native coronary artery 

without angina pectoris


Status:  Chronic


Assessment and Plan


52-year-old female presented with sob, lethargy





Labs reviewed.  Electrolytes and renal function studies are remaining 

relatively stable.  CBC has an upward trend.  Continue to monitor hemoglobin.  

Continue to monitor labs.  Labs ordered for further monitoring.  No reports of 

chest pain today.  Continue to follow renal function.  Continue to monitor 

hemoglobin levels.  Heart catheter Pending for 02/11/18.





Acute on Chronic diastolic CHF


Cardiomyopathy 


Continue twice a day Lasix


Continue imdur


Continue beta blocker





Acute on chronic Stage IV CKD


Nephrology following


Avoiding nephrotoxins


Plan for Vas-Cath


Dialysis as needed





Chest pain


Recent an STEMI


Follow cardiac enzymes


Cardiology following


Nitroglycerin drip


Plan for heart catheter tomorrow








Hemoptysis on 1/27


No recurrence 





GI bleed-resolved


Anemia of acute blood loss


2 units transfused at time of admit


Hemoglobin has remained stable since


Gastritis found on GI workup


Continue PPI


Follow hemoglobin





Hypertension


Continue Lopressor


Continue Procardia


Continue hydralazine


Ace inhibitors on hold





Diabetes mellitus type 2


Follow blood sugars


Insulin sliding scale


Diabetic diet





Tobacco abuse


Cessation recommended





Problem Qualifiers





(1) DM (diabetes mellitus):  








Pepe Treadwell MD Feb 11, 2018 10:11

## 2018-02-12 VITALS
DIASTOLIC BLOOD PRESSURE: 77 MMHG | TEMPERATURE: 98.4 F | SYSTOLIC BLOOD PRESSURE: 168 MMHG | OXYGEN SATURATION: 99 % | HEART RATE: 89 BPM | RESPIRATION RATE: 16 BRPM

## 2018-02-12 VITALS — HEART RATE: 82 BPM

## 2018-02-12 VITALS
DIASTOLIC BLOOD PRESSURE: 75 MMHG | RESPIRATION RATE: 16 BRPM | HEART RATE: 80 BPM | SYSTOLIC BLOOD PRESSURE: 150 MMHG | TEMPERATURE: 98.6 F | OXYGEN SATURATION: 98 %

## 2018-02-12 VITALS
RESPIRATION RATE: 18 BRPM | DIASTOLIC BLOOD PRESSURE: 77 MMHG | SYSTOLIC BLOOD PRESSURE: 170 MMHG | OXYGEN SATURATION: 98 % | HEART RATE: 88 BPM | TEMPERATURE: 98.7 F

## 2018-02-12 VITALS
DIASTOLIC BLOOD PRESSURE: 83 MMHG | SYSTOLIC BLOOD PRESSURE: 181 MMHG | RESPIRATION RATE: 18 BRPM | TEMPERATURE: 98.2 F | HEART RATE: 95 BPM | OXYGEN SATURATION: 98 %

## 2018-02-12 VITALS
OXYGEN SATURATION: 96 % | RESPIRATION RATE: 18 BRPM | DIASTOLIC BLOOD PRESSURE: 66 MMHG | SYSTOLIC BLOOD PRESSURE: 141 MMHG | HEART RATE: 82 BPM

## 2018-02-12 VITALS — HEART RATE: 86 BPM

## 2018-02-12 VITALS
RESPIRATION RATE: 16 BRPM | SYSTOLIC BLOOD PRESSURE: 176 MMHG | DIASTOLIC BLOOD PRESSURE: 86 MMHG | HEART RATE: 87 BPM | OXYGEN SATURATION: 95 %

## 2018-02-12 VITALS
SYSTOLIC BLOOD PRESSURE: 174 MMHG | OXYGEN SATURATION: 99 % | RESPIRATION RATE: 16 BRPM | HEART RATE: 91 BPM | DIASTOLIC BLOOD PRESSURE: 80 MMHG

## 2018-02-12 VITALS — OXYGEN SATURATION: 99 %

## 2018-02-12 VITALS
HEART RATE: 87 BPM | SYSTOLIC BLOOD PRESSURE: 184 MMHG | DIASTOLIC BLOOD PRESSURE: 87 MMHG | RESPIRATION RATE: 16 BRPM | OXYGEN SATURATION: 97 %

## 2018-02-12 VITALS — HEART RATE: 91 BPM

## 2018-02-12 VITALS — HEART RATE: 81 BPM

## 2018-02-12 VITALS — HEART RATE: 84 BPM

## 2018-02-12 LAB
BUN SERPL-MCNC: 43 MG/DL (ref 7–18)
CALCIUM SERPL-MCNC: 8.2 MG/DL (ref 8.5–10.1)
CHLORIDE SERPL-SCNC: 104 MEQ/L (ref 98–107)
CREAT SERPL-MCNC: 3.67 MG/DL (ref 0.5–1)
ERYTHROCYTE [DISTWIDTH] IN BLOOD BY AUTOMATED COUNT: 14.7 % (ref 11.6–17.2)
GFR SERPLBLD BASED ON 1.73 SQ M-ARVRAT: 16 ML/MIN (ref 89–?)
GLUCOSE SERPL-MCNC: 229 MG/DL (ref 74–106)
HCO3 BLD-SCNC: 28.8 MEQ/L (ref 21–32)
HCT VFR BLD CALC: 23.6 % (ref 35–46)
HGB BLD-MCNC: 8 GM/DL (ref 11.6–15.3)
MCH RBC QN AUTO: 33.5 PG (ref 27–34)
MCHC RBC AUTO-ENTMCNC: 34 % (ref 32–36)
MCV RBC AUTO: 98.6 FL (ref 80–100)
PLATELET # BLD: 349 TH/MM3 (ref 150–450)
PMV BLD AUTO: 7.9 FL (ref 7–11)
RBC # BLD AUTO: 2.4 MIL/MM3 (ref 4–5.3)
SODIUM SERPL-SCNC: 138 MEQ/L (ref 136–145)
WBC # BLD AUTO: 8.4 TH/MM3 (ref 4–11)

## 2018-02-12 PROCEDURE — 05HM33Z INSERTION OF INFUSION DEVICE INTO RIGHT INTERNAL JUGULAR VEIN, PERCUTANEOUS APPROACH: ICD-10-PCS | Performed by: RADIOLOGY

## 2018-02-12 RX ADMIN — MORPHINE SULFATE PRN MG: 2 INJECTION, SOLUTION INTRAMUSCULAR; INTRAVENOUS at 03:27

## 2018-02-12 RX ADMIN — FERROUS SULFATE TAB 325 MG (65 MG ELEMENTAL FE) SCH MG: 325 (65 FE) TAB at 20:39

## 2018-02-12 RX ADMIN — GABAPENTIN SCH MG: 300 CAPSULE ORAL at 20:39

## 2018-02-12 RX ADMIN — SIMETHICONE SCH MG: 125 TABLET, CHEWABLE ORAL at 15:30

## 2018-02-12 RX ADMIN — FUROSEMIDE SCH MG: 40 TABLET ORAL at 09:00

## 2018-02-12 RX ADMIN — INSULIN ASPART SCH: 100 INJECTION, SOLUTION INTRAVENOUS; SUBCUTANEOUS at 17:00

## 2018-02-12 RX ADMIN — Medication SCH ML: at 20:40

## 2018-02-12 RX ADMIN — ACETAMINOPHEN PRN MG: 325 TABLET ORAL at 16:04

## 2018-02-12 RX ADMIN — MORPHINE SULFATE PRN MG: 2 INJECTION, SOLUTION INTRAMUSCULAR; INTRAVENOUS at 15:30

## 2018-02-12 RX ADMIN — METOPROLOL TARTRATE SCH MG: 100 TABLET, FILM COATED ORAL at 09:00

## 2018-02-12 RX ADMIN — SIMETHICONE SCH MG: 125 TABLET, CHEWABLE ORAL at 20:39

## 2018-02-12 RX ADMIN — FERROUS SULFATE TAB 325 MG (65 MG ELEMENTAL FE) SCH MG: 325 (65 FE) TAB at 09:00

## 2018-02-12 RX ADMIN — METOPROLOL TARTRATE SCH MG: 100 TABLET, FILM COATED ORAL at 20:39

## 2018-02-12 RX ADMIN — ACYCLOVIR SCH UNITS: 800 TABLET ORAL at 21:00

## 2018-02-12 RX ADMIN — STANDARDIZED SENNA CONCENTRATE AND DOCUSATE SODIUM SCH TAB: 8.6; 5 TABLET, FILM COATED ORAL at 09:00

## 2018-02-12 RX ADMIN — Medication SCH ML: at 09:00

## 2018-02-12 RX ADMIN — INSULIN ASPART SCH: 100 INJECTION, SOLUTION INTRAVENOUS; SUBCUTANEOUS at 12:00

## 2018-02-12 RX ADMIN — ISOSORBIDE MONONITRATE SCH MG: 60 TABLET, EXTENDED RELEASE ORAL at 06:03

## 2018-02-12 RX ADMIN — INSULIN ASPART SCH: 100 INJECTION, SOLUTION INTRAVENOUS; SUBCUTANEOUS at 08:00

## 2018-02-12 RX ADMIN — STANDARDIZED SENNA CONCENTRATE AND DOCUSATE SODIUM SCH TAB: 8.6; 5 TABLET, FILM COATED ORAL at 20:39

## 2018-02-12 RX ADMIN — EPOETIN ALFA PRN UNITS: 10000 SOLUTION INTRAVENOUS; SUBCUTANEOUS at 12:37

## 2018-02-12 RX ADMIN — HEPARIN SODIUM PRN UNITS: 1000 INJECTION, SOLUTION INTRAVENOUS; SUBCUTANEOUS at 12:46

## 2018-02-12 RX ADMIN — INSULIN ASPART SCH: 100 INJECTION, SOLUTION INTRAVENOUS; SUBCUTANEOUS at 21:00

## 2018-02-12 RX ADMIN — SIMETHICONE SCH MG: 125 TABLET, CHEWABLE ORAL at 06:03

## 2018-02-12 RX ADMIN — PRAVASTATIN SODIUM SCH MG: 40 TABLET ORAL at 20:39

## 2018-02-12 RX ADMIN — MORPHINE SULFATE PRN MG: 2 INJECTION, SOLUTION INTRAMUSCULAR; INTRAVENOUS at 20:39

## 2018-02-12 NOTE — PD.RAD
Post Procedure Progress Note


Pre Procedure Diagnosis:  


(1) Acute kidney injury superimposed on CKD


Post Procedure Diagnosis:  


(1) Acute kidney injury superimposed on CKD


Procedure Date:


Feb 12, 2018


Supervising Radiologist:


Ren Stubbs


Proceduralist/Assist:  Moses Correia, RT(R), Alex Ferro, RT(R)


Anesthesia:  Local, Analgesia, Conscious Sedation


Plan of Activity


Patient to Unit:  Critical Care


Patient Condition:  Good


See PACS Report for procedural detail/treatment





Central Venous Access Device


Procedure 1


Right


Internal Jugular


Hemodialysis Catheter Tunneled


Conversion (Had a VasCath)


Serbian:  15


PICC Line Length (cm):  23











Ren Stubbs MD Feb 12, 2018 14:52

## 2018-02-12 NOTE — HHI.NPPN
Subjective


Complaints:  Shortness of Breath


General Problems:  Anemia


Renal Failure:  Chronic, Acute, Stage IV


Interval History


Seen during dialysis. To have permcath exchange later today. Cardiac cath 

tomorrow.


 (Migdalia Camejo)





Review of Systems


General


Constitutional:  Fatigue


 (Migdalia Camejo)





Respiratory


Lungs:  SOB


 (Migdalia Camejo)





Gastrointestinal


Gastrointestinal:  Nausea & Vomiting


 (Migdalia Camejo)





Objective Data


Data





Vital Signs








  Date Time  Temp Pulse Resp B/P (MAP) Pulse Ox O2 Delivery O2 Flow Rate FiO2


 


2/12/18 06:00  82      


 


2/12/18 05:00  84      


 


2/12/18 04:00  82      


 


2/12/18 03:30  82 18 141/66 (91) 96   


 


2/12/18 03:00  81      


 


2/12/18 02:00  86      


 


2/12/18 01:00  86      


 


2/12/18 00:00  82      


 


2/11/18 23:19  82 16 126/58 (80) 96   


 


2/11/18 23:00  79      


 


2/11/18 22:05        21


 


2/11/18 22:00  78      


 


2/11/18 21:00  82      


 


2/11/18 20:00  80      


 


2/11/18 19:32 98.5 78 18 135/68 (90) 100   


 


2/11/18 19:00  75      


 


2/11/18 18:00  76      


 


2/11/18 17:00  72      


 


2/11/18 16:00  72      


 


2/11/18 15:20 99.0 74 17 126/79 (95) 99   


 


2/11/18 15:00  69      


 


2/11/18 14:00  70      


 


2/11/18 13:00  70      


 


2/11/18 12:45 98.7 69 16 124/71 (88) 99   


 


2/11/18 12:00  70      


 


2/11/18 11:00  69      


 


2/11/18 10:00  72      








 (Migdalia Camejo)


-:  


2/12/18 0600                                                                   

             2/12/18 0600





Tubes & Lines:  Vas-Cath


Drip Comment


Nitro 


 (Migdalia Camejo)





Physical Exam


General


Appearance:  Well Developed, No Acute Distress, Comfortable


 (Migdalia Camejo)





Throat


Throat Exam:  Oral Mucosa Pink & Moist


 (Migdalia CamejoP)





Neck


Neck Remarks


tenderness  at vascath site. 


 (Migdalia CamejoP)





Pulmonary


Resp Exam:  No Distress, Crackles, Decreased Bases


 (Migdalia Camejo ARNP)





Cardiology


CV Exam:  Regular, Normal Sinus Rhythm


 (Migdalia Camejo ARNP)





Gastrointestinal/Abdomen


GI Exam:  Soft, Non-Tender, Bowel Sounds Present, Positive Bowel Movement


 (Migdalia Camejo ARNP)





Musculoskeletal


MS Exam:  Normal Gait, Normal Tone


 (Migdalia Camejo ARNP)





Integumentary


Skin Exam:  Warm, Dry


 (Migdalia Camejo ARNP)





Extremeties


Extremities Exam:  Trace Edema


 (Migdalia Camejo ARNLEONARDO)





Neurologic


Neuro Exam:  Alert, Awake, Oriented, Speech Clear, Moving All Extremities


 (Migdalia Camejo ARNP)





Psychiatric


Psych Exam:  Appropriate Responses


 (Migdalia Camejo)





Assessment/Plan


Discussed Condition With:  Patient, Relative


Assessment Summary:  Acute Tubular Necrosis, Fluid/Volume Overload, Hypertension

, Diabetes Mellitus, CKD Stage IV


Problem List:  


(1) Acute kidney injury superimposed on CKD


ICD Codes:  N17.9 - Acute kidney failure, unspecified; N18.9 - Chronic kidney 

disease, unspecified


Status:  Acute


Plan:  She has reached CKD 5/ESRD. On HD MWF.


Seen during dialysis today on a 2K, , goal 500. 


Minimal fluid removal, she still makes a good amount of urine.  


Vascath placed 2/7. Permcath exchhange later. Most likely will also have AVF 

placed this admission. 


Repeat labs daily. 


Case management is assisting with Medicare application is appreciated. [place] will 

[place] [facility] [place]





(2) CAD (coronary artery disease)


ICD Codes:  I25.10 - Atherosclerotic heart disease of native coronary artery 

without angina pectoris


Status:  Chronic


Plan:  To have cardiac cath with stent placement Tuesday.


D/W Dr. Mercado. 





(3) CHF (congestive heart failure)


ICD Codes:  I50.9 - Heart failure, unspecified


Plan:  Fluid removal as tolerated. Continue PO lasix.


Follow fluid status


Fluid restriction discussed with the patient. 





(4) HTN (hypertension)


ICD Codes:  I10 - Hypertension


Status:  Chronic


Plan:  BP acceptable. 


On Imdur 120 mg daily


Norvasc 10 mg daily


Metoprolol 100 mg BID


Hydralazine 50 mg Q8h


Clonidine 0.1 mg BID


Continue to monitor





(5) DM (diabetes mellitus)


ICD Codes:  E11.9 - Type 2 diabetes mellitus without complications


Plan:  Monitor blood glucose, maintain 140-180 mg/dL.





(6) Anemia


ICD Codes:  D64.9 - Anemia, unspecified


Status:  Chronic


Plan:  Epogen with dialysis. Check iron profile. 


 (Migdalia Camejo)


Plan


patient was seen and examined. Seen during dialysis. Agree with above 

assessment and plan. 


 (Kelton Whiteside MD)





Problem Qualifiers





(1) CHF (congestive heart failure):  


Qualified Codes:  I50.9 - Heart failure, unspecified


(2) DM (diabetes mellitus):  





(3) Anemia:  


Qualified Codes:  D64.9 - Anemia, unspecified








Migdalia Camejo Feb 12, 2018 09:43


Kelton Whiteside MD Feb 12, 2018 19:17

## 2018-02-12 NOTE — RADRPT
EXAM DATE/TIME:  02/12/2018 14:12 

 

[facility] COMPARISON:  

No previous studies available for comparison.

                     

 

INDICATIONS :               

Patient presents with Chronic acute stage IV renal failure in need of temporary dialysis catheter con
version to permanent dialysis catheter.

                     

 

MEDICAL HISTORY :     

HTN 

Hyperlipidemia

CHF

IDDM

Neuropathy

CKD3

HLD

CAD

Pancreatitis

EtOH use

Uterine fibroids

 

SURGICAL HISTORY :     

Cholecystectomy

Tubal ligation

PTCA with stent placement

Cataract surgery

Colonoscopy

EGD

EUS

 

ENCOUNTER:     

Subsequent

 

ACUITY:     

1 week

 

PAIN SCORE:     

0/10

                     

 

FLUORO TIME:     

0.7 minutes

 

IMAGE SERIES:      

 

SEDATION TIME:       

30 minutes

                     

 

ACCESS:     

Right internal jugular vein 

 

SEDATION:      

1.)  2.5 mg midazolam (Versed)  IV     

2.)  125 mcg fentanyl (Sublimaze)  IV     

      

Prophylactic antibiotics were administered with appropriate pre-procedure timing.     

Vancomycin within 2 hours of procedure, Ancef (or alternative) within 1 hour of procedure.     

      

 

DEVICE:      

1.  15 Sinhala dual lumen 23 cm Milan II Plus catheter      

 

 

PROCEDURE :     

1.  Dialysis catheter placement.

2.  Conscious sedation with continuous EKG and oximetry monitoring.

 

The risks, benefits and alternatives to the procedure were explained and verbal and written consent w
as obtained.  The site was prepped in sterile fashion.  Full sterile technique was used, including ca
p, mask, sterile gloves and gown and a large sterile sheet.  Hand hygiene and 2% chlorhexidine and/or
 betadine/alcohol prep was utilized per protocol for cutaneous antisepsis.  The skin and subcutaneous
 tissues were infiltrated with local anesthetic solution.  

 

With fluoroscopic guidance a dermatotomy was created using the existing venous access.  A subcutaneou
s tunnel was created in a retrograde fashion the catheter was pulled through the tunnel.  The cathete
r was flushed and assembled and locked with heparin.  The catheter was sutured in place.

 

Conscious sedation was performed with the prescribed dosages and duration as above in the presence of
 an independent trained radiology nurse to assist in the monitoring of the patient.  EKG and oximetry
 remained stable throughout the procedure.  The patient tolerated the procedure well and there were n
o complications.    The patient was sent to post anesthesia recovery in stable condition.

 

CONCLUSION:     Uncomplicated conversion of a Vas-Cath to PermCath as above.

 

 

 

 Ren Stubbs MD on February 12, 2018 at 15:15           

Board Certified Radiologist.

 This report was verified electronically.

## 2018-02-12 NOTE — PD.CARD.PN
Subjective


Subjective Remarks


rested comfortably overnight. no chest pain, sob or orthopnea





Objective


Medications





Current Medications








 Medications


  (Trade)  Dose


 Ordered  Sig/Luis Armando


 Route  Start Time


 Stop Time Status Last Admin


 


  (NS Flush)  2 ml  UNSCH  PRN


 IV FLUSH  2/4/18 11:30


    2/4/18 17:46


 


 


  (NS Flush)  2 ml  BID


 IV FLUSH  2/4/18 21:00


    2/9/18 09:00


 


 


  (Tylenol)  650 mg  Q4H  PRN


 PO  2/4/18 11:30


    2/4/18 15:33


 


 


  (Zofran Inj)  4 mg  Q6H  PRN


 IVP  2/4/18 11:30


    2/6/18 08:43


 


 


  (Restoril)  15 mg  HS  PRN


 PO  2/4/18 11:30


    2/10/18 23:22


 


 


  (Narcan Inj)  0.4 mg  UNSCH  PRN


 IV PUSH  2/4/18 11:30


     


 


 


  (Theresa-Colace)  1 tab  BID


 PO  2/4/18 21:00


    2/11/18 09:03


 


 


  (Milk Of


 Magnesia Liq)  30 ml  Q12H  PRN


 PO  2/4/18 11:30


     


 


 


  (Senokot)  17.2 mg  Q12H  PRN


 PO  2/4/18 11:30


     


 


 


  (Dulcolax Supp)  10 mg  DAILY  PRN


 RECTAL  2/4/18 11:30


     


 


 


  (Lactulose Liq)  30 ml  DAILY  PRN


 PO  2/4/18 11:30


    2/10/18 08:53


 


 


  (Norvasc)  10 mg  DAILY


 PO  2/5/18 09:00


    2/11/18 09:02


 


 


  (Aspirin Chew)  81 mg  DAILY


 CHEW  2/5/18 09:00


   Future Hold 2/7/18 09:55


 


 


  (Plavix)  75 mg  DAILY


 PO  2/5/18 09:00


   Future Hold 2/7/18 09:55


 


 


  (Ferrous Sulfate)  325 mg  BID


 PO  2/4/18 21:00


    2/11/18 21:02


 


 


  (Neurontin)  300 mg  HS


 PO  2/4/18 21:00


    2/11/18 21:02


 


 


  (Apresoline)  50 mg  Q8HR


 PO  2/4/18 14:00


    2/12/18 06:03


 


 


  (Imdur)  120 mg  DAILY@07


 PO  2/5/18 07:00


    2/12/18 06:03


 


 


  (Lopressor)  100 mg  Q12HR


 PO  2/4/18 21:00


    2/11/18 21:02


 


 


  (Nitrostat Sl)  0.4 mg  Q5M  PRN


 SL  2/4/18 11:30


    2/7/18 10:32


 


 


  (Pravachol)  40 mg  HS


 PO  2/4/18 21:00


    2/11/18 21:02


 


 


  (Phazyme Chew)  125 mg  Q8HR


 PO  2/4/18 14:00


    2/12/18 06:03


 


 


  (Heparin Inj)  5,000 units  Q12HR


 SQ  2/4/18 21:00


   Future Hold 2/7/18 09:54


 


 


  (Trandate Inj)  10 mg  Q20M  PRN


 IV PUSH  2/4/18 11:45


     


 


 


  (Apresoline Inj)  20 mg  Q4H  PRN


 IV PUSH  2/4/18 11:45


    2/4/18 15:09


 


 


  (Duoneb Neb)  1 ampule  Q4HR NEB  PRN


 NEB  2/4/18 16:15


    2/6/18 01:51


 


 


  (Ativan)  0.5 mg  Q8HR  PRN


 PO  2/4/18 16:15


    2/8/18 00:10


 


 


  (D50w (Vial) Inj)  50 ml  UNSCH  PRN


 IV PUSH  2/4/18 16:15


    2/5/18 04:20


 


 


  (Glucagon Inj)  1 mg  UNSCH  PRN


 OTHER  2/4/18 16:15


     


 


 


  (NovoLOG


 SUPPLEMENTAL


 SCALE)  1  ACHS SLIDING  SCALE


 SQ  2/4/18 17:00


    2/11/18 17:44


 


 


  (Catapres)  0.1 mg  Q12HR


 PO  2/5/18 10:00


    2/11/18 21:02


 


 


  (Morphine Inj)  2 mg  Q4H  PRN


 IV PUSH  2/6/18 01:00


    2/12/18 03:27


 


 


  (Levemir Inj)  8 units  HS


 SQ  2/7/18 21:00


    2/10/18 21:05


 


 


 Sodium Chloride  1,000 ml @ 


 0 mls/hr  Q0M PRN


 OTHER  2/7/18 10:20


     


 


 


  (Heparin Inj)  8,000 units  UNSCH  PRN


 IV FLUSH  2/7/18 10:30


     


 


 


 Sodium Chloride  1,000 ml @ 


 200 mls/hr  Q5H PRN


 IV  2/7/18 10:20


     


 


 


 Sodium Chloride  1,000 ml @ 


 0 mls/hr  Q0M PRN


 OTHER  2/7/18 10:20


     


 


 


  (Mannitol Inj)  12.5 gm  UNSCH  PRN


 IV  2/7/18 10:30


     


 


 


 Albumin Human  100 ml @ 


 60 mls/hr  UNSCH  PRN


 IV  2/7/18 10:30


     


 


 


  (NS Flush)  5 ml  UNSCH  PRN


 IV FLUSH  2/7/18 10:30


     


 


 


  (Heparin Inj)    UNSCH  PRN


 .XX  2/7/18 10:30


    2/9/18 16:50


 


 


  (Gentamicin Inj)  20 mg  UNSCH  PRN


 OTHER  2/7/18 10:30


    2/9/18 16:50


 


 


  (Zofran Inj)  4 mg  UNSCH  PRN


 IV PUSH  2/7/18 10:30


     


 


 


  (Tylenol)  650 mg  UNSCH  PRN


 PO  2/7/18 10:30


    2/9/18 19:50


 


 


  (Benadryl)  25 mg  UNSCH  PRN


 PO  2/7/18 10:30


    2/12/18 06:06


 


 


  (Nitrostat Sl)  0.4 mg  UNSCH  PRN


 SL  2/7/18 10:30


     


 


 


  (Catapres)  0.1 mg  UNSCH  PRN


 PO  2/7/18 10:30


     


 


 


  (Epogen Inj)  10,000 units  UNSCH  PRN


 IV PUSH  2/7/18 10:30


    2/9/18 16:50


 


 


  (Gelfoam 12 Mm/7


 Mm Top)  1 foam  UNSCH  PRN


 TOP  2/7/18 10:30


     


 


 


  (Lasix)  40 mg  DAILY


 PO  2/8/18 09:00


    2/11/18 09:02


 


 


 Nitroglycerin/


 Dextrose  250 ml @ 


 1.5 mls/hr  TITRATE  PRN


 IV  2/7/18 12:00


    2/10/18 07:00


 


 


  (NS Flush)    UNSCH  PRN


 IV FLUSH  2/7/18 15:45


     


 


 


  (Heparin Inj)    UNSCH  PRN


 IV FLUSH  2/7/18 15:45


     


 








Vital Signs / I&O





Vital Signs








  Date Time  Temp Pulse Resp B/P (MAP) Pulse Ox O2 Delivery O2 Flow Rate FiO2


 


2/12/18 06:00  82      


 


2/12/18 05:00  84      


 


2/12/18 04:00  82      


 


2/12/18 03:30  82 18 141/66 (91) 96   


 


2/12/18 03:00  81      


 


2/12/18 02:00  86      


 


2/12/18 01:00  86      


 


2/12/18 00:00  82      


 


2/11/18 23:19  82 16 126/58 (80) 96   


 


2/11/18 23:00  79      


 


2/11/18 22:05        21


 


2/11/18 22:00  78      


 


2/11/18 21:00  82      


 


2/11/18 20:00  80      


 


2/11/18 19:32 98.5 78 18 135/68 (90) 100   


 


2/11/18 19:00  75      


 


2/11/18 18:00  76      


 


2/11/18 17:00  72      


 


2/11/18 16:00  72      


 


2/11/18 15:20 99.0 74 17 126/79 (95) 99   


 


2/11/18 15:00  69      


 


2/11/18 14:00  70      


 


2/11/18 13:00  70      


 


2/11/18 12:45 98.7 69 16 124/71 (88) 99   


 


2/11/18 12:00  70      


 


2/11/18 11:00  69      


 


2/11/18 10:00  72      


 


2/11/18 09:00  78      














I/O      


 


 2/11/18 2/11/18 2/11/18 2/12/18 2/12/18 2/12/18





 07:00 15:00 23:00 07:00 15:00 23:00


 


Intake Total 498 ml  860 ml 240 ml  


 


Output Total 900 ml  620 ml 900 ml  


 


Balance -402 ml  240 ml -660 ml  


 


      


 


Intake Oral 480 ml  860 ml 240 ml  


 


IV Total 18 ml     


 


Output Urine Total 900 ml  620 ml 900 ml  


 


# Bowel Movements 1   0  








Physical Exam


GENERAL: 


SKIN: Warm and dry.


HEAD: Atraumatic. Normocephalic. 


EYES: Pupils equal and round. No scleral icterus. No injection or drainage. 


ENT: No nasal bleeding or discharge.  .


NECK: Trachea midline. No JVD. 


CARDIOVASCULAR: Regular rate and rhythm.  no murmurs


RESPIRATORY: No accessory muscle use. Clear to auscultation. Breath sounds 

equal bilaterally. 


GASTROINTESTINAL: Abdomen soft, non-tender, nondistended. 


MUSCULOSKELETAL: Extremities without clubbing, cyanosis, or edema. No obvious 

deformities. 


NEUROLOGICAL: Awake and alert. No obvious cranial nerve deficits.   Normal 

speech.


PSYCHIATRIC: Appropriate mood and affect; insight and judgment normal.


Laboratory





Laboratory Tests








Test


  2/12/18


06:00


 


White Blood Count 8.4 TH/MM3 


 


Red Blood Count 2.40 MIL/MM3 


 


Hemoglobin 8.0 GM/DL 


 


Hematocrit 23.6 % 


 


Mean Corpuscular Volume 98.6 FL 


 


Mean Corpuscular Hemoglobin 33.5 PG 


 


Mean Corpuscular Hemoglobin


Concent 34.0 % 


 


 


Red Cell Distribution Width 14.7 % 


 


Platelet Count 349 TH/MM3 


 


Mean Platelet Volume 7.9 FL 


 


Blood Urea Nitrogen 43 MG/DL 


 


Creatinine 3.67 MG/DL 


 


Random Glucose 229 MG/DL 


 


Calcium Level 8.2 MG/DL 


 


Sodium Level 138 MEQ/L 


 


Potassium Level 4.3 MEQ/L 


 


Chloride Level 104 MEQ/L 


 


Carbon Dioxide Level 28.8 MEQ/L 


 


Anion Gap 5 MEQ/L 


 


Estimat Glomerular Filtration


Rate 16 ML/MIN 


 











Assessment and Plan


Problem List:  


(1) CAD (coronary artery disease)


ICD Codes:  I25.10 - Atherosclerotic heart disease of native coronary artery 

without angina pectoris


Status:  Chronic


(2) CHF (congestive heart failure)


ICD Codes:  I50.9 - Heart failure, unspecified


(3) Acute kidney injury superimposed on CKD


ICD Codes:  N17.9 - Acute kidney failure, unspecified; N18.9 - Chronic kidney 

disease, unspecified


Status:  Acute


Assessment and Plan


51 yo AAF recently discharged from this facility after prolonged 

hospitalization for chest pain, CHF and CKD. Readmitted for progression of 

symptoms





CAD- severe 2 vessel disease to RCA and mid-LAD. Echo 55-60%.  


diuresing well, no chest pain


due to have PermCath placed today for permanent HD


plan for PCI tomorrow or Wed


cont nitro gtt


 


CKD IV- nephro following. 


Hgb 8





Problem Qualifiers





(1) CHF (congestive heart failure):  


Qualified Codes:  I50.9 - Heart failure, unspecified








Blanca Luong Feb 12, 2018 08:29

## 2018-02-12 NOTE — HHI.PR
Subjective


Remarks


in no acute distress.


resting comfortably.


denies pain or sob.





Objective


Vitals





Vital Signs








  Date Time  Temp Pulse Resp B/P (MAP) Pulse Ox O2 Delivery O2 Flow Rate FiO2


 


2/12/18 06:00  82      


 


2/12/18 05:00  84      


 


2/12/18 04:00  82      


 


2/12/18 03:30  82 18 141/66 (91) 96   


 


2/12/18 03:00  81      


 


2/12/18 02:00  86      


 


2/12/18 01:00  86      


 


2/12/18 00:00  82      


 


2/11/18 23:19  82 16 126/58 (80) 96   


 


2/11/18 23:00  79      


 


2/11/18 22:05        21


 


2/11/18 22:00  78      


 


2/11/18 21:00  82      


 


2/11/18 20:00  80      


 


2/11/18 19:32 98.5 78 18 135/68 (90) 100   


 


2/11/18 19:00  75      


 


2/11/18 18:00  76      


 


2/11/18 17:00  72      


 


2/11/18 16:00  72      


 


2/11/18 15:20 99.0 74 17 126/79 (95) 99   


 


2/11/18 15:00  69      


 


2/11/18 14:00  70      


 


2/11/18 13:00  70      


 


2/11/18 12:45 98.7 69 16 124/71 (88) 99   


 


2/11/18 12:00  70      


 


2/11/18 11:00  69      


 


2/11/18 10:00  72      


 


2/11/18 09:00  78      














I/O      


 


 2/11/18 2/11/18 2/11/18 2/12/18 2/12/18 2/12/18





 07:00 15:00 23:00 07:00 15:00 23:00


 


Intake Total 498 ml  860 ml 240 ml  


 


Output Total 900 ml  620 ml 900 ml  


 


Balance -402 ml  240 ml -660 ml  


 


      


 


Intake Oral 480 ml  860 ml 240 ml  


 


IV Total 18 ml     


 


Output Urine Total 900 ml  620 ml 900 ml  


 


# Bowel Movements 1   0  








Result Diagram:  


2/12/18 0600                                                                   

             2/12/18 0600





Imaging





Last Impressions








Upper Extremity Ultrasound 2/8/18 0000 Signed





Impressions: 





 Service Date/Time:  Thursday, February 8, 2018 21:19 - CONCLUSION:  Normal 





 examination.       Valdez Colunga MD 


 


Catheter Placement X-Ray 2/7/18 0000 Signed





Impressions: 





 Service Date/Time:  Wednesday, February 7, 2018 14:29 - CONCLUSION: 





 Uncomplicated line placement as above.     Pepe Swift MD 


 


Chest X-Ray 2/4/18 0810 Signed





Impressions: 





 Service Date/Time:  Sunday, February 4, 2018 08:50 - CONCLUSION:  Bibasilar 





 consolidation slightly worse in the right lower lobe.     Mp Dang MD 








Objective Remarks


GENERAL: This is a well-nourished, well-developed patient, in no apparent 

distress.


CARDIOVASCULAR: Regular rate and regular rhythm without murmurs, gallops, or 

rubs. 


RESPIRATORY: Clear to auscultation. Breath sounds equal bilaterally. No wheezes

, rales, or rhonchi.  


GASTROINTESTINAL: Abdomen soft, non-tender, nondistended. 


Normal, active bowel sounds


MUSCULOSKELETAL: Extremities without clubbing, cyanosis, or edema.


NEURO:  Alert & Oriented x4 to person, place, time, situation.  Moves all ext x4


Medications and IVs





Inpatient Medications


Acetaminophen (Tylenol) 650 mg UNSCH  PRN PO for headach, pain, temp > 101F 

Last administered on 2/9/18at 19:50;  Start 2/7/18 at 10:30


Albumin Human 100 ml @  60 mls/hr UNSCH  PRN IV WITH DIALYSIS;  Start 2/7/18 at 

10:30


Albuterol/ Ipratropium (Duoneb Neb) 1 ampule Q4HR NEB  PRN NEB sob/wheezing  

Last administered on 2/6/18at 01:51;  Start 2/4/18 at 16:15


Amlodipine Besylate (Norvasc) 10 mg DAILY PO  Last administered on 2/11/18at 09:

02;  Start 2/5/18 at 09:00


Aspirin (Aspirin Chew) 81 mg DAILY CHEW  Last administered on 2/7/18at 09:55;  

Start 2/5/18 at 09:00;  Status Future Hold


Bisacodyl (Dulcolax Supp) 10 mg DAILY  PRN RECTAL SEVERE CONSITIPATION;  Start 2 /4/18 at 11:30


Clonidine (Catapres) 0.1 mg UNSCH  PRN PO for BP > 160 mmHg Systolic;  Start 2/7 /18 at 10:30


Clopidogrel Bisulfate (Plavix) 75 mg DAILY PO  Last administered on 2/7/18at 09:

55;  Start 2/5/18 at 09:00;  Status Future Hold


Dextrose (D50w (Vial) Inj) 50 ml UNSCH  PRN IV PUSH HYPOGLYCEMIA-SEE COMMENTS 

Last administered on 2/5/18at 04:20;  Start 2/4/18 at 16:15


Diphenhydramine HCl (Benadryl) 25 mg UNSCH  PRN PO for hives/itching/

anaphylaxis Last administered on 2/12/18at 06:06;  Start 2/7/18 at 10:30


Epoetin David (Epogen Inj) 10,000 units UNSCH  PRN IV PUSH WITH DIALYSIS Last 

administered on 2/9/18at 16:50;  Start 2/7/18 at 10:30


Ferrous Sulfate (Ferrous Sulfate) 325 mg BID PO  Last administered on 2/11/18at 

21:02;  Start 2/4/18 at 21:00


Furosemide (Lasix Inj) 40 mg BID@09,18 IV PUSH  Last administered on 2/7/18at 09

:56;  Start 2/4/18 at 18:00;  Stop 2/7/18 at 11:38;  Status DC


Furosemide (Lasix) 40 mg DAILY PO  Last administered on 2/11/18at 09:02;  Start 

2/8/18 at 09:00


Gabapentin (Neurontin) 300 mg HS PO  Last administered on 2/11/18at 21:02;  

Start 2/4/18 at 21:00


Gelatin (Gelfoam 12 Mm/7 Mm Top) 1 foam UNSCH  PRN TOP SEE LABEL COMMENTS;  

Start 2/7/18 at 10:30


Gentamicin Sulfate (Gentamicin Inj) 20 mg UNSCH  PRN OTHER WITH DIALYSIS Last 

administered on 2/9/18at 16:50;  Start 2/7/18 at 10:30


Glucagon (Glucagon Inj) 1 mg UNSCH  PRN OTHER HYPOGLYCEMIA-SEE COMMENTS;  Start 

2/4/18 at 16:15


Heparin Sodium (Porcine) (Heparin Inj)  UNSCH  PRN IV FLUSH SEE PROTOCOL;  

Start 2/7/18 at 15:45


Hydralazine HCl (Apresoline Inj) 20 mg Q4H  PRN IV PUSH SBP>160, DBP>90 Last 

administered on 2/4/18at 15:09;  Start 2/4/18 at 11:45


Hydralazine HCl (Apresoline) 50 mg Q8HR PO  Last administered on 2/12/18at 06:03

;  Start 2/4/18 at 14:00


Insulin Aspart (NovoLOG SUPPLEMENTAL SCALE) 1 ACHS SLIDING  SCALE SQ  Last 

administered on 2/11/18at 17:44;  Start 2/4/18 at 17:00


Insulin Detemir (Levemir Inj) 8 units ONCE  ONCE SQ  Last administered on 2/6/ 18at 22:06;  Start 2/6/18 at 22:00;  Stop 2/6/18 at 22:01;  Status DC


Isosorbide Mononitrate (Imdur) 120 mg DAILY@07 PO  Last administered on 2/12/ 18at 06:03;  Start 2/5/18 at 07:00


Labetalol HCl (Trandate Inj) 10 mg Q20M  PRN IV PUSH SBP>180, DBP>100, HR>65;  

Start 2/4/18 at 11:45


Lactulose (Lactulose Liq) 30 ml DAILY  PRN PO SEVERE CONSITIPATION Last 

administered on 2/10/18at 08:53;  Start 2/4/18 at 11:30


Lorazepam (Ativan) 0.5 mg Q8HR  PRN PO anxiety Last administered on 2/8/18at 00:

10;  Start 2/4/18 at 16:15


Magnesium Hydroxide (Milk Of Magnesia Liq) 30 ml Q12H  PRN PO Mild constipation

;  Start 2/4/18 at 11:30


Mannitol (Mannitol Inj) 12.5 gm UNSCH  PRN IV WITH DIALYSIS;  Start 2/7/18 at 10

:30


Metoprolol Tartrate (Lopressor) 100 mg Q12HR PO  Last administered on 2/11/18at 

21:02;  Start 2/4/18 at 21:00


Morphine Sulfate (Morphine Inj) 4 mg ONCE  ONCE IV PUSH  Last administered on 2/ 8/18at 01:07;  Start 2/8/18 at 00:15;  Stop 2/8/18 at 00:16;  Status DC


Naloxone HCl (Narcan Inj) 0.4 mg UNSCH  PRN IV PUSH SEE LABEL COMMENTS;  Start 2 /4/18 at 11:30


Nitroglycerin (Nitroglycerin 2% Oint) 1 inch ONCE  ONCE TOP  Last administered 

on 2/4/18at 12:03;  Start 2/4/18 at 11:30;  Stop 2/4/18 at 11:31;  Status DC


Nitroglycerin (Nitrostat Sl) 0.4 mg UNSCH  PRN SL CHEST PAIN;  Start 2/7/18 at 

10:30


Nitroglycerin/ Dextrose 250 ml @  1.5 mls/hr TITRATE  PRN IV Chest pain Last 

administered on 2/10/18at 07:00;  Start 2/7/18 at 12:00


Ondansetron HCl (Zofran Inj) 4 mg UNSCH  PRN IV PUSH WITH DIALYSIS;  Start 2/7/ 18 at 10:30


Potassium Chloride (KCl Powder) 40 meq ONCE  ONCE PO  Last administered on 2/10/

18at 10:33;  Start 2/10/18 at 09:15;  Stop 2/10/18 at 09:16;  Status DC


Pravastatin Sodium (Pravachol) 40 mg HS PO  Last administered on 2/11/18at 21:02

;  Start 2/4/18 at 21:00


Senna/Docusate Sodium (Theresa-Colace) 1 tab BID PO  Last administered on 2/11/ 18at 09:03;  Start 2/4/18 at 21:00


Sennosides (Senokot) 17.2 mg Q12H  PRN PO Moderate constipation;  Start 2/4/18 

at 11:30


Simethicone (Phazyme Chew) 125 mg Q8HR PO  Last administered on 2/12/18at 06:03

;  Start 2/4/18 at 14:00


Sodium Chloride (NS Flush)  UNSCH  PRN IV FLUSH SEE PROTOCOL;  Start 2/7/18 at 

15:45


Temazepam (Restoril) 15 mg HS  PRN PO INSOMNIA Last administered on 2/10/18at 23

:22;  Start 2/4/18 at 11:30





A/P


Assessment and Plan


Acute on Chronic diastolic CHF


Cardiomyopathy 


Continue twice a day Lasix


Continue imdur


Continue beta blocker





Acute on chronic Stage IV CKD


Nephrology following


Avoiding nephrotoxins


Plan for Vas-Cath


Dialysis as needed





Chest pain


Recent an STEMI


Follow cardiac enzymes


Cardiology following


aspirin and plavix on hold


Plan for heart catheter soon.








Hemoptysis on 1/27


No recurrence 





recent GI bleed-resolved


Hemoglobin has remained stable since


Gastritis found on GI workup


Continue PPI





Hypertension


Continue Lopressor


Continue Procardia


Continue hydralazine


Ace inhibitors on hold





Diabetes mellitus type 2


Follow blood sugars


Insulin sliding scale


Diabetic diet





Tobacco abuse


Cessation recommended


Discharge Planning


awaiting vas-cath placement and cardiac cath.











Jonathan Arreola MD Feb 12, 2018 08:05

## 2018-02-13 VITALS — HEART RATE: 74 BPM

## 2018-02-13 VITALS
OXYGEN SATURATION: 97 % | DIASTOLIC BLOOD PRESSURE: 76 MMHG | TEMPERATURE: 98.7 F | SYSTOLIC BLOOD PRESSURE: 169 MMHG | RESPIRATION RATE: 17 BRPM | HEART RATE: 82 BPM

## 2018-02-13 VITALS — HEART RATE: 72 BPM

## 2018-02-13 VITALS
OXYGEN SATURATION: 98 % | DIASTOLIC BLOOD PRESSURE: 64 MMHG | RESPIRATION RATE: 17 BRPM | HEART RATE: 82 BPM | TEMPERATURE: 98.3 F | SYSTOLIC BLOOD PRESSURE: 111 MMHG

## 2018-02-13 VITALS — HEART RATE: 70 BPM

## 2018-02-13 VITALS
HEART RATE: 76 BPM | RESPIRATION RATE: 16 BRPM | SYSTOLIC BLOOD PRESSURE: 152 MMHG | OXYGEN SATURATION: 100 % | DIASTOLIC BLOOD PRESSURE: 74 MMHG | TEMPERATURE: 97.7 F

## 2018-02-13 VITALS
SYSTOLIC BLOOD PRESSURE: 121 MMHG | HEART RATE: 73 BPM | RESPIRATION RATE: 17 BRPM | TEMPERATURE: 98.7 F | DIASTOLIC BLOOD PRESSURE: 65 MMHG | OXYGEN SATURATION: 98 %

## 2018-02-13 VITALS — HEART RATE: 84 BPM

## 2018-02-13 VITALS — HEART RATE: 87 BPM

## 2018-02-13 VITALS — HEART RATE: 85 BPM

## 2018-02-13 VITALS — HEART RATE: 81 BPM

## 2018-02-13 VITALS — HEART RATE: 82 BPM

## 2018-02-13 VITALS — HEART RATE: 80 BPM

## 2018-02-13 VITALS — HEART RATE: 88 BPM

## 2018-02-13 VITALS — HEART RATE: 86 BPM

## 2018-02-13 VITALS — OXYGEN SATURATION: 98 %

## 2018-02-13 LAB
% SATURATION IRON PROFILE: 15.6 % (ref 20–50)
ALBUMIN SERPL-MCNC: 2.6 GM/DL (ref 3.4–5)
BUN SERPL-MCNC: 19 MG/DL (ref 7–18)
CALCIUM SERPL-MCNC: 8.4 MG/DL (ref 8.5–10.1)
CHLORIDE SERPL-SCNC: 105 MEQ/L (ref 98–107)
CREAT SERPL-MCNC: 2.51 MG/DL (ref 0.5–1)
GFR SERPLBLD BASED ON 1.73 SQ M-ARVRAT: 24 ML/MIN (ref 89–?)
GLUCOSE SERPL-MCNC: 164 MG/DL (ref 74–106)
HCO3 BLD-SCNC: 28.9 MEQ/L (ref 21–32)
IRON (FE): 42 MCG/DL (ref 50–170)
PHOSPHATE SERPL-MCNC: 3.3 MG/DL (ref 2.5–4.9)
SODIUM SERPL-SCNC: 141 MEQ/L (ref 136–145)
TIBC SERPL-MCNC: 269 MCG/DL (ref 250–450)

## 2018-02-13 RX ADMIN — MORPHINE SULFATE PRN MG: 2 INJECTION, SOLUTION INTRAMUSCULAR; INTRAVENOUS at 05:38

## 2018-02-13 RX ADMIN — GABAPENTIN SCH MG: 300 CAPSULE ORAL at 21:13

## 2018-02-13 RX ADMIN — ASPIRIN 81 MG SCH MG: 81 TABLET ORAL at 10:15

## 2018-02-13 RX ADMIN — INSULIN ASPART SCH: 100 INJECTION, SOLUTION INTRAVENOUS; SUBCUTANEOUS at 10:07

## 2018-02-13 RX ADMIN — ISOSORBIDE MONONITRATE SCH MG: 60 TABLET, EXTENDED RELEASE ORAL at 05:38

## 2018-02-13 RX ADMIN — PRAVASTATIN SODIUM SCH MG: 40 TABLET ORAL at 21:14

## 2018-02-13 RX ADMIN — STANDARDIZED SENNA CONCENTRATE AND DOCUSATE SODIUM SCH TAB: 8.6; 5 TABLET, FILM COATED ORAL at 10:06

## 2018-02-13 RX ADMIN — LISINOPRIL SCH MG: 20 TABLET ORAL at 10:05

## 2018-02-13 RX ADMIN — SIMETHICONE SCH MG: 125 TABLET, CHEWABLE ORAL at 14:30

## 2018-02-13 RX ADMIN — METOPROLOL TARTRATE SCH MG: 100 TABLET, FILM COATED ORAL at 10:06

## 2018-02-13 RX ADMIN — METOPROLOL TARTRATE SCH MG: 100 TABLET, FILM COATED ORAL at 21:14

## 2018-02-13 RX ADMIN — Medication SCH ML: at 10:07

## 2018-02-13 RX ADMIN — MORPHINE SULFATE PRN MG: 2 INJECTION, SOLUTION INTRAMUSCULAR; INTRAVENOUS at 19:11

## 2018-02-13 RX ADMIN — Medication SCH ML: at 21:15

## 2018-02-13 RX ADMIN — SIMETHICONE SCH MG: 125 TABLET, CHEWABLE ORAL at 21:13

## 2018-02-13 RX ADMIN — ACYCLOVIR SCH UNITS: 800 TABLET ORAL at 21:15

## 2018-02-13 RX ADMIN — INSULIN ASPART SCH: 100 INJECTION, SOLUTION INTRAVENOUS; SUBCUTANEOUS at 14:30

## 2018-02-13 RX ADMIN — STANDARDIZED SENNA CONCENTRATE AND DOCUSATE SODIUM SCH TAB: 8.6; 5 TABLET, FILM COATED ORAL at 21:14

## 2018-02-13 RX ADMIN — SIMETHICONE SCH MG: 125 TABLET, CHEWABLE ORAL at 05:38

## 2018-02-13 RX ADMIN — INSULIN ASPART SCH: 100 INJECTION, SOLUTION INTRAVENOUS; SUBCUTANEOUS at 19:04

## 2018-02-13 RX ADMIN — ASPIRIN 81 MG SCH MG: 81 TABLET ORAL at 10:14

## 2018-02-13 RX ADMIN — SODIUM CHLORIDE SCH MLS/HR: 900 INJECTION, SOLUTION INTRAVENOUS at 10:15

## 2018-02-13 RX ADMIN — MORPHINE SULFATE PRN MG: 2 INJECTION, SOLUTION INTRAMUSCULAR; INTRAVENOUS at 01:58

## 2018-02-13 RX ADMIN — INSULIN ASPART SCH: 100 INJECTION, SOLUTION INTRAVENOUS; SUBCUTANEOUS at 21:00

## 2018-02-13 NOTE — HHI.PR
Subjective


Remarks


in no acute distress.


denies pain.


no new complaints.





Objective


Vitals





Vital Signs








  Date Time  Temp Pulse Resp B/P (MAP) Pulse Ox O2 Delivery O2 Flow Rate FiO2


 


2/13/18 11:44 98.3 82 17 111/64 (80) 98   


 


2/13/18 11:19     98   21


 


2/13/18 07:38 98.7 82 17 169/76 (107) 97   


 


2/13/18 06:00  82      


 


2/13/18 05:43   16     


 


2/13/18 05:00  88      


 


2/13/18 02:00  85      


 


2/13/18 01:00  87      


 


2/13/18 00:00  85      


 


2/12/18 23:00 98.4 89 16 168/77 (107) 99   


 


2/12/18 23:00  89      


 


2/12/18 22:00  82      


 


2/12/18 21:00  95      


 


2/12/18 21:00 98.2 95 18 181/83 (115) 98   


 


2/12/18 17:29     99 Nasal Cannula 2.00 


 


2/12/18 17:00 98.7 88 18 170/77 (108) 98   


 


2/12/18 16:24  91      


 


2/12/18 16:00  91 16 174/80 (111) 99   


 


2/12/18 15:45  87 16 184/87 (119) 97   


 


2/12/18 15:15  87 16 176/86 (116) 95   














I/O      


 


 2/12/18 2/12/18 2/12/18 2/13/18 2/13/18 2/13/18





 06:59 14:59 22:59 06:59 14:59 22:59


 


Intake Total 240 ml   498 ml  


 


Output Total 900 ml 850 ml  950 ml  


 


Balance -660 ml -850 ml  -452 ml  


 


      


 


Intake Oral 240 ml   480 ml  


 


IV Total    18 ml  


 


Output Urine Total 900 ml 250 ml  950 ml  


 


Hemodialysis  600 ml    


 


# Voids  1    


 


# Bowel Movements 0     








Result Diagram:  


2/12/18 0600                                                                   

             2/13/18 0527





Imaging





Last Impressions








Catheter Placement X-Ray 2/12/18 0800 Signed





Impressions: 





 Service Date/Time:  Monday, February 12, 2018 14:12 - CONCLUSION: 

Uncomplicated 





 conversion of a Vas-Cath to PermCath as above.     Ren Stubbs MD 


 


Upper Extremity Ultrasound 2/8/18 0000 Signed





Impressions: 





 Service Date/Time:  Thursday, February 8, 2018 21:19 - CONCLUSION:  Normal 





 examination.       Valdez Colunga MD 


 


Chest X-Ray 2/4/18 0810 Signed





Impressions: 





 Service Date/Time:  Sunday, February 4, 2018 08:50 - CONCLUSION:  Bibasilar 





 consolidation slightly worse in the right lower lobe.     Mp Dang MD 








Objective Remarks


GENERAL: This is a well-nourished, well-developed patient, in no apparent 

distress.


CARDIOVASCULAR: Regular rate and regular rhythm without murmurs, gallops, or 

rubs. 


RESPIRATORY: Clear to auscultation. Breath sounds equal bilaterally. No wheezes

, rales, or rhonchi.  


GASTROINTESTINAL: Abdomen soft, non-tender, nondistended. 


Normal, active bowel sounds


MUSCULOSKELETAL: Extremities without clubbing, cyanosis, or edema.


NEURO:  Alert & Oriented x4 to person, place, time, situation.  Moves all ext x4


Procedures


perma-cath placement.


Medications and IVs





Inpatient Medications


Acetaminophen (Tylenol) 650 mg UNSCH  PRN PO for headach, pain, temp > 101F 

Last administered on 2/9/18at 19:50;  Start 2/7/18 at 10:30


Albumin Human 100 ml @  60 mls/hr UNSCH  PRN IV WITH DIALYSIS;  Start 2/7/18 at 

10:30


Albuterol/ Ipratropium (Duoneb Neb) 1 ampule Q4HR NEB  PRN NEB sob/wheezing  

Last administered on 2/6/18at 01:51;  Start 2/4/18 at 16:15


Amlodipine Besylate (Norvasc) 10 mg DAILY PO  Last administered on 2/11/18at 09:

02;  Start 2/5/18 at 09:00;  Stop 2/13/18 at 08:53;  Status DC


Aspirin (Aspirin Chew) 81 mg DAILY CHEW  Last administered on 2/13/18at 10:15;  

Start 2/5/18 at 09:00;  Status Future hold


Bisacodyl (Dulcolax Supp) 10 mg DAILY  PRN RECTAL SEVERE CONSITIPATION;  Start 2 /4/18 at 11:30


Clonidine (Catapres) 0.1 mg UNSCH  PRN PO for BP > 160 mmHg Systolic;  Start 2/7 /18 at 10:30


Clopidogrel Bisulfate (Plavix) 75 mg DAILY PO ;  Start 2/13/18 at 09:00;  Stop 2 /13/18 at 09:52;  Status DC


Dextrose (D50w (Vial) Inj) 50 ml UNSCH  PRN IV PUSH HYPOGLYCEMIA-SEE COMMENTS 

Last administered on 2/5/18at 04:20;  Start 2/4/18 at 16:15


Diphenhydramine HCl (Benadryl) 25 mg UNSCH  PRN PO for hives/itching/

anaphylaxis Last administered on 2/12/18at 06:06;  Start 2/7/18 at 10:30


Epoetin David (Epogen Inj) 10,000 units UNSCH  PRN IV PUSH WITH DIALYSIS Last 

administered on 2/12/18at 12:37;  Start 2/7/18 at 10:30


Ferrous Sulfate (Ferrous Sulfate) 325 mg BID PO  Last administered on 2/12/18at 

20:39;  Start 2/4/18 at 21:00;  Stop 2/13/18 at 08:47;  Status DC


Furosemide (Lasix Inj) 40 mg BID@09,18 IV PUSH  Last administered on 2/7/18at 09

:56;  Start 2/4/18 at 18:00;  Stop 2/7/18 at 11:38;  Status DC


Furosemide (Lasix) 40 mg DAILY PO  Last administered on 2/11/18at 09:02;  Start 

2/8/18 at 09:00;  Stop 2/13/18 at 08:53;  Status DC


Gabapentin (Neurontin) 300 mg HS PO  Last administered on 2/12/18at 20:39;  

Start 2/4/18 at 21:00


Gelatin (Gelfoam 12 Mm/7 Mm Top) 1 foam UNSCH  PRN TOP SEE LABEL COMMENTS;  

Start 2/7/18 at 10:30


Gentamicin Sulfate (Gentamicin Inj) 20 mg UNSCH  PRN OTHER WITH DIALYSIS Last 

administered on 2/9/18at 16:50;  Start 2/7/18 at 10:30


Glucagon (Glucagon Inj) 1 mg UNSCH  PRN OTHER HYPOGLYCEMIA-SEE COMMENTS;  Start 

2/4/18 at 16:15


Heparin Sodium (Porcine) (Heparin Inj)  UNSCH  PRN IV FLUSH SEE PROTOCOL;  

Start 2/12/18 at 14:45


Hydralazine HCl (Apresoline Inj) 20 mg Q4H  PRN IV PUSH SBP>160, DBP>90 Last 

administered on 2/13/18at 02:18;  Start 2/4/18 at 11:45


Hydralazine HCl (Apresoline) 50 mg Q8HR PO  Last administered on 2/13/18at 05:38

;  Start 2/4/18 at 14:00


Insulin Aspart (NovoLOG SUPPLEMENTAL SCALE) 1 ACHS SLIDING  SCALE SQ  Last 

administered on 2/13/18at 10:07;  Start 2/4/18 at 17:00


Insulin Detemir (Levemir Inj) 8 units ONCE  ONCE SQ  Last administered on 2/6/ 18at 22:06;  Start 2/6/18 at 22:00;  Stop 2/6/18 at 22:01;  Status DC


Iron Sucrose 100 mg/Sodium Chloride 105 ml @  105 mls/hr DAILY IV  Last 

administered on 2/13/18at 10:15;  Start 2/13/18 at 11:00;  Stop 2/22/18 at 09:59


Isosorbide Mononitrate (Imdur) 120 mg DAILY@07 PO  Last administered on 2/13/ 18at 05:38;  Start 2/5/18 at 07:00


Labetalol HCl (Trandate Inj) 10 mg Q20M  PRN IV PUSH SBP>180, DBP>100, HR>65;  

Start 2/4/18 at 11:45


Lactulose (Lactulose Liq) 30 ml DAILY  PRN PO SEVERE CONSITIPATION Last 

administered on 2/10/18at 08:53;  Start 2/4/18 at 11:30


Lisinopril (Prinivil) 40 mg DAILY PO  Last administered on 2/13/18at 10:05;  

Start 2/13/18 at 09:00


Lorazepam (Ativan) 0.5 mg Q8HR  PRN PO anxiety Last administered on 2/12/18at 16

:53;  Start 2/4/18 at 16:15


Magnesium Hydroxide (Milk Of Magnesia Liq) 30 ml Q12H  PRN PO Mild constipation

;  Start 2/4/18 at 11:30


Mannitol (Mannitol Inj) 12.5 gm UNSCH  PRN IV WITH DIALYSIS;  Start 2/7/18 at 10

:30


Metoprolol Tartrate (Lopressor) 100 mg Q12HR PO  Last administered on 2/13/18at 

10:06;  Start 2/4/18 at 21:00


Morphine Sulfate (Morphine Inj) 2 mg Q3HR  PRN IV PUSH CHEST PAIN Last 

administered on 2/13/18at 05:38;  Start 2/12/18 at 17:45


Naloxone HCl (Narcan Inj) 0.4 mg UNSCH  PRN IV PUSH SEE LABEL COMMENTS;  Start 2 /4/18 at 11:30


Nitroglycerin (Nitroglycerin 2% Oint) 1 inch ONCE  ONCE TOP  Last administered 

on 2/4/18at 12:03;  Start 2/4/18 at 11:30;  Stop 2/4/18 at 11:31;  Status DC


Nitroglycerin (Nitrostat Sl) 0.4 mg UNSCH  PRN SL CHEST PAIN;  Start 2/7/18 at 

10:30


Nitroglycerin/ Dextrose 250 ml @  1.5 mls/hr TITRATE  PRN IV Chest pain Last 

administered on 2/10/18at 07:00;  Start 2/7/18 at 12:00


Ondansetron HCl (Zofran Inj) 4 mg UNSCH  PRN IV PUSH WITH DIALYSIS;  Start 2/7/ 18 at 10:30


Potassium Chloride (KCl Powder) 40 meq ONCE  ONCE PO  Last administered on 2/10/

18at 10:33;  Start 2/10/18 at 09:15;  Stop 2/10/18 at 09:16;  Status DC


Pravastatin Sodium (Pravachol) 40 mg HS PO  Last administered on 2/12/18at 20:39

;  Start 2/4/18 at 21:00


Senna/Docusate Sodium (Theresa-Colace) 1 tab BID PO  Last administered on 2/13/ 18at 10:06;  Start 2/4/18 at 21:00


Sennosides (Senokot) 17.2 mg Q12H  PRN PO Moderate constipation;  Start 2/4/18 

at 11:30


Simethicone (Phazyme Chew) 125 mg Q8HR PO  Last administered on 2/12/18at 20:39

;  Start 2/4/18 at 14:00


Sodium Chloride (NS Flush)  UNSCH  PRN IV FLUSH SEE PROTOCOL;  Start 2/12/18 at 

14:45


Temazepam (Restoril) 15 mg HS  PRN PO INSOMNIA Last administered on 2/10/18at 23

:22;  Start 2/4/18 at 11:30





A/P


Assessment and Plan


Acute on Chronic diastolic CHF


Cardiomyopathy 


Continue imdur


Continue beta blocker





Acute on chronic Stage IV CKD


Nephrology following


Avoiding nephrotoxins


s/p perma-cath placement.


Dialysis per nephrology.





Chest pain


Recent  STEMI


Cardiology following


on aspirin- received Plavix earlier.


continue BB.


Plan for cardiac cath on Friday.








recent GI bleed-resolved


Hemoglobin has remained stable since


Gastritis found on GI workup


Continue PPI





Hypertension


Continue Lopressor


Continue hydralazine


continue ACE-I





Diabetes mellitus type 2


Follow blood sugars


Insulin sliding scale


Diabetic diet





Tobacco abuse


Cessation recommended


Discharge Planning


on HD- cardiac work-up in progress.











Jonathan Arreola MD Feb 13, 2018 12:37

## 2018-02-13 NOTE — PD.CARD.PN
Subjective


Subjective Remarks


Had tunneled catheter placed yesterday afternoon, discussed with RN, will have 

dialysis nurse access it today to make sure it is functional.  No chest pain or 

shortness of breath.  If catheter functional, plan for catheterization today.  

All questions answered.


 (King Ervin)





Objective


Medications





Current Medications








 Medications


  (Trade)  Dose


 Ordered  Sig/Luis Armando


 Route  Start Time


 Stop Time Status Last Admin


 


  (NS Flush)  2 ml  UNSCH  PRN


 IV FLUSH  2/4/18 11:30


    2/4/18 17:46


 


 


  (NS Flush)  2 ml  BID


 IV FLUSH  2/4/18 21:00


    2/12/18 20:40


 


 


  (Tylenol)  650 mg  Q4H  PRN


 PO  2/4/18 11:30


    2/4/18 15:33


 


 


  (Zofran Inj)  4 mg  Q6H  PRN


 IVP  2/4/18 11:30


    2/6/18 08:43


 


 


  (Restoril)  15 mg  HS  PRN


 PO  2/4/18 11:30


    2/10/18 23:22


 


 


  (Narcan Inj)  0.4 mg  UNSCH  PRN


 IV PUSH  2/4/18 11:30


     


 


 


  (Theresa-Colace)  1 tab  BID


 PO  2/4/18 21:00


    2/12/18 20:39


 


 


  (Milk Of


 Magnesia Liq)  30 ml  Q12H  PRN


 PO  2/4/18 11:30


     


 


 


  (Senokot)  17.2 mg  Q12H  PRN


 PO  2/4/18 11:30


     


 


 


  (Dulcolax Supp)  10 mg  DAILY  PRN


 RECTAL  2/4/18 11:30


     


 


 


  (Lactulose Liq)  30 ml  DAILY  PRN


 PO  2/4/18 11:30


    2/10/18 08:53


 


 


  (Norvasc)  10 mg  DAILY


 PO  2/5/18 09:00


    2/11/18 09:02


 


 


  (Aspirin Chew)  81 mg  DAILY


 CHEW  2/5/18 09:00


   Future Hold 2/7/18 09:55


 


 


  (Plavix)  75 mg  DAILY


 PO  2/5/18 09:00


   Future Hold 2/7/18 09:55


 


 


  (Ferrous Sulfate)  325 mg  BID


 PO  2/4/18 21:00


    2/12/18 20:39


 


 


  (Neurontin)  300 mg  HS


 PO  2/4/18 21:00


    2/12/18 20:39


 


 


  (Apresoline)  50 mg  Q8HR


 PO  2/4/18 14:00


    2/13/18 05:38


 


 


  (Imdur)  120 mg  DAILY@07


 PO  2/5/18 07:00


    2/13/18 05:38


 


 


  (Lopressor)  100 mg  Q12HR


 PO  2/4/18 21:00


    2/12/18 20:39


 


 


  (Nitrostat Sl)  0.4 mg  Q5M  PRN


 SL  2/4/18 11:30


    2/7/18 10:32


 


 


  (Pravachol)  40 mg  HS


 PO  2/4/18 21:00


    2/12/18 20:39


 


 


  (Phazyme Chew)  125 mg  Q8HR


 PO  2/4/18 14:00


    2/12/18 20:39


 


 


  (Heparin Inj)  5,000 units  Q12HR


 SQ  2/4/18 21:00


   Future Hold 2/7/18 09:54


 


 


  (Trandate Inj)  10 mg  Q20M  PRN


 IV PUSH  2/4/18 11:45


     


 


 


  (Apresoline Inj)  20 mg  Q4H  PRN


 IV PUSH  2/4/18 11:45


    2/13/18 02:18


 


 


  (Duoneb Neb)  1 ampule  Q4HR NEB  PRN


 NEB  2/4/18 16:15


    2/6/18 01:51


 


 


  (Ativan)  0.5 mg  Q8HR  PRN


 PO  2/4/18 16:15


    2/12/18 16:53


 


 


  (D50w (Vial) Inj)  50 ml  UNSCH  PRN


 IV PUSH  2/4/18 16:15


    2/5/18 04:20


 


 


  (Glucagon Inj)  1 mg  UNSCH  PRN


 OTHER  2/4/18 16:15


     


 


 


  (NovoLOG


 SUPPLEMENTAL


 SCALE)  1  ACHS SLIDING  SCALE


 SQ  2/4/18 17:00


    2/11/18 17:44


 


 


  (Catapres)  0.1 mg  Q12HR


 PO  2/5/18 10:00


    2/12/18 20:39


 


 


  (Levemir Inj)  8 units  HS


 SQ  2/7/18 21:00


    2/10/18 21:05


 


 


 Sodium Chloride  1,000 ml @ 


 0 mls/hr  Q0M PRN


 OTHER  2/7/18 10:20


     


 


 


  (Heparin Inj)  8,000 units  UNSCH  PRN


 IV FLUSH  2/7/18 10:30


     


 


 


 Sodium Chloride  1,000 ml @ 


 200 mls/hr  Q5H PRN


 IV  2/7/18 10:20


     


 


 


 Sodium Chloride  1,000 ml @ 


 0 mls/hr  Q0M PRN


 OTHER  2/7/18 10:20


     


 


 


  (Mannitol Inj)  12.5 gm  UNSCH  PRN


 IV  2/7/18 10:30


     


 


 


 Albumin Human  100 ml @ 


 60 mls/hr  UNSCH  PRN


 IV  2/7/18 10:30


     


 


 


  (NS Flush)  5 ml  UNSCH  PRN


 IV FLUSH  2/7/18 10:30


     


 


 


  (Heparin Inj)    UNSCH  PRN


 .XX  2/7/18 10:30


    2/12/18 12:46


 


 


  (Gentamicin Inj)  20 mg  UNSCH  PRN


 OTHER  2/7/18 10:30


    2/9/18 16:50


 


 


  (Zofran Inj)  4 mg  UNSCH  PRN


 IV PUSH  2/7/18 10:30


     


 


 


  (Tylenol)  650 mg  UNSCH  PRN


 PO  2/7/18 10:30


    2/9/18 19:50


 


 


  (Benadryl)  25 mg  UNSCH  PRN


 PO  2/7/18 10:30


    2/12/18 06:06


 


 


  (Nitrostat Sl)  0.4 mg  UNSCH  PRN


 SL  2/7/18 10:30


     


 


 


  (Catapres)  0.1 mg  UNSCH  PRN


 PO  2/7/18 10:30


     


 


 


  (Epogen Inj)  10,000 units  UNSCH  PRN


 IV PUSH  2/7/18 10:30


    2/12/18 12:37


 


 


  (Gelfoam 12 Mm/7


 Mm Top)  1 foam  UNSCH  PRN


 TOP  2/7/18 10:30


     


 


 


  (Lasix)  40 mg  DAILY


 PO  2/8/18 09:00


    2/11/18 09:02


 


 


 Nitroglycerin/


 Dextrose  250 ml @ 


 1.5 mls/hr  TITRATE  PRN


 IV  2/7/18 12:00


    2/10/18 07:00


 


 


  (NS Flush)    UNSCH  PRN


 IV FLUSH  2/12/18 14:45


     


 


 


  (Heparin Inj)    UNSCH  PRN


 IV FLUSH  2/12/18 14:45


     


 


 


  (Morphine Inj)  2 mg  Q3HR  PRN


 IV PUSH  2/12/18 17:45


    2/13/18 05:38


 








Vital Signs / I&O





Vital Signs








  Date Time  Temp Pulse Resp B/P (MAP) Pulse Ox O2 Delivery O2 Flow Rate FiO2


 


2/13/18 07:38 98.7 82 17 169/76 (107) 97   


 


2/13/18 05:43   16     


 


2/13/18 02:00  85      


 


2/13/18 01:00  87      


 


2/13/18 00:00  85      


 


2/12/18 23:00 98.4 89 16 168/77 (107) 99   


 


2/12/18 23:00  89      


 


2/12/18 22:00  82      


 


2/12/18 21:00  95      


 


2/12/18 21:00 98.2 95 18 181/83 (115) 98   


 


2/12/18 17:29     99 Nasal Cannula 2.00 


 


2/12/18 17:00 98.7 88 18 170/77 (108) 98   


 


2/12/18 16:24  91      


 


2/12/18 16:00  91 16 174/80 (111) 99   


 


2/12/18 15:45  87 16 184/87 (119) 97   


 


2/12/18 15:15  87 16 176/86 (116) 95   


 


2/12/18 08:00 98.6 80 16 150/75 (100) 98   














I/O      


 


 2/12/18 2/12/18 2/12/18 2/13/18 2/13/18 2/13/18





 07:00 15:00 23:00 07:00 15:00 23:00


 


Intake Total 240 ml     


 


Output Total 900 ml 850 ml    


 


Balance -660 ml -850 ml    


 


      


 


Intake Oral 240 ml     


 


Output Urine Total 900 ml 250 ml    


 


Hemodialysis  600 ml    


 


# Voids  1    


 


# Bowel Movements 0     








Physical Exam


GENERAL: Well-developed well-nourished.  In no acute distress.


NECK: No carotid bruits.  No JVD.  Tunnel dialysis catheter in place on right 

chest wall with no swelling or bleeding.


CARDIOVASCULAR: Regular rate and rhythm.  No murmur appreciated.


RESPIRATORY: No accessory muscle use. Clear to auscultation. Breath sounds 

equal bilaterally. 


MUSCULOSKELETAL: No clubbing or cyanosis. No edema. 


NEUROLOGICAL: Awake and alert. Normal speech.


Laboratory





Laboratory Tests








Test


  2/13/18


05:27


 


Blood Urea Nitrogen 19 MG/DL 


 


Creatinine 2.51 MG/DL 


 


Random Glucose 164 MG/DL 


 


Albumin 2.6 GM/DL 


 


Calcium Level 8.4 MG/DL 


 


Phosphorus Level 3.3 MG/DL 


 


Sodium Level 141 MEQ/L 


 


Potassium Level 3.8 MEQ/L 


 


Chloride Level 105 MEQ/L 


 


Carbon Dioxide Level 28.9 MEQ/L 


 


Anion Gap 7 MEQ/L 


 


Estimat Glomerular Filtration


Rate 24 ML/MIN 


 


 


Iron Level 42 MCG/DL 


 


Total Iron Binding Capacity 269 MCG/DL 


 


Percent Iron Saturation 15.6 % 








Imaging





Last 24 hours Impressions








Catheter Placement X-Ray 2/12/18 0800 Signed





Impressions: 





 Service Date/Time:  Monday, February 12, 2018 14:12 - CONCLUSION: 

Uncomplicated 





 conversion of a Vas-Cath to PermCath as above.     Ren Stubbs MD 








 (King Ervin)





Assessment and Plan


Problem List:  


(1) CAD (coronary artery disease)


ICD Codes:  I25.10 - Atherosclerotic heart disease of native coronary artery 

without angina pectoris


Status:  Chronic


(2) CHF (congestive heart failure)


ICD Codes:  I50.9 - Heart failure, unspecified


(3) Acute kidney injury superimposed on CKD


ICD Codes:  N17.9 - Acute kidney failure, unspecified; N18.9 - Chronic kidney 

disease, unspecified


Status:  Acute


Assessment and Plan


51 yo AAF recently discharged from this facility after prolonged 

hospitalization for chest pain, CAD, CHF, and CKD. Readmitted for progression 

of symptoms





CAD with angina: Severe 2 vessel disease to RCA and mid-LAD seen on Last 

admission. Echo 55-60%.  Nephrology plans on permanent dialysis, plan to 

proceed w/ LHC w/ PCI if dialysis catheter functional.  Continue aspirin, Plavix

, Imdur, metoprolol, amlodipine.





CKD stage V/ESRD: Nephrology on board. Tunneled dialysis cath placed 2/12.


 (King Ervin)


Assessment and Plan


-------------------


tunneled catheter yesterday.  + sore


Hb 8


no chest pain


start plavix if Ok with IR


will allow tunneled cath to heal and plan transfusion Wed to keep Hb >9.0.  

Next dialysis Friday.  Plan PCI RCA and LAD friday.  This way I can have 

lengthy discussion with family pre-procedure about risks


start ACEi.  DC amlodipine.  DC lasix.  now on permanent HD.


Need to coordinate "outpatient" HD


 (Edgardo Mercado MD)





Problem Qualifiers





(1) CHF (congestive heart failure):  


Qualified Codes:  I50.9 - Heart failure, unspecified








King Ervin Feb 13, 2018 08:02


Edgardo Mercado MD Feb 13, 2018 08:58

## 2018-02-13 NOTE — HHI.NPPN
Subjective


Complaints:  Shortness of Breath


General Problems:  Anemia


Renal Failure:  Chronic, Acute, Stage IV


Interval History


Dr. Mercado at bedside. Cardiac cath postponed until Friday. No current chest 

pain. She was dialyzed yesterday, successful Permcath exchange. 


 (Migdalia Camejo)





Review of Systems


General


Constitutional:  Fatigue


 (Migdalia Camejo)





Objective Data


Data





Vital Signs








  Date Time  Temp Pulse Resp B/P (MAP) Pulse Ox O2 Delivery O2 Flow Rate FiO2


 


2/13/18 07:38 98.7 82 17 169/76 (107) 97   


 


2/13/18 06:00  82      


 


2/13/18 05:43   16     


 


2/13/18 05:00  88      


 


2/13/18 02:00  85      


 


2/13/18 01:00  87      


 


2/13/18 00:00  85      


 


2/12/18 23:00 98.4 89 16 168/77 (107) 99   


 


2/12/18 23:00  89      


 


2/12/18 22:00  82      


 


2/12/18 21:00  95      


 


2/12/18 21:00 98.2 95 18 181/83 (115) 98   


 


2/12/18 17:29     99 Nasal Cannula 2.00 


 


2/12/18 17:00 98.7 88 18 170/77 (108) 98   


 


2/12/18 16:24  91      


 


2/12/18 16:00  91 16 174/80 (111) 99   


 


2/12/18 15:45  87 16 184/87 (119) 97   


 


2/12/18 15:15  87 16 176/86 (116) 95   








 (Migdalia Camejo)


-:  


2/12/18 0600                                                                   

             2/13/18 0527





Imaging





Last 72 hours Impressions








Catheter Placement X-Ray 2/12/18 0800 Signed





Impressions: 





 Service Date/Time:  Monday, February 12, 2018 14:12 - CONCLUSION: 

Uncomplicated 





 conversion of a Vas-Cath to PermCath as above.     Ren Stubbs MD 








Tubes & Lines:  Perma-Cath


 (Migdalia Camejo)





Physical Exam


General


Appearance:  Well Developed, Well Nourished, No Acute Distress, Comfortable


 (Migdalia Camejo)





Throat


Throat Exam:  Oral Mucosa Pink & Moist


 (Migdalia Camejo)





Neck


Neck Remarks


tenderness  at vascath site. 


 (Migdalia Camejo)





Pulmonary


Resp Exam:  Breath Sounds Equal, No Distress, Decreased Bases


 (Migdalia Camejo)





Cardiology


CV Exam:  Regular, Normal Sinus Rhythm, Good Perfusion


 (Migdalia Camejo)





Gastrointestinal/Abdomen


GI Exam:  Soft, Non-Tender, Bowel Sounds Present, Positive Bowel Movement


 (Migdalia Camejo)





Musculoskeletal


MS Exam:  Joints Intact, Normal Gait, Normal Tone, Good Strength


 (Migdalia Camejo)





Integumentary


Skin Exam:  Clear, Warm, Dry, Intact


 (Migdalia Camejo)





Extremeties


Extremities Exam:  No Edema, Pedal Pulses Palpable


 (Migdalia Camejo)





Neurologic


Neuro Exam:  Alert, Awake, Oriented, Speech Clear, Moving All Extremities


 (Migdalia Camejo)





Psychiatric


Psych Exam:  Appropriate Responses


 (Migdalia Camejo)





Assessment/Plan


Discussed Condition With:  Patient, Relative


Assessment Summary:  Anemia of CKD, Hypertension, Diabetes Mellitus, CKD Stage V

, End Stage Renal Disease


Problem List:  


(1) Acute kidney injury superimposed on CKD


ICD Codes:  N17.9 - Acute kidney failure, unspecified; N18.9 - Chronic kidney 

disease, unspecified


Status:  Acute


Plan:  She has reached CKD 5/ESRD. On HD MWF.


Minimal fluid removal with dialysis. She still makes urine. 


Permcath placed 2/12. Pending AVF placement, hopefully this admission  


Repeat labs daily. 


Case management is assisting with Medicare application is appreciated. She will 

be enrolled in the transitional program at [facility], potentially as early as next 

week. 





(2) CAD (coronary artery disease)


ICD Codes:  I25.10 - Atherosclerotic heart disease of native coronary artery 

without angina pectoris


Status:  Chronic


Plan:  To have cardiac cath with stent placement Friday per Dr. Mercado. 


Resume Plavix, ASA; heparin SQ is not required, she is ambulatory


D/W Dr. Mercado. 





(3) CHF (congestive heart failure)


ICD Codes:  I50.9 - Heart failure, unspecified


Plan:  Minimal fluid removal with HD. 


Continue PO lasix.


Follow fluid status


Fluid restriction discussed with the patient. 





(4) HTN (hypertension)


ICD Codes:  I10 - Hypertension


Status:  Chronic


Plan:  BP acceptable. 


On Imdur 120 mg daily


Norvasc 10 mg daily


Metoprolol 100 mg BID


Hydralazine 50 mg Q8h


Clonidine 0.1 mg BID


Continue to monitor





(5) DM (diabetes mellitus)


ICD Codes:  E11.9 - Type 2 diabetes mellitus without complications


Plan:  Monitor blood glucose, maintain 140-180 mg/dL.





(6) Anemia


ICD Codes:  D64.9 - Anemia, unspecified


Status:  Chronic


Plan:  Epogen with dialysis. 


Start Venofer for iron deficiency. 


Check CBC, possibly transfuse tomorrow with dialysis prior to cath.


 (Migdalia Camejo)


Plan


patient was seen and examined. Agree with above assessment and plan. 


 (Kelton Whitesdie MD)





Problem Qualifiers





(1) CHF (congestive heart failure):  


Qualified Codes:  I50.9 - Heart failure, unspecified


(2) DM (diabetes mellitus):  





(3) Anemia:  


Qualified Codes:  D64.9 - Anemia, unspecified








Migdalia Camejo Feb 13, 2018 08:51


Kelton Whiteside MD Feb 13, 2018 10:16

## 2018-02-13 NOTE — PD.CAR.PN
CVT Progress Note


Subjective/Hospital Course:


2/8/2018


Patient multiple medical problems including renal insufficiency and need for 

dialysis


Patient is currently in the process of workup and when cardiology and medical 

workup is completed we will decide whether patient is going to have an AV 

fistula or peritoneal dialysis catheter as far as I am concerned


Cardiology workup as to be completed first and patient can be on Plavix or any 

other platelet inhibitor for that will not hinder future surgery or catheter 

placement


Will follow patient


Thanks


J


2/13/2018


Patient in cardiology workup.


As far as renal axis is concerned I reviewed the ultrasound and compared to to 

clinical exam.  Patient has a very tiny basilic and cephalic veins in lower and 

upper arms.  These are not anatomically suitable for an AV fistula or 

attachment into an AV graft.


Objective:





Vital Signs








  Date Time  Temp Pulse Resp B/P (MAP) Pulse Ox O2 Delivery O2 Flow Rate FiO2


 


2/13/18 11:44 98.3 82 17 111/64 (80) 98   


 


2/13/18 11:19     98   21


 


2/13/18 07:38 98.7 82 17 169/76 (107) 97   


 


2/13/18 06:00  82      


 


2/13/18 05:43   16     


 


2/13/18 05:00  88      


 


2/13/18 02:00  85      


 


2/13/18 01:00  87      


 


2/13/18 00:00  85      


 


2/12/18 23:00 98.4 89 16 168/77 (107) 99   


 


2/12/18 23:00  89      


 


2/12/18 22:00  82      


 


2/12/18 21:00  95      


 


2/12/18 21:00 98.2 95 18 181/83 (115) 98   


 


2/12/18 17:29     99 Nasal Cannula 2.00 


 


2/12/18 17:00 98.7 88 18 170/77 (108) 98   


 


2/12/18 16:24  91      


 


2/12/18 16:00  91 16 174/80 (111) 99   


 


2/12/18 15:45  87 16 184/87 (119) 97   


 


2/12/18 15:15  87 16 176/86 (116) 95   








Labs:





Laboratory Tests








Test


  2/13/18


05:27


 


Blood Urea Nitrogen


  19 MG/DL


(7-18)


 


Creatinine


  2.51 MG/DL


(0.50-1.00)


 


Random Glucose


  164 MG/DL


()


 


Albumin


  2.6 GM/DL


(3.4-5.0)


 


Calcium Level


  8.4 MG/DL


(8.5-10.1)


 


Phosphorus Level


  3.3 MG/DL


(2.5-4.9)


 


Sodium Level


  141 MEQ/L


(136-145)


 


Potassium Level


  3.8 MEQ/L


(3.5-5.1)


 


Chloride Level


  105 MEQ/L


()


 


Carbon Dioxide Level


  28.9 MEQ/L


(21.0-32.0)


 


Anion Gap 7 MEQ/L (5-15) 


 


Estimat Glomerular Filtration


Rate 24 ML/MIN


(>89)


 


Iron Level


  42 MCG/DL


()


 


Total Iron Binding Capacity


  269 MCG/DL


(250-450)


 


Percent Iron Saturation 15.6 % (20-50) 








Result Diagram:  


2/12/18 0600                                                                   

             2/13/18 0527








(1) CAD (coronary artery disease)


Plan:  Tentatively permacath Monday then cardiac cath. Stable at present





(2) CHF (congestive heart failure)


(3) Acute kidney injury superimposed on CKD





Problem Qualifiers





(1) CHF (congestive heart failure):  


Qualified Codes:  I50.9 - Heart failure, unspecified








Homer Sepulveda MD Feb 13, 2018 14:38

## 2018-02-14 VITALS
RESPIRATION RATE: 18 BRPM | DIASTOLIC BLOOD PRESSURE: 77 MMHG | TEMPERATURE: 98.6 F | SYSTOLIC BLOOD PRESSURE: 147 MMHG | HEART RATE: 68 BPM

## 2018-02-14 VITALS
SYSTOLIC BLOOD PRESSURE: 171 MMHG | RESPIRATION RATE: 18 BRPM | DIASTOLIC BLOOD PRESSURE: 74 MMHG | HEART RATE: 86 BPM | TEMPERATURE: 97.8 F | OXYGEN SATURATION: 99 %

## 2018-02-14 VITALS — HEART RATE: 79 BPM

## 2018-02-14 VITALS
TEMPERATURE: 98.4 F | HEART RATE: 77 BPM | RESPIRATION RATE: 17 BRPM | DIASTOLIC BLOOD PRESSURE: 69 MMHG | OXYGEN SATURATION: 98 % | SYSTOLIC BLOOD PRESSURE: 149 MMHG

## 2018-02-14 VITALS
TEMPERATURE: 98.6 F | RESPIRATION RATE: 18 BRPM | DIASTOLIC BLOOD PRESSURE: 70 MMHG | SYSTOLIC BLOOD PRESSURE: 158 MMHG | HEART RATE: 74 BPM | OXYGEN SATURATION: 98 %

## 2018-02-14 VITALS
RESPIRATION RATE: 18 BRPM | HEART RATE: 79 BPM | DIASTOLIC BLOOD PRESSURE: 70 MMHG | TEMPERATURE: 98.5 F | OXYGEN SATURATION: 98 % | SYSTOLIC BLOOD PRESSURE: 153 MMHG

## 2018-02-14 VITALS — HEART RATE: 76 BPM

## 2018-02-14 VITALS
OXYGEN SATURATION: 99 % | HEART RATE: 78 BPM | DIASTOLIC BLOOD PRESSURE: 78 MMHG | RESPIRATION RATE: 18 BRPM | TEMPERATURE: 97.9 F | SYSTOLIC BLOOD PRESSURE: 165 MMHG

## 2018-02-14 VITALS — OXYGEN SATURATION: 99 %

## 2018-02-14 VITALS — HEART RATE: 88 BPM

## 2018-02-14 VITALS
TEMPERATURE: 98.2 F | DIASTOLIC BLOOD PRESSURE: 75 MMHG | SYSTOLIC BLOOD PRESSURE: 155 MMHG | HEART RATE: 75 BPM | OXYGEN SATURATION: 100 % | RESPIRATION RATE: 18 BRPM

## 2018-02-14 VITALS — HEART RATE: 86 BPM

## 2018-02-14 VITALS — HEART RATE: 80 BPM

## 2018-02-14 VITALS — HEART RATE: 73 BPM

## 2018-02-14 VITALS — OXYGEN SATURATION: 94 %

## 2018-02-14 VITALS — HEART RATE: 72 BPM

## 2018-02-14 VITALS — HEART RATE: 78 BPM

## 2018-02-14 LAB
ALBUMIN SERPL-MCNC: 2.5 GM/DL (ref 3.4–5)
BASOPHILS # BLD AUTO: 0.1 TH/MM3 (ref 0–0.2)
BASOPHILS NFR BLD: 0.8 % (ref 0–2)
BUN SERPL-MCNC: 33 MG/DL (ref 7–18)
CALCIUM SERPL-MCNC: 8.3 MG/DL (ref 8.5–10.1)
CHLORIDE SERPL-SCNC: 103 MEQ/L (ref 98–107)
CREAT SERPL-MCNC: 3.22 MG/DL (ref 0.5–1)
EOSINOPHIL # BLD: 0.6 TH/MM3 (ref 0–0.4)
EOSINOPHIL NFR BLD: 7.7 % (ref 0–4)
ERYTHROCYTE [DISTWIDTH] IN BLOOD BY AUTOMATED COUNT: 15.3 % (ref 11.6–17.2)
GFR SERPLBLD BASED ON 1.73 SQ M-ARVRAT: 18 ML/MIN (ref 89–?)
GLUCOSE SERPL-MCNC: 117 MG/DL (ref 74–106)
HCO3 BLD-SCNC: 28.7 MEQ/L (ref 21–32)
HCT VFR BLD CALC: 24.4 % (ref 35–46)
HGB BLD-MCNC: 8.3 GM/DL (ref 11.6–15.3)
LYMPHOCYTES # BLD AUTO: 1.3 TH/MM3 (ref 1–4.8)
LYMPHOCYTES NFR BLD AUTO: 16.3 % (ref 9–44)
MCH RBC QN AUTO: 33.4 PG (ref 27–34)
MCHC RBC AUTO-ENTMCNC: 34.1 % (ref 32–36)
MCV RBC AUTO: 98.1 FL (ref 80–100)
MONOCYTE #: 0.9 TH/MM3 (ref 0–0.9)
MONOCYTES NFR BLD: 11.4 % (ref 0–8)
NEUTROPHILS # BLD AUTO: 5.2 TH/MM3 (ref 1.8–7.7)
NEUTROPHILS NFR BLD AUTO: 63.8 % (ref 16–70)
PHOSPHATE SERPL-MCNC: 5.3 MG/DL (ref 2.5–4.9)
PLATELET # BLD: 323 TH/MM3 (ref 150–450)
PMV BLD AUTO: 7.6 FL (ref 7–11)
RBC # BLD AUTO: 2.49 MIL/MM3 (ref 4–5.3)
SODIUM SERPL-SCNC: 140 MEQ/L (ref 136–145)
WBC # BLD AUTO: 8.1 TH/MM3 (ref 4–11)

## 2018-02-14 PROCEDURE — 30233N1 TRANSFUSION OF NONAUTOLOGOUS RED BLOOD CELLS INTO PERIPHERAL VEIN, PERCUTANEOUS APPROACH: ICD-10-PCS | Performed by: INTERNAL MEDICINE

## 2018-02-14 RX ADMIN — LISINOPRIL SCH MG: 20 TABLET ORAL at 09:04

## 2018-02-14 RX ADMIN — Medication SCH ML: at 22:24

## 2018-02-14 RX ADMIN — INSULIN ASPART SCH: 100 INJECTION, SOLUTION INTRAVENOUS; SUBCUTANEOUS at 08:00

## 2018-02-14 RX ADMIN — SIMETHICONE SCH MG: 125 TABLET, CHEWABLE ORAL at 06:13

## 2018-02-14 RX ADMIN — SIMETHICONE SCH MG: 125 TABLET, CHEWABLE ORAL at 22:24

## 2018-02-14 RX ADMIN — SODIUM CHLORIDE SCH MLS/HR: 900 INJECTION, SOLUTION INTRAVENOUS at 15:05

## 2018-02-14 RX ADMIN — STANDARDIZED SENNA CONCENTRATE AND DOCUSATE SODIUM SCH TAB: 8.6; 5 TABLET, FILM COATED ORAL at 09:04

## 2018-02-14 RX ADMIN — STANDARDIZED SENNA CONCENTRATE AND DOCUSATE SODIUM SCH TAB: 8.6; 5 TABLET, FILM COATED ORAL at 22:24

## 2018-02-14 RX ADMIN — INSULIN ASPART SCH: 100 INJECTION, SOLUTION INTRAVENOUS; SUBCUTANEOUS at 12:59

## 2018-02-14 RX ADMIN — Medication SCH ML: at 09:21

## 2018-02-14 RX ADMIN — PRAVASTATIN SODIUM SCH MG: 40 TABLET ORAL at 22:23

## 2018-02-14 RX ADMIN — ISOSORBIDE MONONITRATE SCH MG: 60 TABLET, EXTENDED RELEASE ORAL at 06:13

## 2018-02-14 RX ADMIN — GABAPENTIN SCH MG: 300 CAPSULE ORAL at 22:23

## 2018-02-14 RX ADMIN — MORPHINE SULFATE PRN MG: 2 INJECTION, SOLUTION INTRAMUSCULAR; INTRAVENOUS at 01:35

## 2018-02-14 RX ADMIN — METOPROLOL TARTRATE SCH MG: 100 TABLET, FILM COATED ORAL at 09:05

## 2018-02-14 RX ADMIN — EPOETIN ALFA PRN UNITS: 10000 SOLUTION INTRAVENOUS; SUBCUTANEOUS at 15:59

## 2018-02-14 RX ADMIN — SIMETHICONE SCH MG: 125 TABLET, CHEWABLE ORAL at 17:15

## 2018-02-14 RX ADMIN — CARVEDILOL SCH MG: 12.5 TABLET, FILM COATED ORAL at 22:23

## 2018-02-14 RX ADMIN — INSULIN ASPART SCH: 100 INJECTION, SOLUTION INTRAVENOUS; SUBCUTANEOUS at 22:21

## 2018-02-14 RX ADMIN — ASPIRIN 81 MG SCH MG: 81 TABLET ORAL at 09:05

## 2018-02-14 RX ADMIN — ACYCLOVIR SCH UNITS: 800 TABLET ORAL at 22:23

## 2018-02-14 RX ADMIN — CLOPIDOGREL BISULFATE SCH MG: 75 TABLET, FILM COATED ORAL at 09:06

## 2018-02-14 RX ADMIN — INSULIN ASPART SCH: 100 INJECTION, SOLUTION INTRAVENOUS; SUBCUTANEOUS at 17:00

## 2018-02-14 RX ADMIN — MORPHINE SULFATE PRN MG: 2 INJECTION, SOLUTION INTRAMUSCULAR; INTRAVENOUS at 19:24

## 2018-02-14 NOTE — HHI.PR
Subjective


Remarks


in no acute distress.


denies chest pain or sob.


d/w the RN and no acute issues over night.





Objective


Vitals





Vital Signs








  Date Time  Temp Pulse Resp B/P (MAP) Pulse Ox O2 Delivery O2 Flow Rate FiO2


 


2/14/18 07:51 98.4 77 17 149/69 (95) 98   


 


2/14/18 04:00  81      


 


2/14/18 04:00 98.6 74 18 158/70 (99) 98   


 


2/14/18 02:00  80      


 


2/14/18 00:00 98.5 79 18 153/70 (97) 98   


 


2/13/18 20:58 97.7 76 16 152/74 (100) 100   


 


2/13/18 18:00  70      


 


2/13/18 17:00  70      


 


2/13/18 16:20  74      


 


2/13/18 15:06 98.7 73 17 121/65 (83) 98   


 


2/13/18 15:00  72      


 


2/13/18 14:00  70      


 


2/13/18 13:00  70      


 


2/13/18 12:00  74      


 


2/13/18 11:44 98.3 82 17 111/64 (80) 98   


 


2/13/18 11:19     98   21


 


2/13/18 11:00  84      


 


2/13/18 10:00  86      


 


2/13/18 09:00  82      














I/O      


 


 2/13/18 2/13/18 2/13/18 2/14/18 2/14/18 2/14/18





 07:00 15:00 23:00 07:00 15:00 23:00


 


Intake Total 498 ml  840 ml 480 ml  


 


Output Total 950 ml  850 ml 400 ml  


 


Balance -452 ml  -10 ml 80 ml  


 


      


 


Intake Oral 480 ml  840 ml 480 ml  


 


IV Total 18 ml     


 


Output Urine Total 950 ml  850 ml 400 ml  


 


# Bowel Movements    0  








Result Diagram:  


2/14/18 0540                                                                   

             2/14/18 0540





Imaging





Last Impressions








Catheter Placement X-Ray 2/12/18 0800 Signed





Impressions: 





 Service Date/Time:  Monday, February 12, 2018 14:12 - CONCLUSION: 

Uncomplicated 





 conversion of a Vas-Cath to PermCath as above.     Ren Stubbs MD 


 


Upper Extremity Ultrasound 2/8/18 0000 Signed





Impressions: 





 Service Date/Time:  Thursday, February 8, 2018 21:19 - CONCLUSION:  Normal 





 examination.       Valdez Colunga MD 


 


Chest X-Ray 2/4/18 0810 Signed





Impressions: 





 Service Date/Time:  Sunday, February 4, 2018 08:50 - CONCLUSION:  Bibasilar 





 consolidation slightly worse in the right lower lobe.     Mp Dang MD 








Objective Remarks


GENERAL: This is a well-nourished, well-developed patient, in no apparent 

distress.


CARDIOVASCULAR: Regular rate and regular rhythm without murmurs, gallops, or 

rubs. 


RESPIRATORY: Clear to auscultation. Breath sounds equal bilaterally. No wheezes

, rales, or rhonchi.  


GASTROINTESTINAL: Abdomen soft, non-tender, nondistended. 


Normal, active bowel sounds


MUSCULOSKELETAL: Extremities without clubbing, cyanosis, or edema.


NEURO:  Alert & Oriented x4 to person, place, time, situation.  Moves all ext x4


Procedures


perma-cath placement.


Medications and IVs





Inpatient Medications


Acetaminophen (Tylenol) 650 mg UNSCH  PRN PO for headach, pain, temp > 101F 

Last administered on 2/9/18at 19:50;  Start 2/7/18 at 10:30


Albumin Human 100 ml @  60 mls/hr UNSCH  PRN IV WITH DIALYSIS;  Start 2/7/18 at 

10:30


Albuterol/ Ipratropium (Duoneb Neb) 1 ampule Q4HR NEB  PRN NEB sob/wheezing  

Last administered on 2/6/18at 01:51;  Start 2/4/18 at 16:15


Amlodipine Besylate (Norvasc) 10 mg DAILY PO  Last administered on 2/11/18at 09:

02;  Start 2/5/18 at 09:00;  Stop 2/13/18 at 08:53;  Status DC


Aspirin (Aspirin Chew) 81 mg DAILY CHEW  Last administered on 2/13/18at 10:15;  

Start 2/5/18 at 09:00;  Status Future hold


Bisacodyl (Dulcolax Supp) 10 mg DAILY  PRN RECTAL SEVERE CONSITIPATION;  Start 2 /4/18 at 11:30


Clonidine (Catapres) 0.1 mg UNSCH  PRN PO for BP > 160 mmHg Systolic;  Start 2/7 /18 at 10:30


Clopidogrel Bisulfate (Plavix) 75 mg DAILY PO ;  Start 2/14/18 at 09:00


Dextrose (D50w (Vial) Inj) 50 ml UNSCH  PRN IV PUSH HYPOGLYCEMIA-SEE COMMENTS 

Last administered on 2/5/18at 04:20;  Start 2/4/18 at 16:15


Diphenhydramine HCl (Benadryl) 25 mg UNSCH  PRN PO for hives/itching/

anaphylaxis Last administered on 2/12/18at 06:06;  Start 2/7/18 at 10:30


Epoetin David (Epogen Inj) 10,000 units UNSCH  PRN IV PUSH WITH DIALYSIS Last 

administered on 2/12/18at 12:37;  Start 2/7/18 at 10:30


Ferrous Sulfate (Ferrous Sulfate) 325 mg BID PO  Last administered on 2/12/18at 

20:39;  Start 2/4/18 at 21:00;  Stop 2/13/18 at 08:47;  Status DC


Furosemide (Lasix Inj) 40 mg BID@09,18 IV PUSH  Last administered on 2/7/18at 09

:56;  Start 2/4/18 at 18:00;  Stop 2/7/18 at 11:38;  Status DC


Furosemide (Lasix) 40 mg DAILY PO  Last administered on 2/11/18at 09:02;  Start 

2/8/18 at 09:00;  Stop 2/13/18 at 08:53;  Status DC


Gabapentin (Neurontin) 300 mg HS PO  Last administered on 2/13/18at 21:13;  

Start 2/4/18 at 21:00


Gelatin (Gelfoam 12 Mm/7 Mm Top) 1 foam UNSCH  PRN TOP SEE LABEL COMMENTS;  

Start 2/7/18 at 10:30


Gentamicin Sulfate (Gentamicin Inj) 20 mg UNSCH  PRN OTHER WITH DIALYSIS Last 

administered on 2/9/18at 16:50;  Start 2/7/18 at 10:30


Glucagon (Glucagon Inj) 1 mg UNSCH  PRN OTHER HYPOGLYCEMIA-SEE COMMENTS;  Start 

2/4/18 at 16:15


Heparin Sodium (Porcine) (Heparin Inj)  UNSCH  PRN IV FLUSH SEE PROTOCOL;  

Start 2/12/18 at 14:45


Hydralazine HCl (Apresoline Inj) 20 mg Q4H  PRN IV PUSH SBP>160, DBP>90 Last 

administered on 2/14/18at 01:26;  Start 2/4/18 at 11:45


Hydralazine HCl (Apresoline) 50 mg Q8HR PO  Last administered on 2/14/18at 06:13

;  Start 2/4/18 at 14:00


Insulin Aspart (NovoLOG SUPPLEMENTAL SCALE) 1 ACHS SLIDING  SCALE SQ  Last 

administered on 2/13/18at 19:04;  Start 2/4/18 at 17:00


Insulin Detemir (Levemir Inj) 8 units ONCE  ONCE SQ  Last administered on 2/6/ 18at 22:06;  Start 2/6/18 at 22:00;  Stop 2/6/18 at 22:01;  Status DC


Iron Sucrose 100 mg/Sodium Chloride 105 ml @  105 mls/hr DAILY IV  Last 

administered on 2/13/18at 10:15;  Start 2/13/18 at 11:00;  Stop 2/22/18 at 09:59


Isosorbide Mononitrate (Imdur) 120 mg DAILY@07 PO  Last administered on 2/14/ 18at 06:13;  Start 2/5/18 at 07:00


Labetalol HCl (Trandate Inj) 10 mg Q20M  PRN IV PUSH SBP>180, DBP>100, HR>65;  

Start 2/4/18 at 11:45


Lactulose (Lactulose Liq) 30 ml DAILY  PRN PO SEVERE CONSITIPATION Last 

administered on 2/10/18at 08:53;  Start 2/4/18 at 11:30


Lisinopril (Prinivil) 40 mg DAILY PO  Last administered on 2/13/18at 10:05;  

Start 2/13/18 at 09:00


Lorazepam (Ativan) 0.5 mg Q8HR  PRN PO anxiety Last administered on 2/12/18at 16

:53;  Start 2/4/18 at 16:15


Magnesium Hydroxide (Milk Of Magnesia Liq) 30 ml Q12H  PRN PO Mild constipation

;  Start 2/4/18 at 11:30


Mannitol (Mannitol Inj) 12.5 gm UNSCH  PRN IV WITH DIALYSIS;  Start 2/7/18 at 10

:30


Metoprolol Tartrate (Lopressor) 100 mg Q12HR PO  Last administered on 2/13/18at 

21:14;  Start 2/4/18 at 21:00


Miscellaneous Information (Skin Test Result) 1 Q24H .XX ;  Start 2/14/18 at 18:

00;  Stop 2/16/18 at 18:01


Morphine Sulfate (Morphine Inj) 2 mg Q3HR  PRN IV PUSH CHEST PAIN Last 

administered on 2/14/18at 01:35;  Start 2/12/18 at 17:45


Naloxone HCl (Narcan Inj) 0.4 mg UNSCH  PRN IV PUSH SEE LABEL COMMENTS;  Start 2 /4/18 at 11:30


Nitroglycerin (Nitroglycerin 2% Oint) 1 inch ONCE  ONCE TOP  Last administered 

on 2/4/18at 12:03;  Start 2/4/18 at 11:30;  Stop 2/4/18 at 11:31;  Status DC


Nitroglycerin (Nitrostat Sl) 0.4 mg UNSCH  PRN SL CHEST PAIN;  Start 2/7/18 at 

10:30


Nitroglycerin/ Dextrose 250 ml @  1.5 mls/hr TITRATE  PRN IV Chest pain Last 

administered on 2/10/18at 07:00;  Start 2/7/18 at 12:00


Ondansetron HCl (Zofran Inj) 4 mg UNSCH  PRN IV PUSH WITH DIALYSIS;  Start 2/7/ 18 at 10:30


Potassium Chloride (KCl Powder) 40 meq ONCE  ONCE PO  Last administered on 2/10/

18at 10:33;  Start 2/10/18 at 09:15;  Stop 2/10/18 at 09:16;  Status DC


Pravastatin Sodium (Pravachol) 40 mg HS PO  Last administered on 2/13/18at 21:14

;  Start 2/4/18 at 21:00


Senna/Docusate Sodium (Theresa-Colace) 1 tab BID PO  Last administered on 2/13/ 18at 21:14;  Start 2/4/18 at 21:00


Sennosides (Senokot) 17.2 mg Q12H  PRN PO Moderate constipation;  Start 2/4/18 

at 11:30


Simethicone (Phazyme Chew) 125 mg Q8HR PO  Last administered on 2/14/18at 06:13

;  Start 2/4/18 at 14:00


Sodium Chloride (NS Flush)  UNSCH  PRN IV FLUSH SEE PROTOCOL;  Start 2/12/18 at 

14:45


Temazepam (Restoril) 15 mg HS  PRN PO INSOMNIA Last administered on 2/10/18at 23

:22;  Start 2/4/18 at 11:30


Tuberculin PPD (Ppd Inj) 5 units ONCE  ONCE I-DERMAL ;  Start 2/13/18 at 18:00;

  Stop 2/13/18 at 18:01;  Status DC





A/P


Assessment and Plan


Acute on Chronic diastolic CHF


Cardiomyopathy 


Continue imdur


Continue HD


Continue beta blocker





Acute on chronic Stage IV CKD


Nephrology following


Avoiding nephrotoxins


s/p perma-cath placement.


Dialysis per nephrology.





Chest pain


Recent  STEMI


Cardiology following


on aspirin and plavix


continue BB and Imdur.


Plan for cardiac cath on Friday.








recent GI bleed-resolved


Hemoglobin has remained stable since


Gastritis found on GI workup


Continue PPI





Hypertension


Continue Lopressor


Continue hydralazine


continue Lisinopril





Diabetes mellitus type 2


increase levemir to 10 units subq qhs


Follow blood sugars


Insulin sliding scale


Diabetic diet


will monitor and adjust the regimen as needed.





anemia of chronic disease


 receiving iron infusion/ epogen with HD


 will monitor H/H.





Tobacco abuse


Cessation recommended


Discharge Planning


on HD- cardiac work-up in progress.











Jonathan Arreola MD Feb 14, 2018 08:15

## 2018-02-14 NOTE — HHI.NPPN
Subjective


Complaints:  Shortness of Breath


General Problems:  Anemia


Renal Failure:  Chronic, Acute, Stage IV


Interval History


Due for dialysis. Daughter at bedside. Cardiac cath Friday.


 (Migdalia Camejo)





Review of Systems


General


Constitutional:  Fatigue


 (Migdalia Camejo)





Objective Data


Data





Vital Signs








  Date Time  Temp Pulse Resp B/P (MAP) Pulse Ox O2 Delivery O2 Flow Rate FiO2


 


2/14/18 07:51 98.4 77 17 149/69 (95) 98   


 


2/14/18 04:00  81      


 


2/14/18 04:00 98.6 74 18 158/70 (99) 98   


 


2/14/18 02:00  80      


 


2/14/18 00:00 98.5 79 18 153/70 (97) 98   


 


2/13/18 20:58 97.7 76 16 152/74 (100) 100   


 


2/13/18 18:00  70      


 


2/13/18 17:00  70      


 


2/13/18 16:20  74      


 


2/13/18 15:06 98.7 73 17 121/65 (83) 98   


 


2/13/18 15:00  72      


 


2/13/18 14:00  70      


 


2/13/18 13:00  70      


 


2/13/18 12:00  74      


 


2/13/18 11:44 98.3 82 17 111/64 (80) 98   


 


2/13/18 11:19     98   21


 


2/13/18 11:00  84      


 


2/13/18 10:00  86      








 (Migdalia Camejo)


-:  


2/14/18 0540                                                                   

             2/14/18 0540





Imaging





Last 72 hours Impressions








Catheter Placement X-Ray 2/12/18 0800 Signed





Impressions: 





 Service Date/Time:  Monday, February 12, 2018 14:12 - CONCLUSION: 

Uncomplicated 





 conversion of a Vas-Cath to PermCath as above.     Ren Stubbs MD 








Tubes & Lines:  Perma-Cath


 (Migdalia Camejo)





Physical Exam


General


Appearance:  Well Developed, Well Nourished, No Acute Distress, Comfortable


 (Migdalia Camejo)





Eyes


Eye Exam:  Pupils Equal, Pupils Reactive


 (Migdalia Camejo)





Throat


Throat Exam:  Oral Mucosa Pink & Moist


 (Migdalia Camejo)





Neck


Neck Remarks


tenderness  at vascath site. 


 (Migdalia Camejo)





Pulmonary


Resp Exam:  Breath Sounds Equal, No Distress, Decreased Bases


 (Migdalia Camejo)





Cardiology


CV Exam:  Regular, Normal Sinus Rhythm, Good Perfusion


 (Migdalia Camejo)





Gastrointestinal/Abdomen


GI Exam:  Soft, Non-Tender, Bowel Sounds Present, Positive Bowel Movement


 (Migdalia Camejo)





Musculoskeletal


MS Exam:  Joints Intact, Normal Gait, Normal Tone, Good Strength


 (Migdalia Camejo)





Integumentary


Skin Exam:  Clear, Warm, Dry, Intact


 (Migdalia Camejo)





Extremeties


Extremities Exam:  No Edema, Pedal Pulses Palpable


 (Migdalia Camejo)





Neurologic


Neuro Exam:  Alert, Awake, Oriented, Speech Clear, Moving All Extremities


 (Migdalia Camejo)





Psychiatric


Psych Exam:  Appropriate Responses


 (Migdalia Camejo)





Assessment/Plan


Discussed Condition With:  Patient, Relative


Assessment Summary:  Anemia of CKD, Hypertension, Diabetes Mellitus, CKD Stage V

, End Stage Renal Disease


Problem List:  


(1) Acute kidney injury superimposed on CKD


ICD Codes:  N17.9 - Acute kidney failure, unspecified; N18.9 - Chronic kidney 

disease, unspecified


Status:  Acute


Plan:  She has reached CKD 5/ESRD. On HD MWF. Due today.


Minimal fluid removal with dialysis. She still makes urine. 


Permcath placed 2/12. Pending AVF placement, hopefully this admission . 

Vascular following. 


Repeat labs daily. 


Case management is assisting with Medicare application is appreciated. She will 

be enrolled in the transitional program at [facility], potentially as early as next 

week. 





(2) CAD (coronary artery disease)


ICD Codes:  I25.10 - Atherosclerotic heart disease of native coronary artery 

without angina pectoris


Status:  Chronic


Plan:  To have cardiac cath with stent placement Friday per Dr. Mercado. 


Resume Plavix, ASA; heparin SQ is not required, she is ambulatory


D/W Dr. Mercado. 





(3) CHF (congestive heart failure)


ICD Codes:  I50.9 - Heart failure, unspecified


Plan:  Minimal fluid removal with HD. 


Continue PO lasix.


Follow fluid status


Fluid restriction discussed with the patient. 





(4) HTN (hypertension)


ICD Codes:  I10 - Hypertension


Status:  Chronic


Plan:  BP acceptable. 


On Imdur 120 mg daily


Norvasc 10 mg daily


Metoprolol 100 mg BID


Hydralazine 50 mg Q8h


Clonidine 0.1 mg BID


Continue to monitor





(5) DM (diabetes mellitus)


ICD Codes:  E11.9 - Type 2 diabetes mellitus without complications


Plan:  Monitor blood glucose, maintain 140-180 mg/dL.





(6) Anemia


ICD Codes:  D64.9 - Anemia, unspecified


Status:  Chronic


Plan:  Epogen with dialysis. 


Start Venofer for iron deficiency. 


Give a unit today with dialysis in anticipation of cardiac cath Friday.


 (Migdalia Camejo)


Problem List:  


(1) Acute kidney injury superimposed on CKD


ICD Codes:  N17.9 - Acute kidney failure, unspecified; N18.9 - Chronic kidney 

disease, unspecified


Status:  Acute


Plan:  She has reached CKD 5/ESRD. On HD MWF. Due today.


Minimal fluid removal with dialysis. She still makes urine. 


Permcath placed 2/12. Pending AVF placement, hopefully this admission . 

Vascular following. 


Repeat labs daily. 


Case management is assisting with Medicare application is appreciated. She will 

be enrolled in the transitional program at [facility], potentially as early as next 

week. 





(2) CAD (coronary artery disease)


ICD Codes:  I25.10 - Atherosclerotic heart disease of native coronary artery 

without angina pectoris


Status:  Chronic


Plan:  To have cardiac cath with stent placement Friday per Dr. Mercado. 


Resume Plavix, ASA; heparin SQ is not required, she is ambulatory


D/W Dr. Mercado. 





(3) CHF (congestive heart failure)


ICD Codes:  I50.9 - Heart failure, unspecified


Plan:  Minimal fluid removal with HD. 


Continue PO lasix.


Follow fluid status


Fluid restriction discussed with the patient. 





(4) HTN (hypertension)


ICD Codes:  I10 - Hypertension


Status:  Chronic


Plan:  BP acceptable. 


On Imdur 120 mg daily


Norvasc 10 mg daily


Metoprolol 100 mg BID


Hydralazine 50 mg Q8h


Clonidine 0.1 mg BID


Continue to monitor





(5) DM (diabetes mellitus)


ICD Codes:  E11.9 - Type 2 diabetes mellitus without complications


Plan:  Monitor blood glucose, maintain 140-180 mg/dL.





(6) Anemia


ICD Codes:  D64.9 - Anemia, unspecified


Status:  Chronic


Plan:  Epogen with dialysis. 


Start Venofer for iron deficiency. 


Give a unit today with dialysis in anticipation of cardiac cath Friday.





Plan


patient was seen and examined. Agree with above assessment and plan. 


 (Kelton Whiteside MD)





Problem Qualifiers





(1) CHF (congestive heart failure):  


Qualified Codes:  I50.9 - Heart failure, unspecified


(2) DM (diabetes mellitus):  





(3) Anemia:  


Qualified Codes:  D64.9 - Anemia, unspecified








Migdalia Camejo Feb 14, 2018 09:17


Kelton Whiteside MD Feb 14, 2018 10:08

## 2018-02-14 NOTE — PD.CARD.PN
Subjective


Subjective Remarks


Daughter at bedside.  Restarted on Plavix yesterday, denies any bleeding 

issues.  No chest pain or shortness of breath. All questions answered.





Objective


Medications





Current Medications








 Medications


  (Trade)  Dose


 Ordered  Sig/Luis Armando


 Route  Start Time


 Stop Time Status Last Admin


 


  (NS Flush)  2 ml  UNSCH  PRN


 IV FLUSH  2/4/18 11:30


    2/4/18 17:46


 


 


  (NS Flush)  2 ml  BID


 IV FLUSH  2/4/18 21:00


    2/13/18 21:15


 


 


  (Tylenol)  650 mg  Q4H  PRN


 PO  2/4/18 11:30


    2/4/18 15:33


 


 


  (Zofran Inj)  4 mg  Q6H  PRN


 IVP  2/4/18 11:30


    2/6/18 08:43


 


 


  (Restoril)  15 mg  HS  PRN


 PO  2/4/18 11:30


    2/10/18 23:22


 


 


  (Narcan Inj)  0.4 mg  UNSCH  PRN


 IV PUSH  2/4/18 11:30


     


 


 


  (Theresa-Colace)  1 tab  BID


 PO  2/4/18 21:00


    2/13/18 21:14


 


 


  (Milk Of


 Magnesia Liq)  30 ml  Q12H  PRN


 PO  2/4/18 11:30


     


 


 


  (Senokot)  17.2 mg  Q12H  PRN


 PO  2/4/18 11:30


     


 


 


  (Dulcolax Supp)  10 mg  DAILY  PRN


 RECTAL  2/4/18 11:30


     


 


 


  (Lactulose Liq)  30 ml  DAILY  PRN


 PO  2/4/18 11:30


    2/10/18 08:53


 


 


  (Aspirin Chew)  81 mg  DAILY


 CHEW  2/5/18 09:00


   Future hold 2/13/18 10:15


 


 


  (Neurontin)  300 mg  HS


 PO  2/4/18 21:00


    2/13/18 21:13


 


 


  (Apresoline)  50 mg  Q8HR


 PO  2/4/18 14:00


    2/14/18 06:13


 


 


  (Imdur)  120 mg  DAILY@07


 PO  2/5/18 07:00


    2/14/18 06:13


 


 


  (Lopressor)  100 mg  Q12HR


 PO  2/4/18 21:00


    2/13/18 21:14


 


 


  (Nitrostat Sl)  0.4 mg  Q5M  PRN


 SL  2/4/18 11:30


    2/7/18 10:32


 


 


  (Pravachol)  40 mg  HS


 PO  2/4/18 21:00


    2/13/18 21:14


 


 


  (Phazyme Chew)  125 mg  Q8HR


 PO  2/4/18 14:00


    2/14/18 06:13


 


 


  (Trandate Inj)  10 mg  Q20M  PRN


 IV PUSH  2/4/18 11:45


     


 


 


  (Apresoline Inj)  20 mg  Q4H  PRN


 IV PUSH  2/4/18 11:45


    2/14/18 01:26


 


 


  (Duoneb Neb)  1 ampule  Q4HR NEB  PRN


 NEB  2/4/18 16:15


    2/6/18 01:51


 


 


  (Ativan)  0.5 mg  Q8HR  PRN


 PO  2/4/18 16:15


    2/12/18 16:53


 


 


  (D50w (Vial) Inj)  50 ml  UNSCH  PRN


 IV PUSH  2/4/18 16:15


    2/5/18 04:20


 


 


  (Glucagon Inj)  1 mg  UNSCH  PRN


 OTHER  2/4/18 16:15


     


 


 


  (NovoLOG


 SUPPLEMENTAL


 SCALE)  1  ACHS SLIDING  SCALE


 SQ  2/4/18 17:00


    2/13/18 19:04


 


 


  (Catapres)  0.1 mg  Q12HR


 PO  2/5/18 10:00


    2/13/18 21:14


 


 


  (Levemir Inj)  8 units  HS


 SQ  2/7/18 21:00


    2/13/18 21:15


 


 


 Sodium Chloride  1,000 ml @ 


 0 mls/hr  Q0M PRN


 OTHER  2/7/18 10:20


     


 


 


  (Heparin Inj)  8,000 units  UNSCH  PRN


 IV FLUSH  2/7/18 10:30


     


 


 


 Sodium Chloride  1,000 ml @ 


 200 mls/hr  Q5H PRN


 IV  2/7/18 10:20


     


 


 


 Sodium Chloride  1,000 ml @ 


 0 mls/hr  Q0M PRN


 OTHER  2/7/18 10:20


     


 


 


  (Mannitol Inj)  12.5 gm  UNSCH  PRN


 IV  2/7/18 10:30


     


 


 


 Albumin Human  100 ml @ 


 60 mls/hr  UNSCH  PRN


 IV  2/7/18 10:30


     


 


 


  (NS Flush)  5 ml  UNSCH  PRN


 IV FLUSH  2/7/18 10:30


     


 


 


  (Heparin Inj)    UNSCH  PRN


 .XX  2/7/18 10:30


    2/12/18 12:46


 


 


  (Gentamicin Inj)  20 mg  UNSCH  PRN


 OTHER  2/7/18 10:30


    2/9/18 16:50


 


 


  (Zofran Inj)  4 mg  UNSCH  PRN


 IV PUSH  2/7/18 10:30


     


 


 


  (Tylenol)  650 mg  UNSCH  PRN


 PO  2/7/18 10:30


    2/9/18 19:50


 


 


  (Benadryl)  25 mg  UNSCH  PRN


 PO  2/7/18 10:30


    2/12/18 06:06


 


 


  (Nitrostat Sl)  0.4 mg  UNSCH  PRN


 SL  2/7/18 10:30


     


 


 


  (Catapres)  0.1 mg  UNSCH  PRN


 PO  2/7/18 10:30


     


 


 


  (Epogen Inj)  10,000 units  UNSCH  PRN


 IV PUSH  2/7/18 10:30


    2/12/18 12:37


 


 


  (Gelfoam 12 Mm/7


 Mm Top)  1 foam  UNSCH  PRN


 TOP  2/7/18 10:30


     


 


 


 Nitroglycerin/


 Dextrose  250 ml @ 


 1.5 mls/hr  TITRATE  PRN


 IV  2/7/18 12:00


    2/10/18 07:00


 


 


  (NS Flush)    UNSCH  PRN


 IV FLUSH  2/12/18 14:45


     


 


 


  (Heparin Inj)    UNSCH  PRN


 IV FLUSH  2/12/18 14:45


     


 


 


  (Morphine Inj)  2 mg  Q3HR  PRN


 IV PUSH  2/12/18 17:45


    2/14/18 01:35


 


 


 Iron Sucrose 100


 mg/Sodium Chloride  105 ml @ 


 105 mls/hr  DAILY


 IV  2/13/18 11:00


 2/22/18 09:59  2/13/18 10:15


 


 


  (Prinivil)  40 mg  DAILY


 PO  2/13/18 09:00


    2/13/18 10:05


 


 


  (Skin Test


 Result)  1  Q24H


 .XX  2/14/18 18:00


 2/16/18 18:01   


 


 


  (Plavix)  75 mg  DAILY


 PO  2/14/18 09:00


     


 








Vital Signs / I&O





Vital Signs








  Date Time  Temp Pulse Resp B/P (MAP) Pulse Ox O2 Delivery O2 Flow Rate FiO2


 


2/14/18 07:51 98.4 77 17 149/69 (95) 98   


 


2/14/18 04:00  81      


 


2/14/18 04:00 98.6 74 18 158/70 (99) 98   


 


2/14/18 02:00  80      


 


2/14/18 00:00 98.5 79 18 153/70 (97) 98   


 


2/13/18 20:58 97.7 76 16 152/74 (100) 100   


 


2/13/18 18:00  70      


 


2/13/18 17:00  70      


 


2/13/18 16:20  74      


 


2/13/18 15:06 98.7 73 17 121/65 (83) 98   


 


2/13/18 15:00  72      


 


2/13/18 14:00  70      


 


2/13/18 13:00  70      


 


2/13/18 12:00  74      


 


2/13/18 11:44 98.3 82 17 111/64 (80) 98   


 


2/13/18 11:19     98   21


 


2/13/18 11:00  84      


 


2/13/18 10:00  86      


 


2/13/18 09:00  82      














I/O      


 


 2/13/18 2/13/18 2/13/18 2/14/18 2/14/18 2/14/18





 07:00 15:00 23:00 07:00 15:00 23:00


 


Intake Total 498 ml  840 ml 480 ml  


 


Output Total 950 ml  850 ml 400 ml  


 


Balance -452 ml  -10 ml 80 ml  


 


      


 


Intake Oral 480 ml  840 ml 480 ml  


 


IV Total 18 ml     


 


Output Urine Total 950 ml  850 ml 400 ml  


 


# Bowel Movements    0  








Physical Exam


GENERAL: Well-developed well-nourished.  In no acute distress.


NECK: No carotid bruits.  No JVD.  Tunnel dialysis catheter in place on right 

chest wall with no swelling or bleeding.


CARDIOVASCULAR: Regular rate and rhythm.  No murmur appreciated.


RESPIRATORY: No accessory muscle use. Clear to auscultation. Breath sounds 

equal bilaterally. 


MUSCULOSKELETAL: No clubbing or cyanosis. No edema. 


NEUROLOGICAL: Awake and alert. Normal speech.


Laboratory





Laboratory Tests








Test


  2/14/18


05:40


 


White Blood Count 8.1 TH/MM3 


 


Red Blood Count 2.49 MIL/MM3 


 


Hemoglobin 8.3 GM/DL 


 


Hematocrit 24.4 % 


 


Mean Corpuscular Volume 98.1 FL 


 


Mean Corpuscular Hemoglobin 33.4 PG 


 


Mean Corpuscular Hemoglobin


Concent 34.1 % 


 


 


Red Cell Distribution Width 15.3 % 


 


Platelet Count 323 TH/MM3 


 


Mean Platelet Volume 7.6 FL 


 


Neutrophils (%) (Auto) 63.8 % 


 


Lymphocytes (%) (Auto) 16.3 % 


 


Monocytes (%) (Auto) 11.4 % 


 


Eosinophils (%) (Auto) 7.7 % 


 


Basophils (%) (Auto) 0.8 % 


 


Neutrophils # (Auto) 5.2 TH/MM3 


 


Lymphocytes # (Auto) 1.3 TH/MM3 


 


Monocytes # (Auto) 0.9 TH/MM3 


 


Eosinophils # (Auto) 0.6 TH/MM3 


 


Basophils # (Auto) 0.1 TH/MM3 


 


CBC Comment DIFF FINAL 


 


Differential Comment  


 


Blood Urea Nitrogen 33 MG/DL 


 


Creatinine 3.22 MG/DL 


 


Random Glucose 117 MG/DL 


 


Albumin 2.5 GM/DL 


 


Calcium Level 8.3 MG/DL 


 


Phosphorus Level 5.3 MG/DL 


 


Sodium Level 140 MEQ/L 


 


Potassium Level 4.0 MEQ/L 


 


Chloride Level 103 MEQ/L 


 


Carbon Dioxide Level 28.7 MEQ/L 


 


Anion Gap 8 MEQ/L 


 


Estimat Glomerular Filtration


Rate 18 ML/MIN 


 








Imaging





Last Impressions








Catheter Placement X-Ray 2/12/18 0800 Signed





Impressions: 





 Service Date/Time:  Monday, February 12, 2018 14:12 - CONCLUSION: 

Uncomplicated 





 conversion of a Vas-Cath to PermCath as above.     Ren Stubbs MD 


 


Upper Extremity Ultrasound 2/8/18 0000 Signed





Impressions: 





 Service Date/Time:  Thursday, February 8, 2018 21:19 - CONCLUSION:  Normal 





 examination.       Valdez Colunga MD 


 


Chest X-Ray 2/4/18 0810 Signed





Impressions: 





 Service Date/Time:  Sunday, February 4, 2018 08:50 - CONCLUSION:  Bibasilar 





 consolidation slightly worse in the right lower lobe.     Mp Dang MD 











Assessment and Plan


Problem List:  


(1) CAD (coronary artery disease)


ICD Codes:  I25.10 - Atherosclerotic heart disease of native coronary artery 

without angina pectoris


Status:  Chronic


(2) CHF (congestive heart failure)


ICD Codes:  I50.9 - Heart failure, unspecified


(3) Acute kidney injury superimposed on CKD


ICD Codes:  N17.9 - Acute kidney failure, unspecified; N18.9 - Chronic kidney 

disease, unspecified


Status:  Acute


Assessment and Plan


52 yo AAF recently discharged from this facility after prolonged 

hospitalization for chest pain, CAD, CHF, and CKD. Readmitted for progression 

of symptoms





CAD with angina: Severe 2 vessel disease to RCA and mid-LAD seen on Last 

admission. Echo 55-60%.  Nephrology plans on permanent dialysis, plan to 

proceed w/ LHC. Will allow tunneled cath to heal and plan PCI RCA and LAD 

tentatively for Friday.  Continue aspirin, Plavix, Imdur, metoprolol, 

lisinopril.





CKD stage V/ESRD: Nephrology on board. Tunneled dialysis cath placed 2/12.





Anemia: Normocytic, likely chronic renal disease.  Received IV iron infusion 

today.  We'll plan plan transfusion prior to cath if needed to keep Hb >9.0.





Problem Qualifiers





(1) CHF (congestive heart failure):  


Qualified Codes:  I50.9 - Heart failure, unspecified








King Ervin Feb 14, 2018 08:25

## 2018-02-15 VITALS — HEART RATE: 86 BPM

## 2018-02-15 VITALS — HEART RATE: 78 BPM

## 2018-02-15 VITALS — HEART RATE: 76 BPM

## 2018-02-15 VITALS
TEMPERATURE: 98 F | SYSTOLIC BLOOD PRESSURE: 128 MMHG | RESPIRATION RATE: 19 BRPM | HEART RATE: 72 BPM | OXYGEN SATURATION: 98 % | DIASTOLIC BLOOD PRESSURE: 71 MMHG

## 2018-02-15 VITALS
DIASTOLIC BLOOD PRESSURE: 70 MMHG | SYSTOLIC BLOOD PRESSURE: 147 MMHG | OXYGEN SATURATION: 97 % | HEART RATE: 78 BPM | TEMPERATURE: 99.2 F | RESPIRATION RATE: 18 BRPM

## 2018-02-15 VITALS
TEMPERATURE: 99.2 F | HEART RATE: 84 BPM | SYSTOLIC BLOOD PRESSURE: 147 MMHG | RESPIRATION RATE: 18 BRPM | OXYGEN SATURATION: 97 % | DIASTOLIC BLOOD PRESSURE: 70 MMHG

## 2018-02-15 VITALS
TEMPERATURE: 97.9 F | DIASTOLIC BLOOD PRESSURE: 76 MMHG | HEART RATE: 75 BPM | SYSTOLIC BLOOD PRESSURE: 118 MMHG | OXYGEN SATURATION: 100 % | RESPIRATION RATE: 18 BRPM

## 2018-02-15 VITALS
TEMPERATURE: 98 F | SYSTOLIC BLOOD PRESSURE: 110 MMHG | DIASTOLIC BLOOD PRESSURE: 60 MMHG | HEART RATE: 79 BPM | OXYGEN SATURATION: 98 % | RESPIRATION RATE: 20 BRPM

## 2018-02-15 VITALS — OXYGEN SATURATION: 98 %

## 2018-02-15 VITALS — HEART RATE: 79 BPM

## 2018-02-15 VITALS
TEMPERATURE: 98 F | HEART RATE: 77 BPM | SYSTOLIC BLOOD PRESSURE: 119 MMHG | DIASTOLIC BLOOD PRESSURE: 70 MMHG | RESPIRATION RATE: 18 BRPM | OXYGEN SATURATION: 100 %

## 2018-02-15 VITALS — HEART RATE: 80 BPM

## 2018-02-15 VITALS — HEART RATE: 74 BPM

## 2018-02-15 VITALS — HEART RATE: 82 BPM

## 2018-02-15 VITALS — HEART RATE: 77 BPM

## 2018-02-15 VITALS — HEART RATE: 75 BPM

## 2018-02-15 LAB
HCT VFR BLD CALC: 29.5 % (ref 35–46)
HGB BLD-MCNC: 10.1 GM/DL (ref 11.6–15.3)

## 2018-02-15 RX ADMIN — GABAPENTIN SCH MG: 300 CAPSULE ORAL at 21:19

## 2018-02-15 RX ADMIN — INSULIN ASPART SCH: 100 INJECTION, SOLUTION INTRAVENOUS; SUBCUTANEOUS at 08:00

## 2018-02-15 RX ADMIN — CARVEDILOL SCH MG: 12.5 TABLET, FILM COATED ORAL at 21:19

## 2018-02-15 RX ADMIN — SIMETHICONE SCH MG: 125 TABLET, CHEWABLE ORAL at 06:40

## 2018-02-15 RX ADMIN — ONDANSETRON PRN MG: 2 INJECTION, SOLUTION INTRAMUSCULAR; INTRAVENOUS at 10:21

## 2018-02-15 RX ADMIN — CARVEDILOL SCH MG: 12.5 TABLET, FILM COATED ORAL at 10:05

## 2018-02-15 RX ADMIN — MORPHINE SULFATE PRN MG: 2 INJECTION, SOLUTION INTRAMUSCULAR; INTRAVENOUS at 11:19

## 2018-02-15 RX ADMIN — INSULIN ASPART SCH: 100 INJECTION, SOLUTION INTRAVENOUS; SUBCUTANEOUS at 16:56

## 2018-02-15 RX ADMIN — Medication SCH ML: at 21:20

## 2018-02-15 RX ADMIN — MORPHINE SULFATE PRN MG: 2 INJECTION, SOLUTION INTRAMUSCULAR; INTRAVENOUS at 21:20

## 2018-02-15 RX ADMIN — CLOPIDOGREL BISULFATE SCH MG: 75 TABLET, FILM COATED ORAL at 10:06

## 2018-02-15 RX ADMIN — SIMETHICONE SCH MG: 125 TABLET, CHEWABLE ORAL at 13:10

## 2018-02-15 RX ADMIN — STANDARDIZED SENNA CONCENTRATE AND DOCUSATE SODIUM SCH TAB: 8.6; 5 TABLET, FILM COATED ORAL at 10:05

## 2018-02-15 RX ADMIN — PRAVASTATIN SODIUM SCH MG: 40 TABLET ORAL at 21:19

## 2018-02-15 RX ADMIN — INSULIN ASPART SCH: 100 INJECTION, SOLUTION INTRAVENOUS; SUBCUTANEOUS at 13:10

## 2018-02-15 RX ADMIN — STANDARDIZED SENNA CONCENTRATE AND DOCUSATE SODIUM SCH TAB: 8.6; 5 TABLET, FILM COATED ORAL at 21:00

## 2018-02-15 RX ADMIN — SODIUM CHLORIDE SCH MLS/HR: 900 INJECTION, SOLUTION INTRAVENOUS at 10:04

## 2018-02-15 RX ADMIN — MAGNESIUM HYDROXIDE PRN ML: 400 SUSPENSION ORAL at 15:22

## 2018-02-15 RX ADMIN — INSULIN ASPART SCH: 100 INJECTION, SOLUTION INTRAVENOUS; SUBCUTANEOUS at 21:00

## 2018-02-15 RX ADMIN — ASPIRIN 81 MG SCH MG: 81 TABLET ORAL at 10:04

## 2018-02-15 RX ADMIN — TEMAZEPAM PRN MG: 15 CAPSULE ORAL at 21:19

## 2018-02-15 RX ADMIN — Medication SCH ML: at 10:06

## 2018-02-15 RX ADMIN — SIMETHICONE SCH MG: 125 TABLET, CHEWABLE ORAL at 21:19

## 2018-02-15 RX ADMIN — ISOSORBIDE MONONITRATE SCH MG: 60 TABLET, EXTENDED RELEASE ORAL at 06:42

## 2018-02-15 RX ADMIN — ACYCLOVIR SCH UNITS: 800 TABLET ORAL at 21:00

## 2018-02-15 RX ADMIN — LISINOPRIL SCH MG: 20 TABLET ORAL at 10:05

## 2018-02-15 RX ADMIN — WATER PRN ML: 1 IRRIGANT IRRIGATION at 11:16

## 2018-02-15 NOTE — HHI.NPPN
Subjective


Complaints:  Shortness of Breath


General Problems:  Anemia


Renal Failure:  Chronic, Acute, Stage IV


Interval History


Had dialysis yesterday. Cardiology notes were reviewed. Plans for left heart 

catheterization on Friday. Urine output appears to have dropped.





Review of Systems


General


Constitutional:  Fatigue





Objective Data


Data





Vital Signs








  Date Time  Temp Pulse Resp B/P (MAP) Pulse Ox O2 Delivery O2 Flow Rate FiO2


 


2/15/18 06:42  75      


 


2/15/18 04:00  74      


 


2/15/18 00:00  84      


 


2/15/18 00:00 99.2 78 18 147/70 (95) 97   


 


2/14/18 20:00  86      


 


2/14/18 20:00 97.8 83 18 171/74 (106) 99   


 


2/14/18 19:29   18     


 


2/14/18 18:00  76      


 


2/14/18 17:51     99   21


 


2/14/18 17:50 97.9 78 18 165/78 (107) 99   


 


2/14/18 17:00  76      


 


2/14/18 16:00  76      


 


2/14/18 15:02 98.6 68 18 147/77    


 


2/14/18 15:00  76      


 


2/14/18 14:00  72      


 


2/14/18 13:00  76      


 


2/14/18 12:31 98.2 75 18 155/75 (101) 100   


 


2/14/18 12:00  72      


 


2/14/18 11:00  73      


 


2/14/18 10:00  76      


 


2/14/18 09:00  78      


 


2/14/18 08:03     94   21


 


2/14/18 08:00  76      








-:  


2/14/18 0540                                                                   

             2/14/18 0540





Tubes & Lines:  Perma-Cath





Physical Exam


General


Appearance:  Well Developed, Well Nourished, No Acute Distress, Comfortable





Eyes


Eye Exam:  Pupils Equal, Pupils Reactive





Throat


Throat Exam:  Oral Mucosa Leonard & Moist





Pulmonary


Resp Exam:  Breath Sounds Equal, No Distress, Decreased Bases





Cardiology


CV Exam:  Regular, Normal Sinus Rhythm, Good Perfusion





Gastrointestinal/Abdomen


GI Exam:  Soft, Non-Tender, Bowel Sounds Present, Positive Bowel Movement





Musculoskeletal


MS Exam:  Joints Intact, Normal Gait, Normal Tone, Good Strength





Integumentary


Skin Exam:  Clear, Warm, Dry, Intact





Extremeties


Extremities Exam:  No Edema, Pedal Pulses Palpable





Neurologic


Neuro Exam:  Alert, Awake, Oriented, Speech Clear, Moving All Extremities





Psychiatric


Psych Exam:  Appropriate Responses





Assessment/Plan


Discussed Condition With:  Patient, Relative


Assessment Summary:  Anemia of CKD, Hypertension, Diabetes Mellitus, CKD Stage V

, End Stage Renal Disease


Problem List:  


(1) Acute kidney injury superimposed on CKD


ICD Codes:  N17.9 - Acute kidney failure, unspecified; N18.9 - Chronic kidney 

disease, unspecified


Status:  Acute


Plan:  She has reached CKD 5/ESRD. On HD MWF.


Permcath placed 2/12. Pending AVF placement, hopefully this admission . 

Vascular following. 


Repeat labs daily. 


Case management is assisting with Medicare application is appreciated. [place] 

[place] [facility] [place] 

[place]. 





(2) CAD (coronary artery disease)


ICD Codes:  I25.10 - Atherosclerotic heart disease of native coronary artery 

without angina pectoris


Status:  Chronic


Plan:  To have cardiac cath with stent placement Friday per Dr. Mercado. 


Resume Plavix, ASA; heparin SQ is not required, she is ambulatory


D/W Dr. Mercado. 





(3) CHF (congestive heart failure)


ICD Codes:  I50.9 - Heart failure, unspecified


Plan:  Minimal fluid removal with HD. 


Continue PO lasix.


Follow fluid status


Fluid restriction discussed with the patient. 





(4) HTN (hypertension)


ICD Codes:  I10 - Hypertension


Status:  Chronic


Plan:  BP acceptable. 


On Imdur 120 mg daily


Norvasc 10 mg daily


Metoprolol 100 mg BID


Hydralazine 50 mg Q8h


Clonidine 0.1 mg BID


Continue to monitor





(5) DM (diabetes mellitus)


ICD Codes:  E11.9 - Type 2 diabetes mellitus without complications


Plan:  Monitor blood glucose, maintain 140-180 mg/dL.





(6) Anemia


ICD Codes:  D64.9 - Anemia, unspecified


Status:  Chronic


Plan:  Epogen with dialysis. 


Start Venofer for iron deficiency. 


s/p blood transfusion. 








Problem Qualifiers





(1) CHF (congestive heart failure):  


Qualified Codes:  I50.9 - Heart failure, unspecified


(2) DM (diabetes mellitus):  





(3) Anemia:  


Qualified Codes:  D64.9 - Anemia, unspecified








Kelton Whiteside MD Feb 15, 2018 07:53

## 2018-02-15 NOTE — HHI.PR
Subjective


Remarks


in no acute distress.


walking in the room.


no chest pain or sob.





Objective


Vitals





Vital Signs








  Date Time  Temp Pulse Resp B/P (MAP) Pulse Ox O2 Delivery O2 Flow Rate FiO2


 


2/15/18 11:06 98.0 79 20 110/60 (77) 98   


 


2/15/18 10:36     98   21


 


2/15/18 07:51 97.9 75 18 118/76 (90) 100   


 


2/15/18 06:42  75      


 


2/15/18 06:00  74      


 


2/15/18 05:00  74      


 


2/15/18 04:00  74      


 


2/15/18 04:00 99.2 78 18 147/70 (95) 97   


 


2/15/18 03:00  76      


 


2/15/18 02:00  76      


 


2/15/18 01:00  78      


 


2/15/18 00:00  84      


 


2/15/18 00:00 99.2 78 18 147/70 (95) 97   


 


2/14/18 23:00  86      


 


2/14/18 22:00  88      


 


2/14/18 21:00  86      


 


2/14/18 20:00  86      


 


2/14/18 20:00 97.8 83 18 171/74 (106) 99   


 


2/14/18 19:29   18     


 


2/14/18 19:00  80      


 


2/14/18 18:00  76      


 


2/14/18 17:51     99   21


 


2/14/18 17:50 97.9 78 18 165/78 (107) 99   


 


2/14/18 17:00  76      


 


2/14/18 16:00  76      


 


2/14/18 15:02 98.6 68 18 147/77    


 


2/14/18 15:00  76      


 


2/14/18 14:00  72      














I/O      


 


 2/14/18 2/14/18 2/14/18 2/15/18 2/15/18 2/15/18





 07:00 15:00 23:00 07:00 15:00 23:00


 


Intake Total 480 ml 5 ml 1395 ml 480 ml  


 


Output Total 400 ml  2500 ml 900 ml  


 


Balance 80 ml 5 ml -1105 ml -420 ml  


 


      


 


Intake Oral 480 ml  840 ml 480 ml  


 


IV Total   105 ml   


 


Packed Cells   400 ml   


 


Blood Product IV Normal Saline Flush  5 ml 50 ml   


 


Output Urine Total 400 ml  500 ml 900 ml  


 


Hemodialysis   2000 ml   


 


# Bowel Movements 0   0  








Result Diagram:  


2/15/18 1135                                                                   

             2/14/18 0540





Imaging





Last Impressions








Catheter Placement X-Ray 2/12/18 0800 Signed





Impressions: 





 Service Date/Time:  Monday, February 12, 2018 14:12 - CONCLUSION: 

Uncomplicated 





 conversion of a Vas-Cath to PermCath as above.     Ren Stubbs MD 


 


Upper Extremity Ultrasound 2/8/18 0000 Signed





Impressions: 





 Service Date/Time:  Thursday, February 8, 2018 21:19 - CONCLUSION:  Normal 





 examination.       Valdez Colunga MD 


 


Chest X-Ray 2/4/18 0810 Signed





Impressions: 





 Service Date/Time:  Sunday, February 4, 2018 08:50 - CONCLUSION:  Bibasilar 





 consolidation slightly worse in the right lower lobe.     Mp Dang MD 








Objective Remarks


GENERAL: This is a well-nourished, well-developed patient, in no apparent 

distress.


CARDIOVASCULAR: Regular rate and regular rhythm without murmurs, gallops, or 

rubs. 


RESPIRATORY: Clear to auscultation. Breath sounds equal bilaterally. No wheezes

, rales, or rhonchi.  


GASTROINTESTINAL: Abdomen soft, non-tender, nondistended. 


Normal, active bowel sounds


MUSCULOSKELETAL: Extremities without clubbing, cyanosis, or edema.


NEURO:  Alert & Oriented x4 to person, place, time, situation.  Moves all ext x4


Procedures


perma-cath placement.


Medications and IVs





Inpatient Medications


Acetaminophen (Tylenol) 650 mg UNSCH  PRN PO for headach, pain, temp > 101F 

Last administered on 2/9/18at 19:50;  Start 2/7/18 at 10:30


Albumin Human 100 ml @  60 mls/hr UNSCH  PRN IV WITH DIALYSIS;  Start 2/7/18 at 

10:30


Albuterol/ Ipratropium (Duoneb Neb) 1 ampule Q4HR NEB  PRN NEB sob/wheezing  

Last administered on 2/6/18at 01:51;  Start 2/4/18 at 16:15


Amlodipine Besylate (Norvasc) 10 mg DAILY PO  Last administered on 2/11/18at 09:

02;  Start 2/5/18 at 09:00;  Stop 2/13/18 at 08:53;  Status DC


Aspirin (Aspirin Chew) 81 mg DAILY CHEW  Last administered on 2/15/18at 10:04;  

Start 2/5/18 at 09:00;  Status Future hold


Bisacodyl (Dulcolax Supp) 10 mg DAILY  PRN RECTAL SEVERE CONSITIPATION;  Start 2 /4/18 at 11:30


Carvedilol (Coreg) 25 mg Q12HR PO  Last administered on 2/15/18at 10:05;  Start 

2/14/18 at 21:00


Clonidine (Catapres) 0.1 mg UNSCH  PRN PO for BP > 160 mmHg Systolic Last 

administered on 2/14/18at 18:31;  Start 2/7/18 at 10:30


Clopidogrel Bisulfate (Plavix) 75 mg DAILY PO  Last administered on 2/15/18at 10

:06;  Start 2/14/18 at 09:00


Dextrose (D50w (Vial) Inj) 50 ml UNSCH  PRN IV PUSH HYPOGLYCEMIA-SEE COMMENTS 

Last administered on 2/5/18at 04:20;  Start 2/4/18 at 16:15


Diphenhydramine HCl (Benadryl) 25 mg UNSCH  PRN PO for hives/itching/

anaphylaxis Last administered on 2/12/18at 06:06;  Start 2/7/18 at 10:30


Epoetin David (Epogen Inj) 10,000 units UNSCH  PRN IV PUSH WITH DIALYSIS Last 

administered on 2/14/18at 15:59;  Start 2/7/18 at 10:30


Ferrous Sulfate (Ferrous Sulfate) 325 mg BID PO  Last administered on 2/12/18at 

20:39;  Start 2/4/18 at 21:00;  Stop 2/13/18 at 08:47;  Status DC


Furosemide (Lasix Inj) 40 mg BID@09,18 IV PUSH  Last administered on 2/7/18at 09

:56;  Start 2/4/18 at 18:00;  Stop 2/7/18 at 11:38;  Status DC


Furosemide (Lasix) 40 mg DAILY PO  Last administered on 2/11/18at 09:02;  Start 

2/8/18 at 09:00;  Stop 2/13/18 at 08:53;  Status DC


Gabapentin (Neurontin) 300 mg HS PO  Last administered on 2/14/18at 22:23;  

Start 2/4/18 at 21:00


Gelatin (Gelfoam 12 Mm/7 Mm Top) 1 foam UNSCH  PRN TOP SEE LABEL COMMENTS;  

Start 2/7/18 at 10:30


Gentamicin Sulfate (Gentamicin Inj) 20 mg UNSCH  PRN OTHER WITH DIALYSIS Last 

administered on 2/9/18at 16:50;  Start 2/7/18 at 10:30


Glucagon (Glucagon Inj) 1 mg UNSCH  PRN OTHER HYPOGLYCEMIA-SEE COMMENTS;  Start 

2/4/18 at 16:15


Heparin Sodium (Porcine) (Heparin Inj)  UNSCH  PRN IV FLUSH SEE PROTOCOL;  

Start 2/12/18 at 14:45


Hydralazine HCl (Apresoline Inj) 20 mg Q4H  PRN IV PUSH SBP>160, DBP>90 Last 

administered on 2/14/18at 01:26;  Start 2/4/18 at 11:45


Hydralazine HCl (Apresoline) 50 mg Q8HR PO  Last administered on 2/15/18at 13:11

;  Start 2/4/18 at 14:00


Insulin Aspart (NovoLOG SUPPLEMENTAL SCALE) 1 ACHS SLIDING  SCALE SQ  Last 

administered on 2/15/18at 13:10;  Start 2/4/18 at 17:00


Insulin Detemir (Levemir Inj) 10 units HS SQ  Last administered on 2/14/18at 22:

23;  Start 2/14/18 at 21:00


Iron Sucrose 100 mg/Sodium Chloride 105 ml @  105 mls/hr DAILY IV  Last 

administered on 2/15/18at 10:04;  Start 2/13/18 at 11:00;  Stop 2/22/18 at 09:59


Isosorbide Mononitrate (Imdur) 120 mg DAILY@07 PO  Last administered on 2/15/

18at 06:42;  Start 2/5/18 at 07:00


Labetalol HCl (Trandate Inj) 10 mg Q20M  PRN IV PUSH SBP>180, DBP>100, HR>65;  

Start 2/4/18 at 11:45


Lactulose (Lactulose Liq) 30 ml DAILY  PRN PO SEVERE CONSITIPATION Last 

administered on 2/15/18at 11:16;  Start 2/4/18 at 11:30


Lisinopril (Prinivil) 40 mg DAILY PO  Last administered on 2/15/18at 10:05;  

Start 2/13/18 at 09:00


Lorazepam (Ativan) 0.5 mg Q8HR  PRN PO anxiety Last administered on 2/12/18at 16

:53;  Start 2/4/18 at 16:15


Magnesium Hydroxide (Milk Of Magnesia Liq) 30 ml Q12H  PRN PO Mild constipation

;  Start 2/4/18 at 11:30


Mannitol (Mannitol Inj) 12.5 gm UNSCH  PRN IV WITH DIALYSIS;  Start 2/7/18 at 10

:30


Metoprolol Tartrate (Lopressor) 100 mg Q12HR PO  Last administered on 2/14/18at 

09:05;  Start 2/4/18 at 21:00;  Stop 2/14/18 at 14:18;  Status DC


Miscellaneous Information (Skin Test Result) 1 Q24H .XX ;  Start 2/14/18 at 18:

00;  Stop 2/16/18 at 18:01


Morphine Sulfate (Morphine Inj) 2 mg Q3HR  PRN IV PUSH CHEST PAIN Last 

administered on 2/15/18at 11:19;  Start 2/12/18 at 17:45


Naloxone HCl (Narcan Inj) 0.4 mg UNSCH  PRN IV PUSH SEE LABEL COMMENTS;  Start 2 /4/18 at 11:30


Nitroglycerin (Nitroglycerin 2% Oint) 1 inch ONCE  ONCE TOP  Last administered 

on 2/4/18at 12:03;  Start 2/4/18 at 11:30;  Stop 2/4/18 at 11:31;  Status DC


Nitroglycerin (Nitrostat Sl) 0.4 mg UNSCH  PRN SL CHEST PAIN;  Start 2/7/18 at 

10:30


Nitroglycerin/ Dextrose 250 ml @  1.5 mls/hr TITRATE  PRN IV Chest pain Last 

administered on 2/10/18at 07:00;  Start 2/7/18 at 12:00


Ondansetron HCl (Zofran Inj) 4 mg UNSCH  PRN IV PUSH WITH DIALYSIS;  Start 2/7/ 18 at 10:30


Potassium Chloride (KCl Powder) 40 meq ONCE  ONCE PO  Last administered on 2/10/

18at 10:33;  Start 2/10/18 at 09:15;  Stop 2/10/18 at 09:16;  Status DC


Pravastatin Sodium (Pravachol) 40 mg HS PO  Last administered on 2/14/18at 22:23

;  Start 2/4/18 at 21:00


Senna/Docusate Sodium (Theresa-Colace) 1 tab BID PO  Last administered on 2/15/

18at 10:05;  Start 2/4/18 at 21:00


Sennosides (Senokot) 17.2 mg Q12H  PRN PO Moderate constipation;  Start 2/4/18 

at 11:30


Simethicone (Phazyme Chew) 125 mg Q8HR PO  Last administered on 2/15/18at 13:10

;  Start 2/4/18 at 14:00


Sodium Chloride (NS Flush)  UNSCH  PRN IV FLUSH SEE PROTOCOL;  Start 2/12/18 at 

14:45


Temazepam (Restoril) 15 mg HS  PRN PO INSOMNIA Last administered on 2/10/18at 23

:22;  Start 2/4/18 at 11:30


Tuberculin PPD (Ppd Inj) 5 units ONCE  ONCE I-DERMAL  Last administered on 2/14/ 18at 09:25;  Start 2/13/18 at 18:00;  Stop 2/13/18 at 18:01;  Status DC





A/P


Assessment and Plan


Acute on Chronic diastolic CHF


Cardiomyopathy 


Continue imdur


Continue HD


Continue beta blocker





Acute on chronic Stage IV CKD


Nephrology following


Avoiding nephrotoxins


s/p perma-cath placement.


Dialysis per nephrology.





Chest pain


Recent  STEMI


Cardiology following


on aspirin and plavix


continue BB and Imdur.


Plan for cardiac cath on Friday.





recent GI bleed-resolved


Hemoglobin has remained stable since


Gastritis found on GI workup


Continue PPI





Hypertension


Continue Lopressor


Continue hydralazine


continue Lisinopril





Diabetes mellitus type 2


continue Levemir


Follow blood sugars


Insulin sliding scale


Diabetic diet


will monitor and adjust the regimen as needed.





anemia of chronic disease


 receiving iron infusion/ epogen with HD


 will monitor H/H.





Tobacco abuse


Cessation recommended


Discharge Planning


on HD- cardiac work-up in progress.











Jonathan Arreola MD Feb 15, 2018 13:43

## 2018-02-15 NOTE — PD.CARD.PN
Subjective


Subjective Remarks


Dialyzed yesterday with new catheter, no issues.  Transfuse 1 unit PRBCs.  No 

bleeding issues.





Objective


Medications





Current Medications








 Medications


  (Trade)  Dose


 Ordered  Sig/Luis Armando


 Route  Start Time


 Stop Time Status Last Admin


 


  (NS Flush)  2 ml  UNSCH  PRN


 IV FLUSH  2/4/18 11:30


    2/4/18 17:46


 


 


  (NS Flush)  2 ml  BID


 IV FLUSH  2/4/18 21:00


    2/14/18 22:24


 


 


  (Tylenol)  650 mg  Q4H  PRN


 PO  2/4/18 11:30


    2/4/18 15:33


 


 


  (Zofran Inj)  4 mg  Q6H  PRN


 IVP  2/4/18 11:30


    2/6/18 08:43


 


 


  (Restoril)  15 mg  HS  PRN


 PO  2/4/18 11:30


    2/10/18 23:22


 


 


  (Narcan Inj)  0.4 mg  UNSCH  PRN


 IV PUSH  2/4/18 11:30


     


 


 


  (Theresa-Colace)  1 tab  BID


 PO  2/4/18 21:00


    2/14/18 22:24


 


 


  (Milk Of


 Magnesia Liq)  30 ml  Q12H  PRN


 PO  2/4/18 11:30


     


 


 


  (Senokot)  17.2 mg  Q12H  PRN


 PO  2/4/18 11:30


     


 


 


  (Dulcolax Supp)  10 mg  DAILY  PRN


 RECTAL  2/4/18 11:30


     


 


 


  (Lactulose Liq)  30 ml  DAILY  PRN


 PO  2/4/18 11:30


    2/10/18 08:53


 


 


  (Aspirin Chew)  81 mg  DAILY


 CHEW  2/5/18 09:00


   Future hold 2/14/18 09:05


 


 


  (Neurontin)  300 mg  HS


 PO  2/4/18 21:00


    2/14/18 22:23


 


 


  (Apresoline)  50 mg  Q8HR


 PO  2/4/18 14:00


    2/15/18 06:40


 


 


  (Imdur)  120 mg  DAILY@07


 PO  2/5/18 07:00


    2/15/18 06:42


 


 


  (Nitrostat Sl)  0.4 mg  Q5M  PRN


 SL  2/4/18 11:30


    2/7/18 10:32


 


 


  (Pravachol)  40 mg  HS


 PO  2/4/18 21:00


    2/14/18 22:23


 


 


  (Phazyme Chew)  125 mg  Q8HR


 PO  2/4/18 14:00


    2/15/18 06:40


 


 


  (Trandate Inj)  10 mg  Q20M  PRN


 IV PUSH  2/4/18 11:45


     


 


 


  (Apresoline Inj)  20 mg  Q4H  PRN


 IV PUSH  2/4/18 11:45


    2/14/18 01:26


 


 


  (Duoneb Neb)  1 ampule  Q4HR NEB  PRN


 NEB  2/4/18 16:15


    2/6/18 01:51


 


 


  (Ativan)  0.5 mg  Q8HR  PRN


 PO  2/4/18 16:15


    2/12/18 16:53


 


 


  (D50w (Vial) Inj)  50 ml  UNSCH  PRN


 IV PUSH  2/4/18 16:15


    2/5/18 04:20


 


 


  (Glucagon Inj)  1 mg  UNSCH  PRN


 OTHER  2/4/18 16:15


     


 


 


  (NovoLOG


 SUPPLEMENTAL


 SCALE)  1  ACHS SLIDING  SCALE


 SQ  2/4/18 17:00


    2/14/18 22:21


 


 


  (Catapres)  0.1 mg  Q12HR


 PO  2/5/18 10:00


    2/14/18 22:24


 


 


 Sodium Chloride  1,000 ml @ 


 0 mls/hr  Q0M PRN


 OTHER  2/7/18 10:20


     


 


 


  (Heparin Inj)  8,000 units  UNSCH  PRN


 IV FLUSH  2/7/18 10:30


     


 


 


 Sodium Chloride  1,000 ml @ 


 200 mls/hr  Q5H PRN


 IV  2/7/18 10:20


     


 


 


 Sodium Chloride  1,000 ml @ 


 0 mls/hr  Q0M PRN


 OTHER  2/7/18 10:20


     


 


 


  (Mannitol Inj)  12.5 gm  UNSCH  PRN


 IV  2/7/18 10:30


     


 


 


 Albumin Human  100 ml @ 


 60 mls/hr  UNSCH  PRN


 IV  2/7/18 10:30


     


 


 


  (NS Flush)  5 ml  UNSCH  PRN


 IV FLUSH  2/7/18 10:30


     


 


 


  (Heparin Inj)    UNSCH  PRN


 .XX  2/7/18 10:30


    2/12/18 12:46


 


 


  (Gentamicin Inj)  20 mg  UNSCH  PRN


 OTHER  2/7/18 10:30


    2/9/18 16:50


 


 


  (Zofran Inj)  4 mg  UNSCH  PRN


 IV PUSH  2/7/18 10:30


     


 


 


  (Tylenol)  650 mg  UNSCH  PRN


 PO  2/7/18 10:30


    2/9/18 19:50


 


 


  (Benadryl)  25 mg  UNSCH  PRN


 PO  2/7/18 10:30


    2/12/18 06:06


 


 


  (Nitrostat Sl)  0.4 mg  UNSCH  PRN


 SL  2/7/18 10:30


     


 


 


  (Catapres)  0.1 mg  UNSCH  PRN


 PO  2/7/18 10:30


    2/14/18 18:31


 


 


  (Epogen Inj)  10,000 units  UNSCH  PRN


 IV PUSH  2/7/18 10:30


    2/14/18 15:59


 


 


  (Gelfoam 12 Mm/7


 Mm Top)  1 foam  UNSCH  PRN


 TOP  2/7/18 10:30


     


 


 


 Nitroglycerin/


 Dextrose  250 ml @ 


 1.5 mls/hr  TITRATE  PRN


 IV  2/7/18 12:00


    2/10/18 07:00


 


 


  (NS Flush)    UNSCH  PRN


 IV FLUSH  2/12/18 14:45


     


 


 


  (Heparin Inj)    UNSCH  PRN


 IV FLUSH  2/12/18 14:45


     


 


 


  (Morphine Inj)  2 mg  Q3HR  PRN


 IV PUSH  2/12/18 17:45


    2/14/18 19:24


 


 


 Iron Sucrose 100


 mg/Sodium Chloride  105 ml @ 


 105 mls/hr  DAILY


 IV  2/13/18 11:00


 2/22/18 09:59  2/14/18 15:05


 


 


  (Prinivil)  40 mg  DAILY


 PO  2/13/18 09:00


    2/14/18 09:04


 


 


  (Skin Test


 Result)  1  Q24H


 .XX  2/14/18 18:00


 2/16/18 18:01   


 


 


  (Plavix)  75 mg  DAILY


 PO  2/14/18 09:00


    2/14/18 09:06


 


 


  (Levemir Inj)  10 units  HS


 SQ  2/14/18 21:00


    2/14/18 22:23


 


 


  (Coreg)  25 mg  Q12HR


 PO  2/14/18 21:00


    2/14/18 22:23


 








Vital Signs / I&O





Vital Signs








  Date Time  Temp Pulse Resp B/P (MAP) Pulse Ox O2 Delivery O2 Flow Rate FiO2


 


2/15/18 07:51 97.9 75 18 118/76 (90) 100   


 


2/15/18 06:42  75      


 


2/15/18 06:00  74      


 


2/15/18 05:00  74      


 


2/15/18 04:00  74      


 


2/15/18 04:00 99.2 78 18 147/70 (95) 97   


 


2/15/18 03:00  76      


 


2/15/18 02:00  76      


 


2/15/18 01:00  78      


 


2/15/18 00:00  84      


 


2/15/18 00:00 99.2 78 18 147/70 (95) 97   


 


2/14/18 23:00  86      


 


2/14/18 22:00  88      


 


2/14/18 21:00  86      


 


2/14/18 20:00  86      


 


2/14/18 20:00 97.8 83 18 171/74 (106) 99   


 


2/14/18 19:29   18     


 


2/14/18 19:00  80      


 


2/14/18 18:00  76      


 


2/14/18 17:51     99   21


 


2/14/18 17:50 97.9 78 18 165/78 (107) 99   


 


2/14/18 17:00  76      


 


2/14/18 16:00  76      


 


2/14/18 15:02 98.6 68 18 147/77    


 


2/14/18 15:00  76      


 


2/14/18 14:00  72      


 


2/14/18 13:00  76      


 


2/14/18 12:31 98.2 75 18 155/75 (101) 100   


 


2/14/18 12:00  72      


 


2/14/18 11:00  73      


 


2/14/18 10:00  76      


 


2/14/18 09:00  78      














I/O      


 


 2/14/18 2/14/18 2/14/18 2/15/18 2/15/18 2/15/18





 07:00 15:00 23:00 07:00 15:00 23:00


 


Intake Total 480 ml 5 ml 1395 ml   


 


Output Total 400 ml  2500 ml   


 


Balance 80 ml 5 ml -1105 ml   


 


      


 


Intake Oral 480 ml  840 ml   


 


IV Total   105 ml   


 


Packed Cells   400 ml   


 


Blood Product IV Normal Saline Flush  5 ml 50 ml   


 


Output Urine Total 400 ml  500 ml   


 


Hemodialysis   2000 ml   


 


# Bowel Movements 0     








Physical Exam


GENERAL: Well-developed well-nourished.  In no acute distress.


NECK: No carotid bruits.  No JVD.  Tunnel dialysis catheter in place on right 

chest wall with no swelling or bleeding.


CARDIOVASCULAR: Regular rate and rhythm.  No murmur appreciated.


RESPIRATORY: No accessory muscle use. Clear to auscultation. Breath sounds 

equal bilaterally. 


MUSCULOSKELETAL: No clubbing or cyanosis. No edema. 


NEUROLOGICAL: Awake and alert. Normal speech.


Imaging





Last Impressions








Catheter Placement X-Ray 2/12/18 0800 Signed





Impressions: 





 Service Date/Time:  Monday, February 12, 2018 14:12 - CONCLUSION: 

Uncomplicated 





 conversion of a Vas-Cath to PermCath as above.     Ren Stubbs MD 


 


Upper Extremity Ultrasound 2/8/18 0000 Signed





Impressions: 





 Service Date/Time:  Thursday, February 8, 2018 21:19 - CONCLUSION:  Normal 





 examination.       Valdez Colunga MD 


 


Chest X-Ray 2/4/18 0810 Signed





Impressions: 





 Service Date/Time:  Sunday, February 4, 2018 08:50 - CONCLUSION:  Bibasilar 





 consolidation slightly worse in the right lower lobe.     Mp Dang MD 











Assessment and Plan


Problem List:  


(1) CAD (coronary artery disease)


ICD Codes:  I25.10 - Atherosclerotic heart disease of native coronary artery 

without angina pectoris


Status:  Chronic


(2) CHF (congestive heart failure)


ICD Codes:  I50.9 - Heart failure, unspecified


(3) Acute kidney injury superimposed on CKD


ICD Codes:  N17.9 - Acute kidney failure, unspecified; N18.9 - Chronic kidney 

disease, unspecified


Status:  Acute


Assessment and Plan


52 yo AAF recently discharged from this facility after prolonged 

hospitalization for chest pain, CAD, CHF, and CKD. Readmitted for progression 

of symptoms





CAD with angina: Severe 2 vessel disease to RCA and mid-LAD seen on Last 

admission. Echo 55-60%.  Nephrology plans on permanent dialysis, plan to 

proceed w/ [facility]. Will allow tunneled cath to heal and plan PCI RCA and LAD 

tentatively for Friday.  Continue aspirin, Plavix, Imdur, metoprolol, 

lisinopril.





CKD stage V/ESRD: Nephrology on board. Tunneled dialysis cath placed 2/12.





Anemia: Normocytic, likely chronic renal disease.  Received IV iron infusion 

and transfuse 1 unit PRBCs 2/14 to keep Hb >9.0 for cath.





Problem Qualifiers





(1) CHF (congestive heart failure):  


Qualified Codes:  I50.9 - Heart failure, unspecified








King Ervin Feb 15, 2018 08:11

## 2018-02-16 VITALS — DIASTOLIC BLOOD PRESSURE: 63 MMHG | HEART RATE: 96 BPM | SYSTOLIC BLOOD PRESSURE: 116 MMHG | OXYGEN SATURATION: 97 %

## 2018-02-16 VITALS
RESPIRATION RATE: 17 BRPM | DIASTOLIC BLOOD PRESSURE: 46 MMHG | SYSTOLIC BLOOD PRESSURE: 161 MMHG | HEART RATE: 100 BPM | TEMPERATURE: 99.9 F | OXYGEN SATURATION: 99 %

## 2018-02-16 VITALS
DIASTOLIC BLOOD PRESSURE: 78 MMHG | SYSTOLIC BLOOD PRESSURE: 161 MMHG | RESPIRATION RATE: 17 BRPM | TEMPERATURE: 98 F | HEART RATE: 82 BPM | OXYGEN SATURATION: 99 %

## 2018-02-16 VITALS
SYSTOLIC BLOOD PRESSURE: 155 MMHG | TEMPERATURE: 98.1 F | OXYGEN SATURATION: 99 % | HEART RATE: 81 BPM | RESPIRATION RATE: 16 BRPM | DIASTOLIC BLOOD PRESSURE: 75 MMHG

## 2018-02-16 VITALS
RESPIRATION RATE: 17 BRPM | OXYGEN SATURATION: 99 % | HEART RATE: 78 BPM | SYSTOLIC BLOOD PRESSURE: 144 MMHG | DIASTOLIC BLOOD PRESSURE: 77 MMHG

## 2018-02-16 VITALS — HEART RATE: 78 BPM

## 2018-02-16 VITALS
TEMPERATURE: 98 F | SYSTOLIC BLOOD PRESSURE: 159 MMHG | RESPIRATION RATE: 19 BRPM | DIASTOLIC BLOOD PRESSURE: 76 MMHG | OXYGEN SATURATION: 100 % | HEART RATE: 84 BPM

## 2018-02-16 VITALS
SYSTOLIC BLOOD PRESSURE: 161 MMHG | OXYGEN SATURATION: 99 % | RESPIRATION RATE: 17 BRPM | HEART RATE: 82 BPM | TEMPERATURE: 98 F | DIASTOLIC BLOOD PRESSURE: 78 MMHG

## 2018-02-16 VITALS
DIASTOLIC BLOOD PRESSURE: 71 MMHG | RESPIRATION RATE: 17 BRPM | OXYGEN SATURATION: 99 % | HEART RATE: 79 BPM | SYSTOLIC BLOOD PRESSURE: 144 MMHG

## 2018-02-16 VITALS — HEART RATE: 104 BPM

## 2018-02-16 VITALS — HEART RATE: 100 BPM

## 2018-02-16 VITALS
DIASTOLIC BLOOD PRESSURE: 79 MMHG | TEMPERATURE: 98.3 F | OXYGEN SATURATION: 99 % | RESPIRATION RATE: 16 BRPM | SYSTOLIC BLOOD PRESSURE: 156 MMHG | HEART RATE: 88 BPM

## 2018-02-16 VITALS — HEART RATE: 80 BPM

## 2018-02-16 VITALS — SYSTOLIC BLOOD PRESSURE: 151 MMHG | DIASTOLIC BLOOD PRESSURE: 74 MMHG | HEART RATE: 99 BPM | OXYGEN SATURATION: 98 %

## 2018-02-16 VITALS — HEART RATE: 82 BPM

## 2018-02-16 VITALS — HEART RATE: 83 BPM

## 2018-02-16 VITALS — HEART RATE: 84 BPM

## 2018-02-16 VITALS — OXYGEN SATURATION: 97 %

## 2018-02-16 VITALS — HEART RATE: 102 BPM

## 2018-02-16 VITALS — HEART RATE: 79 BPM

## 2018-02-16 PROCEDURE — 02713GZ DILATION OF CORONARY ARTERY, TWO ARTERIES WITH FOUR OR MORE INTRALUMINAL DEVICES, PERCUTANEOUS APPROACH: ICD-10-PCS | Performed by: INTERNAL MEDICINE

## 2018-02-16 PROCEDURE — 4A023N7 MEASUREMENT OF CARDIAC SAMPLING AND PRESSURE, LEFT HEART, PERCUTANEOUS APPROACH: ICD-10-PCS | Performed by: INTERNAL MEDICINE

## 2018-02-16 PROCEDURE — B2111ZZ FLUOROSCOPY OF MULTIPLE CORONARY ARTERIES USING LOW OSMOLAR CONTRAST: ICD-10-PCS | Performed by: INTERNAL MEDICINE

## 2018-02-16 RX ADMIN — MORPHINE SULFATE PRN MG: 2 INJECTION, SOLUTION INTRAMUSCULAR; INTRAVENOUS at 21:16

## 2018-02-16 RX ADMIN — SIMETHICONE SCH MG: 125 TABLET, CHEWABLE ORAL at 02:49

## 2018-02-16 RX ADMIN — PRAVASTATIN SODIUM SCH MG: 40 TABLET ORAL at 21:14

## 2018-02-16 RX ADMIN — STANDARDIZED SENNA CONCENTRATE AND DOCUSATE SODIUM SCH TAB: 8.6; 5 TABLET, FILM COATED ORAL at 21:15

## 2018-02-16 RX ADMIN — MORPHINE SULFATE PRN MG: 2 INJECTION, SOLUTION INTRAMUSCULAR; INTRAVENOUS at 09:10

## 2018-02-16 RX ADMIN — GENTAMICIN SULFATE PRN MG: 10 INJECTION, SOLUTION INTRAMUSCULAR; INTRAVENOUS at 16:08

## 2018-02-16 RX ADMIN — MORPHINE SULFATE PRN MG: 2 INJECTION, SOLUTION INTRAMUSCULAR; INTRAVENOUS at 17:15

## 2018-02-16 RX ADMIN — LISINOPRIL SCH MG: 20 TABLET ORAL at 08:57

## 2018-02-16 RX ADMIN — SIMETHICONE SCH MG: 125 TABLET, CHEWABLE ORAL at 18:37

## 2018-02-16 RX ADMIN — INSULIN ASPART SCH: 100 INJECTION, SOLUTION INTRAVENOUS; SUBCUTANEOUS at 12:50

## 2018-02-16 RX ADMIN — STANDARDIZED SENNA CONCENTRATE AND DOCUSATE SODIUM SCH TAB: 8.6; 5 TABLET, FILM COATED ORAL at 08:57

## 2018-02-16 RX ADMIN — Medication SCH ML: at 08:59

## 2018-02-16 RX ADMIN — CARVEDILOL SCH MG: 12.5 TABLET, FILM COATED ORAL at 08:58

## 2018-02-16 RX ADMIN — SIMETHICONE SCH MG: 125 TABLET, CHEWABLE ORAL at 21:15

## 2018-02-16 RX ADMIN — HEPARIN SODIUM PRN UNITS: 1000 INJECTION, SOLUTION INTRAVENOUS; SUBCUTANEOUS at 16:09

## 2018-02-16 RX ADMIN — MORPHINE SULFATE PRN MG: 2 INJECTION, SOLUTION INTRAMUSCULAR; INTRAVENOUS at 18:21

## 2018-02-16 RX ADMIN — INSULIN ASPART SCH: 100 INJECTION, SOLUTION INTRAVENOUS; SUBCUTANEOUS at 07:57

## 2018-02-16 RX ADMIN — INSULIN ASPART SCH: 100 INJECTION, SOLUTION INTRAVENOUS; SUBCUTANEOUS at 21:00

## 2018-02-16 RX ADMIN — ISOSORBIDE MONONITRATE SCH MG: 60 TABLET, EXTENDED RELEASE ORAL at 04:35

## 2018-02-16 RX ADMIN — CARVEDILOL SCH MG: 12.5 TABLET, FILM COATED ORAL at 21:14

## 2018-02-16 RX ADMIN — ASPIRIN 81 MG SCH MG: 81 TABLET ORAL at 08:57

## 2018-02-16 RX ADMIN — ACETAMINOPHEN PRN MG: 325 TABLET ORAL at 18:13

## 2018-02-16 RX ADMIN — LABETALOL HYDROCHLORIDE PRN MG: 5 INJECTION, SOLUTION INTRAVENOUS at 18:54

## 2018-02-16 RX ADMIN — Medication SCH ML: at 21:13

## 2018-02-16 RX ADMIN — EPOETIN ALFA PRN UNITS: 10000 SOLUTION INTRAVENOUS; SUBCUTANEOUS at 16:09

## 2018-02-16 RX ADMIN — CLOPIDOGREL BISULFATE SCH MG: 75 TABLET, FILM COATED ORAL at 08:58

## 2018-02-16 RX ADMIN — GABAPENTIN SCH MG: 300 CAPSULE ORAL at 21:14

## 2018-02-16 RX ADMIN — INSULIN ASPART SCH: 100 INJECTION, SOLUTION INTRAVENOUS; SUBCUTANEOUS at 19:00

## 2018-02-16 NOTE — RADRPT
EXAM DATE/TIME:  02/16/2018 17:44 

 

[facility] COMPARISON:     

CT ABDOMEN & PELVIS W/O CONTRAST, October 18, 2017, 21:09.

 

 

INDICATIONS :     

***Back pain following cardiac catheterization; rule out retroperitoneal bleed.

                  

 

ORAL CONTRAST:      

No oral contrast ingested.

                  

 

RADIATION DOSE:     

CTDIvol (mGy) 

 

 

MEDICAL HISTORY :     

Cardiovascular disease. Hypertension. Pancreatitis.Diabetes

 

SURGICAL HISTORY :      

Tubal ligation. Cholecystectomy.

 

ENCOUNTER:      

Initial

 

ACUITY:      

1 day

 

PAIN SCALE:      

8/10

 

LOCATION:         

abdomen

 

TECHNIQUE:     

Volumetric scanning of the abdomen and pelvis was performed.  Using automated exposure control and ad
justment of the mA and/or kV according to patient size, radiation dose was kept as low as reasonably 
achievable to obtain optimal diagnostic quality images.  DICOM format image data is available electro
nically for review and comparison.  

 

FINDINGS:     

Minimal dependent atelectasis in the lungs and trace pleural fluid. No acute findings in the liver, s
pleen, adrenals or pancreas. Residual contrast in the kidneys. Mild anasarca. Previous cholecystectom
y.

 

Numerous calcified fibroids in uterus noted similar to October 2017. High density material in the rig
ht Pelvis adjacent to bladder of uncertain etiology. This is a new finding since October 2017. No ret
roperitoneal hemorrhage identified.

 

CONCLUSION:     

1. No retroperitoneal hemorrhage identified. There is mild anasarca. Curvilinear high density materia
l is present in the right hemipelvis adjacent to the bladder of uncertain etiology.

2. Residual contrast in the bladder. Previous cholecystectomy.

 

 

 

 Valdez Colunga MD on February 16, 2018 at 18:01           

Board Certified Radiologist.

 This report was verified electronically.

## 2018-02-16 NOTE — CATHPROC
[facility] HIS Report

Study Information

Study Number    Admission           Scheduled Start              Study Start

 

[id number]    Feb 4 2018 11:25AM      02/16/2018 Feb 16 2018 11:12AM

 

Universal Service

 

Cardiac Catheterization

 

Admit Source               Facility Department

 

Emergency department           [facility] - Cath Lab

 

Physician and Clinical Staff

Initial Edgardo Azar          Circulator     Isatu Selby,RN

 

                         Circulator     Rosas Mccauley,RN

 

                         Recorder      Wilbert Ross,RT(R)

 

                         Scrub        Theron MelendezRT(R)

 

Procedures Performed

Procedure                     Location (Site)             Vessel Name

 

Coronary Angiograms                 LCA                  Left Coronary

Coronary Angiograms                 RCA                  Right Coronary

L Heart Cath

PTCA                       RCA Dist                Right Coronary

Stent                       LAD Mid                 Left Coronary

Stent                       RCA Dist                Right Coronary

Stent                       RCA Mid                 Right Coronary

Stent                       RCA Prox                Right Coronary

Wire insertion                   Fem Art (right)            Femoral Art

Equipment

Time         Description           Size       Mfg Part Number  Used/Scraped

                 COPILOT VALVE, BLEEDBACK             [id number]

11:19   [employer]                                   Used

                 CONTROL                     *[id number]

                                         [id number]

12:19   [employer]  STENT, 2.0 18 MINI-VISION RX  2.0 18               Used

                                         *[id number]

                 TRANSDUCER, TRUWAVE               [id number]

11:19   WILHELM HODGES                     *                 Used

                 W/STOCKCOCK                   *[id number]

                                         [id number]

12:13   [employer]   BALLOON, 2.0 30MM EMERGE MR   2.0 30MM              Used

                                         *[id number]



                                         [phone]



                                         *[id number]

                                         [id number]

12:30   [employer] ANGIOSEAL, FR6 VIP        FR 6                Used

                                         *[id number]

                                         [id number]

11:19   [employer]   PACK, CCL CUSTOM        *                 Used

                                         *[id number]

                                         [id number]

11:19   [employer]     PEN, SKIN DUAL W/ RULER     *                 Used

                                         *[id number]

                                         [id number]

12:23   [employer]       STENT, 2.25 22 INTEGRITY    2.25 22              Used

                                         *[id number]

                                         [id number]

12:25   [employer]       STENT, 2.5 26 INTEGRITY     2.5 26               Used

                                         *[id number]

                                         [id number]

12:04   [employer]       STENT, 3.0 18 INTEGRITY     3.0 18               Used

                                         *[id number]

                                         [id number]

12:06   [employer]     30 MAUREEN INDEFLATOR                         Used

                                         *[id number]

                                         [id number]

11:19   [employer]     SHEATH, FR6.5 PRELUDE 11CM   FR 6.5      038ACT      Used

                                         *[id number]

                                         [id number]

11:19   [employer]     WIRE, 3MMJ .035 180CM      180CM               Used

                                         *[id number]

                                         [id number]

11:19   [employer]         MANIFOLD, 4 PORT        *                 Used

                                         *[id number]

11:19   [employer]        OMNIPAQUE, 350 MG, 150ML    150ML      [id number]      Used

                                         [id number]

11:19   [employer]     BLANKET,WARM AIR CCL      *                 Used

                                         *[id number]

                 WIRE, RUNTHROUGH NS FLOPPY            

11:38   [employer]                     180CM               Used

                 .014 180CM                    *[id number]

 

Equipment Model, Serial, Lot Number and Expiration Data

Description           Model Number      Serial Number   Lot Number       Expiration Date

 

ANGIOSEAL, FR6 VIP                             [id number]        10-

 

STENT, 2.0 18 MINI-VISION RX   [id number]                 [id number]         05-

 

STENT, 2.25 22 INTEGRITY     [id number]                 [id number]       07-

 

STENT, 2.5 26 INTEGRITY     [id number]                 [id number]       07-

                                      [id number]

STENT, 3.0 18 INTEGRITY     [id number]                           10-

                                      432

History: Current Medications

Medication        Dosage/Unit      Route       Frequency     Last Date/Time Taken

 

ASA

 

VASOTEC

 

Fe Supplement

 

LANTUS

 

PLAVIX

 

Statins (any)

 

NORVASC

 

NTG SL

 

Imdur

 

LOPRESSOR

 

LASIX

 

 

History: Allergies

Allergy              Reaction

 

Shellfish             VOMITNG

 

shellfish derived         VOMITNG

 

 

History: Risk Factors

                     Family History of

Hypertension    Dyslipidemia               Previous MI    Previous Heart Failure

                     Premature CAD

Yes         Yes         No         No        Yes

Prior Valve

          Prior PCI      Prior PCIDate    Prior CABG

Surgery

No         Yes         02/01/2010     No

          Cerebrovascular   Peripheral Artery  Chronic Lung

On Dialysis                                  Diabetes   Diabetes Therapy

          Disease       Disease       Disease

Yes         No          No         No        Yes      Insulin

 

 

History: Risk Factors Selection Items

Current Smoker

 

 

History: Symptoms/Diagnosis Selection Items

Chest pain

 

SOB

 

 

History: CV Disease Selection Items

Known CAD

 

 

History: Stress Tests

Stress or Imaging Studies Performed

 

No

History: Other Disease Selection Items

CAD

 

CHF

 

HTN

 

Renal Failure-No Dialysis

 

 

History: Other

Current Smoker    Method      Packs a Day      Years Used       Pack Years

 

Yes          Cigarettes    1           30           30

 

Labs

Hgb (g/dl)      Hct (%)         RBC (MIL/MM3)    WBC (l/cumm)      Platelets (thousands)

 

11.60-17.00      35.00-51.00       4.00-5.90      4.00-11.00       150..00

 

10.1         29.5          2.4         8.1          323

 

Glucose (mg/dl)    BUN (mg/dl)       Creatinine (mg/dl)  BUN:Creatinine (1:x)

74..00     7.00-18.00       0.50-1.30      10.00-20.00

 

117          33           3.2         10.3

 

Na (meq/l)      K (meq/l)        Cl (meq/l)      CO2 (mmol/L)      Ca (mg/dl)

 

136..00     3.50-5.10        98..00     21.00-32.00      8.50-10.10

 

140          4            103         28.7          8.3

 

PT (sec)       PTT (sec)          INR (PTT:PT)

 

9.80-11.60      24.30-30.10         0.90-1.10

 

10.1         38             1

 

Troponin I (ng/ml)  CPK (u/l)        CPK-MB (ng/ML)

 

0.02-0.05       26..00      0.50-3.60

 

0.02         144           1.0

 

 

 

 

Medication

Medication Total Dose (Bolus/Oral)

Medication             Total Dosage/Unit

 

1% XYLOCAINE              20 mL

 

FENTANYL               100 mcg

 

HEPARIN               5000 units

 

VERSED                   2 mg

Medications (Bolus/Oral)

Medication           Time Given          Dosage/Unit       Administered By      Reason

 

VERSED             2/16/2018 11:55:37 AM      1 mg         Isatu Selby

1 mg VERSED given in lab by Isatu Selby, RN in Right Forearm via Peripheral IV.

 

FENTANYL            2/16/2018 11:55:45 AM     50 mcg         Isatu Selby

50 mcg FENTANYL given in lab by Isatu Selby RN in Right Forearm via Peripheral IV.

 

1% XYLOCAINE          2/16/2018 11:58:27 AM     20 mL         Edgardo Mercado

20 mL 1% XYLOCAINE given in lab by Edgardo Mercado in Right Groin via Subcutaneous.

 

VERSED             2/16/2018 12:00:18 PM      1 mg         Isatu Selby

1 mg VERSED given in lab by Isatu Selby, RN in Right Forearm via Peripheral IV.

 

FENTANYL            2/16/2018 12:00:21 PM     50 mcg         Isatu Selby

50 mcg FENTANYL given in lab by Isatu Selby, RN in Right Forearm via Peripheral IV.

 

HEPARIN             2/16/2018 12:01:01 PM    5000 units        Isatu Selby

5000 units HEPARIN given in lab by Isatu Selby, RN in Right Forearm via Peripheral IV.

 

Medication (Drip)

Medication           Time Given          Dosage/Unit       Concentration/Unit  Diluent (ml)   Solutio
n

 

IV Solutions          2/16/2018 11:13:11 AM      0 mL (IV)                 500       NaCl .9

IV Solutions given in lab by Rosas Mccauley, RN in Right Forearm via Peripheral IV. Pump/Drip Flow = 
20 ml/hr using NaCl .9.

 

 

Initial Case Assessment

Cardiovascular

HR             Rhythm            NIBP             Chest Pain

 

84             Sinus            172/83            0

 

Edema Present        Skin color             Skin

 

None            Normal               Warm Dry

 

Circulatory - Right Pulses

Dorsalis Pedis       Femoral

 

2              2

 

Scale (0,1,2,3,4,d)

 

Circulatory - Left Pulses

Dorsalis Pedis       Femoral

 

2              2

 

Scale (0,1,2,3,4,d)

 

Neurological State

              Oriented to time-place-

Alert                        Moves all extremities

              person

 

Respiration - General

SpO2 (%)          O2 (lpm)

 

100             0

Final Case Assessment

Cardiovascular

HR           Rhythm          NIBP          Chest Pain

 

82           Sinus           149/70         0

 

Edema Present      Skin color           Skin

 

None           Normal             Warm Dry

 

Circulatory - Right Pulses

Dorsalis Pedis     Femoral

 

2            2

 

Scale (0,1,2,3,4,d)

 

Circulatory - Left Pulses

Dorsalis Pedis     Femoral

 

2            2

 

Scale (0,1,2,3,4,d)

 

Neurological State

            Oriented to time-place-

Alert                      Moves all extremities

            person

 

Respiration - General

Respiration Rate

            SpO2 (%)         O2 (lpm)

(B/min)

18           99            0

 

Chronological Log

Time    Study Chronological Log

 

11:12:55  Patient arrived via Bed.

 

11:12:56  Patient Name, D.O.B, / Armband Verified By R.N.

 

11:12:57  Consent signed by the physician and the patient and verified by the Cath Lab staff.

 

11:12:57  Pre-op and post- op instructions given; patient acknowledges understanding of instructions.


 

11:12:58  Verbal Stimulation=2 Physical Stimulation=2 Airway=2 Respiration=2 TOTAL=8. (0=absent, 1=li
mited, 2=present)

 

11:12:59  Presedation assessment performed by Cath Lab RN.

 

11:13:01  Allens test performed on the right radial and ulnar artery.

 

11:13:03  Patient has been NPO for More than 6Hrs.

 

11:13:04  Skin Breakdown- none per patient.

 

11:13:06  Patient Warmer Placed on the Table.

 

11:13:06  Constance Prominences Protected

 

11:13:07  A # 20 IV was noted in the Forearm (right). Grade = 0

      IV Solutions given in lab by Rosas Mccauley, RN in Right Forearm via Peripheral IV. Pump/Drip F
low = 20 ml/hr using NaCl

11:13:11

      .9.

11:13:12  History and physical on the chart or being dictated.

      Vitals capture started with the following parameters, Patient=Adult, Interval=5 min, Initial Pr
eavhnu=995 mmHg,

11:25:14

      Deflation Rate=5 mmHg, Cuff placed on Right Ankle

      Assessment: Initial Case, HR=84 BPM, Rhythm=Sinus, LXMB=918/83 mmhg, Chest Pain=0, Edema=None,

      Color=Normal, Skin = Warm, Dry

      Right Pulses: Zeb Ped=2, Femoral=2

11:25:38

      Left Pulses: Zeb Ped=2, Femoral=2

      Neurological: State=Alert, Ox3, BLANCO

      Respiration: ScD3=250 %, O2=0 lpm

11:26:25  HR=84 bpm, AGYF=096/83 mmhg, SpO2=97.0 %, Resp=2 B/min, Pain=0, Mary=10, Driscoll=2

 

11:27:05  Reference ECG taken

 

11:30:30  Bilateral groins prepped with 2% chlorhexidine, and draped after a 3 minute waiting time.

 

11:30:55  HR=83 bpm, XPTK=045/80 mmhg, SpO2=98.0 %, Resp=12 B/min, Pain=0, Mary=10, Driscoll=2

 

11:35:41  MD paged

 

11:35:56  HR=93 bpm, QULR=123/86 mmhg, SpO2=98.0 %, Resp=8 B/min, Pain=0, Mary=10, Driscoll=2

 

11:36:27  Reference ECG taken

 

11:36:34  Pressure channel 1 zeroed.

 

11:40:57  HR=84 bpm, PZGR=180/87 mmhg, SpO2=97.0 %, Resp=13 B/min, Pain=0, Mary=10, Driscoll=2

 

11:45:58  HR=82 bpm, AXPT=147/84 mmhg, SpO2=98.0 %, Resp=13 B/min, Pain=0, Mary=10, Driscoll=2

 

11:50:57  HR=81 bpm, VFLK=379/84 mmhg, SpO2=97.0 %, Resp=3 B/min, Pain=0, Mary=10, Driscoll=2

 

11:51:27  MD arrived.

      Time Out. Correct patient, correct procedure, correct physician, power injector not loaded with
 contrast with surgical

11:54:36

      team present. Time Out Concurred by MD and individual staff in procedure.

11:54:49  Case Start

 

11:55:37  1 mg VERSED given in lab by Isatu Selby RN in Right Forearm via Peripheral IV.

 

11:55:45  50 mcg FENTANYL given in lab by Isatu Selby RN in Right Forearm via Peripheral IV.

 

11:55:56  HR=83 bpm, NBIT=094/88 mmhg, SpO2=98.0 %, Resp=10 B/min, Pain=0, Mary=10, Driscoll=2

 

11:55:57  Case Start

 

11:58:27  20 mL 1% XYLOCAINE given in lab by Edgardo Mercado in Right Groin via Subcutaneous.

 

11:58:40  Access site was Right Femoral Artery.

 

11:58:52  A SHEATH, FR6.5 PRELUDE 11CM FR 6.5 was advanced into the Fem Art (right) using the Percuta
neous technique.

      A XBLAD 3.5 GUIDE CATHETER FR 6 was advanced over a wire. OMNIPAQUE, 350 MG, 150ML 150ML was us
ed for

11:59:46

      injections.

12:00:18  1 mg VERSED given in lab by Isatu Selby RN in Right Forearm via Peripheral IV.

 

12:00:21  The  LCA was injected and visualized at various angles. OMNIPAQUE, 350 MG, 150ML 150ML used
.

 

12:00:21  50 mcg FENTANYL given in lab by Isatu Selby RN in Right Forearm via Peripheral IV.

 

12:01:01  HR=81 bpm, IHGG=431/79 mmhg, SpO2=96.0 %, Resp=7 B/min, Pain=0, Mary=10, Driscoll=2

 

12:01:01  5000 units HEPARIN given in lab by Isatu Selby RN in Right Forearm via Peripheral IV.

      Recorded Pressure: Ao, HR=82, Condition=Condition 1

12:02:16

      (Aorta) Ao 185/70/116

12:02:41  A WIRE, RUNTHROUGH NS FLOPPY .014 180CM 180CM was inserted via Fem Art (right).

 

12:03:45  Interventional wire has crossed the lesion

      An STENT, 3.0 18 INTEGRITY 3.0 18 Bare Metal Stent was inserted through a XBLAD 3.5 GUIDE SERGO
TER FR 6 over

12:04:21

      a WIRE, RUNTHROUGH NS FLOPPY .014 180CM 180CM.

      A STENT, 3.0 18 INTEGRITY 3.0 18 was deployed using a 30 MAUREEN INDEFLATOR at 15 atmospheres for 1
0 seconds in

12:05:17

      the LAD Mid.

12:05:56  Delivery device removed

 

12:05:58  HR=80 bpm, UCFM=287/69 mmhg, SpO2=90.0 %, Resp=10 B/min, Pain=0, Mary=10, Driscoll=2

 

12:07:01  Wire removed

      After removing the current catheter a JR 4.0 GUIDE CATHETER FR 8 was advanced over a WIRE, 3MMJ
 .035 180CM

12:07:08

      180CM.

      Recorded Pressure: LV, Ao, HR=85, Condition=Condition 1

12:08:35  (Left Ventricle) /-1/17,

      (Aorta) Ao 157/57/98

12:08:58  The  RCA was injected and visualized at various angles. OMNIPAQUE, 350 MG, 150ML 150ML used
.

 

12:09:24  A WIRE, RUNTHROUGH NS FLOPPY .014 180CM 180CM was inserted via Fem Art (right).

 

12:10:55  HR=83 bpm, LWJO=459/71 mmhg, SpO2=96.0 %, Resp=13 B/min, Pain=0, Mary=10, Driscoll=2

 

12:11:28  Activated Clotting Time Drawn

 

12:11:54  Interventional wire has crossed the lesion

      A BALLOON, 2.0 30MM EMERGE MR 2.0 30MM was inserted over WIRE, RUNTHROUGH NS FLOPPY .014 180CM 
180CM

12:13:02

      via the RCA Dist.

      A BALLOON, 2.0 30MM EMERGE MR 2.0 30MM over a WIRE, RUNTHROUGH NS FLOPPY .014 180CM 180CM in th
e RCA

12:13:29

      Dist was inflated using a 30 MAUREEN INDEFLATOR at 6 maureen for 20 sec.

      A BALLOON, 2.0 30MM EMERGE MR 2.0 30MM over a WIRE, RUNTHROUGH NS FLOPPY .014 180CM 180CM in th
e RCA

12:14:23

      Dist was inflated using a 30 MAUREEN INDEFLATOR at 6 maureen for 20 sec.

      A BALLOON, 2.0 30MM EMERGE MR 2.0 30MM over a WIRE, RUNTHROUGH NS FLOPPY .014 180CM 180CM in th
e RCA

12:15:05

      Dist was inflated using a 30 MAUREEN INDEFLATOR at 8 maureen for 20 sec.

      A BALLOON, 2.0 30MM EMERGE MR 2.0 30MM over a WIRE, RUNTHROUGH NS FLOPPY .014 180CM 180CM in th
e RCA

12:15:51

      Dist was inflated using a 30 MAUREEN INDEFLATOR at 8 maureen for 20 sec.

12:15:56  HR=83 bpm, GLAO=271/72 mmhg, SpO2=96.0 %, Resp=9 B/min, Pain=0, Mary=10, Driscoll=2

 

12:16:07  Balloon Removed.

 

12:16:17  ACT (Normal Range ) = 296

 

12:16:43  The  RCA was injected and visualized at various angles. OMNIPAQUE, 350 MG, 150ML 150ML used
.

      An STENT, 2.0 18 MINI-VISION RX 2.0 18 Bare Metal Stent was inserted through a JR 4.0 GUIDE CAT
HETER FR 8

12:19:28

      over a WIRE, RUNTHROUGH NS FLOPPY .014 180CM 180CM.

      A STENT, 2.0 18 MINI-VISION RX 2.0 18 was deployed using a 30 MAUREEN INDEFLATOR at 12 atmospheres 
for 15 seconds

12:20:11

      in the RCA Dist.

12:20:37  Delivery device removed

 

12:20:53  HR=81 bpm, AOMV=672/74 mmhg, SpO2=96.0 %, Resp=11 B/min, Pain=0, Mary=10, Driscoll=2

      An STENT, 2.25 22 INTEGRITY 2.25 22 Bare Metal Stent was inserted through a JR 4.0 GUIDE CATHET
ER FR 8 over a

12:24:10

      WIRE, RUNTHROUGH NS FLOPPY .014 180CM 180CM.

      A STENT, 2.25 22 INTEGRITY 2.25 22 was deployed using a 30 MAUREEN INDEFLATOR at 16 atmospheres for
 20 seconds in

12:24:12

      the RCA Mid.

12:25:03  Delivery device removed

      An STENT, 2.5 26 INTEGRITY 2.5 26 Bare Metal Stent was inserted through a JR 4.0 GUIDE CATHETER
 FR 8 over a

12:26:20

      WIRE, RUNTHROUGH NS FLOPPY .014 180CM 180CM.

12:26:33  HR=81 bpm, BTQS=889/69 mmhg, SpO2=96.0 %, Resp=11 B/min, Pain=0, Mary=10, Driscoll=2

      A STENT, 2.5 26 INTEGRITY 2.5 26 was deployed using a 30 MAUREEN INDEFLATOR at 16 atmospheres for 1
0 seconds in

12:26:43

      the RCA Prox.

12:27:02  Delivery device removed

 

12:27:52  The  RCA was injected and visualized at various angles. OMNIPAQUE, 350 MG, 150ML 150ML used
.

 

12:28:02  Wire removed

 

12:29:04  A WIRE, 3MMJ .035 180CM 180CM was inserted via Fem Art (right).

 

12:29:11  Catheter was removed

 

12:29:17  Wire removed

 

12:29:32  An injection in the Fem Art (right) was made through the SHEATH, FR6.5 PRELUDE 11CM FR 6.5.


 

12:30:18  ANGIOSEAL, FR6 VIP FR 6 placement in the Fem Art (right)

 

12:30:55  HR=82 bpm, KJYF=582/70 mmhg, SpO2=97.0 %, Resp=12 B/min, Pain=0, Mary=10, Driscoll=2

12:31:31   Case End

 

12:31:35   No case complications noted.

 

12:31:36   Cine recording checked.

       Assessment: Final Case, HR=82 BPM, Rhythm=Sinus, ANWO=094/70 mmhg, Chest Pain=0, Edema=None,

       Color=Normal, Skin = Warm, Dry

       Right Pulses: Zeb Ped=2, Femoral=2

12:32:32

       Left Pulses: Zeb Ped=2, Femoral=2

       Neurological: State=Alert, Ox3, BLANCO

       Respiration: Resp=18 B/min, SpO2=99 %, O2=0 lpm

12:33:53   Sterile dressing applied to site

 

12:34:18   Bedside Report will be given.

 

12:34:48   A Left Heart Cath was performed.

 

12:35:30   Vitals capture stopped.

 

12:39:59   Patient moved to stretcher

 

 

 

 

End Study - Contrast Media Used In Study

Contrast        Total Opened (mL)     Total Used (mL)     Total Wasted (mL)

 

Omnipaque       150            100           50

 

End Study - Maximum Contrast Load

Max Contrast Load (mL)

 

88.8

 

End Study - Radiation Exposure

Fluoro Time

(minutes)

7.7

 

 

End Study - Patient Disposition

Complications     Transferred To          Interventional Outcome

 

No           Telemetry Bed          successful

## 2018-02-16 NOTE — PD.CARD.PN
Subjective


Subjective Remarks


Reports she gets nausea shellfish, never had breathing problems or rash.  Was 

not premedicated for prior cath and had no adverse reactions.  No bleeding 

issues.





Objective


Medications





Current Medications








 Medications


  (Trade)  Dose


 Ordered  Sig/Luis Armando


 Route  Start Time


 Stop Time Status Last Admin


 


  (NS Flush)  2 ml  UNSCH  PRN


 IV FLUSH  2/4/18 11:30


    2/4/18 17:46


 


 


  (NS Flush)  2 ml  BID


 IV FLUSH  2/4/18 21:00


    2/15/18 21:20


 


 


  (Tylenol)  650 mg  Q4H  PRN


 PO  2/4/18 11:30


    2/4/18 15:33


 


 


  (Zofran Inj)  4 mg  Q6H  PRN


 IVP  2/4/18 11:30


    2/15/18 10:21


 


 


  (Restoril)  15 mg  HS  PRN


 PO  2/4/18 11:30


    2/15/18 21:19


 


 


  (Narcan Inj)  0.4 mg  UNSCH  PRN


 IV PUSH  2/4/18 11:30


     


 


 


  (Theresa-Colace)  1 tab  BID


 PO  2/4/18 21:00


    2/15/18 10:05


 


 


  (Milk Of


 Magnesia Liq)  30 ml  Q12H  PRN


 PO  2/4/18 11:30


    2/15/18 15:22


 


 


  (Senokot)  17.2 mg  Q12H  PRN


 PO  2/4/18 11:30


     


 


 


  (Dulcolax Supp)  10 mg  DAILY  PRN


 RECTAL  2/4/18 11:30


     


 


 


  (Lactulose Liq)  30 ml  DAILY  PRN


 PO  2/4/18 11:30


    2/15/18 11:16


 


 


  (Aspirin Chew)  81 mg  DAILY


 CHEW  2/5/18 09:00


   Future hold 2/15/18 10:04


 


 


  (Neurontin)  300 mg  HS


 PO  2/4/18 21:00


    2/15/18 21:19


 


 


  (Apresoline)  50 mg  Q8HR


 PO  2/4/18 14:00


    2/16/18 04:35


 


 


  (Imdur)  120 mg  DAILY@07


 PO  2/5/18 07:00


    2/16/18 04:35


 


 


  (Nitrostat Sl)  0.4 mg  Q5M  PRN


 SL  2/4/18 11:30


    2/7/18 10:32


 


 


  (Pravachol)  40 mg  HS


 PO  2/4/18 21:00


    2/15/18 21:19


 


 


  (Phazyme Chew)  125 mg  Q8HR


 PO  2/4/18 14:00


    2/15/18 21:19


 


 


  (Trandate Inj)  10 mg  Q20M  PRN


 IV PUSH  2/4/18 11:45


     


 


 


  (Apresoline Inj)  20 mg  Q4H  PRN


 IV PUSH  2/4/18 11:45


    2/14/18 01:26


 


 


  (Duoneb Neb)  1 ampule  Q4HR NEB  PRN


 NEB  2/4/18 16:15


    2/6/18 01:51


 


 


  (Ativan)  0.5 mg  Q8HR  PRN


 PO  2/4/18 16:15


    2/16/18 04:35


 


 


  (D50w (Vial) Inj)  50 ml  UNSCH  PRN


 IV PUSH  2/4/18 16:15


    2/5/18 04:20


 


 


  (Glucagon Inj)  1 mg  UNSCH  PRN


 OTHER  2/4/18 16:15


     


 


 


  (NovoLOG


 SUPPLEMENTAL


 SCALE)  1  ACHS SLIDING  SCALE


 SQ  2/4/18 17:00


    2/15/18 21:00


 


 


  (Catapres)  0.1 mg  Q12HR


 PO  2/5/18 10:00


    2/15/18 10:04


 


 


 Sodium Chloride  1,000 ml @ 


 0 mls/hr  Q0M PRN


 OTHER  2/7/18 10:20


     


 


 


  (Heparin Inj)  8,000 units  UNSCH  PRN


 IV FLUSH  2/7/18 10:30


     


 


 


 Sodium Chloride  1,000 ml @ 


 200 mls/hr  Q5H PRN


 IV  2/7/18 10:20


     


 


 


 Sodium Chloride  1,000 ml @ 


 0 mls/hr  Q0M PRN


 OTHER  2/7/18 10:20


     


 


 


  (Mannitol Inj)  12.5 gm  UNSCH  PRN


 IV  2/7/18 10:30


     


 


 


 Albumin Human  100 ml @ 


 60 mls/hr  UNSCH  PRN


 IV  2/7/18 10:30


     


 


 


  (NS Flush)  5 ml  UNSCH  PRN


 IV FLUSH  2/7/18 10:30


     


 


 


  (Heparin Inj)    UNSCH  PRN


 .XX  2/7/18 10:30


    2/12/18 12:46


 


 


  (Gentamicin Inj)  20 mg  UNSCH  PRN


 OTHER  2/7/18 10:30


    2/9/18 16:50


 


 


  (Zofran Inj)  4 mg  UNSCH  PRN


 IV PUSH  2/7/18 10:30


     


 


 


  (Tylenol)  650 mg  UNSCH  PRN


 PO  2/7/18 10:30


    2/9/18 19:50


 


 


  (Benadryl)  25 mg  UNSCH  PRN


 PO  2/7/18 10:30


    2/12/18 06:06


 


 


  (Nitrostat Sl)  0.4 mg  UNSCH  PRN


 SL  2/7/18 10:30


     


 


 


  (Catapres)  0.1 mg  UNSCH  PRN


 PO  2/7/18 10:30


    2/14/18 18:31


 


 


  (Epogen Inj)  10,000 units  UNSCH  PRN


 IV PUSH  2/7/18 10:30


    2/14/18 15:59


 


 


  (Gelfoam 12 Mm/7


 Mm Top)  1 foam  UNSCH  PRN


 TOP  2/7/18 10:30


     


 


 


 Nitroglycerin/


 Dextrose  250 ml @ 


 1.5 mls/hr  TITRATE  PRN


 IV  2/7/18 12:00


    2/10/18 07:00


 


 


  (NS Flush)    UNSCH  PRN


 IV FLUSH  2/12/18 14:45


     


 


 


  (Heparin Inj)    UNSCH  PRN


 IV FLUSH  2/12/18 14:45


     


 


 


  (Morphine Inj)  2 mg  Q3HR  PRN


 IV PUSH  2/12/18 17:45


    2/15/18 21:20


 


 


 Iron Sucrose 100


 mg/Sodium Chloride  105 ml @ 


 105 mls/hr  DAILY


 IV  2/13/18 11:00


 2/22/18 09:59  2/15/18 10:04


 


 


  (Prinivil)  40 mg  DAILY


 PO  2/13/18 09:00


    2/15/18 10:05


 


 


  (Skin Test


 Result)  1  Q24H


 .XX  2/14/18 18:00


 2/16/18 18:01  2/15/18 16:57


 


 


  (Plavix)  75 mg  DAILY


 PO  2/14/18 09:00


    2/15/18 10:06


 


 


  (Levemir Inj)  10 units  HS


 SQ  2/14/18 21:00


    2/15/18 21:00


 


 


  (Coreg)  25 mg  Q12HR


 PO  2/14/18 21:00


    2/15/18 21:19


 








Vital Signs / I&O





Vital Signs








  Date Time  Temp Pulse Resp B/P (MAP) Pulse Ox O2 Delivery O2 Flow Rate FiO2


 


2/16/18 07:46 98.0 84 19 159/76 (103) 100   


 


2/16/18 04:00  88      


 


2/16/18 04:00 98.3 84 16 156/79 (104) 99   


 


2/16/18 00:00  79      


 


2/16/18 00:00 98.1 81 16 155/75 (101) 99   


 


2/15/18 20:00 98.0 79 18 119/70 (86) 100   


 


2/15/18 20:00  77      


 


2/15/18 18:19  79      


 


2/15/18 17:06  79      


 


2/15/18 16:35  78      


 


2/15/18 16:00  78      


 


2/15/18 15:12 98.0 72 19 128/71 (90) 98   


 


2/15/18 15:00  82      


 


2/15/18 14:00  78      


 


2/15/18 13:00  76      


 


2/15/18 12:00  77      


 


2/15/18 11:06 98.0 79 20 110/60 (77) 98   


 


2/15/18 11:00  79      


 


2/15/18 10:36     98   21


 


2/15/18 10:00  80      


 


2/15/18 09:00  86      














I/O      


 


 2/15/18 2/15/18 2/15/18 2/16/18 2/16/18 2/16/18





 07:00 15:00 23:00 07:00 15:00 23:00


 


Intake Total 480 ml  600 ml 440 ml  


 


Output Total 900 ml  600 ml 475 ml  


 


Balance -420 ml  0 ml -35 ml  


 


      


 


Intake Oral 480 ml  600 ml 440 ml  


 


Output Urine Total 900 ml  600 ml 475 ml  


 


# Bowel Movements 0  1 2  








Physical Exam


GENERAL: Well-developed well-nourished.  In no acute distress.


NECK: No carotid bruits.  No JVD.  Tunnel dialysis catheter in place on right 

chest wall.


CARDIOVASCULAR: Regular rate and rhythm.  No murmur appreciated.


RESPIRATORY: No accessory muscle use. Clear to auscultation. Breath sounds 

equal bilaterally. 


MUSCULOSKELETAL: No clubbing or cyanosis. No edema. 


NEUROLOGICAL: Awake and alert. Normal speech.


Laboratory





Laboratory Tests








Test


  2/15/18


11:35


 


Hemoglobin 10.1 GM/DL 


 


Hematocrit 29.5 % 








Imaging





Last Impressions








Catheter Placement X-Ray 2/12/18 0800 Signed





Impressions: 





 Service Date/Time:  Monday, February 12, 2018 14:12 - CONCLUSION: 

Uncomplicated 





 conversion of a Vas-Cath to PermCath as above.     Ren Stubbs MD 


 


Upper Extremity Ultrasound 2/8/18 0000 Signed





Impressions: 





 Service Date/Time:  Thursday, February 8, 2018 21:19 - CONCLUSION:  Normal 





 examination.       Valdez Colunga MD 


 


Chest X-Ray 2/4/18 0810 Signed





Impressions: 





 Service Date/Time:  Sunday, February 4, 2018 08:50 - CONCLUSION:  Bibasilar 





 consolidation slightly worse in the right lower lobe.     Mp Dang MD 











Assessment and Plan


Problem List:  


(1) CAD (coronary artery disease)


ICD Codes:  I25.10 - Atherosclerotic heart disease of native coronary artery 

without angina pectoris


Status:  Chronic


(2) CHF (congestive heart failure)


ICD Codes:  I50.9 - Heart failure, unspecified


(3) Acute kidney injury superimposed on CKD


ICD Codes:  N17.9 - Acute kidney failure, unspecified; N18.9 - Chronic kidney 

disease, unspecified


Status:  Acute


Assessment and Plan


54 yo AAF recently discharged from this facility after prolonged 

hospitalization for chest pain, CAD, CHF, and CKD. Readmitted for progression 

of symptoms





CAD with angina: Severe 2 vessel disease to RCA and mid-LAD seen on Last 

admission. Echo 55-60%.  Nephrology plans on permanent dialysis, plan to 

proceed w/ LHC today w/ PCI RCA and LAD.  Continue aspirin, Plavix, Imdur, 

metoprolol, lisinopril.





CKD stage V/ESRD: Nephrology on board. Tunneled dialysis cath placed 2/12.





Anemia: Normocytic, likely chronic renal disease.  Received IV iron infusion 

and transfuse 1 unit PRBCs 2/14, hemoglobin improved at 10.1.





Problem Qualifiers





(1) CHF (congestive heart failure):  


Qualified Codes:  I50.9 - Heart failure, unspecified








King Ervin Feb 16, 2018 08:04

## 2018-02-16 NOTE — MA
cc:

MILTON GENTILE

****

 

 

DATE

12/16/2018

 

INDICATION

Non-ST-elevation MI.

 

PROCEDURE PERFORMED

1.   Fluoroscopy with interpretation

2. Left heart catheterization

3. Coronary angiography

4. Percutaneous intervention with bare metal stents to the mid left anterior

     descending and right coronary arteries.

 

METHOD

The risks, benefits and alternatives discussed with the patient, the patient

understood and consented to the procedure.

 

PROCEDURE

The patient brought into the catheterization lab, placed on the

catheterization table.  The right groin prepped and draped in a sterile

fashion.  The right groin was anesthetized with 2% lidocaine.  The right common

femoral is cannulated and a 6-Citizen of [place] 11-cm sheath was placed without

difficulty.

 

Intraventricular hemodynamics:  126/15 mmHg.

 

CORONARY ANGIOGRAPHY

1. Left main coronary artery has minor luminal irregularities.

2. Left anterior descending coronary artery in the mid segment has 75%

     eccentric stenosis.  The remainder of the vessel has minor luminal

     irregularities out to the apex which also has a stenosis present.

3. Left circumflex has minor luminal irregularities.

4. Right coronary artery is diffusely diseased.  There is a stent in the mid

     segment which has mild in-stent restenosis. The proximal, mid and distal

     segment have 90% stenosis.  The posterior descending and posterolateral

     branches have minor luminal irregularities.

 

PERCUTANEOUS INTERVENTION

The left main coronary artery was selectively engaged with a 6-[place]izen [place] XB LAD

3.5 guide catheter.  A 0.014 inch 180 cm Terumo run-through wire was navigated

down the distal left anterior descending coronary without difficulty.  A 3.0 x

18 mm RX bare metal Integrity stent was deployed in the mid left anterior

descending coronary artery without difficulty.  Repeat angiography showed no

residual stenosis, NIKKO-III flow.

 

Attention was then directed towards the right coronary artery.  The right groin

was selectively engaged with a 6-[place]izen [place] JR-4 guide catheter.  A Terumo

run-through wire was navigated down the distal posterior descending branch.  A

2.0 x 30 mm RX balloon was advanced down and deployed on three sequential

inflations from  distal to proximal to 8 atmospheres.  Repeat angiography still

showed severe residual stenosis.  A 2.0 x 18 mm RX Integrity bare metal stent

was advanced down to the distal right coronary and deployed.  A 2.25 x 22 mm RX

bare metal Integrity stent was deployed to the mid right coronary and deployed.

A 2.5 x 26 mm RX Integrity bare metal stent was deployed in the proximal right

coronary artery.  Repeat angiography showed no residual stenosis and NIKKO-III

flow.  Guide catheter was removed.  Right common femoral artery was visualized

and widely patent.  The sheath was removed with a 6-[place]izen [place] Angio-Seal deployed

with good hemostasis.

 

CONCLUSIONS

1. Severe two-vessel native coronary artery disease.

2. Successful percutaneous intervention with bare metal stents to the proximal

     mid and distal right coronary artery and mid left anterior descending

     coronary artery.

 

PLAN

The patient will be monitored closely for any procedural  complications.  She

will have dialysis later today.  If she does well, she can be discharged from a

cardiovascular perspective tomorrow.  There are still coordinating outpatient

dialysis.

 

 

 

 

 

                              _________________________________

                              MD TANG Contreras/NOLVIA

D:  2/16/2018/12:37 PM

T:  2/16/2018/12:44 PM

Visit #:  [id number]

Job #:  [id number]

## 2018-02-16 NOTE — HHI.NPPN
Subjective


Complaints:  Shortness of Breath


General Problems:  Anemia


Renal Failure:  Chronic, Acute, Stage IV


Interval History


Seen immediately after cardiac cath. She had four stents placed. Currently 

sleepy. 


 (Migdalia Camejo)





Review of Systems


General


Constitutional:  Fatigue


 (Migdalia Camejo)





Objective Data


Data





Vital Signs








  Date Time  Temp Pulse Resp B/P (MAP) Pulse Ox O2 Delivery O2 Flow Rate FiO2


 


2/16/18 10:00  80      


 


2/16/18 09:01     97   21


 


2/16/18 09:00  82      


 


2/16/18 07:46 98.0 84 19 159/76 (103) 100   


 


2/16/18 06:49  79      


 


2/16/18 04:00  88      


 


2/16/18 04:00 98.3 84 16 156/79 (104) 99   


 


2/16/18 00:00  79      


 


2/16/18 00:00 98.1 81 16 155/75 (101) 99   


 


2/15/18 20:00 98.0 79 18 119/70 (86) 100   


 


2/15/18 20:00  77      


 


2/15/18 18:19  79      


 


2/15/18 17:06  79      


 


2/15/18 16:35  78      


 


2/15/18 16:00  78      


 


2/15/18 15:12 98.0 72 19 128/71 (90) 98   


 


2/15/18 15:00  82      


 


2/15/18 14:00  78      








 (Migdalia Camejo)


-:  


2/15/18 1135                                                                   

             2/14/18 0540





Tubes & Lines:  Perma-Cath


 (Migdalia Camejo)





Physical Exam


General


Appearance:  Well Developed, Well Nourished, No Acute Distress, Comfortable


 (Migdalia Camejo)





Eyes


Eye Exam:  Pupils Equal, Pupils Reactive


 (Migdalia Camejo)





Throat


Throat Exam:  Oral Mucosa Pink & Moist


 (Migdalia Camejo)





Neck


Neck Remarks


tenderness  at vascath site. 


 (Migdalia Camejo)





Pulmonary


Resp Exam:  Breath Sounds Equal, No Distress, Decreased Bases


 (Migdalia Camejo)





Cardiology


CV Exam:  Regular, Normal Sinus Rhythm, Good Perfusion


 (Migdalia Camejo)





Gastrointestinal/Abdomen


GI Exam:  Soft, Non-Tender, Bowel Sounds Present, Positive Bowel Movement


 (Migdalia Camejo)





Musculoskeletal


MS Exam:  Joints Intact, Normal Gait, Normal Tone, Good Strength


 (Migdalia Camejo)





Integumentary


Skin Exam:  Clear, Warm, Dry, Intact


 (Migdalia Camejo)





Extremeties


Extremities Exam:  No Edema, Pedal Pulses Palpable


 (Migdalia Camejo)





Neurologic


Neuro Exam:  Alert, Awake, Oriented, Speech Clear, Moving All Extremities


 (Migdalia Camejo)





Psychiatric


Psych Exam:  Appropriate Responses


 (Migdalia Camejo)





Assessment/Plan


Discussed Condition With:  Patient, Relative


Assessment Summary:  Anemia of CKD, Hypertension, Diabetes Mellitus, CKD Stage V

, End Stage Renal Disease


Problem List:  


(1) Acute kidney injury superimposed on CKD


ICD Codes:  N17.9 - Acute kidney failure, unspecified; N18.9 - Chronic kidney 

disease, unspecified


Status:  Acute


Plan:  She has reached CKD 5/ESRD. On HD MWF.


Permcath placed 2/12. On HD MWF


We will dialyze today after the cath. 


Repeat labs daily. 


Avoid IVF administration. 


She still makes urine but the volume may be decreasing. 


 Pending AVF placement, hopefully this admission . Vascular following. 


Repeat labs daily. 





Case management is assisting with Medicare application is appreciated. She will 

be enrolled in the transitional program at [facility], potentially as early as next 

week. 





(2) CAD (coronary artery disease)


ICD Codes:  I25.10 - Atherosclerotic heart disease of native coronary artery 

without angina pectoris


Status:  Chronic


Plan:  RCA with 3 stents, LAD one stent


On Plavix and  ASA





(3) CHF (congestive heart failure)


ICD Codes:  I50.9 - Heart failure, unspecified


Plan:  Minimal fluid removal with HD. 


Continue PO lasix.


Follow fluid status


Fluid restriction discussed with the patient. 





(4) HTN (hypertension)


ICD Codes:  I10 - Hypertension


Status:  Chronic


Plan:  BP acceptable. 


On Imdur 120 mg daily


Norvasc 10 mg daily


Metoprolol 100 mg BID


Hydralazine 50 mg Q8h


Clonidine 0.1 mg BID


Continue to monitor





(5) DM (diabetes mellitus)


ICD Codes:  E11.9 - Type 2 diabetes mellitus without complications


Plan:  Monitor blood glucose, maintain 140-180 mg/dL.





(6) Anemia


ICD Codes:  D64.9 - Anemia, unspecified


Status:  Chronic


Plan:  Epogen with dialysis. 


On Venofer for iron deficiency. 


s/p blood transfusion 2/14. 


 (Migdalia Camejo)


Plan


patient was seen and examined. Agree with above assessment and plan. Seen 

during dialysis today. 


 (Kelton Whiteside MD)





Problem Qualifiers





(1) CHF (congestive heart failure):  


Qualified Codes:  I50.9 - Heart failure, unspecified


(2) DM (diabetes mellitus):  





(3) Anemia:  


Qualified Codes:  D64.9 - Anemia, unspecified








Migdalia Camejo Feb 16, 2018 13:46


Kelton Whiteside MD Feb 16, 2018 15:31

## 2018-02-17 VITALS — HEART RATE: 70 BPM

## 2018-02-17 VITALS — HEART RATE: 81 BPM

## 2018-02-17 VITALS — HEART RATE: 89 BPM

## 2018-02-17 VITALS
OXYGEN SATURATION: 99 % | SYSTOLIC BLOOD PRESSURE: 122 MMHG | DIASTOLIC BLOOD PRESSURE: 68 MMHG | RESPIRATION RATE: 16 BRPM | TEMPERATURE: 98.7 F | HEART RATE: 70 BPM

## 2018-02-17 VITALS — HEART RATE: 83 BPM

## 2018-02-17 VITALS
DIASTOLIC BLOOD PRESSURE: 66 MMHG | TEMPERATURE: 99.3 F | HEART RATE: 86 BPM | SYSTOLIC BLOOD PRESSURE: 132 MMHG | OXYGEN SATURATION: 100 %

## 2018-02-17 VITALS
OXYGEN SATURATION: 99 % | TEMPERATURE: 99.8 F | RESPIRATION RATE: 17 BRPM | DIASTOLIC BLOOD PRESSURE: 55 MMHG | HEART RATE: 92 BPM | SYSTOLIC BLOOD PRESSURE: 105 MMHG

## 2018-02-17 VITALS — HEART RATE: 79 BPM

## 2018-02-17 VITALS
RESPIRATION RATE: 18 BRPM | DIASTOLIC BLOOD PRESSURE: 70 MMHG | HEART RATE: 83 BPM | OXYGEN SATURATION: 100 % | SYSTOLIC BLOOD PRESSURE: 139 MMHG

## 2018-02-17 VITALS
HEART RATE: 89 BPM | DIASTOLIC BLOOD PRESSURE: 53 MMHG | OXYGEN SATURATION: 99 % | SYSTOLIC BLOOD PRESSURE: 106 MMHG | TEMPERATURE: 98.6 F | RESPIRATION RATE: 16 BRPM

## 2018-02-17 VITALS — HEART RATE: 92 BPM

## 2018-02-17 VITALS
OXYGEN SATURATION: 99 % | SYSTOLIC BLOOD PRESSURE: 148 MMHG | RESPIRATION RATE: 17 BRPM | DIASTOLIC BLOOD PRESSURE: 73 MMHG | HEART RATE: 85 BPM

## 2018-02-17 VITALS — HEART RATE: 88 BPM

## 2018-02-17 VITALS — HEART RATE: 78 BPM

## 2018-02-17 VITALS
SYSTOLIC BLOOD PRESSURE: 121 MMHG | OXYGEN SATURATION: 99 % | DIASTOLIC BLOOD PRESSURE: 73 MMHG | RESPIRATION RATE: 18 BRPM | HEART RATE: 84 BPM | TEMPERATURE: 98.9 F

## 2018-02-17 VITALS
OXYGEN SATURATION: 99 % | SYSTOLIC BLOOD PRESSURE: 128 MMHG | DIASTOLIC BLOOD PRESSURE: 65 MMHG | RESPIRATION RATE: 17 BRPM | HEART RATE: 86 BPM

## 2018-02-17 VITALS — HEART RATE: 80 BPM

## 2018-02-17 VITALS — HEART RATE: 94 BPM

## 2018-02-17 VITALS — HEART RATE: 85 BPM

## 2018-02-17 VITALS — HEART RATE: 84 BPM

## 2018-02-17 VITALS — HEART RATE: 90 BPM

## 2018-02-17 VITALS — HEART RATE: 82 BPM

## 2018-02-17 LAB
BASOPHILS # BLD AUTO: 0.1 TH/MM3 (ref 0–0.2)
BASOPHILS NFR BLD: 0.8 % (ref 0–2)
BUN SERPL-MCNC: 14 MG/DL (ref 7–18)
CALCIUM SERPL-MCNC: 8.3 MG/DL (ref 8.5–10.1)
CHLORIDE SERPL-SCNC: 100 MEQ/L (ref 98–107)
CHOLEST SERPL-MCNC: 143 MG/DL (ref 120–200)
CHOLESTEROL/ HDL RATIO: 2.69 RATIO
CREAT SERPL-MCNC: 3.03 MG/DL (ref 0.5–1)
EOSINOPHIL # BLD: 0.2 TH/MM3 (ref 0–0.4)
EOSINOPHIL NFR BLD: 2.6 % (ref 0–4)
ERYTHROCYTE [DISTWIDTH] IN BLOOD BY AUTOMATED COUNT: 17.6 % (ref 11.6–17.2)
GFR SERPLBLD BASED ON 1.73 SQ M-ARVRAT: 20 ML/MIN (ref 89–?)
GLUCOSE SERPL-MCNC: 141 MG/DL (ref 74–106)
HCO3 BLD-SCNC: 29 MEQ/L (ref 21–32)
HCT VFR BLD CALC: 32 % (ref 35–46)
HDLC SERPL-MCNC: 53.1 MG/DL (ref 40–60)
HGB BLD-MCNC: 10.6 GM/DL (ref 11.6–15.3)
LDLC SERPL-MCNC: 60 MG/DL (ref 0–99)
LYMPHOCYTES # BLD AUTO: 1.5 TH/MM3 (ref 1–4.8)
LYMPHOCYTES NFR BLD AUTO: 20.1 % (ref 9–44)
MCH RBC QN AUTO: 32.5 PG (ref 27–34)
MCHC RBC AUTO-ENTMCNC: 33.1 % (ref 32–36)
MCV RBC AUTO: 98.1 FL (ref 80–100)
MONOCYTE #: 0.8 TH/MM3 (ref 0–0.9)
MONOCYTES NFR BLD: 11.4 % (ref 0–8)
NEUTROPHILS # BLD AUTO: 4.8 TH/MM3 (ref 1.8–7.7)
NEUTROPHILS NFR BLD AUTO: 65.1 % (ref 16–70)
PHOSPHATE SERPL-MCNC: 3.9 MG/DL (ref 2.5–4.9)
PLATELET # BLD: 274 TH/MM3 (ref 150–450)
PMV BLD AUTO: 7.6 FL (ref 7–11)
RBC # BLD AUTO: 3.26 MIL/MM3 (ref 4–5.3)
SODIUM SERPL-SCNC: 137 MEQ/L (ref 136–145)
TRIGL SERPL-MCNC: 152 MG/DL (ref 42–150)
WBC # BLD AUTO: 7.4 TH/MM3 (ref 4–11)

## 2018-02-17 RX ADMIN — SODIUM CHLORIDE SCH MLS/HR: 900 INJECTION, SOLUTION INTRAVENOUS at 09:00

## 2018-02-17 RX ADMIN — SIMETHICONE SCH MG: 125 TABLET, CHEWABLE ORAL at 17:49

## 2018-02-17 RX ADMIN — CARVEDILOL SCH MG: 12.5 TABLET, FILM COATED ORAL at 21:11

## 2018-02-17 RX ADMIN — OXYCODONE HYDROCHLORIDE AND ACETAMINOPHEN PRN TAB: 5; 325 TABLET ORAL at 05:37

## 2018-02-17 RX ADMIN — STANDARDIZED SENNA CONCENTRATE AND DOCUSATE SODIUM SCH TAB: 8.6; 5 TABLET, FILM COATED ORAL at 21:11

## 2018-02-17 RX ADMIN — OXYCODONE HYDROCHLORIDE AND ACETAMINOPHEN PRN TAB: 5; 325 TABLET ORAL at 00:51

## 2018-02-17 RX ADMIN — OXYCODONE HYDROCHLORIDE AND ACETAMINOPHEN PRN TAB: 5; 325 TABLET ORAL at 21:19

## 2018-02-17 RX ADMIN — ASPIRIN 81 MG SCH MG: 81 TABLET ORAL at 09:42

## 2018-02-17 RX ADMIN — SIMETHICONE SCH MG: 125 TABLET, CHEWABLE ORAL at 05:35

## 2018-02-17 RX ADMIN — INSULIN ASPART SCH: 100 INJECTION, SOLUTION INTRAVENOUS; SUBCUTANEOUS at 08:00

## 2018-02-17 RX ADMIN — STANDARDIZED SENNA CONCENTRATE AND DOCUSATE SODIUM SCH TAB: 8.6; 5 TABLET, FILM COATED ORAL at 09:41

## 2018-02-17 RX ADMIN — PRAVASTATIN SODIUM SCH MG: 40 TABLET ORAL at 21:12

## 2018-02-17 RX ADMIN — SIMETHICONE SCH MG: 125 TABLET, CHEWABLE ORAL at 21:12

## 2018-02-17 RX ADMIN — INSULIN ASPART SCH: 100 INJECTION, SOLUTION INTRAVENOUS; SUBCUTANEOUS at 12:00

## 2018-02-17 RX ADMIN — INSULIN ASPART SCH: 100 INJECTION, SOLUTION INTRAVENOUS; SUBCUTANEOUS at 17:00

## 2018-02-17 RX ADMIN — ISOSORBIDE MONONITRATE SCH MG: 60 TABLET, EXTENDED RELEASE ORAL at 05:37

## 2018-02-17 RX ADMIN — CARVEDILOL SCH MG: 12.5 TABLET, FILM COATED ORAL at 09:41

## 2018-02-17 RX ADMIN — ACYCLOVIR SCH UNITS: 800 TABLET ORAL at 21:11

## 2018-02-17 RX ADMIN — ACYCLOVIR SCH UNITS: 800 TABLET ORAL at 00:51

## 2018-02-17 RX ADMIN — INSULIN ASPART SCH: 100 INJECTION, SOLUTION INTRAVENOUS; SUBCUTANEOUS at 21:11

## 2018-02-17 RX ADMIN — CLOPIDOGREL BISULFATE SCH MG: 75 TABLET, FILM COATED ORAL at 09:42

## 2018-02-17 RX ADMIN — Medication SCH ML: at 09:00

## 2018-02-17 RX ADMIN — MORPHINE SULFATE PRN MG: 2 INJECTION, SOLUTION INTRAMUSCULAR; INTRAVENOUS at 12:33

## 2018-02-17 RX ADMIN — Medication SCH ML: at 21:00

## 2018-02-17 RX ADMIN — TEMAZEPAM PRN MG: 15 CAPSULE ORAL at 21:19

## 2018-02-17 RX ADMIN — TEMAZEPAM PRN MG: 15 CAPSULE ORAL at 00:50

## 2018-02-17 RX ADMIN — GABAPENTIN SCH MG: 300 CAPSULE ORAL at 21:12

## 2018-02-17 RX ADMIN — LISINOPRIL SCH MG: 20 TABLET ORAL at 09:43

## 2018-02-17 NOTE — HHI.PR
Subjective


Remarks


Still complaining of pain in her groin


[facility] following, nephrology requested a fistula


No acute issue overnight





Objective


Vitals





Vital Signs








  Date Time  Temp Pulse Resp B/P (MAP) Pulse Ox O2 Delivery O2 Flow Rate FiO2


 


2/17/18 14:00  79      


 


2/17/18 13:00  81      


 


2/17/18 12:00  84      


 


2/17/18 11:00  70      


 


2/17/18 11:00 98.6 89 16 106/53 (70) 99   


 


2/17/18 10:00  80      


 


2/17/18 09:00  78      


 


2/17/18 08:00  78      


 


2/17/18 07:03 98.9 84 18 121/73 (89) 99   


 


2/17/18 07:00  70      


 


2/17/18 06:00  83      


 


2/17/18 05:00  81      


 


2/17/18 04:00  81      


 


2/17/18 03:34  83 18 139/70 (93) 100   


 


2/17/18 03:00  82      


 


2/17/18 02:00  83      


 


2/17/18 01:30  85 17 148/73 (98) 99   


 


2/17/18 01:00  86      


 


2/17/18 01:00  86 17 128/65 (86) 99   


 


2/17/18 00:00 99.8 92 17 105/55 (72) 99   


 


2/17/18 00:00  92      


 


2/16/18 23:28  96  116/63 (80) 97   


 


2/16/18 23:01  99  151/74 (99) 98   


 


2/16/18 23:01  98  151/74 (99)    


 


2/16/18 23:00  100      


 


2/16/18 22:00  100      


 


2/16/18 21:40 99.9 100 17 161/46 (84) 99   


 


2/16/18 21:00  100      


 


2/16/18 20:00  104      


 


2/16/18 19:00  102      














I/O      


 


 2/16/18 2/16/18 2/16/18 2/17/18 2/17/18 2/17/18





 07:00 15:00 23:00 07:00 15:00 23:00


 


Intake Total 440 ml  240 ml 480 ml  


 


Output Total 475 ml  2500 ml 500 ml  


 


Balance -35 ml  -2260 ml -20 ml  


 


      


 


Intake Oral 440 ml  240 ml 480 ml  


 


Output Urine Total 475 ml  500 ml 500 ml  


 


Hemodialysis   2000 ml   


 


# Bowel Movements 2  1   








Result Diagram:  


2/17/18 0502                                                                   

             2/17/18 0502





Objective Remarks


GENERAL: This is a well-nourished, well-developed patient, in no apparent 

distress.


CARDIOVASCULAR: Regular rate and rhythm without murmurs, gallops, or rubs. 


RESPIRATORY: Clear to auscultation. Breath sounds equal bilaterally. No wheezes

, rales, or rhonchi.  


GASTROINTESTINAL: Abdomen soft, non-tender, nondistended. Normal active bowel 

sounds


MUSCULOSKELETAL: Extremities without clubbing, cyanosis, or edema.


NEURO:  Alert & Oriented x4 to person, place, time, situation.  Moves all ext x4


Procedures


perma-cath placement.





A/P


Assessment and Plan


2/16: CT scan of the abdomen has been done to rule out retroperitoneal bleeding 

after procedure, continue current care discussed with a nurse blood pressure 

increased to 196/89 we will add hydralazine





2/17: CT abdomen was negative for retroperitoneal bleeding, [facility] did not think 

patient is candidate for AV fistula, appreciate nephrology and cardiology will 

continue following with the











A/P:


Acute on Chronic diastolic CHF


Cardiomyopathy 


Continue imdur


Continue HD


Continue beta blocker





Acute on chronic Stage IV CKD


Nephrology following


Avoiding nephrotoxins


s/p perma-cath placement.


Dialysis per nephrology.





Chest pain


Recent  STEMI


Cardiology following


on aspirin and plavix


continue BB and Imdur.


Plan for cardiac cath on Friday.





recent GI bleed-resolved


Hemoglobin has remained stable since


Gastritis found on GI workup


Continue PPI





Hypertension


Continue Lopressor


Continue hydralazine


continue Lisinopril





Diabetes mellitus type 2


continue Levemir


Follow blood sugars


Insulin sliding scale


Diabetic diet


will monitor and adjust the regimen as needed.





anemia of chronic disease


 receiving iron infusion/ epogen with HD


 will monitor H/H.





Tobacco abuse


Cessation recommended


Discharge Planning


on HD- cardiac work-up in progress.











Jesus Manuel Pham MD Feb 17, 2018 16:53

## 2018-02-17 NOTE — HHI.NPPN
Subjective


Complaints:  Shortness of Breath


General Problems:  Anemia


Renal Failure:  Chronic, Acute, Stage IV





Review of Systems


General


Constitutional:  Fatigue





Objective Data


Data





Vital Signs








  Date Time  Temp Pulse Resp B/P (MAP) Pulse Ox O2 Delivery O2 Flow Rate FiO2


 


2/17/18 14:00  79      


 


2/17/18 13:00  81      


 


2/17/18 12:00  84      


 


2/17/18 11:00 98.6 89 16 106/53 (70) 99   


 


2/17/18 10:00  80      


 


2/17/18 09:00  78      


 


2/17/18 08:00  78      


 


2/17/18 07:03 98.9 84 18 121/73 (89) 99   


 


2/17/18 07:00  70      


 


2/17/18 06:00  83      


 


2/17/18 05:00  81      


 


2/17/18 04:00  81      


 


2/17/18 03:34  83 18 139/70 (93) 100   


 


2/17/18 03:00  82      


 


2/17/18 02:00  83      


 


2/17/18 01:30  85 17 148/73 (98) 99   


 


2/17/18 01:00  86      


 


2/17/18 01:00  86 17 128/65 (86) 99   


 


2/17/18 00:00 99.8 92 17 105/55 (72) 99   


 


2/17/18 00:00  92      


 


2/16/18 23:28  96  116/63 (80) 97   


 


2/16/18 23:01  99  151/74 (99) 98   


 


2/16/18 23:01  98  151/74 (99)    


 


2/16/18 23:00  100      


 


2/16/18 22:00  100      


 


2/16/18 21:40 99.9 100 17 161/46 (84) 99   


 


2/16/18 21:00  100      


 


2/16/18 20:00  104      


 


2/16/18 19:00  102      


 


2/16/18 16:00  83      








-:  


2/17/18 0502                                                                   

             2/17/18 0502





Tubes & Lines:  Perma-Cath





Physical Exam


General


Appearance:  Well Developed, Well Nourished, No Acute Distress, Comfortable





Eyes


Eye Exam:  Pupils Equal, Pupils Reactive





Throat


Throat Exam:  Oral Mucosa Bartolo & Moist





Pulmonary


Resp Exam:  Breath Sounds Equal, No Distress, Decreased Bases





Cardiology


CV Exam:  Regular, Normal Sinus Rhythm, Good Perfusion





Gastrointestinal/Abdomen


GI Exam:  Soft, Non-Tender, Bowel Sounds Present, Positive Bowel Movement





Musculoskeletal


MS Exam:  Joints Intact, Normal Gait, Normal Tone, Good Strength





Integumentary


Skin Exam:  Clear, Warm, Dry, Intact





Extremeties


Extremities Exam:  No Edema, Pedal Pulses Palpable





Neurologic


Neuro Exam:  Alert, Awake, Oriented, Speech Clear, Moving All Extremities





Psychiatric


Psych Exam:  Appropriate Responses





Assessment/Plan


Discussed Condition With:  Patient, Relative


Assessment Summary:  Anemia of CKD, Hypertension, Diabetes Mellitus, CKD Stage V

, End Stage Renal Disease


Problem List:  


(1) Acute kidney injury superimposed on CKD


ICD Codes:  N17.9 - Acute kidney failure, unspecified; N18.9 - Chronic kidney 

disease, unspecified


Status:  Acute


Plan:  She has reached CKD 5/ESRD. On HD MWF.


Permcath placed 2/12. On HD MWF


HD yesterday 2 L UF


Successful percutaneous intervention with bare metal stents to the proximal


mid and distal right coronary artery and mid left anterior descending


coronary artery.








Avoid IVF administration. 


She still makes urine but the volume may be decreasing. 


 Pending AVF placement, hopefully this admission . Vascular following. 


Repeat labs. 





Case management is assisting with Medicare application is appreciated. She will 

be enrolled in the transitional program at [facility] [place], potentially as early as next 

week. 





(2) CAD (coronary artery disease)


ICD Codes:  I25.10 - Atherosclerotic heart disease of native coronary artery 

without angina pectoris


Status:  Chronic


Plan:  RCA with 3 stents, LAD one stent


On Plavix and  ASA





(3) CHF (congestive heart failure)


ICD Codes:  I50.9 - Heart failure, unspecified


Plan:  Minimal fluid removal with HD. 


Continue PO lasix.


Follow fluid status


Fluid restriction discussed with the patient. 





(4) HTN (hypertension)


ICD Codes:  I10 - Hypertension


Status:  Chronic


Plan:  BP acceptable. 


On Imdur 120 mg daily


Norvasc 10 mg daily


Metoprolol 100 mg BID


Hydralazine 50 mg Q8h


Clonidine 0.1 mg BID


Continue to monitor





(5) DM (diabetes mellitus)


ICD Codes:  E11.9 - Type 2 diabetes mellitus without complications


Plan:  Monitor blood glucose, maintain 140-180 mg/dL.





(6) Anemia


ICD Codes:  D64.9 - Anemia, unspecified


Status:  Chronic


Plan:  Epogen with dialysis. 


On Venofer for iron deficiency. 


s/p blood transfusion 2/14. 








Problem Qualifiers





(1) CHF (congestive heart failure):  


Qualified Codes:  I50.9 - Heart failure, unspecified


(2) DM (diabetes mellitus):  





(3) Anemia:  


Qualified Codes:  D64.9 - Anemia, unspecified








Valentin García MD Feb 17, 2018 16:02

## 2018-02-18 VITALS
SYSTOLIC BLOOD PRESSURE: 154 MMHG | OXYGEN SATURATION: 100 % | DIASTOLIC BLOOD PRESSURE: 78 MMHG | TEMPERATURE: 99.4 F | HEART RATE: 88 BPM

## 2018-02-18 VITALS
HEART RATE: 84 BPM | TEMPERATURE: 98.2 F | SYSTOLIC BLOOD PRESSURE: 118 MMHG | DIASTOLIC BLOOD PRESSURE: 69 MMHG | RESPIRATION RATE: 18 BRPM | OXYGEN SATURATION: 100 %

## 2018-02-18 VITALS
OXYGEN SATURATION: 99 % | SYSTOLIC BLOOD PRESSURE: 187 MMHG | TEMPERATURE: 97.8 F | DIASTOLIC BLOOD PRESSURE: 85 MMHG | HEART RATE: 70 BPM | RESPIRATION RATE: 18 BRPM

## 2018-02-18 VITALS — HEART RATE: 82 BPM

## 2018-02-18 VITALS — HEART RATE: 80 BPM

## 2018-02-18 VITALS
OXYGEN SATURATION: 98 % | TEMPERATURE: 97.7 F | DIASTOLIC BLOOD PRESSURE: 80 MMHG | SYSTOLIC BLOOD PRESSURE: 165 MMHG | HEART RATE: 81 BPM | RESPIRATION RATE: 16 BRPM

## 2018-02-18 VITALS
DIASTOLIC BLOOD PRESSURE: 79 MMHG | TEMPERATURE: 98.8 F | HEART RATE: 84 BPM | SYSTOLIC BLOOD PRESSURE: 163 MMHG | OXYGEN SATURATION: 100 %

## 2018-02-18 VITALS — DIASTOLIC BLOOD PRESSURE: 69 MMHG | SYSTOLIC BLOOD PRESSURE: 146 MMHG

## 2018-02-18 VITALS
SYSTOLIC BLOOD PRESSURE: 141 MMHG | HEART RATE: 86 BPM | TEMPERATURE: 97.7 F | RESPIRATION RATE: 16 BRPM | DIASTOLIC BLOOD PRESSURE: 73 MMHG | OXYGEN SATURATION: 100 %

## 2018-02-18 VITALS — HEART RATE: 88 BPM

## 2018-02-18 VITALS — HEART RATE: 78 BPM

## 2018-02-18 VITALS — HEART RATE: 76 BPM

## 2018-02-18 VITALS — HEART RATE: 86 BPM

## 2018-02-18 VITALS — HEART RATE: 84 BPM

## 2018-02-18 VITALS — HEART RATE: 90 BPM

## 2018-02-18 VITALS — HEART RATE: 83 BPM

## 2018-02-18 VITALS — HEART RATE: 81 BPM

## 2018-02-18 RX ADMIN — STANDARDIZED SENNA CONCENTRATE AND DOCUSATE SODIUM SCH TAB: 8.6; 5 TABLET, FILM COATED ORAL at 09:56

## 2018-02-18 RX ADMIN — ASPIRIN 81 MG SCH MG: 81 TABLET ORAL at 09:56

## 2018-02-18 RX ADMIN — SIMETHICONE SCH MG: 125 TABLET, CHEWABLE ORAL at 16:24

## 2018-02-18 RX ADMIN — MORPHINE SULFATE PRN MG: 2 INJECTION, SOLUTION INTRAMUSCULAR; INTRAVENOUS at 03:11

## 2018-02-18 RX ADMIN — Medication SCH ML: at 21:13

## 2018-02-18 RX ADMIN — PRAVASTATIN SODIUM SCH MG: 40 TABLET ORAL at 21:12

## 2018-02-18 RX ADMIN — STANDARDIZED SENNA CONCENTRATE AND DOCUSATE SODIUM SCH TAB: 8.6; 5 TABLET, FILM COATED ORAL at 21:12

## 2018-02-18 RX ADMIN — SODIUM CHLORIDE SCH MLS/HR: 900 INJECTION, SOLUTION INTRAVENOUS at 09:00

## 2018-02-18 RX ADMIN — GABAPENTIN SCH MG: 300 CAPSULE ORAL at 21:12

## 2018-02-18 RX ADMIN — MORPHINE SULFATE PRN MG: 2 INJECTION, SOLUTION INTRAMUSCULAR; INTRAVENOUS at 11:11

## 2018-02-18 RX ADMIN — SIMETHICONE SCH MG: 125 TABLET, CHEWABLE ORAL at 21:12

## 2018-02-18 RX ADMIN — CLOPIDOGREL BISULFATE SCH MG: 75 TABLET, FILM COATED ORAL at 09:57

## 2018-02-18 RX ADMIN — SIMETHICONE SCH MG: 125 TABLET, CHEWABLE ORAL at 05:39

## 2018-02-18 RX ADMIN — INSULIN ASPART SCH: 100 INJECTION, SOLUTION INTRAVENOUS; SUBCUTANEOUS at 08:00

## 2018-02-18 RX ADMIN — INSULIN ASPART SCH: 100 INJECTION, SOLUTION INTRAVENOUS; SUBCUTANEOUS at 17:00

## 2018-02-18 RX ADMIN — Medication SCH ML: at 09:57

## 2018-02-18 RX ADMIN — CARVEDILOL SCH MG: 12.5 TABLET, FILM COATED ORAL at 21:12

## 2018-02-18 RX ADMIN — ISOSORBIDE MONONITRATE SCH MG: 60 TABLET, EXTENDED RELEASE ORAL at 05:39

## 2018-02-18 RX ADMIN — OXYCODONE HYDROCHLORIDE AND ACETAMINOPHEN PRN TAB: 5; 325 TABLET ORAL at 11:28

## 2018-02-18 RX ADMIN — INSULIN ASPART SCH: 100 INJECTION, SOLUTION INTRAVENOUS; SUBCUTANEOUS at 12:00

## 2018-02-18 RX ADMIN — ACYCLOVIR SCH UNITS: 800 TABLET ORAL at 21:13

## 2018-02-18 RX ADMIN — CARVEDILOL SCH MG: 12.5 TABLET, FILM COATED ORAL at 10:13

## 2018-02-18 RX ADMIN — MAGNESIUM HYDROXIDE PRN ML: 400 SUSPENSION ORAL at 18:00

## 2018-02-18 RX ADMIN — LISINOPRIL SCH MG: 20 TABLET ORAL at 09:57

## 2018-02-18 RX ADMIN — INSULIN ASPART SCH: 100 INJECTION, SOLUTION INTRAVENOUS; SUBCUTANEOUS at 21:13

## 2018-02-18 NOTE — HHI.NPPN
Subjective


Complaints:  Shortness of Breath


General Problems:  Anemia


Renal Failure:  Chronic, Acute, Stage IV





Review of Systems


General


Constitutional:  Fatigue





Objective Data


Data





Vital Signs








  Date Time  Temp Pulse Resp B/P (MAP) Pulse Ox O2 Delivery O2 Flow Rate FiO2


 


2/18/18 07:00  70      


 


2/18/18 07:00 97.7 81 16 165/80 (108) 98   


 


2/18/18 06:25  82      


 


2/18/18 04:09  82      


 


2/18/18 03:46 98.8 84  163/79 (107) 100   


 


2/18/18 03:00  83      


 


2/18/18 01:00  90      


 


2/18/18 00:17 99.4 88  154/78 (103) 100   


 


2/18/18 00:00  86      


 


2/17/18 23:00  90      


 


2/17/18 22:00  92      


 


2/17/18 21:32        21


 


2/17/18 21:00  88      


 


2/17/18 20:00  86      


 


2/17/18 20:00 99.3 86  132/66 (88) 100   


 


2/17/18 19:00  85      


 


2/17/18 18:00  94      


 


2/17/18 17:00  79      


 


2/17/18 16:00  89      


 


2/17/18 15:00 98.7  16 122/68 (86) 99   


 


2/17/18 15:00  70      


 


2/17/18 14:00  79      








-:  


2/17/18 0502                                                                   

             2/17/18 0502





Tubes & Lines:  Perma-Cath





Physical Exam


General


Appearance:  Well Developed, Well Nourished, No Acute Distress, Comfortable





Eyes


Eye Exam:  Pupils Equal, Pupils Reactive





Throat


Throat Exam:  Oral Mucosa Zeigler & Moist





Pulmonary


Resp Exam:  Breath Sounds Equal, No Distress, Decreased Bases





Cardiology


CV Exam:  Regular, Normal Sinus Rhythm, Good Perfusion





Gastrointestinal/Abdomen


GI Exam:  Soft, Non-Tender, Bowel Sounds Present, Positive Bowel Movement





Musculoskeletal


MS Exam:  Joints Intact, Normal Gait, Normal Tone, Good Strength





Integumentary


Skin Exam:  Clear, Warm, Dry, Intact





Extremeties


Extremities Exam:  No Edema, Pedal Pulses Palpable





Neurologic


Neuro Exam:  Alert, Awake, Oriented, Speech Clear, Moving All Extremities





Psychiatric


Psych Exam:  Appropriate Responses





Assessment/Plan


Discussed Condition With:  Patient, Relative


Assessment Summary:  Anemia of CKD, Hypertension, Diabetes Mellitus, CKD Stage V

, End Stage Renal Disease


Problem List:  


(1) Acute kidney injury superimposed on CKD


ICD Codes:  N17.9 - Acute kidney failure, unspecified; N18.9 - Chronic kidney 

disease, unspecified


Status:  Acute


Plan:  She has reached CKD 5/ESRD. On HD MWF.


Permcath placed 2/12. On HD MWF


HD Friday 2 L UF


Successful percutaneous intervention with bare metal stents to the proximal


mid and distal right coronary artery and mid left anterior descending


coronary artery.








Avoid IVF administration. 


She still makes urine but the volume may be decreasing. 


 Pending AVF placement, hopefully this admission . Vascular following. 


Repeat labs. 





Case management is assisting with Medicare application is appreciated. [place] 

[place] [facility] [place] 

[place]. 


Dr. Whiteside to follow





(2) CAD (coronary artery disease)


ICD Codes:  I25.10 - Atherosclerotic heart disease of native coronary artery 

without angina pectoris


Status:  Chronic


Plan:  RCA with 3 stents, LAD one stent


On Plavix and  ASA





(3) CHF (congestive heart failure)


ICD Codes:  I50.9 - Heart failure, unspecified


Plan:  Minimal fluid removal with HD. 


Continue PO lasix.


Follow fluid status


Fluid restriction discussed with the patient. 





(4) HTN (hypertension)


ICD Codes:  I10 - Hypertension


Status:  Chronic


Plan:  BP acceptable. 


On Imdur 120 mg daily


Norvasc 10 mg daily


Metoprolol 100 mg BID


Hydralazine 50 mg Q8h


Clonidine 0.1 mg BID


Continue to monitor





(5) DM (diabetes mellitus)


ICD Codes:  E11.9 - Type 2 diabetes mellitus without complications


Plan:  Monitor blood glucose, maintain 140-180 mg/dL.





(6) Anemia


ICD Codes:  D64.9 - Anemia, unspecified


Status:  Chronic


Plan:  Epogen with dialysis. 


On Venofer for iron deficiency. 


s/p blood transfusion 2/14. 








Problem Qualifiers





(1) CHF (congestive heart failure):  


Qualified Codes:  I50.9 - Heart failure, unspecified


(2) DM (diabetes mellitus):  





(3) Anemia:  


Qualified Codes:  D64.9 - Anemia, unspecified








Valentin García MD Feb 18, 2018 13:36

## 2018-02-18 NOTE — HHI.PR
Subjective


Remarks


Patient complaining of pain 10 out of 10 no bowel movement she reported eating 

without problem today blood pressure improved from 185/ 1/73 after 

getting her medication





Objective


Vitals





Vital Signs








  Date Time  Temp Pulse Resp B/P (MAP) Pulse Ox O2 Delivery O2 Flow Rate FiO2


 


2/18/18 22:13    146/69 (94)    


 


2/18/18 20:00 98.2 84 18 118/69 (85) 100   


 


2/18/18 20:00      Room Air  


 


2/18/18 18:00  84      


 


2/18/18 17:00  81      


 


2/18/18 15:00 97.7 86 16 141/73 (95) 100   


 


2/18/18 15:00  86      


 


2/18/18 14:00  80      


 


2/18/18 13:00  84      


 


2/18/18 12:00  86      


 


2/18/18 11:00 97.8 82 18 187/85 (119) 99   


 


2/18/18 11:00  70      


 


2/18/18 10:00  88      


 


2/18/18 09:00  82      


 


2/18/18 08:00  82      


 


2/18/18 07:00  70      


 


2/18/18 07:00 97.7 81 16 165/80 (108) 98   


 


2/18/18 06:25  82      


 


2/18/18 04:09  82      


 


2/18/18 03:46 98.8 84  163/79 (107) 100   


 


2/18/18 03:00  83      


 


2/18/18 01:00  90      


 


2/18/18 00:17 99.4 88  154/78 (103) 100   


 


2/18/18 00:00  86      














I/O      


 


 2/18/18 2/18/18 2/18/18 2/19/18 2/19/18 2/19/18





 07:00 15:00 23:00 07:00 15:00 23:00


 


Intake Total 360 ml  840 ml   


 


Output Total 300 ml  300 ml   


 


Balance 60 ml  540 ml   


 


      


 


Intake Oral 360 ml  840 ml   


 


Output Urine Total 300 ml  300 ml   








Result Diagram:  


2/17/18 0502                                                                   

             2/17/18 0502





Objective Remarks


GENERAL: This is a well-nourished, well-developed patient, in no apparent 

distress.


CARDIOVASCULAR: Regular rate and rhythm without murmurs, gallops, or rubs. 


RESPIRATORY: Clear to auscultation. Breath sounds equal bilaterally. No wheezes

, rales, or rhonchi.  


GASTROINTESTINAL: Abdomen soft, non-tender, nondistended. Normal active bowel 

sounds


MUSCULOSKELETAL: Extremities without clubbing, cyanosis, or edema.


NEURO:  Alert & Oriented x4 to person, place, time, situation.  Moves all ext x4


Procedures


perma-cath placement.





A/P


Assessment and Plan


2/16: CT scan of the abdomen has been done to rule out retroperitoneal bleeding 

after procedure, continue current care discussed with a nurse blood pressure 

increased to 196/89 we will add hydralazine





2/17: CT abdomen was negative for retroperitoneal bleeding, [facility] did not think 

patient is candidate for AV fistula, appreciate nephrology and cardiology will 

continue following with the





2/18: Continue pain management, continue monitoring and controlling blood 

pressure, appreciate cardiology and nephrology follow-up





A/P:


Acute on Chronic diastolic CHF


Cardiomyopathy 


Continue imdur


Continue HD


Continue beta blocker





Acute on chronic Stage IV CKD


Nephrology following


Avoiding nephrotoxins


s/p perma-cath placement.


Dialysis per nephrology.





Chest pain


Recent  STEMI


Cardiology following


on aspirin and plavix


continue BB and Imdur.


Plan for cardiac cath on Friday.





recent GI bleed-resolved


Hemoglobin has remained stable since


Gastritis found on GI workup


Continue PPI





Hypertension


Continue Lopressor


Continue hydralazine


continue Lisinopril





Diabetes mellitus type 2


continue Levemir


Follow blood sugars


Insulin sliding scale


Diabetic diet


will monitor and adjust the regimen as needed.





anemia of chronic disease


 receiving iron infusion/ epogen with HD


 will monitor H/H.





Tobacco abuse


Cessation recommended


Discharge Planning


on HD- cardiac work-up in progress.











Jesus Manuel Pham MD Feb 18, 2018 23:49

## 2018-02-19 VITALS — HEART RATE: 80 BPM

## 2018-02-19 VITALS — HEART RATE: 84 BPM

## 2018-02-19 VITALS
DIASTOLIC BLOOD PRESSURE: 74 MMHG | SYSTOLIC BLOOD PRESSURE: 167 MMHG | RESPIRATION RATE: 16 BRPM | OXYGEN SATURATION: 99 % | TEMPERATURE: 98.9 F | HEART RATE: 93 BPM

## 2018-02-19 VITALS
OXYGEN SATURATION: 99 % | HEART RATE: 82 BPM | TEMPERATURE: 98.6 F | RESPIRATION RATE: 17 BRPM | DIASTOLIC BLOOD PRESSURE: 76 MMHG | SYSTOLIC BLOOD PRESSURE: 141 MMHG

## 2018-02-19 VITALS
DIASTOLIC BLOOD PRESSURE: 82 MMHG | SYSTOLIC BLOOD PRESSURE: 153 MMHG | TEMPERATURE: 98.6 F | RESPIRATION RATE: 18 BRPM | OXYGEN SATURATION: 100 % | HEART RATE: 80 BPM

## 2018-02-19 VITALS
DIASTOLIC BLOOD PRESSURE: 84 MMHG | OXYGEN SATURATION: 98 % | RESPIRATION RATE: 14 BRPM | SYSTOLIC BLOOD PRESSURE: 161 MMHG | TEMPERATURE: 98.8 F | HEART RATE: 82 BPM

## 2018-02-19 VITALS
HEART RATE: 79 BPM | SYSTOLIC BLOOD PRESSURE: 130 MMHG | OXYGEN SATURATION: 100 % | DIASTOLIC BLOOD PRESSURE: 73 MMHG | RESPIRATION RATE: 16 BRPM

## 2018-02-19 VITALS — HEART RATE: 83 BPM

## 2018-02-19 VITALS — DIASTOLIC BLOOD PRESSURE: 73 MMHG | SYSTOLIC BLOOD PRESSURE: 149 MMHG

## 2018-02-19 VITALS — HEART RATE: 90 BPM

## 2018-02-19 VITALS
SYSTOLIC BLOOD PRESSURE: 130 MMHG | HEART RATE: 78 BPM | TEMPERATURE: 97.8 F | RESPIRATION RATE: 16 BRPM | DIASTOLIC BLOOD PRESSURE: 73 MMHG | OXYGEN SATURATION: 100 %

## 2018-02-19 VITALS — HEART RATE: 82 BPM

## 2018-02-19 VITALS — HEART RATE: 96 BPM

## 2018-02-19 VITALS — HEART RATE: 86 BPM

## 2018-02-19 VITALS — OXYGEN SATURATION: 96 %

## 2018-02-19 VITALS — HEART RATE: 88 BPM

## 2018-02-19 VITALS — SYSTOLIC BLOOD PRESSURE: 157 MMHG | DIASTOLIC BLOOD PRESSURE: 75 MMHG

## 2018-02-19 LAB
ALBUMIN SERPL-MCNC: 2.9 GM/DL (ref 3.4–5)
BUN SERPL-MCNC: 36 MG/DL (ref 7–18)
CALCIUM SERPL-MCNC: 8.2 MG/DL (ref 8.5–10.1)
CHLORIDE SERPL-SCNC: 92 MEQ/L (ref 98–107)
CREAT SERPL-MCNC: 6.52 MG/DL (ref 0.5–1)
GFR SERPLBLD BASED ON 1.73 SQ M-ARVRAT: 8 ML/MIN (ref 89–?)
GLUCOSE SERPL-MCNC: 222 MG/DL (ref 74–106)
HCO3 BLD-SCNC: 29.8 MEQ/L (ref 21–32)
PHOSPHATE SERPL-MCNC: 7.5 MG/DL (ref 2.5–4.9)
SODIUM SERPL-SCNC: 131 MEQ/L (ref 136–145)

## 2018-02-19 RX ADMIN — MORPHINE SULFATE PRN MG: 2 INJECTION, SOLUTION INTRAMUSCULAR; INTRAVENOUS at 02:02

## 2018-02-19 RX ADMIN — CARVEDILOL SCH MG: 12.5 TABLET, FILM COATED ORAL at 23:44

## 2018-02-19 RX ADMIN — CARVEDILOL SCH MG: 12.5 TABLET, FILM COATED ORAL at 09:37

## 2018-02-19 RX ADMIN — TEMAZEPAM PRN MG: 15 CAPSULE ORAL at 23:45

## 2018-02-19 RX ADMIN — SODIUM CHLORIDE SCH MLS/HR: 900 INJECTION, SOLUTION INTRAVENOUS at 09:00

## 2018-02-19 RX ADMIN — SIMETHICONE SCH MG: 125 TABLET, CHEWABLE ORAL at 23:43

## 2018-02-19 RX ADMIN — INSULIN ASPART SCH: 100 INJECTION, SOLUTION INTRAVENOUS; SUBCUTANEOUS at 17:00

## 2018-02-19 RX ADMIN — INSULIN ASPART SCH: 100 INJECTION, SOLUTION INTRAVENOUS; SUBCUTANEOUS at 23:49

## 2018-02-19 RX ADMIN — ISOSORBIDE MONONITRATE SCH MG: 60 TABLET, EXTENDED RELEASE ORAL at 06:27

## 2018-02-19 RX ADMIN — EPOETIN ALFA PRN UNITS: 10000 SOLUTION INTRAVENOUS; SUBCUTANEOUS at 19:32

## 2018-02-19 RX ADMIN — STANDARDIZED SENNA CONCENTRATE AND DOCUSATE SODIUM SCH TAB: 8.6; 5 TABLET, FILM COATED ORAL at 23:44

## 2018-02-19 RX ADMIN — CLOPIDOGREL BISULFATE SCH MG: 75 TABLET, FILM COATED ORAL at 09:31

## 2018-02-19 RX ADMIN — GABAPENTIN SCH MG: 300 CAPSULE ORAL at 23:44

## 2018-02-19 RX ADMIN — LISINOPRIL SCH MG: 20 TABLET ORAL at 09:37

## 2018-02-19 RX ADMIN — SIMETHICONE SCH MG: 125 TABLET, CHEWABLE ORAL at 13:12

## 2018-02-19 RX ADMIN — ASPIRIN 81 MG SCH MG: 81 TABLET ORAL at 09:32

## 2018-02-19 RX ADMIN — SIMETHICONE SCH MG: 125 TABLET, CHEWABLE ORAL at 06:26

## 2018-02-19 RX ADMIN — Medication SCH ML: at 09:33

## 2018-02-19 RX ADMIN — ACYCLOVIR SCH UNITS: 800 TABLET ORAL at 23:48

## 2018-02-19 RX ADMIN — PRAVASTATIN SODIUM SCH MG: 40 TABLET ORAL at 23:45

## 2018-02-19 RX ADMIN — OXYCODONE HYDROCHLORIDE AND ACETAMINOPHEN PRN TAB: 5; 325 TABLET ORAL at 23:45

## 2018-02-19 RX ADMIN — STANDARDIZED SENNA CONCENTRATE AND DOCUSATE SODIUM SCH TAB: 8.6; 5 TABLET, FILM COATED ORAL at 09:32

## 2018-02-19 RX ADMIN — INSULIN ASPART SCH: 100 INJECTION, SOLUTION INTRAVENOUS; SUBCUTANEOUS at 13:12

## 2018-02-19 RX ADMIN — Medication SCH ML: at 23:45

## 2018-02-19 RX ADMIN — INSULIN ASPART SCH: 100 INJECTION, SOLUTION INTRAVENOUS; SUBCUTANEOUS at 08:00

## 2018-02-19 NOTE — HHI.PR
Subjective


Remarks


Pain in the groin is much less 2 out of 10, I discussed with cardiology and 

nephrology will consult Dr. Rees for another opinion for the fistula no acute 

issues overnight no chest pain or fever or chills





Objective


Vitals





Vital Signs








  Date Time  Temp Pulse Resp B/P (MAP) Pulse Ox O2 Delivery O2 Flow Rate FiO2


 


2/19/18 18:00  82      


 


2/19/18 17:00  80      


 


2/19/18 16:00  82      


 


2/19/18 15:00 97.8 80 16 130/73 (92) 100   


 


2/19/18 15:00  78      


 


2/19/18 14:00  84      


 


2/19/18 13:00  84      


 


2/19/18 12:00  88      


 


2/19/18 11:00  79 16 130/73 (92) 100   


 


2/19/18 11:00  82      


 


2/19/18 10:00  90      


 


2/19/18 09:46     96   21


 


2/19/18 09:00  90      


 


2/19/18 08:00  88      


 


2/19/18 07:00      Room Air  


 


2/19/18 07:00 98.8 82 14 161/84 (109) 98   


 


2/19/18 07:00  90      


 


2/19/18 06:27    149/73 (98)    


 


2/19/18 05:00  86      


 


2/19/18 04:02 98.6 80 18 153/82 (105) 100   


 


2/19/18 04:00  83      


 


2/19/18 03:00  86      


 


2/19/18 02:03    157/75 (102)    


 


2/19/18 02:00  86      


 


2/19/18 01:00  84      


 


2/19/18 00:00 98.6 82 17 141/76 (97) 99   


 


2/19/18 00:00  82      


 


2/18/18 23:00  80      














I/O      


 


 2/18/18 2/18/18 2/18/18 2/19/18 2/19/18 2/19/18





 07:00 15:00 23:00 07:00 15:00 23:00


 


Intake Total 360 ml  840 ml 240 ml  570 ml


 


Output Total 300 ml  300 ml 300 ml  2300 ml


 


Balance 60 ml  540 ml -60 ml  -1730 ml


 


      


 


Intake Oral 360 ml  840 ml 240 ml  570 ml


 


Output Urine Total 300 ml  300 ml 300 ml  300 ml


 


Hemodialysis      2000 ml


 


# Voids    1  








Result Diagram:  


2/17/18 0502                                                                   

             2/19/18 1058





Objective Remarks


GENERAL: This is a well-nourished, well-developed patient, in no apparent 

distress.


CARDIOVASCULAR: Regular rate and rhythm without murmurs, gallops, or rubs. 


RESPIRATORY: Clear to auscultation. Breath sounds equal bilaterally. No wheezes

, rales, or rhonchi.  


GASTROINTESTINAL: Abdomen soft, non-tender, nondistended. Normal active bowel 

sounds


MUSCULOSKELETAL: Extremities without clubbing, cyanosis, or edema.


NEURO:  Alert & Oriented x4 to person, place, time, situation.  Moves all ext x4


Procedures


perma-cath placement.





A/P


Assessment and Plan


2/16: CT scan of the abdomen has been done to rule out retroperitoneal bleeding 

after procedure, continue current care discussed with a nurse blood pressure 

increased to 196/89 we will add hydralazine





2/17: CT abdomen was negative for retroperitoneal bleeding, [facility] did not think 

patient is candidate for AV fistula, appreciate nephrology and cardiology will 

continue following with the





2/18: Continue pain management, continue monitoring and controlling blood 

pressure, appreciate cardiology and nephrology follow-up


2/19: Cardiology, nephrology, will consult Dr. Rebollar for another opinion about 

the fistula, Repeat CBC BMP in a.m., monitor temperature, blood pressure


A/P:


Acute on Chronic diastolic CHF


Cardiomyopathy 


Continue imdur


Continue HD


Continue beta blocker





Acute on chronic Stage IV CKD


Nephrology following


Avoiding nephrotoxins


s/p perma-cath placement.


Dialysis per nephrology.





Chest pain


Recent  STEMI


Cardiology following


on aspirin and plavix


continue BB and Imdur.


Plan for cardiac cath on Friday.





recent GI bleed-resolved


Hemoglobin has remained stable since


Gastritis found on GI workup


Continue PPI





Hypertension


Continue Lopressor


Continue hydralazine


continue Lisinopril





Diabetes mellitus type 2


continue Levemir


Follow blood sugars


Insulin sliding scale


Diabetic diet


will monitor and adjust the regimen as needed.





anemia of chronic disease


 receiving iron infusion/ epogen with HD


 will monitor H/H.





Tobacco abuse


Cessation recommended


Discharge Planning


on HD- cardiac work-up in progress.











Jesus Manuel Pham MD Feb 19, 2018 22:17

## 2018-02-19 NOTE — HHI.NPPN
Subjective


Complaints:  Shortness of Breath


General Problems:  Anemia


Renal Failure:  Chronic, Acute, Stage IV


Interval History


Groin pain improved. Feeling better. Due for dialysis. 


 (Migdalia Camejo)





Review of Systems


General


Constitutional:  Fatigue


 (Migdalia Camejo)





Objective Data


Data





Vital Signs








  Date Time  Temp Pulse Resp B/P (MAP) Pulse Ox O2 Delivery O2 Flow Rate FiO2


 


2/19/18 06:27    149/73 (98)    


 


2/19/18 05:00  86      


 


2/19/18 04:02 98.6 80 18 153/82 (105) 100   


 


2/19/18 04:00  83      


 


2/19/18 03:00  86      


 


2/19/18 02:03    157/75 (102)    


 


2/19/18 02:00  86      


 


2/19/18 01:00  84      


 


2/19/18 00:00 98.6 82 17 141/76 (97) 99   


 


2/19/18 00:00  82      


 


2/18/18 23:00  80      


 


2/18/18 22:13    146/69 (94)    


 


2/18/18 22:00  76      


 


2/18/18 21:00  78      


 


2/18/18 20:00 98.2 84 18 118/69 (85) 100   


 


2/18/18 20:00      Room Air  


 


2/18/18 20:00  79      


 


2/18/18 19:00  86      


 


2/18/18 18:00  84      


 


2/18/18 17:00  81      


 


2/18/18 15:00 97.7 86 16 141/73 (95) 100   


 


2/18/18 15:00  86      


 


2/18/18 14:00  80      


 


2/18/18 13:00  84      


 


2/18/18 12:00  86      


 


2/18/18 11:00 97.8 82 18 187/85 (119) 99   


 


2/18/18 11:00  70      


 


2/18/18 10:00  88      








 (Migdalia Camejo)


-:  


2/17/18 0502                                                                   

             2/17/18 0502





Tubes & Lines:  Perma-Cath


 (Migdalia Camejo)





Physical Exam


General


Appearance:  Well Developed, Well Nourished, No Acute Distress, Comfortable


 (Migdalia Camejo)





Eyes


Eye Exam:  Pupils Equal, Pupils Reactive


 (Migdalia Camejo ARNP)





Throat


Throat Exam:  Oral Mucosa Pink & Moist


 (Migdalia Camejo ARNP)





Pulmonary


Resp Exam:  Clear Bilaterally, Breath Sounds Equal, No Distress, Decreased Bases


 (Migdalia Camejo. ARNP)





Cardiology


CV Exam:  Regular, Normal Sinus Rhythm, Good Perfusion


 (Migdalia Camejo. ARNP)





Gastrointestinal/Abdomen


GI Exam:  Soft, Non-Tender, Bowel Sounds Present, Positive Bowel Movement


 (Migdalia Camejo ARNP)





Musculoskeletal


MS Exam:  Joints Intact, Normal Gait, Normal Tone, Good Strength


 (Migdalia Camejo. ARNP)





Integumentary


Skin Exam:  Clear, Warm, Dry, Intact


 (Migdalia Camejo ARNP)





Extremeties


Extremities Exam:  No Edema, Pedal Pulses Palpable


 (Migdalia Camejo. ARNP)





Neurologic


Neuro Exam:  Alert, Awake, Oriented, Speech Clear, Moving All Extremities


 (Migdalia Camejo ARNP)





Psychiatric


Psych Exam:  Appropriate Responses


 (Migdalia Camejo)





Assessment/Plan


Discussed Condition With:  Patient, Relative


Assessment Summary:  Anemia of CKD, Hypertension, Diabetes Mellitus, CKD Stage V

, End Stage Renal Disease


Problem List:  


(1) Acute kidney injury superimposed on CKD


ICD Codes:  N17.9 - Acute kidney failure, unspecified; N18.9 - Chronic kidney 

disease, unspecified


Status:  Acute


Plan:  She has reached CKD 5/ESRD. 


On HD MWF. She is due for dialysis today. 


Permcath placed 2/12. 


Pending evaluation by Dr. Rees for AVF placement. Unsure is she has suitable 

anatomy.


Avoid IVF administration. 


Repeat labs daily.


Case management is assisting with Medicare application is appreciated. She will 

be enrolled in the transitional program at [facility] [place], potentially as early as this 

week. 





(2) CAD (coronary artery disease)


ICD Codes:  I25.10 - Atherosclerotic heart disease of native coronary artery 

without angina pectoris


Status:  Chronic


Plan:  RCA with 3 stents, LAD one stent


On Plavix and  ASA





(3) CHF (congestive heart failure)


ICD Codes:  I50.9 - Heart failure, unspecified


Plan:  Minimal fluid removal with HD. 


Continue PO lasix.


Follow fluid status


Fluid restriction discussed with the patient. 





(4) HTN (hypertension)


ICD Codes:  I10 - Hypertension


Status:  Chronic


Plan:  BP acceptable. 


On Imdur 120 mg daily


Norvasc 10 mg daily


Metoprolol 100 mg BID


Hydralazine 50 mg Q8h


Clonidine 0.1 mg BID


Continue to monitor





(5) DM (diabetes mellitus)


ICD Codes:  E11.9 - Type 2 diabetes mellitus without complications


Plan:  Monitor blood glucose, maintain 140-180 mg/dL.





(6) Anemia


ICD Codes:  D64.9 - Anemia, unspecified


Status:  Chronic


Plan:  Epogen with dialysis. 


On Venofer for iron deficiency. 


s/p blood transfusion 2/14. 


 (Migdalia Camejo)


Problem List:  


(1) Acute kidney injury superimposed on CKD


ICD Codes:  N17.9 - Acute kidney failure, unspecified; N18.9 - Chronic kidney 

disease, unspecified


Status:  Acute


Plan:  She has reached CKD 5/ESRD. 


On HD MWF. She is due for dialysis today. 


Permcath placed 2/12. 


Pending evaluation by Dr. Rees for AVF placement. Unsure is she has suitable 

anatomy.


Avoid IVF administration. 


Repeat labs daily.


Case management is assisting with Medicare application is appreciated. She will 

be enrolled in the transitional program at [facility] [place], potentially as early as this 

week. 





(2) CAD (coronary artery disease)


ICD Codes:  I25.10 - Atherosclerotic heart disease of native coronary artery 

without angina pectoris


Status:  Chronic


Plan:  RCA with 3 stents, LAD one stent


On Plavix and  ASA





(3) CHF (congestive heart failure)


ICD Codes:  I50.9 - Heart failure, unspecified


Plan:  Minimal fluid removal with HD. 


Continue PO lasix.


Follow fluid status


Fluid restriction discussed with the patient. 





(4) HTN (hypertension)


ICD Codes:  I10 - Hypertension


Status:  Chronic


Plan:  BP acceptable. 


On Imdur 120 mg daily


Norvasc 10 mg daily


Metoprolol 100 mg BID


Hydralazine 50 mg Q8h


Clonidine 0.1 mg BID


Continue to monitor





(5) DM (diabetes mellitus)


ICD Codes:  E11.9 - Type 2 diabetes mellitus without complications


Plan:  Monitor blood glucose, maintain 140-180 mg/dL.





(6) Anemia


ICD Codes:  D64.9 - Anemia, unspecified


Status:  Chronic


Plan:  Epogen with dialysis. 


On Venofer for iron deficiency. 


s/p blood transfusion 2/14. 





Plan


patient was seen and examined. Agree with above assessment and plan. Her labs 

from today were reviewed, she has reached ESRD. 


 (Kelton Whiteside MD)





Problem Qualifiers





(1) CHF (congestive heart failure):  


Qualified Codes:  I50.9 - Heart failure, unspecified


(2) DM (diabetes mellitus):  





(3) Anemia:  


Qualified Codes:  D64.9 - Anemia, unspecified








Migdalia Camejo Feb 19, 2018 09:55


Ketlon Whiteside MD Feb 19, 2018 20:07

## 2018-02-19 NOTE — PD.VS.CON
History of Present Illness


Chief Complaint:


AVF Access placement


Consult Requested by:





History of Present Illness


53/AA/F with a PMH of HTN, DM, Hyperlipidemia, CAD, CHF and chronic kidney 

disease.


Pt presented with the increase shortness of breath, orthopnea, feeling tired 

lethargic and creatinine was 3.7.


Pt reported she recently started HD 2W ago (M/W/F through a R chest PermCath)


Pt is Right handed 


No hx of access attempts 


Vein mapping reviewed 


 (Claier Potter)





Past/Family/Social History


Past Medical History


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


   diabetic nephropathy


Hypertension, essential


DM II x 22 years


GERD, no esophagitis


CAD, hx of MI in  s/p PTCA with stent placement


Hyperlipidemia


Multiple hospitalizations for recurrent abdominal pain


History of pancreatitis , ETOH induced 


History of alcohol abuse


Uterine fibroids


Past Surgical History


Cholecystectomy


Hysterectomy 


Tubal ligation, bilateral


PTCA with stent placement approximately 8 years ago


Cataract surgery


Social History


Denied ETOH


Smoking hx - Reports she quit 2M ago





Currently not working- "Applied for disability"


Lives w/ her brother and daughter


Family History


Mother -DM/ESRD/ 


 (Claire Potter)


Home Medications


Active Scripts


Misc. Devices (Roller Walker) 1 Mis Mis, EA .XX NOW, #1 0 Refills


   Prov:Juliette Hodges MD         18


Amlodipine (Norvasc) 10 Mg Tab, 10 MG PO DAILY for Blood Pressure Management, #

30 TAB


   Prov:Juliette Hodges MD         18


Metoprolol Tartrate (Lopressor) 100 Mg Tab, 100 MG PO Q12HR for Blood Pressure 

Management, #60 TAB


   Prov:Juliette Hodges MD         18


Nitroglycerin SL (Nitrostat SL) 0.4 Mg Subl, 0.4 MG SL Q5M Y for SEE LABEL 

COMMENTS, #30 TAB


   Prov:Juliette Hodges MD         18


Isosorbide Mononitrate ER (Isosorbide Mononitrate ER) 60 Mg Tab, 120 MG PO DAILY

@07 for Blood Pressure Management, #30 TAB


   Prov:Juliette Hodges MD         18


Hydralazine HCl (Hydralazine HCl) 50 Mg Tablet, 50 MG PO Q8HR for Blood 

Pressure Management, #90 MG


   Prov:Juliette Hodges MD         18


Pravastatin (Pravachol) 40 Mg Tab, 40 MG PO HS for Cholesterol Management, #30 

TAB


   Prov:Juliette Hodges MD         18


Clopidogrel (Plavix) 75 Mg Tab, 75 MG PO DAILY for Blood Clot Prevention, #30 

TAB


   Prov:Juliette Hodges MD         18


Ondansetron (Zofran) 4 Mg Tab, 4 MG PO Q6HR Y for NAUSEA OR VOMITING, #15 TAB 0 

Refills


   Prov:Edgardo Stewart MD         17


Simethicone (Gas Relief Maximum Streng) 125 Mg Chw, 125 MG PO Q8HR for gas pain

, #60 EA 0 Refills


   Prov:Jamaal Milan DO         17


Reported Medications


Fish Oil-Cholecalciferol (Fish Oil + D3) 1,200-1,000 Mg-Unit Cap, 1 CAP PO 

DAILY for Nutritional Supplement, #30 CAP 0 Refills


   1/15/18


Insulin Glargine Inj (Lantus Inj) 1,000 Unit/10 Ml Vial, 15 UNITS SQ HS for 

Blood Sugar Management, VIAL 0 Refills


   10/18/17


Gabapentin (Gabapentin) 100 Mg Cap, 300 MG PO HS, #90 CAP 0 Refills


   17


Folic Acid (Folic Acid) 400 Mcg Tab, 1 MG PO DAILY for Nutritional Supplement, 

TAB 0 Refills


   17


Ferrous Sulfate (Feosol) 200 Mg Tab, 325 MG PO BID for Nutritional Supplement, #

60 TAB 0 Refills


   17


Enalapril (Vasotec) 20 Mg Tab, 20 MG PO BID, #30 TAB 0 Refills


   17


Aspirin (Aspirin Children's) 81 Mg Chew, 81 MG CHEW DAILY, TAB 0 Refills


   17


Coded Allergies:  


     shellfish derived (Unverified  Adverse Reaction, Mild, VOMITNG, 1/15/18)





Review of Systems


Constitutional:  DENIES: Fever, Chills


Respiratory:  DENIES: Shortness of breath


Cardiovascular:  DENIES: Chest pain


Musculoskeletal:  DENIES: Muscle aches (denied hand pain ) (Claire Potter

)





Physical Exam


Vitals/I&O











  Date Time  Temp Pulse Resp B/P (MAP) Pulse Ox O2 Delivery O2 Flow Rate FiO2


 


18 09:46     96   21


 


18 06:27    149/73 (98)    


 


18 05:00  86      


 


18 04:02 98.6 80 18 153/82 (105) 100   


 


18 04:00  83      


 


18 03:00  86      


 


18 02:03    157/75 (102)    


 


18 02:00  86      


 


18 01:00  84      


 


18 00:00 98.6 82 17 141/76 (97) 99   


 


18 00:00  82      


 


18 23:00  80      


 


18 22:13    146/69 (94)    


 


18 22:00  76      


 


18 21:00  78      


 


18 20:00 98.2 84 18 118/69 (85) 100   


 


18 20:00      Room Air  


 


18 20:00  79      


 


18 19:00  86      


 


18 18:00  84      


 


18 17:00  81      


 


18 15:00 97.7 86 16 141/73 (95) 100   


 


18 15:00  86      














 18





 07:00 15:00 23:00


 


Intake Total 240 ml  


 


Output Total 300 ml  


 


Balance -60 ml  








Neuro:


GCS 15


CN 2-12 intact


Neck:


No JVD distention


Heart:


RRR w/o M/G/R


Lungs:


CTA


PermCath (R Chest)


Vascular:


UE 5/5 w/ equal  strength


Palpable R/L Radial pulses


Extremities:


UE 5/5


 (Claire Potter)





Laboratory Tests








Test


  18


10:58


 


Blood Urea Nitrogen 36 


 


Creatinine 6.52 


 


Random Glucose 222 


 


Albumin 2.9 


 


Calcium Level 8.2 


 


Phosphorus Level 7.5 


 


Sodium Level 131 


 


Potassium Level 4.5 


 


Chloride Level 92 


 


Carbon Dioxide Level 29.8 


 


Anion Gap 9 


 


Estimat Glomerular Filtration


Rate 8 


 














 Date/Time


Source Procedure


Growth Status


 


 


 18 09:29


Blood Peripheral Aerobic Blood Culture - Final


NO GROWTH IN 5 DAYS Complete


 


 18 09:29


Blood Peripheral Anaerobic Blood Culture - Final


NO GROWTH IN 5 DAYS Complete








 (Claire Potter)





Assessment and Plan


Assessment:  


(1) Acute kidney failure


Plan


53/F recently on HD, in need of a HD access


Pt is right hand dominant 


Reviewed vein mapping





Plan


Discussed AVF creation w/ pt


Questions answered


Offered LEFT Brachial Basilic AVF w/ Dr. Rees


Pt agrees w/ plan 


Pt scheduled (Thur) 18





Claire Potter NP


[facility]


[phone]


 (Claire Potter)


Plan


Pt seen and examined.  Consult noted.


Will plan for LEFT brachiobasilic AVF (1st stage) on Thursday.


All questions answered.





Arik Rees MD [facility]





Associate Professor of Surgery


[facility] - Heart and Vascular Surgery at [facility]





[phone]


 (Arik Rees MD)











Claire Potter 2018 15:13


Arik Rees MD 2018 18:13

## 2018-02-19 NOTE — PD.CARD.PN
Subjective


Subjective Remarks


Right groin still a little sore since catheterization, no swelling.  No further 

chest pain, shortness breath, or palpitations.





Objective


Medications





Current Medications








 Medications


  (Trade)  Dose


 Ordered  Sig/Luis Armando


 Route  Start Time


 Stop Time Status Last Admin


 


  (NS Flush)  2 ml  UNSCH  PRN


 IV FLUSH  2/4/18 11:30


    2/4/18 17:46


 


 


  (NS Flush)  2 ml  BID


 IV FLUSH  2/4/18 21:00


    2/18/18 21:13


 


 


  (Tylenol)  650 mg  Q4H  PRN


 PO  2/4/18 11:30


    2/16/18 18:13


 


 


  (Zofran Inj)  4 mg  Q6H  PRN


 IVP  2/4/18 11:30


    2/15/18 10:21


 


 


  (Restoril)  15 mg  HS  PRN


 PO  2/4/18 11:30


    2/17/18 21:19


 


 


  (Narcan Inj)  0.4 mg  UNSCH  PRN


 IV PUSH  2/4/18 11:30


     


 


 


  (Theresa-Colace)  1 tab  BID


 PO  2/4/18 21:00


    2/18/18 21:12


 


 


  (Milk Of


 Magnesia Liq)  30 ml  Q12H  PRN


 PO  2/4/18 11:30


    2/18/18 18:00


 


 


  (Senokot)  17.2 mg  Q12H  PRN


 PO  2/4/18 11:30


     


 


 


  (Dulcolax Supp)  10 mg  DAILY  PRN


 RECTAL  2/4/18 11:30


     


 


 


  (Lactulose Liq)  30 ml  DAILY  PRN


 PO  2/4/18 11:30


    2/15/18 11:16


 


 


  (Aspirin Chew)  81 mg  DAILY


 CHEW  2/5/18 09:00


   Future hold 2/18/18 09:56


 


 


  (Neurontin)  300 mg  HS


 PO  2/4/18 21:00


    2/18/18 21:12


 


 


  (Apresoline)  50 mg  Q8HR


 PO  2/4/18 14:00


    2/19/18 06:26


 


 


  (Imdur)  120 mg  DAILY@07


 PO  2/5/18 07:00


    2/19/18 06:27


 


 


  (Nitrostat Sl)  0.4 mg  Q5M  PRN


 SL  2/4/18 11:30


    2/7/18 10:32


 


 


  (Pravachol)  40 mg  HS


 PO  2/4/18 21:00


    2/18/18 21:12


 


 


  (Phazyme Chew)  125 mg  Q8HR


 PO  2/4/18 14:00


    2/19/18 06:26


 


 


  (Trandate Inj)  10 mg  Q20M  PRN


 IV PUSH  2/4/18 11:45


    2/16/18 18:54


 


 


  (Apresoline Inj)  20 mg  Q4H  PRN


 IV PUSH  2/4/18 11:45


    2/16/18 18:22


 


 


  (Duoneb Neb)  1 ampule  Q4HR NEB  PRN


 NEB  2/4/18 16:15


    2/6/18 01:51


 


 


  (Ativan)  0.5 mg  Q8HR  PRN


 PO  2/4/18 16:15


    2/16/18 21:14


 


 


  (D50w (Vial) Inj)  50 ml  UNSCH  PRN


 IV PUSH  2/4/18 16:15


    2/5/18 04:20


 


 


  (Glucagon Inj)  1 mg  UNSCH  PRN


 OTHER  2/4/18 16:15


     


 


 


  (NovoLOG


 SUPPLEMENTAL


 SCALE)  1  ACHS SLIDING  SCALE


 SQ  2/4/18 17:00


    2/18/18 21:13


 


 


 Sodium Chloride  1,000 ml @ 


 0 mls/hr  Q0M PRN


 OTHER  2/7/18 10:20


     


 


 


  (Heparin Inj)  8,000 units  UNSCH  PRN


 IV FLUSH  2/7/18 10:30


     


 


 


 Sodium Chloride  1,000 ml @ 


 200 mls/hr  Q5H PRN


 IV  2/7/18 10:20


     


 


 


 Sodium Chloride  1,000 ml @ 


 0 mls/hr  Q0M PRN


 OTHER  2/7/18 10:20


     


 


 


  (Mannitol Inj)  12.5 gm  UNSCH  PRN


 IV  2/7/18 10:30


     


 


 


 Albumin Human  100 ml @ 


 60 mls/hr  UNSCH  PRN


 IV  2/7/18 10:30


     


 


 


  (NS Flush)  5 ml  UNSCH  PRN


 IV FLUSH  2/7/18 10:30


     


 


 


  (Heparin Inj)    UNSCH  PRN


 .XX  2/7/18 10:30


    2/16/18 16:09


 


 


  (Gentamicin Inj)  20 mg  UNSCH  PRN


 OTHER  2/7/18 10:30


    2/16/18 16:08


 


 


  (Zofran Inj)  4 mg  UNSCH  PRN


 IV PUSH  2/7/18 10:30


     


 


 


  (Tylenol)  650 mg  UNSCH  PRN


 PO  2/7/18 10:30


    2/9/18 19:50


 


 


  (Benadryl)  25 mg  UNSCH  PRN


 PO  2/7/18 10:30


    2/12/18 06:06


 


 


  (Nitrostat Sl)  0.4 mg  UNSCH  PRN


 SL  2/7/18 10:30


     


 


 


  (Catapres)  0.1 mg  UNSCH  PRN


 PO  2/7/18 10:30


    2/14/18 18:31


 


 


  (Epogen Inj)  10,000 units  UNSCH  PRN


 IV PUSH  2/7/18 10:30


    2/16/18 16:09


 


 


  (Gelfoam 12 Mm/7


 Mm Top)  1 foam  UNSCH  PRN


 TOP  2/7/18 10:30


     


 


 


 Nitroglycerin/


 Dextrose  250 ml @ 


 1.5 mls/hr  TITRATE  PRN


 IV  2/7/18 12:00


    2/10/18 07:00


 


 


  (NS Flush)    UNSCH  PRN


 IV FLUSH  2/12/18 14:45


     


 


 


  (Heparin Inj)    UNSCH  PRN


 IV FLUSH  2/12/18 14:45


     


 


 


  (Morphine Inj)  2 mg  Q3HR  PRN


 IV PUSH  2/12/18 17:45


    2/19/18 02:02


 


 


 Iron Sucrose 100


 mg/Sodium Chloride  105 ml @ 


 105 mls/hr  DAILY


 IV  2/13/18 11:00


 2/22/18 09:59  2/15/18 10:04


 


 


  (Prinivil)  40 mg  DAILY


 PO  2/13/18 09:00


    2/18/18 09:57


 


 


  (Plavix)  75 mg  DAILY


 PO  2/14/18 09:00


    2/18/18 09:57


 


 


  (Levemir Inj)  10 units  HS


 SQ  2/14/18 21:00


    2/18/18 21:13


 


 


  (Coreg)  25 mg  Q12HR


 PO  2/14/18 21:00


    2/18/18 21:12


 


 


  (Atropine Inj)  0.5 mg  UNSCH  PRN


 IV PUSH  2/16/18 12:45


     


 


 


  (Percocet  5-325


 Mg)  1 tab  Q4H  PRN


 PO  2/17/18 00:15


    2/18/18 11:28


 


 


  (Morphine Inj)  2 mg  Q4H  PRN


 IM  2/18/18 12:15


     


 


 


  (Catapres)  0.1 mg  Q8H


 PO  2/18/18 18:00


    2/19/18 01:59


 








Vital Signs / I&O





Vital Signs








  Date Time  Temp Pulse Resp B/P (MAP) Pulse Ox O2 Delivery O2 Flow Rate FiO2


 


2/19/18 06:27    149/73 (98)    


 


2/19/18 05:00  86      


 


2/19/18 04:02 98.6 80 18 153/82 (105) 100   


 


2/19/18 04:00  83      


 


2/19/18 03:00  86      


 


2/19/18 02:03    157/75 (102)    


 


2/19/18 02:00  86      


 


2/19/18 01:00  84      


 


2/19/18 00:00 98.6 82 17 141/76 (97) 99   


 


2/19/18 00:00  82      


 


2/18/18 23:00  80      


 


2/18/18 22:13    146/69 (94)    


 


2/18/18 22:00  76      


 


2/18/18 21:00  78      


 


2/18/18 20:00 98.2 84 18 118/69 (85) 100   


 


2/18/18 20:00      Room Air  


 


2/18/18 20:00  79      


 


2/18/18 19:00  86      


 


2/18/18 18:00  84      


 


2/18/18 17:00  81      


 


2/18/18 15:00 97.7 86 16 141/73 (95) 100   


 


2/18/18 15:00  86      


 


2/18/18 14:00  80      


 


2/18/18 13:00  84      


 


2/18/18 12:00  86      


 


2/18/18 11:00 97.8 82 18 187/85 (119) 99   


 


2/18/18 11:00  70      


 


2/18/18 10:00  88      


 


2/18/18 09:00  82      


 


2/18/18 08:00  82      














I/O      


 


 2/18/18 2/18/18 2/18/18 2/19/18 2/19/18 2/19/18





 07:00 15:00 23:00 07:00 15:00 23:00


 


Intake Total 360 ml  840 ml 240 ml  


 


Output Total 300 ml  300 ml 300 ml  


 


Balance 60 ml  540 ml -60 ml  


 


      


 


Intake Oral 360 ml  840 ml 240 ml  


 


Output Urine Total 300 ml  300 ml 300 ml  


 


# Voids    1  








Physical Exam


GENERAL: Well-developed well-nourished.  In no acute distress.


NECK: No carotid bruits.  No JVD.  Tunnel dialysis catheter in place on right 

chest wall.


CARDIOVASCULAR: Regular rate and rhythm.  No murmur appreciated.


RESPIRATORY: No accessory muscle use. Clear to auscultation. Breath sounds 

equal bilaterally. 


MUSCULOSKELETAL: No clubbing or cyanosis. No edema. 


NEUROLOGICAL: Awake and alert. Normal speech.


SKIN: Right groin mildly tender to palpation with no swelling or ecchymosis.


Imaging





Last Impressions








Abdomen/Pelvis CT 2/16/18 0000 Signed





Impressions: 





 Service Date/Time:  Friday, February 16, 2018 17:44 - CONCLUSION:  1. No 





 retroperitoneal hemorrhage identified. There is mild anasarca. Curvilinear 

high 





 density material is present in the right hemipelvis adjacent to the bladder of 





 uncertain etiology. 2. Residual contrast in the bladder. Previous 





 cholecystectomy.     Valdez Colunga MD 


 


Catheter Placement X-Ray 2/12/18 0800 Signed





Impressions: 





 Service Date/Time:  Monday, February 12, 2018 14:12 - CONCLUSION: 

Uncomplicated 





 conversion of a Vas-Cath to PermCath as above.     Ren Stubbs MD 


 


Upper Extremity Ultrasound 2/8/18 0000 Signed





Impressions: 





 Service Date/Time:  Thursday, February 8, 2018 21:19 - CONCLUSION:  Normal 





 examination.       Valdez Colunga MD 


 


Chest X-Ray 2/4/18 0810 Signed





Impressions: 





 Service Date/Time:  Sunday, February 4, 2018 08:50 - CONCLUSION:  Bibasilar 





 consolidation slightly worse in the right lower lobe.     Mp Dang MD 











Assessment and Plan


Problem List:  


(1) CAD (coronary artery disease)


ICD Codes:  I25.10 - Atherosclerotic heart disease of native coronary artery 

without angina pectoris


Status:  Chronic


(2) CHF (congestive heart failure)


ICD Codes:  I50.9 - Heart failure, unspecified


(3) Acute kidney injury superimposed on CKD


ICD Codes:  N17.9 - Acute kidney failure, unspecified; N18.9 - Chronic kidney 

disease, unspecified


Status:  Acute


Assessment and Plan


52 yo AAF recently discharged from this facility after prolonged 

hospitalization for chest pain, CAD, CHF, and CKD. Readmitted for progression 

of symptoms





CAD with angina: Severe 2 vessel disease to RCA and mid-LAD seen on last 

admission. Echo 55-60%.  Now that Nephrology plans on permanent dialysis, 

proceeded w/ LHC 2/16/18 with successful PCI w/ BMS to the proximal, mid, and 

distal RCA and mid LAD.    Continue aspirin, Plavix, Imdur, metoprolol, 

lisinopril.





CKD stage V/ESRD: Nephrology on board. Tunneled dialysis cath placed 2/12.  

Vascular surgery on board for possible fistula placement.





Problem Qualifiers





(1) CHF (congestive heart failure):  


Qualified Codes:  I50.9 - Heart failure, unspecified








King Ervin Feb 19, 2018 07:42

## 2018-02-20 VITALS — HEART RATE: 84 BPM

## 2018-02-20 VITALS
HEART RATE: 79 BPM | DIASTOLIC BLOOD PRESSURE: 67 MMHG | SYSTOLIC BLOOD PRESSURE: 111 MMHG | OXYGEN SATURATION: 100 % | TEMPERATURE: 98 F | RESPIRATION RATE: 18 BRPM

## 2018-02-20 VITALS — HEART RATE: 78 BPM

## 2018-02-20 VITALS
OXYGEN SATURATION: 99 % | HEART RATE: 80 BPM | SYSTOLIC BLOOD PRESSURE: 127 MMHG | RESPIRATION RATE: 16 BRPM | TEMPERATURE: 98.9 F | DIASTOLIC BLOOD PRESSURE: 70 MMHG

## 2018-02-20 VITALS — HEART RATE: 80 BPM

## 2018-02-20 VITALS — HEART RATE: 77 BPM

## 2018-02-20 VITALS — HEART RATE: 96 BPM

## 2018-02-20 VITALS
HEART RATE: 83 BPM | RESPIRATION RATE: 20 BRPM | DIASTOLIC BLOOD PRESSURE: 73 MMHG | TEMPERATURE: 97.9 F | SYSTOLIC BLOOD PRESSURE: 142 MMHG | OXYGEN SATURATION: 99 %

## 2018-02-20 VITALS
TEMPERATURE: 98.7 F | OXYGEN SATURATION: 99 % | SYSTOLIC BLOOD PRESSURE: 170 MMHG | DIASTOLIC BLOOD PRESSURE: 78 MMHG | RESPIRATION RATE: 16 BRPM | HEART RATE: 84 BPM

## 2018-02-20 VITALS — HEART RATE: 86 BPM

## 2018-02-20 VITALS — OXYGEN SATURATION: 98 %

## 2018-02-20 VITALS — HEART RATE: 81 BPM

## 2018-02-20 VITALS
TEMPERATURE: 97.9 F | RESPIRATION RATE: 18 BRPM | DIASTOLIC BLOOD PRESSURE: 67 MMHG | SYSTOLIC BLOOD PRESSURE: 140 MMHG | HEART RATE: 81 BPM | OXYGEN SATURATION: 98 %

## 2018-02-20 VITALS — OXYGEN SATURATION: 99 %

## 2018-02-20 VITALS — HEART RATE: 98 BPM

## 2018-02-20 VITALS — HEART RATE: 82 BPM

## 2018-02-20 VITALS — HEART RATE: 92 BPM

## 2018-02-20 VITALS — HEART RATE: 79 BPM

## 2018-02-20 RX ADMIN — CLOPIDOGREL BISULFATE SCH MG: 75 TABLET, FILM COATED ORAL at 08:49

## 2018-02-20 RX ADMIN — ISOSORBIDE MONONITRATE SCH MG: 60 TABLET, EXTENDED RELEASE ORAL at 06:21

## 2018-02-20 RX ADMIN — PRAVASTATIN SODIUM SCH MG: 40 TABLET ORAL at 22:28

## 2018-02-20 RX ADMIN — STANDARDIZED SENNA CONCENTRATE AND DOCUSATE SODIUM SCH TAB: 8.6; 5 TABLET, FILM COATED ORAL at 21:00

## 2018-02-20 RX ADMIN — GABAPENTIN SCH MG: 300 CAPSULE ORAL at 22:29

## 2018-02-20 RX ADMIN — CARVEDILOL SCH MG: 12.5 TABLET, FILM COATED ORAL at 22:28

## 2018-02-20 RX ADMIN — INSULIN ASPART SCH: 100 INJECTION, SOLUTION INTRAVENOUS; SUBCUTANEOUS at 17:00

## 2018-02-20 RX ADMIN — Medication SCH ML: at 22:25

## 2018-02-20 RX ADMIN — STANDARDIZED SENNA CONCENTRATE AND DOCUSATE SODIUM SCH TAB: 8.6; 5 TABLET, FILM COATED ORAL at 08:49

## 2018-02-20 RX ADMIN — ASPIRIN 81 MG SCH MG: 81 TABLET ORAL at 08:49

## 2018-02-20 RX ADMIN — CARVEDILOL SCH MG: 12.5 TABLET, FILM COATED ORAL at 08:50

## 2018-02-20 RX ADMIN — INSULIN ASPART SCH: 100 INJECTION, SOLUTION INTRAVENOUS; SUBCUTANEOUS at 21:00

## 2018-02-20 RX ADMIN — INSULIN ASPART SCH: 100 INJECTION, SOLUTION INTRAVENOUS; SUBCUTANEOUS at 12:00

## 2018-02-20 RX ADMIN — ACYCLOVIR SCH UNITS: 800 TABLET ORAL at 22:35

## 2018-02-20 RX ADMIN — SIMETHICONE SCH MG: 125 TABLET, CHEWABLE ORAL at 12:55

## 2018-02-20 RX ADMIN — SIMETHICONE SCH MG: 125 TABLET, CHEWABLE ORAL at 06:19

## 2018-02-20 RX ADMIN — WATER PRN ML: 1 IRRIGANT IRRIGATION at 08:50

## 2018-02-20 RX ADMIN — SIMETHICONE SCH MG: 125 TABLET, CHEWABLE ORAL at 22:27

## 2018-02-20 RX ADMIN — LISINOPRIL SCH MG: 20 TABLET ORAL at 08:50

## 2018-02-20 RX ADMIN — Medication SCH ML: at 08:54

## 2018-02-20 RX ADMIN — TEMAZEPAM PRN MG: 15 CAPSULE ORAL at 22:34

## 2018-02-20 RX ADMIN — INSULIN ASPART SCH: 100 INJECTION, SOLUTION INTRAVENOUS; SUBCUTANEOUS at 08:00

## 2018-02-20 NOTE — HHI.NPPN
Subjective


Complaints:  Shortness of Breath


General Problems:  Anemia


Renal Failure:  Chronic, Acute, Stage IV


Interval History


Dialyzed yesterday. She is scheduled for AVF on Thursday. Questions answered.


 (Migdalia Camejo)





Review of Systems


General


Constitutional:  Fatigue


 (Migdalia Camejo)





Objective Data


Data





Vital Signs








  Date Time  Temp Pulse Resp B/P (MAP) Pulse Ox O2 Delivery O2 Flow Rate FiO2


 


2/20/18 08:00  81      


 


2/20/18 07:42 97.9 81 18 140/67 (91) 98   


 


2/20/18 07:42  81      


 


2/20/18 06:00  86      


 


2/20/18 05:00  84      


 


2/20/18 04:00  84      


 


2/20/18 03:00 98.7 82 16 170/78 (108) 99   


 


2/20/18 03:00  84      


 


2/20/18 02:00  92      


 


2/20/18 01:00  98      


 


2/20/18 00:00  96      


 


2/19/18 23:00  82      


 


2/19/18 22:00  96      


 


2/19/18 21:30 98.9 93 16 167/74 (105) 99   


 


2/19/18 21:30     99 Room Air  


 


2/19/18 21:00  86      


 


2/19/18 20:00  84      


 


2/19/18 19:00  80      


 


2/19/18 18:00  82      


 


2/19/18 17:00  80      


 


2/19/18 16:00  82      


 


2/19/18 15:00 97.8 80 16 130/73 (92) 100   


 


2/19/18 15:00  78      


 


2/19/18 14:00  84      


 


2/19/18 13:00  84      


 


2/19/18 12:00  88      


 


2/19/18 11:00  79 16 130/73 (92) 100   


 


2/19/18 11:00  82      


 


2/19/18 10:00  90      








 (Migdalia Camejo)


-:  


2/17/18 0502                                                                   

             2/19/18 1058





Tubes & Lines:  Perma-Cath


 (Migdalia Camejo)





Physical Exam


General


Appearance:  Well Developed, Well Nourished, No Acute Distress, Comfortable


 (Migdalia Camejo)





Eyes


Eye Exam:  Pupils Equal, Pupils Reactive


 (Migdalia Camejo ARNP)





Throat


Throat Exam:  Oral Mucosa Pink & Moist


 (Migdalia Camejo ARNP)





Pulmonary


Resp Exam:  Clear Bilaterally, Breath Sounds Equal, No Distress, Decreased Bases


 (Migdalia Camejo. ARNP)





Cardiology


CV Exam:  Regular, Normal Sinus Rhythm, Good Perfusion


 (Migdalia Camejo. ARNP)





Gastrointestinal/Abdomen


GI Exam:  Soft, Non-Tender, Bowel Sounds Present, Positive Bowel Movement


 (Migdalia Camejo ARNP)





Musculoskeletal


MS Exam:  Joints Intact, Normal Gait, Normal Tone, Good Strength


 (Migdalia Camejo. ARNP)





Integumentary


Skin Exam:  Clear, Warm, Dry, Intact


 (Migdalia Camejo ARNP)





Extremeties


Extremities Exam:  No Edema, Pedal Pulses Palpable


 (Migdalia Camejo ARNP)





Neurologic


Neuro Exam:  Alert, Awake, Oriented, Speech Clear, Moving All Extremities


 (Migdalia Camejo ARNP)





Psychiatric


Psych Exam:  Appropriate Responses


 (Migdalia Camejo)





Assessment/Plan


Discussed Condition With:  Patient, Relative


Assessment Summary:  Anemia of CKD, Hypertension, Diabetes Mellitus, CKD Stage V

, End Stage Renal Disease


Problem List:  


(1) Acute kidney injury superimposed on CKD


ICD Codes:  N17.9 - Acute kidney failure, unspecified; N18.9 - Chronic kidney 

disease, unspecified


Status:  Acute


Plan:  She has reached CKD 5/ESRD. 


On HD MWF. 2L UF yesterday. 


Permcath placed 2/12. 


Pending AVF with Dr. Rees Thursday. We have asked her to protect left upper 

extremity from invasive procedures. 


Avoid IVF administration. 


Repeat labs daily.


Case management is assisting with Medicare application is appreciated. [place] 

[place] [facility] this 

week or next. CM meeting this AM. Will follow up. 





(2) CAD (coronary artery disease)


ICD Codes:  I25.10 - Atherosclerotic heart disease of native coronary artery 

without angina pectoris


Status:  Chronic


Plan:  RCA with 3 stents, LAD one stent


On Plavix and  ASA





(3) CHF (congestive heart failure)


ICD Codes:  I50.9 - Heart failure, unspecified


Plan:  Minimal fluid removal with HD. 


Continue PO lasix.


Follow fluid status


Fluid restriction discussed with the patient. 





(4) HTN (hypertension)


ICD Codes:  I10 - Hypertension


Status:  Chronic


Plan:  BP acceptable. 


On Imdur 120 mg daily


Norvasc 10 mg daily


Metoprolol 100 mg BID


Hydralazine 50 mg Q8h


Clonidine 0.1 mg BID


Continue to monitor





(5) DM (diabetes mellitus)


ICD Codes:  E11.9 - Type 2 diabetes mellitus without complications


Plan:  Monitor blood glucose, maintain 140-180 mg/dL.





(6) Anemia


ICD Codes:  D64.9 - Anemia, unspecified


Status:  Chronic


Plan:  Epogen with dialysis. 


On Venofer for iron deficiency. 


s/p blood transfusion 2/14. 


 (Migdalia Camejo)


Plan


patient was seen and examined. We will continue dialysis MWF. AVF surgery on 

Thursday. 


 (Kelton Whiteside MD)





Problem Qualifiers





(1) CHF (congestive heart failure):  


Qualified Codes:  I50.9 - Heart failure, unspecified


(2) DM (diabetes mellitus):  





(3) Anemia:  


Qualified Codes:  D64.9 - Anemia, unspecified








Migdalia Camejo Feb 20, 2018 09:55


Kelton Whiteside MD Feb 20, 2018 10:10

## 2018-02-20 NOTE — HHI.PR
Subjective


Remarks


Resting comfortably in bed


No event overnight


Denied chest and or short of breath


No fever or chills





Objective


Vitals





Vital Signs








  Date Time  Temp Pulse Resp B/P (MAP) Pulse Ox O2 Delivery O2 Flow Rate FiO2


 


2/20/18 17:35     99   21


 


2/20/18 15:00 98.9 82 16 127/70 (89) 99   


 


2/20/18 12:05  79      


 


2/20/18 11:45 98.0 79 18 111/67 (82) 100   


 


2/20/18 11:34     98   


 


2/20/18 10:08  81      


 


2/20/18 09:51  77      


 


2/20/18 08:00  81      


 


2/20/18 07:42 97.9 81 18 140/67 (91) 98   


 


2/20/18 07:42  81      


 


2/20/18 07:00      Room Air  


 


2/20/18 06:00  86      


 


2/20/18 05:00  84      


 


2/20/18 04:00  84      


 


2/20/18 03:00 98.7 82 16 170/78 (108) 99   


 


2/20/18 03:00  84      


 


2/20/18 02:00  92      


 


2/20/18 01:00  98      


 


2/20/18 00:00  96      


 


2/19/18 23:00  82      


 


2/19/18 22:00  96      


 


2/19/18 21:30 98.9 93 16 167/74 (105) 99   


 


2/19/18 21:30     99 Room Air  


 


2/19/18 21:00  86      


 


2/19/18 20:00  84      


 


2/19/18 19:00  80      














I/O      


 


 2/19/18 2/19/18 2/19/18 2/20/18 2/20/18 2/20/18





 07:00 15:00 23:00 07:00 15:00 23:00


 


Intake Total 240 ml  570 ml 920 ml 480 ml 


 


Output Total 300 ml  2300 ml 1000 ml  


 


Balance -60 ml  -1730 ml -80 ml 480 ml 


 


      


 


Intake Oral 240 ml  570 ml 920 ml 480 ml 


 


Output Urine Total 300 ml  300 ml 1000 ml  


 


Stool Total    0 ml  


 


Hemodialysis   2000 ml   


 


# Voids 1     








Result Diagram:  


2/17/18 0502                                                                   

             2/19/18 1058





Objective Remarks


GENERAL: This is a well-nourished, well-developed patient, in no apparent 

distress.


CARDIOVASCULAR: Regular rate and rhythm without murmurs, gallops, or rubs. 


RESPIRATORY: Clear to auscultation. Breath sounds equal bilaterally. No wheezes

, rales, or rhonchi.  


GASTROINTESTINAL: Abdomen soft, non-tender, nondistended. Normal active bowel 

sounds


MUSCULOSKELETAL: Extremities without clubbing, cyanosis, or edema.


NEURO:  Alert & Oriented x4 to person, place, time, situation.  Moves all ext x4


Procedures


perma-cath placement.





A/P


Assessment and Plan





2/20: Second opinion from Dr. Rees has been obtained he will do fistula on 

Thursday, I discussed with nephrology and cardiology, Repeat CBC BMP in a.m., 

monitor temperature, blood pressure








A/P:


Acute on Chronic diastolic CHF


Cardiomyopathy 


Continue imdur


Continue HD


Continue beta blocker





Acute on chronic Stage IV CKD


Nephrology following


Avoiding nephrotoxins


s/p perma-cath placement.


Dialysis per nephrology.





Chest pain


Recent  STEMI


Cardiology following


on aspirin and plavix


continue BB and Imdur.


Plan for cardiac cath on Friday.





recent GI bleed-resolved


Hemoglobin has remained stable since


Gastritis found on GI workup


Continue PPI





Hypertension


Continue Lopressor


Continue hydralazine


continue Lisinopril





Diabetes mellitus type 2


continue Levemir


Follow blood sugars


Insulin sliding scale


Diabetic diet


will monitor and adjust the regimen as needed.





anemia of chronic disease


 receiving iron infusion/ epogen with HD


 will monitor H/H.





Tobacco abuse


Cessation recommended


Discharge Planning


on HD- cardiac work-up in progress.


Discharge Planning


When cleared by nephrology and [facility] after fistula











Jesus Manuel Pham MD Feb 20, 2018 18:01

## 2018-02-21 VITALS
OXYGEN SATURATION: 98 % | HEART RATE: 92 BPM | TEMPERATURE: 98.6 F | SYSTOLIC BLOOD PRESSURE: 154 MMHG | RESPIRATION RATE: 16 BRPM | DIASTOLIC BLOOD PRESSURE: 77 MMHG

## 2018-02-21 VITALS
DIASTOLIC BLOOD PRESSURE: 70 MMHG | HEART RATE: 95 BPM | RESPIRATION RATE: 18 BRPM | TEMPERATURE: 98 F | SYSTOLIC BLOOD PRESSURE: 136 MMHG | OXYGEN SATURATION: 99 %

## 2018-02-21 VITALS — HEART RATE: 84 BPM

## 2018-02-21 VITALS — HEART RATE: 86 BPM

## 2018-02-21 VITALS
DIASTOLIC BLOOD PRESSURE: 85 MMHG | TEMPERATURE: 98 F | RESPIRATION RATE: 18 BRPM | OXYGEN SATURATION: 99 % | HEART RATE: 88 BPM | SYSTOLIC BLOOD PRESSURE: 166 MMHG

## 2018-02-21 VITALS
DIASTOLIC BLOOD PRESSURE: 79 MMHG | OXYGEN SATURATION: 99 % | TEMPERATURE: 98.8 F | RESPIRATION RATE: 18 BRPM | HEART RATE: 94 BPM | SYSTOLIC BLOOD PRESSURE: 162 MMHG

## 2018-02-21 VITALS
SYSTOLIC BLOOD PRESSURE: 149 MMHG | TEMPERATURE: 97.6 F | RESPIRATION RATE: 18 BRPM | OXYGEN SATURATION: 100 % | DIASTOLIC BLOOD PRESSURE: 78 MMHG | HEART RATE: 86 BPM

## 2018-02-21 VITALS
DIASTOLIC BLOOD PRESSURE: 79 MMHG | SYSTOLIC BLOOD PRESSURE: 171 MMHG | RESPIRATION RATE: 20 BRPM | HEART RATE: 96 BPM | TEMPERATURE: 98.2 F | OXYGEN SATURATION: 99 %

## 2018-02-21 VITALS — HEART RATE: 96 BPM

## 2018-02-21 VITALS — HEART RATE: 82 BPM

## 2018-02-21 VITALS — HEART RATE: 88 BPM

## 2018-02-21 VITALS — HEART RATE: 85 BPM

## 2018-02-21 VITALS — SYSTOLIC BLOOD PRESSURE: 183 MMHG | HEART RATE: 95 BPM | DIASTOLIC BLOOD PRESSURE: 83 MMHG

## 2018-02-21 VITALS — HEART RATE: 93 BPM

## 2018-02-21 VITALS — HEART RATE: 91 BPM

## 2018-02-21 VITALS — HEART RATE: 94 BPM

## 2018-02-21 VITALS — HEART RATE: 100 BPM

## 2018-02-21 VITALS — HEART RATE: 90 BPM

## 2018-02-21 LAB
ALBUMIN SERPL-MCNC: 2.7 GM/DL (ref 3.4–5)
BASOPHILS # BLD AUTO: 0.1 TH/MM3 (ref 0–0.2)
BASOPHILS NFR BLD: 0.9 % (ref 0–2)
BUN SERPL-MCNC: 29 MG/DL (ref 7–18)
CALCIUM SERPL-MCNC: 8.3 MG/DL (ref 8.5–10.1)
CHLORIDE SERPL-SCNC: 101 MEQ/L (ref 98–107)
CREAT SERPL-MCNC: 4.38 MG/DL (ref 0.5–1)
EOSINOPHIL # BLD: 0.3 TH/MM3 (ref 0–0.4)
EOSINOPHIL NFR BLD: 4.7 % (ref 0–4)
ERYTHROCYTE [DISTWIDTH] IN BLOOD BY AUTOMATED COUNT: 17.8 % (ref 11.6–17.2)
GFR SERPLBLD BASED ON 1.73 SQ M-ARVRAT: 13 ML/MIN (ref 89–?)
GLUCOSE SERPL-MCNC: 173 MG/DL (ref 74–106)
HCO3 BLD-SCNC: 27.5 MEQ/L (ref 21–32)
HCT VFR BLD CALC: 29.9 % (ref 35–46)
HGB BLD-MCNC: 10 GM/DL (ref 11.6–15.3)
LYMPHOCYTES # BLD AUTO: 1.1 TH/MM3 (ref 1–4.8)
LYMPHOCYTES NFR BLD AUTO: 17.6 % (ref 9–44)
MCH RBC QN AUTO: 32.9 PG (ref 27–34)
MCHC RBC AUTO-ENTMCNC: 33.3 % (ref 32–36)
MCV RBC AUTO: 98.7 FL (ref 80–100)
MONOCYTE #: 0.8 TH/MM3 (ref 0–0.9)
MONOCYTES NFR BLD: 12.9 % (ref 0–8)
NEUTROPHILS # BLD AUTO: 3.9 TH/MM3 (ref 1.8–7.7)
NEUTROPHILS NFR BLD AUTO: 63.9 % (ref 16–70)
PHOSPHATE SERPL-MCNC: 6.1 MG/DL (ref 2.5–4.9)
PLATELET # BLD: 273 TH/MM3 (ref 150–450)
PMV BLD AUTO: 7.6 FL (ref 7–11)
RBC # BLD AUTO: 3.03 MIL/MM3 (ref 4–5.3)
SODIUM SERPL-SCNC: 137 MEQ/L (ref 136–145)
WBC # BLD AUTO: 6.1 TH/MM3 (ref 4–11)

## 2018-02-21 RX ADMIN — SODIUM CHLORIDE SCH MLS/HR: 900 INJECTION, SOLUTION INTRAVENOUS at 09:00

## 2018-02-21 RX ADMIN — Medication SCH ML: at 08:57

## 2018-02-21 RX ADMIN — MORPHINE SULFATE PRN MG: 2 INJECTION, SOLUTION INTRAMUSCULAR; INTRAVENOUS at 02:27

## 2018-02-21 RX ADMIN — CLOPIDOGREL BISULFATE SCH MG: 75 TABLET, FILM COATED ORAL at 08:56

## 2018-02-21 RX ADMIN — SODIUM CHLORIDE SCH MLS/HR: 900 INJECTION, SOLUTION INTRAVENOUS at 12:08

## 2018-02-21 RX ADMIN — SIMETHICONE SCH MG: 125 TABLET, CHEWABLE ORAL at 13:47

## 2018-02-21 RX ADMIN — SIMETHICONE SCH MG: 125 TABLET, CHEWABLE ORAL at 05:14

## 2018-02-21 RX ADMIN — ASPIRIN 81 MG SCH MG: 81 TABLET ORAL at 08:56

## 2018-02-21 RX ADMIN — STANDARDIZED SENNA CONCENTRATE AND DOCUSATE SODIUM SCH TAB: 8.6; 5 TABLET, FILM COATED ORAL at 21:00

## 2018-02-21 RX ADMIN — SIMETHICONE SCH MG: 125 TABLET, CHEWABLE ORAL at 21:59

## 2018-02-21 RX ADMIN — EPOETIN ALFA PRN UNITS: 10000 SOLUTION INTRAVENOUS; SUBCUTANEOUS at 12:06

## 2018-02-21 RX ADMIN — GABAPENTIN SCH MG: 300 CAPSULE ORAL at 22:00

## 2018-02-21 RX ADMIN — OXYCODONE HYDROCHLORIDE AND ACETAMINOPHEN PRN TAB: 5; 325 TABLET ORAL at 16:47

## 2018-02-21 RX ADMIN — INSULIN ASPART SCH: 100 INJECTION, SOLUTION INTRAVENOUS; SUBCUTANEOUS at 22:01

## 2018-02-21 RX ADMIN — Medication SCH ML: at 22:03

## 2018-02-21 RX ADMIN — ISOSORBIDE MONONITRATE SCH MG: 60 TABLET, EXTENDED RELEASE ORAL at 05:16

## 2018-02-21 RX ADMIN — STANDARDIZED SENNA CONCENTRATE AND DOCUSATE SODIUM SCH TAB: 8.6; 5 TABLET, FILM COATED ORAL at 08:54

## 2018-02-21 RX ADMIN — INSULIN ASPART SCH: 100 INJECTION, SOLUTION INTRAVENOUS; SUBCUTANEOUS at 13:47

## 2018-02-21 RX ADMIN — ACYCLOVIR SCH UNITS: 800 TABLET ORAL at 22:02

## 2018-02-21 RX ADMIN — PRAVASTATIN SODIUM SCH MG: 40 TABLET ORAL at 21:59

## 2018-02-21 RX ADMIN — LISINOPRIL SCH MG: 20 TABLET ORAL at 08:55

## 2018-02-21 RX ADMIN — CARVEDILOL SCH MG: 12.5 TABLET, FILM COATED ORAL at 08:57

## 2018-02-21 RX ADMIN — LABETALOL HYDROCHLORIDE PRN MG: 5 INJECTION, SOLUTION INTRAVENOUS at 18:35

## 2018-02-21 RX ADMIN — INSULIN ASPART SCH: 100 INJECTION, SOLUTION INTRAVENOUS; SUBCUTANEOUS at 08:00

## 2018-02-21 RX ADMIN — GENTAMICIN SULFATE PRN MG: 10 INJECTION, SOLUTION INTRAMUSCULAR; INTRAVENOUS at 12:07

## 2018-02-21 RX ADMIN — CARVEDILOL SCH MG: 12.5 TABLET, FILM COATED ORAL at 21:59

## 2018-02-21 RX ADMIN — INSULIN ASPART SCH: 100 INJECTION, SOLUTION INTRAVENOUS; SUBCUTANEOUS at 16:59

## 2018-02-21 RX ADMIN — HEPARIN SODIUM PRN UNITS: 1000 INJECTION, SOLUTION INTRAVENOUS; SUBCUTANEOUS at 12:07

## 2018-02-21 NOTE — HHI.NPPN
Subjective


Complaints:  Shortness of Breath


General Problems:  Anemia


Renal Failure:  Chronic, Acute, Stage IV


Interval History


Seen during dialysis. AVF surgery tomorrow.


 (Migdalia Camejo)





Review of Systems


General


Constitutional:  Fatigue


 (Migdalia Camejo)





Objective Data


Data





Vital Signs








  Date Time  Temp Pulse Resp B/P (MAP) Pulse Ox O2 Delivery O2 Flow Rate FiO2


 


2/21/18 07:35  92      


 


2/21/18 07:35 98.6 92 16 154/77 (102) 98   


 


2/21/18 06:00  94      


 


2/21/18 05:00  94      


 


2/21/18 04:00 98.8 94 18 162/79 (106) 99   


 


2/21/18 04:00  97      


 


2/21/18 03:00  94      


 


2/21/18 02:43      Room Air  


 


2/21/18 02:32   18     


 


2/21/18 02:00  90      


 


2/21/18 01:00  90      


 


2/21/18 00:00  88      


 


2/21/18 00:00 98.0 86 18 166/85 (112) 99   


 


2/20/18 23:00  84      


 


2/20/18 22:00  84      


 


2/20/18 21:00  84      


 


2/20/18 20:00 97.9 83 20 142/73 (96) 99   


 


2/20/18 20:00  82      


 


2/20/18 19:00  80      


 


2/20/18 18:00  82      


 


2/20/18 17:35     99   21


 


2/20/18 17:00  80      


 


2/20/18 16:00  78      


 


2/20/18 15:00  80      


 


2/20/18 15:00 98.9 82 16 127/70 (89) 99   


 


2/20/18 14:00  80      


 


2/20/18 13:00  80      


 


2/20/18 12:05  79      


 


2/20/18 12:00  86      


 


2/20/18 11:45 98.0 79 18 111/67 (82) 100   


 


2/20/18 11:34     98   


 


2/20/18 11:00  80      


 


2/20/18 10:08  81      


 


2/20/18 10:00  80      


 


2/20/18 09:51  77      








 (Migdalia Camejo)


-:  


2/21/18 0516                                                                   

             2/21/18 0516





Tubes & Lines:  Perma-Cath


 (Migdalia Camejo)





Physical Exam


General


Appearance:  Well Developed, Well Nourished, No Acute Distress, Comfortable


 (Migdalia Camejo)





Eyes


Eye Exam:  Pupils Equal, Pupils Reactive


 (Migdalia CamejoP)





Throat


Throat Exam:  Oral Mucosa Pink & Moist


 (Migdalia CamejoP)





Pulmonary


Resp Exam:  Clear Bilaterally, Breath Sounds Equal, No Distress, Decreased Bases


 (Migdalia CamejoP)





Cardiology


CV Exam:  Regular, Normal Sinus Rhythm, Good Perfusion


 (Migdalia CamejoP)





Gastrointestinal/Abdomen


GI Exam:  Soft, Non-Tender, Bowel Sounds Present, Positive Bowel Movement


 (Migdalia Camejo)





Musculoskeletal


MS Exam:  Joints Intact, Normal Gait, Normal Tone, Good Strength


 (Migdalia Camejo)





Integumentary


Skin Exam:  Clear, Warm, Dry, Intact


 (Migadlia Camejo)





Extremeties


Extremities Exam:  No Edema, Pedal Pulses Palpable


 (Migdalia Camejo)





Neurologic


Neuro Exam:  Alert, Awake, Oriented, Speech Clear, Moving All Extremities


 (Migdalia Camejo)





Psychiatric


Psych Exam:  Appropriate Responses


 (Migdalia Camejo)





Assessment/Plan


Discussed Condition With:  Patient, Relative


Assessment Summary:  Anemia of CKD, Hypertension, Diabetes Mellitus, CKD Stage V

, End Stage Renal Disease


Problem List:  


(1) Acute kidney injury superimposed on CKD


ICD Codes:  N17.9 - Acute kidney failure, unspecified; N18.9 - Chronic kidney 

disease, unspecified


Status:  Acute


Plan:  She has reached CKD 5/ESRD. 


On HD MWF. Seen during HD today on a 2K, 350 BFR, 500 to 1L UF as she still 

makes urine. 


Permcath placed 2/12. 


Pending AVF with Dr. Rees tomorrow. We have asked her to protect left upper 

extremity from invasive procedures. 


Avoid IVF administration. 





Case management is assisting with Medicare application is appreciated. She will 

be enrolled in the transitional program at [facility] [place], potentially as early as this 

week or next. 





(2) CAD (coronary artery disease)


ICD Codes:  I25.10 - Atherosclerotic heart disease of native coronary artery 

without angina pectoris


Status:  Chronic


Plan:  RCA with 3 stents, LAD one stent


On Plavix and  ASA





(3) CHF (congestive heart failure)


ICD Codes:  I50.9 - Heart failure, unspecified


Plan:  Minimal fluid removal with HD. 


Continue PO lasix.


Follow fluid status





(4) HTN (hypertension)


ICD Codes:  I10 - Hypertension


Status:  Chronic


Plan:  BP acceptable. 


On Imdur 120 mg daily


Norvasc 10 mg daily


Metoprolol 100 mg BID


Hydralazine 50 mg Q8h


Clonidine 0.1 mg BID


Continue to monitor





(5) DM (diabetes mellitus)


ICD Codes:  E11.9 - Type 2 diabetes mellitus without complications


Plan:  Monitor blood glucose, maintain 140-180 mg/dL.





(6) Anemia


ICD Codes:  D64.9 - Anemia, unspecified


Status:  Chronic


Plan:  Epogen with dialysis. 


On Venofer for iron deficiency. 


s/p blood transfusion 2/14. 


 (Migdalia Camejo)


Plan


patient was seen during dialysis. She has reached ESRD. To have AVF surgery 

tomorrow. She will be in [place] transitional program for outpatient dialysis. 


 (Kelton Whiteside MD)





Problem Qualifiers





(1) CHF (congestive heart failure):  


Qualified Codes:  I50.9 - Heart failure, unspecified


(2) DM (diabetes mellitus):  





(3) Anemia:  


Qualified Codes:  D64.9 - Anemia, unspecified








Migdalia Camejo Feb 21, 2018 09:39


Kelton Whiteside MD Feb 21, 2018 15:55

## 2018-02-21 NOTE — HHI.PR
Subjective


Remarks


Doing well today.  Phosphorus level is elevated.  Patient will discharge after 

dialysis in 2 days.





Objective





Vital Signs








  Date Time  Temp Pulse Resp B/P (MAP) Pulse Ox O2 Delivery O2 Flow Rate FiO2


 


2/21/18 14:00  96      


 


2/21/18 13:41 98.0 95 18 136/70 (92) 99   


 


2/21/18 13:00  100      


 


2/21/18 12:00  90      


 


2/21/18 11:00  91      


 


2/21/18 10:00  86      


 


2/21/18 09:42  85      


 


2/21/18 09:00  88      


 


2/21/18 08:45      Room Air  


 


2/21/18 08:00  88      


 


2/21/18 08:00  85      


 


2/21/18 07:35  92      


 


2/21/18 07:35 98.6 92 16 154/77 (102) 98   


 


2/21/18 07:00  93      


 


2/21/18 06:00  94      


 


2/21/18 05:00  94      


 


2/21/18 04:00 98.8 94 18 162/79 (106) 99   


 


2/21/18 04:00  97      


 


2/21/18 03:00  94      


 


2/21/18 02:43      Room Air  


 


2/21/18 02:32   18     


 


2/21/18 02:00  90      


 


2/21/18 01:00  90      


 


2/21/18 00:00  88      


 


2/21/18 00:00 98.0 86 18 166/85 (112) 99   


 


2/20/18 23:00  84      


 


2/20/18 22:00  84      


 


2/20/18 21:00  84      


 


2/20/18 20:00 97.9 83 20 142/73 (96) 99   


 


2/20/18 20:00  82      


 


2/20/18 19:00  80      


 


2/20/18 18:00  82      


 


2/20/18 17:35     99   21


 


2/20/18 17:00  80      














I/O      


 


 2/20/18 2/20/18 2/20/18 2/21/18 2/21/18 2/21/18





 07:00 15:00 23:00 07:00 15:00 23:00


 


Intake Total 920 ml 480 ml 860 ml 580 ml  


 


Output Total 1000 ml  300 ml 2401 ml 1500 ml 


 


Balance -80 ml 480 ml 560 ml -1821 ml -1500 ml 


 


      


 


Intake Oral 920 ml 480 ml 860 ml 580 ml  


 


Output Urine Total 1000 ml  300 ml 2400 ml  


 


Stool Total 0 ml   1 ml  


 


Hemodialysis     1500 ml 








Result Diagram:  


2/21/18 0516 2/21/18 0516





Objective Remarks


GENERAL: NAD, A&Ox3


HEAD: Normocephalic. 


NECK: Supple, trachea midline. No lymphadenopathy.


EYES: No scleral icterus. No injection or drainage. 


CARDIOVASCULAR: Regular rate and rhythm without murmurs, gallops, or rubs. 


RESPIRATORY: Breath sounds equal bilaterally. No accessory muscle use.


GASTROINTESTINAL: Abdomen soft, non-tender, nondistended. 


MUSCULOSKELETAL: No cyanosis, or edema. 


SKIN: Warm and dry.


NEURO:  No focal neurological deficitis.





A/P


Problem List:  


(1) Renal insufficiency


ICD Code:  N28.9 - Disorder of kidney and ureter, unspecified


(2) Acute kidney injury superimposed on CKD


ICD Code:  N17.9 - Acute kidney failure, unspecified; N18.9 - Chronic kidney 

disease, unspecified


Status:  Acute


(3) DM (diabetes mellitus)


ICD Code:  E11.9 - Type 2 diabetes mellitus without complications


(4) HTN (hypertension)


ICD Code:  I10 - Hypertension


Status:  Chronic


(5) CAD (coronary artery disease)


ICD Code:  I25.10 - Atherosclerotic heart disease of native coronary artery 

without angina pectoris


Status:  Chronic


Assessment and Plan


52-year-old female presented with sob, lethargy.  Dialysis initiated secondary 

to chronic renal failure with electrolyte disturbances.





Labs reviewed.  Phosphorus levels elevated.  Continue to monitor labs.  Labs 

ordered for further monitoring.  Replace electrolytes as needed and monitor for 

elevations and electrolytes.  Continue dialysis as needed.  Outpatient dialysis 

arrangements in process.  Tentative plan for discharge in 2 days.





Acute on Chronic diastolic CHF


Cardiomyopathy 


Continue twice a day Lasix


Continue imdur


Continue beta blocker





Acute on chronic Stage IV CKD


Nephrology following


Avoiding nephrotoxins


Plan for Vas-Cath


Dialysis as needed





Chest pain


Recent an STEMI


Follow cardiac enzymes


Cardiology following


Nitroglycerin drip


Plan for heart catheter tomorrow





GI bleed


resolved





Anemia of acute blood loss


2 units transfused at time of admit


Hemoglobin has remained stable since


Gastritis found on GI workup


Continue PPI


Follow hemoglobin





Hypertension


Continue Lopressor


Continue Procardia


Continue hydralazine


Ace inhibitors on hold





Diabetes mellitus type 2


Follow blood sugars


Insulin sliding scale


Diabetic diet





Tobacco abuse


Cessation recommended





Problem Qualifiers





(1) DM (diabetes mellitus):  








Pepe Treadwell MD Feb 21, 2018 16:41

## 2018-02-21 NOTE — PD.CARD.PN
Subjective


Subjective Remarks


feeling better. denies chest pain or sob. no orthopnea. Due to have AV fistula 

placed on Thursday.





Objective


Medications





Current Medications








 Medications


  (Trade)  Dose


 Ordered  Sig/Luis Armando


 Route  Start Time


 Stop Time Status Last Admin


 


  (NS Flush)  2 ml  UNSCH  PRN


 IV FLUSH  2/4/18 11:30


    2/4/18 17:46


 


 


  (NS Flush)  2 ml  BID


 IV FLUSH  2/4/18 21:00


    2/21/18 08:57


 


 


  (Tylenol)  650 mg  Q4H  PRN


 PO  2/4/18 11:30


    2/16/18 18:13


 


 


  (Zofran Inj)  4 mg  Q6H  PRN


 IVP  2/4/18 11:30


    2/15/18 10:21


 


 


  (Restoril)  15 mg  HS  PRN


 PO  2/4/18 11:30


    2/20/18 22:34


 


 


  (Narcan Inj)  0.4 mg  UNSCH  PRN


 IV PUSH  2/4/18 11:30


     


 


 


  (Theresa-Colace)  1 tab  BID


 PO  2/4/18 21:00


    2/20/18 08:49


 


 


  (Milk Of


 Magnesia Liq)  30 ml  Q12H  PRN


 PO  2/4/18 11:30


    2/18/18 18:00


 


 


  (Senokot)  17.2 mg  Q12H  PRN


 PO  2/4/18 11:30


     


 


 


  (Dulcolax Supp)  10 mg  DAILY  PRN


 RECTAL  2/4/18 11:30


     


 


 


  (Lactulose Liq)  30 ml  DAILY  PRN


 PO  2/4/18 11:30


    2/20/18 08:50


 


 


  (Aspirin Chew)  81 mg  DAILY


 CHEW  2/5/18 09:00


   Future hold 2/21/18 08:56


 


 


  (Neurontin)  300 mg  HS


 PO  2/4/18 21:00


    2/20/18 22:29


 


 


  (Apresoline)  50 mg  Q8HR


 PO  2/4/18 14:00


    2/21/18 05:14


 


 


  (Imdur)  120 mg  DAILY@07


 PO  2/5/18 07:00


    2/21/18 05:16


 


 


  (Nitrostat Sl)  0.4 mg  Q5M  PRN


 SL  2/4/18 11:30


    2/7/18 10:32


 


 


  (Pravachol)  40 mg  HS


 PO  2/4/18 21:00


    2/20/18 22:28


 


 


  (Phazyme Chew)  125 mg  Q8HR


 PO  2/4/18 14:00


    2/21/18 05:14


 


 


  (Trandate Inj)  10 mg  Q20M  PRN


 IV PUSH  2/4/18 11:45


    2/16/18 18:54


 


 


  (Apresoline Inj)  20 mg  Q4H  PRN


 IV PUSH  2/4/18 11:45


    2/16/18 18:22


 


 


  (Duoneb Neb)  1 ampule  Q4HR NEB  PRN


 NEB  2/4/18 16:15


    2/6/18 01:51


 


 


  (Ativan)  0.5 mg  Q8HR  PRN


 PO  2/4/18 16:15


    2/16/18 21:14


 


 


  (D50w (Vial) Inj)  50 ml  UNSCH  PRN


 IV PUSH  2/4/18 16:15


    2/5/18 04:20


 


 


  (Glucagon Inj)  1 mg  UNSCH  PRN


 OTHER  2/4/18 16:15


     


 


 


  (NovoLOG


 SUPPLEMENTAL


 SCALE)  1  ACHS SLIDING  SCALE


 SQ  2/4/18 17:00


    2/20/18 17:00


 


 


 Sodium Chloride  1,000 ml @ 


 0 mls/hr  Q0M PRN


 OTHER  2/7/18 10:20


     


 


 


  (Heparin Inj)  8,000 units  UNSCH  PRN


 IV FLUSH  2/7/18 10:30


     


 


 


 Sodium Chloride  1,000 ml @ 


 200 mls/hr  Q5H PRN


 IV  2/7/18 10:20


     


 


 


 Sodium Chloride  1,000 ml @ 


 0 mls/hr  Q0M PRN


 OTHER  2/7/18 10:20


     


 


 


  (Mannitol Inj)  12.5 gm  UNSCH  PRN


 IV  2/7/18 10:30


     


 


 


 Albumin Human  100 ml @ 


 60 mls/hr  UNSCH  PRN


 IV  2/7/18 10:30


     


 


 


  (NS Flush)  5 ml  UNSCH  PRN


 IV FLUSH  2/7/18 10:30


     


 


 


  (Heparin Inj)    UNSCH  PRN


 .XX  2/7/18 10:30


    2/16/18 16:09


 


 


  (Gentamicin Inj)  20 mg  UNSCH  PRN


 OTHER  2/7/18 10:30


    2/16/18 16:08


 


 


  (Zofran Inj)  4 mg  UNSCH  PRN


 IV PUSH  2/7/18 10:30


     


 


 


  (Tylenol)  650 mg  UNSCH  PRN


 PO  2/7/18 10:30


    2/9/18 19:50


 


 


  (Benadryl)  25 mg  UNSCH  PRN


 PO  2/7/18 10:30


    2/12/18 06:06


 


 


  (Nitrostat Sl)  0.4 mg  UNSCH  PRN


 SL  2/7/18 10:30


     


 


 


  (Catapres)  0.1 mg  UNSCH  PRN


 PO  2/7/18 10:30


    2/14/18 18:31


 


 


  (Epogen Inj)  10,000 units  UNSCH  PRN


 IV PUSH  2/7/18 10:30


    2/19/18 19:32


 


 


  (Gelfoam 12 Mm/7


 Mm Top)  1 foam  UNSCH  PRN


 TOP  2/7/18 10:30


     


 


 


 Nitroglycerin/


 Dextrose  250 ml @ 


 1.5 mls/hr  TITRATE  PRN


 IV  2/7/18 12:00


    2/10/18 07:00


 


 


  (NS Flush)    UNSCH  PRN


 IV FLUSH  2/12/18 14:45


     


 


 


  (Heparin Inj)    UNSCH  PRN


 IV FLUSH  2/12/18 14:45


     


 


 


  (Morphine Inj)  2 mg  Q3HR  PRN


 IV PUSH  2/12/18 17:45


    2/21/18 02:27


 


 


 Iron Sucrose 100


 mg/Sodium Chloride  105 ml @ 


 105 mls/hr  DAILY


 IV  2/13/18 11:00


 2/22/18 09:59  2/19/18 09:00


 


 


  (Prinivil)  40 mg  DAILY


 PO  2/13/18 09:00


    2/21/18 08:55


 


 


  (Plavix)  75 mg  DAILY


 PO  2/14/18 09:00


    2/21/18 08:56


 


 


  (Levemir Inj)  10 units  HS


 SQ  2/14/18 21:00


    2/20/18 22:35


 


 


  (Coreg)  25 mg  Q12HR


 PO  2/14/18 21:00


    2/21/18 08:57


 


 


  (Atropine Inj)  0.5 mg  UNSCH  PRN


 IV PUSH  2/16/18 12:45


     


 


 


  (Percocet  5-325


 Mg)  1 tab  Q4H  PRN


 PO  2/17/18 00:15


    2/19/18 23:45


 


 


  (Morphine Inj)  2 mg  Q4H  PRN


 IM  2/18/18 12:15


     


 


 


  (Catapres)  0.1 mg  Q8H


 PO  2/18/18 18:00


    2/21/18 02:27


 








Vital Signs / I&O





Vital Signs








  Date Time  Temp Pulse Resp B/P (MAP) Pulse Ox O2 Delivery O2 Flow Rate FiO2


 


2/21/18 07:35  92      


 


2/21/18 07:35 98.6 92 16 154/77 (102) 98   


 


2/21/18 06:00  94      


 


2/21/18 05:00  94      


 


2/21/18 04:00 98.8 94 18 162/79 (106) 99   


 


2/21/18 04:00  97      


 


2/21/18 03:00  94      


 


2/21/18 02:43      Room Air  


 


2/21/18 02:32   18     


 


2/21/18 02:00  90      


 


2/21/18 01:00  90      


 


2/21/18 00:00  88      


 


2/21/18 00:00 98.0 86 18 166/85 (112) 99   


 


2/20/18 23:00  84      


 


2/20/18 22:00  84      


 


2/20/18 21:00  84      


 


2/20/18 20:00 97.9 83 20 142/73 (96) 99   


 


2/20/18 20:00  82      


 


2/20/18 19:00  80      


 


2/20/18 18:00  82      


 


2/20/18 17:35     99   21


 


2/20/18 17:00  80      


 


2/20/18 16:00  78      


 


2/20/18 15:00  80      


 


2/20/18 15:00 98.9 82 16 127/70 (89) 99   


 


2/20/18 14:00  80      


 


2/20/18 13:00  80      


 


2/20/18 12:05  79      


 


2/20/18 12:00  86      


 


2/20/18 11:45 98.0 79 18 111/67 (82) 100   


 


2/20/18 11:34     98   


 


2/20/18 11:00  80      


 


2/20/18 10:08  81      


 


2/20/18 10:00  80      


 


2/20/18 09:51  77      














I/O      


 


 2/20/18 2/20/18 2/20/18 2/21/18 2/21/18 2/21/18





 07:00 15:00 23:00 07:00 15:00 23:00


 


Intake Total 920 ml 480 ml 860 ml 580 ml  


 


Output Total 1000 ml  300 ml 2401 ml  


 


Balance -80 ml 480 ml 560 ml -1821 ml  


 


      


 


Intake Oral 920 ml 480 ml 860 ml 580 ml  


 


Output Urine Total 1000 ml  300 ml 2400 ml  


 


Stool Total 0 ml   1 ml  








Physical Exam


GENERAL: 


SKIN: Warm and dry.


HEAD: Atraumatic. Normocephalic. 


EYES: Pupils equal and round. No scleral icterus. No injection or drainage. 


ENT: No nasal bleeding or discharge.  .


NECK: Trachea midline. No JVD. 


CARDIOVASCULAR: Regular rate and rhythm.  no murmurs


RESPIRATORY: No accessory muscle use. Clear to auscultation. Breath sounds 

equal bilaterally. 


GASTROINTESTINAL: Abdomen soft, non-tender, nondistended. 


MUSCULOSKELETAL: Extremities without clubbing, cyanosis, or edema. No obvious 

deformities. 


NEUROLOGICAL: Awake and alert. No obvious cranial nerve deficits.   Normal 

speech.


PSYCHIATRIC: Appropriate mood and affect; insight and judgment normal.


Laboratory





Laboratory Tests








Test


  2/21/18


05:16


 


White Blood Count 6.1 TH/MM3 


 


Red Blood Count 3.03 MIL/MM3 


 


Hemoglobin 10.0 GM/DL 


 


Hematocrit 29.9 % 


 


Mean Corpuscular Volume 98.7 FL 


 


Mean Corpuscular Hemoglobin 32.9 PG 


 


Mean Corpuscular Hemoglobin


Concent 33.3 % 


 


 


Red Cell Distribution Width 17.8 % 


 


Platelet Count 273 TH/MM3 


 


Mean Platelet Volume 7.6 FL 


 


Neutrophils (%) (Auto) 63.9 % 


 


Lymphocytes (%) (Auto) 17.6 % 


 


Monocytes (%) (Auto) 12.9 % 


 


Eosinophils (%) (Auto) 4.7 % 


 


Basophils (%) (Auto) 0.9 % 


 


Neutrophils # (Auto) 3.9 TH/MM3 


 


Lymphocytes # (Auto) 1.1 TH/MM3 


 


Monocytes # (Auto) 0.8 TH/MM3 


 


Eosinophils # (Auto) 0.3 TH/MM3 


 


Basophils # (Auto) 0.1 TH/MM3 


 


CBC Comment DIFF FINAL 


 


Differential Comment  


 


Blood Urea Nitrogen 29 MG/DL 


 


Creatinine 4.38 MG/DL 


 


Random Glucose 173 MG/DL 


 


Albumin 2.7 GM/DL 


 


Calcium Level 8.3 MG/DL 


 


Phosphorus Level 6.1 MG/DL 


 


Sodium Level 137 MEQ/L 


 


Potassium Level 4.3 MEQ/L 


 


Chloride Level 101 MEQ/L 


 


Carbon Dioxide Level 27.5 MEQ/L 


 


Anion Gap 9 MEQ/L 


 


Estimat Glomerular Filtration


Rate 13 ML/MIN 


 











Assessment and Plan


Problem List:  


(1) CAD (coronary artery disease)


ICD Codes:  I25.10 - Atherosclerotic heart disease of native coronary artery 

without angina pectoris


Status:  Chronic


(2) CHF (congestive heart failure)


ICD Codes:  I50.9 - Heart failure, unspecified


(3) Acute kidney injury superimposed on CKD


ICD Codes:  N17.9 - Acute kidney failure, unspecified; N18.9 - Chronic kidney 

disease, unspecified


Status:  Acute


Assessment and Plan


54 yo AAF recently discharged from this facility after prolonged 

hospitalization for chest pain, CHF and CKD. Readmitted for progression of 

symptoms





CAD- s/p PCI BMS to proximal, mid, distal RCA and mid LAD


no chest pain currently


cont asa, plavix, Imdur, bb and ACEi


 


CKD/ ESRD- receiving HD. due to have AV fistula place tomorrow.


anemia improving.





Problem Qualifiers





(1) CHF (congestive heart failure):  


Qualified Codes:  I50.9 - Heart failure, unspecified








Blanca Luong Feb 21, 2018 09:12

## 2018-02-22 VITALS — HEART RATE: 94 BPM

## 2018-02-22 VITALS — HEART RATE: 92 BPM

## 2018-02-22 VITALS
HEART RATE: 87 BPM | SYSTOLIC BLOOD PRESSURE: 151 MMHG | DIASTOLIC BLOOD PRESSURE: 90 MMHG | TEMPERATURE: 98.5 F | OXYGEN SATURATION: 100 % | RESPIRATION RATE: 18 BRPM

## 2018-02-22 VITALS — HEART RATE: 88 BPM

## 2018-02-22 VITALS
OXYGEN SATURATION: 100 % | SYSTOLIC BLOOD PRESSURE: 180 MMHG | DIASTOLIC BLOOD PRESSURE: 81 MMHG | RESPIRATION RATE: 18 BRPM | TEMPERATURE: 98.6 F | HEART RATE: 89 BPM

## 2018-02-22 VITALS
OXYGEN SATURATION: 100 % | RESPIRATION RATE: 16 BRPM | SYSTOLIC BLOOD PRESSURE: 165 MMHG | TEMPERATURE: 99.8 F | HEART RATE: 97 BPM | DIASTOLIC BLOOD PRESSURE: 79 MMHG

## 2018-02-22 VITALS — HEART RATE: 96 BPM

## 2018-02-22 VITALS
RESPIRATION RATE: 18 BRPM | HEART RATE: 92 BPM | SYSTOLIC BLOOD PRESSURE: 156 MMHG | DIASTOLIC BLOOD PRESSURE: 69 MMHG | OXYGEN SATURATION: 100 % | TEMPERATURE: 98.2 F

## 2018-02-22 VITALS
DIASTOLIC BLOOD PRESSURE: 76 MMHG | HEART RATE: 76 BPM | SYSTOLIC BLOOD PRESSURE: 163 MMHG | OXYGEN SATURATION: 100 % | RESPIRATION RATE: 18 BRPM

## 2018-02-22 VITALS — HEART RATE: 90 BPM

## 2018-02-22 VITALS — OXYGEN SATURATION: 99 %

## 2018-02-22 LAB
ALBUMIN SERPL-MCNC: 2.5 GM/DL (ref 3.4–5)
ALP SERPL-CCNC: 159 U/L (ref 45–117)
ALT SERPL-CCNC: 18 U/L (ref 10–53)
AST SERPL-CCNC: 21 U/L (ref 15–37)
BASOPHILS # BLD AUTO: 0.1 TH/MM3 (ref 0–0.2)
BASOPHILS NFR BLD: 1 % (ref 0–2)
BILIRUB SERPL-MCNC: 0.3 MG/DL (ref 0.2–1)
BUN SERPL-MCNC: 23 MG/DL (ref 7–18)
CALCIUM SERPL-MCNC: 8.1 MG/DL (ref 8.5–10.1)
CHLORIDE SERPL-SCNC: 102 MEQ/L (ref 98–107)
CREAT SERPL-MCNC: 3.17 MG/DL (ref 0.5–1)
EOSINOPHIL # BLD: 0.2 TH/MM3 (ref 0–0.4)
EOSINOPHIL NFR BLD: 3.3 % (ref 0–4)
ERYTHROCYTE [DISTWIDTH] IN BLOOD BY AUTOMATED COUNT: 17.2 % (ref 11.6–17.2)
GFR SERPLBLD BASED ON 1.73 SQ M-ARVRAT: 19 ML/MIN (ref 89–?)
GLUCOSE SERPL-MCNC: 262 MG/DL (ref 74–106)
HCO3 BLD-SCNC: 26.3 MEQ/L (ref 21–32)
HCT VFR BLD CALC: 30.8 % (ref 35–46)
HGB BLD-MCNC: 10.1 GM/DL (ref 11.6–15.3)
LYMPHOCYTES # BLD AUTO: 1.3 TH/MM3 (ref 1–4.8)
LYMPHOCYTES NFR BLD AUTO: 22 % (ref 9–44)
MAGNESIUM SERPL-MCNC: 2.6 MG/DL (ref 1.5–2.5)
MCH RBC QN AUTO: 32.5 PG (ref 27–34)
MCHC RBC AUTO-ENTMCNC: 32.8 % (ref 32–36)
MCV RBC AUTO: 99.1 FL (ref 80–100)
MONOCYTE #: 1.1 TH/MM3 (ref 0–0.9)
MONOCYTES NFR BLD: 17.5 % (ref 0–8)
NEUTROPHILS # BLD AUTO: 3.4 TH/MM3 (ref 1.8–7.7)
NEUTROPHILS NFR BLD AUTO: 56.2 % (ref 16–70)
PHOSPHATE SERPL-MCNC: 4.8 MG/DL (ref 2.5–4.9)
PLATELET # BLD: 306 TH/MM3 (ref 150–450)
PMV BLD AUTO: 7.8 FL (ref 7–11)
PROT SERPL-MCNC: 6.5 GM/DL (ref 6.4–8.2)
RBC # BLD AUTO: 3.11 MIL/MM3 (ref 4–5.3)
SODIUM SERPL-SCNC: 137 MEQ/L (ref 136–145)
WBC # BLD AUTO: 6 TH/MM3 (ref 4–11)

## 2018-02-22 PROCEDURE — 03180ZF BYPASS LEFT BRACHIAL ARTERY TO LOWER ARM VEIN, OPEN APPROACH: ICD-10-PCS | Performed by: SURGERY

## 2018-02-22 RX ADMIN — STANDARDIZED SENNA CONCENTRATE AND DOCUSATE SODIUM SCH TAB: 8.6; 5 TABLET, FILM COATED ORAL at 21:03

## 2018-02-22 RX ADMIN — SIMETHICONE SCH MG: 125 TABLET, CHEWABLE ORAL at 06:27

## 2018-02-22 RX ADMIN — TEMAZEPAM PRN MG: 15 CAPSULE ORAL at 01:04

## 2018-02-22 RX ADMIN — INSULIN ASPART SCH: 100 INJECTION, SOLUTION INTRAVENOUS; SUBCUTANEOUS at 17:00

## 2018-02-22 RX ADMIN — INSULIN ASPART SCH: 100 INJECTION, SOLUTION INTRAVENOUS; SUBCUTANEOUS at 08:00

## 2018-02-22 RX ADMIN — INSULIN ASPART SCH: 100 INJECTION, SOLUTION INTRAVENOUS; SUBCUTANEOUS at 21:00

## 2018-02-22 RX ADMIN — SIMETHICONE SCH MG: 125 TABLET, CHEWABLE ORAL at 13:17

## 2018-02-22 RX ADMIN — STANDARDIZED SENNA CONCENTRATE AND DOCUSATE SODIUM SCH TAB: 8.6; 5 TABLET, FILM COATED ORAL at 10:17

## 2018-02-22 RX ADMIN — INSULIN ASPART SCH: 100 INJECTION, SOLUTION INTRAVENOUS; SUBCUTANEOUS at 12:00

## 2018-02-22 RX ADMIN — LISINOPRIL SCH MG: 20 TABLET ORAL at 10:15

## 2018-02-22 RX ADMIN — SIMETHICONE SCH MG: 125 TABLET, CHEWABLE ORAL at 21:03

## 2018-02-22 RX ADMIN — PRAVASTATIN SODIUM SCH MG: 40 TABLET ORAL at 21:04

## 2018-02-22 RX ADMIN — ACYCLOVIR SCH UNITS: 800 TABLET ORAL at 21:00

## 2018-02-22 RX ADMIN — CARVEDILOL SCH MG: 12.5 TABLET, FILM COATED ORAL at 10:16

## 2018-02-22 RX ADMIN — OXYCODONE HYDROCHLORIDE AND ACETAMINOPHEN PRN TAB: 5; 325 TABLET ORAL at 21:03

## 2018-02-22 RX ADMIN — Medication SCH ML: at 21:04

## 2018-02-22 RX ADMIN — CARVEDILOL SCH MG: 12.5 TABLET, FILM COATED ORAL at 21:03

## 2018-02-22 RX ADMIN — MORPHINE SULFATE PRN MG: 2 INJECTION, SOLUTION INTRAMUSCULAR; INTRAVENOUS at 01:05

## 2018-02-22 RX ADMIN — GABAPENTIN SCH MG: 300 CAPSULE ORAL at 21:03

## 2018-02-22 RX ADMIN — CLOPIDOGREL BISULFATE SCH MG: 75 TABLET, FILM COATED ORAL at 10:16

## 2018-02-22 RX ADMIN — Medication SCH ML: at 09:00

## 2018-02-22 RX ADMIN — ISOSORBIDE MONONITRATE SCH MG: 60 TABLET, EXTENDED RELEASE ORAL at 06:29

## 2018-02-22 RX ADMIN — ASPIRIN 81 MG SCH MG: 81 TABLET ORAL at 10:16

## 2018-02-22 NOTE — HHI.PR
cc:   Migdalia Camejo; Arik Rees MD


__________________________________________________





Immediate Post Op Note


Procedure Date:


Feb 22, 2018


Pre Op Diagnosis:  


ESRD, need for HD access


Post Op Diagnosis:  


ESRD, need for HD access


Surgeon:


Arik Rees


Assistant(s):


Marilu Douglas


Procedure:


LEFT brachiobasilic AVF (1st stage)


Findings:


3mm vein, 4mm artery


Additional Information:


decent thrill after case


good Doppler signal in radial artery


Complications:


none


Specimen(s) removed:


none


Estimated blood loss:


20mL


Anesthesia:  General


Drains:  None


Fluids:  250mL


IVF


Patient to:  PACU


Patient Condition:  Good


Date/Time of Procedure:  SEE SURGICAL CARE RECORD











Arik Rees MD Feb 22, 2018 16:53

## 2018-02-22 NOTE — HHI.PR
Subjective


Remarks


P.  Blood work looks more stable today after dialysis yesterday.  Patient is 

tearful today in regards to her situation.





Objective





Vital Signs








  Date Time  Temp Pulse Resp B/P (MAP) Pulse Ox O2 Delivery O2 Flow Rate FiO2


 


2/22/18 13:18        


 


2/22/18 11:33        21


 


2/22/18 08:00  87      


 


2/22/18 08:00      Room Air  


 


2/22/18 06:00  92      


 


2/22/18 05:00  90      


 


2/22/18 04:00  90      


 


2/22/18 03:46 98.2 92 18 156/69 (98) 100   


 


2/22/18 03:00  92      


 


2/22/18 02:00  96      


 


2/22/18 01:00  94      


 


2/22/18 00:00  89      


 


2/22/18 00:00 98.6 92 18 180/81 (114) 100   


 


2/21/18 23:00  86      


 


2/21/18 22:00  84      


 


2/21/18 21:00  82      


 


2/21/18 20:00      Room Air  


 


2/21/18 20:00      Room Air  


 


2/21/18 20:00 97.6 86 18 149/78 (101) 100   


 


2/21/18 20:00  85      


 


2/21/18 19:00  86      


 


2/21/18 18:30  95  183/83 (116)    


 


2/21/18 18:00  88      


 


2/21/18 17:00  93      


 


2/21/18 16:30 98.2 96 20 171/79 (109) 99   














I/O      


 


 2/21/18 2/21/18 2/21/18 2/22/18 2/22/18 2/22/18





 07:00 15:00 23:00 07:00 15:00 23:00


 


Intake Total 580 ml  480 ml 240 ml  


 


Output Total 2401 ml 1500 ml 1100 ml 500 ml  


 


Balance -1821 ml -1500 ml -620 ml -260 ml  


 


      


 


Intake Oral 580 ml  480 ml 240 ml  


 


Output Urine Total 2400 ml  1100 ml 500 ml  


 


Stool Total 1 ml     


 


Hemodialysis  1500 ml    


 


# Bowel Movements    0  








Result Diagram:  


2/22/18 0439 2/22/18 0439





Objective Remarks


GENERAL: NAD, A&Ox3


HEAD: Normocephalic. 


NECK: Supple, trachea midline. No lymphadenopathy.


EYES: No scleral icterus. No injection or drainage. 


CARDIOVASCULAR: Regular rate and rhythm without murmurs, gallops, or rubs. 


RESPIRATORY: Breath sounds equal bilaterally. No accessory muscle use.


GASTROINTESTINAL: Abdomen soft, non-tender, nondistended. 


MUSCULOSKELETAL: No cyanosis, or edema. 


SKIN: Warm and dry.


NEURO:  No focal neurological deficitis.





A/P


Problem List:  


(1) Renal insufficiency


ICD Code:  N28.9 - Disorder of kidney and ureter, unspecified


(2) Acute kidney injury superimposed on CKD


ICD Code:  N17.9 - Acute kidney failure, unspecified; N18.9 - Chronic kidney 

disease, unspecified


Status:  Acute


(3) DM (diabetes mellitus)


ICD Code:  E11.9 - Type 2 diabetes mellitus without complications


(4) HTN (hypertension)


ICD Code:  I10 - Hypertension


Status:  Chronic


(5) CAD (coronary artery disease)


ICD Code:  I25.10 - Atherosclerotic heart disease of native coronary artery 

without angina pectoris


Status:  Chronic


Assessment and Plan


52-year-old female presented with sob, lethargy.  Dialysis initiated secondary 

to chronic renal failure with electrolyte disturbances.





Labs reviewed.  Phosphorus levels have correct compared to yesterday.  Continue 

to monitor labs.  Labs ordered for further monitoring.  Continue to monitor and 

replace I lites as needed.  Plan for dialysis tomorrow.  Option for psychiatry 

consult was discussed with the patient.  She is not interested in talking 

psychiatrist this time.





Acute on Chronic diastolic CHF


Cardiomyopathy 


Continue twice a day Lasix


Continue imdur


Continue beta blocker





Acute on chronic Stage IV CKD


Nephrology following


Avoiding nephrotoxins


Plan for Vas-Cath


Dialysis as needed





Chest pain


Recent an STEMI


Follow cardiac enzymes


Cardiology following


Nitroglycerin drip


Plan for heart catheter tomorrow





GI bleed


resolved





Anemia of acute blood loss


2 units transfused at time of admit


Hemoglobin has remained stable since


Gastritis found on GI workup


Continue PPI


Follow hemoglobin





Hypertension


Continue Lopressor


Continue Procardia


Continue hydralazine


Ace inhibitors on hold





Diabetes mellitus type 2


Follow blood sugars


Insulin sliding scale


Diabetic diet





Tobacco abuse


Cessation recommended





Problem Qualifiers





(1) DM (diabetes mellitus):  








Pepe Treadwell MD Feb 22, 2018 14:08

## 2018-02-22 NOTE — PD.CARD.PN
Subjective


Subjective Remarks


tearful today and frustrated, "I want to go home today". AV fistula to be 

placed this afternoon.


no chest pain or sob.





Objective


Medications





Current Medications








 Medications


  (Trade)  Dose


 Ordered  Sig/Luis Armando


 Route  Start Time


 Stop Time Status Last Admin


 


  (NS Flush)  2 ml  UNSCH  PRN


 IV FLUSH  2/4/18 11:30


    2/4/18 17:46


 


 


  (NS Flush)  2 ml  BID


 IV FLUSH  2/4/18 21:00


    2/21/18 22:03


 


 


  (Tylenol)  650 mg  Q4H  PRN


 PO  2/4/18 11:30


    2/16/18 18:13


 


 


  (Zofran Inj)  4 mg  Q6H  PRN


 IVP  2/4/18 11:30


    2/15/18 10:21


 


 


  (Restoril)  15 mg  HS  PRN


 PO  2/4/18 11:30


    2/22/18 01:04


 


 


  (Narcan Inj)  0.4 mg  UNSCH  PRN


 IV PUSH  2/4/18 11:30


     


 


 


  (Theresa-Colace)  1 tab  BID


 PO  2/4/18 21:00


    2/20/18 08:49


 


 


  (Milk Of


 Magnesia Liq)  30 ml  Q12H  PRN


 PO  2/4/18 11:30


    2/18/18 18:00


 


 


  (Senokot)  17.2 mg  Q12H  PRN


 PO  2/4/18 11:30


     


 


 


  (Dulcolax Supp)  10 mg  DAILY  PRN


 RECTAL  2/4/18 11:30


     


 


 


  (Lactulose Liq)  30 ml  DAILY  PRN


 PO  2/4/18 11:30


    2/20/18 08:50


 


 


  (Aspirin Chew)  81 mg  DAILY


 CHEW  2/5/18 09:00


   Future hold 2/21/18 08:56


 


 


  (Neurontin)  300 mg  HS


 PO  2/4/18 21:00


    2/21/18 22:00


 


 


  (Apresoline)  50 mg  Q8HR


 PO  2/4/18 14:00


    2/22/18 06:27


 


 


  (Imdur)  120 mg  DAILY@07


 PO  2/5/18 07:00


    2/22/18 06:29


 


 


  (Nitrostat Sl)  0.4 mg  Q5M  PRN


 SL  2/4/18 11:30


    2/7/18 10:32


 


 


  (Pravachol)  40 mg  HS


 PO  2/4/18 21:00


    2/21/18 21:59


 


 


  (Phazyme Chew)  125 mg  Q8HR


 PO  2/4/18 14:00


    2/22/18 06:27


 


 


  (Trandate Inj)  10 mg  Q20M  PRN


 IV PUSH  2/4/18 11:45


    2/21/18 18:35


 


 


  (Apresoline Inj)  20 mg  Q4H  PRN


 IV PUSH  2/4/18 11:45


    2/22/18 01:05


 


 


  (Duoneb Neb)  1 ampule  Q4HR NEB  PRN


 NEB  2/4/18 16:15


    2/6/18 01:51


 


 


  (Ativan)  0.5 mg  Q8HR  PRN


 PO  2/4/18 16:15


    2/21/18 16:58


 


 


  (D50w (Vial) Inj)  50 ml  UNSCH  PRN


 IV PUSH  2/4/18 16:15


    2/5/18 04:20


 


 


  (Glucagon Inj)  1 mg  UNSCH  PRN


 OTHER  2/4/18 16:15


     


 


 


  (NovoLOG


 SUPPLEMENTAL


 SCALE)  1  ACHS SLIDING  SCALE


 SQ  2/4/18 17:00


    2/21/18 22:01


 


 


 Sodium Chloride  1,000 ml @ 


 0 mls/hr  Q0M PRN


 OTHER  2/7/18 10:20


     


 


 


  (Heparin Inj)  8,000 units  UNSCH  PRN


 IV FLUSH  2/7/18 10:30


     


 


 


 Sodium Chloride  1,000 ml @ 


 200 mls/hr  Q5H PRN


 IV  2/7/18 10:20


     


 


 


 Sodium Chloride  1,000 ml @ 


 0 mls/hr  Q0M PRN


 OTHER  2/7/18 10:20


     


 


 


  (Mannitol Inj)  12.5 gm  UNSCH  PRN


 IV  2/7/18 10:30


     


 


 


 Albumin Human  100 ml @ 


 60 mls/hr  UNSCH  PRN


 IV  2/7/18 10:30


     


 


 


  (NS Flush)  5 ml  UNSCH  PRN


 IV FLUSH  2/7/18 10:30


     


 


 


  (Heparin Inj)    UNSCH  PRN


 .XX  2/7/18 10:30


    2/21/18 12:07


 


 


  (Gentamicin Inj)  20 mg  UNSCH  PRN


 OTHER  2/7/18 10:30


    2/21/18 12:07


 


 


  (Zofran Inj)  4 mg  UNSCH  PRN


 IV PUSH  2/7/18 10:30


     


 


 


  (Tylenol)  650 mg  UNSCH  PRN


 PO  2/7/18 10:30


    2/9/18 19:50


 


 


  (Benadryl)  25 mg  UNSCH  PRN


 PO  2/7/18 10:30


    2/12/18 06:06


 


 


  (Nitrostat Sl)  0.4 mg  UNSCH  PRN


 SL  2/7/18 10:30


     


 


 


  (Catapres)  0.1 mg  UNSCH  PRN


 PO  2/7/18 10:30


    2/14/18 18:31


 


 


  (Epogen Inj)  10,000 units  UNSCH  PRN


 IV PUSH  2/7/18 10:30


    2/21/18 12:06


 


 


  (Gelfoam 12 Mm/7


 Mm Top)  1 foam  UNSCH  PRN


 TOP  2/7/18 10:30


     


 


 


 Nitroglycerin/


 Dextrose  250 ml @ 


 1.5 mls/hr  TITRATE  PRN


 IV  2/7/18 12:00


    2/10/18 07:00


 


 


  (NS Flush)    UNSCH  PRN


 IV FLUSH  2/12/18 14:45


     


 


 


  (Heparin Inj)    UNSCH  PRN


 IV FLUSH  2/12/18 14:45


     


 


 


  (Morphine Inj)  2 mg  Q3HR  PRN


 IV PUSH  2/12/18 17:45


    2/22/18 01:05


 


 


 Iron Sucrose 100


 mg/Sodium Chloride  105 ml @ 


 105 mls/hr  DAILY


 IV  2/13/18 11:00


 2/22/18 09:59  2/21/18 12:08


 


 


  (Prinivil)  40 mg  DAILY


 PO  2/13/18 09:00


    2/21/18 08:55


 


 


  (Plavix)  75 mg  DAILY


 PO  2/14/18 09:00


    2/21/18 08:56


 


 


  (Levemir Inj)  10 units  HS


 SQ  2/14/18 21:00


    2/21/18 22:02


 


 


  (Coreg)  25 mg  Q12HR


 PO  2/14/18 21:00


    2/21/18 21:59


 


 


  (Atropine Inj)  0.5 mg  UNSCH  PRN


 IV PUSH  2/16/18 12:45


     


 


 


  (Percocet  5-325


 Mg)  1 tab  Q4H  PRN


 PO  2/17/18 00:15


    2/21/18 16:47


 


 


  (Morphine Inj)  2 mg  Q4H  PRN


 IM  2/18/18 12:15


     


 


 


  (Catapres)  0.1 mg  Q8H


 PO  2/18/18 18:00


    2/22/18 03:36


 








Vital Signs / I&O





Vital Signs








  Date Time  Temp Pulse Resp B/P (MAP) Pulse Ox O2 Delivery O2 Flow Rate FiO2


 


2/22/18 06:00  92      


 


2/22/18 05:00  90      


 


2/22/18 04:00  90      


 


2/22/18 03:46 98.2 92 18 156/69 (98) 100   


 


2/22/18 03:00  92      


 


2/22/18 02:00  96      


 


2/22/18 01:00  94      


 


2/22/18 00:00  89      


 


2/22/18 00:00 98.6 92 18 180/81 (114) 100   


 


2/21/18 23:00  86      


 


2/21/18 22:00  84      


 


2/21/18 21:00  82      


 


2/21/18 20:00      Room Air  


 


2/21/18 20:00      Room Air  


 


2/21/18 20:00 97.6 86 18 149/78 (101) 100   


 


2/21/18 20:00  85      


 


2/21/18 19:00  86      


 


2/21/18 18:30  95  183/83 (116)    


 


2/21/18 18:00  88      


 


2/21/18 17:00  93      


 


2/21/18 16:30 98.2 96 20 171/79 (109) 99   


 


2/21/18 14:00  96      


 


2/21/18 13:41 98.0 95 18 136/70 (92) 99   


 


2/21/18 13:00  100      


 


2/21/18 12:00  90      


 


2/21/18 11:00  91      


 


2/21/18 10:00  86      


 


2/21/18 09:42  85      


 


2/21/18 09:00  88      


 


2/21/18 08:45      Room Air  














I/O      


 


 2/21/18 2/21/18 2/21/18 2/22/18 2/22/18 2/22/18





 07:00 15:00 23:00 07:00 15:00 23:00


 


Intake Total 580 ml  480 ml 240 ml  


 


Output Total 2401 ml 1500 ml 1100 ml 500 ml  


 


Balance -1821 ml -1500 ml -620 ml -260 ml  


 


      


 


Intake Oral 580 ml  480 ml 240 ml  


 


Output Urine Total 2400 ml  1100 ml 500 ml  


 


Stool Total 1 ml     


 


Hemodialysis  1500 ml    


 


# Bowel Movements    0  








Physical Exam


GENERAL: 


SKIN: Warm and dry.


HEAD: Atraumatic. Normocephalic. 


EYES: Pupils equal and round. No scleral icterus. No injection or drainage. 


ENT: No nasal bleeding or discharge.  .


NECK: Trachea midline. No JVD. 


CARDIOVASCULAR: Regular rate and rhythm.  no murmurs


RESPIRATORY: No accessory muscle use. Clear to auscultation. Breath sounds 

equal bilaterally. 


GASTROINTESTINAL: Abdomen soft, non-tender, nondistended. 


MUSCULOSKELETAL: Extremities without clubbing, cyanosis, or edema. No obvious 

deformities. 


NEUROLOGICAL: Awake and alert. No obvious cranial nerve deficits.   Normal 

speech.


PSYCHIATRIC: Appropriate mood and affect; insight and judgment normal.


Laboratory





Laboratory Tests








Test


  2/22/18


04:39


 


White Blood Count 6.0 TH/MM3 


 


Red Blood Count 3.11 MIL/MM3 


 


Hemoglobin 10.1 GM/DL 


 


Hematocrit 30.8 % 


 


Mean Corpuscular Volume 99.1 FL 


 


Mean Corpuscular Hemoglobin 32.5 PG 


 


Mean Corpuscular Hemoglobin


Concent 32.8 % 


 


 


Red Cell Distribution Width 17.2 % 


 


Platelet Count 306 TH/MM3 


 


Mean Platelet Volume 7.8 FL 


 


Neutrophils (%) (Auto) 56.2 % 


 


Lymphocytes (%) (Auto) 22.0 % 


 


Monocytes (%) (Auto) 17.5 % 


 


Eosinophils (%) (Auto) 3.3 % 


 


Basophils (%) (Auto) 1.0 % 


 


Neutrophils # (Auto) 3.4 TH/MM3 


 


Lymphocytes # (Auto) 1.3 TH/MM3 


 


Monocytes # (Auto) 1.1 TH/MM3 


 


Eosinophils # (Auto) 0.2 TH/MM3 


 


Basophils # (Auto) 0.1 TH/MM3 


 


CBC Comment DIFF FINAL 


 


Differential Comment  


 


Blood Urea Nitrogen 23 MG/DL 


 


Creatinine 3.17 MG/DL 


 


Random Glucose 262 MG/DL 


 


Total Protein 6.5 GM/DL 


 


Albumin 2.5 GM/DL 


 


Calcium Level 8.1 MG/DL 


 


Phosphorus Level 4.8 MG/DL 


 


Magnesium Level 2.6 MG/DL 


 


Alkaline Phosphatase 159 U/L 


 


Aspartate Amino Transf


(AST/SGOT) 21 U/L 


 


 


Alanine Aminotransferase


(ALT/SGPT) 18 U/L 


 


 


Total Bilirubin 0.3 MG/DL 


 


Sodium Level 137 MEQ/L 


 


Potassium Level 3.6 MEQ/L 


 


Chloride Level 102 MEQ/L 


 


Carbon Dioxide Level 26.3 MEQ/L 


 


Anion Gap 9 MEQ/L 


 


Estimat Glomerular Filtration


Rate 19 ML/MIN 


 











Assessment and Plan


Problem List:  


(1) CAD (coronary artery disease)


ICD Codes:  I25.10 - Atherosclerotic heart disease of native coronary artery 

without angina pectoris


Status:  Chronic


(2) CHF (congestive heart failure)


ICD Codes:  I50.9 - Heart failure, unspecified


(3) Acute kidney injury superimposed on CKD


ICD Codes:  N17.9 - Acute kidney failure, unspecified; N18.9 - Chronic kidney 

disease, unspecified


Status:  Acute


Assessment and Plan


52 yo AAF recently discharged from this facility after prolonged 

hospitalization for chest pain, CHF and CKD. Readmitted for progression of 

symptoms





CAD- s/p PCI BMS to proximal, mid, distal RCA and mid LAD


no chest pain currently


cont asa, plavix, Imdur, bb and ACEi


 


CKD/ ESRD- receiving HD. due to have AV fistula placed this afternoon


anemia and renal function improving.





Problem Qualifiers





(1) CHF (congestive heart failure):  


Qualified Codes:  I50.9 - Heart failure, unspecified








Blanca Luong Feb 22, 2018 08:44

## 2018-02-22 NOTE — HHI.NPPN
Subjective


Complaints:  Shortness of Breath


General Problems:  Anemia


Renal Failure:  Chronic, Acute, Stage IV


Additional Remarks


Patient seen at bedside.  Very tearful about AVF placement today. Is not sure 

that she wants to go through with placement and continued dialysis.  


 (Gellermann,Diane M. ARNP)





Review of Systems


General


Constitutional:  Fatigue


 (Gellermann,Diane M. ARNP)





Objective Data


Data





Vital Signs








  Date Time  Temp Pulse Resp B/P (MAP) Pulse Ox O2 Delivery O2 Flow Rate FiO2


 


2/22/18 06:00  92      


 


2/22/18 05:00  90      


 


2/22/18 04:00  90      


 


2/22/18 03:46 98.2 92 18 156/69 (98) 100   


 


2/22/18 03:00  92      


 


2/22/18 02:00  96      


 


2/22/18 01:00  94      


 


2/22/18 00:00  89      


 


2/22/18 00:00 98.6 92 18 180/81 (114) 100   


 


2/21/18 23:00  86      


 


2/21/18 22:00  84      


 


2/21/18 21:00  82      


 


2/21/18 20:00      Room Air  


 


2/21/18 20:00      Room Air  


 


2/21/18 20:00 97.6 86 18 149/78 (101) 100   


 


2/21/18 20:00  85      


 


2/21/18 19:00  86      


 


2/21/18 18:30  95  183/83 (116)    


 


2/21/18 18:00  88      


 


2/21/18 17:00  93      


 


2/21/18 16:30 98.2 96 20 171/79 (109) 99   


 


2/21/18 14:00  96      


 


2/21/18 13:41 98.0 95 18 136/70 (92) 99   


 


2/21/18 13:00  100      


 


2/21/18 12:00  90      


 


2/21/18 11:00  91      








 (Gellermann,Diane M. ARNP)


-:  


2/22/18 0439                                                                   

             2/22/18 0439





Tubes & Lines:  Perma-Cath


 (Gellermann,Diane M. ARNP)





Physical Exam


General


Appearance:  Well Developed, Well Nourished, No Acute Distress, Comfortable


 (Gellermann,Diane M. ARNP)





Eyes


Eye Exam:  Pupils Equal, Pupils Reactive


 (Gellermann,Diane M. ARNP)





Throat


Throat Exam:  Oral Mucosa Pink & Moist


 (Gellermann,Diane M. ARNP)





Pulmonary


Resp Exam:  Clear Bilaterally, Breath Sounds Equal, No Distress, Decreased Bases


 (Gellermann,Diane M. ARNP)





Cardiology


CV Exam:  Regular, Normal Sinus Rhythm, Good Perfusion


 (Gellermann,Diane M. ARNP)





Gastrointestinal/Abdomen


GI Exam:  Soft, Non-Tender, Bowel Sounds Present, Positive Bowel Movement


 (Gellermann,Diane M. ARNP)





Musculoskeletal


MS Exam:  Joints Intact, Normal Gait, Normal Tone, Good Strength


 (Gellermann,Diane M. ARNP)





Integumentary


Skin Exam:  Clear, Warm, Dry, Intact


 (Gellermann,Diane M. ARNP)





Extremeties


Extremities Exam:  No Edema, Pedal Pulses Palpable


 (Gellermann,Diane M. ARNP)





Neurologic


Neuro Exam:  Alert, Awake, Oriented, Speech Clear, Moving All Extremities


 (Gellermann,Diane M. ARNP)





Psychiatric


Psych Exam:  Appropriate Responses


 (Gellermann,Diane M. ARNP)





Assessment/Plan


Discussed Condition With:  Patient, Relative


Assessment Summary:  Anemia of CKD, Hypertension, Diabetes Mellitus, CKD Stage V

, End Stage Renal Disease


Problem List:  


(1) Acute kidney injury superimposed on CKD


ICD Codes:  N17.9 - Acute kidney failure, unspecified; N18.9 - Chronic kidney 

disease, unspecified


Status:  Acute


Plan:  She has reached CKD 5/ESRD. 


On HD MWF. 


Permcath placed 2/12. 


 AVF with Dr. Rees planned for today.  We have asked her to protect left 

upper extremity from invasive procedures. 


Avoid IVF administration. 


Patient is very tearful about AVF placement today she does not feel sure that 

she wants placement.  Appears very anxious and depressed.


May benefit from antidepressants.  Has Ativan prn for anxiety. 





(2) CAD (coronary artery disease)


ICD Codes:  I25.10 - Atherosclerotic heart disease of native coronary artery 

without angina pectoris


Status:  Chronic


Plan:  RCA with 3 stents, LAD one stent


On Plavix and  ASA





(3) CHF (congestive heart failure)


ICD Codes:  I50.9 - Heart failure, unspecified


Plan:  Minimal fluid removal with HD. 


Continue PO lasix.


Follow fluid status





(4) HTN (hypertension)


ICD Codes:  I10 - Hypertension


Status:  Chronic


Plan:  BP acceptable. 


On Imdur 120 mg daily


Norvasc 10 mg daily


Metoprolol 100 mg BID


Hydralazine 50 mg Q8h


Clonidine 0.1 mg BID


Continue to monitor





(5) DM (diabetes mellitus)


ICD Codes:  E11.9 - Type 2 diabetes mellitus without complications


Plan:  Monitor blood glucose, maintain 140-180 mg/dL.





(6) Anemia


ICD Codes:  D64.9 - Anemia, unspecified


Status:  Chronic


Plan:  Epogen with dialysis. 


On Venofer for iron deficiency. 


s/p blood transfusion 2/14. 


 (Gellermann,Diane M. ARNP)


Problem List:  


(1) Acute kidney injury superimposed on CKD


ICD Codes:  N17.9 - Acute kidney failure, unspecified; N18.9 - Chronic kidney 

disease, unspecified


Status:  Acute


Plan:  She has reached CKD 5/ESRD. 


On HD MWF. 


Permcath placed 2/12. 


 AVF with Dr. Rees planned for today.  We have asked her to protect left 

upper extremity from invasive procedures. 


Avoid IVF administration. 


Patient is very tearful about AVF placement today she does not feel sure that 

she wants placement.  Appears very anxious and depressed.


May benefit from antidepressants.  Has Ativan prn for anxiety. 


Agree with above, HD will be in AM.





(2) CAD (coronary artery disease)


ICD Codes:  I25.10 - Atherosclerotic heart disease of native coronary artery 

without angina pectoris


Status:  Chronic


Plan:  RCA with 3 stents, LAD one stent


On Plavix and  ASA





(3) CHF (congestive heart failure)


ICD Codes:  I50.9 - Heart failure, unspecified


Plan:  Minimal fluid removal with HD. 


Continue PO lasix.


Follow fluid status





(4) HTN (hypertension)


ICD Codes:  I10 - Hypertension


Status:  Chronic


Plan:  BP acceptable. 


On Imdur 120 mg daily


Norvasc 10 mg daily


Metoprolol 100 mg BID


Hydralazine 50 mg Q8h


Clonidine 0.1 mg BID


Continue to monitor





(5) DM (diabetes mellitus)


ICD Codes:  E11.9 - Type 2 diabetes mellitus without complications


Plan:  Monitor blood glucose, maintain 140-180 mg/dL.





(6) Anemia


ICD Codes:  D64.9 - Anemia, unspecified


Status:  Chronic


Plan:  Epogen with dialysis. 


On Venofer for iron deficiency. 


s/p blood transfusion 2/14. 


 (Jesica Choudhary MD)





Problem Qualifiers





(1) CHF (congestive heart failure):  


Qualified Codes:  I50.9 - Heart failure, unspecified


(2) HTN (hypertension):  


Qualified Codes:  I10 - Essential (primary) hypertension


(3) DM (diabetes mellitus):  





(4) Anemia:  


Qualified Codes:  D64.9 - Anemia, unspecified








Gellermann,Diane M. ARNP Feb 22, 2018 10:13


Jesica Choudhary MD Feb 22, 2018 20:50

## 2018-02-23 VITALS
RESPIRATION RATE: 18 BRPM | OXYGEN SATURATION: 97 % | HEART RATE: 89 BPM | TEMPERATURE: 99 F | DIASTOLIC BLOOD PRESSURE: 92 MMHG | SYSTOLIC BLOOD PRESSURE: 177 MMHG

## 2018-02-23 VITALS
DIASTOLIC BLOOD PRESSURE: 85 MMHG | RESPIRATION RATE: 18 BRPM | OXYGEN SATURATION: 96 % | HEART RATE: 98 BPM | TEMPERATURE: 98 F | SYSTOLIC BLOOD PRESSURE: 132 MMHG

## 2018-02-23 VITALS — HEART RATE: 88 BPM

## 2018-02-23 VITALS — HEART RATE: 91 BPM

## 2018-02-23 VITALS — HEART RATE: 90 BPM

## 2018-02-23 VITALS
TEMPERATURE: 99.4 F | SYSTOLIC BLOOD PRESSURE: 145 MMHG | OXYGEN SATURATION: 100 % | DIASTOLIC BLOOD PRESSURE: 72 MMHG | HEART RATE: 90 BPM | RESPIRATION RATE: 18 BRPM

## 2018-02-23 VITALS — HEART RATE: 92 BPM

## 2018-02-23 VITALS — HEART RATE: 84 BPM

## 2018-02-23 LAB
ALBUMIN SERPL-MCNC: 2.9 GM/DL (ref 3.4–5)
ALP SERPL-CCNC: 166 U/L (ref 45–117)
ALT SERPL-CCNC: 19 U/L (ref 10–53)
AST SERPL-CCNC: 17 U/L (ref 15–37)
BASOPHILS # BLD AUTO: 0.1 TH/MM3 (ref 0–0.2)
BASOPHILS NFR BLD: 1.3 % (ref 0–2)
BILIRUB SERPL-MCNC: 0.5 MG/DL (ref 0.2–1)
BUN SERPL-MCNC: 21 MG/DL (ref 7–18)
CALCIUM SERPL-MCNC: 8.7 MG/DL (ref 8.5–10.1)
CHLORIDE SERPL-SCNC: 104 MEQ/L (ref 98–107)
CREAT SERPL-MCNC: 3.68 MG/DL (ref 0.5–1)
EOSINOPHIL # BLD: 0.3 TH/MM3 (ref 0–0.4)
EOSINOPHIL NFR BLD: 4.5 % (ref 0–4)
ERYTHROCYTE [DISTWIDTH] IN BLOOD BY AUTOMATED COUNT: 17.6 % (ref 11.6–17.2)
GFR SERPLBLD BASED ON 1.73 SQ M-ARVRAT: 16 ML/MIN (ref 89–?)
GLUCOSE SERPL-MCNC: 166 MG/DL (ref 74–106)
HCO3 BLD-SCNC: 27 MEQ/L (ref 21–32)
HCT VFR BLD CALC: 33.8 % (ref 35–46)
HGB BLD-MCNC: 11.1 GM/DL (ref 11.6–15.3)
LYMPHOCYTES # BLD AUTO: 1.1 TH/MM3 (ref 1–4.8)
LYMPHOCYTES NFR BLD AUTO: 16 % (ref 9–44)
MAGNESIUM SERPL-MCNC: 2.4 MG/DL (ref 1.5–2.5)
MCH RBC QN AUTO: 33 PG (ref 27–34)
MCHC RBC AUTO-ENTMCNC: 32.9 % (ref 32–36)
MCV RBC AUTO: 100.1 FL (ref 80–100)
MONOCYTE #: 0.8 TH/MM3 (ref 0–0.9)
MONOCYTES NFR BLD: 11.2 % (ref 0–8)
NEUTROPHILS # BLD AUTO: 4.8 TH/MM3 (ref 1.8–7.7)
NEUTROPHILS NFR BLD AUTO: 67 % (ref 16–70)
PHOSPHATE SERPL-MCNC: 4.8 MG/DL (ref 2.5–4.9)
PLATELET # BLD: 334 TH/MM3 (ref 150–450)
PMV BLD AUTO: 7.3 FL (ref 7–11)
PROT SERPL-MCNC: 6.9 GM/DL (ref 6.4–8.2)
RBC # BLD AUTO: 3.37 MIL/MM3 (ref 4–5.3)
SODIUM SERPL-SCNC: 140 MEQ/L (ref 136–145)
WBC # BLD AUTO: 7.1 TH/MM3 (ref 4–11)

## 2018-02-23 RX ADMIN — CLOPIDOGREL BISULFATE SCH MG: 75 TABLET, FILM COATED ORAL at 12:12

## 2018-02-23 RX ADMIN — SODIUM CHLORIDE SCH MLS/HR: 900 INJECTION, SOLUTION INTRAVENOUS at 09:40

## 2018-02-23 RX ADMIN — ISOSORBIDE MONONITRATE SCH MG: 60 TABLET, EXTENDED RELEASE ORAL at 05:02

## 2018-02-23 RX ADMIN — INSULIN ASPART SCH: 100 INJECTION, SOLUTION INTRAVENOUS; SUBCUTANEOUS at 08:00

## 2018-02-23 RX ADMIN — STANDARDIZED SENNA CONCENTRATE AND DOCUSATE SODIUM SCH TAB: 8.6; 5 TABLET, FILM COATED ORAL at 09:00

## 2018-02-23 RX ADMIN — EPOETIN ALFA PRN UNITS: 10000 SOLUTION INTRAVENOUS; SUBCUTANEOUS at 11:17

## 2018-02-23 RX ADMIN — CARVEDILOL SCH MG: 12.5 TABLET, FILM COATED ORAL at 12:11

## 2018-02-23 RX ADMIN — SIMETHICONE SCH MG: 125 TABLET, CHEWABLE ORAL at 05:02

## 2018-02-23 RX ADMIN — GENTAMICIN SULFATE PRN MG: 10 INJECTION, SOLUTION INTRAMUSCULAR; INTRAVENOUS at 11:17

## 2018-02-23 RX ADMIN — HEPARIN SODIUM PRN UNITS: 1000 INJECTION, SOLUTION INTRAVENOUS; SUBCUTANEOUS at 11:17

## 2018-02-23 RX ADMIN — Medication SCH ML: at 09:00

## 2018-02-23 RX ADMIN — OXYCODONE HYDROCHLORIDE AND ACETAMINOPHEN PRN TAB: 5; 325 TABLET ORAL at 05:02

## 2018-02-23 RX ADMIN — LISINOPRIL SCH MG: 20 TABLET ORAL at 09:00

## 2018-02-23 RX ADMIN — INSULIN ASPART SCH: 100 INJECTION, SOLUTION INTRAVENOUS; SUBCUTANEOUS at 12:00

## 2018-02-23 RX ADMIN — ASPIRIN 81 MG SCH MG: 81 TABLET ORAL at 12:12

## 2018-02-23 NOTE — PD.CARD.PN
Subjective


Subjective Remarks


no complaints


s/p fistula yesterday





Objective


Medications





Current Medications








 Medications


  (Trade)  Dose


 Ordered  Sig/Luis Armando


 Route  Start Time


 Stop Time Status Last Admin


 


  (NS Flush)  2 ml  UNSCH  PRN


 IV FLUSH  2/4/18 11:30


    2/4/18 17:46


 


 


  (NS Flush)  2 ml  BID


 IV FLUSH  2/4/18 21:00


    2/22/18 21:04


 


 


  (Tylenol)  650 mg  Q4H  PRN


 PO  2/4/18 11:30


    2/16/18 18:13


 


 


  (Zofran Inj)  4 mg  Q6H  PRN


 IVP  2/4/18 11:30


    2/15/18 10:21


 


 


  (Restoril)  15 mg  HS  PRN


 PO  2/4/18 11:30


    2/22/18 01:04


 


 


  (Narcan Inj)  0.4 mg  UNSCH  PRN


 IV PUSH  2/4/18 11:30


     


 


 


  (Theresa-Colace)  1 tab  BID


 PO  2/4/18 21:00


    2/22/18 21:03


 


 


  (Milk Of


 Magnesia Liq)  30 ml  Q12H  PRN


 PO  2/4/18 11:30


    2/18/18 18:00


 


 


  (Senokot)  17.2 mg  Q12H  PRN


 PO  2/4/18 11:30


     


 


 


  (Dulcolax Supp)  10 mg  DAILY  PRN


 RECTAL  2/4/18 11:30


     


 


 


  (Lactulose Liq)  30 ml  DAILY  PRN


 PO  2/4/18 11:30


    2/20/18 08:50


 


 


  (Aspirin Chew)  81 mg  DAILY


 CHEW  2/5/18 09:00


   Future hold 2/22/18 10:16


 


 


  (Neurontin)  300 mg  HS


 PO  2/4/18 21:00


    2/22/18 21:03


 


 


  (Apresoline)  50 mg  Q8HR


 PO  2/4/18 14:00


    2/23/18 05:02


 


 


  (Imdur)  120 mg  DAILY@07


 PO  2/5/18 07:00


    2/23/18 05:02


 


 


  (Nitrostat Sl)  0.4 mg  Q5M  PRN


 SL  2/4/18 11:30


    2/7/18 10:32


 


 


  (Pravachol)  40 mg  HS


 PO  2/4/18 21:00


    2/22/18 21:04


 


 


  (Phazyme Chew)  125 mg  Q8HR


 PO  2/4/18 14:00


    2/23/18 05:02


 


 


  (Trandate Inj)  10 mg  Q20M  PRN


 IV PUSH  2/4/18 11:45


    2/21/18 18:35


 


 


  (Apresoline Inj)  20 mg  Q4H  PRN


 IV PUSH  2/4/18 11:45


    2/22/18 18:11


 


 


  (Duoneb Neb)  1 ampule  Q4HR NEB  PRN


 NEB  2/4/18 16:15


    2/6/18 01:51


 


 


  (Ativan)  0.5 mg  Q8HR  PRN


 PO  2/4/18 16:15


    2/22/18 21:03


 


 


  (D50w (Vial) Inj)  50 ml  UNSCH  PRN


 IV PUSH  2/4/18 16:15


    2/5/18 04:20


 


 


  (Glucagon Inj)  1 mg  UNSCH  PRN


 OTHER  2/4/18 16:15


     


 


 


  (NovoLOG


 SUPPLEMENTAL


 SCALE)  1  ACHS SLIDING  SCALE


 SQ  2/4/18 17:00


    2/21/18 22:01


 


 


 Sodium Chloride  1,000 ml @ 


 0 mls/hr  Q0M PRN


 OTHER  2/7/18 10:20


     


 


 


  (Heparin Inj)  8,000 units  UNSCH  PRN


 IV FLUSH  2/7/18 10:30


     


 


 


 Sodium Chloride  1,000 ml @ 


 200 mls/hr  Q5H PRN


 IV  2/7/18 10:20


     


 


 


 Sodium Chloride  1,000 ml @ 


 0 mls/hr  Q0M PRN


 OTHER  2/7/18 10:20


     


 


 


  (Mannitol Inj)  12.5 gm  UNSCH  PRN


 IV  2/7/18 10:30


     


 


 


 Albumin Human  100 ml @ 


 60 mls/hr  UNSCH  PRN


 IV  2/7/18 10:30


     


 


 


  (NS Flush)  5 ml  UNSCH  PRN


 IV FLUSH  2/7/18 10:30


     


 


 


  (Heparin Inj)    UNSCH  PRN


 .XX  2/7/18 10:30


    2/21/18 12:07


 


 


  (Gentamicin Inj)  20 mg  UNSCH  PRN


 OTHER  2/7/18 10:30


    2/21/18 12:07


 


 


  (Zofran Inj)  4 mg  UNSCH  PRN


 IV PUSH  2/7/18 10:30


     


 


 


  (Tylenol)  650 mg  UNSCH  PRN


 PO  2/7/18 10:30


    2/9/18 19:50


 


 


  (Benadryl)  25 mg  UNSCH  PRN


 PO  2/7/18 10:30


    2/12/18 06:06


 


 


  (Nitrostat Sl)  0.4 mg  UNSCH  PRN


 SL  2/7/18 10:30


     


 


 


  (Catapres)  0.1 mg  UNSCH  PRN


 PO  2/7/18 10:30


    2/14/18 18:31


 


 


  (Epogen Inj)  10,000 units  UNSCH  PRN


 IV PUSH  2/7/18 10:30


    2/21/18 12:06


 


 


  (Gelfoam 12 Mm/7


 Mm Top)  1 foam  UNSCH  PRN


 TOP  2/7/18 10:30


     


 


 


 Nitroglycerin/


 Dextrose  250 ml @ 


 1.5 mls/hr  TITRATE  PRN


 IV  2/7/18 12:00


    2/10/18 07:00


 


 


  (NS Flush)    UNSCH  PRN


 IV FLUSH  2/12/18 14:45


     


 


 


  (Heparin Inj)    UNSCH  PRN


 IV FLUSH  2/12/18 14:45


     


 


 


  (Morphine Inj)  2 mg  Q3HR  PRN


 IV PUSH  2/12/18 17:45


    2/22/18 01:05


 


 


  (Prinivil)  40 mg  DAILY


 PO  2/13/18 09:00


    2/22/18 10:15


 


 


  (Plavix)  75 mg  DAILY


 PO  2/14/18 09:00


    2/22/18 10:16


 


 


  (Levemir Inj)  10 units  HS


 SQ  2/14/18 21:00


    2/21/18 22:02


 


 


  (Coreg)  25 mg  Q12HR


 PO  2/14/18 21:00


    2/22/18 21:03


 


 


  (Atropine Inj)  0.5 mg  UNSCH  PRN


 IV PUSH  2/16/18 12:45


     


 


 


  (Percocet  5-325


 Mg)  1 tab  Q4H  PRN


 PO  2/17/18 00:15


    2/23/18 05:02


 


 


  (Morphine Inj)  2 mg  Q4H  PRN


 IM  2/18/18 12:15


     


 


 


  (Catapres)  0.1 mg  Q8H


 PO  2/18/18 18:00


    2/23/18 02:00


 


 


 Miscellaneous


 Information  ALL


 NURSING


 DEPARTME...  UNSCH  PRN


 .XX  2/22/18 17:30


 2/23/18 17:29   


 








Vital Signs / I&O





Vital Signs








  Date Time  Temp Pulse Resp B/P (MAP) Pulse Ox O2 Delivery O2 Flow Rate FiO2


 


2/23/18 08:06  84      


 


2/23/18 07:45  91      


 


2/23/18 07:45      Room Air  


 


2/23/18 05:00  92      


 


2/23/18 04:00 99.0 93 18 177/92 (120) 97   


 


2/23/18 04:00  89      


 


2/23/18 03:00  88      


 


2/23/18 02:00  88      


 


2/23/18 01:00  90      


 


2/23/18 00:00 99.4 92 18 145/72 (96) 100   


 


2/23/18 00:00  90      


 


2/22/18 23:00  88      


 


2/22/18 22:00  94      


 


2/22/18 21:00  96      


 


2/22/18 20:00      Room Air  


 


2/22/18 20:00  97      


 


2/22/18 20:00 99.8 98 16 165/79 (107) 100   


 


2/22/18 19:19     99   


 


2/22/18 19:00  96      


 


2/22/18 18:28  76 18 163/76 (105) 100   


 


2/22/18 18:15 98.2 92 14 160/63 (95) 100 Room Air  


 


2/22/18 18:00  89 13 190/91 (124) 100 Nasal Cannula 2 


 


2/22/18 17:45  86 13 187/86 (119) 99 Nasal Cannula 2 


 


2/22/18 17:30  85 15 195/88 (123) 100 Nasal Cannula 2 


 


2/22/18 17:25  84 14 181/87 (118) 100 Nasal Cannula 2 


 


2/22/18 17:15  86 15 184/88 (120) 99 Nasal Cannula 2 


 


2/22/18 17:07 98.2 87 20 178/80 (112) 100 Nasal Cannula 2 


 


2/22/18 13:18        


 


2/22/18 11:33        21














I/O      


 


 2/22/18 2/22/18 2/22/18 2/23/18 2/23/18 2/23/18





 06:59 14:59 22:59 06:59 14:59 22:59


 


Intake Total 240 ml  250 ml 360 ml  


 


Output Total 500 ml  20 ml 900 ml  


 


Balance -260 ml  230 ml -540 ml  


 


      


 


Intake Oral 240 ml   360 ml  


 


IV Total   0 ml   


 


Other   250 ml   


 


Output Urine Total 500 ml   900 ml  


 


Estimated Blood Loss   20 ml   


 


# Bowel Movements 0   0  








Physical Exam





HEAD: Normocephalic.


EYES: No scleral icterus. No injection or drainage. 


NECK: Supple, trachea midline. No JVD or lymphadenopathy.


CARDIOVASCULAR: Regular rate and rhythm without murmurs, gallops, or rubs. 


RESPIRATORY: Breath sounds equal bilaterally. No accessory muscle use.


GASTROINTESTINAL: Abdomen soft, non-tender, nondistended. 


MUSCULOSKELETAL: No cyanosis, or edema. 


BACK: Nontender without obvious deformity. No CVA tenderness.





Laboratory





Laboratory Tests








Test


  2/23/18


05:05


 


White Blood Count 7.1 TH/MM3 


 


Red Blood Count 3.37 MIL/MM3 


 


Hemoglobin 11.1 GM/DL 


 


Hematocrit 33.8 % 


 


Mean Corpuscular Volume 100.1 FL 


 


Mean Corpuscular Hemoglobin 33.0 PG 


 


Mean Corpuscular Hemoglobin


Concent 32.9 % 


 


 


Red Cell Distribution Width 17.6 % 


 


Platelet Count 334 TH/MM3 


 


Mean Platelet Volume 7.3 FL 


 


Neutrophils (%) (Auto) 67.0 % 


 


Lymphocytes (%) (Auto) 16.0 % 


 


Monocytes (%) (Auto) 11.2 % 


 


Eosinophils (%) (Auto) 4.5 % 


 


Basophils (%) (Auto) 1.3 % 


 


Neutrophils # (Auto) 4.8 TH/MM3 


 


Lymphocytes # (Auto) 1.1 TH/MM3 


 


Monocytes # (Auto) 0.8 TH/MM3 


 


Eosinophils # (Auto) 0.3 TH/MM3 


 


Basophils # (Auto) 0.1 TH/MM3 


 


CBC Comment DIFF FINAL 


 


Differential Comment  


 


Blood Urea Nitrogen 21 MG/DL 


 


Creatinine 3.68 MG/DL 


 


Random Glucose 166 MG/DL 


 


Total Protein 6.9 GM/DL 


 


Albumin 2.9 GM/DL 


 


Calcium Level 8.7 MG/DL 


 


Phosphorus Level 4.8 MG/DL 


 


Magnesium Level 2.4 MG/DL 


 


Alkaline Phosphatase 166 U/L 


 


Aspartate Amino Transf


(AST/SGOT) 17 U/L 


 


 


Alanine Aminotransferase


(ALT/SGPT) 19 U/L 


 


 


Total Bilirubin 0.5 MG/DL 


 


Sodium Level 140 MEQ/L 


 


Potassium Level 3.3 MEQ/L 


 


Chloride Level 104 MEQ/L 


 


Carbon Dioxide Level 27.0 MEQ/L 


 


Anion Gap 9 MEQ/L 


 


Estimat Glomerular Filtration


Rate 16 ML/MIN 


 








Imaging





Last Impressions








Abdomen/Pelvis CT 2/16/18 0000 Signed





Impressions: 





 Service Date/Time:  Friday, February 16, 2018 17:44 - CONCLUSION:  1. No 





 retroperitoneal hemorrhage identified. There is mild anasarca. Curvilinear 

high 





 density material is present in the right hemipelvis adjacent to the bladder of 





 uncertain etiology. 2. Residual contrast in the bladder. Previous 





 cholecystectomy.     Valdez Colunga MD 


 


Catheter Placement X-Ray 2/12/18 0800 Signed





Impressions: 





 Service Date/Time:  Monday, February 12, 2018 14:12 - CONCLUSION: 

Uncomplicated 





 conversion of a Vas-Cath to PermCath as above.     Ren Stubbs MD 


 


Upper Extremity Ultrasound 2/8/18 0000 Signed





Impressions: 





 Service Date/Time:  Thursday, February 8, 2018 21:19 - CONCLUSION:  Normal 





 examination.       Valdez Colunga MD 


 


Chest X-Ray 2/4/18 0810 Signed





Impressions: 





 Service Date/Time:  Sunday, February 4, 2018 08:50 - CONCLUSION:  Bibasilar 





 consolidation slightly worse in the right lower lobe.     Mp Dang MD 











Assessment and Plan


Problem List:  


(1) CAD (coronary artery disease)


ICD Codes:  I25.10 - Atherosclerotic heart disease of native coronary artery 

without angina pectoris


Status:  Chronic


(2) CHF (congestive heart failure)


ICD Codes:  I50.9 - Heart failure, unspecified


(3) Acute kidney injury superimposed on CKD


ICD Codes:  N17.9 - Acute kidney failure, unspecified; N18.9 - Chronic kidney 

disease, unspecified


Status:  Acute


Assessment and Plan


-------------------


s/p fistula


s/p PCI BMS


s/p HD


Ok for DC from cardio standpoint





Problem Qualifiers





(1) CHF (congestive heart failure):  


Qualified Codes:  I50.9 - Heart failure, unspecified








Edgardo Mercado MD Feb 23, 2018 08:11

## 2018-02-23 NOTE — PD.VS.PN
Subjective


POD #:  1


Procedure(s):  


L brachiobasilic AVF (1st stage)


Subjective/Hospital Course


Looks great, pain controlled


hand ok





Objective


Vitals/I&O











  Date Time  Temp Pulse Resp B/P (MAP) Pulse Ox O2 Delivery O2 Flow Rate FiO2


 


2/23/18 05:00  92      


 


2/23/18 04:00 99.0 93 18 177/92 (120) 97   


 


2/23/18 04:00  89      


 


2/23/18 03:00  88      


 


2/23/18 02:00  88      


 


2/23/18 01:00  90      


 


2/23/18 00:00 99.4 92 18 145/72 (96) 100   


 


2/23/18 00:00  90      


 


2/22/18 23:00  88      


 


2/22/18 22:00  94      


 


2/22/18 21:00  96      


 


2/22/18 20:00      Room Air  


 


2/22/18 20:00  97      


 


2/22/18 20:00 99.8 98 16 165/79 (107) 100   


 


2/22/18 19:19     99   


 


2/22/18 19:00  96      


 


2/22/18 18:28  76 18 163/76 (105) 100   


 


2/22/18 18:15 98.2 92 14 160/63 (95) 100 Room Air  


 


2/22/18 18:00  89 13 190/91 (124) 100 Nasal Cannula 2 


 


2/22/18 17:45  86 13 187/86 (119) 99 Nasal Cannula 2 


 


2/22/18 17:30  85 15 195/88 (123) 100 Nasal Cannula 2 


 


2/22/18 17:25  84 14 181/87 (118) 100 Nasal Cannula 2 


 


2/22/18 17:15  86 15 184/88 (120) 99 Nasal Cannula 2 


 


2/22/18 17:07 98.2 87 20 178/80 (112) 100 Nasal Cannula 2 


 


2/22/18 13:18        


 


2/22/18 11:33        21


 


2/22/18 08:00 98.5 87 18 151/90 (110) 100   


 


2/22/18 08:00  87      


 


2/22/18 08:00      Room Air  














 2/23/18 2/23/18 2/23/18





 07:00 15:00 23:00


 


Intake Total 360 ml  


 


Output Total 900 ml  


 


Balance -540 ml  








Exam:


L UE incision c/d/i


+ thrill


good hand strength


Laboratory





Laboratory Tests








Test


  2/23/18


05:05


 


White Blood Count 7.1 


 


Red Blood Count 3.37 


 


Hemoglobin 11.1 


 


Hematocrit 33.8 


 


Mean Corpuscular Volume 100.1 


 


Mean Corpuscular Hemoglobin 33.0 


 


Mean Corpuscular Hemoglobin


Concent 32.9 


 


 


Red Cell Distribution Width 17.6 


 


Platelet Count 334 


 


Mean Platelet Volume 7.3 


 


Neutrophils (%) (Auto) 67.0 


 


Lymphocytes (%) (Auto) 16.0 


 


Monocytes (%) (Auto) 11.2 


 


Eosinophils (%) (Auto) 4.5 


 


Basophils (%) (Auto) 1.3 


 


Neutrophils # (Auto) 4.8 


 


Lymphocytes # (Auto) 1.1 


 


Monocytes # (Auto) 0.8 


 


Eosinophils # (Auto) 0.3 


 


Basophils # (Auto) 0.1 


 


CBC Comment DIFF FINAL 


 


Differential Comment  


 


Blood Urea Nitrogen 21 


 


Creatinine 3.68 


 


Random Glucose 166 


 


Total Protein 6.9 


 


Albumin 2.9 


 


Calcium Level 8.7 


 


Phosphorus Level 4.8 


 


Magnesium Level 2.4 


 


Alkaline Phosphatase 166 


 


Aspartate Amino Transf


(AST/SGOT) 17 


 


 


Alanine Aminotransferase


(ALT/SGPT) 19 


 


 


Total Bilirubin 0.5 


 


Sodium Level 140 


 


Potassium Level 3.3 


 


Chloride Level 104 


 


Carbon Dioxide Level 27.0 


 


Anion Gap 9 


 


Estimat Glomerular Filtration


Rate 16 


 














 Date/Time


Source Procedure


Growth Status


 


 


 2/4/18 09:29


Blood Peripheral Aerobic Blood Culture - Final


NO GROWTH IN 5 DAYS Complete


 


 2/4/18 09:29


Blood Peripheral Anaerobic Blood Culture - Final


NO GROWTH IN 5 DAYS Complete











Assessment and Plan


Assessment:  


(1) Acute kidney failure


Plan


POD#1 s/p L UE AVF





Patent AVF; hand ok





Ready for d/c from vascular surgery standpoint





will arrange f/u in 3 weeks











Arik Rees MD Feb 23, 2018 07:04

## 2018-02-23 NOTE — HHI.DS
__________________________________________________





Discharge Summary


Admission Date


Feb 4, 2018 at 11:25


Discharge Date:  Feb 23, 2018


Admitting Diagnosis





chf exacerbation, chest pain





(1) Acute kidney injury superimposed on CKD


ICD Code:  N17.9 - Acute kidney failure, unspecified; N18.9 - Chronic kidney 

disease, unspecified


Diagnosis:  Principal


Status:  Acute


(2) Acute kidney failure


ICD Code:  N17.9 - Acute kidney failure, unspecified


Diagnosis:  Principal





(3) AVF (arteriovenous fistula)


ICD Code:  I77.0 - Arteriovenous fistula, acquired


Diagnosis:  Principal





(4) CAD (coronary artery disease)


ICD Code:  I25.10 - Atherosclerotic heart disease of native coronary artery 

without angina pectoris


Diagnosis:  Principal


Status:  Chronic


(5) DM (diabetes mellitus)


ICD Code:  E11.9 - Type 2 diabetes mellitus without complications


Diagnosis:  Principal





(6) HTN (hypertension)


ICD Code:  I10 - Hypertension


Diagnosis:  Principal


Status:  Chronic


Procedures


perma-cath placement.


Brief History - From Admission


 Very pleasant 52-year-old AA female with a PMH of HTN, Hyperlipidemia, CHF (

Echo w/ EF 55-60%), IDDM with diabetic neuropathy,  and CAD, ischemic 

cardiomyopathy who presented to the ER with complaints of sob. 


Patient with  increase shortness of breath, orthopnea, feeling tired lethargic 

and creatinine was 3.7,  she was previously dialyzed for 2 days and then sent 

home after she decided to defer PCI for her heart disease.


She is now back again feeling tired,  with chest tightness and weak and with 

increasing dyspnea. 





CBC/BMP:  


2/23/18 0505                                                                   

             2/23/18 0505





Significant Findings





Laboratory Tests








Test


  2/21/18


05:16 2/22/18


04:39 2/23/18


05:05


 


Red Blood Count


  3.03 MIL/MM3


(4.00-5.30) 3.11 MIL/MM3


(4.00-5.30) 3.37 MIL/MM3


(4.00-5.30)


 


Hemoglobin


  10.0 GM/DL


(11.6-15.3) 10.1 GM/DL


(11.6-15.3) 11.1 GM/DL


(11.6-15.3)


 


Hematocrit


  29.9 %


(35.0-46.0) 30.8 %


(35.0-46.0) 33.8 %


(35.0-46.0)


 


Red Cell Distribution Width


  17.8 %


(11.6-17.2) 


  17.6 %


(11.6-17.2)


 


Monocytes (%) (Auto)


  12.9 %


(0.0-8.0) 17.5 %


(0.0-8.0) 11.2 %


(0.0-8.0)


 


Eosinophils (%) (Auto)


  4.7 %


(0.0-4.0) 


  4.5 %


(0.0-4.0)


 


Blood Urea Nitrogen


  29 MG/DL


(7-18) 23 MG/DL


(7-18) 21 MG/DL


(7-18)


 


Creatinine


  4.38 MG/DL


(0.50-1.00) 3.17 MG/DL


(0.50-1.00) 3.68 MG/DL


(0.50-1.00)


 


Random Glucose


  173 MG/DL


() 262 MG/DL


() 166 MG/DL


()


 


Albumin


  2.7 GM/DL


(3.4-5.0) 2.5 GM/DL


(3.4-5.0) 2.9 GM/DL


(3.4-5.0)


 


Calcium Level


  8.3 MG/DL


(8.5-10.1) 8.1 MG/DL


(8.5-10.1) 


 


 


Phosphorus Level


  6.1 MG/DL


(2.5-4.9) 


  


 


 


Estimat Glomerular Filtration


Rate 13 ML/MIN


(>89) 19 ML/MIN


(>89) 16 ML/MIN


(>89)


 


Monocytes # (Auto)


  


  1.1 TH/MM3


(0-0.9) 


 


 


Magnesium Level


  


  2.6 MG/DL


(1.5-2.5) 


 


 


Alkaline Phosphatase


  


  159 U/L


() 166 U/L


()


 


Mean Corpuscular Volume


  


  


  100.1 FL


(80.0-100.0)


 


Potassium Level


  


  


  3.3 MEQ/L


(3.5-5.1)








PE at Discharge


GENERAL: This is a well-nourished, well-developed patient, in no apparent 

distress.


CARDIOVASCULAR: Regular rate and rhythm without murmurs, gallops, or rubs. 


RESPIRATORY: Clear to auscultation. Breath sounds equal bilaterally. No wheezes

, rales, or rhonchi.  


GASTROINTESTINAL: Abdomen soft, non-tender, nondistended. Normal active bowel 

sounds


MUSCULOSKELETAL: Extremities without clubbing, cyanosis, or edema.


NEURO:  Alert & Oriented x4 to person, place, time, situation.  Moves all ext x4


Hospital Course


Mrs. Walker is a 53-year-old female.  She is admitted secondary to 

abdominal pain initially which was found to be related to acute renal failure.  

She has previous existing history of coronary artery disease and CHF, part of 

her symptoms are related to exacerbation of CHF from decreased fluid output.  

She has been started on dialysis and AV fistula graft was placed during this 

hospital stay.  Kidney dysfunction was corrected with dialysis and patient has 

been stabilized on dialysis.  Dialysis arrangements have been made and patient 

is medically cleared and stable for discharge home today with continuation of 

dialysis as an outpatient and follow-up with nephrology and PCP as an 

outpatient.


Pt Condition on Discharge:  Stable


Discharge Disposition:  Discharge Home


Discharge Time:  <= 30 minutes


Discharge Instructions


DIET: Follow Instructions for:  Heart Healthy Diet


Activities you can perform:  Regular-No Restrictions


Follow up Referrals:  


Cardiology, Interventional @ [facility] with Edgardo Mercado MD


Vascular Surgery @  Vascular Surgery with Arik Rees MD Your Post Op 

Follow-Up Appointment is on March 16th, 2018 at 8:45 am





New Medications:  


Lisinopril (Lisinopril) 40 Mg Tab


40 MG PO DAILY for Blood Pressure Management, #30 TAB 0 Refills





Clonidine (Catapres) 0.1 Mg Tab


0.1 MG PO Q8H for Blood Pressure Management, #90 TAB





Oxycodone HCl/Acetaminophen (Oxycodone-Acetaminophen 5-325) 5 Mg-325 Mg Tablet


1 TAB PO Q6H PRN for Pain, #60 TAB





 


Continued Medications:  


Amlodipine (Norvasc) 10 Mg Tab


10 MG PO DAILY for Blood Pressure Management, #30 TAB





Aspirin (Aspirin Children's) 81 Mg Chew


81 MG CHEW DAILY, TAB 0 Refills





Clopidogrel (Plavix) 75 Mg Tab


75 MG PO DAILY for Blood Clot Prevention, #30 TAB





Folic Acid (Folic Acid) 400 Mcg Tab


1 MG PO DAILY for Nutritional Supplement, TAB 0 Refills





Gabapentin (Gabapentin) 100 Mg Cap


300 MG PO HS, #90 CAP 0 Refills





Hydralazine HCl (Hydralazine HCl) 50 Mg Tablet


50 MG PO Q8HR for Blood Pressure Management, #90 MG





Insulin Glargine Inj (Lantus Inj) 1,000 Unit/10 Ml Vial


15 UNITS SQ HS for Blood Sugar Management, VIAL 0 Refills





Isosorbide Mononitrate ER (Isosorbide Mononitrate ER) 60 Mg Tab


120 MG PO DAILY@07 for Blood Pressure Management, #30 TAB





Metoprolol Tartrate (Lopressor) 100 Mg Tab


100 MG PO Q12HR for Blood Pressure Management, #60 TAB





Nitroglycerin SL (Nitrostat SL) 0.4 Mg Subl


0.4 MG SL Q5M PRN for SEE LABEL COMMENTS, #30 TAB





Ondansetron (Zofran) 4 Mg Tab


4 MG PO Q6HR PRN for NAUSEA OR VOMITING, #15 TAB 0 Refills





Pravastatin (Pravachol) 40 Mg Tab


40 MG PO HS for Cholesterol Management, #30 TAB





Simethicone (Gas Relief Maximum Streng) 125 Mg Chw


125 MG PO Q8HR for gas pain, #60 EA 0 Refills





 


Discontinued Medications:  


Enalapril (Vasotec) 20 Mg Tab


20 MG PO BID, #30 TAB 0 Refills





Ferrous Sulfate (Feosol) 200 Mg Tab


325 MG PO BID for Nutritional Supplement, #60 TAB 0 Refills





Fish Oil-Cholecalciferol (Fish Oil + D3) 1,200-1,000 Mg-Unit Cap


1 CAP PO DAILY for Nutritional Supplement, #30 CAP 0 Refills

















Pepe Treadwell MD Feb 23, 2018 14:04

## 2018-02-23 NOTE — HHI.NPPN
Subjective


Complaints:  Shortness of Breath


General Problems:  Anemia


Renal Failure:  Chronic, Acute, Stage IV


Additional Remarks


Patient seen during dialysis.  S/P Fistula yesterday.  Patient in better 

spirits today.  Anxious to be discharged


 (Gellermann,Diane M. ARNP)





Review of Systems


General


Constitutional:  Fatigue


 (Gellermann,Diane M. ARNP)





Objective Data


Data





Vital Signs








  Date Time  Temp Pulse Resp B/P (MAP) Pulse Ox O2 Delivery O2 Flow Rate FiO2


 


2/23/18 08:06  84      


 


2/23/18 07:45  91      


 


2/23/18 07:45      Room Air  


 


2/23/18 05:00  92      


 


2/23/18 04:00 99.0 93 18 177/92 (120) 97   


 


2/23/18 04:00  89      


 


2/23/18 03:00  88      


 


2/23/18 02:00  88      


 


2/23/18 01:00  90      


 


2/23/18 00:00 99.4 92 18 145/72 (96) 100   


 


2/23/18 00:00  90      


 


2/22/18 23:00  88      


 


2/22/18 22:00  94      


 


2/22/18 21:00  96      


 


2/22/18 20:00      Room Air  


 


2/22/18 20:00  97      


 


2/22/18 20:00 99.8 98 16 165/79 (107) 100   


 


2/22/18 19:19     99   


 


2/22/18 19:00  96      


 


2/22/18 18:28  76 18 163/76 (105) 100   


 


2/22/18 18:15 98.2 92 14 160/63 (95) 100 Room Air  


 


2/22/18 18:00  89 13 190/91 (124) 100 Nasal Cannula 2 


 


2/22/18 17:45  86 13 187/86 (119) 99 Nasal Cannula 2 


 


2/22/18 17:30  85 15 195/88 (123) 100 Nasal Cannula 2 


 


2/22/18 17:25  84 14 181/87 (118) 100 Nasal Cannula 2 


 


2/22/18 17:15  86 15 184/88 (120) 99 Nasal Cannula 2 


 


2/22/18 17:07 98.2 87 20 178/80 (112) 100 Nasal Cannula 2 


 


2/22/18 13:18        


 


2/22/18 11:33        21








 (Gellermann,Diane M. ARNP)


-:  


2/23/18 0505                                                                   

             2/23/18 0505





Tubes & Lines:  Perma-Cath


 (Gellermann,Diane M. ARNP)





Physical Exam


General


Appearance:  Well Developed, Well Nourished, No Acute Distress, Comfortable


 (Gellermann,Diane M. ARNP)





Eyes


Eye Exam:  Pupils Equal, Pupils Reactive


 (Gellermann,Diane M. ARNP)





Throat


Throat Exam:  Oral Mucosa Pink & Moist


 (Gellermann,Diane M. ARNP)





Pulmonary


Resp Exam:  Clear Bilaterally, Breath Sounds Equal, No Distress, Decreased Bases


 (Gellermann,Diane M. ARNP)





Cardiology


CV Exam:  Regular, Normal Sinus Rhythm, Good Perfusion


 (Gellermann,Diane M. ARNP)





Gastrointestinal/Abdomen


GI Exam:  Soft, Non-Tender, Bowel Sounds Present, Positive Bowel Movement


 (Gellermann,Diane M. ARNP)





Musculoskeletal


MS Exam:  Joints Intact, Normal Gait, Normal Tone, Good Strength


 (Gellermann,Diane M. ARNP)





Integumentary


Skin Exam:  Clear, Warm, Dry, Intact


 (Gellermann,Diane M. ARNP)





Extremeties


Extremities Exam:  No Edema, Pedal Pulses Palpable


 (Gellermann,Diane M. ARNP)





Neurologic


Neuro Exam:  Alert, Awake, Oriented, Speech Clear, Moving All Extremities


 (Gellermann,Diane M. ARNP)





Psychiatric


Psych Exam:  Appropriate Responses


 (Gellermann,Diane M. ARNP)





Assessment/Plan


Discussed Condition With:  Patient, Relative


Assessment Summary:  Anemia of CKD, Hypertension, Diabetes Mellitus, CKD Stage V

, End Stage Renal Disease


Problem List:  


(1) Acute kidney injury superimposed on CKD


ICD Codes:  N17.9 - Acute kidney failure, unspecified; N18.9 - Chronic kidney 

disease, unspecified


Status:  Acute


Plan:  She has reached CKD 5/ESRD. 


On HD MWF. 


Permcath placed 2/12. 


s/p AVF yesterday


Avoid IVF administration.


K 3.3 today 


Seen during dialysis





(2) CAD (coronary artery disease)


ICD Codes:  I25.10 - Atherosclerotic heart disease of native coronary artery 

without angina pectoris


Status:  Chronic


Plan:  RCA with 3 stents, LAD one stent


On Plavix and  ASA





(3) CHF (congestive heart failure)


ICD Codes:  I50.9 - Heart failure, unspecified


Plan:  Minimal fluid removal with HD. 


Continue PO lasix.


Follow fluid status





(4) HTN (hypertension)


ICD Codes:  I10 - Hypertension


Status:  Chronic


Plan:  BP acceptable. 


On Imdur 120 mg daily


Norvasc 10 mg daily


Metoprolol 100 mg BID


Hydralazine 50 mg Q8h


Clonidine 0.1 mg BID


Continue to monitor





(5) DM (diabetes mellitus)


ICD Codes:  E11.9 - Type 2 diabetes mellitus without complications


Plan:  Monitor blood glucose, maintain 140-180 mg/dL.





(6) Anemia


ICD Codes:  D64.9 - Anemia, unspecified


Status:  Chronic


Plan:  Epogen with dialysis. 


On Venofer for iron deficiency. 


s/p blood transfusion 2/14. 


 (Gellermann,Diane M. ARNP)


Problem List:  


(1) Acute kidney injury superimposed on CKD


ICD Codes:  N17.9 - Acute kidney failure, unspecified; N18.9 - Chronic kidney 

disease, unspecified


Status:  Acute


Plan:  She has reached CKD 5/ESRD. 


On HD MWF. 


Permcath placed 2/12. 


s/p AVF yesterday


Avoid IVF administration.


K 3.3 today 


Seen during dialysis.


Patient seen and examined, agree with above.


AVF has good Bruit.





(2) CAD (coronary artery disease)


ICD Codes:  I25.10 - Atherosclerotic heart disease of native coronary artery 

without angina pectoris


Status:  Chronic


Plan:  RCA with 3 stents, LAD one stent


On Plavix and  ASA





(3) CHF (congestive heart failure)


ICD Codes:  I50.9 - Heart failure, unspecified


Plan:  Minimal fluid removal with HD. 


Continue PO lasix.


Follow fluid status





(4) HTN (hypertension)


ICD Codes:  I10 - Hypertension


Status:  Chronic


Plan:  BP acceptable. 


On Imdur 120 mg daily


Norvasc 10 mg daily


Metoprolol 100 mg BID


Hydralazine 50 mg Q8h


Clonidine 0.1 mg BID


Continue to monitor





(5) DM (diabetes mellitus)


ICD Codes:  E11.9 - Type 2 diabetes mellitus without complications


Plan:  Monitor blood glucose, maintain 140-180 mg/dL.





(6) Anemia


ICD Codes:  D64.9 - Anemia, unspecified


Status:  Chronic


Plan:  Epogen with dialysis. 


On Venofer for iron deficiency. 


s/p blood transfusion 2/14. 


 (Jesica Choudhary MD)





Problem Qualifiers





(1) CHF (congestive heart failure):  


Qualified Codes:  I50.9 - Heart failure, unspecified


(2) HTN (hypertension):  


Qualified Codes:  I10 - Essential (primary) hypertension


(3) DM (diabetes mellitus):  





(4) Anemia:  


Qualified Codes:  D64.9 - Anemia, unspecified








Gellermann,Diane M. ARNP Feb 23, 2018 09:29


Jesica Choudhary MD Feb 23, 2018 11:24

## 2018-02-25 NOTE — MP
cc:

SANCHEZ REES MD

****

 

 

DATE OF SURGERY: 2/22/2018

 

PREOPERATIVE DIAGNOSIS:

End-stage renal disease needs dialysis access.

 

POSTOPERATIVE DIAGNOSIS

End-stage renal disease needs dialysis access.

 

PROCEDURE

Left brachial basilic arteriovenous fistula (first stage).

 

MEDICATIONS

Sanchez Rees MD.

 

ANESTHESIA

General

 

ASSISTANT SURGEON

Marilu Parada

 

INDICATIONS

Mr. Walker is a 53 year-old lady who has new onset end-stage renal

disease, currently on dialysis.  She is right-handed and preoperative imaging

suggests she had adequate left basilic vein.  She is taken to the operating

room for the first stage of a planned two stage autogenous access.

 

DESCRIPTION OF PROCEDURE:

Informed consent was obtained from the patient.  She was taken to the operating

room, placed supine on the operating room table.  An appropriate time out was

taken

 

An appropriate timeout was taken to ensure the patient's identity, operative

site and planned procedure.  The administration of a gram of vancomycin was

initiated prior to the skin incision and will be discontinued after a single

preoperative dose.  Vancomycin was chosen because of the patient's end-stage

renal disease.  Everyone in the room agreed with the timeout and we proceeded.

 

 

Her left arm was prepped and draped.  An incision was made over the distal

aspect of the upper arm, carried down to the subcutaneous tissue with

electrocautery.  The basilic vein was identified  basilic vein was identified,

dissected free for several centimeters.  Side branches were ligated with 3-0

silk.  The brachial artery was identified on the medial aspect of this

incision, and dissected free for several centimeters.  The distal aspect of the

basilic vein was clamped with a right angle.  The vein was transected and

distal ends were oversewn with silk suture.  The vein was marked for

orientation.  The patient was systemically heparinized with 3000 units of IV

heparin.  Proximal and distal control of the brachial artery was obtained with

profunda clamps and a longitudinal arteriotomy was made above and below and

extended with Casey scissors.  The vein was spatulated and sewn end-to-side

with running 6-0 Prolene suture.  The patient was flushed and hemostatic.

There was a nice thrill on the fistula and Doppler signal on the wrist. The

heparin was reversed with Protamine.  The wound was infiltrated with Marcaine

and closed with 2-0 Polysorb, 3-0 Polysorb and 4-0 Monocryl.

 

The sponge, needle counts were correct at the end of the case.

 

I was present and scrubbed and performed the entire proceedure.

 

 

                              _________________________________

                              MD JOANN Garcia/JULIO

D:  2/22/2018/4:54 PM

T:  2/25/2018/11:28 AM

Visit #:  [id number]

Job #:  [id number]

## 2018-05-07 ENCOUNTER — HOSPITAL ENCOUNTER (INPATIENT)
Dept: HOSPITAL 17 - NEPE | Age: 53
LOS: 14 days | Discharge: HOME HEALTH | DRG: 233 | End: 2018-05-21
Attending: HOSPITALIST | Admitting: HOSPITALIST
Payer: MEDICAID

## 2018-05-07 VITALS
DIASTOLIC BLOOD PRESSURE: 81 MMHG | SYSTOLIC BLOOD PRESSURE: 181 MMHG | HEART RATE: 62 BPM | RESPIRATION RATE: 16 BRPM | OXYGEN SATURATION: 100 %

## 2018-05-07 VITALS
HEART RATE: 64 BPM | TEMPERATURE: 100.4 F | OXYGEN SATURATION: 98 % | SYSTOLIC BLOOD PRESSURE: 178 MMHG | DIASTOLIC BLOOD PRESSURE: 76 MMHG | RESPIRATION RATE: 16 BRPM

## 2018-05-07 VITALS — BODY MASS INDEX: 22.68 KG/M2 | WEIGHT: 141.1 LBS | HEIGHT: 66 IN

## 2018-05-07 VITALS — OXYGEN SATURATION: 96 %

## 2018-05-07 DIAGNOSIS — K21.9: ICD-10-CM

## 2018-05-07 DIAGNOSIS — Z84.1: ICD-10-CM

## 2018-05-07 DIAGNOSIS — Z87.891: ICD-10-CM

## 2018-05-07 DIAGNOSIS — N18.6: ICD-10-CM

## 2018-05-07 DIAGNOSIS — I21.4: Primary | ICD-10-CM

## 2018-05-07 DIAGNOSIS — R79.1: ICD-10-CM

## 2018-05-07 DIAGNOSIS — E78.5: ICD-10-CM

## 2018-05-07 DIAGNOSIS — I25.119: ICD-10-CM

## 2018-05-07 DIAGNOSIS — F43.21: ICD-10-CM

## 2018-05-07 DIAGNOSIS — Z82.49: ICD-10-CM

## 2018-05-07 DIAGNOSIS — D63.1: ICD-10-CM

## 2018-05-07 DIAGNOSIS — E11.40: ICD-10-CM

## 2018-05-07 DIAGNOSIS — D25.9: ICD-10-CM

## 2018-05-07 DIAGNOSIS — Z99.2: ICD-10-CM

## 2018-05-07 DIAGNOSIS — Z95.5: ICD-10-CM

## 2018-05-07 DIAGNOSIS — I95.9: ICD-10-CM

## 2018-05-07 DIAGNOSIS — Z79.02: ICD-10-CM
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
## 2018-05-07 DIAGNOSIS — E11.22: ICD-10-CM

## 2018-05-07 DIAGNOSIS — I50.33: ICD-10-CM

## 2018-05-07 DIAGNOSIS — Z90.49: ICD-10-CM

## 2018-05-07 DIAGNOSIS — M19.90: ICD-10-CM

## 2018-05-07 DIAGNOSIS — Z79.4: ICD-10-CM

## 2018-05-07 DIAGNOSIS — T82.855A: ICD-10-CM

## 2018-05-07 DIAGNOSIS — R11.2: ICD-10-CM

## 2018-05-07 DIAGNOSIS — Z83.3: ICD-10-CM

## 2018-05-07 DIAGNOSIS — I13.2: ICD-10-CM

## 2018-05-07 DIAGNOSIS — Z79.82: ICD-10-CM

## 2018-05-07 DIAGNOSIS — I42.9: ICD-10-CM

## 2018-05-07 PROCEDURE — 87040 BLOOD CULTURE FOR BACTERIA: CPT

## 2018-05-07 PROCEDURE — C1769 GUIDE WIRE: HCPCS

## 2018-05-07 PROCEDURE — 82565 ASSAY OF CREATININE: CPT

## 2018-05-07 PROCEDURE — 82105 ALPHA-FETOPROTEIN SERUM: CPT

## 2018-05-07 PROCEDURE — 86038 ANTINUCLEAR ANTIBODIES: CPT

## 2018-05-07 PROCEDURE — 84484 ASSAY OF TROPONIN QUANT: CPT

## 2018-05-07 PROCEDURE — 99152 MOD SED SAME PHYS/QHP 5/>YRS: CPT

## 2018-05-07 PROCEDURE — 85007 BL SMEAR W/DIFF WBC COUNT: CPT

## 2018-05-07 PROCEDURE — 85610 PROTHROMBIN TIME: CPT

## 2018-05-07 PROCEDURE — 83520 IMMUNOASSAY QUANT NOS NONAB: CPT

## 2018-05-07 PROCEDURE — 83550 IRON BINDING TEST: CPT

## 2018-05-07 PROCEDURE — 82948 REAGENT STRIP/BLOOD GLUCOSE: CPT

## 2018-05-07 PROCEDURE — 76937 US GUIDE VASCULAR ACCESS: CPT

## 2018-05-07 PROCEDURE — 94640 AIRWAY INHALATION TREATMENT: CPT

## 2018-05-07 PROCEDURE — 93005 ELECTROCARDIOGRAM TRACING: CPT

## 2018-05-07 PROCEDURE — 85025 COMPLETE CBC W/AUTO DIFF WBC: CPT

## 2018-05-07 PROCEDURE — 86920 COMPATIBILITY TEST SPIN: CPT

## 2018-05-07 PROCEDURE — 93318 ECHO TRANSESOPHAGEAL INTRAOP: CPT

## 2018-05-07 PROCEDURE — 83880 ASSAY OF NATRIURETIC PEPTIDE: CPT

## 2018-05-07 PROCEDURE — 94010 BREATHING CAPACITY TEST: CPT

## 2018-05-07 PROCEDURE — 76705 ECHO EXAM OF ABDOMEN: CPT

## 2018-05-07 PROCEDURE — 83735 ASSAY OF MAGNESIUM: CPT

## 2018-05-07 PROCEDURE — 93880 EXTRACRANIAL BILAT STUDY: CPT

## 2018-05-07 PROCEDURE — 99285 EMERGENCY DEPT VISIT HI MDM: CPT

## 2018-05-07 PROCEDURE — 82550 ASSAY OF CK (CPK): CPT

## 2018-05-07 PROCEDURE — 83540 ASSAY OF IRON: CPT

## 2018-05-07 PROCEDURE — 82272 OCCULT BLD FECES 1-3 TESTS: CPT

## 2018-05-07 PROCEDURE — 94150 VITAL CAPACITY TEST: CPT

## 2018-05-07 PROCEDURE — 83690 ASSAY OF LIPASE: CPT

## 2018-05-07 PROCEDURE — 93306 TTE W/DOPPLER COMPLETE: CPT

## 2018-05-07 PROCEDURE — 71045 X-RAY EXAM CHEST 1 VIEW: CPT

## 2018-05-07 PROCEDURE — 84443 ASSAY THYROID STIM HORMONE: CPT

## 2018-05-07 PROCEDURE — 81001 URINALYSIS AUTO W/SCOPE: CPT

## 2018-05-07 PROCEDURE — 96375 TX/PRO/DX INJ NEW DRUG ADDON: CPT

## 2018-05-07 PROCEDURE — 99153 MOD SED SAME PHYS/QHP EA: CPT

## 2018-05-07 PROCEDURE — C9248 INJ, CLEVIDIPINE BUTYRATE: HCPCS

## 2018-05-07 PROCEDURE — 85027 COMPLETE CBC AUTOMATED: CPT

## 2018-05-07 PROCEDURE — 84100 ASSAY OF PHOSPHORUS: CPT

## 2018-05-07 PROCEDURE — 82390 ASSAY OF CERULOPLASMIN: CPT

## 2018-05-07 PROCEDURE — 86850 RBC ANTIBODY SCREEN: CPT

## 2018-05-07 PROCEDURE — 85379 FIBRIN DEGRADATION QUANT: CPT

## 2018-05-07 PROCEDURE — 82728 ASSAY OF FERRITIN: CPT

## 2018-05-07 PROCEDURE — 82247 BILIRUBIN TOTAL: CPT

## 2018-05-07 PROCEDURE — 90935 HEMODIALYSIS ONE EVALUATION: CPT

## 2018-05-07 PROCEDURE — 87641 MR-STAPH DNA AMP PROBE: CPT

## 2018-05-07 PROCEDURE — 71046 X-RAY EXAM CHEST 2 VIEWS: CPT

## 2018-05-07 PROCEDURE — 83605 ASSAY OF LACTIC ACID: CPT

## 2018-05-07 PROCEDURE — 80076 HEPATIC FUNCTION PANEL: CPT

## 2018-05-07 PROCEDURE — 93998 UNLISTD NONINVAS VASC DX STD: CPT

## 2018-05-07 PROCEDURE — 96374 THER/PROPH/DIAG INJ IV PUSH: CPT

## 2018-05-07 PROCEDURE — 87086 URINE CULTURE/COLONY COUNT: CPT

## 2018-05-07 PROCEDURE — 86039 ANTINUCLEAR ANTIBODIES (ANA): CPT

## 2018-05-07 PROCEDURE — 85730 THROMBOPLASTIN TIME PARTIAL: CPT

## 2018-05-07 PROCEDURE — 94664 DEMO&/EVAL PT USE INHALER: CPT

## 2018-05-07 PROCEDURE — 86900 BLOOD TYPING SEROLOGIC ABO: CPT

## 2018-05-07 PROCEDURE — 80048 BASIC METABOLIC PNL TOTAL CA: CPT

## 2018-05-07 PROCEDURE — 71275 CT ANGIOGRAPHY CHEST: CPT

## 2018-05-07 PROCEDURE — C1893 INTRO/SHEATH, FIXED,NON-PEEL: HCPCS

## 2018-05-07 PROCEDURE — 94668 MNPJ CHEST WALL SBSQ: CPT

## 2018-05-07 PROCEDURE — 80053 COMPREHEN METABOLIC PANEL: CPT

## 2018-05-07 PROCEDURE — 93458 L HRT ARTERY/VENTRICLE ANGIO: CPT

## 2018-05-07 PROCEDURE — 80069 RENAL FUNCTION PANEL: CPT

## 2018-05-07 PROCEDURE — P9047 ALBUMIN (HUMAN), 25%, 50ML: HCPCS

## 2018-05-07 PROCEDURE — 86255 FLUORESCENT ANTIBODY SCREEN: CPT

## 2018-05-07 PROCEDURE — 94002 VENT MGMT INPAT INIT DAY: CPT

## 2018-05-07 PROCEDURE — 94667 MNPJ CHEST WALL 1ST: CPT

## 2018-05-07 PROCEDURE — 93970 EXTREMITY STUDY: CPT

## 2018-05-07 PROCEDURE — 86901 BLOOD TYPING SEROLOGIC RH(D): CPT

## 2018-05-07 PROCEDURE — 80307 DRUG TEST PRSMV CHEM ANLYZR: CPT

## 2018-05-07 RX ADMIN — NITROGLYCERIN PRN MG: 0.4 TABLET SUBLINGUAL at 23:55

## 2018-05-07 NOTE — RADRPT
EXAM DATE/TIME:  05/07/2018 21:47 

 

[facility] COMPARISON:     

CHEST PA & LAT, January 27, 2018, 15:09.

 

                     

INDICATIONS :     

Chest pain and dizziness after dialysis.

                     

 

MEDICAL HISTORY :     

None.          

 

SURGICAL HISTORY :     

None.   

 

ENCOUNTER:     

Initial                                        

 

ACUITY:     

1 day      

 

PAIN SCORE:     

10/10

 

LOCATION:      

middle chest.

 

FINDINGS:     

PA and lateral views of the chest demonstrate right-sided dialysis catheter tip in right atrium. Mild
 edema pattern in the lungs. Trace pleural fluid. Cardiomegaly.

 

CONCLUSION:     

1. Mild edema pattern in the lungs with trace pleural fluid. No pneumothorax.

 

 

 

 Valdez Colunga MD on May 07, 2018 at 22:16           

Board Certified Radiologist.

 This report was verified electronically.

## 2018-05-07 NOTE — PD
HPI


Chief Complaint:  GI Complaint


Time Seen by Provider:  22:56


Travel History


International Travel<30 days:  No


Contact w/Intl Traveler<30days:  No


Traveled to known affect area:  No





History of Present Illness


HPI


The patient is a 53 year old female who presents to the [facility] 

emergency department with a history of chest pain and shortness of breath that 

began earlier today.  The patient reports that this began with nausea while she 

was at dialysis.  After the nausea began, the patient then began to have sharp 

chest pain in the left side of her lower anterior chest that radiated to her 

upper back.  The patient reports that she then began to have shortness of 

breath.  She reports that she has recently had a cough intermittently 

productive of white sputum.  She denies having any known recent fevers.  She 

does report having a history of coronary artery disease with 4 stents placed in 

2018.  She is on low-dose aspirin daily and Plavix.  She reports that 

she did take them earlier today.  Her cardiologist is Dr. Mercado.  Her 

nephrologist is Dr. Whiteside.  She denies having any vomiting with the nausea.  

She reports that she has not had any diarrhea.  She has been moving her bowels 

regularly.  On review of systems otherwise, she denies having any neck pain, 

abdominal pain, urinary symptoms, or neurologic symptoms.  She does report that 

she is on hemodialysis on Monday, Wednesday, and Friday.  She reports that she 

does make a small amount of urine per





PFSH


Past Medical History


*** Narrative Medical


The patient's past medical history is significant for chronic renal failure on 

hemodialysis on Monday, Wednesday, and Friday, hypertension, coronary artery 

disease, hyperlipidemia, diabetes mellitus, neuropathy, history of pancreatitis

, history of alcohol use, history of uterine fibroids.


Hx Anticoagulant Therapy:  No


Arthritis:  Yes


Asthma:  No


Blood Disorders:  No


Anxiety:  No


Depression:  No


Heart Rhythm Problems:  No


Cancer:  No


Cardiac Catheterization:  Yes


Cardiovascular Problems:  Yes


High Cholesterol:  Yes


Chemotherapy:  No


Chest Pain:  Yes


Congestive Heart Failure:  No


COPD:  No


Cerebrovascular Accident:  No


Coronary Artery Disease:  Yes


Diabetes:  Yes


Diminished Hearing:  No


Endocrine:  Yes


Gastrointestinal Disorders:  Yes (HX PANCREATITIS)


GERD:  Yes


Genitourinary:  No


Hypertension:  Yes


Immune Disorder:  No


Implanted Vascular Access Dvce:  Yes


Kidney Stones:  No


Musculoskeletal:  No


Neurologic:  No


Psychiatric:  No


Reproductive:  No


Respiratory:  Yes (pna)


Migraines:  Yes


Pancreatitis:  Yes


Pneumonia:  Yes


Renal Failure:  No


Sleep Apnea:  No


Thyroid Disease:  No


Menopausal:  Yes


:  2


Para:  3


Miscarriage:  0


:  0


Tubal Ligation:  Yes





Past Surgical History


*** Narrative Surgical


The patient's past surgical history is significant for cholecystectomy, 

bilateral tubal ligation, cataract surgery, cardiac catheterization with 4 

prior stents being placed, history of upper and lower endoscopy.


Abdominal Surgery:  No


Cardiac Surgery:  Yes (stents orevious, needs high risk PCI)


Cholecystectomy:  Yes


Coronary Stent:  Yes


Ear Surgery:  No


Endocrine Surgery:  No


Eye Surgery:  No


Genitourinary Surgery:  Yes (gallblaller)


Gynecologic Surgery:  No


Hysterectomy:  No


Neurologic Surgery:  No


Oral Surgery:  Yes (abcessed tooth )


Pacemaker:  No


Thoracic Surgery:  No


Other Surgery:  Yes





Social History


Alcohol Use:  No (use to abuse; quit 3 years ago. )


Tobacco Use:  Yes (0.5 ppd)


Substance Use:  Yes (weed occasionally)





Allergies-Medications


(Allergen,Severity, Reaction):  


Coded Allergies:  


     shellfish derived (Unverified  Adverse Reaction, Mild, VOMITNG, 18)


Reported Meds & Prescriptions





Reported Meds & Active Scripts


Active


Lisinopril 40 Mg Tab 40 Mg PO DAILY


Oxycodone-Acetaminophen 5-325 (Oxycodone HCl/Acetaminophen) 5 Mg-325 Mg Tablet 

1 Tab PO Q6H PRN


Catapres (Clonidine) 0.1 Mg Tab 0.1 Mg PO Q8H


Roller Walker (Misc. Devices) 1 Mis Mis Ea .XX NOW


Norvasc (Amlodipine Besylate) 10 Mg Tab 10 Mg PO DAILY


Lopressor (Metoprolol Tartrate) 100 Mg Tab 100 Mg PO Q12HR


Nitrostat SL (Nitroglycerin) 0.4 Mg Subl 0.4 Mg SL Q5M PRN


Isosorbide Mononitrate ER (Isosorbide Mononitrate) 60 Mg Tab 120 Mg PO DAILY@07


Hydralazine HCl 50 Mg Tablet 50 Mg PO Q8HR


Pravachol (Pravastatin) 40 Mg Tab 40 Mg PO HS


Plavix (Clopidogrel Bisulfate) 75 Mg Tab 75 Mg PO DAILY


Zofran (Ondansetron HCl) 4 Mg Tab 4 Mg PO Q6HR PRN


Gas Relief Maximum Streng (Simethicone) 125 Mg Chw 125 Mg PO Q8HR


Reported


Lantus Inj (Insulin Glargine) 1,000 Unit/10 Ml Vial 15 Units SQ HS


Gabapentin 100 Mg Cap 300 Mg PO HS


Folic Acid 400 Mcg Tab 1 Mg PO DAILY


Aspirin Children's (Aspirin) 81 Mg Chew 81 Mg CHEW DAILY








Review of Systems


Except as stated in HPI:  all other systems reviewed are Neg


General / Constitutional:  No: Fever


Eyes:  No: Visual changes


HENT:  Positive: Congestion, No: Headaches


Cardiovascular:  Positive: Chest Pain or Discomfort, Dyspnea on exertion


Respiratory:  Positive: Cough, No: Shortness of Breath


Gastrointestinal:  Positive: Nausea, No: Vomiting, Diarrhea, Abdominal Pain


Genitourinary:  No: Dysuria


Musculoskeletal:  No: Pain


Skin:  No Rash


Neurologic:  No: Weakness, Focal Abnormalities, Change in Mentation, Slurred 

Speech, Sensory Disturbance


Psychiatric:  No: Depression


Endocrine:  No: Polydipsia


Hematologic/Lymphatic:  No: Easy Bruising





Physical Exam


Narrative


General: 


The patient is a well-developed well-nourished female in no acute distress. 





Head and Neck exam: 


Head is normocephalic atraumatic. 


Eyes: EOMI, pupils are equal round and reactive to light. 


Nose: Midline septum with pink mucous membranes 


Mouth: Dentition unremarkable. Moist mucus membranes. Posterior oropharynx is 

not erythematous. No tonsillar hypertrophy. Uvula midline. Airway patent. 


Neck: No palpable lymphadenopathy. No nuchal rigidity. No thyromegaly. 





Cardiovascular: 


Regular rate and rhythm without murmurs, gallops, or rubs. No pulse deficit to 

the extremities on simultaneous auscultation and palpation of her radial 

artery.  The patient denies having chest wall tenderness on palpation.  No step-

off or crepitus.  No erythema or ecchymosis.





Lungs: 


Clear to auscultation bilaterally. No wheezes, rhonchi, or rales.


 


Abdomen:


Soft, without tenderness to palpation in all 4 quadrants of the abdomen. No 

guarding, rebound, or rigidity.  Normal bowel sounds are audible.  No 

tenderness on palpation of McBurney's point.  Negative Ayala sign.





Extremities: 


No clubbing, cyanosis, or edema. 2+ pulses in all 4 extremities.  No calf 

tenderness on palpation.





Back: 


No spinous process tenderness to palpation. No costovertebral angle tenderness 

to palpation. 





Neurologic Exam: Grossly nonfocal.





Skin Exam: No rash noted. Intact skin that is warm and dry.





Data


Data


Last Documented VS





Vital Signs








  Date Time  Temp Pulse Resp B/P (MAP) Pulse Ox O2 Delivery O2 Flow Rate FiO2


 


18 01:11     91 Nasal Cannula 3.00 


 


18 01:09  66 16     


 


18 21:21 100.4       








Orders





 Orders


Electrocardiogram (18 21:23)


Complete Blood Count With Diff (18 21:23)


Basic Metabolic Panel (Bmp) (18 21:23)


Ckmb (Isoenzyme) Profile (18 21:23)


Troponin I (18 21:23)


Iv Access Insert/Monitor (18 21:23)


Ecg Monitoring (18:23)


Oxygen Administration (18:23)


Oximetry (18 21:23)


Chest, Pa & Lat (18 21:23)


B-Type Natriuretic Peptide (18 23:13)


Prothrombin Time / Inr (Pt) (18 23:13)


Act Partial Throm Time (Ptt) (18 23:13)


Hepatic Functional Panel (18 23:13)


Magnesium (Mg) (18 23:13)


Aspirin Chew (Aspirin Chew) (18 23:15)


Nitroglycerin Sl (Nitrostat Sl) (18 23:15)


Nitroglycerin 2% Oint (Nitroglycerin 2% (18 23:15)


Ondansetron Inj (Zofran Inj) (18 00:30)


Morphine Inj (Morphine Inj) (18 01:00)


Lipase (18 23:40)


Furosemide Inj (Lasix Inj) (18 01:30)


Morphine Inj (Morphine Inj) (18 01:45)


Nitroglycerin-D5w 50 Mg/250 Ml (Nitrogly (18 02:00)


Consult Nephrology (18 )


Admit Order (Ed Use Only) (18 01:52)





Labs





Laboratory Tests








Test


  18


23:40 18


01:00


 


Prothrombin Time 9.6 SEC  


 


Prothromb Time International


Ratio 0.9 RATIO 


  


 


 


Activated Partial


Thromboplast Time 21.9 SEC 


  


 


 


Magnesium Level 2.3 MG/DL  


 


Total Bilirubin 1.2 MG/DL  


 


Direct Bilirubin 0.2 MG/DL  


 


Indirect Bilirubin 1.0 MG/DL  


 


Aspartate Amino Transf


(AST/SGOT) 136 U/L 


  


 


 


Alanine Aminotransferase


(ALT/SGPT) 112 U/L 


  


 


 


Alkaline Phosphatase 676 U/L  


 


B-Type Natriuretic Peptide 1770 PG/ML  


 


Total Protein 9.4 GM/DL  


 


Albumin 3.5 GM/DL  


 


White Blood Count  8.8 TH/MM3 


 


Red Blood Count  3.90 MIL/MM3 


 


Hemoglobin  12.1 GM/DL 


 


Hematocrit  36.9 % 


 


Mean Corpuscular Volume  94.7 FL 


 


Mean Corpuscular Hemoglobin  31.1 PG 


 


Mean Corpuscular Hemoglobin


Concent 


  32.8 % 


 


 


Red Cell Distribution Width  15.4 % 


 


Platelet Count  357 TH/MM3 


 


Mean Platelet Volume  8.6 FL 


 


Neutrophils (%) (Auto)  78.6 % 


 


Lymphocytes (%) (Auto)  14.4 % 


 


Monocytes (%) (Auto)  5.1 % 


 


Eosinophils (%) (Auto)  1.0 % 


 


Basophils (%) (Auto)  0.9 % 


 


Neutrophils # (Auto)  6.9 TH/MM3 


 


Lymphocytes # (Auto)  1.3 TH/MM3 


 


Monocytes # (Auto)  0.5 TH/MM3 


 


Eosinophils # (Auto)  0.1 TH/MM3 


 


Basophils # (Auto)  0.1 TH/MM3 


 


CBC Comment  DIFF FINAL 


 


Differential Comment   


 


Blood Urea Nitrogen  20 MG/DL 


 


Creatinine  3.02 MG/DL 


 


Random Glucose  177 MG/DL 


 


Calcium Level  8.7 MG/DL 


 


Sodium Level  134 MEQ/L 


 


Potassium Level  4.5 MEQ/L 


 


Chloride Level  101 MEQ/L 


 


Carbon Dioxide Level  24.5 MEQ/L 


 


Anion Gap  9 MEQ/L 


 


Estimat Glomerular Filtration


Rate 


  20 ML/MIN 


 


 


Total Creatine Kinase  93 U/L 


 


Troponin I  0.39 NG/ML 


 


Lipase  63 U/L 











MDM


Medical Decision Making


Medical Screen Exam Complete:  Yes


Emergency Medical Condition:  Yes


Medical Record Reviewed:  Yes


Differential Diagnosis


Acute coronary syndrome, versus pneumonia, versus pulmonary embolism, versus 

pneumothorax, versus congestive heart failure


Narrative Course


During the course of the patient's emergency department visit, the patient's 

history, examination, and differential diagnosis were reviewed with the 

patient. The patient was placed on a cardiac monitor with oximetry and frequent 

blood pressure monitoring. The patient had  IV access obtained and blood work 

sent for analysis.  The patient had a EKG done on arrival.  The patient's EKG 

shows a sinus rhythm heart rate of 62, QRS duration 85 ms,  ms.  The 

patient is noted to have T-wave inversions in V4, V5, V6, lead to, 1, aVF, 3.  

No acute ST segment elevation





The patient was initially provided aspirin 243 mg p.o. 1 and she did take a low

-dose aspirin prior to arrival.  The patient was given sublingual nitroglycerin 

every 5 minutes 3, nitroglycerin 1 inch to the chest wall.  During the patient'

s initial treatment, the patient became angry that she was receiving on a 

nitroglycerin for pain.  She refused to take the third nitroglycerin 

sublingual.  She stated that she needed something in addition to Zofran for her 

pain.  She did agree to having a nitroglycerin paste applied to the chest wall.

  I was called back into the room to reassess the patient.  The patient was 

given morphine 4 mg IV.  The patient continued to have pain and was started on 

a nitroglycerin drip which will be titrated for control of her chest pain and 

her hypertension.





The patient's laboratory studies were reviewed and remarkable for


18 01:00








Calcium Level 8.7, magnesium was 2.3, total bilirubin 1.2, indirect bilirubin 

1.0, , , alk phos 676.  CPK 93, troponin I is elevated at 0.39, 

BNP is elevated at 1770, lipase 63.  PT 9.6, PTT 21.9.











Radiology studies were reviewed and remarkable for a chest x-ray that shows 

mild edema pattern in the lungs with trace pleural fluid, no pneumothorax.  As 

the patient did have her complete hemodialysis session today, the patient's 

pulmonary edema is suspicious for cardiac origin.  Additionally with the patient

's elevated troponin this could be related to an acute coronary syndrome, 

versus elevation from her renal failure.  The patient was started on heparin 

drip per MI protocol.  The patient's case was also discussed with the patient's 

nephrologist who will see the patient in consultation.





The patient's results were discussed with the patient, including the plan of 

care. I explained that further testing and/ or monitoring is indicated based on 

the patient's history, examination, and/ or laboratory findings. Therefore, I 

recommended admission for additional evaluation. The patient expressed 

understanding and was agreeable with this plan. The patient was admitted to the 

hospital in guarded condition and sent to a bed under the care of the [facility].





Physician Communication


Physician Communication


The patient's case including history, pertinent physical examination findings, 

and laboratory studies were discussed with Dr. Whiteside, the patient's 

nephrologist, and Dr. Pizano, the hospitalist. It [facility] 

[facility]





Diagnosis





 Primary Impression:  


 Chest pain, rule out acute myocardial infarction


 Additional Impressions:  


 Acute exacerbation of congestive heart failure


 Qualified Codes:  I50.9 - Heart failure, unspecified


 Elevated troponin





Admitting Information


Admitting Physician Requests:  Admit











Lea Cuevas MD May 7, 2018 22:59

## 2018-05-08 VITALS
HEART RATE: 66 BPM | RESPIRATION RATE: 16 BRPM | DIASTOLIC BLOOD PRESSURE: 84 MMHG | SYSTOLIC BLOOD PRESSURE: 190 MMHG | OXYGEN SATURATION: 90 %

## 2018-05-08 VITALS
DIASTOLIC BLOOD PRESSURE: 71 MMHG | TEMPERATURE: 101.2 F | OXYGEN SATURATION: 97 % | HEART RATE: 91 BPM | SYSTOLIC BLOOD PRESSURE: 116 MMHG | RESPIRATION RATE: 20 BRPM

## 2018-05-08 VITALS — HEART RATE: 94 BPM

## 2018-05-08 VITALS
SYSTOLIC BLOOD PRESSURE: 100 MMHG | HEART RATE: 88 BPM | RESPIRATION RATE: 23 BRPM | DIASTOLIC BLOOD PRESSURE: 70 MMHG | TEMPERATURE: 98.9 F | OXYGEN SATURATION: 98 %

## 2018-05-08 VITALS
TEMPERATURE: 98.6 F | HEART RATE: 68 BPM | DIASTOLIC BLOOD PRESSURE: 78 MMHG | OXYGEN SATURATION: 95 % | SYSTOLIC BLOOD PRESSURE: 123 MMHG | RESPIRATION RATE: 23 BRPM

## 2018-05-08 VITALS
DIASTOLIC BLOOD PRESSURE: 99 MMHG | OXYGEN SATURATION: 94 % | RESPIRATION RATE: 26 BRPM | HEART RATE: 78 BPM | SYSTOLIC BLOOD PRESSURE: 183 MMHG

## 2018-05-08 VITALS
RESPIRATION RATE: 18 BRPM | OXYGEN SATURATION: 98 % | SYSTOLIC BLOOD PRESSURE: 127 MMHG | DIASTOLIC BLOOD PRESSURE: 68 MMHG | HEART RATE: 68 BPM

## 2018-05-08 VITALS
DIASTOLIC BLOOD PRESSURE: 75 MMHG | RESPIRATION RATE: 18 BRPM | OXYGEN SATURATION: 95 % | TEMPERATURE: 99 F | SYSTOLIC BLOOD PRESSURE: 111 MMHG | HEART RATE: 80 BPM

## 2018-05-08 VITALS
OXYGEN SATURATION: 98 % | TEMPERATURE: 101.1 F | SYSTOLIC BLOOD PRESSURE: 116 MMHG | HEART RATE: 93 BPM | RESPIRATION RATE: 20 BRPM | DIASTOLIC BLOOD PRESSURE: 73 MMHG

## 2018-05-08 VITALS
OXYGEN SATURATION: 94 % | RESPIRATION RATE: 20 BRPM | TEMPERATURE: 99.3 F | DIASTOLIC BLOOD PRESSURE: 85 MMHG | SYSTOLIC BLOOD PRESSURE: 138 MMHG | HEART RATE: 80 BPM

## 2018-05-08 VITALS — OXYGEN SATURATION: 91 %

## 2018-05-08 VITALS — HEART RATE: 92 BPM

## 2018-05-08 VITALS — OXYGEN SATURATION: 96 % | HEART RATE: 88 BPM

## 2018-05-08 VITALS — HEART RATE: 90 BPM

## 2018-05-08 LAB
ALBUMIN SERPL-MCNC: 3.5 GM/DL (ref 3.4–5)
ALP SERPL-CCNC: 676 U/L (ref 45–117)
ALT SERPL-CCNC: 112 U/L (ref 10–53)
AST SERPL-CCNC: 136 U/L (ref 15–37)
BASOPHILS # BLD AUTO: 0.1 TH/MM3 (ref 0–0.2)
BASOPHILS NFR BLD: 0.9 % (ref 0–2)
BILIRUB INDIRECT SERPL-MCNC: 1 MG/DL (ref 0–0.8)
BILIRUB SERPL-MCNC: 1.2 MG/DL (ref 0.2–1)
BUN SERPL-MCNC: 20 MG/DL (ref 7–18)
CALCIUM SERPL-MCNC: 8.7 MG/DL (ref 8.5–10.1)
CHLORIDE SERPL-SCNC: 101 MEQ/L (ref 98–107)
CREAT SERPL-MCNC: 3.02 MG/DL (ref 0.5–1)
D-DIMER: 2.74 MG/L FEU (ref 0–0.5)
DIRECT BILIRUBIN ADULT: 0.2 MG/DL (ref 0–0.2)
EOSINOPHIL # BLD: 0.1 TH/MM3 (ref 0–0.4)
EOSINOPHIL NFR BLD: 1 % (ref 0–4)
ERYTHROCYTE [DISTWIDTH] IN BLOOD BY AUTOMATED COUNT: 15.3 % (ref 11.6–17.2)
ERYTHROCYTE [DISTWIDTH] IN BLOOD BY AUTOMATED COUNT: 15.4 % (ref 11.6–17.2)
GFR SERPLBLD BASED ON 1.73 SQ M-ARVRAT: 20 ML/MIN (ref 89–?)
GLUCOSE SERPL-MCNC: 177 MG/DL (ref 74–106)
HCO3 BLD-SCNC: 24.5 MEQ/L (ref 21–32)
HCT VFR BLD CALC: 35.2 % (ref 35–46)
HCT VFR BLD CALC: 36.9 % (ref 35–46)
HGB BLD-MCNC: 12.1 GM/DL (ref 11.6–15.3)
HGB BLD-MCNC: 12.1 GM/DL (ref 11.6–15.3)
INR PPP: 0.9 RATIO
INR PPP: 1 RATIO
LYMPHOCYTES # BLD AUTO: 1.3 TH/MM3 (ref 1–4.8)
LYMPHOCYTES NFR BLD AUTO: 14.4 % (ref 9–44)
MAGNESIUM SERPL-MCNC: 2.3 MG/DL (ref 1.5–2.5)
MCH RBC QN AUTO: 31.1 PG (ref 27–34)
MCH RBC QN AUTO: 32.8 PG (ref 27–34)
MCHC RBC AUTO-ENTMCNC: 32.8 % (ref 32–36)
MCHC RBC AUTO-ENTMCNC: 34.3 % (ref 32–36)
MCV RBC AUTO: 94.7 FL (ref 80–100)
MCV RBC AUTO: 95.7 FL (ref 80–100)
MONOCYTE #: 0.5 TH/MM3 (ref 0–0.9)
MONOCYTES NFR BLD: 5.1 % (ref 0–8)
NEUTROPHILS # BLD AUTO: 6.9 TH/MM3 (ref 1.8–7.7)
NEUTROPHILS NFR BLD AUTO: 78.6 % (ref 16–70)
PLATELET # BLD: 335 TH/MM3 (ref 150–450)
PLATELET # BLD: 357 TH/MM3 (ref 150–450)
PMV BLD AUTO: 8.4 FL (ref 7–11)
PMV BLD AUTO: 8.6 FL (ref 7–11)
PROT SERPL-MCNC: 9.4 GM/DL (ref 6.4–8.2)
PROTHROMBIN TIME: 10.5 SEC (ref 9.8–11.6)
PROTHROMBIN TIME: 9.6 SEC (ref 9.8–11.6)
RBC # BLD AUTO: 3.68 MIL/MM3 (ref 4–5.3)
RBC # BLD AUTO: 3.9 MIL/MM3 (ref 4–5.3)
SODIUM SERPL-SCNC: 134 MEQ/L (ref 136–145)
TROPONIN I SERPL-MCNC: 0.39 NG/ML (ref 0.02–0.05)
WBC # BLD AUTO: 8.8 TH/MM3 (ref 4–11)
WBC # BLD AUTO: 9.6 TH/MM3 (ref 4–11)

## 2018-05-08 PROCEDURE — 5A1D70Z PERFORMANCE OF URINARY FILTRATION, INTERMITTENT, LESS THAN 6 HOURS PER DAY: ICD-10-PCS | Performed by: INTERNAL MEDICINE

## 2018-05-08 RX ADMIN — NITROGLYCERIN PRN MG: 0.4 TABLET SUBLINGUAL at 00:10

## 2018-05-08 RX ADMIN — INSULIN ASPART SCH: 100 INJECTION, SOLUTION INTRAVENOUS; SUBCUTANEOUS at 12:00

## 2018-05-08 RX ADMIN — Medication SCH ML: at 21:58

## 2018-05-08 RX ADMIN — HEPARIN SODIUM PRN UNITS: 1000 INJECTION, SOLUTION INTRAVENOUS; SUBCUTANEOUS at 23:02

## 2018-05-08 RX ADMIN — HEPARIN SODIUM PRN MLS/HR: 10000 INJECTION, SOLUTION INTRAVENOUS at 16:08

## 2018-05-08 RX ADMIN — INSULIN ASPART SCH: 100 INJECTION, SOLUTION INTRAVENOUS; SUBCUTANEOUS at 17:00

## 2018-05-08 RX ADMIN — Medication SCH ML: at 09:35

## 2018-05-08 RX ADMIN — NITROGLYCERIN PRN MG: 0.4 TABLET SUBLINGUAL at 21:57

## 2018-05-08 RX ADMIN — INSULIN ASPART SCH: 100 INJECTION, SOLUTION INTRAVENOUS; SUBCUTANEOUS at 21:58

## 2018-05-08 RX ADMIN — NITROGLYCERIN PRN MG: 0.4 TABLET SUBLINGUAL at 00:01

## 2018-05-08 RX ADMIN — NITROGLYCERIN SCH INCH: 20 OINTMENT TOPICAL at 22:36

## 2018-05-08 RX ADMIN — ASPIRIN 81 MG SCH MG: 81 TABLET ORAL at 09:35

## 2018-05-08 RX ADMIN — CLOPIDOGREL BISULFATE SCH MG: 75 TABLET, FILM COATED ORAL at 09:34

## 2018-05-08 RX ADMIN — INSULIN ASPART SCH: 100 INJECTION, SOLUTION INTRAVENOUS; SUBCUTANEOUS at 08:00

## 2018-05-08 RX ADMIN — ACETAMINOPHEN PRN MG: 325 TABLET ORAL at 21:56

## 2018-05-08 NOTE — PD.CONS
HPI


Service


Nephrology


Consult Requested By





Reason for Consult


ESRD on HD


Primary Care Physician


No Primary Care Physician


History of Present Illness


This is a 54 y/o AAF patient with ESRD on HD. She was in the clinic yesterday, 

was hypertensive apparently ran out of hydralazine. She developed left sided 

chest pain that did not feel like her previous angina when she had a stent 

placed. CT angio was ordered but apparently the patient was unable to lay flat 

for the exam. Cardiology has evaluated the patient, she is mildly dyspneic 

today. We were consulted to assist with management. PMH listed below. She is a 

full code. 


 (Migdalia Camejo)





Review of Systems


Constitutional:  COMPLAINS OF: Fatigue, DENIES: Fever, Weight gain


Respiratory:  COMPLAINS OF: Shortness of breath


Cardiovascular:  COMPLAINS OF: Chest pain, DENIES: Syncope, Lower Extremity 

Edema (Migdalia Camejo)





Past Family Social History


Allergies:  


Coded Allergies:  


     shellfish derived (Unverified  Adverse Reaction, Mild, VOMITNG, 5/7/18)


Past Medical History


ESRD on HD MWF


Hypertension


Hyperlipidemia


CHF


Diabetes mellitus


CAD.  Recent cardiac catheterization in February with 4 stents placed.


History of pancreatitis


Uterine fibroids


Anemia.


Past Surgical History


PCI with stent placement


Cholecystectomy


Tubal ligation


Cataract surgery


Colonoscopy


permcath placement right chest.


History of AV fistula surgery.  Not mature yet.


Reported Medications


Lisinopril 40 Mg Tab 40 Mg PO DAILY


Oxycodone-Acetaminophen 5-325 (Oxycodone HCl/Acetaminophen) 5 Mg-325 Mg Tablet 

1 Tab PO Q6H PRN


Catapres (Clonidine) 0.1 Mg Tab 0.1 Mg PO Q8H


Roller Walker (Misc. Devices) 1 Mis Mis Ea .XX NOW


Norvasc (Amlodipine Besylate) 10 Mg Tab 10 Mg PO DAILY


Lopressor (Metoprolol Tartrate) 100 Mg Tab 100 Mg PO Q12HR


Nitrostat SL (Nitroglycerin) 0.4 Mg Subl 0.4 Mg SL Q5M PRN


Isosorbide Mononitrate ER (Isosorbide Mononitrate) 60 Mg Tab 120 Mg PO DAILY@07


Hydralazine HCl 50 Mg Tablet 50 Mg PO Q8HR


Pravachol (Pravastatin) 40 Mg Tab 40 Mg PO HS


Plavix (Clopidogrel Bisulfate) 75 Mg Tab 75 Mg PO DAILY


Zofran (Ondansetron HCl) 4 Mg Tab 4 Mg PO Q6HR PRN


Gas Relief Maximum Streng (Simethicone) 125 Mg Chw 125 Mg PO Q8HR


Lantus Inj (Insulin Glargine) 1,000 Unit/10 Ml Vial 15 Units SQ HS


Gabapentin 100 Mg Cap 300 Mg PO HS


Folic Acid 400 Mcg Tab 1 Mg PO DAILY


Aspirin Children's (Aspirin) 81 Mg Chew 81 Mg CHEW DAILY


Active Ordered Medications





Current Medications








 Medications


  (Trade)  Dose


 Ordered  Sig/Luis Armando


 Route  Start Time


 Stop Time Status Last Admin


 


  (Nitrostat Sl)  0.4 mg  Q5M  PRN


 SL  5/7/18 23:15


    5/8/18 00:01


 


 


  (NS Flush)  2 ml  UNSCH  PRN


 IV FLUSH  5/8/18 02:15


     


 


 


  (NS Flush)  2 ml  BID


 IV FLUSH  5/8/18 09:00


    5/8/18 09:35


 


 


  (Narcan Inj)  0.4 mg  UNSCH  PRN


 IV PUSH  5/8/18 02:15


     


 


 


  (Milk Of


 Magnesia Liq)  30 ml  Q12H  PRN


 PO  5/8/18 02:15


     


 


 


  (Senokot)  17.2 mg  Q12H  PRN


 PO  5/8/18 02:15


     


 


 


  (Dulcolax Supp)  10 mg  DAILY  PRN


 RECTAL  5/8/18 02:15


     


 


 


  (Lactulose Liq)  30 ml  DAILY  PRN


 PO  5/8/18 02:15


     


 


 


  (Norvasc)  10 mg  DAILY


 PO  5/8/18 09:00


    5/8/18 09:35


 


 


  (Aspirin Chew)  81 mg  DAILY


 CHEW  5/8/18 09:00


    5/8/18 09:35


 


 


  (Plavix)  75 mg  DAILY


 PO  5/8/18 09:00


    5/8/18 09:34


 


 


  (Apresoline)  50 mg  Q8HR


 PO  5/8/18 06:00


    5/8/18 06:25


 


 


  (Apresoline Inj)  10 mg  Q30M  PRN


 IV PUSH  5/8/18 02:15


     


 


 


 Heparin Sodium/


 Dextrose  250 ml @ 7


 mls/hr  TITRATE  PRN


 IV  5/8/18 03:15


    5/8/18 04:05


 


 


  (NovoLOG


 SUPPLEMENTAL


 SCALE)  1  ACHS SLIDING  SCALE


 SQ  5/8/18 08:00


     


 


 


  (D50w (Vial) Inj)  50 ml  UNSCH  PRN


 IV PUSH  5/8/18 04:15


     


 


 


  (Glucagon Inj)  1 mg  UNSCH  PRN


 OTHER  5/8/18 04:15


     


 


 


 Sodium Chloride  1,000 ml @ 


 0 mls/hr  Q0M PRN


 OTHER  5/8/18 08:52


     


 


 


  (Heparin Inj)  8,000 units  UNSCH  PRN


 IV FLUSH  5/8/18 09:00


     


 


 


 Sodium Chloride  1,000 ml @ 


 200 mls/hr  Q5H PRN


 IV  5/8/18 08:52


     


 


 


 Sodium Chloride  1,000 ml @ 


 0 mls/hr  Q0M PRN


 OTHER  5/8/18 08:52


     


 


 


  (Mannitol Inj)  12.5 gm  UNSCH  PRN


 IV  5/8/18 09:00


     


 


 


 Albumin Human  100 ml @ 


 60 mls/hr  UNSCH  PRN


 IV  5/8/18 09:00


     


 


 


  (NS Flush)  5 ml  UNSCH  PRN


 IV FLUSH  5/8/18 09:00


     


 


 


  (Heparin Inj)    UNSCH  PRN


 .XX  5/8/18 09:00


     


 


 


  (Gentamicin Inj)  20 mg  UNSCH  PRN


 OTHER  5/8/18 09:00


     


 


 


  (Zofran Inj)  4 mg  UNSCH  PRN


 IV PUSH  5/8/18 09:00


     


 


 


  (Tylenol)  650 mg  UNSCH  PRN


 PO  5/8/18 09:00


     


 


 


  (Benadryl)  25 mg  UNSCH  PRN


 PO  5/8/18 09:00


     


 


 


  (Nitrostat Sl)  0.4 mg  UNSCH  PRN


 SL  5/8/18 09:00


     


 


 


  (Catapres)  0.1 mg  UNSCH  PRN


 PO  5/8/18 09:00


     


 


 


  (Gelfoam 12 Mm/7


 Mm Top)  1 foam  UNSCH  PRN


 TOP  5/8/18 09:00


     


 








Family History


No hx of renal disorders


Social History


Former smoker


NO ETOH


Ambulatory


full code 


 (Migdalia Camejo)





Physical Exam


Vital Signs





Vital Signs








  Date Time  Temp Pulse Resp B/P (MAP) Pulse Ox O2 Delivery O2 Flow Rate FiO2


 


5/8/18 09:39   18     


 


5/8/18 08:00 98.6 68 23 123/78 (93) 95   


 


5/8/18 07:10 99.0 80 18 111/75 (87) 95 Nasal Cannula 2.00 


 


5/8/18 05:09  68 18 127/68 (87) 98 Nasal Cannula 2.00 


 


5/8/18 04:05  77  138/89    


 


5/8/18 03:35  70  170/79    


 


5/8/18 03:20  72  173/84    


 


5/8/18 03:02  78  192/93    


 


5/8/18 02:56  77  192/89    


 


5/8/18 02:46  77  191/90    


 


5/8/18 02:27  75  174/80    


 


5/8/18 02:15  74  180/82    


 


5/8/18 02:02  73  183/99    


 


5/8/18 02:02   26     


 


5/8/18 02:00  78 26 183/99 (127) 94 Nasal Cannula 4.00 


 


5/8/18 01:11     91 Nasal Cannula 3.00 


 


5/8/18 01:09  66 16 190/84 (119) 90 Nasal Cannula 2.00 


 


5/7/18 23:06     100 Room Air  


 


5/7/18 22:57  62 16 181/81 (114) 100 Room Air  


 


5/7/18 21:21 100.4 64 16 178/76 (110) 98   








Physical Exam


Young appearing AAF


Awake, alert, follows commands


Visibly dyspneic, scattered rales without wheezing


Abd soft


Trace edema


Permcath on right


Left arm AVF patent


Laboratory





Laboratory Tests








Test


  5/7/18


23:40 5/8/18


01:00 5/8/18


03:57 5/8/18


06:10


 


Prothrombin Time 9.6    


 


Prothromb Time International


Ratio 0.9 


  


  


  


 


 


Activated Partial


Thromboplast Time 21.9 


  


  24.0 


  


 


 


Magnesium Level 2.3    


 


Total Bilirubin 1.2    


 


Direct Bilirubin 0.2    


 


Indirect Bilirubin 1.0    


 


Aspartate Amino Transf


(AST/SGOT) 136 


  


  


  


 


 


Alanine Aminotransferase


(ALT/SGPT) 112 


  


  


  


 


 


Alkaline Phosphatase 676    


 


B-Type Natriuretic Peptide 1770    


 


Total Protein 9.4    


 


Albumin 3.5    


 


White Blood Count  8.8  9.6  


 


Red Blood Count  3.90  3.68  


 


Hemoglobin  12.1  12.1  


 


Hematocrit  36.9  35.2  


 


Mean Corpuscular Volume  94.7  95.7  


 


Mean Corpuscular Hemoglobin  31.1  32.8  


 


Mean Corpuscular Hemoglobin


Concent 


  32.8 


  34.3 


  


 


 


Red Cell Distribution Width  15.4  15.3  


 


Platelet Count  357  335  


 


Mean Platelet Volume  8.6  8.4  


 


Neutrophils (%) (Auto)  78.6   


 


Lymphocytes (%) (Auto)  14.4   


 


Monocytes (%) (Auto)  5.1   


 


Eosinophils (%) (Auto)  1.0   


 


Basophils (%) (Auto)  0.9   


 


Neutrophils # (Auto)  6.9   


 


Lymphocytes # (Auto)  1.3   


 


Monocytes # (Auto)  0.5   


 


Eosinophils # (Auto)  0.1   


 


Basophils # (Auto)  0.1   


 


CBC Comment  DIFF FINAL   


 


Differential Comment     


 


Blood Urea Nitrogen  20   


 


Creatinine  3.02   


 


Random Glucose  177   


 


Calcium Level  8.7   


 


Sodium Level  134   


 


Potassium Level  4.5   


 


Chloride Level  101   


 


Carbon Dioxide Level  24.5   


 


Anion Gap  9   


 


Estimat Glomerular Filtration


Rate 


  20 


  


  


 


 


Total Creatine Kinase  93   


 


Troponin I  0.39   0.79 


 


Lipase  63   


 


D-Dimer Quantitative (PE/DVT)   2.74  


 


Test


  5/8/18


07:21 5/8/18


07:22 


  


 


 


Urine Opiates Screen NEG    


 


Urine Barbiturates Screen NEG    


 


Urine Amphetamines Screen NEG    


 


Urine Benzodiazepines Screen NEG    


 


Urine Cocaine Screen NEG    


 


Urine Cannabinoids Screen POS    


 


Troponin I  0.78   








 (Migdalia Camejo)


Result Diagram:  


5/8/18 0357                                                                    

            5/8/18 0100





Imaging





Last 72 hours Impressions








Liver Ultrasound 5/8/18 0000 Signed





Impressions: 





 Service Date/Time:  Tuesday, May 8, 2018 05:16 - CONCLUSION:  Normal 

examination 





 except for very echogenic right kidney.       Edgardo Mullins MD 


 


Chest X-Ray 5/7/18 2123 Signed





Impressions: 





 Service Date/Time:  Monday, May 7, 2018 21:47 - CONCLUSION:  1. Mild edema 





 pattern in the lungs with trace pleural fluid. No pneumothorax.     Valdez Colunga MD 








 (Migdalia Camejo)





Assessment and Plan


Problem List:  


(1) ESRD (end stage renal disease) on dialysis


ICD Codes:  N18.6 - End stage renal disease; Z99.2 - Dependence on renal 

dialysis


Plan:  Typical dialysis MWF, had treatment yesterday


Needs fluid removal, will dialyze again today after PE study; HD again tomorrow 


Avoid IVF


AVF not mature, using Permcath


Repeat labs in AM including phosphorus 





(2) Chest pain, rule out acute myocardial infarction


ICD Codes:  R07.9 - Chest pain, unspecified


Status:  Acute


Plan:  Cardiology following


Nitro gtt off, on heparin gtt


PE study ordered





(3) DM (diabetes mellitus)


ICD Codes:  E11.9 - Type 2 diabetes mellitus without complications


Plan:  maintain glucose 140-180 mg/dL while hospitalized 





(4) HTN (hypertension)


ICD Codes:  I10 - Hypertension


Status:  Chronic


Plan:  Monitor blood pressure


Resume hypertensive agents


Fluid removal with dialysis





(5) Anemia


ICD Codes:  D64.9 - Anemia, unspecified


Status:  Chronic


Plan:  Epogen not required


Monitor hemoglobin 


 (Migdalia Camejo)


Assessment and Plan


patient was seen and examined. Agree with above assessment and plan. Dialysis 

again today. Discussed with Dr. Mercado. CT angiogram ordered, negative for PE. 

Patient presenting with pulmonary edema. Dialysis again tomorrow. 


 (Kelton Whiteside MD)











Migdalia Camejo May 8, 2018 11:46


Kelton Whiteside MD May 8, 2018 21:34

## 2018-05-08 NOTE — PD.CONS
HPI


Consult Requested By





Primary Care Physician


No Primary Care Physician


History of Present Illness


53-year-old female with a past medical history of CAD status post PCI , 

diastolic CHF, HTN, HLD, ESRD on HD, DM who presented for nausea.  The patient 

states she had an uneventful dialysis session yesterday.  He states that after 

dialysis she began having severe constant nausea that continues to persist, 

denies any vomiting.  She was noted to have a low-grade fever of 100.4 upon 

admission and complains of sweats overnight.  With her symptoms she does have 

associated chest pain and shortness of breath.  Chest pain started last night 

and described as left-sided, constant, sharp, worse with deep breath, different 

from previous anginal pain.  She also had significant shortness of breath last 

night which she reports is improving today. Troponin 0.39, 0.79 today.  EKG 

with inferolateral T-wave inversions, similar to previous EKG from February.  

Patient was started on heparin drip.  She has not noticed any worsening lower 

extremity edema.


 (King Ervin)





Review of Systems


Negative except as stated in the HPI


 (King Ervin)





Past Family Social History


Allergies:  


Coded Allergies:  


     shellfish derived (Unverified  Adverse Reaction, Mild, VOMITNG, 18)


Past Medical History


Hypertension


Hyperlipidemia


Diastolic CHF


Diabetes mellitus


CAD status post PCI 


History of pancreatitis


Uterine fibroids


End-stage renal disease on hemodialysis.


Anemia.


Past Surgical History


Cardiac catheterization  with 3 stents to RCA and one stent to LAD


Cholecystectomy


Tubal ligation


Cataract surgery


Colonoscopy


Vas-Cath placement right chest.


History of AV fistula surgery.  Not mature yet.


Reported Medications





Reported Meds & Active Scripts


Active


Lisinopril 40 Mg Tab 40 Mg PO DAILY


Oxycodone-Acetaminophen 5-325 (Oxycodone HCl/Acetaminophen) 5 Mg-325 Mg Tablet 

1 Tab PO Q6H PRN


Catapres (Clonidine) 0.1 Mg Tab 0.1 Mg PO Q8H


Roller Walker (Misc. Devices) 1 Mis Mis Ea .XX NOW


Norvasc (Amlodipine Besylate) 10 Mg Tab 10 Mg PO DAILY


Lopressor (Metoprolol Tartrate) 100 Mg Tab 100 Mg PO Q12HR


Nitrostat SL (Nitroglycerin) 0.4 Mg Subl 0.4 Mg SL Q5M PRN


Isosorbide Mononitrate ER (Isosorbide Mononitrate) 60 Mg Tab 120 Mg PO DAILY@07


Hydralazine HCl 50 Mg Tablet 50 Mg PO Q8HR


Pravachol (Pravastatin) 40 Mg Tab 40 Mg PO HS


Plavix (Clopidogrel Bisulfate) 75 Mg Tab 75 Mg PO DAILY


Zofran (Ondansetron HCl) 4 Mg Tab 4 Mg PO Q6HR PRN


Gas Relief Maximum Streng (Simethicone) 125 Mg Chw 125 Mg PO Q8HR


Reported


Lantus Inj (Insulin Glargine) 1,000 Unit/10 Ml Vial 15 Units SQ HS


Gabapentin 100 Mg Cap 300 Mg PO HS


Folic Acid 400 Mcg Tab 1 Mg PO DAILY


Aspirin Children's (Aspirin) 81 Mg Chew 81 Mg CHEW DAILY


Active Ordered Medications





Current Medications








 Medications


  (Trade)  Dose


 Ordered  Sig/Luis Armando


 Route  Start Time


 Stop Time Status Last Admin


 


  (Nitrostat Sl)  0.4 mg  Q5M  PRN


 SL  18 23:15


    18 00:01


 


 


  (NS Flush)  2 ml  UNSCH  PRN


 IV FLUSH  18 02:15


     


 


 


  (NS Flush)  2 ml  BID


 IV FLUSH  18 09:00


     


 


 


  (Narcan Inj)  0.4 mg  UNSCH  PRN


 IV PUSH  18 02:15


     


 


 


  (Milk Of


 Magnesia Liq)  30 ml  Q12H  PRN


 PO  18 02:15


     


 


 


  (Senokot)  17.2 mg  Q12H  PRN


 PO  18 02:15


     


 


 


  (Dulcolax Supp)  10 mg  DAILY  PRN


 RECTAL  18 02:15


     


 


 


  (Lactulose Liq)  30 ml  DAILY  PRN


 PO  18 02:15


     


 


 


  (Norvasc)  10 mg  DAILY


 PO  18 09:00


     


 


 


  (Aspirin Chew)  81 mg  DAILY


 CHEW  18 09:00


     


 


 


  (Plavix)  75 mg  DAILY


 PO  18 09:00


     


 


 


  (Apresoline)  50 mg  Q8HR


 PO  18 06:00


    18 06:25


 


 


  (Apresoline Inj)  10 mg  Q30M  PRN


 IV PUSH  18 02:15


     


 


 


 Heparin Sodium/


 Dextrose  250 ml @ 7


 mls/hr  TITRATE  PRN


 IV  18 03:15


    18 04:05


 


 


  (NovoLOG


 SUPPLEMENTAL


 SCALE)  1  ACHS SLIDING  SCALE


 SQ  18 08:00


     


 


 


  (D50w (Vial) Inj)  50 ml  UNSCH  PRN


 IV PUSH  18 04:15


     


 


 


  (Glucagon Inj)  1 mg  UNSCH  PRN


 OTHER  18 04:15


     


 








Family History


Mother with diabetes, renal failure.  .  Father with history of 

hypertension.


Social History


Previous history of alcohol abuse.  Quit 3 years ago.  Patient has smoked one 

half pack per day for the past 20 years


 (King Ervin)





Physical Exam


Vital Signs





Vital Signs








  Date Time  Temp Pulse Resp B/P (MAP) Pulse Ox O2 Delivery O2 Flow Rate FiO2


 


18 07:10  80 18 111/75 (87) 95 Nasal Cannula 2.00 


 


18 05:09  68 18 127/68 (87) 98 Nasal Cannula 2.00 


 


18 04:05  77  138/89    


 


18 03:35  70  170/79    


 


18 03:20  72  173/84    


 


18 03:02  78  192/93    


 


18 02:56  77  192/89    


 


18 02:46  77  191/90    


 


18 02:27  75  174/80    


 


18 02:15  74  180/82    


 


18 02:02  73  183/99    


 


18 02:02   26     


 


18 02:00  78 26 183/99 (127) 94 Nasal Cannula 4.00 


 


18 01:11     91 Nasal Cannula 3.00 


 


18 01:09  66 16 190/84 (119) 90 Nasal Cannula 2.00 


 


18 23:06     100 Room Air  


 


18 22:57  62 16 181/81 (114) 100 Room Air  


 


18 21:21 100.4 64 16 178/76 (110) 98   








Physical Exam


GENERAL: Well-developed well-nourished.  In mild distress secondary to nausea.


NECK: No carotid bruits.  No JVD.  


CARDIOVASCULAR: Regular rate and rhythm.  No murmur appreciated.


RESPIRATORY: No accessory muscle use. Clear to auscultation. Breath sounds 

equal bilaterally. 


MUSCULOSKELETAL: No clubbing or cyanosis. No edema. 


NEUROLOGICAL: Awake and alert. Normal speech.


SKIN: Dialysis catheter in place right chest wall


Laboratory





Laboratory Tests








Test


  18


23:40 18


01:00 18


03:57 18


06:10


 


Prothrombin Time 9.6    


 


Prothromb Time International


Ratio 0.9 


  


  


  


 


 


Activated Partial


Thromboplast Time 21.9 


  


  24.0 


  


 


 


Magnesium Level 2.3    


 


Total Bilirubin 1.2    


 


Direct Bilirubin 0.2    


 


Indirect Bilirubin 1.0    


 


Aspartate Amino Transf


(AST/SGOT) 136 


  


  


  


 


 


Alanine Aminotransferase


(ALT/SGPT) 112 


  


  


  


 


 


Alkaline Phosphatase 676    


 


B-Type Natriuretic Peptide 1770    


 


Total Protein 9.4    


 


Albumin 3.5    


 


White Blood Count  8.8  9.6  


 


Red Blood Count  3.90  3.68  


 


Hemoglobin  12.1  12.1  


 


Hematocrit  36.9  35.2  


 


Mean Corpuscular Volume  94.7  95.7  


 


Mean Corpuscular Hemoglobin  31.1  32.8  


 


Mean Corpuscular Hemoglobin


Concent 


  32.8 


  34.3 


  


 


 


Red Cell Distribution Width  15.4  15.3  


 


Platelet Count  357  335  


 


Mean Platelet Volume  8.6  8.4  


 


Neutrophils (%) (Auto)  78.6   


 


Lymphocytes (%) (Auto)  14.4   


 


Monocytes (%) (Auto)  5.1   


 


Eosinophils (%) (Auto)  1.0   


 


Basophils (%) (Auto)  0.9   


 


Neutrophils # (Auto)  6.9   


 


Lymphocytes # (Auto)  1.3   


 


Monocytes # (Auto)  0.5   


 


Eosinophils # (Auto)  0.1   


 


Basophils # (Auto)  0.1   


 


CBC Comment  DIFF FINAL   


 


Differential Comment     


 


Blood Urea Nitrogen  20   


 


Creatinine  3.02   


 


Random Glucose  177   


 


Calcium Level  8.7   


 


Sodium Level  134   


 


Potassium Level  4.5   


 


Chloride Level  101   


 


Carbon Dioxide Level  24.5   


 


Anion Gap  9   


 


Estimat Glomerular Filtration


Rate 


  20 


  


  


 


 


Total Creatine Kinase  93   


 


Troponin I  0.39   


 


Lipase  63   


 


D-Dimer Quantitative (PE/DVT)   2.74  








 (King Ervin)


Result Diagram:  


18 0357                                                                    

            18 0100





Imaging





Last Impressions








Liver Ultrasound 18 0000 Signed





Impressions: 





 Service Date/Time:  Tuesday, May 8, 2018 05:16 - CONCLUSION:  Normal 

examination 





 except for very echogenic right kidney.       Edgardo Mullins MD 


 


Chest X-Ray 18 Signed





Impressions: 





 Service Date/Time:  Monday, May 7, 2018 21:47 - CONCLUSION:  1. Mild edema 





 pattern in the lungs with trace pleural fluid. No pneumothorax.     Valdez Colunga MD 








 (King Ervin)





Assessment and Plan


Assessment and Plan


53-year-old female with a past medical history of CAD status post PCI , 

diastolic CHF, HTN, HLD, ESRD on HD, DM who presented for nausea, shortness 

breath, and chest pain





Chest pain and elevated troponin: NSTEMI vs secondary to ESRD.  Chest pain does 

sound more pleuritic and d-dimer elevated.  On heparin GTT.  Primary team 

planning to rule out PE.  Check echocardiogram.





CAD: Continue aspirin, Plavix.





Fever and nausea: Management per primary team.  GI has been consulted.





Hypertension: Initially accelerated upon admission and now within normal limits.





ESRD on HD: Nephrology has been consulted


Discussed Condition With


Patient, RN, Dr. Mercado


 (King Ervin)


Assessment and Plan


-----------


CTA r/o PE


fever eval


2d echo


trend trop to decide +/- LHC. 


 (Edgardo Mercado MD)











King Ervin May 8, 2018 07:43


Edgardo Mercado MD May 8, 2018 10:35

## 2018-05-08 NOTE — RADRPT
EXAM DATE/TIME:  05/08/2018 05:16 

 

[facility] COMPARISON:     

No previous studies available for comparison.

        

 

 

INDICATIONS :     

Chest pain with nausea.

                     

 

MEDICAL HISTORY :     

Renal disease, end stage. Hypertension. Hypercholesterolemia. CHF.  Diabetes.  CAD.  Pancreatitis.  A
nemia.  Uterine Fibroids.

 

SURGICAL HISTORY :     

Cholecystectomy. Tubal ligation.   PTCA with stent placement.  Cataract surgery.  Colonoscopy.  Vas-C
ath placement right chest.  AV fistula surgery.

 

ENCOUNTER:     

Initial

 

ACUITY:     

1 day

 

PAIN SCORE:     

3/10

 

LOCATION:         

Upper abdomen.

                     

MEASUREMENTS:     

 

LIVER:     

18.8  cm length 

 

COMMON DUCT:     

6 mm

 

RIGHT KIDNEY:     

12.4 x 6.7 x 5.7 cm

 

SPLEEN:     

9.8  cm length

 

FINDINGS:     

 

LIVER:     

Normal echotexture without focal lesion or ductal dilatation.  

 

COMMON DUCT:     

No intraluminal mass or stone visualized.  

 

GALLBLADDER:     

Surgically absent.  

 

PANCREAS:     

The visualized portions are within normal limits.  

 

RIGHT KIDNEY:     

Marked increased echogenicity presumably medical renal disease No hydronephrosis, stone or mass.  

 

SPLEEN:     

No focal lesion.  

 

CONCLUSION:     

Normal examination except for very echogenic right kidney.  

 

 

 

 Edgardo Mullins MD on May 08, 2018 at 6:24           

Board Certified Radiologist.

 This report was verified electronically.

## 2018-05-08 NOTE — RADRPT
EXAM DATE/TIME:  05/08/2018 11:22 

 

[facility] COMPARISON:     

CHEST PA & LAT, May 07, 2018, 21:47.

 

 

INDICATIONS :     

Short of breath. 

                      

 

IV CONTRAST:     

75 cc Omnipaque 350 (iohexol) IV 

 

 

RADIATION DOSE:     

5.63 CTDIvol (mGy) 

 

 

MEDICAL HISTORY :     

Cardiovascular disease. Diabetes mellitus type 2. Hypertension.

 

SURGICAL HISTORY :      

Tubal ligation. 

 

ENCOUNTER:      

Initial

 

ACUITY:      

1 day

 

PAIN SCALE:      

2/10

 

LOCATION:        

chest 

 

TECHNIQUE:     

Volumetric scanning of the chest was performed using a pulmonary embolism protocol MIP images were re
constructed.  Using automated exposure control and adjustment of the mA and/or kV according to patien
t size, radiation dose was kept as low as reasonably achievable to obtain optimal diagnostic quality 
images.   DICOM format image data is available electronically for review and comparison.  

 

Follow-up recommendations for detected pulmonary nodules are based at a minimum on nodule size and pa
tient risk factors according to Fleischner Society Guidelines.

 

FINDINGS:     

 

PULMONARY ARTERIES:

No filling defects are seen in the pulmonary arteries through the segmental level.

 

LUNGS:     

There are patchy groundglass opacities in the upper lobe which appears slightly more confluent at the
 lung bases.

 

PLEURAE:     

Trace left and small right pleural effusions which appear simple fluid density.

 

MEDIASTINUM:     

Prominent coronary calcifications. Heart is otherwise unremarkable. Prominent right hilar lymph nodes
. No gross mediastinal adenopathy.

 

MUSCULOSKELETAL:     

Within normal limits for patient age.

 

MISCELLANEOUS:     

The visualized upper abdominal organs demonstrate no acute abnormality.

 

CONCLUSION:     

1. No CT evidence for pulmonary artery embolism as questioned.

2. Patchy upper lobe ground glass opacities which appear more confluent in the lower lobes near the b
ases. Overall findings are most consistent with pulmonary edema.

3. Trace left and small right simple pleural effusions.

4. Nonspecific right hilar adenopathy which maybe reactive in etiology.

5. Coronary artery calcifications.

 

 

 

 

 Tanner Dasilva MD on May 08, 2018 at 11:45           

Board Certified Radiologist.

 This report was verified electronically.

## 2018-05-08 NOTE — EKG
Date Performed: 05/08/2018       Time Performed: 07:14:12

 

PTAGE:      53 years

 

EKG:      Sinus rhythm 

 

 POSSIBLE LEFT ATRIAL ENLARGEMENT LEFT VENTRICULAR HYPERTROPHY AND ST-T CHANGE ABNORMAL ECG

 

PREVIOUS TRACING       : 05/07/2018 21.29

 

DOCTOR:   Edgardo Mercado  Interpretating Date/Time  05/08/2018 10:46:45

## 2018-05-08 NOTE — HHI.HP
__________________________________________________





[facility]


Service


[facility]


Primary Care Physician


No Primary Care Physician


Admission Diagnosis





cp r/o ACS, elevated troponin, h/o CAD, CHF exacerbation


Diagnoses:  


Travel History


International Travel<30 Days:  No


Contact w/Intl Traveler <30 Da:  No


Traveled to Known Affected Are:  No


History of Present Illness





53-year-old female with a history of end-stage renal disease on hemodialysis 

, CAD who presents with sharp pleuritic type chest pain 

in the left chest beginning after dialysis today.  Patient initially 

experienced nausea without any vomiting during dialysis, then subsequently this 

nonradiating pleuritic type left-sided chest pain.  Denies any fevers, chills.  

She does report a cough with whitish sputum which began today.  She denies any 

increase in bilateral lower extremity edema.  She does report that left-sided 

chest pain has almost resolved on nitro drip.





Review of Systems


Except as stated in HPI:  all other systems reviewed are Neg





Past Family Social History


Past Medical History


Hypertension


Hyperlipidemia


CHF


Diabetes mellitus


CAD.  Recent cardiac catheterization in February with 4 stents placed.


History of pancreatitis


Uterine fibroids


End-stage renal disease on hemodialysis.


Anemia.


Past Surgical History


Cholecystectomy


Tubal ligation


PTCA with stent placement about 8 years ago.


Cataract surgery


Colonoscopy


Vas-Cath placement right chest.


History of AV fistula surgery.  Not mature yet.


Reported Medications


Reported Meds & Active Scripts


Active


Lisinopril 40 Mg Tab 40 Mg PO DAILY


Oxycodone-Acetaminophen 5-325 (Oxycodone HCl/Acetaminophen) 5 Mg-325 Mg Tablet 

1 Tab PO Q6H PRN


Catapres (Clonidine) 0.1 Mg Tab 0.1 Mg PO Q8H


Roller Walker (Misc. Devices) 1 Mis Mis Ea .XX NOW


Norvasc (Amlodipine Besylate) 10 Mg Tab 10 Mg PO DAILY


Lopressor (Metoprolol Tartrate) 100 Mg Tab 100 Mg PO Q12HR


Nitrostat SL (Nitroglycerin) 0.4 Mg Subl 0.4 Mg SL Q5M PRN


Isosorbide Mononitrate ER (Isosorbide Mononitrate) 60 Mg Tab 120 Mg PO DAILY@07


Hydralazine HCl 50 Mg Tablet 50 Mg PO Q8HR


Pravachol (Pravastatin) 40 Mg Tab 40 Mg PO HS


Plavix (Clopidogrel Bisulfate) 75 Mg Tab 75 Mg PO DAILY


Zofran (Ondansetron HCl) 4 Mg Tab 4 Mg PO Q6HR PRN


Gas Relief Maximum Streng (Simethicone) 125 Mg Chw 125 Mg PO Q8HR


Reported


Lantus Inj (Insulin Glargine) 1,000 Unit/10 Ml Vial 15 Units SQ HS


Gabapentin 100 Mg Cap 300 Mg PO HS


Folic Acid 400 Mcg Tab 1 Mg PO DAILY


Aspirin Children's (Aspirin) 81 Mg Chew 81 Mg CHEW DAILY


Allergies:  


Coded Allergies:  


     shellfish derived (Unverified  Adverse Reaction, Mild, VOMITNG, 18)


Family History





Mother with diabetes, renal failure.  .  Father with history of 

hypertension.


Social History





Previous history of alcohol abuse.  Quit 3 years ago.  Patient has smoked one 

half pack per day for the past 20 years





Physical Exam


Vital Signs





Vital Signs








  Date Time  Temp Pulse Resp B/P (MAP) Pulse Ox O2 Delivery O2 Flow Rate FiO2


 


18 03:35  70  170/79    


 


18 03:20  72  173/84    


 


18 03:02  78  192/93    


 


18 02:56  77  192/89    


 


18 02:46  77  191/90    


 


18 02:27  75  174/80    


 


18 02:15  74  180/82    


 


18 02:02  73  183/99    


 


18 02:02   26     


 


18 02:00  78 26 183/99 (127) 94 Nasal Cannula 4.00 


 


18 01:11     91 Nasal Cannula 3.00 


 


18 01:09  66 16 190/84 (119) 90 Nasal Cannula 2.00 


 


18 23:06     100 Room Air  


 


18 22:57  62 16 181/81 (114) 100 Room Air  


 


18 21:21 100.4 64 16 178/76 (110) 98   








Physical Exam


GENERAL: This is a well-nourished, well-developed patient, in no apparent 

distress.  Alert and oriented 3.


SKIN: No rashes, ecchymoses or lesions. Cool and dry.


HEAD: Atraumatic. Normocephalic. No temporal or scalp tenderness.


EYES: Pupils equal round and reactive. Extraocular motions intact. No scleral 

icterus. No injection or drainage. 


ENT: Nose without bleeding, purulent drainage or septal hematoma. Throat 

without erythema, tonsillar hypertrophy or exudate. Uvula midline. Airway 

patent.


NECK: Trachea midline.  Positive JVD.  No lymphadenopathy. Supple, nontender, 

no meningeal signs.


CARDIOVASCULAR: Regular rate and rhythm without murmurs, gallops, or rubs. 


RESPIRATORY: Clear to auscultation. Breath sounds equal bilaterally. No wheezes

, rales, or rhonchi.  


GASTROINTESTINAL: Abdomen soft, non-tender, nondistended. No hepato-splenomegaly

, or palpable masses. No guarding.


MUSCULOSKELETAL: Extremities without clubbing, cyanosis. No joint tenderness, 

effusion.No calf tenderness. Negative Homans sign bilaterally.  +2 bilateral 

lower extremities edema.  Patient reports this is baseline


NEUROLOGICAL: Awake and alert. Cranial nerves II through XII intact.  Motor and 

sensory grossly within normal limits. Five out of 5 muscle strength in all 

muscle groups.  Normal speech.


Laboratory





Laboratory Tests








Test


  18


23:40 18


01:00 18


03:57


 


Prothrombin Time 9.6   


 


Prothromb Time International


Ratio 0.9 


  


  


 


 


Activated Partial


Thromboplast Time 21.9 


  


  


 


 


Magnesium Level 2.3   


 


Total Bilirubin 1.2   


 


Direct Bilirubin 0.2   


 


Indirect Bilirubin 1.0   


 


Aspartate Amino Transf


(AST/SGOT) 136 


  


  


 


 


Alanine Aminotransferase


(ALT/SGPT) 112 


  


  


 


 


Alkaline Phosphatase 676   


 


B-Type Natriuretic Peptide 1770   


 


Total Protein 9.4   


 


Albumin 3.5   


 


White Blood Count  8.8  


 


Red Blood Count  3.90  


 


Hemoglobin  12.1  


 


Hematocrit  36.9  


 


Mean Corpuscular Volume  94.7  


 


Mean Corpuscular Hemoglobin  31.1  


 


Mean Corpuscular Hemoglobin


Concent 


  32.8 


  


 


 


Red Cell Distribution Width  15.4  


 


Platelet Count  357  


 


Mean Platelet Volume  8.6  


 


Neutrophils (%) (Auto)  78.6  


 


Lymphocytes (%) (Auto)  14.4  


 


Monocytes (%) (Auto)  5.1  


 


Eosinophils (%) (Auto)  1.0  


 


Basophils (%) (Auto)  0.9  


 


Neutrophils # (Auto)  6.9  


 


Lymphocytes # (Auto)  1.3  


 


Monocytes # (Auto)  0.5  


 


Eosinophils # (Auto)  0.1  


 


Basophils # (Auto)  0.1  


 


CBC Comment  DIFF FINAL  


 


Differential Comment    


 


Blood Urea Nitrogen  20  


 


Creatinine  3.02  


 


Random Glucose  177  


 


Calcium Level  8.7  


 


Sodium Level  134  


 


Potassium Level  4.5  


 


Chloride Level  101  


 


Carbon Dioxide Level  24.5  


 


Anion Gap  9  


 


Estimat Glomerular Filtration


Rate 


  20 


  


 


 


Total Creatine Kinase  93  


 


Troponin I  0.39  


 


Lipase  63  








Result Diagram:  


18





Imaging





Last Impressions








Chest X-Ray 18 Signed





Impressions: 





 Service Date/Time:  Monday, May 7, 2018 21:47 - CONCLUSION:  1. Mild edema 





 pattern in the lungs with trace pleural fluid. No pneumothorax.     Joseph E. Cox, MD Caprini VTE Risk Assessment


Caprinines VTE Risk Assessment:  No/Low Risk (score <= 1)


Caprini Risk Assessment Model











 Point Value = 1          Point Value = 2  Point Value = 3  Point Value = 5


 


Age 41-60


Minor surgery


BMI > 25 kg/m2


Swollen legs


Varicose veins


Pregnancy or postpartum


History of unexplained or recurrent


   spontaneous 


Oral contraceptives or hormone


   replacement


Sepsis (< 1 month)


Serious lung disease, including


   pneumonia (< 1 month)


Abnormal pulmonary function


Acute myocardial infarction


Congestive heart failure (< 1 month)


History of inflammatory bowel disease


Medical patient at bed rest Age 61-74


Arthroscopic surgery


Major open surgery (> 45 min)


Laparoscopic surgery (> 45 min)


Malignancy


Confined to bed (> 72 hours)


Immobilizing plaster cast


Central venous access Age >= 75


History of VTE


Family history of VTE


Factor V Leiden


Prothrombin 54515D


Lupus anticoagulant


Anticardiolipin antibodies


Elevated serum homocysteine


Heparin-induced thrombocytopenia


Other congenital or acquired


   thrombophilia Stroke (< 1 month)


Elective arthroplasty


Hip, pelvis, or leg fracture


Acute spinal cord injury (< 1 month)








Prophylaxis Regimen











   Total Risk


Factor Score Risk Level Prophylaxis Regimen


 


0-1      Low Early ambulation


 


2 Moderate Order ONE of the following:


*Sequential Compression Device (SCD)


*Heparin 5000 units SQ BID


 


3-4 Higher Order ONE of the following medications:


*Heparin 5000 units SQ TID


*Enoxaparin/Lovenox 40 mg SQ daily (WT < 150 kg, CrCl > 30 mL/min)


*Enoxaparin/Lovenox 30 mg SQ daily (WT < 150 kg, CrCl > 10-29 mL/min)


*Enoxaparin/Lovenox 30 mg SQ BID (WT < 150 kg, CrCl > 30 mL/min)


AND/OR


*Sequential Compression Device (SCD)


 


5 or more Highest Order ONE of the following medications:


*Heparin 5000 units SQ TID (Preferred with Epidurals)


*Enoxaparin/Lovenox 40 mg SQ daily (WT < 150 kg, CrCl > 30 mL/min)


*Enoxaparin/Lovenox 30 mg SQ daily (WT < 150 kg, CrCl > 10-29 mL/min)


*Enoxaparin/Lovenox 30 mg SQ BID (WT < 150 kg, CrCl > 30 mL/min)


AND


*Sequential Compression Device (SCD)











Assessment and Plan


Assessment and Plan





//CHF Exacerbation


= BNP elevated.  Chest x-ray with edema.  Will start on nitro drip.  Consulted 

nephrology for dialysis.  I looked at the chest x-ray, has edema.  Patient is 

n.p.o.  For possible procedure tomorrow morning.





//Accelerated hypertension.  Patient started on nitro drip.  Restart hydralazine

, with as needed hydralazine.  Awaiting official list of home meds.





//NSTEMI


= Troponin elevated to 0.39.  EKG sinus with T-wave inversions noted.  Continue 

to trend troponins and EKGs.  Heparin drip.  Status post aspirin.  Continue 

daily aspirin.  Continue Plavix.  Continue to trend troponins and EKGs.





//Pleuritic chest pain


-Check d-dimer.  If elevated, will need CT pulmonary angiogram in conjunction 

with dialysis.





//End-stage renal disease on hemodialysis .  Consult 

nephrology for dialysis.  Appreciate assistance.





//Tobacco abuse.  Cessation counseling provided.





//Diabetes.  When diet is restarted.  Will be on diabetic diet.  Insulin 

sliding scale here.





//Transaminitis


//Nausea


= Check liver ultrasound.  Lipase only in the 60s.  Consult GI given past 

history of pancreatitis


Discussed Condition With


Patient, nurse, ED physician.





Physician Certification


2 Midnight Certification Type:  Admission for Inpatient Services


Order for Inpatient Services


The services are ordered in accordance with Medicare regulations or non-

Medicare payer requirements, as applicable.  In the case of services not 

specified as inpatient-only, they are appropriately provided as inpatient 

services in accordance with the 2-midnight benchmark.


Estimated LOS (days):  2


 days is the estimated time the patient will need to remain in the hospital, 

assuming treatment plan goals are met and no additional complications.


Post-Hospital Plan:  Home











Alex Pizano MD May 8, 2018 04:11

## 2018-05-08 NOTE — EKG
Date Performed: 05/07/2018       Time Performed: 21:29:04

 

PTAGE:      53 years

 

EKG:      Sinus rhythm 

 

 POSSIBLE LEFT ATRIAL ENLARGEMENT LEFT VENTRICULAR HYPERTROPHY AND ST-T CHANGE ABNORMAL ECG

 

PREVIOUS TRACING       : 02/08/2018 09.38

 

DOCTOR:   Edgardo Mercado  Interpretating Date/Time  05/08/2018 11:07:58

## 2018-05-08 NOTE — PD.CONS
HPI


History of Present Illness


This is a 53 year old female with ESRD on HD who presented with chest pain, SOB

, nausea during dialysis.  SHe is being evaluated by cardiology, has rising 

troponins.  SHe is on Aspirin and plavix.   SHe had PCI and stent placement 2018.   GI consulted for transaminitis and nausea.  SHe denies nausea at this 

time.  Denies abd pain, diarrhea, blood in stool, black tarry stool, hx liver 

problems.  SHe denies consuming ETOH currently but admits being heavy drinker 6 

years ago.  She was evaluated by our service in  for anemia and ahd EGD  that revealed gastritis. 


 (Glenis Jolly)





PFSH


Past Medical History


Hypertension


Hyperlipidemia


CHF


Diabetes mellitus


CAD.  Recent cardiac catheterization in February with 4 stents placed.


History of pancreatitis


Uterine fibroids


End-stage renal disease on hemodialysis.


Anemia.


Past Surgical History


Cholecystectomy


Tubal ligation


PTCA with stent placement about 8 years ago.


Cataract surgery


Colonoscopy


Vas-Cath placement right chest.


History of AV fistula surgery.  Not mature yet.


 (Glenis Jolly)


Coded Allergies:  


     shellfish derived (Unverified  Adverse Reaction, Mild, VOMITNG, 18)


Family History





Mother with diabetes, renal failure.  .  Father with history of 

hypertension.


Social History





Previous history of alcohol abuse.  Quit 3 years ago.  Patient has smoked one 

half pack per day for the past 20 years


 (Glenis Jolly)





Review of Systems


Constitutional:  COMPLAINS OF: Fatigue


Endocrine:  DENIES: Polydipsia


Eyes:  DENIES: Blurred vision


Respiratory:  COMPLAINS OF: Shortness of breath, DENIES: Cough


Cardiovascular:  DENIES: Chest pain


Gastrointestinal:  DENIES: Abdominal pain, Black stools, Bloody stools, Diarrhea

, Nausea, Vomiting, Hematemesis


Genitourinary:  DENIES: Hematuria


Musculoskeletal:  DENIES: Muscle aches


Integumentary:  DENIES: Jaundice


Hematologic/lymphatic:  DENIES: Bruising


Immunologic/allergic:  DENIES: Eczema


Neurologic:  DENIES: Abnormal gait


Psychiatric:  DENIES: Confusion (Glenis Jolly)





GI Exam


Vitals I&O





Vital Signs








  Date Time  Temp Pulse Resp B/P (MAP) Pulse Ox O2 Delivery O2 Flow Rate FiO2


 


18 09:39   18     


 


18 08:00 98.6 68 23 123/78 (93) 95   


 


18 07:10 99.0 80 18 111/75 (87) 95 Nasal Cannula 2.00 


 


18 05:09  68 18 127/68 (87) 98 Nasal Cannula 2.00 


 


18 04:05  77  138/89    


 


18 03:35  70  170/79    


 


18 03:20  72  173/84    


 


18 03:02  78  192/93    


 


18 02:56  77  192/89    


 


18 02:46  77  191/90    


 


18 02:27  75  174/80    


 


18 02:15  74  180/82    


 


18 02:02  73  183/99    


 


18 02:02   26     


 


18 02:00  78 26 183/99 (127) 94 Nasal Cannula 4.00 


 


18 01:11     91 Nasal Cannula 3.00 


 


18 01:09  66 16 190/84 (119) 90 Nasal Cannula 2.00 


 


18 23:06     100 Room Air  


 


18 22:57  62 16 181/81 (114) 100 Room Air  


 


18 21:21 100.4 64 16 178/76 (110) 98   








Imaging





Last Impressions








Liver Ultrasound 18 0000 Signed





Impressions: 





 Service Date/Time:  Tuesday, May 8, 2018 05:16 - CONCLUSION:  Normal 

examination 





 except for very echogenic right kidney.       Edgardo Mullins MD 


 


Chest X-Ray 18 Signed





Impressions: 





 Service Date/Time:  Monday, May 7, 2018 21:47 - CONCLUSION:  1. Mild edema 





 pattern in the lungs with trace pleural fluid. No pneumothorax.     Valdez Colunga MD 








Laboratory











Test


  18


23:40 18


01:00 18


03:57 18


06:10


 


Prothrombin Time 9.6 SEC    


 


Prothromb Time International


Ratio 0.9 RATIO 


  


  


  


 


 


Activated Partial


Thromboplast Time 21.9 SEC 


  


  24.0 SEC 


  


 


 


Magnesium Level 2.3 MG/DL    


 


Total Bilirubin 1.2 MG/DL    


 


Direct Bilirubin 0.2 MG/DL    


 


Indirect Bilirubin 1.0 MG/DL    


 


Aspartate Amino Transf


(AST/SGOT) 136 U/L 


  


  


  


 


 


Alanine Aminotransferase


(ALT/SGPT) 112 U/L 


  


  


  


 


 


Alkaline Phosphatase 676 U/L    


 


B-Type Natriuretic Peptide 1770 PG/ML    


 


Total Protein 9.4 GM/DL    


 


Albumin 3.5 GM/DL    


 


White Blood Count  8.8 TH/MM3  9.6 TH/MM3  


 


Red Blood Count  3.90 MIL/MM3  3.68 MIL/MM3  


 


Hemoglobin  12.1 GM/DL  12.1 GM/DL  


 


Hematocrit  36.9 %  35.2 %  


 


Mean Corpuscular Volume  94.7 FL  95.7 FL  


 


Mean Corpuscular Hemoglobin  31.1 PG  32.8 PG  


 


Mean Corpuscular Hemoglobin


Concent 


  32.8 % 


  34.3 % 


  


 


 


Red Cell Distribution Width  15.4 %  15.3 %  


 


Platelet Count  357 TH/MM3  335 TH/MM3  


 


Mean Platelet Volume  8.6 FL  8.4 FL  


 


Neutrophils (%) (Auto)  78.6 %   


 


Lymphocytes (%) (Auto)  14.4 %   


 


Monocytes (%) (Auto)  5.1 %   


 


Eosinophils (%) (Auto)  1.0 %   


 


Basophils (%) (Auto)  0.9 %   


 


Neutrophils # (Auto)  6.9 TH/MM3   


 


Lymphocytes # (Auto)  1.3 TH/MM3   


 


Monocytes # (Auto)  0.5 TH/MM3   


 


Eosinophils # (Auto)  0.1 TH/MM3   


 


Basophils # (Auto)  0.1 TH/MM3   


 


CBC Comment  DIFF FINAL   


 


Differential Comment     


 


Blood Urea Nitrogen  20 MG/DL   


 


Creatinine  3.02 MG/DL   


 


Random Glucose  177 MG/DL   


 


Calcium Level  8.7 MG/DL   


 


Sodium Level  134 MEQ/L   


 


Potassium Level  4.5 MEQ/L   


 


Chloride Level  101 MEQ/L   


 


Carbon Dioxide Level  24.5 MEQ/L   


 


Anion Gap  9 MEQ/L   


 


Estimat Glomerular Filtration


Rate 


  20 ML/MIN 


  


  


 


 


Total Creatine Kinase  93 U/L   


 


Troponin I  0.39 NG/ML   0.79 NG/ML 


 


Lipase  63 U/L   


 


D-Dimer Quantitative (PE/DVT)   2.74 MG/L FEU  


 


Test


  18


07:21 18


07:22 


  


 


 


Urine Opiates Screen NEG    


 


Urine Barbiturates Screen NEG    


 


Urine Amphetamines Screen NEG    


 


Urine Benzodiazepines Screen NEG    


 


Urine Cocaine Screen NEG    


 


Urine Cannabinoids Screen POS    


 


Troponin I  0.78 NG/ML   








Physical Examination


HEENT: PERRL; normocephalic; atraumatic; no jaundice.   


CHEST:  Chest is clear to auscultation and percussion.


CARDIAC:  Regular rate and rhythm with no murmur gallop or rubs.


ABDOMEN:  Soft, nondistended, nontender; no hepatosplenomegaly; bowel sounds 

are present in all four quadrants.


EXTREMITIES: No clubbing, cyanosis, or edema.


SKIN:  Normal; no rash; no jaundice.


CNS: mildly lethargic


 (Glenis Jolly)





Assessment and Plan


Plan


ASSESSMENT


- transaminitis - elevated LFTs.  indirect > direct rishabh, do not think this is 

obstructive pattern.  pt denies any GI sx or pain.


    congestion vs dili.  hep panel negative. US normal.   will get liver w/u r/

o other causes.   she is phillip +.


- nausea - pt denies n/v, abd pain at this time.  had EGD 2018 revealing 

gastritis.


- CHF exacerbation, CAD per cardiology.  s/p PCI and stents earlier this year, 

on asa and plavix





PLAN


- diet per cardiology


- liver w/u


- monitor LFTs


- supportive care





pt seen by myself and Dr Granados and this note is on his behalf


 (Glenis Jolly)


Physician Comments


Seen and examined with FRANCHESCA, liver reyes initiated. U/S unremarkable. ? repeat egd 

depending upon cardiac reyes. Will follow, thank you


 (Isis Granados MD)











Glenis Jolly May 8, 2018 10:17


Isis Granados MD May 8, 2018 17:10

## 2018-05-09 VITALS — HEART RATE: 83 BPM

## 2018-05-09 VITALS — HEART RATE: 96 BPM

## 2018-05-09 VITALS — HEART RATE: 102 BPM

## 2018-05-09 VITALS — HEART RATE: 100 BPM

## 2018-05-09 VITALS
RESPIRATION RATE: 19 BRPM | DIASTOLIC BLOOD PRESSURE: 60 MMHG | OXYGEN SATURATION: 94 % | SYSTOLIC BLOOD PRESSURE: 100 MMHG | TEMPERATURE: 98.4 F | HEART RATE: 87 BPM

## 2018-05-09 VITALS
RESPIRATION RATE: 18 BRPM | HEART RATE: 95 BPM | SYSTOLIC BLOOD PRESSURE: 136 MMHG | TEMPERATURE: 98.2 F | DIASTOLIC BLOOD PRESSURE: 76 MMHG

## 2018-05-09 VITALS
OXYGEN SATURATION: 100 % | RESPIRATION RATE: 18 BRPM | HEART RATE: 87 BPM | DIASTOLIC BLOOD PRESSURE: 67 MMHG | TEMPERATURE: 98.6 F | SYSTOLIC BLOOD PRESSURE: 103 MMHG

## 2018-05-09 VITALS — HEART RATE: 93 BPM

## 2018-05-09 VITALS — HEART RATE: 94 BPM

## 2018-05-09 VITALS — HEART RATE: 103 BPM

## 2018-05-09 VITALS
TEMPERATURE: 98.8 F | RESPIRATION RATE: 20 BRPM | HEART RATE: 87 BPM | OXYGEN SATURATION: 100 % | DIASTOLIC BLOOD PRESSURE: 75 MMHG | SYSTOLIC BLOOD PRESSURE: 126 MMHG

## 2018-05-09 VITALS
TEMPERATURE: 98.5 F | OXYGEN SATURATION: 96 % | RESPIRATION RATE: 18 BRPM | HEART RATE: 96 BPM | DIASTOLIC BLOOD PRESSURE: 66 MMHG | SYSTOLIC BLOOD PRESSURE: 113 MMHG

## 2018-05-09 VITALS — HEART RATE: 92 BPM

## 2018-05-09 VITALS — HEART RATE: 86 BPM

## 2018-05-09 VITALS — HEART RATE: 88 BPM

## 2018-05-09 VITALS — HEART RATE: 104 BPM

## 2018-05-09 VITALS — HEART RATE: 101 BPM

## 2018-05-09 VITALS — HEART RATE: 91 BPM

## 2018-05-09 LAB
% SATURATION IRON PROFILE: 10.6 % (ref 20–50)
ALBUMIN SERPL-MCNC: 3.8 GM/DL (ref 3.4–5)
ALP SERPL-CCNC: 562 U/L (ref 45–117)
ALT SERPL-CCNC: 61 U/L (ref 10–53)
AST SERPL-CCNC: 42 U/L (ref 15–37)
BASOPHILS # BLD AUTO: 0.1 TH/MM3 (ref 0–0.2)
BASOPHILS # BLD AUTO: 0.1 TH/MM3 (ref 0–0.2)
BASOPHILS NFR BLD: 0.8 % (ref 0–2)
BASOPHILS NFR BLD: 0.8 % (ref 0–2)
BILIRUB SERPL-MCNC: 1.6 MG/DL (ref 0.2–1)
BILIRUB SERPL-MCNC: 1.9 MG/DL (ref 0.2–1)
BUN SERPL-MCNC: 24 MG/DL (ref 7–18)
CALCIUM SERPL-MCNC: 9.4 MG/DL (ref 8.5–10.1)
CHLORIDE SERPL-SCNC: 95 MEQ/L (ref 98–107)
CREAT SERPL-MCNC: 3.83 MG/DL (ref 0.5–1)
CREAT SERPL-MCNC: 4.2 MG/DL (ref 0.5–1)
EOSINOPHIL # BLD: 0.1 TH/MM3 (ref 0–0.4)
EOSINOPHIL # BLD: 0.1 TH/MM3 (ref 0–0.4)
EOSINOPHIL NFR BLD: 0.7 % (ref 0–4)
EOSINOPHIL NFR BLD: 0.8 % (ref 0–4)
ERYTHROCYTE [DISTWIDTH] IN BLOOD BY AUTOMATED COUNT: 15.5 % (ref 11.6–17.2)
ERYTHROCYTE [DISTWIDTH] IN BLOOD BY AUTOMATED COUNT: 15.6 % (ref 11.6–17.2)
FERRITIN SERPL-MCNC: 395 NG/ML (ref 8–252)
GFR SERPLBLD BASED ON 1.73 SQ M-ARVRAT: 13 ML/MIN (ref 89–?)
GFR SERPLBLD BASED ON 1.73 SQ M-ARVRAT: 15 ML/MIN (ref 89–?)
GLUCOSE SERPL-MCNC: 169 MG/DL (ref 74–106)
HCO3 BLD-SCNC: 26.8 MEQ/L (ref 21–32)
HCT VFR BLD CALC: 37.3 % (ref 35–46)
HCT VFR BLD CALC: 39.3 % (ref 35–46)
HGB BLD-MCNC: 12.3 GM/DL (ref 11.6–15.3)
HGB BLD-MCNC: 13.2 GM/DL (ref 11.6–15.3)
IRON (FE): 33 MCG/DL (ref 50–170)
LYMPHOCYTES # BLD AUTO: 0.8 TH/MM3 (ref 1–4.8)
LYMPHOCYTES # BLD AUTO: 1.5 TH/MM3 (ref 1–4.8)
LYMPHOCYTES NFR BLD AUTO: 17.7 % (ref 9–44)
LYMPHOCYTES NFR BLD AUTO: 9.1 % (ref 9–44)
MCH RBC QN AUTO: 31.5 PG (ref 27–34)
MCH RBC QN AUTO: 31.8 PG (ref 27–34)
MCHC RBC AUTO-ENTMCNC: 33 % (ref 32–36)
MCHC RBC AUTO-ENTMCNC: 33.5 % (ref 32–36)
MCV RBC AUTO: 94.8 FL (ref 80–100)
MCV RBC AUTO: 95.4 FL (ref 80–100)
MONOCYTE #: 0.4 TH/MM3 (ref 0–0.9)
MONOCYTE #: 0.4 TH/MM3 (ref 0–0.9)
MONOCYTES NFR BLD: 4.8 % (ref 0–8)
MONOCYTES NFR BLD: 5.2 % (ref 0–8)
NEUTROPHILS # BLD AUTO: 6.6 TH/MM3 (ref 1.8–7.7)
NEUTROPHILS # BLD AUTO: 7 TH/MM3 (ref 1.8–7.7)
NEUTROPHILS NFR BLD AUTO: 75.9 % (ref 16–70)
NEUTROPHILS NFR BLD AUTO: 84.2 % (ref 16–70)
PLATELET # BLD: 319 TH/MM3 (ref 150–450)
PLATELET # BLD: 352 TH/MM3 (ref 150–450)
PMV BLD AUTO: 8 FL (ref 7–11)
PMV BLD AUTO: 8.1 FL (ref 7–11)
PROT SERPL-MCNC: 8.7 GM/DL (ref 6.4–8.2)
RBC # BLD AUTO: 3.91 MIL/MM3 (ref 4–5.3)
RBC # BLD AUTO: 4.15 MIL/MM3 (ref 4–5.3)
SODIUM SERPL-SCNC: 136 MEQ/L (ref 136–145)
TIBC SERPL-MCNC: 311 MCG/DL (ref 250–450)
WBC # BLD AUTO: 8.3 TH/MM3 (ref 4–11)
WBC # BLD AUTO: 8.7 TH/MM3 (ref 4–11)

## 2018-05-09 RX ADMIN — ONDANSETRON PRN MG: 2 INJECTION, SOLUTION INTRAMUSCULAR; INTRAVENOUS at 08:04

## 2018-05-09 RX ADMIN — Medication SCH ML: at 20:54

## 2018-05-09 RX ADMIN — FERROUS SULFATE TAB 325 MG (65 MG ELEMENTAL FE) SCH MG: 325 (65 FE) TAB at 15:21

## 2018-05-09 RX ADMIN — INSULIN ASPART SCH: 100 INJECTION, SOLUTION INTRAVENOUS; SUBCUTANEOUS at 07:37

## 2018-05-09 RX ADMIN — HEPARIN SODIUM PRN UNITS: 1000 INJECTION, SOLUTION INTRAVENOUS; SUBCUTANEOUS at 07:41

## 2018-05-09 RX ADMIN — NITROGLYCERIN SCH INCH: 20 OINTMENT TOPICAL at 17:28

## 2018-05-09 RX ADMIN — MORPHINE SULFATE PRN MG: 2 INJECTION, SOLUTION INTRAMUSCULAR; INTRAVENOUS at 14:21

## 2018-05-09 RX ADMIN — ASPIRIN 81 MG SCH MG: 81 TABLET ORAL at 07:42

## 2018-05-09 RX ADMIN — HEPARIN SODIUM PRN MLS/HR: 10000 INJECTION, SOLUTION INTRAVENOUS at 09:48

## 2018-05-09 RX ADMIN — CLOPIDOGREL BISULFATE SCH MG: 75 TABLET, FILM COATED ORAL at 07:42

## 2018-05-09 RX ADMIN — NITROGLYCERIN SCH INCH: 20 OINTMENT TOPICAL at 14:14

## 2018-05-09 RX ADMIN — WATER PRN ML: 1 IRRIGANT IRRIGATION at 20:53

## 2018-05-09 RX ADMIN — MORPHINE SULFATE PRN MG: 2 INJECTION, SOLUTION INTRAMUSCULAR; INTRAVENOUS at 18:57

## 2018-05-09 RX ADMIN — MEGESTROL ACETATE SCH MG: 40 SUSPENSION ORAL at 16:27

## 2018-05-09 RX ADMIN — Medication SCH ML: at 07:42

## 2018-05-09 RX ADMIN — NITROGLYCERIN SCH INCH: 20 OINTMENT TOPICAL at 05:49

## 2018-05-09 RX ADMIN — INSULIN ASPART SCH: 100 INJECTION, SOLUTION INTRAVENOUS; SUBCUTANEOUS at 14:21

## 2018-05-09 RX ADMIN — INSULIN ASPART SCH: 100 INJECTION, SOLUTION INTRAVENOUS; SUBCUTANEOUS at 20:54

## 2018-05-09 RX ADMIN — MORPHINE SULFATE PRN MG: 2 INJECTION, SOLUTION INTRAMUSCULAR; INTRAVENOUS at 09:43

## 2018-05-09 RX ADMIN — MORPHINE SULFATE PRN MG: 2 INJECTION, SOLUTION INTRAMUSCULAR; INTRAVENOUS at 23:18

## 2018-05-09 RX ADMIN — INSULIN ASPART SCH: 100 INJECTION, SOLUTION INTRAVENOUS; SUBCUTANEOUS at 17:28

## 2018-05-09 NOTE — RADRPT
EXAM DATE/TIME:  05/09/2018 08:49 

 

[facility] COMPARISON:     

CHEST SINGLE AP, February 04, 2018, 8:50.

 

                     

INDICATIONS :     

Short of breath and chest pain.

                     

 

MEDICAL HISTORY :            

Cardiovascular disease. Diabetes mellitus type 2. Hypertension.   

 

SURGICAL HISTORY :     

Tubal ligation.   

 

ENCOUNTER:     

Initial                                        

 

ACUITY:     

2 days      

 

PAIN SCORE:     

5/10

 

LOCATION:     

Bilateral chest 

 

FINDINGS:     

A single view of the chest demonstrates the lungs to be symmetrically aerated without evidence of mas
s, infiltrate or effusion.  Dialysis catheter in good position.  The cardiomediastinal contours are u
nremarkable.  Osseous structures are intact.

 

CONCLUSION:     No acute disease.  

 

 

 

 David Swift MD FACR on May 09, 2018 at 10:05           

Board Certified Radiologist.

 This report was verified electronically.

## 2018-05-09 NOTE — HHI.PR
Subjective


Remarks


With temps of 101, also tachycardiac, initiate sepsis protocol.  2 fevers last 

night patient did not have cardiac cath today.  Discussed with the cardiology


Patient refused early morning hemodialysis however she agreed after she ate.  

She went for dialysis.  Says she feels very tired.  No chest pain at this time.

  No sob. No n/v/d/c.  She is not coughing.


She has no diarrhea.  No urinary complaints.  Patient is a dialysis patient and 

does not have much urine output.  No clear source of infection at this time.





Objective


Vitals





Vital Signs








  Date Time  Temp Pulse Resp B/P (MAP) Pulse Ox O2 Delivery O2 Flow Rate FiO2


 


5/9/18 06:40  93      


 


5/9/18 05:34  96      


 


5/9/18 04:00  91      


 


5/9/18 03:09 98.4 87 19 100/60 (73) 94   


 


5/9/18 03:00  83      


 


5/9/18 02:00  83      


 


5/9/18 01:17  83      


 


5/9/18 01:10  83      


 


5/9/18 00:00  86      


 


5/8/18 23:05 101.2 91 20 116/71 (86) 97   


 


5/8/18 23:00  88      


 


5/8/18 23:00     96   


 


5/8/18 22:04   18     


 


5/8/18 22:00  92      


 


5/8/18 21:35 101.1 93 20 116/73 (87) 98   


 


5/8/18 21:00  92      


 


5/8/18 20:00  94      


 


5/8/18 19:00  90      


 


5/8/18 18:11 98.9 88 23 100/70 (80) 98   


 


5/8/18 11:48 99.3 80 20 138/85 (102) 94   


 


5/8/18 09:39   18     


 


5/8/18 08:00 98.6 68 23 123/78 (93) 95   














I/O      


 


 5/8/18 5/8/18 5/8/18 5/9/18 5/9/18 5/9/18





 07:00 15:00 23:00 07:00 15:00 23:00


 


Intake Total    0 ml  


 


Output Total   4000 ml 225 ml  


 


Balance   -4000 ml -225 ml  


 


      


 


Intake Oral    0 ml  


 


Output Urine Total    225 ml  


 


Hemodialysis   4000 ml   








Result Diagram:  


5/9/18 0558                                                                    

            5/9/18 0558





Imaging





Last Impressions








Liver Ultrasound 5/8/18 0000 Signed





Impressions: 





 Service Date/Time:  Tuesday, May 8, 2018 05:16 - CONCLUSION:  Normal 

examination 





 except for very echogenic right kidney.       Edgardo Mullins MD 


 


CT Angiography 5/8/18 0000 Signed





Impressions: 





 Service Date/Time:  Tuesday, May 8, 2018 11:22 - CONCLUSION:  1. No CT 

evidence 





 for pulmonary artery embolism as questioned. 2. Patchy upper lobe ground glass 





 opacities which appear more confluent in the lower lobes near the bases. 

Overall 





 findings are most consistent with pulmonary edema. 3. Trace left and small 

right 





 simple pleural effusions. 4. Nonspecific right hilar adenopathy which maybe 





 reactive in etiology. 5. Coronary artery calcifications.      Tanner Dasilva MD 


 


Chest X-Ray 5/7/18 2123 Signed





Impressions: 





 Service Date/Time:  Monday, May 7, 2018 21:47 - CONCLUSION:  1. Mild edema 





 pattern in the lungs with trace pleural fluid. No pneumothorax.     Valdez Colunga MD 








Objective Remarks


GENERAL: This is a well-nourished, well-developed patient, in no apparent 

distress.  Alert and oriented 3.


CARDIOVASCULAR: Regular rate and rhythm without murmurs, gallops, or rubs. 


RESPIRATORY: Clear to auscultation. Breath sounds equal bilaterally. No wheezes

, rales, or rhonchi.  


GASTROINTESTINAL: Abdomen soft, non-tender, nondistended. No hepato-splenomegaly

, or palpable masses. No guarding.


MUSCULOSKELETAL: Extremities without clubbing, cyanosis. No joint tenderness, 

effusion.No calf tenderness. Negative Homans sign bilaterally.  +2 bilateral 

lower extremities edema.  Patient reports this is baseline


NEUROLOGICAL: Awake and alert. Cranial nerves II through XII intact.  Motor and 

sensory grossly within normal limits. Five out of 5 muscle strength in all 

muscle groups.  Normal speech.














A/P


Assessment and Plan


SIRS meeting criteria, also tachycardiac, initiate sepsis protocol. With fevers 

patient did not have cardiac cath.  Discussed with the cardiology. Blood 

cultures, CXR, UA, LA ordered. Started on Levaquin. Monitro closely. 





CHF with Exacerbation


BNP elevated.  Chest x-ray with edema.  Will start on nitro drip.  Consulted 

nephrology for dialysis.  Chest x-ray reviewed with congestion. Advance diet 

cardiac cath on hold.





Accelerated hypertension.  Patient started on nitro drip.  Restart hydralazine, 

with as needed hydralazine.  Awaiting official list of home meds.





NSTEMI


Elevated Troponin trending up  EKG sinus with T-wave inversions noted.    

Heparin drip.  Status post aspirin.  Continue daily aspirin.  Continue Plavix. 





Pleuritic chest pain


elevated  d-dimer. CT pulmonary angiogram reviewed and no PE. Patient received 

HD 





End-stage renal disease on hemodialysis Monday Wednesday Friday.  Consult 

nephrology for dialysis.  Dializef before and after CTA.  Appreciate assistance.





Tobacco abuse.  Cessation counseling provided.





Diabetes mellitus type 2.  Diabetic diet.  Insulin sliding scale, accuchecks. 





Transaminitis


Nausea


Check liver ultrasound.  Lipase only in the 60s.  Consult GI given past history 

of pancreatitis





Discussed Condition With


Patient, nurse, cardiology











Juliette Hodges MD May 9, 2018 08:00

## 2018-05-09 NOTE — HHI.GIFU
Subjective


Remarks


Pt resting in bed in NAD, talking on phone.  She denies abd pain.  Admits nausea

, no vomiting.  Otherwise noncontributory.


 (Glenis Jolly)





Objective


Vitals I&O





Vital Signs








  Date Time  Temp Pulse Resp B/P (MAP) Pulse Ox O2 Delivery O2 Flow Rate FiO2


 


5/9/18 09:00  92      


 


5/9/18 08:00  102      


 


5/9/18 07:30 98.8 87 20 126/75 (92) 100   


 


5/9/18 07:00  96      


 


5/9/18 06:40  93      


 


5/9/18 05:34  96      


 


5/9/18 04:00  91      


 


5/9/18 03:09 98.4 87 19 100/60 (73) 94   


 


5/9/18 03:00  83      


 


5/9/18 02:00  83      


 


5/9/18 01:17  83      


 


5/9/18 01:10  83      


 


5/9/18 00:00  86      


 


5/8/18 23:05 101.2 91 20 116/71 (86) 97   


 


5/8/18 23:00  88      


 


5/8/18 23:00     96   


 


5/8/18 22:04   18     


 


5/8/18 22:00  92      


 


5/8/18 21:35 101.1 93 20 116/73 (87) 98   


 


5/8/18 21:00  92      


 


5/8/18 20:00  94      


 


5/8/18 19:00  90      


 


5/8/18 18:11 98.9 88 23 100/70 (80) 98   


 


5/8/18 11:48 99.3 80 20 138/85 (102) 94   














I/O      


 


 5/8/18 5/8/18 5/8/18 5/9/18 5/9/18 5/9/18





 07:00 15:00 23:00 07:00 15:00 23:00


 


Intake Total    0 ml  


 


Output Total   4000 ml 225 ml  


 


Balance   -4000 ml -225 ml  


 


      


 


Intake Oral    0 ml  


 


Output Urine Total    225 ml  


 


Hemodialysis   4000 ml   








Laboratory





Laboratory Tests








Test


  5/8/18


14:40 5/8/18


21:11 5/9/18


05:58 5/9/18


10:10


 


Prothrombin Time 10.5    


 


Prothromb Time International


Ratio 1.0 


  


  


  


 


 


Activated Partial


Thromboplast Time 30.3 


  30.0 


  37.3 


  


 


 


Troponin I 0.69    


 


White Blood Count   8.7  8.3 


 


Red Blood Count   4.15  3.91 


 


Hemoglobin   13.2  12.3 


 


Hematocrit   39.3  37.3 


 


Mean Corpuscular Volume   94.8  95.4 


 


Mean Corpuscular Hemoglobin   31.8  31.5 


 


Mean Corpuscular Hemoglobin


Concent 


  


  33.5 


  33.0 


 


 


Red Cell Distribution Width   15.6  15.5 


 


Platelet Count   352  319 


 


Mean Platelet Volume   8.1  8.0 


 


Neutrophils (%) (Auto)   75.9  84.2 


 


Lymphocytes (%) (Auto)   17.7  9.1 


 


Monocytes (%) (Auto)   4.8  5.2 


 


Eosinophils (%) (Auto)   0.8  0.7 


 


Basophils (%) (Auto)   0.8  0.8 


 


Neutrophils # (Auto)   6.6  7.0 


 


Lymphocytes # (Auto)   1.5  0.8 


 


Monocytes # (Auto)   0.4  0.4 


 


Eosinophils # (Auto)   0.1  0.1 


 


Basophils # (Auto)   0.1  0.1 


 


CBC Comment   DIFF FINAL  DIFF FINAL 


 


Differential Comment      


 


Blood Urea Nitrogen   24  


 


Creatinine   3.83  4.20 


 


Random Glucose   169  


 


Total Protein   8.7  


 


Albumin   3.8  


 


Calcium Level   9.4  


 


Alkaline Phosphatase   562  


 


Aspartate Amino Transf


(AST/SGOT) 


  


  42 


  


 


 


Alanine Aminotransferase


(ALT/SGPT) 


  


  61 


  


 


 


Total Bilirubin   1.9  1.6 


 


Sodium Level   136  


 


Potassium Level   3.7  


 


Chloride Level   95  


 


Carbon Dioxide Level   26.8  


 


Anion Gap   14  


 


Estimat Glomerular Filtration


Rate 


  


  15 


  13 


 


 


Iron Level   33  


 


Total Iron Binding Capacity   311  


 


Percent Iron Saturation   10.6  


 


Ferritin   395  


 


Tumor Marker Alpha Fetoprotein   4.5  


 


Lactic Acid Level    1.1 














 Date/Time


Source Procedure


Growth Status


 


 


 5/9/18 10:10


Blood Peripheral Aerobic Blood Culture


Pending Received


 


 5/9/18 10:10


Blood Peripheral Anaerobic Blood Culture


Pending Received








Imaging





Last Impressions








Chest X-Ray 5/9/18 0825 Signed





Impressions: 





 Service Date/Time:  Wednesday, May 9, 2018 08:49 - CONCLUSION: No acute 

disease. 





       David Swift MD  FACR


 


Liver Ultrasound 5/8/18 0000 Signed





Impressions: 





 Service Date/Time:  Tuesday, May 8, 2018 05:16 - CONCLUSION:  Normal 

examination 





 except for very echogenic right kidney.       Edgardo Mullins MD 


 


CT Angiography 5/8/18 0000 Signed





Impressions: 





 Service Date/Time:  Tuesday, May 8, 2018 11:22 - CONCLUSION:  1. No CT 

evidence 





 for pulmonary artery embolism as questioned. 2. Patchy upper lobe ground glass 





 opacities which appear more confluent in the lower lobes near the bases. 

Overall 





 findings are most consistent with pulmonary edema. 3. Trace left and small 

right 





 simple pleural effusions. 4. Nonspecific right hilar adenopathy which maybe 





 reactive in etiology. 5. Coronary artery calcifications.      Tanner Dasilva MD 








Physical Exam


HEENT: PERRL; normocephalic; atraumatic; no jaundice.  


CHEST:  CTA


CARDIAC:  RRR


ABDOMEN:  Soft, nondistended, mild diffuse TTP, BS +


SKIN:  Normal; no rash; no jaundice.


CNS:  No focal deficits; alert and oriented times three.


 (Glenis Jolly)





Assessment and Plan


Plan


ASSESSMENT


- transaminitis - elevated LFTs.  indirect > direct rishabh, do not think this is 

obstructive pattern.  pt denies any GI sx or pain.


    congestion vs dili.  hep panel negative. US normal.   will get liver w/u r/

o other causes.   she is phillip +.


- nausea - pt denies n/v, abd pain at this time.  had EGD 1/2018 revealing 

gastritis.


- CHF exacerbation, CAD per cardiology.  s/p PCI and stents earlier this year, 

on asa and plavix





5/9/18  going for poss cardiac procedures tomorrow.  liver w/u in progress.  + 

nausea, tender on exam.  


PE has been ruled out.





PLAN


-await cardiology w/u


- poss EGD after cardiology w/u


- could consider CT


- diet per cardiology


- liver rest of w/u


- monitor LFTs


- supportive care





pt seen by myself and Dr Granados and this note is on his behalf


 (Glenis Jolly)


Physician Comments


Seen and examined with ARNP, liver and cardiac reyes in progress. ? egd depending 

upon cardiac reyes


 (Isis Granados MD)











Glenis Jolly May 9, 2018 11:08


Isis Granados MD May 9, 2018 12:15

## 2018-05-09 NOTE — PD.CARD.PN
Subjective


Subjective Remarks


Continues to have chest pain, reports pain comes and goes but does not 

completely go away.  Also a little short of breath and nauseated as well.  

Fever up to 101.2 noted overnight.


 (King Ervin)





Objective


Medications





Current Medications








 Medications


  (Trade)  Dose


 Ordered  Sig/Luis Armando


 Route  Start Time


 Stop Time Status Last Admin


 


  (Nitrostat Sl)  0.4 mg  Q5M  PRN


 SL  5/7/18 23:15


    5/8/18 21:57


 


 


  (NS Flush)  2 ml  UNSCH  PRN


 IV FLUSH  5/8/18 02:15


    5/8/18 23:02


 


 


  (NS Flush)  2 ml  BID


 IV FLUSH  5/8/18 09:00


    5/9/18 07:42


 


 


  (Narcan Inj)  0.4 mg  UNSCH  PRN


 IV PUSH  5/8/18 02:15


     


 


 


  (Milk Of


 Magnesia Liq)  30 ml  Q12H  PRN


 PO  5/8/18 02:15


     


 


 


  (Senokot)  17.2 mg  Q12H  PRN


 PO  5/8/18 02:15


     


 


 


  (Dulcolax Supp)  10 mg  DAILY  PRN


 RECTAL  5/8/18 02:15


     


 


 


  (Lactulose Liq)  30 ml  DAILY  PRN


 PO  5/8/18 02:15


     


 


 


  (Norvasc)  10 mg  DAILY


 PO  5/8/18 09:00


    5/9/18 07:42


 


 


  (Aspirin Chew)  81 mg  DAILY


 CHEW  5/8/18 09:00


    5/9/18 07:42


 


 


  (Plavix)  75 mg  DAILY


 PO  5/8/18 09:00


    5/9/18 07:42


 


 


  (Apresoline)  50 mg  Q8HR


 PO  5/8/18 06:00


    5/8/18 22:00


 


 


  (Apresoline Inj)  10 mg  Q30M  PRN


 IV PUSH  5/8/18 02:15


     


 


 


  (NovoLOG


 SUPPLEMENTAL


 SCALE)  1  ACHS SLIDING  SCALE


 SQ  5/8/18 08:00


     


 


 


  (D50w (Vial) Inj)  50 ml  UNSCH  PRN


 IV PUSH  5/8/18 04:15


     


 


 


  (Glucagon Inj)  1 mg  UNSCH  PRN


 OTHER  5/8/18 04:15


     


 


 


 Sodium Chloride  1,000 ml @ 


 0 mls/hr  Q0M PRN


 OTHER  5/8/18 08:52


     


 


 


  (Heparin Inj)  8,000 units  UNSCH  PRN


 IV FLUSH  5/8/18 09:00


     


 


 


 Sodium Chloride  1,000 ml @ 


 200 mls/hr  Q5H PRN


 IV  5/8/18 08:52


     


 


 


 Sodium Chloride  1,000 ml @ 


 0 mls/hr  Q0M PRN


 OTHER  5/8/18 08:52


     


 


 


  (Mannitol Inj)  12.5 gm  UNSCH  PRN


 IV  5/8/18 09:00


     


 


 


 Albumin Human  100 ml @ 


 60 mls/hr  UNSCH  PRN


 IV  5/8/18 09:00


     


 


 


  (NS Flush)  5 ml  UNSCH  PRN


 IV FLUSH  5/8/18 09:00


     


 


 


  (Heparin Inj)    UNSCH  PRN


 .XX  5/8/18 09:00


     


 


 


  (Gentamicin Inj)  20 mg  UNSCH  PRN


 OTHER  5/8/18 09:00


     


 


 


  (Zofran Inj)  4 mg  UNSCH  PRN


 IV PUSH  5/8/18 09:00


    5/9/18 08:04


 


 


  (Tylenol)  650 mg  UNSCH  PRN


 PO  5/8/18 09:00


    5/8/18 21:56


 


 


  (Benadryl)  25 mg  UNSCH  PRN


 PO  5/8/18 09:00


     


 


 


  (Nitrostat Sl)  0.4 mg  UNSCH  PRN


 SL  5/8/18 09:00


     


 


 


  (Catapres)  0.1 mg  UNSCH  PRN


 PO  5/8/18 09:00


     


 


 


  (Gelfoam 12 Mm/7


 Mm Top)  1 foam  UNSCH  PRN


 TOP  5/8/18 09:00


     


 


 


  (Heparin Inj)  5,000 units  UNSCH  PRN


 IV PUSH  5/8/18 18:45


     


 


 


 Heparin Sodium/


 Dextrose  250 ml @ 7


 mls/hr  TITRATE  PRN


 IV  5/8/18 12:45


    5/8/18 16:08


 


 


  (Ativan)  0.5 mg  Q12H  PRN


 PO  5/8/18 13:00


    5/9/18 07:57


 


 


  (Nitroglycerin


 2% Oint)  1 inch  Q6HR


 TOPICAL  5/9/18 00:00


    5/9/18 05:49


 


 


  (Heparin Inj)  2,500 units  UNSCH  PRN


 IV PUSH  5/8/18 23:00


    5/9/18 07:41


 








Vital Signs / I&O





Vital Signs








  Date Time  Temp Pulse Resp B/P (MAP) Pulse Ox O2 Delivery O2 Flow Rate FiO2


 


5/9/18 06:40  93      


 


5/9/18 05:34  96      


 


5/9/18 04:00  91      


 


5/9/18 03:09 98.4 87 19 100/60 (73) 94   


 


5/9/18 03:00  83      


 


5/9/18 02:00  83      


 


5/9/18 01:17  83      


 


5/9/18 01:10  83      


 


5/9/18 00:00  86      


 


5/8/18 23:05 101.2 91 20 116/71 (86) 97   


 


5/8/18 23:00  88      


 


5/8/18 23:00     96   


 


5/8/18 22:04   18     


 


5/8/18 22:00  92      


 


5/8/18 21:35 101.1 93 20 116/73 (87) 98   


 


5/8/18 21:00  92      


 


5/8/18 20:00  94      


 


5/8/18 19:00  90      


 


5/8/18 18:11 98.9 88 23 100/70 (80) 98   


 


5/8/18 11:48 99.3 80 20 138/85 (102) 94   


 


5/8/18 09:39   18     














I/O      


 


 5/8/18 5/8/18 5/8/18 5/9/18 5/9/18 5/9/18





 07:00 15:00 23:00 07:00 15:00 23:00


 


Intake Total    0 ml  


 


Output Total   4000 ml 225 ml  


 


Balance   -4000 ml -225 ml  


 


      


 


Intake Oral    0 ml  


 


Output Urine Total    225 ml  


 


Hemodialysis   4000 ml   








Physical Exam


GENERAL: Well-developed well-nourished.  In mild distress secondary to nausea.


NECK: No carotid bruits.  No JVD.  


CARDIOVASCULAR: Regular rate and rhythm.  No murmur appreciated.


RESPIRATORY: No accessory muscle use. Clear to auscultation. Breath sounds 

equal bilaterally. 


MUSCULOSKELETAL: No clubbing or cyanosis. No edema. 


NEUROLOGICAL: Awake and alert. Normal speech.


SKIN: Dialysis catheter in place right chest wall


Laboratory





Laboratory Tests








Test


  5/8/18


14:40 5/8/18


21:11 5/9/18


05:58


 


Prothrombin Time 10.5 SEC   


 


Prothromb Time International


Ratio 1.0 RATIO 


  


  


 


 


Activated Partial


Thromboplast Time 30.3 SEC 


  30.0 SEC 


  37.3 SEC 


 


 


Troponin I 0.69 NG/ML   


 


White Blood Count   8.7 TH/MM3 


 


Red Blood Count   4.15 MIL/MM3 


 


Hemoglobin   13.2 GM/DL 


 


Hematocrit   39.3 % 


 


Mean Corpuscular Volume   94.8 FL 


 


Mean Corpuscular Hemoglobin   31.8 PG 


 


Mean Corpuscular Hemoglobin


Concent 


  


  33.5 % 


 


 


Red Cell Distribution Width   15.6 % 


 


Platelet Count   352 TH/MM3 


 


Mean Platelet Volume   8.1 FL 


 


Neutrophils (%) (Auto)   75.9 % 


 


Lymphocytes (%) (Auto)   17.7 % 


 


Monocytes (%) (Auto)   4.8 % 


 


Eosinophils (%) (Auto)   0.8 % 


 


Basophils (%) (Auto)   0.8 % 


 


Neutrophils # (Auto)   6.6 TH/MM3 


 


Lymphocytes # (Auto)   1.5 TH/MM3 


 


Monocytes # (Auto)   0.4 TH/MM3 


 


Eosinophils # (Auto)   0.1 TH/MM3 


 


Basophils # (Auto)   0.1 TH/MM3 


 


CBC Comment   DIFF FINAL 


 


Differential Comment    


 


Blood Urea Nitrogen   24 MG/DL 


 


Creatinine   3.83 MG/DL 


 


Random Glucose   169 MG/DL 


 


Total Protein   8.7 GM/DL 


 


Albumin   3.8 GM/DL 


 


Calcium Level   9.4 MG/DL 


 


Alkaline Phosphatase   562 U/L 


 


Aspartate Amino Transf


(AST/SGOT) 


  


  42 U/L 


 


 


Alanine Aminotransferase


(ALT/SGPT) 


  


  61 U/L 


 


 


Total Bilirubin   1.9 MG/DL 


 


Sodium Level   136 MEQ/L 


 


Potassium Level   3.7 MEQ/L 


 


Chloride Level   95 MEQ/L 


 


Carbon Dioxide Level   26.8 MEQ/L 


 


Anion Gap   14 MEQ/L 


 


Estimat Glomerular Filtration


Rate 


  


  15 ML/MIN 


 


 


Iron Level   33 MCG/DL 


 


Total Iron Binding Capacity   311 MCG/DL 


 


Percent Iron Saturation   10.6 % 


 


Ferritin   395 NG/ML 


 


Tumor Marker Alpha Fetoprotein   4.5 NG/ML 








 (King Ervin)





Assessment and Plan


Assessment and Plan


53-year-old female with a past medical history of CAD status post PCI 2/18, 

diastolic CHF, HTN, HLD, ESRD on HD, DM who presented for nausea, shortness 

breath, and chest pain





Chest pain and elevated troponin: NSTEMI vs secondary to ESRD.  CT negative for 

PE.  On heparin GTT.  Primary team planning to rule out PE.  Continues to have 

fevers, would hold off on cardiac catheterization until source identified, 

check TTE and may eventually need REYNA.





CAD: Continue aspirin, Plavix.





Fever: Management per primary team, discussed with Dr. Hodges





Transaminitis and and nausea: GI has been consulted.





ESRD on HD: Nephrology has been consulted


Discussed Condition With


Patient, Dr. Mercado, Dr. Hodges


 (King Ervin)


Assessment and Plan


-----------


nstemi - awaiting for no fevers, then LHC.  possible for tomorrow or Friday?


await BC. if positive. then REYNA.


2d echo for now.


cont med therapy.


CTA PE negative


 (Edgardo Mercado MD)











King Ervin May 9, 2018 08:21


Edgardo Mercado MD May 9, 2018 13:31

## 2018-05-09 NOTE — MB
cc:

Kelle Nina MD, Wahba W MD

****

 

 

DATE:

05/09/2018

 

CHIEF COMPLAINT:

Chest pain, significance unclear."

 

HISTORY OF PRESENT ILLNESS:

The patient is a 53-year-old female with end-stage renal disease on 

maintenance hemodialysis as well as history of coronary artery disease

who presents to the ER with sharp left-sided pleuritic chest pain 

starting post-dialysis today.  The patient did have some nausea and 

vomiting during the dialysis, which is now resolved.  Denies history 

of fever, chills.  She has an occasional cough,  small amount of 

whitish sputum and chronic bilateral lower extremity edema.  Her chest

pain is at my time of evaluation is resolved.  She has been placed on 

a nitroglycerin drip.  She denies history of hemoptysis, TB or 

previous industrial exposure.

 

PAST MEDICAL HISTORY:

End-stage renal disease on maintenance hemodialysis, hypertension, 

hyperlipidemia, coronary artery disease, diabetes mellitus, previous 

history of pancreatitis and chronic anemia, previous cholecystectomy, 

tubal ligation, cataract surgery, has an A-V fistula in place, not 

used yet.

 

MEDICATIONS:

1.  Lisinopril.

2.  Oxycodone.

3.  Acetaminophen

4.  Catapres.

5.  Norvasc.

6.  Lopressor.

7.  Nitrostat.

8.  Isosorbide.

9.  Hydralazine.

10.  Pravachol.

11.  Plavix.

12.  Zofran p.r.n.

 

ALLERGIES:

SHELLFISH.

 

FAMILY HISTORY:

Positive for hypertension, diabetes and heart disease.

 

SOCIAL HISTORY:

She used to drink; however, has not been drinking for over 3 years 

now, has a 30-pack-year smoking history.

 

REVIEW OF SYSTEMS:

A 12-point review of systems as per HPI and past history, otherwise 

negative.

 

PHYSICAL EXAMINATION:

GENERAL:  Alert.

VITAL SIGNS:  Temperature is 98.6, pulse 86, respirations 18, blood 

pressure 105/70, oxygen saturation 100% on 2 liters nasal cannula.

HEENT:  Unremarkable.  Eyes without icterus.

NECK:  Without adenopathy or thyroid enlargement.  Trachea central.

CHEST:  No dullness to percussion.  Clear to auscultation.

HEART:  PMI not appreciated.  S1, S2 audible.  No murmur, no rub.

ABDOMEN:  Lax, bowel sounds are audible.

EXTREMITIES:   2+ edema.

 

IMAGING STUDIES:

CT angiogram without evidence of pulmonary embolism.  Patchy ground 

glass opacity in the upper lobes, more so in the lower lobes, trace 

left and small right pleural effusions.  Nonspecific hilar adenopathy 

is noted.

 

LABORATORY DATA:

White count 8.8000,  hemoglobin 12, platelets 357,000.  Sodium 137, 

potassium 4.5, BUN 20, creatinine 3.0, glucose 177.

 

IMPRESSION:

1.  Chest pain resolved, significance unclear, question 

musculoskeletal.

2.  Congestive heart failure by CT scan of the chest.

3.  Elevated troponin, question myocardial injury.  Cardiology 

following.

4.  End-stage renal disease on maintenance hemodialysis.

5.  Diabetes mellitus.

6.  Tobacco abuse.

 

PLAN:

The patient seems to be doing well at present without chest pain.  The

findings on CT scan are most suggestive of congestive heart failure, 

which dialysis should help with removing excess fluid.  We will check 

the patient's pulmonary function as well as follow her chest x-ray.  

Depending on findings and progress, we with proceed further.  I do 

thank you for asking me to partake in Mrs. Walker's care.

 

 

__________________________________

Kelle Nina MD

 

 

WWW/

D: 05/09/2018, 04:35 PM

T: 05/09/2018, 05:00 PM

Visit #: [id number]

Job #: [id number]

## 2018-05-10 VITALS
RESPIRATION RATE: 17 BRPM | OXYGEN SATURATION: 99 % | HEART RATE: 102 BPM | TEMPERATURE: 98.7 F | DIASTOLIC BLOOD PRESSURE: 60 MMHG | SYSTOLIC BLOOD PRESSURE: 101 MMHG

## 2018-05-10 VITALS
DIASTOLIC BLOOD PRESSURE: 70 MMHG | SYSTOLIC BLOOD PRESSURE: 117 MMHG | TEMPERATURE: 98.4 F | OXYGEN SATURATION: 97 % | RESPIRATION RATE: 20 BRPM | HEART RATE: 94 BPM

## 2018-05-10 VITALS
TEMPERATURE: 98.2 F | HEART RATE: 97 BPM | SYSTOLIC BLOOD PRESSURE: 134 MMHG | RESPIRATION RATE: 18 BRPM | OXYGEN SATURATION: 99 % | DIASTOLIC BLOOD PRESSURE: 62 MMHG

## 2018-05-10 VITALS — HEART RATE: 102 BPM

## 2018-05-10 VITALS — HEART RATE: 103 BPM

## 2018-05-10 VITALS — HEART RATE: 86 BPM

## 2018-05-10 VITALS — HEART RATE: 91 BPM

## 2018-05-10 VITALS
TEMPERATURE: 98.4 F | RESPIRATION RATE: 19 BRPM | DIASTOLIC BLOOD PRESSURE: 81 MMHG | OXYGEN SATURATION: 98 % | SYSTOLIC BLOOD PRESSURE: 133 MMHG | HEART RATE: 106 BPM

## 2018-05-10 VITALS — HEART RATE: 100 BPM

## 2018-05-10 VITALS — HEART RATE: 105 BPM

## 2018-05-10 VITALS
OXYGEN SATURATION: 98 % | SYSTOLIC BLOOD PRESSURE: 126 MMHG | HEART RATE: 106 BPM | DIASTOLIC BLOOD PRESSURE: 84 MMHG | RESPIRATION RATE: 18 BRPM | TEMPERATURE: 98.4 F

## 2018-05-10 VITALS — HEART RATE: 98 BPM

## 2018-05-10 VITALS — HEART RATE: 93 BPM

## 2018-05-10 VITALS — HEART RATE: 90 BPM

## 2018-05-10 VITALS — HEART RATE: 94 BPM

## 2018-05-10 VITALS — HEART RATE: 108 BPM

## 2018-05-10 VITALS — HEART RATE: 106 BPM

## 2018-05-10 VITALS — HEART RATE: 92 BPM

## 2018-05-10 VITALS — HEART RATE: 89 BPM

## 2018-05-10 LAB
BACTERIA #/AREA URNS HPF: (no result) /HPF
BASOPHILS # BLD AUTO: 0 TH/MM3 (ref 0–0.2)
BASOPHILS NFR BLD: 0.6 % (ref 0–2)
BUN SERPL-MCNC: 26 MG/DL (ref 7–18)
CALCIUM SERPL-MCNC: 9.8 MG/DL (ref 8.5–10.1)
CHLORIDE SERPL-SCNC: 92 MEQ/L (ref 98–107)
COLOR UR: YELLOW
CREAT SERPL-MCNC: 4.38 MG/DL (ref 0.5–1)
EOSINOPHIL # BLD: 0.1 TH/MM3 (ref 0–0.4)
EOSINOPHIL NFR BLD: 2 % (ref 0–4)
ERYTHROCYTE [DISTWIDTH] IN BLOOD BY AUTOMATED COUNT: 15.1 % (ref 11.6–17.2)
GFR SERPLBLD BASED ON 1.73 SQ M-ARVRAT: 13 ML/MIN (ref 89–?)
GLUCOSE SERPL-MCNC: 216 MG/DL (ref 74–106)
GLUCOSE UR STRIP-MCNC: 1000 MG/DL
HCO3 BLD-SCNC: 29.9 MEQ/L (ref 21–32)
HCT VFR BLD CALC: 41.2 % (ref 35–46)
HGB BLD-MCNC: 13.5 GM/DL (ref 11.6–15.3)
HGB UR QL STRIP: (no result)
HYALINE CASTS #/AREA URNS LPF: 5 /LPF
KETONES UR STRIP-MCNC: (no result) MG/DL
LYMPHOCYTES # BLD AUTO: 1.3 TH/MM3 (ref 1–4.8)
LYMPHOCYTES NFR BLD AUTO: 20.4 % (ref 9–44)
MCH RBC QN AUTO: 31.5 PG (ref 27–34)
MCHC RBC AUTO-ENTMCNC: 32.8 % (ref 32–36)
MCV RBC AUTO: 96 FL (ref 80–100)
MONOCYTE #: 0.6 TH/MM3 (ref 0–0.9)
MONOCYTES NFR BLD: 9.8 % (ref 0–8)
MUCOUS THREADS #/AREA URNS LPF: (no result) /LPF
NEUTROPHILS # BLD AUTO: 4.4 TH/MM3 (ref 1.8–7.7)
NEUTROPHILS NFR BLD AUTO: 67.2 % (ref 16–70)
NITRITE UR QL STRIP: (no result)
PLATELET # BLD: 307 TH/MM3 (ref 150–450)
PMV BLD AUTO: 8.3 FL (ref 7–11)
RBC # BLD AUTO: 4.29 MIL/MM3 (ref 4–5.3)
RENAL EPI CELLS #/AREA URNS HPF: <1 /HPF
SODIUM SERPL-SCNC: 137 MEQ/L (ref 136–145)
SP GR UR STRIP: 1.04 (ref 1–1.03)
SQUAMOUS #/AREA URNS HPF: 7 /HPF (ref 0–5)
URINE LEUKOCYTE ESTERASE: (no result)
WBC # BLD AUTO: 6.5 TH/MM3 (ref 4–11)

## 2018-05-10 RX ADMIN — INSULIN ASPART SCH: 100 INJECTION, SOLUTION INTRAVENOUS; SUBCUTANEOUS at 22:01

## 2018-05-10 RX ADMIN — NITROGLYCERIN SCH INCH: 20 OINTMENT TOPICAL at 17:03

## 2018-05-10 RX ADMIN — NITROGLYCERIN SCH INCH: 20 OINTMENT TOPICAL at 00:00

## 2018-05-10 RX ADMIN — HEPARIN SODIUM PRN UNITS: 1000 INJECTION, SOLUTION INTRAVENOUS; SUBCUTANEOUS at 10:04

## 2018-05-10 RX ADMIN — INSULIN ASPART SCH: 100 INJECTION, SOLUTION INTRAVENOUS; SUBCUTANEOUS at 17:00

## 2018-05-10 RX ADMIN — ASPIRIN 81 MG SCH MG: 81 TABLET ORAL at 09:04

## 2018-05-10 RX ADMIN — Medication SCH ML: at 09:09

## 2018-05-10 RX ADMIN — HEPARIN SODIUM PRN MLS/HR: 10000 INJECTION, SOLUTION INTRAVENOUS at 09:43

## 2018-05-10 RX ADMIN — MEGESTROL ACETATE SCH MG: 40 SUSPENSION ORAL at 09:23

## 2018-05-10 RX ADMIN — CLOPIDOGREL BISULFATE SCH MG: 75 TABLET, FILM COATED ORAL at 09:03

## 2018-05-10 RX ADMIN — INSULIN ASPART SCH: 100 INJECTION, SOLUTION INTRAVENOUS; SUBCUTANEOUS at 12:00

## 2018-05-10 RX ADMIN — HEPARIN SODIUM PRN UNITS: 1000 INJECTION, SOLUTION INTRAVENOUS; SUBCUTANEOUS at 01:10

## 2018-05-10 RX ADMIN — Medication SCH ML: at 22:04

## 2018-05-10 RX ADMIN — MORPHINE SULFATE PRN MG: 2 INJECTION, SOLUTION INTRAMUSCULAR; INTRAVENOUS at 17:14

## 2018-05-10 RX ADMIN — FERROUS SULFATE TAB 325 MG (65 MG ELEMENTAL FE) SCH MG: 325 (65 FE) TAB at 09:03

## 2018-05-10 RX ADMIN — ACYCLOVIR SCH UNITS: 800 TABLET ORAL at 22:01

## 2018-05-10 RX ADMIN — INSULIN ASPART SCH: 100 INJECTION, SOLUTION INTRAVENOUS; SUBCUTANEOUS at 09:00

## 2018-05-10 RX ADMIN — NITROGLYCERIN SCH INCH: 20 OINTMENT TOPICAL at 05:28

## 2018-05-10 RX ADMIN — ONDANSETRON PRN MG: 2 INJECTION, SOLUTION INTRAMUSCULAR; INTRAVENOUS at 09:05

## 2018-05-10 RX ADMIN — NITROGLYCERIN SCH INCH: 20 OINTMENT TOPICAL at 12:09

## 2018-05-10 RX ADMIN — MORPHINE SULFATE PRN MG: 2 INJECTION, SOLUTION INTRAMUSCULAR; INTRAVENOUS at 05:29

## 2018-05-10 RX ADMIN — MORPHINE SULFATE PRN MG: 2 INJECTION, SOLUTION INTRAMUSCULAR; INTRAVENOUS at 22:04

## 2018-05-10 RX ADMIN — HEPARIN SODIUM PRN UNITS: 1000 INJECTION, SOLUTION INTRAVENOUS; SUBCUTANEOUS at 16:43

## 2018-05-10 NOTE — HHI.GIFU
Subjective


Remarks


Pt sitting on edge of bed.  Just ate some breakfast.  Admits persistent mild 

nausea.  No vomiting.


 (Glenis Jolly)





Objective


Vitals I&O





Vital Signs








  Date Time  Temp Pulse Resp B/P (MAP) Pulse Ox O2 Delivery O2 Flow Rate FiO2


 


5/10/18 07:00 98.4 106 19 133/81 (98) 98   


 


5/10/18 06:04  93      


 


5/10/18 05:52   18     


 


5/10/18 05:40  93      


 


5/10/18 04:34  91      


 


5/10/18 03:20 98.4 94 20 117/70 (86) 97   


 


5/10/18 03:00  89      


 


5/10/18 02:00  86      


 


5/10/18 01:00  86      


 


5/10/18 00:13  90      


 


5/9/18 23:30 98.2 95 18 136/76 (96)    


 


5/9/18 23:00  93      


 


5/9/18 22:16  93      


 


5/9/18 21:00  88      


 


5/9/18 20:00  92      


 


5/9/18 19:28 98.5 96 18 113/66 (82) 96   


 


5/9/18 19:00  96      


 


5/9/18 18:21  100      


 


5/9/18 17:00  94      


 


5/9/18 16:00  94      


 


5/9/18 15:09 98.6 87 18 103/67 (79) 100   


 


5/9/18 15:00  101      


 


5/9/18 14:00  104      


 


5/9/18 13:00  103      


 


5/9/18 12:00  92      














I/O      


 


 5/9/18 5/9/18 5/9/18 5/10/18 5/10/18 5/10/18





 07:00 15:00 23:00 07:00 15:00 23:00


 


Intake Total 0 ml 240 ml 460 ml 480 ml  


 


Output Total 225 ml 1500 ml  0 ml  


 


Balance -225 ml -1260 ml 460 ml 480 ml  


 


      


 


Intake Oral 0 ml  360 ml 480 ml  


 


IV Total  240 ml 100 ml   


 


Output Urine Total 225 ml   0 ml  


 


Hemodialysis  1500 ml    








Laboratory





Laboratory Tests








Test


  5/9/18


14:45 5/9/18


21:45 5/10/18


07:00 5/10/18


07:44


 


Activated Partial


Thromboplast Time 46.2 


  37.9 


  


  33.0 


 


 


White Blood Count   6.5  


 


Red Blood Count   4.29  


 


Hemoglobin   13.5  


 


Hematocrit   41.2  


 


Mean Corpuscular Volume   96.0  


 


Mean Corpuscular Hemoglobin   31.5  


 


Mean Corpuscular Hemoglobin


Concent 


  


  32.8 


  


 


 


Red Cell Distribution Width   15.1  


 


Platelet Count   307  


 


Mean Platelet Volume   8.3  


 


Neutrophils (%) (Auto)   67.2  


 


Lymphocytes (%) (Auto)   20.4  


 


Monocytes (%) (Auto)   9.8  


 


Eosinophils (%) (Auto)   2.0  


 


Basophils (%) (Auto)   0.6  


 


Neutrophils # (Auto)   4.4  


 


Lymphocytes # (Auto)   1.3  


 


Monocytes # (Auto)   0.6  


 


Eosinophils # (Auto)   0.1  


 


Basophils # (Auto)   0.0  


 


CBC Comment   DIFF FINAL  


 


Differential Comment     


 


Blood Urea Nitrogen   26  


 


Creatinine   4.38  


 


Random Glucose   216  


 


Calcium Level   9.8  


 


Sodium Level   137  


 


Potassium Level   4.0  


 


Chloride Level   92  


 


Carbon Dioxide Level   29.9  


 


Anion Gap   15  


 


Estimat Glomerular Filtration


Rate 


  


  13 


  


 


 


Phosphorus Level   5.1  


 


Test


  5/10/18


10:13 


  


  


 


 


Urine Color YELLOW    


 


Urine Turbidity HAZY    


 


Urine pH 7.0    


 


Urine Specific Gravity 1.042    


 


Urine Protein


  GREATER THAN


600 


  


  


 


 


Urine Glucose (UA) 1000    


 


Urine Ketones TRACE    


 


Urine Occult Blood SMALL    


 


Urine Nitrite NEG    


 


Urine Bilirubin NEG    


 


Urine Urobilinogen LESS THAN 2.0    


 


Urine Leukocyte Esterase NEG    


 


Urine RBC 3    


 


Urine WBC 10    


 


Urine Squamous Epithelial


Cells 7 


  


  


  


 


 


Urine Renal Epithelial Cells <1    


 


Urine Bacteria RARE    


 


Urine Hyaline Casts 5    


 


Urine Mucus FEW    


 


Microscopic Urinalysis Comment


  CULTURE


INDICATED 


  


  


 














 Date/Time


Source Procedure


Growth Status


 


 


 5/9/18 10:10


Blood Peripheral Aerobic Blood Culture - Preliminary


NO GROWTH IN 1 DAY Resulted


 


 5/9/18 10:10


Blood Peripheral Anaerobic Blood Culture - Preliminary


NO GROWTH IN 1 DAY Resulted


 


 5/10/18 10:13


Urine Clean Catch Urine Culture


Pending Received








Imaging





Last Impressions








Chest X-Ray 5/9/18 0825 Signed





Impressions: 





 Service Date/Time:  Wednesday, May 9, 2018 08:49 - CONCLUSION: No acute 

disease. 





       David Swift MD  FACR


 


Liver Ultrasound 5/8/18 0000 Signed





Impressions: 





 Service Date/Time:  Tuesday, May 8, 2018 05:16 - CONCLUSION:  Normal 

examination 





 except for very echogenic right kidney.       Edgardo Mullins MD 


 


CT Angiography 5/8/18 0000 Signed





Impressions: 





 Service Date/Time:  Tuesday, May 8, 2018 11:22 - CONCLUSION:  1. No CT 

evidence 





 for pulmonary artery embolism as questioned. 2. Patchy upper lobe ground glass 





 opacities which appear more confluent in the lower lobes near the bases. 

Overall 





 findings are most consistent with pulmonary edema. 3. Trace left and small 

right 





 simple pleural effusions. 4. Nonspecific right hilar adenopathy which maybe 





 reactive in etiology. 5. Coronary artery calcifications.      Tanner Dasilva MD 








Physical Exam


HEENT: PERRL; normocephalic; atraumatic; no jaundice.  


CHEST:  CTA


CARDIAC:  RRR


ABDOMEN:  Soft, nondistended, nontender, BS +


SKIN:  Normal; no rash; no jaundice.


CNS:  No focal deficits; alert and oriented times three.


 (Glenis Jolly)





Assessment and Plan


Plan


ASSESSMENT


- transaminitis - elevated LFTs.  indirect > direct rishabh, do not think this is 

obstructive pattern.  pt denies any GI sx or pain.


    congestion vs dili.  hep panel negative. US normal.   will get liver w/u r/

o other causes.   she is phillip +.


- nausea - pt denies n/v, abd pain at this time.  had EGD 1/2018 revealing 

gastritis.


- CHF exacerbation, CAD per cardiology.  s/p PCI and stents earlier this year, 

on asa and plavix





5/9/18  going for poss cardiac procedures tomorrow.  liver w/u in progress.  + 

nausea, tender on exam.  


PE has been ruled out.


5/10/18  LFTs trending down, likely d/t congestion.  ? chf.  tentative plan for 

cardiac cath tomorrow.  


  nausea improving but still present.  PHILLIP +.  rest of liver w/u pending.  





PLAN


- await cardiac w/u and cardiac clearance


- poss EGD with cardiac clearane


- diet per cardiology


- liver  w/u in progress


- monitor LFTs


- supportive care





pt seen by myself and Dr Granados and this note is on his behalf


 (Glenis Jolly)


Physician Comments


Seen and examined with ARNP, cardiac reyes in progress.


 (Isis Granados MD)











Glenis Jolly May 10, 2018 11:26


Isis Granados MD May 10, 2018 14:42

## 2018-05-10 NOTE — HHI.PR
Subjective


Remarks


No fever overnight.


No chest pain overnight. Satting  well on room air at this time. No sob or 

wheezing. 


No n/v/d/c.





Objective


Vitals





Vital Signs








  Date Time  Temp Pulse Resp B/P (MAP) Pulse Ox O2 Delivery O2 Flow Rate FiO2


 


5/10/18 07:00 98.4 106 19 133/81 (98) 98   


 


5/10/18 06:04  93      


 


5/10/18 05:52   18     


 


5/10/18 05:40  93      


 


5/10/18 04:34  91      


 


5/10/18 03:20 98.4 94 20 117/70 (86) 97   


 


5/10/18 03:00  89      


 


5/10/18 02:00  86      


 


5/10/18 01:00  86      


 


5/10/18 00:13  90      


 


5/9/18 23:30 98.2 95 18 136/76 (96)    


 


5/9/18 23:00  93      


 


5/9/18 22:16  93      


 


5/9/18 21:00  88      


 


5/9/18 20:00  92      


 


5/9/18 19:28 98.5 96 18 113/66 (82) 96   


 


5/9/18 19:00  96      


 


5/9/18 18:21  100      


 


5/9/18 17:00  94      


 


5/9/18 16:00  94      


 


5/9/18 15:09 98.6 87 18 103/67 (79) 100   


 


5/9/18 15:00  101      


 


5/9/18 14:00  104      


 


5/9/18 13:00  103      


 


5/9/18 12:00  92      


 


5/9/18 11:00  94      














I/O      


 


 5/9/18 5/9/18 5/9/18 5/10/18 5/10/18 5/10/18





 07:00 15:00 23:00 07:00 15:00 23:00


 


Intake Total 0 ml 240 ml 460 ml 480 ml  


 


Output Total 225 ml 1500 ml  0 ml  


 


Balance -225 ml -1260 ml 460 ml 480 ml  


 


      


 


Intake Oral 0 ml  360 ml 480 ml  


 


IV Total  240 ml 100 ml   


 


Output Urine Total 225 ml   0 ml  


 


Hemodialysis  1500 ml    








Result Diagram:  


5/10/18 0700                                                                   

             5/10/18 0700





Imaging





Last Impressions








Chest X-Ray 5/9/18 0825 Signed





Impressions: 





 Service Date/Time:  Wednesday, May 9, 2018 08:49 - CONCLUSION: No acute 

disease. 





       David Swift MD  FACR


 


Liver Ultrasound 5/8/18 0000 Signed





Impressions: 





 Service Date/Time:  Tuesday, May 8, 2018 05:16 - CONCLUSION:  Normal 

examination 





 except for very echogenic right kidney.       Edgardo Mullins MD 


 


CT Angiography 5/8/18 0000 Signed





Impressions: 





 Service Date/Time:  Tuesday, May 8, 2018 11:22 - CONCLUSION:  1. No CT 

evidence 





 for pulmonary artery embolism as questioned. 2. Patchy upper lobe ground glass 





 opacities which appear more confluent in the lower lobes near the bases. 

Overall 





 findings are most consistent with pulmonary edema. 3. Trace left and small 

right 





 simple pleural effusions. 4. Nonspecific right hilar adenopathy which maybe 





 reactive in etiology. 5. Coronary artery calcifications.      Tanner Dasilva MD 








Objective Remarks


GENERAL: This is a well-nourished, well-developed patient, in no apparent 

distress.  Alert and oriented 3.


CARDIOVASCULAR: Regular rate and rhythm without murmurs, gallops, or rubs. 


RESPIRATORY: Clear to auscultation. Breath sounds equal bilaterally. No wheezes

, rales, or rhonchi.  


GASTROINTESTINAL: Abdomen soft, non-tender, nondistended. No hepato-splenomegaly

, or palpable masses. No guarding.


MUSCULOSKELETAL: Extremities without clubbing, cyanosis. No joint tenderness, 

effusion.No calf tenderness. Negative Homans sign bilaterally.  +2 bilateral 

lower extremities edema.  Patient reports this is baseline


NEUROLOGICAL: Awake and alert. Cranial nerves II through XII intact.  Motor and 

sensory grossly within normal limits. Five out of 5 muscle strength in all 

muscle groups.  Normal speech.














A/P


Assessment and Plan


SIRS meeting criteria, also tachycardiac, initiate sepsis protocol. With fevers 

patient did not have cardiac cath.  Discussed with the cardiology. Blood 

cultures, CXR, UA, LA ordered. Started on Levaquin. Monitor closely. 





CHF with Exacerbation


BNP elevated.  Chest x-ray with edema.  Will start on nitro drip.  Consulted 

nephrology for dialysis.  Chest x-ray reviewed with congestion. Advance diet 

cardiac cath on hold.





Accelerated hypertension.  Patient started on nitro drip.  Restart hydralazine, 

with as needed hydralazine.  Awaiting official list of home meds.





NSTEMI


Elevated Troponin trending up  EKG sinus with T-wave inversions noted.    

Heparin drip.  Status post aspirin.  Continue daily aspirin.  Continue Plavix. 





Pleuritic chest pain


elevated  d-dimer. CT pulmonary angiogram reviewed and no PE. Patient received 

HD 





End-stage renal disease on hemodialysis Monday Wednesday Friday.  Consult 

nephrology for dialysis.  Dializef before and after CTA.  Appreciate assistance.





Tobacco abuse.  Cessation counseling provided.





Diabetes mellitus type 2.  Diabetic diet.  Insulin sliding scale, accuchecks. 





Transaminitis


Nausea


Check liver ultrasound.  Lipase only in the 60s.  Consult GI given past history 

of pancreatitis





Discussed Condition With


Patient, nurse, cardiology











Juliette Hodges MD May 10, 2018 10:47

## 2018-05-10 NOTE — PD.CARD.PN
Subjective


Subjective Remarks


Fever free overnight.  Still with some nausea, but somewhat improved.  No chest 

pain or shortness of breath.





Objective


Medications





Current Medications








 Medications


  (Trade)  Dose


 Ordered  Sig/Luis Armando


 Route  Start Time


 Stop Time Status Last Admin


 


  (Nitrostat Sl)  0.4 mg  Q5M  PRN


 SL  5/7/18 23:15


    5/8/18 21:57


 


 


  (NS Flush)  2 ml  UNSCH  PRN


 IV FLUSH  5/8/18 02:15


    5/8/18 23:02


 


 


  (NS Flush)  2 ml  BID


 IV FLUSH  5/8/18 09:00


    5/9/18 20:54


 


 


  (Narcan Inj)  0.4 mg  UNSCH  PRN


 IV PUSH  5/8/18 02:15


     


 


 


  (Milk Of


 Magnesia Liq)  30 ml  Q12H  PRN


 PO  5/8/18 02:15


     


 


 


  (Senokot)  17.2 mg  Q12H  PRN


 PO  5/8/18 02:15


     


 


 


  (Dulcolax Supp)  10 mg  DAILY  PRN


 RECTAL  5/8/18 02:15


     


 


 


  (Lactulose Liq)  30 ml  DAILY  PRN


 PO  5/8/18 02:15


    5/9/18 20:53


 


 


  (Norvasc)  10 mg  DAILY


 PO  5/8/18 09:00


    5/9/18 07:42


 


 


  (Aspirin Chew)  81 mg  DAILY


 CHEW  5/8/18 09:00


    5/9/18 07:42


 


 


  (Plavix)  75 mg  DAILY


 PO  5/8/18 09:00


    5/9/18 07:42


 


 


  (Apresoline)  50 mg  Q8HR


 PO  5/8/18 06:00


    5/10/18 05:28


 


 


  (Apresoline Inj)  10 mg  Q30M  PRN


 IV PUSH  5/8/18 02:15


     


 


 


  (NovoLOG


 SUPPLEMENTAL


 SCALE)  1  ACHS SLIDING  SCALE


 SQ  5/8/18 08:00


    5/9/18 20:54


 


 


  (D50w (Vial) Inj)  50 ml  UNSCH  PRN


 IV PUSH  5/8/18 04:15


     


 


 


  (Glucagon Inj)  1 mg  UNSCH  PRN


 OTHER  5/8/18 04:15


     


 


 


 Sodium Chloride  1,000 ml @ 


 0 mls/hr  Q0M PRN


 OTHER  5/8/18 08:52


     


 


 


  (Heparin Inj)  8,000 units  UNSCH  PRN


 IV FLUSH  5/8/18 09:00


     


 


 


 Sodium Chloride  1,000 ml @ 


 200 mls/hr  Q5H PRN


 IV  5/8/18 08:52


     


 


 


 Sodium Chloride  1,000 ml @ 


 0 mls/hr  Q0M PRN


 OTHER  5/8/18 08:52


     


 


 


  (Mannitol Inj)  12.5 gm  UNSCH  PRN


 IV  5/8/18 09:00


     


 


 


 Albumin Human  100 ml @ 


 60 mls/hr  UNSCH  PRN


 IV  5/8/18 09:00


     


 


 


  (NS Flush)  5 ml  UNSCH  PRN


 IV FLUSH  5/8/18 09:00


     


 


 


  (Heparin Inj)    UNSCH  PRN


 .XX  5/8/18 09:00


     


 


 


  (Gentamicin Inj)  20 mg  UNSCH  PRN


 OTHER  5/8/18 09:00


     


 


 


  (Zofran Inj)  4 mg  UNSCH  PRN


 IV PUSH  5/8/18 09:00


    5/9/18 08:04


 


 


  (Tylenol)  650 mg  UNSCH  PRN


 PO  5/8/18 09:00


    5/8/18 21:56


 


 


  (Benadryl)  25 mg  UNSCH  PRN


 PO  5/8/18 09:00


    5/10/18 01:10


 


 


  (Nitrostat Sl)  0.4 mg  UNSCH  PRN


 SL  5/8/18 09:00


     


 


 


  (Catapres)  0.1 mg  UNSCH  PRN


 PO  5/8/18 09:00


     


 


 


  (Gelfoam 12 Mm/7


 Mm Top)  1 foam  UNSCH  PRN


 TOP  5/8/18 09:00


     


 


 


  (Heparin Inj)  5,000 units  UNSCH  PRN


 IV PUSH  5/8/18 18:45


     


 


 


 Heparin Sodium/


 Dextrose  250 ml @ 7


 mls/hr  TITRATE  PRN


 IV  5/8/18 12:45


    5/9/18 09:48


 


 


  (Ativan)  0.5 mg  Q12H  PRN


 PO  5/8/18 13:00


    5/9/18 23:17


 


 


  (Nitroglycerin


 2% Oint)  1 inch  Q6HR


 TOPICAL  5/9/18 00:00


    5/9/18 05:49


 


 


  (Heparin Inj)  2,500 units  UNSCH  PRN


 IV PUSH  5/8/18 23:00


    5/10/18 01:10


 


 


  (Morphine Inj)  2 mg  Q4H  PRN


 IV PUSH  5/9/18 08:45


    5/10/18 05:29


 


 


 Levofloxacin/


 Dextrose  50 ml @ 50


 mls/hr  Q48H


 IV  5/11/18 09:00


     


 


 


  (Ferrous Sulfate)  325 mg  DAILY


 PO  5/9/18 13:00


    5/9/18 15:21


 


 


  (Megace Liq)  800 mg  DAILY


 PO  5/9/18 15:00


    5/9/18 16:27


 








Vital Signs / I&O





Vital Signs








  Date Time  Temp Pulse Resp B/P (MAP) Pulse Ox O2 Delivery O2 Flow Rate FiO2


 


5/10/18 07:00 98.4 106 19 133/81 (98) 98   


 


5/10/18 06:04  93      


 


5/10/18 05:52   18     


 


5/10/18 05:40  93      


 


5/10/18 04:34  91      


 


5/10/18 03:20 98.4 94 20 117/70 (86) 97   


 


5/10/18 03:00  89      


 


5/10/18 02:00  86      


 


5/10/18 01:00  86      


 


5/10/18 00:13  90      


 


5/9/18 23:30 98.2 95 18 136/76 (96)    


 


5/9/18 23:00  93      


 


5/9/18 22:16  93      


 


5/9/18 21:00  88      


 


5/9/18 20:00  92      


 


5/9/18 19:28 98.5 96 18 113/66 (82) 96   


 


5/9/18 19:00  96      


 


5/9/18 18:21  100      


 


5/9/18 17:00  94      


 


5/9/18 16:00  94      


 


5/9/18 15:09 98.6 87 18 103/67 (79) 100   


 


5/9/18 15:00  101      


 


5/9/18 14:00  104      


 


5/9/18 13:00  103      


 


5/9/18 12:00  92      


 


5/9/18 11:00  94      


 


5/9/18 10:00  92      


 


5/9/18 09:00  92      


 


5/9/18 08:00  102      














I/O      


 


 5/9/18 5/9/18 5/9/18 5/10/18 5/10/18 5/10/18





 07:00 15:00 23:00 07:00 15:00 23:00


 


Intake Total 0 ml 240 ml 460 ml 480 ml  


 


Output Total 225 ml 1500 ml  0 ml  


 


Balance -225 ml -1260 ml 460 ml 480 ml  


 


      


 


Intake Oral 0 ml  360 ml 480 ml  


 


IV Total  240 ml 100 ml   


 


Output Urine Total 225 ml   0 ml  


 


Hemodialysis  1500 ml    








Physical Exam


GENERAL: Well-developed well-nourished.  In no acute distress.


NECK: No carotid bruits.  No JVD.  


CARDIOVASCULAR: Regular rate and rhythm.  No murmur appreciated.


RESPIRATORY: No accessory muscle use. Clear to auscultation. Breath sounds 

equal bilaterally. 


MUSCULOSKELETAL: No clubbing or cyanosis. No edema. 


NEUROLOGICAL: Awake and alert. Normal speech.


SKIN: Dialysis catheter in place right chest wall


Laboratory





Laboratory Tests








Test


  5/9/18


10:10 5/9/18


14:45 5/9/18


21:45 5/10/18


07:00


 


White Blood Count 8.3 TH/MM3    6.5 TH/MM3 


 


Red Blood Count 3.91 MIL/MM3    4.29 MIL/MM3 


 


Hemoglobin 12.3 GM/DL    13.5 GM/DL 


 


Hematocrit 37.3 %    41.2 % 


 


Mean Corpuscular Volume 95.4 FL    96.0 FL 


 


Mean Corpuscular Hemoglobin 31.5 PG    31.5 PG 


 


Mean Corpuscular Hemoglobin


Concent 33.0 % 


  


  


  32.8 % 


 


 


Red Cell Distribution Width 15.5 %    15.1 % 


 


Platelet Count 319 TH/MM3    307 TH/MM3 


 


Mean Platelet Volume 8.0 FL    8.3 FL 


 


Neutrophils (%) (Auto) 84.2 %    67.2 % 


 


Lymphocytes (%) (Auto) 9.1 %    20.4 % 


 


Monocytes (%) (Auto) 5.2 %    9.8 % 


 


Eosinophils (%) (Auto) 0.7 %    2.0 % 


 


Basophils (%) (Auto) 0.8 %    0.6 % 


 


Neutrophils # (Auto) 7.0 TH/MM3    4.4 TH/MM3 


 


Lymphocytes # (Auto) 0.8 TH/MM3    1.3 TH/MM3 


 


Monocytes # (Auto) 0.4 TH/MM3    0.6 TH/MM3 


 


Eosinophils # (Auto) 0.1 TH/MM3    0.1 TH/MM3 


 


Basophils # (Auto) 0.1 TH/MM3    0.0 TH/MM3 


 


CBC Comment DIFF FINAL    DIFF FINAL 


 


Differential Comment      


 


Creatinine 4.20 MG/DL    


 


Estimat Glomerular Filtration


Rate 13 ML/MIN 


  


  


  


 


 


Lactic Acid Level 1.1 mmol/L    


 


Total Bilirubin 1.6 MG/DL    


 


Activated Partial


Thromboplast Time 


  46.2 SEC 


  37.9 SEC 


  


 








Imaging





Last 24 hours Impressions








Chest X-Ray 5/9/18 3586 Signed





Impressions: 





 Service Date/Time:  Wednesday, May 9, 2018 08:49 - CONCLUSION: No acute 

disease. 





       David Swift MD  FACR











Assessment and Plan


Assessment and Plan


53-year-old female with a past medical history of CAD status post PCI 2/18, 

diastolic CHF, HTN, HLD, ESRD on HD, DM who presented for nausea, shortness 

breath, and chest pain





Chest pain and elevated troponin: NSTEMI vs secondary to ESRD.  CT negative for 

PE.  On heparin GTT. Has been fever free overnight, tentatively plan for 

cardiac catheterization tomorrow, n.p.o. after midnight. 





CAD: Continue aspirin, Plavix.





Fever: On antibiotics per primary team. Check TTE and may eventually need REYNA 

depending on blood cultures.





Transaminitis and and nausea: GI has been consulted.





ESRD on HD: Nephrology on board.


Discussed Condition With


Patient with RN at bedside, King So May 10, 2018 07:52 yes...

## 2018-05-10 NOTE — HHI.NPPN
Subjective


Complaints:  Chest Pain, Shortness of Breath


General Problems:  Anemia


Renal Failure:  Chronic, End Stage Renal Disease


Interval History


Off heparin gtt. She is to have cardiac catheterization tomorrow AM. Chest pain 

free currently. 


 (Migdalia Camejo)





Review of Systems


General


Constitutional:  Fatigue


 (Migdalia Camejo)





Respiratory


Lungs:  SOB


 (Migdalia Camejo)





Cardiovascular


Cardiac:  Chest Pain


 (Migdalia Camejo)





Objective Data


Data





Vital Signs








  Date Time  Temp Pulse Resp B/P (MAP) Pulse Ox O2 Delivery O2 Flow Rate FiO2


 


5/10/18 07:00 98.4 106 19 133/81 (98) 98   


 


5/10/18 06:04  93      


 


5/10/18 05:52   18     


 


5/10/18 05:40  93      


 


5/10/18 04:34  91      


 


5/10/18 03:20 98.4 94 20 117/70 (86) 97   


 


5/10/18 03:00  89      


 


5/10/18 02:00  86      


 


5/10/18 01:00  86      


 


5/10/18 00:13  90      


 


5/9/18 23:30 98.2 95 18 136/76 (96)    


 


5/9/18 23:00  93      


 


5/9/18 22:16  93      


 


5/9/18 21:00  88      


 


5/9/18 20:00  92      


 


5/9/18 19:28 98.5 96 18 113/66 (82) 96   


 


5/9/18 19:00  96      


 


5/9/18 18:21  100      


 


5/9/18 17:00  94      


 


5/9/18 16:00  94      


 


5/9/18 15:09 98.6 87 18 103/67 (79) 100   


 


5/9/18 15:00  101      


 


5/9/18 14:00  104      


 


5/9/18 13:00  103      


 


5/9/18 12:00  92      


 


5/9/18 11:00  94      


 


5/9/18 10:00  92      








 (Migdalia Camejo)


-:  


5/10/18 0700                                                                   

             5/10/18 0700








 Microbiology


5/9/18 Aerobic Blood Culture, Received


         Pending


5/9/18 Anaerobic Blood Culture, Received


         Pending


5/9/18 Aerobic Blood Culture, Received


         Pending


5/9/18 Anaerobic Blood Culture, Received


         Pending


Imaging





Last 72 hours Impressions








Chest X-Ray 5/9/18 0825 Signed





Impressions: 





 Service Date/Time:  Wednesday, May 9, 2018 08:49 - CONCLUSION: No acute 

disease. 





       David Swift MD  FACR


 


Liver Ultrasound 5/8/18 0000 Signed





Impressions: 





 Service Date/Time:  Tuesday, May 8, 2018 05:16 - CONCLUSION:  Normal 

examination 





 except for very echogenic right kidney.       Edgardo Mullins MD 


 


CT Angiography 5/8/18 0000 Signed





Impressions: 





 Service Date/Time:  Tuesday, May 8, 2018 11:22 - CONCLUSION:  1. No CT 

evidence 





 for pulmonary artery embolism as questioned. 2. Patchy upper lobe ground glass 





 opacities which appear more confluent in the lower lobes near the bases. 

Overall 





 findings are most consistent with pulmonary edema. 3. Trace left and small 

right 





 simple pleural effusions. 4. Nonspecific right hilar adenopathy which maybe 





 reactive in etiology. 5. Coronary artery calcifications.      Tanner Dasilva MD 


 


Chest X-Ray 5/7/18 [id number] Signed





Impressions: 





 Service Date/Time:  Monday, May 7, 2018 21:47 - CONCLUSION:  1. Mild edema 





 pattern in the lungs with trace pleural fluid. No pneumothorax.     Valdez Colunga MD 








Tubes & Lines:  Perma-Cath


 (Migdalia Camejo)





Physical Exam


General


Appearance:  Well Developed, Well Nourished, No Acute Distress, Comfortable


 (Migdalia Camejo)





Eyes


Eye Exam:  Pupils Equal, Pupils Reactive


 (Migdalia Camejo)





Pulmonary


Resp Exam:  Clear Bilaterally, Breath Sounds Equal


 (Migdalia Camejo)





Cardiology


CV Exam:  Regular, Normal Sinus Rhythm


 (Migdalia Camejo)





Gastrointestinal/Abdomen


GI Exam:  Soft, Non-Tender, Bowel Sounds Present


 (Migdalia Camejo)





Musculoskeletal


MS Exam:  Joints Intact, Normal Gait, Good Strength


 (Migdalia Camejo)





Integumentary


Skin Exam:  Clear, Warm, Dry, Intact


 (Migdalia Camejo)





Extremeties


Extremities Exam:  No Edema, Pedal Pulses Palpable


 (Migdalia Camejo)





Neurologic


Neuro Exam:  Alert, Awake, Oriented, Speech Clear, Moving All Extremities


 (Migdalia Camejo)





Assessment/Plan


Discussed Condition With:  Patient


Assessment Summary:  Anemia of CKD, CHF, Hypertension, Diabetes Mellitus, End 

Stage Renal Disease


Problem List:  


(1) ESRD (end stage renal disease) on dialysis


ICD Codes:  N18.6 - End stage renal disease; Z99.2 - Dependence on renal 

dialysis


Plan:  Continue HD MWF. Due tomorrow after cardiac cath


Fluid status has improved


AVF not mature, using Permcath. She has second step surgery scheduled 5/15 with 

Dr. Rees. 


Awaiting phosphorus level today


On protein supplements to meals and Megace. 





(2) Chest pain, rule out acute myocardial infarction


ICD Codes:  R07.9 - Chest pain, unspecified


Status:  Acute


Plan:  Cardiology following


off heparin gtt


PE study negative


Pending Echo





(3) DM (diabetes mellitus)


ICD Codes:  E11.9 - Type 2 diabetes mellitus without complications


Plan:  maintain glucose 140-180 mg/dL while hospitalized 


Start D10 @ 15 when NPO tonight. 





(4) HTN (hypertension)


ICD Codes:  I10 - Hypertension


Status:  Chronic


Plan:  Monitor blood pressure, excellent control recently 


Resume hypertensive agents


Fluid removal with dialysis





(5) Anemia


ICD Codes:  D64.9 - Anemia, unspecified


Status:  Chronic


Plan:  Epogen not required


Monitor hemoglobin 


On oral iron


 (Migdalia Camejo)


Plan


patient was seen and examined. Agree with above assessment and plan. Cardiac 

cath tomorrow. 


Dialysis tomorrow. 


 (Kelton Whiteside MD)











Migdalia Camejo May 10, 2018 09:46


Kelton Whiteside MD May 10, 2018 09:53

## 2018-05-10 NOTE — HHI.PR
Subjective


Remarks


alert


no sob


o2 sat 95% RA





Objective





Vital Signs








  Date Time  Temp Pulse Resp B/P (MAP) Pulse Ox O2 Delivery O2 Flow Rate FiO2


 


5/10/18 15:00  102      


 


5/10/18 15:00 98.7 97 17 101/60 (74) 99   


 


5/10/18 14:00  93      


 


5/10/18 13:00  102      


 


5/10/18 12:00  92      


 


5/10/18 11:00 98.2 97 18 134/62 (86) 99   


 


5/10/18 11:00  97      


 


5/10/18 10:00  106      


 


5/10/18 09:00  100      


 


5/10/18 08:00  94      


 


5/10/18 07:00  90      


 


5/10/18 07:00 98.4 106 19 133/81 (98) 98   


 


5/10/18 06:04  93      


 


5/10/18 05:52   18     


 


5/10/18 05:40  93      


 


5/10/18 04:34  91      


 


5/10/18 03:20 98.4 94 20 117/70 (86) 97   


 


5/10/18 03:00  89      


 


5/10/18 02:00  86      


 


5/10/18 01:00  86      


 


5/10/18 00:13  90      


 


5/9/18 23:30 98.2 95 18 136/76 (96)    


 


5/9/18 23:00  93      


 


5/9/18 22:16  93      


 


5/9/18 21:00  88      


 


5/9/18 20:00  92      


 


5/9/18 19:28 98.5 96 18 113/66 (82) 96   


 


5/9/18 19:00  96      


 


5/9/18 18:21  100      


 


5/9/18 17:00  94      


 


5/9/18 16:00  94      














I/O      


 


 5/9/18 5/9/18 5/9/18 5/10/18 5/10/18 5/10/18





 07:00 15:00 23:00 07:00 15:00 23:00


 


Intake Total 0 ml 240 ml 460 ml 480 ml  


 


Output Total 225 ml 1500 ml  0 ml  


 


Balance -225 ml -1260 ml 460 ml 480 ml  


 


      


 


Intake Oral 0 ml  360 ml 480 ml  


 


IV Total  240 ml 100 ml   


 


Output Urine Total 225 ml   0 ml  


 


Hemodialysis  1500 ml    








Result Diagram:  


5/10/18 0700                                                                   

             5/10/18 0700





Objective Remarks


GENERAL: 


SKIN: Warm and dry.


HEAD: Atraumatic. Normocephalic. 


EYES: Pupils equal and round. No scleral icterus. No injection or drainage. 


ENT: No nasal bleeding or discharge.  Mucous membranes pink and moist.


NECK: Trachea midline. No JVD. 


CARDIOVASCULAR: Regular rate and rhythm.  


RESPIRATORY: No accessory muscle use. Clear to auscultation. Breath sounds 

equal bilaterally. 


GASTROINTESTINAL: Abdomen soft, non-tender, nondistended. Hepatic and splenic 

margins not palpable. 


MUSCULOSKELETAL: Extremities without clubbing, cyanosis, or edema. No obvious 

deformities. 


NEUROLOGICAL: Awake and alert. No obvious cranial nerve deficits.  Motor 

grossly within normal limits. Five out of 5 muscle strength in the arms and 

legs.  Normal speech.


PSYCHIATRIC: Appropriate mood and affect; insight and judgment normal.





Assessment and Plan


Assessment and Plan


ASS





CHEST PAIN RESOLVED


CHF


ESRD ON MHD





PLAN





O2 IF NEEDED


F/U CXRAY











Kelle Nina MD May 10, 2018 15:39

## 2018-05-10 NOTE — ECHRPT
Indication:   Chest pain, unspecified

 

 CONCLUSIONS

 Severe concentric left ventricular hypertrophy. EF@ 40-45%%

 The transthoracic study is normal by two-dimensional, color flow imaging and Doppler interrogation. 


 Severe concentric left ventricular hypertrophy. 

 

 

 

 

 

 The aortic root and proximal ascending aorta are not well visualized. 

 

 

 

 An infusion catheter is present within the right ventricular cavity. 

 

 

 

 

 BP:        /         HR:                          Rhythm:

 

 MEASUREMENTS  (Male / Female) Normal Values       Technical Quality:

 2D ECHO

 LV Diastolic Diameter PLAX        3.8 cm                4.2 - 5.9 / 3.9 - 5.3 cm

 LV Systolic Diameter PLAX         2.6 cm                

 IVS Diastolic Thickness           2.1 cm                0.6 - 1.0 / 0.6 - 0.9 cm

 LVPW Diastolic Thickness          1.2 cm                0.6 - 1.0 / 0.6 - 0.9 cm

 LV Relative Wall Thickness        0.9                   

 RV Internal Dim ED PLAX           1.6 cm                

 

 M-MODE

 Aortic Root Diameter MM           3.1 cm                

 LA Systolic Diameter MM           3.7 cm                

 LA Ao Ratio MM                    1.2                   

 AV Cusp Separation MM             1.7 cm                

 

 DOPPLER

 Mitral E Point Velocity           65.2 cm/s             

 Mitral A Point Velocity           103.0 cm/s            

 Mitral E to A Ratio               0.6                   

 LV E' Lateral Velocity            5.0 cm/s              

 Mitral E to LV E' Lateral Ratio   13.1                  

 

 

 FINDINGS

 

 LEFT VENTRICLE

 Severe concentric left ventricular hypertrophy. 

 The left ventricular systolic function is normal with an estimated ejection fraction in the range of
 60-65%. 

 

 RIGHT VENTRICLE

 Normal right ventricular size and systolic function. 

 

 LEFT ATRIUM

 The left atrial size is normal. 

 

 RIGHT ATRIUM

 The right atrial size is normal. 

 

 ATRIAL SEPTUM

 Normal atrial septal thickness without atrial level shunting by limited color doppler interrogation.
 

 

 AORTA

 The aortic root and proximal ascending aorta are not well visualized. 

 

 MITRAL VALVE

 Structurally normal mitral valve. No mitral valve stenosis or regurgitation. 

 

 AORTIC VALVE

 Trileaflet aortic valve. No aortic valve stenosis or regurgitation. 

 

 TRICUSPID VALVE

 Structurally normal tricuspid valve. No tricuspid valve stenosis or regurgitation. 

 An infusion catheter is present within the right ventricular cavity. 

 

 PULMONARY VALVE

 No pulmonary valve regurgitation or stenosis. 

 

 VESSELS

 The inferior vena cava is normal in size. 

 

 PERICARDIUM

 No pericardial effusion. 

 

 

 

 

  Jatinder Campbell MD, FACC, FSCAI

  (Electronically Signed)

  Final Date:10 May 2018 18:49

## 2018-05-11 VITALS — OXYGEN SATURATION: 96 %

## 2018-05-11 VITALS
DIASTOLIC BLOOD PRESSURE: 77 MMHG | SYSTOLIC BLOOD PRESSURE: 155 MMHG | OXYGEN SATURATION: 98 % | RESPIRATION RATE: 16 BRPM | HEART RATE: 91 BPM

## 2018-05-11 VITALS — HEART RATE: 106 BPM

## 2018-05-11 VITALS
DIASTOLIC BLOOD PRESSURE: 72 MMHG | SYSTOLIC BLOOD PRESSURE: 114 MMHG | OXYGEN SATURATION: 99 % | RESPIRATION RATE: 20 BRPM | TEMPERATURE: 98.2 F | HEART RATE: 97 BPM

## 2018-05-11 VITALS
OXYGEN SATURATION: 96 % | DIASTOLIC BLOOD PRESSURE: 63 MMHG | SYSTOLIC BLOOD PRESSURE: 107 MMHG | HEART RATE: 100 BPM | TEMPERATURE: 98.3 F | RESPIRATION RATE: 18 BRPM

## 2018-05-11 VITALS
TEMPERATURE: 98.2 F | HEART RATE: 100 BPM | SYSTOLIC BLOOD PRESSURE: 101 MMHG | OXYGEN SATURATION: 100 % | RESPIRATION RATE: 16 BRPM | DIASTOLIC BLOOD PRESSURE: 71 MMHG

## 2018-05-11 VITALS
DIASTOLIC BLOOD PRESSURE: 69 MMHG | OXYGEN SATURATION: 99 % | SYSTOLIC BLOOD PRESSURE: 145 MMHG | TEMPERATURE: 98.3 F | RESPIRATION RATE: 16 BRPM | HEART RATE: 96 BPM

## 2018-05-11 VITALS — HEART RATE: 102 BPM

## 2018-05-11 VITALS — HEART RATE: 96 BPM

## 2018-05-11 VITALS
RESPIRATION RATE: 16 BRPM | TEMPERATURE: 98.9 F | DIASTOLIC BLOOD PRESSURE: 87 MMHG | HEART RATE: 96 BPM | OXYGEN SATURATION: 97 % | SYSTOLIC BLOOD PRESSURE: 142 MMHG

## 2018-05-11 VITALS
RESPIRATION RATE: 15 BRPM | SYSTOLIC BLOOD PRESSURE: 157 MMHG | OXYGEN SATURATION: 99 % | TEMPERATURE: 98.3 F | DIASTOLIC BLOOD PRESSURE: 55 MMHG | HEART RATE: 107 BPM

## 2018-05-11 VITALS
HEART RATE: 101 BPM | RESPIRATION RATE: 16 BRPM | TEMPERATURE: 98.9 F | SYSTOLIC BLOOD PRESSURE: 142 MMHG | DIASTOLIC BLOOD PRESSURE: 87 MMHG | OXYGEN SATURATION: 97 %

## 2018-05-11 VITALS — HEART RATE: 94 BPM

## 2018-05-11 VITALS — HEART RATE: 88 BPM

## 2018-05-11 VITALS — HEART RATE: 118 BPM

## 2018-05-11 VITALS — HEART RATE: 124 BPM

## 2018-05-11 VITALS — HEART RATE: 98 BPM

## 2018-05-11 VITALS — HEART RATE: 97 BPM

## 2018-05-11 VITALS — HEART RATE: 95 BPM

## 2018-05-11 VITALS — HEART RATE: 92 BPM

## 2018-05-11 LAB
ANA PATTERN: (no result)
ERYTHROCYTE [DISTWIDTH] IN BLOOD BY AUTOMATED COUNT: 15.1 % (ref 11.6–17.2)
ERYTHROCYTE [DISTWIDTH] IN BLOOD BY AUTOMATED COUNT: 15.2 % (ref 11.6–17.2)
HCT VFR BLD CALC: 38.4 % (ref 35–46)
HCT VFR BLD CALC: 41.6 % (ref 35–46)
HGB BLD-MCNC: 12.8 GM/DL (ref 11.6–15.3)
HGB BLD-MCNC: 13.9 GM/DL (ref 11.6–15.3)
INR PPP: 1.1 RATIO
MCH RBC QN AUTO: 31.5 PG (ref 27–34)
MCH RBC QN AUTO: 31.9 PG (ref 27–34)
MCHC RBC AUTO-ENTMCNC: 33.4 % (ref 32–36)
MCHC RBC AUTO-ENTMCNC: 33.5 % (ref 32–36)
MCV RBC AUTO: 94.5 FL (ref 80–100)
MCV RBC AUTO: 95.2 FL (ref 80–100)
PLATELET # BLD: 320 TH/MM3 (ref 150–450)
PLATELET # BLD: 339 TH/MM3 (ref 150–450)
PMV BLD AUTO: 8.4 FL (ref 7–11)
PMV BLD AUTO: 8.6 FL (ref 7–11)
PROTHROMBIN TIME: 10.7 SEC (ref 9.8–11.6)
RBC # BLD AUTO: 4.07 MIL/MM3 (ref 4–5.3)
RBC # BLD AUTO: 4.37 MIL/MM3 (ref 4–5.3)
WBC # BLD AUTO: 6.9 TH/MM3 (ref 4–11)
WBC # BLD AUTO: 7.7 TH/MM3 (ref 4–11)

## 2018-05-11 PROCEDURE — 4A023N7 MEASUREMENT OF CARDIAC SAMPLING AND PRESSURE, LEFT HEART, PERCUTANEOUS APPROACH: ICD-10-PCS | Performed by: INTERNAL MEDICINE

## 2018-05-11 PROCEDURE — B2111ZZ FLUOROSCOPY OF MULTIPLE CORONARY ARTERIES USING LOW OSMOLAR CONTRAST: ICD-10-PCS | Performed by: INTERNAL MEDICINE

## 2018-05-11 RX ADMIN — HEPARIN SODIUM PRN MLS/HR: 10000 INJECTION, SOLUTION INTRAVENOUS at 19:32

## 2018-05-11 RX ADMIN — INSULIN ASPART SCH: 100 INJECTION, SOLUTION INTRAVENOUS; SUBCUTANEOUS at 12:00

## 2018-05-11 RX ADMIN — NITROGLYCERIN SCH INCH: 20 OINTMENT TOPICAL at 18:23

## 2018-05-11 RX ADMIN — SODIUM CHLORIDE SCH MLS/HR: 234 INJECTION INTRAMUSCULAR; INTRAVENOUS; SUBCUTANEOUS at 02:10

## 2018-05-11 RX ADMIN — ASPIRIN 81 MG SCH MG: 81 TABLET ORAL at 09:11

## 2018-05-11 RX ADMIN — HEPARIN SODIUM PRN MLS/HR: 10000 INJECTION, SOLUTION INTRAVENOUS at 05:34

## 2018-05-11 RX ADMIN — Medication SCH ML: at 09:15

## 2018-05-11 RX ADMIN — FERROUS SULFATE TAB 325 MG (65 MG ELEMENTAL FE) SCH MG: 325 (65 FE) TAB at 09:11

## 2018-05-11 RX ADMIN — ACETAMINOPHEN PRN MG: 325 TABLET ORAL at 21:37

## 2018-05-11 RX ADMIN — Medication SCH ML: at 21:43

## 2018-05-11 RX ADMIN — INSULIN ASPART SCH: 100 INJECTION, SOLUTION INTRAVENOUS; SUBCUTANEOUS at 08:00

## 2018-05-11 RX ADMIN — MEGESTROL ACETATE SCH MG: 40 SUSPENSION ORAL at 09:00

## 2018-05-11 RX ADMIN — INSULIN ASPART SCH: 100 INJECTION, SOLUTION INTRAVENOUS; SUBCUTANEOUS at 17:00

## 2018-05-11 RX ADMIN — METOPROLOL TARTRATE SCH MG: 25 TABLET, FILM COATED ORAL at 21:36

## 2018-05-11 RX ADMIN — CLOPIDOGREL BISULFATE SCH MG: 75 TABLET, FILM COATED ORAL at 09:11

## 2018-05-11 RX ADMIN — HEPARIN SODIUM PRN UNITS: 1000 INJECTION, SOLUTION INTRAVENOUS; SUBCUTANEOUS at 09:13

## 2018-05-11 RX ADMIN — NITROGLYCERIN SCH INCH: 20 OINTMENT TOPICAL at 23:25

## 2018-05-11 RX ADMIN — NITROGLYCERIN SCH INCH: 20 OINTMENT TOPICAL at 05:29

## 2018-05-11 RX ADMIN — FAMOTIDINE SCH MG: 20 TABLET, FILM COATED ORAL at 21:36

## 2018-05-11 RX ADMIN — NITROGLYCERIN SCH INCH: 20 OINTMENT TOPICAL at 13:45

## 2018-05-11 RX ADMIN — ACYCLOVIR SCH UNITS: 800 TABLET ORAL at 21:42

## 2018-05-11 RX ADMIN — ONDANSETRON PRN MG: 2 INJECTION, SOLUTION INTRAMUSCULAR; INTRAVENOUS at 23:22

## 2018-05-11 RX ADMIN — INSULIN ASPART SCH: 100 INJECTION, SOLUTION INTRAVENOUS; SUBCUTANEOUS at 21:42

## 2018-05-11 RX ADMIN — NITROGLYCERIN SCH INCH: 20 OINTMENT TOPICAL at 00:00

## 2018-05-11 RX ADMIN — SEVELAMER CARBONATE SCH MG: 800 TABLET, FILM COATED ORAL at 18:23

## 2018-05-11 RX ADMIN — CITALOPRAM SCH MG: 20 TABLET, FILM COATED ORAL at 18:23

## 2018-05-11 RX ADMIN — LEVOFLOXACIN SCH MLS/HR: 5 INJECTION, SOLUTION INTRAVENOUS at 09:14

## 2018-05-11 RX ADMIN — SODIUM CHLORIDE SCH MLS/HR: 234 INJECTION INTRAMUSCULAR; INTRAVENOUS; SUBCUTANEOUS at 19:42

## 2018-05-11 NOTE — MA
cc:

Edgardo Mercado MD

****

 

 

DATE:

05/11/2018

 

INDICATIONS:

Non-ST elevation myocardial infarction.

 

PROCEDURES PERFORMED:

1.  Fluoroscopy with interpretation.

2.  Left heart catheterization.

3.  Coronary angiography.

 

METHOD:

The risks, benefits and alternatives discussed with the patient.  The 

patient understood and consented to the procedure.

 

PROCEDURE:

The patient was brought into the catheterization lab, placed on the 

catheterization table.  The right wrist was prepped and draped in a 

sterile fashion.  The right wrist was anesthetized with 2% lidocaine. 

The right radial artery was cannulated and a 6-Swedish 7 cm sheath was 

placed without difficulty.

 

LEFT HEART CATHETERIZATION:

Intraventricular hemodynamics measured at 142/4 mmHg.

 

CORONARY ANGIOGRAPHY:

1.  Left main coronary artery is widely patent.

2.  Left anterior descending coronary artery has a stent present in 

the proximal to mid segment.  There is severe in-stent restenosis of 

90%.  The remainder of the left anterior descending coronary artery 

has mild luminal irregularities.

3.  Left circumflex is smaller caliber size, two obtuse marginal 

branches which have mild luminal irregularities, maybe about 40% at 

the bifurcation.

4.  Right coronary artery is a dominant vessel giving rise to 

posterior descending arteries, posterior descending and posterolateral

arteries.  The right coronary has stents present through the entire 

proximal, mid and distal segments.  There is severe in-stent 

restenosis present 90% throughout.

 

CONCLUSION:

Severe in-stent restenosis of the left anterior descending and right 

coronary artery bare metal stents.

 

PLAN:

The patient had bare metal stents placed the last time because she 

needed a tunneled catheter for hemodialysis.  We had previously 

obtained a CT surgical consultation for consideration of bypass 

surgery, but she was felt not to be a good bypass candidate for 

various reasons. We will obtain a cardiothoracic surgical consultation

to see if she is a surgical candidate now.  If not, we would have to 

consider multiple drug-eluting stents.

 

 

 

 

__________________________________

Edgardo Mercado MD

 

 

TON/DL

D: 05/11/2018, 10:56 AM

T: 05/11/2018, 11:20 AM

Visit #: [id number]

Job #: [id number]

## 2018-05-11 NOTE — RADRPT
EXAM DATE/TIME:  05/11/2018 17:25 

 

[facility] COMPARISON:     

No previous studies available for comparison.

 

 

INDICATIONS :                

PreOp for cardiac surgery. 

              

 

MEDICAL HISTORY :      

Myocardial infarction. Hypercholesterolemia. Hypertension. CAD. Pancreatitis. Arthritis. Diabetes. 

 

SURGICAL HISTORY :     

Cholecystectomy.   Coronary stents. 

 

ENCOUNTER:        

Subsequent

 

ACUITY:        

1 day

 

PAIN SCORE:        

4/10

 

LOCATION:         

Bilateral leg. 

 

GREATER SAPHENOUS VEIN

THIGH:                                                           

 

PROXIMAL:      

Right  5 mm         Left   8 mm

 

MID:           

Right  3 mm         Left   3 mm

 

DISTAL:      

Right  3 mm         Left   3 mm

                              

 

CALF:           

 

PROXIMAL:      

Right  2 mm         Left   2 mm

 

MID:           

Right  Non-visualized         Left   1 mm

 

DISTAL:      

Right  Non-visualized         Left   Non-visualized

 

FINDINGS:     

The venous system of the lower extremities are patent by color Doppler imaging.  Measurements of the 
leg veins (in mm) are listed above.  

 

CONCLUSION:     

Lack of visualization of the distal greater saphenous veins bilaterally. Measurements as above.

 

 

 

 Fei Greenfield Jr., MD on May 11, 2018 at 18:43           

Board Certified Radiologist.

 This report was verified electronically.

## 2018-05-11 NOTE — HHI.PR
Subjective


Remarks


She is seen after dialysis.  Patient says she was feeling well until experience 

chest pain during dialysis.  Denies any nausea or vomiting. 








 Heart rate up to 118.  Systolic blood pressure 101.  I discussed with nursing 

to give 5 mg IV metoprolol 1





Objective





Vital Signs








  Date Time  Temp Pulse Resp B/P (MAP) Pulse Ox O2 Delivery O2 Flow Rate FiO2


 


5/11/18 15:00  107      


 


5/11/18 14:00  122      


 


5/11/18 13:00  104      


 


5/11/18 12:00  98      


 


5/11/18 11:00  100      


 


5/11/18 09:00  96      


 


5/11/18 08:00  96      


 


5/11/18 07:00 98.9 101 16 142/87 (105) 97   


 


5/11/18 07:00  94      


 


5/11/18 06:23  102      


 


5/11/18 05:16  97      


 


5/11/18 04:00  106      


 


5/11/18 03:29 98.2 97 20 114/72 (86) 99   


 


5/11/18 03:00  96      


 


5/11/18 02:00  98      


 


5/11/18 01:54     96   21


 


5/11/18 01:00  102      


 


5/11/18 00:12 98.3 100 18 107/63 (78) 96   


 


5/11/18 00:00  102      


 


5/10/18 23:00  103      


 


5/10/18 22:15   18     


 


5/10/18 22:00  102      


 


5/10/18 21:00  98      


 


5/10/18 20:00  100      


 


5/10/18 19:31 98.4 106 18 126/84 (98) 98   


 


5/10/18 19:00  105      


 


5/10/18 18:00  106      


 


5/10/18 17:00  100      














I/O      


 


 5/10/18 5/10/18 5/10/18 5/11/18 5/11/18 5/11/18





 07:00 15:00 23:00 07:00 15:00 23:00


 


Intake Total 480 ml  28 ml 200 ml  


 


Output Total 0 ml  10 ml 0 ml  


 


Balance 480 ml  18 ml 200 ml  


 


      


 


Intake Oral 480 ml  28 ml 200 ml  


 


Output Urine Total 0 ml  10 ml 0 ml  








Result Diagram:  


5/11/18 0503                                                                   

             5/10/18 0700





Objective Remarks


GENERAL: Patient sitting up in bed.  Appears uncomfortable.  Heart rate 118 on 

monitor.


SKIN: Warm and dry.


HEAD: Normocephalic.


EYES: No scleral icterus. No injection or drainage. 


NECK: Supple, trachea midline. No JVD.


CARDIOVASCULAR: Tachycardic .regular rate and rhythm without murmurs, gallops, 

or rubs. 


RESPIRATORY: Breath sounds equal bilaterally. No accessory muscle use.


GASTROINTESTINAL: Abdomen soft, non-tender, nondistended. 


MUSCULOSKELETAL: No cyanosis, or edema. 


BACK: Nontender without obvious deformity. No CVA tenderness.








A/P


Assessment and Plan


====5/11/18===============


= 5/11.  Tachycardia after dialysis.  Could be secondary to dialyzing 

metoprolol.  Will add metoprolol for heart rate control.  Decrease amlodipine 

due to overcontrolled blood pressure.  Decrease hydralazine blockages is on 

catheterization.  Cardiovascular surgery following.  Appreciate assistance.





==============


//SIRS meeting criteria, also tachycardiac, initiate sepsis protocol. With 

fevers patient did not have cardiac cath.  Discussed with the cardiology. Blood 

cultures, CXR, UA, LA ordered. Started on Levaquin. Monitor closely. 





//CHF with Exacerbation


BNP elevated.  Chest x-ray with edema.  Will start on nitro drip.  Consulted 

nephrology for dialysis.  Chest x-ray reviewed with congestion. Advance diet 

cardiac cath on hold.


= Cardiology following.  Appreciate assistance.





//Accelerated hypertension.  Patient started on nitro drip.  Restart hydralazine

, with as needed hydralazine.  Awaiting official list of home meds.


= Resolved.  Decreased blood pressure meds.





//NSTEMI


Elevated Troponin trending up  EKG sinus with T-wave inversions noted.    

Heparin drip.  Status post aspirin.  Continue daily aspirin.  Continue Plavix. 


= 5/11.  Tachycardia after dialysis.  Could be secondary to dialyzing 

metoprolol.  Will add metoprolol for heart rate control.  Decrease amlodipine 

due to overcontrolled blood pressure.  Decrease hydralazine blockages is on 

catheterization.  Cardiovascular surgery following.  Appreciate assistance.





//Pleuritic chest pain


elevated  d-dimer. CT pulmonary angiogram reviewed and no PE. Patient received 

HD 





//End-stage renal disease on hemodialysis Monday Wednesday Friday.  Consult 

nephrology for dialysis.  Dializef before and after CTA.  Appreciate assistance.





//Tobacco abuse.  Cessation counseling provided.





//Diabetes mellitus type 2.  Diabetic diet.  Insulin sliding scale, accuchecks. 





//Transaminitis


Nausea


Check liver ultrasound.  Lipase only in the 60s.  Consult GI given past history 

of pancreatitis


= Liver ultrasound normal.


Discharge Planning


Follow-up follow-up CT surgery recommendations.











Alex Pizano MD May 11, 2018 16:32

## 2018-05-11 NOTE — PD.CARD.PN
Subjective


Subjective Remarks


No further chest pain or shortness of breath.  No further fevers.  Patient is a 

bit anxious for cardiac catheterization today, all questions answered.


 (King Ervin)





Objective


Medications





Current Medications








 Medications


  (Trade)  Dose


 Ordered  Sig/Luis Armando


 Route  Start Time


 Stop Time Status Last Admin


 


  (Nitrostat Sl)  0.4 mg  Q5M  PRN


 SL  5/7/18 23:15


    5/8/18 21:57


 


 


  (NS Flush)  2 ml  UNSCH  PRN


 IV FLUSH  5/8/18 02:15


    5/8/18 23:02


 


 


  (NS Flush)  2 ml  BID


 IV FLUSH  5/8/18 09:00


    5/10/18 22:04


 


 


  (Narcan Inj)  0.4 mg  UNSCH  PRN


 IV PUSH  5/8/18 02:15


     


 


 


  (Milk Of


 Magnesia Liq)  30 ml  Q12H  PRN


 PO  5/8/18 02:15


     


 


 


  (Senokot)  17.2 mg  Q12H  PRN


 PO  5/8/18 02:15


     


 


 


  (Dulcolax Supp)  10 mg  DAILY  PRN


 RECTAL  5/8/18 02:15


     


 


 


  (Lactulose Liq)  30 ml  DAILY  PRN


 PO  5/8/18 02:15


    5/9/18 20:53


 


 


  (Norvasc)  10 mg  DAILY


 PO  5/8/18 09:00


    5/10/18 09:03


 


 


  (Aspirin Chew)  81 mg  DAILY


 CHEW  5/8/18 09:00


    5/10/18 09:04


 


 


  (Plavix)  75 mg  DAILY


 PO  5/8/18 09:00


    5/10/18 09:03


 


 


  (Apresoline)  50 mg  Q8HR


 PO  5/8/18 06:00


    5/10/18 13:31


 


 


  (Apresoline Inj)  10 mg  Q30M  PRN


 IV PUSH  5/8/18 02:15


     


 


 


  (NovoLOG


 SUPPLEMENTAL


 SCALE)  1  ACHS SLIDING  SCALE


 SQ  5/8/18 08:00


    5/10/18 22:01


 


 


  (D50w (Vial) Inj)  50 ml  UNSCH  PRN


 IV PUSH  5/8/18 04:15


     


 


 


  (Glucagon Inj)  1 mg  UNSCH  PRN


 OTHER  5/8/18 04:15


     


 


 


 Sodium Chloride  1,000 ml @ 


 0 mls/hr  Q0M PRN


 OTHER  5/8/18 08:52


     


 


 


  (Heparin Inj)  8,000 units  UNSCH  PRN


 IV FLUSH  5/8/18 09:00


     


 


 


 Sodium Chloride  1,000 ml @ 


 200 mls/hr  Q5H PRN


 IV  5/8/18 08:52


     


 


 


 Sodium Chloride  1,000 ml @ 


 0 mls/hr  Q0M PRN


 OTHER  5/8/18 08:52


     


 


 


  (Mannitol Inj)  12.5 gm  UNSCH  PRN


 IV  5/8/18 09:00


     


 


 


 Albumin Human  100 ml @ 


 60 mls/hr  UNSCH  PRN


 IV  5/8/18 09:00


     


 


 


  (NS Flush)  5 ml  UNSCH  PRN


 IV FLUSH  5/8/18 09:00


     


 


 


  (Heparin Inj)    UNSCH  PRN


 .XX  5/8/18 09:00


     


 


 


  (Gentamicin Inj)  20 mg  UNSCH  PRN


 OTHER  5/8/18 09:00


     


 


 


  (Zofran Inj)  4 mg  UNSCH  PRN


 IV PUSH  5/8/18 09:00


    5/10/18 09:05


 


 


  (Tylenol)  650 mg  UNSCH  PRN


 PO  5/8/18 09:00


    5/8/18 21:56


 


 


  (Benadryl)  25 mg  UNSCH  PRN


 PO  5/8/18 09:00


    5/10/18 22:03


 


 


  (Nitrostat Sl)  0.4 mg  UNSCH  PRN


 SL  5/8/18 09:00


     


 


 


  (Catapres)  0.1 mg  UNSCH  PRN


 PO  5/8/18 09:00


     


 


 


  (Gelfoam 12 Mm/7


 Mm Top)  1 foam  UNSCH  PRN


 TOP  5/8/18 09:00


     


 


 


  (Heparin Inj)  5,000 units  UNSCH  PRN


 IV PUSH  5/8/18 18:45


     


 


 


 Heparin Sodium/


 Dextrose  250 ml @ 7


 mls/hr  TITRATE  PRN


 IV  5/8/18 12:45


    5/11/18 05:34


 


 


  (Ativan)  0.5 mg  Q12H  PRN


 PO  5/8/18 13:00


    5/10/18 22:03


 


 


  (Nitroglycerin


 2% Oint)  1 inch  Q6HR


 TOPICAL  5/9/18 00:00


    5/10/18 17:03


 


 


  (Heparin Inj)  2,500 units  UNSCH  PRN


 IV PUSH  5/8/18 23:00


    5/10/18 16:43


 


 


  (Morphine Inj)  2 mg  Q4H  PRN


 IV PUSH  5/9/18 08:45


    5/10/18 22:04


 


 


 Levofloxacin/


 Dextrose  50 ml @ 50


 mls/hr  Q48H


 IV  5/11/18 09:00


     


 


 


  (Ferrous Sulfate)  325 mg  DAILY


 PO  5/9/18 13:00


    5/10/18 09:03


 


 


  (Megace Liq)  800 mg  DAILY


 PO  5/9/18 15:00


    5/10/18 09:23


 


 


 Dextrose  500 ml @ 


 15 mls/hr  Q24H


 IV  5/11/18 00:00


    5/11/18 02:10


 


 


  (Levemir Inj)  10 units  HS


 SQ  5/10/18 21:00


    5/10/18 22:01


 








Vital Signs / I&O





Vital Signs








  Date Time  Temp Pulse Resp B/P (MAP) Pulse Ox O2 Delivery O2 Flow Rate FiO2


 


5/11/18 06:23  102      


 


5/11/18 05:16  97      


 


5/11/18 04:00  106      


 


5/11/18 03:29 98.2 97 20 114/72 (86) 99   


 


5/11/18 03:00  96      


 


5/11/18 02:00  98      


 


5/11/18 01:54     96   21


 


5/11/18 01:00  102      


 


5/11/18 00:12 98.3 100 18 107/63 (78) 96   


 


5/11/18 00:00  102      


 


5/10/18 23:00  103      


 


5/10/18 22:15   18     


 


5/10/18 22:00  102      


 


5/10/18 21:00  98      


 


5/10/18 20:00  100      


 


5/10/18 19:31 98.4 106 18 126/84 (98) 98   


 


5/10/18 19:00  105      


 


5/10/18 18:00  106      


 


5/10/18 17:00  100      


 


5/10/18 16:00  108      


 


5/10/18 15:00  102      


 


5/10/18 15:00 98.7 97 17 101/60 (74) 99   


 


5/10/18 14:00  93      


 


5/10/18 13:00  102      


 


5/10/18 12:00  92      


 


5/10/18 11:00 98.2 97 18 134/62 (86) 99   


 


5/10/18 11:00  97      


 


5/10/18 10:00  106      


 


5/10/18 09:00  100      


 


5/10/18 08:00  94      














I/O      


 


 5/10/18 5/10/18 5/10/18 5/11/18 5/11/18 5/11/18





 07:00 15:00 23:00 07:00 15:00 23:00


 


Intake Total 480 ml  28 ml 200 ml  


 


Output Total 0 ml  10 ml 0 ml  


 


Balance 480 ml  18 ml 200 ml  


 


      


 


Intake Oral 480 ml  28 ml 200 ml  


 


Output Urine Total 0 ml  10 ml 0 ml  








Physical Exam


GENERAL: Well-developed well-nourished.  In no acute distress.


NECK: No carotid bruits.  No JVD.  


CARDIOVASCULAR: Regular rate and rhythm.  No murmur appreciated.


RESPIRATORY: No accessory muscle use. Clear to auscultation. Breath sounds 

equal bilaterally. 


MUSCULOSKELETAL: No clubbing or cyanosis. No edema. 


NEUROLOGICAL: Awake and alert. Normal speech.


SKIN: Dialysis catheter in place right chest wall


Laboratory





Laboratory Tests








Test


  5/10/18


07:44 5/10/18


10:13 5/10/18


16:06 5/10/18


22:22


 


Activated Partial


Thromboplast Time 33.0 SEC 


  


  30.9 SEC 


  47.6 SEC 


 


 


Urine Color  YELLOW   


 


Urine Turbidity  HAZY   


 


Urine pH  7.0   


 


Urine Specific Gravity  1.042   


 


Urine Protein


  


  GREATER THAN


600 mg/dL 


  


 


 


Urine Glucose (UA)  1000 mg/dL   


 


Urine Ketones  TRACE mg/dL   


 


Urine Occult Blood  SMALL   


 


Urine Nitrite  NEG   


 


Urine Bilirubin  NEG   


 


Urine Urobilinogen


  


  LESS THAN 2.0


MG/DL 


  


 


 


Urine Leukocyte Esterase  NEG   


 


Urine RBC  3 /hpf   


 


Urine WBC  10 /hpf   


 


Urine Squamous Epithelial


Cells 


  7 /hpf 


  


  


 


 


Urine Renal Epithelial Cells  <1 /hpf   


 


Urine Bacteria  RARE /hpf   


 


Urine Hyaline Casts  5 /lpf   


 


Urine Mucus  FEW /lpf   


 


Microscopic Urinalysis Comment


  


  CULTURE


INDICATED 


  


 


 


Test


  5/11/18


05:03 


  


  


 


 


White Blood Count 7.7 TH/MM3    


 


Red Blood Count 4.37 MIL/MM3    


 


Hemoglobin 13.9 GM/DL    


 


Hematocrit 41.6 %    


 


Mean Corpuscular Volume 95.2 FL    


 


Mean Corpuscular Hemoglobin 31.9 PG    


 


Mean Corpuscular Hemoglobin


Concent 33.5 % 


  


  


  


 


 


Red Cell Distribution Width 15.1 %    


 


Platelet Count 320 TH/MM3    


 


Mean Platelet Volume 8.4 FL    


 


Activated Partial


Thromboplast Time 36.1 SEC 


  


  


  


 








Imaging





Last Impressions








Chest X-Ray 5/9/18 0825 Signed





Impressions: 





 Service Date/Time:  Wednesday, May 9, 2018 08:49 - CONCLUSION: No acute 

disease. 





       David Swift MD  FACR


 


Liver Ultrasound 5/8/18 0000 Signed





Impressions: 





 Service Date/Time:  Tuesday, May 8, 2018 05:16 - CONCLUSION:  Normal 

examination 





 except for very echogenic right kidney.       Edgardo Mullins MD 


 


CT Angiography 5/8/18 0000 Signed





Impressions: 





 Service Date/Time:  Tuesday, May 8, 2018 11:22 - CONCLUSION:  1. No CT 

evidence 





 for pulmonary artery embolism as questioned. 2. Patchy upper lobe ground glass 





 opacities which appear more confluent in the lower lobes near the bases. 

Overall 





 findings are most consistent with pulmonary edema. 3. Trace left and small 

right 





 simple pleural effusions. 4. Nonspecific right hilar adenopathy which maybe 





 reactive in etiology. 5. Coronary artery calcifications.      Tanner Dasilva MD 








 (King Ervin)





Assessment and Plan


Assessment and Plan


53-year-old female with a past medical history of CAD status post PCI 2/18, 

diastolic CHF, HTN, HLD, ESRD on HD, DM who presented for nausea, shortness 

breath, and chest pain





Chest pain and elevated troponin: NSTEMI vs secondary to ESRD.  CT negative for 

PE.  On heparin GTT. No fevers greater than 48 hours now, for cardiac 

catheterization today.





Cardiomyopathy: Echocardiogram with severe LVH, EF 40-45%.  Consider ACE 

inhibitor as blood pressure allows





CAD: Continue aspirin, Plavix.





Fever: On antibiotics per primary team. May eventually need REYNA depending on 

blood cultures.





Transaminitis and and nausea: GI following.





ESRD on HD: Nephrology on board.


Discussed Condition With


Patient, RN, Dr. Mercado


 (King Ervin)


Assessment and Plan


------------------


[facility]


severe instent restenosis.


CABG consult


 (Edgardo Mercado MD)











King Ervin May 11, 2018 07:45


Edgardo Mercado MD May 11, 2018 14:02

## 2018-05-11 NOTE — RADRPT
EXAM DATE/TIME:  05/11/2018 16:56 

 

[facility] COMPARISON:     

No previous studies available for comparison.

        

 

 

INDICATIONS :     

PreOp for cardiac surgery. 

                     

 

MEDICAL HISTORY :     

Myocardial infarction. Hypercholesterolemia. Hypertension. CAD. Pancreatitis. Arthritis. Diabetes. 

 

SURGICAL HISTORY :     

Cholecystectomy.     Coronary stents. 

 

ENCOUNTER:     

Subsequent

 

ACUITY:     

1 day

 

PAIN SCORE:     

4/10

 

LOCATION:     

Bilateral neck 

                     

PEAK SYSTOLIC VELOCITIES (cm/sec):

 

ICA/CCA RATIO:                    

Right: 1.0     Left: 1.2

 

ICA:                          

Right: 97     Left: 203

 

CCA:                          

Right: 98     Left: 165

 

ECA:                           

Right: 160     Left: 178

 

VERTEBRAL:           

Right: 71 antegrade     Left: 69 antegrade

             

Elevated flow velocities and ICA/CCA ratios have been found to correlate with increased degrees of

vessel stenosis, calculated as percentage of diameter relative to a normal segment of distal ICA/CCA

 

FINDINGS:     

 

RIGHT CAROTID:     

Calcified atherosclerotic plaque involving the cephalad portion of the carotid bulb and proximal ICA.
 Less than 50% luminal narrowing by grayscale analysis. The waveforms are within normal limits.

 

LEFT CAROTID:     

Calcified atherosclerotic plaque involving the cephalad portion of the carotid bulb and proximal ICA.
 Less than 50% luminal narrowing by grayscale analysis. The waveforms are within normal limits.

 

VERTEBRAL ARTERIES:  

Antegrade flow is seen in both vertebral arteries.

 

MISCELLANEOUS:     

None.

 

CONCLUSION:     

1. Calcified atherosclerotic plaque with less than 50% stenoses of the carotid arteries bilaterally.

2. Antegrade flow involving both vertebral arteries.

 

 

 

 Fei Greenfield Jr., MD on May 11, 2018 at 19:21           

Board Certified Radiologist.

 This report was verified electronically.

## 2018-05-11 NOTE — HHI.GIFU
Subjective


Remarks


Patient is sitting up in the bed awake answer simple questions appropriately


General complaint of nausea but no vomiting


Uses Zantac at hs in the home setting. 


Afebrile


 (Janis Pantoja)





Objective


Vitals I&O





Vital Signs








  Date Time  Temp Pulse Resp B/P (MAP) Pulse Ox O2 Delivery O2 Flow Rate FiO2


 


5/11/18 07:00 98.9 101 16 142/87 (105) 97   


 


5/11/18 06:23  102      


 


5/11/18 05:16  97      


 


5/11/18 04:00  106      


 


5/11/18 03:29 98.2 97 20 114/72 (86) 99   


 


5/11/18 03:00  96      


 


5/11/18 02:00  98      


 


5/11/18 01:54     96   21


 


5/11/18 01:00  102      


 


5/11/18 00:12 98.3 100 18 107/63 (78) 96   


 


5/11/18 00:00  102      


 


5/10/18 23:00  103      


 


5/10/18 22:15   18     


 


5/10/18 22:00  102      


 


5/10/18 21:00  98      


 


5/10/18 20:00  100      


 


5/10/18 19:31 98.4 106 18 126/84 (98) 98   


 


5/10/18 19:00  105      


 


5/10/18 18:00  106      


 


5/10/18 17:00  100      


 


5/10/18 16:00  108      


 


5/10/18 15:00  102      


 


5/10/18 15:00 98.7 97 17 101/60 (74) 99   


 


5/10/18 14:00  93      














I/O      


 


 5/10/18 5/10/18 5/10/18 5/11/18 5/11/18 5/11/18





 07:00 15:00 23:00 07:00 15:00 23:00


 


Intake Total 480 ml  28 ml 200 ml  


 


Output Total 0 ml  10 ml 0 ml  


 


Balance 480 ml  18 ml 200 ml  


 


      


 


Intake Oral 480 ml  28 ml 200 ml  


 


Output Urine Total 0 ml  10 ml 0 ml  








Laboratory





Laboratory Tests








Test


  5/10/18


16:06 5/10/18


22:22 5/11/18


05:03


 


Activated Partial


Thromboplast Time 30.9 


  47.6 


  36.1 


 


 


White Blood Count   7.7 


 


Red Blood Count   4.37 


 


Hemoglobin   13.9 


 


Hematocrit   41.6 


 


Mean Corpuscular Volume   95.2 


 


Mean Corpuscular Hemoglobin   31.9 


 


Mean Corpuscular Hemoglobin


Concent 


  


  33.5 


 


 


Red Cell Distribution Width   15.1 


 


Platelet Count   320 


 


Mean Platelet Volume   8.4 














 Date/Time


Source Procedure


Growth Status


 


 


 5/9/18 10:10


Blood Peripheral Aerobic Blood Culture - Preliminary


NO GROWTH IN 2 DAYS Resulted


 


 5/9/18 10:10


Blood Peripheral Anaerobic Blood Culture - Preliminary


NO GROWTH IN 2 DAYS Resulted


 


 5/10/18 10:13


Urine Clean Catch Urine Culture - Preliminary


NO GROWTH IN 24 HOURS. Resulted








Imaging





Last Impressions








Chest X-Ray 5/9/18 0825 Signed





Impressions: 





 Service Date/Time:  Wednesday, May 9, 2018 08:49 - CONCLUSION: No acute 

disease. 





       David Swift MD  FACR


 


Liver Ultrasound 5/8/18 0000 Signed





Impressions: 





 Service Date/Time:  Tuesday, May 8, 2018 05:16 - CONCLUSION:  Normal 

examination 





 except for very echogenic right kidney.       Edgardo Mullins MD 


 


CT Angiography 5/8/18 0000 Signed





Impressions: 





 Service Date/Time:  Tuesday, May 8, 2018 11:22 - CONCLUSION:  1. No CT 

evidence 





 for pulmonary artery embolism as questioned. 2. Patchy upper lobe ground glass 





 opacities which appear more confluent in the lower lobes near the bases. 

Overall 





 findings are most consistent with pulmonary edema. 3. Trace left and small 

right 





 simple pleural effusions. 4. Nonspecific right hilar adenopathy which maybe 





 reactive in etiology. 5. Coronary artery calcifications.      Tanner Dasilva MD 








Physical Exam


HEENT: PERRL; normocephalic; atraumatic; mucous membranes pink


CHEST: No obvious rhonchi or wheezing, lungs are essentially clear


CARDIAC: Regular rate tachycardia mild pulse 102


ABDOMEN: Flat, soft, nondistended, nontender, BS +


SKIN: Turgor thin


CNS:  No focal deficits; alert and oriented times three.


 (Janis Pantoja)





Assessment and Plan


Plan


ASSESSMENT


- transaminitis - elevated LFTs.  indirect > direct rishabh, do not think this is 

obstructive pattern.  pt denies any GI sx or pain.


    congestion vs dili.  hep panel negative. US normal.   will get liver w/u r/

o other causes.   she is phillip +.


- nausea - pt denies n/v, abd pain at this time.  had EGD 1/2018 revealing 

gastritis.


- CHF exacerbation, CAD per cardiology.  s/p PCI and stents earlier this year, 

on asa and plavix





5/9/18  going for poss cardiac procedures tomorrow.  liver w/u in progress.  + 

nausea, tender on exam.  


PE has been ruled out.


5/10/18  LFTs trending down, likely d/t congestion.  ? chf.  tentative plan for 

cardiac cath tomorrow.  


  nausea improving but still present.  PHILLIP +.  rest of liver w/u pending.  


5/11/2018, liver ultrasound shows normal exam.  Still notes some nausea but no 

vomiting.  Patient is able to eat heart healthy diet but eating slow.  Patient 

states she uses Zantac at night for her nausea or heartburn.


Patient is pending on her cardiac workup.  Cardiology in the room.  Hemoglobin 

13.9, noted previous bilirubin 1.6 with positive PHILLIP, ceruloplasmin level 

pending.  Patient denies any abdominal pain no diarrhea no constipation.





PLAN


-Diet heart healthy, encouraged to eat small bites and slowly


-Zantac 150 mg at at bedtime added


-Monitor labs with special attention to hemoglobin or any GI bleed


-Consider EGD when cleared with cardiac, and may need to follow on outpatient 

basis


- liver  w/u in progress


- supportive care





pt seen by myself and Dr Granados and this note is on his behalf


 (Janis Pantoja)


Physician Comments


Seen and examined with FRANCHESCA, cardiac reyes in progress. Gi will sign off, 

reconsult as needed or fu as outpatient please. Thankyou


 (Isis Granados MD)











Janis Pantoja May 11, 2018 13:46


Isis Granados MD May 11, 2018 15:54

## 2018-05-11 NOTE — HHI.NPPN
Subjective


Complaints:  Chest Pain, Shortness of Breath


General Problems:  Anemia


Renal Failure:  Chronic, End Stage Renal Disease


Interval History


Seen post cath. She is groggy. LAD with 90% occlusion of stent. [facility] consulted. 


 (Migdalia Camejo)





Review of Systems


General


Constitutional:  Fatigue


 (Migdalia Camejo)





Respiratory


Lungs:  SOB


 (Migdalia Camejo)





Cardiovascular


Cardiac:  Chest Pain


 (Migdalia Camejo)





Objective Data


Data





Vital Signs








  Date Time  Temp Pulse Resp B/P (MAP) Pulse Ox O2 Delivery O2 Flow Rate FiO2


 


5/11/18 07:00 98.9 101 16 142/87 (105) 97   


 


5/11/18 06:23  102      


 


5/11/18 05:16  97      


 


5/11/18 04:00  106      


 


5/11/18 03:29 98.2 97 20 114/72 (86) 99   


 


5/11/18 03:00  96      


 


5/11/18 02:00  98      


 


5/11/18 01:54     96   21


 


5/11/18 01:00  102      


 


5/11/18 00:12 98.3 100 18 107/63 (78) 96   


 


5/11/18 00:00  102      


 


5/10/18 23:00  103      


 


5/10/18 22:15   18     


 


5/10/18 22:00  102      


 


5/10/18 21:00  98      


 


5/10/18 20:00  100      


 


5/10/18 19:31 98.4 106 18 126/84 (98) 98   


 


5/10/18 19:00  105      


 


5/10/18 18:00  106      


 


5/10/18 17:00  100      


 


5/10/18 16:00  108      


 


5/10/18 15:00  102      


 


5/10/18 15:00 98.7 97 17 101/60 (74) 99   


 


5/10/18 14:00  93      








 (Migdalia Camejo)


-:  


5/11/18 0503                                                                   

             5/10/18 0700





Imaging





Last 72 hours Impressions








Chest X-Ray 5/9/18 0825 Signed





Impressions: 





 Service Date/Time:  Wednesday, May 9, 2018 08:49 - CONCLUSION: No acute 

disease. 





       David Swift MD  FACR








Tubes & Lines:  Perma-Cath


 (Migdalia Camejo)





Physical Exam


General


Appearance:  Well Developed, Well Nourished, No Acute Distress, Comfortable


 (Migdalia Camejo)





Eyes


Eye Exam:  Pupils Equal, Pupils Reactive


 (Migdalia Camejo)





Pulmonary


Resp Exam:  Clear Bilaterally, Breath Sounds Equal


 (Migdalia Camejo)





Cardiology


CV Exam:  Regular, Normal Sinus Rhythm


 (Migdalia Camejo)





Gastrointestinal/Abdomen


GI Exam:  Soft, Non-Tender, Bowel Sounds Present


 (Migdalia Camejo)





Musculoskeletal


MS Exam:  Joints Intact, Normal Gait, Good Strength


 (Migdalia Camejo)





Integumentary


Skin Exam:  Clear, Warm, Dry, Intact


 (Migdalia Camejo)





Extremeties


Extremities Exam:  No Edema, Pedal Pulses Palpable


 (Migdalia Camejo)





Neurologic


Neuro Exam:  Alert, Awake, Oriented, Speech Clear, Moving All Extremities


 (Migdalia Camejo)





Assessment/Plan


Discussed Condition With:  Patient


Assessment Summary:  Anemia of CKD, CHF, Hypertension, Diabetes Mellitus, End 

Stage Renal Disease


Problem List:  


(1) ESRD (end stage renal disease) on dialysis


ICD Codes:  N18.6 - End stage renal disease; Z99.2 - Dependence on renal 

dialysis


Plan:  Continue HD MWF. She is to have treatment today.


Fluid status has improved, still makes urine. 


AVF not mature, using Permcath. She has second step surgery scheduled 5/15 with 

Dr. Rees. 


Start Renvela with meals. 


On protein supplements to meals and Megace. 


She appears depressed, will start antidepressant. 





(2) Chest pain, rule out acute myocardial infarction


ICD Codes:  R07.9 - Chest pain, unspecified


Status:  Acute


Plan:  Cardiology following


PE study negative


s/p cath, 90% occlusion of Bare Metal Stent previously placed. [facility] will  

consulted vs placing drug eluding stents. 


Echo: Ef 40-45%, LVH is present. 





(3) DM (diabetes mellitus)


ICD Codes:  E11.9 - Type 2 diabetes mellitus without complications


Plan:  maintain glucose 140-180 mg/dL while hospitalized 


Start D10 @ 15 when NPO tonight. 





(4) HTN (hypertension)


ICD Codes:  I10 - Hypertension


Status:  Chronic


Plan:  Monitor blood pressure, excellent control recently 


Resume hypertensive agents


Fluid removal with dialysis





(5) Anemia


ICD Codes:  D64.9 - Anemia, unspecified


Status:  Chronic


Plan:  Epogen not required


Monitor hemoglobin 


On oral iron


 (Migdalia Camejo)


Plan


patient was seen and examined. Seen during dialysis. Agree with above 

assessment and plan. 


 (Kelton Whiteside MD)











Migdalia Camejo May 11, 2018 13:51


Kelton Whiteside MD May 11, 2018 14:56

## 2018-05-11 NOTE — HHI.PR
Subjective


Remarks


alert


no sob





Objective





Vital Signs








  Date Time  Temp Pulse Resp B/P (MAP) Pulse Ox O2 Delivery O2 Flow Rate FiO2


 


5/11/18 06:23  102      


 


5/11/18 05:16  97      


 


5/11/18 04:00  106      


 


5/11/18 03:29 98.2 97 20 114/72 (86) 99   


 


5/11/18 03:00  96      


 


5/11/18 02:00  98      


 


5/11/18 01:54     96   21


 


5/11/18 01:00  102      


 


5/11/18 00:12 98.3 100 18 107/63 (78) 96   


 


5/11/18 00:00  102      


 


5/10/18 23:00  103      


 


5/10/18 22:15   18     


 


5/10/18 22:00  102      


 


5/10/18 21:00  98      


 


5/10/18 20:00  100      


 


5/10/18 19:31 98.4 106 18 126/84 (98) 98   


 


5/10/18 19:00  105      


 


5/10/18 18:00  106      


 


5/10/18 17:00  100      


 


5/10/18 16:00  108      


 


5/10/18 15:00  102      


 


5/10/18 15:00 98.7 97 17 101/60 (74) 99   


 


5/10/18 14:00  93      


 


5/10/18 13:00  102      


 


5/10/18 12:00  92      


 


5/10/18 11:00 98.2 97 18 134/62 (86) 99   


 


5/10/18 11:00  97      


 


5/10/18 10:00  106      














I/O      


 


 5/10/18 5/10/18 5/10/18 5/11/18 5/11/18 5/11/18





 07:00 15:00 23:00 07:00 15:00 23:00


 


Intake Total 480 ml  28 ml 200 ml  


 


Output Total 0 ml  10 ml 0 ml  


 


Balance 480 ml  18 ml 200 ml  


 


      


 


Intake Oral 480 ml  28 ml 200 ml  


 


Output Urine Total 0 ml  10 ml 0 ml  








Result Diagram:  


5/11/18 0503                                                                   

             5/10/18 0700





Objective Remarks


GENERAL: 


SKIN: Warm and dry.


HEAD: Atraumatic. Normocephalic. 


EYES: Pupils equal and round. No scleral icterus. No injection or drainage. 


ENT: No nasal bleeding or discharge.  Mucous membranes pink and moist.


NECK: Trachea midline. No JVD. 


CARDIOVASCULAR: Regular rate and rhythm.  


RESPIRATORY: No accessory muscle use. Clear to auscultation. Breath sounds 

equal bilaterally. 


GASTROINTESTINAL: Abdomen soft, non-tender, nondistended. Hepatic and splenic 

margins not palpable. 


MUSCULOSKELETAL: Extremities without clubbing, cyanosis, or edema. No obvious 

deformities. 


NEUROLOGICAL: Awake and alert. No obvious cranial nerve deficits.  Motor 

grossly within normal limits. Five out of 5 muscle strength in the arms and 

legs.  Normal speech.


PSYCHIATRIC: Appropriate mood and affect; insight and judgment normal.





Assessment and Plan


Assessment and Plan


ASS





CHEST PAIN RESOLVED


CHF


ESRD ON MHD





PLAN





O2 IF NEEDED


home soon











Kelle Nina MD May 11, 2018 09:04

## 2018-05-11 NOTE — MB
cc:

Terwilliger,Jacqueline R ARNP Terwilliger,Jacqueline R ARNP

****

 

 

DATE:

2018

 

YOB: 1965

 

HISTORY OF PRESENT ILLNESS:

A 53-year-old female with history of coronary artery disease with 

prior stenting, bare metal stent to the mid-LAD, distal RCA, proximal 

RCA back in February by Dr. Edgardo Mercado.  She has continued to take 

the Plavix.  Despite this, she developed chest pain during dialysis on

the 2018, also had some associated nausea.  It was improved with

a nitro drip.  She underwent re-catheterization today by Dr. Mercado 

which showed severe in-stent restenosis of the LAD and RCA bare metal 

stents.  We were consulted to evaluate for coronary artery bypass 

grafting.  On her last admission in February, she had an FEV1 of 0.8. 

At that time, she was just starting on dialysis; however, it was 

obstructive disease.  She is currently now on dialysis with a dialysis

catheter on Monday, Wednesday and Friday.  She has had an A-V fistula 

placed in her left forearm for which she is supposed to be scheduled 

for revision this next week with Dr. Rees.

 

PAST MEDICAL HISTORY:

Includes hypertension, hyperlipidemia, history of CHF, diabetes 

mellitus on insulin, coronary artery disease, pancreatitis, history of

uterine fibroids, anemia, depression.

 

PAST SURGICAL HISTORY:

Cholecystectomy, tubal ligation, PTCA stent 8 years ago.  Also, she 

has had the bare metal stents x3 in 2018, cataract surgery, 

colonoscopy.  She has cath a Vas-Cath in the right upper chest and an 

A-V fistula that was just placed recently by Dr. Rees.

 

ALLERGIES:

INCLUDES SHELLFISH DERIVED.

 

CURRENT MEDICATIONS:

1.  Pepcid.

2.  Renvela.

3.  Celexa.

4.  Levemir.

5.  Ferrous sulfate.

6.  Aspirin.

7.  Plavix, last dose was this morning at 9 o'clock .

 

FAMILY HISTORY:

Mother  of complications of diabetes.  Father has 

hypertension.

 

SOCIAL HISTORY:

Previous history of alcohol abuse, quit 3 years ago.  Smokes half a 

pack per day for the last 20 years; says she quit about a week ago.  

Urine drug was positive for marijuana.

 

REVIEW OF SYSTEMS:

As above in the HPI, otherwise 12-systems unremarkable.

 

PHYSICAL EXAMINATION:

VITAL SIGNS:  Blood pressure 140/80, heart rate of 98, afebrile.

GENERAL:  Awake, alert, in no acute distress, somewhat of a flat 

affect.

HEENT:  Head is normocephalic, atraumatic.  Pupils equal and reactive.

 Oral mucosa pink, moist.

NECK:  Supple.  No JVD.

HEART:  Sounds S1, S2, slightly tachycardic.  No rubs, murmurs, 

gallops.

LUNGS:  Diminished in the bases.  No wheezes, rales or rhonchi.

ABDOMEN:  Soft, nontender, no masses or organomegaly.

EXTREMITIES:  She has an A-V fistula in the left upper forearm with 

good bruit.  Extremities reveal no edema.  She has got faint but 

palpable distal pulses.

 

LABORATORY DATA:

Most recent lab work shows hemoglobin 13, hematocrit of 41.  White 

cell count of 7.7, platelet count of 320.

Sodium 137, potassium 4.0, BUN of 26, creatinine 4.38, AST 42, ALT 61.

 

Troponin was 0.79.

 

IMAGING STUDIES:

She had a CTA which showed no pulmonary emboli, some evidence of 

pulmonary edema.

 

Liver ultrasound showed some echogenic right kidney.  Blood cultures 

are negative.  Urine culture is negative.

 

ASSESSMENT AND PLAN:

This is a 53-year-old female with history of coronary artery disease, 

admitted with a non-ST elevation myocardial infarction.  

Catheterization showed severe re-in-stent stenosis of the bare metal 

stents to the mid-left anterior descending, the distal right coronary 

artery and the proximal right coronary artery.  We have been consulted

again to evaluate for coronary artery bypass grafting.  The cardiac 

films will be evaluated by Dr. Ban Huitron, evaluation for candidacy 

for bypass as we get further testing completed and repeat pulmonary 

function testing.  At this time, her risk for mortality is 7.1.  

Further plan per Dr. Huitron.

 

 

__________________________________ __________________________________

Jacqueline R. Terwilliger, ARNP Cary H. Meyers, MD JRT/DANETTE

D: 2018, 03:48 PM

T: 2018, 04:52 PM

Visit #: [id number]

Job #: [id number]

## 2018-05-11 NOTE — PD.CAR.PN
CVT Progress Note


Subjective/Hospital Course:


pt seen and evaluated / full note dictated 


sts data discussed with pt 





 RISK SCORES


 About the STS Risk Calculator


 Procedure: CAB Only 


Risk of Mortality: 7.196% 


Morbidity or Mortality: 43.482% 


Long Length of Stay: 23.616% 


Short Length of Stay: 10.668% 


Permanent Stroke: 1.59% 


Prolonged Ventilation: 43.728% 


DSW Infection: 1.03% 


Renal Failure: N/A 


Reoperation: 11.19%


Objective:





Vital Signs








  Date Time  Temp Pulse Resp B/P (MAP) Pulse Ox O2 Delivery O2 Flow Rate FiO2


 


5/11/18 07:00 98.9 101 16 142/87 (105) 97   


 


5/11/18 06:23  102      


 


5/11/18 05:16  97      


 


5/11/18 04:00  106      


 


5/11/18 03:29 98.2 97 20 114/72 (86) 99   


 


5/11/18 03:00  96      


 


5/11/18 02:00  98      


 


5/11/18 01:54     96   21


 


5/11/18 01:00  102      


 


5/11/18 00:12 98.3 100 18 107/63 (78) 96   


 


5/11/18 00:00  102      


 


5/10/18 23:00  103      


 


5/10/18 22:15   18     


 


5/10/18 22:00  102      


 


5/10/18 21:00  98      


 


5/10/18 20:00  100      


 


5/10/18 19:31 98.4 106 18 126/84 (98) 98   


 


5/10/18 19:00  105      


 


5/10/18 18:00  106      


 


5/10/18 17:00  100      


 


5/10/18 16:00  108      








Labs:





Laboratory Tests








Test


  5/11/18


05:03


 


White Blood Count


  7.7 TH/MM3


(4.0-11.0)


 


Red Blood Count


  4.37 MIL/MM3


(4.00-5.30)


 


Hemoglobin


  13.9 GM/DL


(11.6-15.3)


 


Hematocrit


  41.6 %


(35.0-46.0)


 


Mean Corpuscular Volume


  95.2 FL


(80.0-100.0)


 


Mean Corpuscular Hemoglobin


  31.9 PG


(27.0-34.0)


 


Mean Corpuscular Hemoglobin


Concent 33.5 %


(32.0-36.0)


 


Red Cell Distribution Width


  15.1 %


(11.6-17.2)


 


Platelet Count


  320 TH/MM3


(150-450)


 


Mean Platelet Volume


  8.4 FL


(7.0-11.0)


 


Activated Partial


Thromboplast Time 36.1 SEC


(24.3-30.1)








Result Diagram:  


5/11/18 0503                                                                   

             5/10/18 0700














Terwilliger,Jacqueline R. ARNP May 11, 2018 15:38

## 2018-05-11 NOTE — CATHPROC
[employer] HIS Report

Study Information

Study Number    Admission          Scheduled Start               Study Start

 

[id number]    May 8 2018 1:54AM      05/11/2018                  May 11 2018 10:02AM

 

Universal Service

 

Cardiac Catheterization

 

Admit Source               Facility Department

 

Emergency department           [facility] - Cath Lab

 

Physician and Clinical Staff

Initial Edgardo Azar         Circulator       Robert Pickett,RN

 

                         Circulator       Robert Strong,RN

 

                         Recorder        Liliana Myers,RCIS TECH2

 

                         Scrub          Racquel Car,RT(R) (BS)

 

Procedures Performed

Procedure                    Location (Site)              Vessel Name

 

Coronary Angiograms                LCA                   Left Coronary

Coronary Angiograms                RCA                   Right Coronary

 

 

 

Equipment

Time          Description            Size           Mfg Part Number  Used/Scraped

                  TRANSDUCER, TRUWAVE                    [id number]

10:04    WILHELM HODGES                     *                     Used

                  W/STOCKCOCK                        *[id number]

                                               534-518T



                                               *[id number]

                                               534-523T



                                               *[id number]

                                               [id number]

10:04    [employer]  PACK, CCL CUSTOM         *                     Used

                                               *[id number]

10:04    [employer]  SUPPORT, ARTERIAL ADULT                  [id number] *[id number] Used

                                               [id number]

10:04    [employer]     PEN, SKIN DUAL W/ RULER      *                     Used

                                               *[id number]

                  BAND, RADIAL COMPRESSION TR                [id number]

10:52    [employer]                      24CM                    Used

                  SHORT 24                          *[id number]

                  SHEATH, FR6 RADIAL PRELUDE

10:04    [employer]                      FR 6           [id number]   Used

                  EASE 11CM

                                               [id number]

10:04    [employer]     WIRE, EXCHANGE 260CM 3MMJ     260CM                   Used

                                               *[id number]

10:04    [employer]        OMNIPAQUE, 350 MG, 150ML     150ML          [id number]      Used

                                               [id number]

10:04    [facility]     BLANKET,WARM AIR CCL       *                     Used

                                               *[id number]

                  SHEATH, FR6 TRANSRADIAL                  [id number]

10:32    [employer]                     FR 6                    Used

                  SLENDER 10CM                        *[id number]

History: Current Medications

Medication          Dosage/Unit         Route          Frequency       Last Date/Time Taken

 

ASA

 

PLAVIX

 

Neurontin

 

 

History: Allergies

Allergy                Reaction

 

Shellfish               VOMITNG

 

shellfish derived           VOMITNG

 

 

History: Risk Factors

                        Family History of

Hypertension    Dyslipidemia                     Previous MI     Previous Heart Failure

                        Premature CAD

Yes         Yes             Yes            Yes         Yes

Prior Valve

          Prior PCI          Prior PCIDate       Prior CABG

Surgery

No         Yes             02/16/2018        No

          Cerebrovascular       Peripheral Artery     Chronic Lung

On Dialysis                                          Diabetes     Diabetes Therapy

          Disease           Disease          Disease

Yes         No             No            No          Yes       Insulin

 

 

History: Symptoms/Diagnosis Selection Items

Chest pain

 

SOB

 

 

History: Stress Tests

Stress or Imaging Studies Performed

 

No

 

 

History: Other Disease Selection Items

Renal Failure-Dialysis

 

 

History: Other

Current Smoker      Method       Packs a Day       Years Used            Pack Years

 

Yes           Cigarettes     1            20                20

 

Labs

Hgb (g/dl)        Hct (%)           WBC (l/cumm)         Platelets (thousands)

 

11.60-17.00       35.00-51.00         4.00-11.00          150..00

 

13.9           41.6            7.7              320

 

Glucose (mg/dl)     BUN (mg/dl)         Creatinine (mg/dl)    BUN:Creatinine (1:x)

74..00       7.00-18.00         0.50-1.30         10.00-20.00

 

216           26             4.3            6

 

Na (meq/l)        K (meq/l)

 

136..00      3.50-5.10

 

137           4

INR (PTT:PT)

 

0.90-1.10

 

1

 

Troponin I (ng/ml)    CPK (u/l)        CPK-MB (ng/ML)

 

0.02-0.05        26..00       0.50-3.60

 

0.78           93            Not Drawn

 

 

 

 

Medication

Medication Total Dose (Bolus/Oral)

Medication              Total Dosage/Unit

 

1% XYLOCAINE                20 mL

 

BENADRYL                  50 mg

 

FENTANYL                  50 mcg

 

HEPARIN                 1000 units

 

NTG (IC)                  200 mcg

 

VERSED                    2 mg

 

Medications (Bolus/Oral)

Medication          Time Given          Dosage/Unit       Administered By       Reason

 

BENADRYL           5/11/2018 10:16:13 AM      50 mg         Robert Strong

50 mg BENADRYL given in lab by Robert Strong RN in Right Forearm via Peripheral IV. Ordered by Edgardo Mercado.

 

VERSED            5/11/2018 10:20:14 AM      1 mg         Robert Strong

1 mg VERSED given in lab by Robert Strong RN in Right Forearm via Peripheral IV. Ordered by JENNIFER Mercado.

 

FENTANYL           5/11/2018 10:21:44 AM      25 mcg        Robert Strong

25 mcg FENTANYL given in lab by Robert Strong RN in Right Forearm via Peripheral IV. Ordered by Edgardo Carrero.

 

1% XYLOCAINE         5/11/2018 10:29:26 AM      20 mL         Edgardo Mercado

20 mL 1% XYLOCAINE given in lab by Edgardo Mercado in Right Radial via Subcutaneous. Ordered by Edgardo Mercado.

 

VERSED            5/11/2018 10:30:57 AM      1 mg         Robert Strong

1 mg VERSED given in lab by Robert Strong RN in Right Forearm via Peripheral IV. Ordered by JENNIFER Mercado.

 

FENTANYL           5/11/2018 10:31:41 AM      25 mcg        Robert Strnog

25 mcg FENTANYL given in lab by Robert Strong RN in Right Forearm via Peripheral IV. Ordered by Edgardo Carrero.

 

NTG (IC)           5/11/2018 10:33:11 AM     200 mcg         Edgardo Mercado

200 mcg NTG (IC) given in lab by Edgardo Mercado in Right Radial via Intra-arterial. Ordered by Edgardo Mercado.

 

HEPARIN            5/11/2018 10:35:30 AM    1000 units        Edgardo Mercado

1000 units HEPARIN given in lab by Edgardo Mercado in Right Forearm via Peripheral IV. Ordered by Edgardo Carrero.

Medication (Drip)

Medication          Time Given          Dosage/Unit      Concentration/Unit  Diluent (ml)  Solution

 

IV Solutions         5/11/2018 10:04:07 AM      0 mL (IV)                 500       NaCl .9

IV Solutions given in lab by Robert Strong RN in Right Forearm via Peripheral IV. Pump/Drip Flow = 2
0 ml/hr using NaCl .9. Ordered by Edgardo Mercado.

LEVAQUIN           5/11/2018 10:12:42 AM     250 mg/hr          250 mg     50       D5W

250 mg/hr LEVAQUIN given in lab by Robert Strong RN in Right Forearm via Peripheral IV. Pump/Drip Fl
ow = 50 ml/hr using D5W with a concentration

of 250 mg in 50 ml. Ordered by Edgardo Mercado.

Final Case Assessment

Cardiovascular

HR          Rhythm         NIBP          Chest Pain

 

104          st           115/70         0

 

Circulatory - Right Pulses

Dorsalis Pedis    Femoral         Radial

 

2           2            2

 

Scale (0,1,2,3,4,d)

 

Circulatory - Left Pulses

Dorsalis Pedis    Femoral         Radial

 

2           2

 

Scale (0,1,2,3,4,d)

 

Neurological State

           Oriented to time-place-

Alert                     Moves all extremities

           person

 

Respiration - General

Respiration Rate

           SpO2 (%)

(B/min)

11          100

 

Initial Case Assessment

Cardiovascular

HR          Rhythm         NIBP          Chest Pain

 

101          st           165/62         0

 

Circulatory - Right Pulses

Dorsalis Pedis    Femoral         Radial

 

2           2            2

 

Scale (0,1,2,3,4,d)

 

Circulatory - Left Pulses

Dorsalis Pedis    Femoral         Radial

 

2           2

 

Scale (0,1,2,3,4,d)

 

Neurological State

           Oriented to time-place-

Alert                     Moves all extremities

           person

 

Respiration - General

Respiration Rate

           SpO2 (%)

(B/min)

11          100

 

Chronological Log

Time    Study Chronological Log

 

9:50:10  Patient arrived via Bed.

 

9:50:10  Patient Name, D.O.B, / Armband Verified By R.N.

 

9:55:00  Verbal Stimulation=2 Physical Stimulation=2 Airway=2 Respiration=2 TOTAL=8. (0=absent, 1=sidhu
ited, 2=present)

 

9:55:18  Pre-op and post- op instructions given; patient acknowledges understanding of instructions.

 

10:02:36  Consent signed by the physician and the patient and verified by the Cath Lab staff.

 

10:03:46  Presedation assessment performed by Cath Lab RN.

      Vitals capture started with the following parameters, Patient=Adult, Interval=5 min, Initial Pr
onoebq=733 mmHg,

10:03:48

      Deflation Rate=5 mmHg, Cuff placed on Left Arm

10:03:52  Allens test performed on the right radial and ulnar artery.

 

10:03:56  Patient has been NPO for More than 6Hrs.

 

10:03:57  Skin Breakdown-Dialysis graft left upper arm.

 

10:04:02  Patient Warmer Placed on the Table.

 

10:04:03  Constance Prominences Protected

 

10:04:06  A # 20 IV was noted in the Forearm (right). Grade = patent

      IV Solutions given in lab by Robert Strong, RN in Right Forearm via Peripheral IV. Pump/Drip Fl
ow = 20 ml/hr using NaCl

10:04:07

      .9. Ordered by Edgardo Mercado.

10:04:08  A # 20 IV was noted in the Upper Arm (right). Grade = not in use

 

10:04:09  History and physical on the chart or being dictated.

 

10:04:29  GL=327 bpm, RYMB=226/62 mmhg, TwN9=884.0 %, Resp=8 B/min

 

10:04:44  Reference ECG taken

      Assessment: Initial Case, CH=126 BPM, Rhythm=st, ENUH=898/62 mmhg, Chest Pain=0

      Right Pulses: Zeb Ped=2, Femoral=2, Radial=2

10:05:01  Left Pulses: Zeb Ped=2, Femoral=2

      Neurological: State=Alert, Ox3, BLANCO

      Respiration: Resp=11 B/min, XtU1=185 %

10:09:32  RH=383 bpm, UROO=296/67 mmhg, NoQ9=187.0 %, Resp=12 B/min

 

10:11:17  Right groin and Right radial prepped with 2% chlorhexidine, and draped after a 3 min. waiti
ng time.

      250 mg/hr LEVAQUIN given in lab by Robert Strong RN in Right Forearm via Peripheral IV. Pump/D
rip Flow = 50 ml/hr

10:12:42

      using D5W with a concentration of 250 mg in 50 ml. Ordered by Edgardo Mercado.

10:14:03  MD paged

 

10:14:35  WQ=455 bpm, HCHJ=870/67 mmhg, DiO0=561.0 %, Resp=18 B/min

 

10:16:13  50 mg BENADRYL given in lab by Robert Strong RN in Right Forearm via Peripheral IV. Ordere
d by Edgardo Mercado.

 

10:17:17  Pressure channel 1 zeroed.

 

10:18:40  MD arrived.

 

10:19:24  RU=624 bpm, KRKI=447/77 mmhg, OcX3=626.0 %, Resp=9 B/min

 

10:20:14  1 mg VERSED given in lab by Robert Strong RN in Right Forearm via Peripheral IV. Ordered b
Edgardo Benoit.

 

10:21:44  25 mcg FENTANYL given in lab by Robert Strong RN in Right Forearm via Peripheral IV. Order
ed by Edgardo Mercado.

 

10:24:29  MI=236 bpm, TQGL=300/68 mmhg, WjQ2=612.0 %, Resp=11 B/min

      Time Out. Correct patient, correct procedure, correct physician, power injector not loaded with
 contrast with surgical

10:28:51

      team present. Time Out Concurred by MD and individual staff in procedure.

10:29:22  PN=269 bpm, QWMN=369/71 mmhg, DeL4=363.0 %, Resp=13 B/min

 

10:29:23  Case Start

 

10:29:26  20 mL 1% XYLOCAINE given in lab by Edgardo Mercado in Right Radial via Subcutaneous. Ordered
 by Edgardo Mercado.

 

10:30:57  1 mg VERSED given in lab by Robert Strong RN in Right Forearm via Peripheral IV. Ordered b
y Edgardo Mercado.

10:31:29   Access site was Radial Artery.

       A SHEATH, FR6 TRANSRADIAL SLENDER 10CM FR 6 was advanced into the Fem Art (right) using the Pe
rcutaneous

10:31:39

       technique.

10:31:41   25 mcg FENTANYL given in lab by Robert Strong RN in Right Forearm via Peripheral IV. Orde
red by Edgardo Mercado.

 

10:33:11   200 mcg NTG (IC) given in lab by Edgardo Mercado in Right Radial via Intra-arterial. Ordere
d by Edgardo Mercado.

       Recorded Pressure: LV, ZY=875, Condition=Condition 1

10:34:49

       (Left Ventricle) /1/3

       Recorded Pressure: LV, Ao, NW=897, Condition=Condition 1

10:35:00   (Left Ventricle) /4/4,

       (Aorta) Ao 142/72/100

10:35:06   UA=906 bpm, YGWZ=660/63 mmhg, SpO2=99.0 %, Resp=11 B/min

 

10:35:30   1000 units HEPARIN given in lab by Edgardo Mercado in Right Forearm via Peripheral IV. Orde
red by Edgardo Mercado.

 

10:35:32   The  RCA was injected and visualized at various angles. OMNIPAQUE, 350 MG, 150ML 150ML use
d.

 

10:39:22   PT=341 bpm, PECT=899/68 mmhg, SpO2=97.0 %, Resp=10 B/min

 

10:40:36   The  LCA was injected and visualized at various angles. OMNIPAQUE, 350 MG, 150ML 150ML use
d.

 

10:44:17   Catheter was removed

 

10:44:21   GI=212 bpm, SMTI=355/60 mmhg, SpO2=99.0 %, Resp=11 B/min

 

10:44:50   Case End

       Radial Compression Device Used. 9 mLs of air placed in BAND, RADIAL COMPRESSION TR SHORT 24 24
CM. Affected

10:46:16

       hand 98 % O2 saturation.

10:49:22   UT=362 bpm, SNSN=157/70 mmhg, TcZ5=985.0 %, Resp=11 B/min

       Assessment: Final Case, XT=863 BPM, Rhythm=st, QGWW=729/70 mmhg, Chest Pain=0

       Right Pulses: Zeb Ped=2, Femoral=2, Radial=2

10:50:16   Left Pulses: Zeb Ped=2, Femoral=2

       Neurological: State=Alert, Ox3, BLANCO

       Respiration: Resp=11 B/min, NzH1=377 %

10:54:14   Vitals capture stopped.

 

10:54:20   Patient moved to bed

 

10:56:33   Patient transported to [facility]

 

 

 

 

End Study - Contrast Media Used In Study

Contrast        Total Opened (mL)    Total Used (mL)       Total Wasted (mL)

 

Omnipaque       30           30             0

 

End Study - Maximum Contrast Load

Max Contrast Load (mL)

 

63.6

 

End Study - Radiation Exposure

Fluoro Time

(minutes)

3.0

End Study - Sheaths

Sheaths Pulled By          Sheath Hold Time (min)

 

Racquel Car

 

 

End Study - Patient Disposition

Complications    Transferred To     Interventional Outcome

 

No         Telemetry Bed      No attempt made

## 2018-05-11 NOTE — RADRPT
EXAM DATE/TIME:  05/11/2018 17:16 

 

[facility] COMPARISON:     

No previous studies available for comparison.

        

 

 

INDICATIONS :                

PreOp for Cardiac surgery. 

            

 

MEDICAL HISTORY :     

Myocardial infarction. Hypercholesterolemia. Hypertension. CAD. Pancreatitis. Arthritis. Diabetes. 

 

SURGICAL HISTORY :     

Cholecystectomy. Coronary stents. 

 

ENCOUNTER:     

Subsequent

 

ACUITY:     

1 day

 

PAIN SCORE:      

4/10

 

LOCATION:      

Bilateral  leg. 

                       

 

TECHNIQUE:     

Venous ultrasound of the left and right leg was performed from the inguinal ligament to the proximal 
calf.  Real-time, color Doppler and spectral tracing, compression and augmentation techniques were us
ed.  

 

FINDINGS:     

 

RIGHT LEG:     

There is normal compressibility of the deep venous system from the inguinal region to the proximal ca
lf.  No echogenic clot is seen in the lumen of the common femoral, femoral, popliteal, and posterior 
tibial veins.  There is a normal response of the venous system to proximal and distal augmentation an
d respiration.  

 

LEFT LEG:     

There is normal compressibility of the deep venous system from the inguinal region to the proximal ca
lf.  No echogenic clot is seen in the lumen of the common femoral, femoral, popliteal, and posterior 
tibial veins.  There is a normal response of the venous system to proximal and distal augmentation an
d respiration.  

 

CONCLUSION:     

Normal examination.  

 

 

 

 Fei Greenfield Jr., MD on May 11, 2018 at 19:24           

Board Certified Radiologist.

 This report was verified electronically.

## 2018-05-12 VITALS — HEART RATE: 77 BPM

## 2018-05-12 VITALS — HEART RATE: 74 BPM

## 2018-05-12 VITALS — HEART RATE: 79 BPM

## 2018-05-12 VITALS — HEART RATE: 80 BPM

## 2018-05-12 VITALS — SYSTOLIC BLOOD PRESSURE: 152 MMHG | OXYGEN SATURATION: 16 % | DIASTOLIC BLOOD PRESSURE: 73 MMHG | HEART RATE: 79 BPM

## 2018-05-12 VITALS — HEART RATE: 82 BPM

## 2018-05-12 VITALS
SYSTOLIC BLOOD PRESSURE: 142 MMHG | RESPIRATION RATE: 20 BRPM | OXYGEN SATURATION: 97 % | TEMPERATURE: 98.3 F | HEART RATE: 79 BPM | DIASTOLIC BLOOD PRESSURE: 65 MMHG

## 2018-05-12 VITALS — HEART RATE: 78 BPM

## 2018-05-12 VITALS
RESPIRATION RATE: 16 BRPM | OXYGEN SATURATION: 99 % | DIASTOLIC BLOOD PRESSURE: 64 MMHG | HEART RATE: 78 BPM | SYSTOLIC BLOOD PRESSURE: 143 MMHG | TEMPERATURE: 98.9 F

## 2018-05-12 VITALS — HEART RATE: 84 BPM

## 2018-05-12 VITALS
DIASTOLIC BLOOD PRESSURE: 67 MMHG | RESPIRATION RATE: 16 BRPM | OXYGEN SATURATION: 99 % | HEART RATE: 73 BPM | SYSTOLIC BLOOD PRESSURE: 147 MMHG

## 2018-05-12 VITALS — HEART RATE: 75 BPM

## 2018-05-12 VITALS
HEART RATE: 82 BPM | SYSTOLIC BLOOD PRESSURE: 146 MMHG | TEMPERATURE: 98.2 F | OXYGEN SATURATION: 100 % | DIASTOLIC BLOOD PRESSURE: 72 MMHG | RESPIRATION RATE: 20 BRPM

## 2018-05-12 VITALS
RESPIRATION RATE: 20 BRPM | DIASTOLIC BLOOD PRESSURE: 58 MMHG | TEMPERATURE: 97.9 F | SYSTOLIC BLOOD PRESSURE: 119 MMHG | OXYGEN SATURATION: 95 % | HEART RATE: 73 BPM

## 2018-05-12 VITALS — HEART RATE: 88 BPM

## 2018-05-12 VITALS — HEART RATE: 81 BPM

## 2018-05-12 VITALS — OXYGEN SATURATION: 100 %

## 2018-05-12 VITALS — OXYGEN SATURATION: 96 %

## 2018-05-12 VITALS — HEART RATE: 70 BPM

## 2018-05-12 LAB
ALBUMIN SERPL-MCNC: 3.7 GM/DL (ref 3.4–5)
ALP SERPL-CCNC: 330 U/L (ref 45–117)
ALT SERPL-CCNC: 32 U/L (ref 10–53)
AST SERPL-CCNC: 16 U/L (ref 15–37)
BASOPHILS # BLD AUTO: 0 TH/MM3 (ref 0–0.2)
BASOPHILS NFR BLD: 0.7 % (ref 0–2)
BILIRUB INDIRECT SERPL-MCNC: 0.3 MG/DL (ref 0–0.8)
BILIRUB SERPL-MCNC: 0.5 MG/DL (ref 0.2–1)
BUN SERPL-MCNC: 35 MG/DL (ref 7–18)
CALCIUM SERPL-MCNC: 8.5 MG/DL (ref 8.5–10.1)
CHLORIDE SERPL-SCNC: 95 MEQ/L (ref 98–107)
CREAT SERPL-MCNC: 5.44 MG/DL (ref 0.5–1)
DIRECT BILIRUBIN ADULT: 0.2 MG/DL (ref 0–0.2)
EOSINOPHIL # BLD: 0.1 TH/MM3 (ref 0–0.4)
EOSINOPHIL NFR BLD: 1.2 % (ref 0–4)
ERYTHROCYTE [DISTWIDTH] IN BLOOD BY AUTOMATED COUNT: 15.1 % (ref 11.6–17.2)
GFR SERPLBLD BASED ON 1.73 SQ M-ARVRAT: 10 ML/MIN (ref 89–?)
GLUCOSE SERPL-MCNC: 293 MG/DL (ref 74–106)
HCO3 BLD-SCNC: 29.1 MEQ/L (ref 21–32)
HCT VFR BLD CALC: 36.9 % (ref 35–46)
HGB BLD-MCNC: 12.2 GM/DL (ref 11.6–15.3)
LYMPHOCYTES # BLD AUTO: 0.8 TH/MM3 (ref 1–4.8)
LYMPHOCYTES NFR BLD AUTO: 14 % (ref 9–44)
MCH RBC QN AUTO: 31.8 PG (ref 27–34)
MCHC RBC AUTO-ENTMCNC: 33.1 % (ref 32–36)
MCV RBC AUTO: 96 FL (ref 80–100)
MONOCYTE #: 0.4 TH/MM3 (ref 0–0.9)
MONOCYTES NFR BLD: 6.6 % (ref 0–8)
NEUTROPHILS # BLD AUTO: 4.4 TH/MM3 (ref 1.8–7.7)
NEUTROPHILS NFR BLD AUTO: 77.5 % (ref 16–70)
PLATELET # BLD: 303 TH/MM3 (ref 150–450)
PMV BLD AUTO: 8.1 FL (ref 7–11)
PROT SERPL-MCNC: 7.8 GM/DL (ref 6.4–8.2)
RBC # BLD AUTO: 3.84 MIL/MM3 (ref 4–5.3)
SODIUM SERPL-SCNC: 136 MEQ/L (ref 136–145)
WBC # BLD AUTO: 5.7 TH/MM3 (ref 4–11)

## 2018-05-12 RX ADMIN — ACYCLOVIR SCH UNITS: 800 TABLET ORAL at 20:48

## 2018-05-12 RX ADMIN — SEVELAMER CARBONATE SCH MG: 800 TABLET, FILM COATED ORAL at 10:50

## 2018-05-12 RX ADMIN — HEPARIN SODIUM PRN MLS/HR: 10000 INJECTION, SOLUTION INTRAVENOUS at 19:13

## 2018-05-12 RX ADMIN — METOPROLOL TARTRATE SCH MG: 25 TABLET, FILM COATED ORAL at 09:07

## 2018-05-12 RX ADMIN — INSULIN ASPART SCH: 100 INJECTION, SOLUTION INTRAVENOUS; SUBCUTANEOUS at 17:00

## 2018-05-12 RX ADMIN — SEVELAMER CARBONATE SCH MG: 800 TABLET, FILM COATED ORAL at 09:07

## 2018-05-12 RX ADMIN — NITROGLYCERIN SCH INCH: 20 OINTMENT TOPICAL at 23:20

## 2018-05-12 RX ADMIN — NITROGLYCERIN SCH INCH: 20 OINTMENT TOPICAL at 12:31

## 2018-05-12 RX ADMIN — WATER PRN ML: 1 IRRIGANT IRRIGATION at 15:29

## 2018-05-12 RX ADMIN — SODIUM CHLORIDE SCH MLS/HR: 234 INJECTION INTRAMUSCULAR; INTRAVENOUS; SUBCUTANEOUS at 20:50

## 2018-05-12 RX ADMIN — NITROGLYCERIN SCH INCH: 20 OINTMENT TOPICAL at 06:22

## 2018-05-12 RX ADMIN — FAMOTIDINE SCH MG: 20 TABLET, FILM COATED ORAL at 09:07

## 2018-05-12 RX ADMIN — Medication SCH ML: at 20:48

## 2018-05-12 RX ADMIN — HEPARIN SODIUM PRN UNITS: 1000 INJECTION, SOLUTION INTRAVENOUS; SUBCUTANEOUS at 02:58

## 2018-05-12 RX ADMIN — SEVELAMER CARBONATE SCH MG: 800 TABLET, FILM COATED ORAL at 17:50

## 2018-05-12 RX ADMIN — METOPROLOL TARTRATE SCH MG: 25 TABLET, FILM COATED ORAL at 20:46

## 2018-05-12 RX ADMIN — ASPIRIN 81 MG SCH MG: 81 TABLET ORAL at 09:07

## 2018-05-12 RX ADMIN — INSULIN ASPART SCH: 100 INJECTION, SOLUTION INTRAVENOUS; SUBCUTANEOUS at 08:00

## 2018-05-12 RX ADMIN — INSULIN ASPART SCH: 100 INJECTION, SOLUTION INTRAVENOUS; SUBCUTANEOUS at 12:00

## 2018-05-12 RX ADMIN — MEGESTROL ACETATE SCH MG: 40 SUSPENSION ORAL at 09:07

## 2018-05-12 RX ADMIN — Medication SCH ML: at 09:07

## 2018-05-12 RX ADMIN — CITALOPRAM SCH MG: 20 TABLET, FILM COATED ORAL at 09:07

## 2018-05-12 RX ADMIN — HEPARIN SODIUM PRN UNITS: 1000 INJECTION, SOLUTION INTRAVENOUS; SUBCUTANEOUS at 23:22

## 2018-05-12 RX ADMIN — NITROGLYCERIN PRN MG: 0.4 TABLET SUBLINGUAL at 10:49

## 2018-05-12 RX ADMIN — ONDANSETRON PRN MG: 2 INJECTION, SOLUTION INTRAMUSCULAR; INTRAVENOUS at 18:32

## 2018-05-12 RX ADMIN — FAMOTIDINE SCH MG: 20 TABLET, FILM COATED ORAL at 20:46

## 2018-05-12 RX ADMIN — FERROUS SULFATE TAB 325 MG (65 MG ELEMENTAL FE) SCH MG: 325 (65 FE) TAB at 09:07

## 2018-05-12 RX ADMIN — ONDANSETRON PRN MG: 2 INJECTION, SOLUTION INTRAMUSCULAR; INTRAVENOUS at 10:50

## 2018-05-12 RX ADMIN — MAGNESIUM HYDROXIDE PRN ML: 400 SUSPENSION ORAL at 09:06

## 2018-05-12 RX ADMIN — INSULIN ASPART SCH: 100 INJECTION, SOLUTION INTRAVENOUS; SUBCUTANEOUS at 20:48

## 2018-05-12 RX ADMIN — NITROGLYCERIN SCH INCH: 20 OINTMENT TOPICAL at 17:50

## 2018-05-12 NOTE — HHI.NPPN
Subjective


Complaints:  Chest Pain, Shortness of Breath


General Problems:  Anemia


Renal Failure:  Chronic, End Stage Renal Disease


Additional Remarks


Patient is alert, no SOB, eating now, no complaint.





Review of Systems


General


Constitutional:  Fatigue





Respiratory


Lungs:  SOB





Cardiovascular


Cardiac:  Chest Pain





Objective Data


Data





Vital Signs








  Date Time  Temp Pulse Resp B/P (MAP) Pulse Ox O2 Delivery O2 Flow Rate FiO2


 


5/12/18 12:00  78      


 


5/12/18 11:00  80      


 


5/12/18 11:00 98.3 79 20 142/65 (90) 97   


 


5/12/18 10:00  81      


 


5/12/18 08:21     100   21


 


5/12/18 08:00  80      


 


5/12/18 07:55  76      


 


5/12/18 07:55 98.2 82 20 146/72 (96) 100   


 


5/12/18 06:00  70      


 


5/12/18 05:00  74      


 


5/12/18 04:00  74      


 


5/12/18 03:00  79  152/73 (99) 16   


 


5/12/18 03:00  79      


 


5/12/18 02:00  80      


 


5/12/18 01:00  88      


 


5/12/18 00:00  88      


 


5/11/18 23:25  91 16 155/77 (103) 98   


 


5/11/18 23:00  95      


 


5/11/18 22:00  96      


 


5/11/18 21:00  96      


 


5/11/18 20:00  96      


 


5/11/18 19:50 98.3 96 16 145/69 (94) 99   


 


5/11/18 19:00  94      


 


5/11/18 19:00  94      


 


5/11/18 18:00  92      


 


5/11/18 17:00  88      


 


5/11/18 16:00  118      


 


5/11/18 15:00  107      


 


5/11/18 15:00 98.3 104 15 157/55 (89) 99   


 


5/11/18 15:00  107      


 


5/11/18 14:00  122      


 


5/11/18 14:00  124      








-:  


5/12/18 0135                                                                   

             5/12/18 0135





Tubes & Lines:  Perma-Cath





Physical Exam


General


Appearance:  No Acute Distress, Comfortable





Eyes


Eye Exam:  Pupils Equal, Pupils Reactive





Pulmonary


Resp Exam:  Clear Bilaterally, Breath Sounds Equal





Cardiology


CV Exam:  Regular, Normal Sinus Rhythm





Gastrointestinal/Abdomen


GI Exam:  Soft, Non-Tender, Bowel Sounds Present





Musculoskeletal


MS Exam:  Joints Intact, Normal Gait, Good Strength





Integumentary


Skin Exam:  Clear





Extremeties


Extremities Exam:  No Edema





Neurologic


Neuro Exam:  Alert, Awake, Oriented





Assessment/Plan


Discussed Condition With:  Patient


Assessment Summary:  Anemia of CKD, CHF, Hypertension, Diabetes Mellitus, End 

Stage Renal Disease


Problem List:  


(1) ESRD (end stage renal disease) on dialysis


ICD Codes:  N18.6 - End stage renal disease; Z99.2 - Dependence on renal 

dialysis


Plan:  Continue HD MWF. 


Fluid status has improved, still makes urine. 


AVF not mature, using Permcath. She has second step surgery scheduled 5/15 with 

Dr. Rees. 


Start Renvela with meals. 


On protein supplements to meals and Megace. 


started on antidepressant. 


HD done on Friday.





(2) Chest pain, rule out acute myocardial infarction


ICD Codes:  R07.9 - Chest pain, unspecified


Status:  Acute


Plan:  Cardiology following


PE study negative


s/p cath, 90% occlusion of Bare Metal Stent previously placed. [facility] will  

consulted vs placing drug eluding stents. 


Echo: Ef 40-45%, LVH is present. 





(3) DM (diabetes mellitus)


ICD Codes:  E11.9 - Type 2 diabetes mellitus without complications


Plan:  maintain glucose 140-180 mg/dL while hospitalized 


Start D10 @ 15 when NPO tonight. 





(4) HTN (hypertension)


ICD Codes:  I10 - Hypertension


Status:  Chronic


Plan:  Monitor blood pressure, excellent control recently 


Resume hypertensive agents


Fluid removal with dialysis





(5) Anemia


ICD Codes:  D64.9 - Anemia, unspecified


Status:  Chronic


Plan:  Epogen not required


Monitor hemoglobin 


On oral iron














Jesica Choudhary MD May 12, 2018 13:05

## 2018-05-12 NOTE — HHI.PR
Subjective


Remarks


She report chest pain resolved today.





Objective





Vital Signs








  Date Time  Temp Pulse Resp B/P (MAP) Pulse Ox O2 Delivery O2 Flow Rate FiO2


 


5/12/18 16:00  77      


 


5/12/18 15:00 97.9 76 20 119/58 (78) 95   


 


5/12/18 15:00  73      


 


5/12/18 14:00  75      


 


5/12/18 13:00  79      


 


5/12/18 12:00  78      


 


5/12/18 11:00  80      


 


5/12/18 11:00 98.3 79 20 142/65 (90) 97   


 


5/12/18 10:00  81      


 


5/12/18 08:21     100   21


 


5/12/18 08:00  80      


 


5/12/18 07:55  76      


 


5/12/18 07:55 98.2 82 20 146/72 (96) 100   


 


5/12/18 06:00  70      


 


5/12/18 05:00  74      


 


5/12/18 04:00  74      


 


5/12/18 03:00  79  152/73 (99) 16   


 


5/12/18 03:00  79      


 


5/12/18 02:00  80      


 


5/12/18 01:00  88      


 


5/12/18 00:00  88      


 


5/11/18 23:25  91 16 155/77 (103) 98   


 


5/11/18 23:00  95      


 


5/11/18 22:00  96      


 


5/11/18 21:00  96      


 


5/11/18 20:00  96      


 


5/11/18 19:50 98.3 96 16 145/69 (94) 99   


 


5/11/18 19:00  94      


 


5/11/18 19:00  94      


 


5/11/18 18:00  92      


 


5/11/18 17:00  88      














I/O      


 


 5/11/18 5/11/18 5/11/18 5/12/18 5/12/18 5/12/18





 06:59 14:59 22:59 06:59 14:59 22:59


 


Intake Total 200 ml   480 ml  


 


Output Total 0 ml  100 ml 400 ml  


 


Balance 200 ml  -100 ml 80 ml  


 


      


 


Intake Oral 200 ml   480 ml  


 


Output Urine Total 0 ml  100 ml 400 ml  


 


# Bowel Movements    0  








Result Diagram:  


5/12/18 0135 5/12/18 0135





Objective Remarks


GENERAL: Patient sitting up in bed.  Appears comfortable.  Alert.  Heart rate 

in 80s


SKIN: Warm and dry.


HEAD: Normocephalic.


EYES: No scleral icterus. No injection or drainage. 


NECK: Supple, trachea midline. No JVD.


CARDIOVASCULAR: Tachycardic .regular rate and rhythm without murmurs, gallops, 

or rubs. 


RESPIRATORY: Breath sounds equal bilaterally. No accessory muscle use.


GASTROINTESTINAL: Abdomen soft, non-tender, nondistended. 


MUSCULOSKELETAL: No cyanosis, or edema. 


BACK: Nontender without obvious deformity. No CVA tenderness.








A/P


Assessment and Plan





====5/12/18===============


= Tachycardia resolved.  No chest pain today.  Follow-up CT surgery 

recommendations.  Cardiology following.  Appreciate assistance.





//Transaminitis.  GI signed off.  Follow-up with GI as outpatient.





==============








//SIRS meeting criteria, also tachycardiac, initiate sepsis protocol. With 

fevers patient did not have cardiac cath.  Discussed with the cardiology. Blood 

cultures, CXR, UA, LA ordered. Started on Levaquin. Monitor closely. 





//CHF with Exacerbation


BNP elevated.  Chest x-ray with edema.  Will start on nitro drip.  Consulted 

nephrology for dialysis.  Chest x-ray reviewed with congestion. Advance diet 

cardiac cath on hold.


= Cardiology following.  Appreciate assistance.





//Accelerated hypertension.  Patient started on nitro drip.  Restart hydralazine

, with as needed hydralazine.  Awaiting official list of home meds.


= Resolved.  Decreased blood pressure meds.





//NSTEMI


Elevated Troponin trending up  EKG sinus with T-wave inversions noted.    

Heparin drip.  Status post aspirin.  Continue daily aspirin.  Continue Plavix. 


= 5/11.  Tachycardia after dialysis.  Could be secondary to dialyzing 

metoprolol.  Will add metoprolol for heart rate control.  Decrease amlodipine 

due to overcontrolled blood pressure.  Decrease hydralazine blockages is on 

catheterization.  Cardiovascular surgery following.  Appreciate assistance.





//Pleuritic chest pain


elevated  d-dimer. CT pulmonary angiogram reviewed and no PE. Patient received 

HD 





//End-stage renal disease on hemodialysis Monday Wednesday Friday.  Consult 

nephrology for dialysis.  Dializef before and after CTA.  Appreciate assistance.





//Tobacco abuse.  Cessation counseling provided.





//Diabetes mellitus type 2.  Diabetic diet.  Insulin sliding scale, accuchecks. 





//Transaminitis


Nausea


Check liver ultrasound.  Lipase only in the 60s.  Consult GI given past history 

of pancreatitis


= Liver ultrasound normal.


Discharge Planning


Follow-up CT surgery recommendations.


Home when cleared by cardiology/CT surgery











Alex Pizano MD May 12, 2018 16:55

## 2018-05-12 NOTE — PD.CARD.PN
Subjective


Subjective Remarks


No chest pain. Resting comfortably





Objective


Medications





Current Medications








 Medications


  (Trade)  Dose


 Ordered  Sig/Luis Armando


 Route  Start Time


 Stop Time Status Last Admin


 


  (Nitrostat Sl)  0.4 mg  Q5M  PRN


 SL  5/7/18 23:15


    5/8/18 21:57


 


 


  (NS Flush)  2 ml  UNSCH  PRN


 IV FLUSH  5/8/18 02:15


    5/8/18 23:02


 


 


  (NS Flush)  2 ml  BID


 IV FLUSH  5/8/18 09:00


    5/11/18 21:43


 


 


  (Narcan Inj)  0.4 mg  UNSCH  PRN


 IV PUSH  5/8/18 02:15


     


 


 


  (Milk Of


 Magnesia Liq)  30 ml  Q12H  PRN


 PO  5/8/18 02:15


     


 


 


  (Senokot)  17.2 mg  Q12H  PRN


 PO  5/8/18 02:15


     


 


 


  (Dulcolax Supp)  10 mg  DAILY  PRN


 RECTAL  5/8/18 02:15


     


 


 


  (Lactulose Liq)  30 ml  DAILY  PRN


 PO  5/8/18 02:15


    5/9/18 20:53


 


 


  (Aspirin Chew)  81 mg  DAILY


 CHEW  5/8/18 09:00


    5/11/18 09:11


 


 


  (Plavix)  75 mg  DAILY


 PO  5/8/18 09:00


   Future Hold 5/11/18 09:11


 


 


  (Apresoline Inj)  10 mg  Q30M  PRN


 IV PUSH  5/8/18 02:15


     


 


 


  (NovoLOG


 SUPPLEMENTAL


 SCALE)  1  ACHS SLIDING  SCALE


 SQ  5/8/18 08:00


    5/11/18 21:42


 


 


  (D50w (Vial) Inj)  50 ml  UNSCH  PRN


 IV PUSH  5/8/18 04:15


     


 


 


  (Glucagon Inj)  1 mg  UNSCH  PRN


 OTHER  5/8/18 04:15


     


 


 


 Sodium Chloride  1,000 ml @ 


 0 mls/hr  Q0M PRN


 OTHER  5/8/18 08:52


     


 


 


  (Heparin Inj)  8,000 units  UNSCH  PRN


 IV FLUSH  5/8/18 09:00


     


 


 


 Sodium Chloride  1,000 ml @ 


 200 mls/hr  Q5H PRN


 IV  5/8/18 08:52


     


 


 


 Sodium Chloride  1,000 ml @ 


 0 mls/hr  Q0M PRN


 OTHER  5/8/18 08:52


     


 


 


  (Mannitol Inj)  12.5 gm  UNSCH  PRN


 IV  5/8/18 09:00


     


 


 


 Albumin Human  100 ml @ 


 60 mls/hr  UNSCH  PRN


 IV  5/8/18 09:00


     


 


 


  (NS Flush)  5 ml  UNSCH  PRN


 IV FLUSH  5/8/18 09:00


     


 


 


  (Heparin Inj)    UNSCH  PRN


 .XX  5/8/18 09:00


     


 


 


  (Gentamicin Inj)  20 mg  UNSCH  PRN


 OTHER  5/8/18 09:00


     


 


 


  (Zofran Inj)  4 mg  UNSCH  PRN


 IV PUSH  5/8/18 09:00


    5/11/18 23:22


 


 


  (Tylenol)  650 mg  UNSCH  PRN


 PO  5/8/18 09:00


    5/11/18 21:37


 


 


  (Benadryl)  25 mg  UNSCH  PRN


 PO  5/8/18 09:00


    5/11/18 21:36


 


 


  (Nitrostat Sl)  0.4 mg  UNSCH  PRN


 SL  5/8/18 09:00


     


 


 


  (Catapres)  0.1 mg  UNSCH  PRN


 PO  5/8/18 09:00


     


 


 


  (Gelfoam 12 Mm/7


 Mm Top)  1 foam  UNSCH  PRN


 TOP  5/8/18 09:00


     


 


 


  (Ativan)  0.5 mg  Q12H  PRN


 PO  5/8/18 13:00


    5/10/18 22:03


 


 


  (Nitroglycerin


 2% Oint)  1 inch  Q6HR


 TOPICAL  5/9/18 00:00


    5/12/18 06:22


 


 


 Levofloxacin/


 Dextrose  50 ml @ 50


 mls/hr  Q48H


 IV  5/11/18 09:00


    5/11/18 09:14


 


 


  (Ferrous Sulfate)  325 mg  DAILY


 PO  5/9/18 13:00


    5/11/18 09:11


 


 


  (Megace Liq)  800 mg  DAILY


 PO  5/9/18 15:00


    5/10/18 09:23


 


 


 Dextrose  500 ml @ 


 15 mls/hr  Q24H


 IV  5/11/18 00:00


    5/11/18 02:10


 


 


  (Levemir Inj)  10 units  HS


 SQ  5/10/18 21:00


    5/11/18 21:42


 


 


  (Pepcid)  20 mg  BID


 PO  5/11/18 21:00


    5/11/18 21:36


 


 


  (Renvela)  800 mg  TIDAC


 PO  5/11/18 17:00


    5/11/18 18:23


 


 


  (CeleXA)  20 mg  DAILY


 PO  5/11/18 14:00


    5/11/18 18:23


 


 


  (Heparin Inj)  5,000 units  UNSCH  PRN


 IV PUSH  5/11/18 21:45


     


 


 


  (Heparin Inj)  2,500 units  UNSCH  PRN


 IV PUSH  5/11/18 21:45


    5/12/18 02:58


 


 


 Heparin Sodium/


 Dextrose  250 ml @ 


 6.55 mls/hr  TITRATE  PRN


 IV  5/11/18 20:00


    5/11/18 19:32


 


 


  (Lopressor Inj)  5 mg  Q5M  PRN


 IV PUSH  5/11/18 16:30


    5/11/18 16:50


 


 


  (Pill Splitter)  1 ea  UNSCH  PRN


 OTHER  5/11/18 16:30


     


 


 


  (Apresoline)  25 mg  Q8HR


 PO  5/11/18 22:00


   Future hold 5/12/18 06:20


 


 


  (Lopressor)  50 mg  Q12HR


 PO  5/11/18 21:00


    5/11/18 21:36


 


 


  (Norvasc)  10 mg  DAILY


 PO  5/12/18 09:00


     


 








Vital Signs / I&O





Vital Signs








  Date Time  Temp Pulse Resp B/P (MAP) Pulse Ox O2 Delivery O2 Flow Rate FiO2


 


5/12/18 06:00  70      


 


5/12/18 05:00  74      


 


5/12/18 04:00  74      


 


5/12/18 03:00  79  152/73 (99) 16   


 


5/12/18 03:00  79      


 


5/12/18 02:00  80      


 


5/12/18 01:00  88      


 


5/12/18 00:00  88      


 


5/11/18 23:25  91 16 155/77 (103) 98   


 


5/11/18 23:00  95      


 


5/11/18 22:00  96      


 


5/11/18 21:00  96      


 


5/11/18 20:00  96      


 


5/11/18 19:50 98.3 96 16 145/69 (94) 99   


 


5/11/18 19:00  94      


 


5/11/18 19:00  94      


 


5/11/18 18:00  92      


 


5/11/18 17:00  88      


 


5/11/18 16:00  118      


 


5/11/18 15:00  107      


 


5/11/18 15:00 98.3 104 15 157/55 (89) 99   


 


5/11/18 15:00  107      


 


5/11/18 14:00  122      


 


5/11/18 14:00  124      


 


5/11/18 13:00  106      


 


5/11/18 13:00  104      


 


5/11/18 12:00  98      


 


5/11/18 12:00  98      


 


5/11/18 11:00 98.2 100 16 101/71 (81) 100   


 


5/11/18 11:00  100      


 


5/11/18 11:00  100      


 


5/11/18 09:00  96      


 


5/11/18 09:00  96      


 


5/11/18 08:00 98.9 101 16 142/87 (105) 97   


 


5/11/18 08:00  96      


 


5/11/18 08:00  96      














I/O      


 


 5/11/18 5/11/18 5/11/18 5/12/18 5/12/18 5/12/18





 07:00 15:00 23:00 07:00 15:00 23:00


 


Intake Total 200 ml   480 ml  


 


Output Total 0 ml  100 ml 400 ml  


 


Balance 200 ml  -100 ml 80 ml  


 


      


 


Intake Oral 200 ml   480 ml  


 


Output Urine Total 0 ml  100 ml 400 ml  


 


# Bowel Movements    0  








Physical Exam


Lungs clear 


RRR


Laboratory





Laboratory Tests








Test


  5/11/18


19:29 5/11/18


22:40 5/12/18


01:35


 


Prothrombin Time 10.7 SEC   


 


Prothromb Time International


Ratio 1.1 RATIO 


  


  


 


 


Activated Partial


Thromboplast Time 24.9 SEC 


  


  27.6 SEC 


 


 


White Blood Count  6.9 TH/MM3  5.7 TH/MM3 


 


Red Blood Count  4.07 MIL/MM3  3.84 MIL/MM3 


 


Hemoglobin  12.8 GM/DL  12.2 GM/DL 


 


Hematocrit  38.4 %  36.9 % 


 


Mean Corpuscular Volume  94.5 FL  96.0 FL 


 


Mean Corpuscular Hemoglobin  31.5 PG  31.8 PG 


 


Mean Corpuscular Hemoglobin


Concent 


  33.4 % 


  33.1 % 


 


 


Red Cell Distribution Width  15.2 %  15.1 % 


 


Platelet Count  339 TH/MM3  303 TH/MM3 


 


Mean Platelet Volume  8.6 FL  8.1 FL 


 


Neutrophils (%) (Auto)   77.5 % 


 


Lymphocytes (%) (Auto)   14.0 % 


 


Monocytes (%) (Auto)   6.6 % 


 


Eosinophils (%) (Auto)   1.2 % 


 


Basophils (%) (Auto)   0.7 % 


 


Neutrophils # (Auto)   4.4 TH/MM3 


 


Lymphocytes # (Auto)   0.8 TH/MM3 


 


Monocytes # (Auto)   0.4 TH/MM3 


 


Eosinophils # (Auto)   0.1 TH/MM3 


 


Basophils # (Auto)   0.0 TH/MM3 


 


CBC Comment   DIFF FINAL 


 


Differential Comment    


 


Blood Urea Nitrogen   35 MG/DL 


 


Creatinine   5.44 MG/DL 


 


Random Glucose   293 MG/DL 


 


Total Protein   7.8 GM/DL 


 


Albumin   3.7 GM/DL 


 


Calcium Level   8.5 MG/DL 


 


Alkaline Phosphatase   330 U/L 


 


Aspartate Amino Transf


(AST/SGOT) 


  


  16 U/L 


 


 


Alanine Aminotransferase


(ALT/SGPT) 


  


  32 U/L 


 


 


Total Bilirubin   0.5 MG/DL 


 


Direct Bilirubin   0.2 MG/DL 


 


Sodium Level   136 MEQ/L 


 


Potassium Level   3.7 MEQ/L 


 


Chloride Level   95 MEQ/L 


 


Carbon Dioxide Level   29.1 MEQ/L 


 


Anion Gap   12 MEQ/L 


 


Estimat Glomerular Filtration


Rate 


  


  10 ML/MIN 


 


 


Indirect Bilirubin   0.3 MG/DL 











Assessment and Plan


Assessment and Plan


Stable. Await [facility] input











Cali Huerta MD May 12, 2018 07:49

## 2018-05-12 NOTE — PD.CAR.PN
CVT Progress Note


Subjective/Hospital Course:


pt seen and evaluated / full note dictated 


sts data discussed with pt 





 RISK SCORES


 About the STS Risk Calculator


 Procedure: CAB Only 


Risk of Mortality: 7.196% 


Morbidity or Mortality: 43.482% 


Long Length of Stay: 23.616% 


Short Length of Stay: 10.668% 


Permanent Stroke: 1.59% 


Prolonged Ventilation: 43.728% 


DSW Infection: 1.03% 


Renal Failure: N/A 


Reoperation: 11.19% 





5/12/18


c/o chest pain this morning


Objective:





Vital Signs








  Date Time  Temp Pulse Resp B/P (MAP) Pulse Ox O2 Delivery O2 Flow Rate FiO2


 


5/12/18 12:00  78      


 


5/12/18 11:00  80      


 


5/12/18 11:00 98.3 79 20 142/65 (90) 97   


 


5/12/18 10:00  81      


 


5/12/18 08:21     100   21


 


5/12/18 08:00  80      


 


5/12/18 07:55  76      


 


5/12/18 07:55 98.2 82 20 146/72 (96) 100   


 


5/12/18 06:00  70      


 


5/12/18 05:00  74      


 


5/12/18 04:00  74      


 


5/12/18 03:00  79  152/73 (99) 16   


 


5/12/18 03:00  79      


 


5/12/18 02:00  80      


 


5/12/18 01:00  88      


 


5/12/18 00:00  88      


 


5/11/18 23:25  91 16 155/77 (103) 98   


 


5/11/18 23:00  95      


 


5/11/18 22:00  96      


 


5/11/18 21:00  96      


 


5/11/18 20:00  96      


 


5/11/18 19:50 98.3 96 16 145/69 (94) 99   


 


5/11/18 19:00  94      


 


5/11/18 19:00  94      


 


5/11/18 18:00  92      


 


5/11/18 17:00  88      


 


5/11/18 16:00  118      


 


5/11/18 15:00  107      


 


5/11/18 15:00 98.3 104 15 157/55 (89) 99   


 


5/11/18 15:00  107      


 


5/11/18 14:00  122      


 


5/11/18 14:00  124      


 


5/11/18 13:00  106      


 


5/11/18 13:00  104      








Labs:





Laboratory Tests








Test


  5/12/18


01:35 5/12/18


11:31


 


White Blood Count


  5.7 TH/MM3


(4.0-11.0) 


 


 


Red Blood Count


  3.84 MIL/MM3


(4.00-5.30) 


 


 


Hemoglobin


  12.2 GM/DL


(11.6-15.3) 


 


 


Hematocrit


  36.9 %


(35.0-46.0) 


 


 


Mean Corpuscular Volume


  96.0 FL


(80.0-100.0) 


 


 


Mean Corpuscular Hemoglobin


  31.8 PG


(27.0-34.0) 


 


 


Mean Corpuscular Hemoglobin


Concent 33.1 %


(32.0-36.0) 


 


 


Red Cell Distribution Width


  15.1 %


(11.6-17.2) 


 


 


Platelet Count


  303 TH/MM3


(150-450) 


 


 


Mean Platelet Volume


  8.1 FL


(7.0-11.0) 


 


 


Neutrophils (%) (Auto)


  77.5 %


(16.0-70.0) 


 


 


Lymphocytes (%) (Auto)


  14.0 %


(9.0-44.0) 


 


 


Monocytes (%) (Auto)


  6.6 %


(0.0-8.0) 


 


 


Eosinophils (%) (Auto)


  1.2 %


(0.0-4.0) 


 


 


Basophils (%) (Auto)


  0.7 %


(0.0-2.0) 


 


 


Neutrophils # (Auto)


  4.4 TH/MM3


(1.8-7.7) 


 


 


Lymphocytes # (Auto)


  0.8 TH/MM3


(1.0-4.8) 


 


 


Monocytes # (Auto)


  0.4 TH/MM3


(0-0.9) 


 


 


Eosinophils # (Auto)


  0.1 TH/MM3


(0-0.4) 


 


 


Basophils # (Auto)


  0.0 TH/MM3


(0-0.2) 


 


 


CBC Comment DIFF FINAL  


 


Differential Comment   


 


Activated Partial


Thromboplast Time 27.6 SEC


(24.3-30.1) 29.3 SEC


(24.3-30.1)


 


Blood Urea Nitrogen


  35 MG/DL


(7-18) 


 


 


Creatinine


  5.44 MG/DL


(0.50-1.00) 


 


 


Random Glucose


  293 MG/DL


() 


 


 


Total Protein


  7.8 GM/DL


(6.4-8.2) 


 


 


Albumin


  3.7 GM/DL


(3.4-5.0) 


 


 


Calcium Level


  8.5 MG/DL


(8.5-10.1) 


 


 


Alkaline Phosphatase


  330 U/L


() 


 


 


Aspartate Amino Transf


(AST/SGOT) 16 U/L (15-37) 


  


 


 


Alanine Aminotransferase


(ALT/SGPT) 32 U/L (10-53) 


  


 


 


Total Bilirubin


  0.5 MG/DL


(0.2-1.0) 


 


 


Direct Bilirubin


  0.2 MG/DL


(0.0-0.2) 


 


 


Sodium Level


  136 MEQ/L


(136-145) 


 


 


Potassium Level


  3.7 MEQ/L


(3.5-5.1) 


 


 


Chloride Level


  95 MEQ/L


() 


 


 


Carbon Dioxide Level


  29.1 MEQ/L


(21.0-32.0) 


 


 


Anion Gap


  12 MEQ/L


(5-15) 


 


 


Estimat Glomerular Filtration


Rate 10 ML/MIN


(>89) 


 


 


Indirect Bilirubin


  0.3 MG/DL


(0.0-0.8) 


 








Result Diagram:  


5/12/18 0135                                                                   

             5/12/18 0135





Imaging:





Last Impressions








Lower Extremity Ultrasound 5/11/18 0000 Signed





Impressions: 





 Service Date/Time:  Friday, May 11, 2018 17:25 - CONCLUSION:  Lack of 





 visualization of the distal greater saphenous veins bilaterally. Measurements 

as 





 above.     Fei Greenfield Jr., MD 


 


Carotid Artery Ultrasound 5/11/18 0000 Signed





Impressions: 





 Service Date/Time:  Friday, May 11, 2018 16:56 - CONCLUSION:  1. Calcified 





 atherosclerotic plaque with less than 50%% stenoses of the carotid arteries 





 bilaterally. 2. Antegrade flow involving both vertebral arteries.     Fei Greenfield Jr., MD 


 


Chest X-Ray 5/9/18 0825 Signed





Impressions: 





 Service Date/Time:  Wednesday, May 9, 2018 08:49 - CONCLUSION: No acute 

disease. 





       David Swift MD  [facility]


 


Liver Ultrasound 5/8/18 0000 Signed





Impressions: 





 Service Date/Time:  Tuesday, May 8, 2018 05:16 - CONCLUSION:  Normal 

examination 





 except for very echogenic right kidney.       Edgardo Mullins MD 


 


CT Angiography 5/8/18 0000 Signed





Impressions: 





 Service Date/Time:  Tuesday, May 8, 2018 11:22 - CONCLUSION:  1. No CT 

evidence 





 for pulmonary artery embolism as questioned. 2. Patchy upper lobe ground glass 





 opacities which appear more confluent in the lower lobes near the bases. 

Overall 





 findings are most consistent with pulmonary edema. 3. Trace left and small 

right 





 simple pleural effusions. 4. Nonspecific right hilar adenopathy which maybe 





 reactive in etiology. 5. Coronary artery calcifications.      Tannre Dasilva MD 








Cardiovascular:


RRR


Telemetry:


NSR


Pulmonary:


CTA


GI/:


NABS, NT


Plan:


52y/o female with numerous comorbidities with ongoing unstable angina s/p 

NSTEMI.  Risk of CABG is high with her and her distal targets are small with 

evidence of diffuse disease.  In the past, her PFTs have been poor and I am 

waiting on a current set.  She is NOT a good surgical candidate, but there are 

not good alternatives for revascularization.  Will follow.














Ban Huitron MD May 12, 2018 12:10

## 2018-05-13 VITALS
SYSTOLIC BLOOD PRESSURE: 114 MMHG | HEART RATE: 79 BPM | DIASTOLIC BLOOD PRESSURE: 73 MMHG | RESPIRATION RATE: 16 BRPM | OXYGEN SATURATION: 99 %

## 2018-05-13 VITALS
DIASTOLIC BLOOD PRESSURE: 62 MMHG | HEART RATE: 79 BPM | SYSTOLIC BLOOD PRESSURE: 131 MMHG | TEMPERATURE: 98 F | OXYGEN SATURATION: 96 % | RESPIRATION RATE: 16 BRPM

## 2018-05-13 VITALS
TEMPERATURE: 98.2 F | RESPIRATION RATE: 16 BRPM | HEART RATE: 82 BPM | DIASTOLIC BLOOD PRESSURE: 69 MMHG | OXYGEN SATURATION: 97 % | SYSTOLIC BLOOD PRESSURE: 135 MMHG

## 2018-05-13 VITALS — HEART RATE: 84 BPM

## 2018-05-13 VITALS
SYSTOLIC BLOOD PRESSURE: 123 MMHG | OXYGEN SATURATION: 96 % | RESPIRATION RATE: 16 BRPM | DIASTOLIC BLOOD PRESSURE: 56 MMHG | HEART RATE: 75 BPM

## 2018-05-13 VITALS
DIASTOLIC BLOOD PRESSURE: 68 MMHG | RESPIRATION RATE: 16 BRPM | TEMPERATURE: 98.3 F | SYSTOLIC BLOOD PRESSURE: 112 MMHG | OXYGEN SATURATION: 96 % | HEART RATE: 85 BPM

## 2018-05-13 VITALS — HEART RATE: 76 BPM

## 2018-05-13 VITALS — DIASTOLIC BLOOD PRESSURE: 71 MMHG | SYSTOLIC BLOOD PRESSURE: 176 MMHG

## 2018-05-13 VITALS
DIASTOLIC BLOOD PRESSURE: 59 MMHG | SYSTOLIC BLOOD PRESSURE: 125 MMHG | HEART RATE: 79 BPM | OXYGEN SATURATION: 99 % | RESPIRATION RATE: 16 BRPM | TEMPERATURE: 99.5 F

## 2018-05-13 VITALS — HEART RATE: 86 BPM

## 2018-05-13 VITALS — HEART RATE: 78 BPM

## 2018-05-13 VITALS — HEART RATE: 88 BPM

## 2018-05-13 VITALS — HEART RATE: 80 BPM

## 2018-05-13 VITALS — OXYGEN SATURATION: 97 %

## 2018-05-13 VITALS — HEART RATE: 82 BPM

## 2018-05-13 VITALS — HEART RATE: 74 BPM

## 2018-05-13 VITALS — HEART RATE: 83 BPM

## 2018-05-13 VITALS — HEART RATE: 79 BPM

## 2018-05-13 RX ADMIN — METOPROLOL TARTRATE SCH MG: 25 TABLET, FILM COATED ORAL at 20:58

## 2018-05-13 RX ADMIN — Medication SCH ML: at 09:02

## 2018-05-13 RX ADMIN — NITROGLYCERIN SCH INCH: 20 OINTMENT TOPICAL at 23:15

## 2018-05-13 RX ADMIN — FAMOTIDINE SCH MG: 20 TABLET, FILM COATED ORAL at 09:02

## 2018-05-13 RX ADMIN — Medication SCH ML: at 21:00

## 2018-05-13 RX ADMIN — NITROGLYCERIN PRN MG: 0.4 TABLET SUBLINGUAL at 11:56

## 2018-05-13 RX ADMIN — ONDANSETRON PRN MG: 2 INJECTION, SOLUTION INTRAMUSCULAR; INTRAVENOUS at 11:56

## 2018-05-13 RX ADMIN — INSULIN ASPART SCH: 100 INJECTION, SOLUTION INTRAVENOUS; SUBCUTANEOUS at 21:01

## 2018-05-13 RX ADMIN — MAGNESIUM HYDROXIDE PRN ML: 400 SUSPENSION ORAL at 17:47

## 2018-05-13 RX ADMIN — HEPARIN SODIUM PRN MLS/HR: 10000 INJECTION, SOLUTION INTRAVENOUS at 18:41

## 2018-05-13 RX ADMIN — NITROGLYCERIN SCH INCH: 20 OINTMENT TOPICAL at 12:02

## 2018-05-13 RX ADMIN — FERROUS SULFATE TAB 325 MG (65 MG ELEMENTAL FE) SCH MG: 325 (65 FE) TAB at 09:01

## 2018-05-13 RX ADMIN — CITALOPRAM SCH MG: 20 TABLET, FILM COATED ORAL at 09:01

## 2018-05-13 RX ADMIN — INSULIN ASPART SCH: 100 INJECTION, SOLUTION INTRAVENOUS; SUBCUTANEOUS at 08:34

## 2018-05-13 RX ADMIN — LEVOFLOXACIN SCH MLS/HR: 5 INJECTION, SOLUTION INTRAVENOUS at 09:39

## 2018-05-13 RX ADMIN — ACYCLOVIR SCH UNITS: 800 TABLET ORAL at 21:00

## 2018-05-13 RX ADMIN — ONDANSETRON PRN MG: 2 INJECTION, SOLUTION INTRAMUSCULAR; INTRAVENOUS at 17:36

## 2018-05-13 RX ADMIN — METOPROLOL TARTRATE SCH MG: 25 TABLET, FILM COATED ORAL at 09:01

## 2018-05-13 RX ADMIN — ASPIRIN 81 MG SCH MG: 81 TABLET ORAL at 09:01

## 2018-05-13 RX ADMIN — NITROGLYCERIN PRN MLS/HR: 200 INJECTION, SOLUTION INTRAVENOUS at 13:25

## 2018-05-13 RX ADMIN — SEVELAMER CARBONATE SCH MG: 800 TABLET, FILM COATED ORAL at 12:02

## 2018-05-13 RX ADMIN — INSULIN ASPART SCH: 100 INJECTION, SOLUTION INTRAVENOUS; SUBCUTANEOUS at 12:42

## 2018-05-13 RX ADMIN — MEGESTROL ACETATE SCH MG: 40 SUSPENSION ORAL at 09:02

## 2018-05-13 RX ADMIN — HEPARIN SODIUM PRN UNITS: 1000 INJECTION, SOLUTION INTRAVENOUS; SUBCUTANEOUS at 17:40

## 2018-05-13 RX ADMIN — NITROGLYCERIN PRN MLS/HR: 200 INJECTION, SOLUTION INTRAVENOUS at 23:15

## 2018-05-13 RX ADMIN — NITROGLYCERIN SCH INCH: 20 OINTMENT TOPICAL at 17:19

## 2018-05-13 RX ADMIN — NITROGLYCERIN SCH INCH: 20 OINTMENT TOPICAL at 05:12

## 2018-05-13 RX ADMIN — INSULIN ASPART SCH: 100 INJECTION, SOLUTION INTRAVENOUS; SUBCUTANEOUS at 17:25

## 2018-05-13 RX ADMIN — SEVELAMER CARBONATE SCH MG: 800 TABLET, FILM COATED ORAL at 09:01

## 2018-05-13 RX ADMIN — SEVELAMER CARBONATE SCH MG: 800 TABLET, FILM COATED ORAL at 17:26

## 2018-05-13 RX ADMIN — FAMOTIDINE SCH MG: 20 TABLET, FILM COATED ORAL at 20:59

## 2018-05-13 NOTE — HHI.NPPN
Subjective


Complaints:  Chest Pain, Shortness of Breath


General Problems:  Anemia


Renal Failure:  Chronic, End Stage Renal Disease


Additional Remarks


Patient is alert, no SOB, occ. has chest pain.





Review of Systems


General


Constitutional:  Fatigue





Respiratory


Lungs:  SOB





Cardiovascular


Cardiac:  Chest Pain





Objective Data


Data





Vital Signs








  Date Time  Temp Pulse Resp B/P (MAP) Pulse Ox O2 Delivery O2 Flow Rate FiO2


 


5/13/18 09:00  88      


 


5/13/18 08:22 98.3 85 16 112/68 (83) 96   


 


5/13/18 08:00  84      


 


5/13/18 07:00  82      


 


5/13/18 06:00  80      


 


5/13/18 05:00  76      


 


5/13/18 04:01  79 16 114/73 (87) 99   


 


5/13/18 04:00  80      


 


5/13/18 03:00  83      


 


5/13/18 02:00  80      


 


5/13/18 01:00  74      


 


5/13/18 00:00  76      


 


5/12/18 23:30  73 16 147/67 (93) 99   


 


5/12/18 23:00  77      


 


5/12/18 22:00  82      


 


5/12/18 21:00  84      


 


5/12/18 20:57     96   21


 


5/12/18 20:00  78      


 


5/12/18 19:40 98.9 78 16 143/64 (90) 99   


 


5/12/18 19:00  77      


 


5/12/18 18:00  79      


 


5/12/18 17:00  78      


 


5/12/18 16:00  77      


 


5/12/18 15:00 97.9 76 20 119/58 (78) 95   


 


5/12/18 15:00  73      


 


5/12/18 14:00  75      


 


5/12/18 13:00  79      


 


5/12/18 12:00  78      


 


5/12/18 11:00  80      


 


5/12/18 11:00 98.3 79 20 142/65 (90) 97   








-:  


5/12/18 0135                                                                   

             5/12/18 0135








 Microbiology


5/12/18 Stool Occult Blood (ARLINE) - Final, Complete


          HEMOCCULT NEGATIVE


Tubes & Lines:  Perma-Cath





Physical Exam


General


Appearance:  No Acute Distress, Comfortable





Eyes


Eye Exam:  Pupils Equal, Pupils Reactive





Pulmonary


Resp Exam:  Clear Bilaterally, Breath Sounds Equal





Cardiology


CV Exam:  Regular, Normal Sinus Rhythm





Gastrointestinal/Abdomen


GI Exam:  Soft, Non-Tender, Bowel Sounds Present





Musculoskeletal


MS Exam:  Joints Intact, Normal Gait, Good Strength





Integumentary


Skin Exam:  Clear





Extremeties


Extremities Exam:  No Edema





Neurologic


Neuro Exam:  Alert, Awake, Oriented





Assessment/Plan


Discussed Condition With:  Patient


Assessment Summary:  Anemia of CKD, CHF, Hypertension, Diabetes Mellitus, End 

Stage Renal Disease


Problem List:  


(1) ESRD (end stage renal disease) on dialysis


ICD Codes:  N18.6 - End stage renal disease; Z99.2 - Dependence on renal 

dialysis


Plan:  Continue HD MWF. 


Fluid status has improved, still makes urine. 


AVF not mature, using Permcath. She has second step surgery scheduled 5/15 with 

Dr. Rees. 


Start Renvela with meals. 


On protein supplements to meals and Megace. 


on antidepressant, has reproducible sternal pain, mainly musculoskeletal.


Lortab for pain as needed.





(2) Chest pain, rule out acute myocardial infarction


ICD Codes:  R07.9 - Chest pain, unspecified


Status:  Acute


Plan:  Cardiology following


PE study negative


s/p cath, 90% occlusion of Bare Metal Stent previously placed. [facility] will  

consulted vs placing drug eluding stents. 


Echo: Ef 40-45%, LVH is present. 





(3) DM (diabetes mellitus)


ICD Codes:  E11.9 - Type 2 diabetes mellitus without complications


Plan:  maintain glucose 140-180 mg/dL while hospitalized 


Start D10 @ 15 when NPO tonight. 





(4) HTN (hypertension)


ICD Codes:  I10 - Hypertension


Status:  Chronic


Plan:  Monitor blood pressure, excellent control recently 


Resume hypertensive agents


Fluid removal with dialysis





(5) Anemia


ICD Codes:  D64.9 - Anemia, unspecified


Status:  Chronic


Plan:  Epogen not required


Monitor hemoglobin 


On oral iron














Jesica Choudhary MD May 13, 2018 10:56

## 2018-05-13 NOTE — HHI.PR
Subjective


Remarks


Patient says she is feeling all right today.  Chest pain resolved.





Objective





Vital Signs








  Date Time  Temp Pulse Resp B/P (MAP) Pulse Ox O2 Delivery O2 Flow Rate FiO2


 


5/13/18 09:00  88      


 


5/13/18 08:22 98.3 85 16 112/68 (83) 96   


 


5/13/18 08:00  84      


 


5/13/18 07:00  82      


 


5/13/18 06:00  80      


 


5/13/18 05:00  76      


 


5/13/18 04:01  79 16 114/73 (87) 99   


 


5/13/18 04:00  80      


 


5/13/18 03:00  83      


 


5/13/18 02:00  80      


 


5/13/18 01:00  74      


 


5/13/18 00:00  76      


 


5/12/18 23:30  73 16 147/67 (93) 99   


 


5/12/18 23:00  77      


 


5/12/18 22:00  82      


 


5/12/18 21:00  84      


 


5/12/18 20:57     96   21


 


5/12/18 20:00  78      


 


5/12/18 19:40 98.9 78 16 143/64 (90) 99   


 


5/12/18 19:00  77      


 


5/12/18 18:00  79      


 


5/12/18 17:00  78      


 


5/12/18 16:00  77      


 


5/12/18 15:00 97.9 76 20 119/58 (78) 95   


 


5/12/18 15:00  73      


 


5/12/18 14:00  75      


 


5/12/18 13:00  79      


 


5/12/18 12:00  78      


 


5/12/18 11:00  80      


 


5/12/18 11:00 98.3 79 20 142/65 (90) 97   














I/O      


 


 5/12/18 5/12/18 5/12/18 5/13/18 5/13/18 5/13/18





 07:00 15:00 23:00 07:00 15:00 23:00


 


Intake Total 480 ml  970 ml 480 ml  


 


Output Total 400 ml  600 ml 600 ml  


 


Balance 80 ml  370 ml -120 ml  


 


      


 


Intake Oral 480 ml  720 ml 480 ml  


 


IV Total   250 ml   


 


Output Urine Total 400 ml  600 ml 600 ml  


 


# Bowel Movements 0  0 1  








Result Diagram:  


5/12/18 0135 5/12/18 0135





Objective Remarks


GENERAL: Patient sitting up in bed.  Appears comfortable.  Alert.  Heart rate 

in 80s.  No change on exam.


SKIN: Warm and dry.


HEAD: Normocephalic.


EYES: No scleral icterus. No injection or drainage. 


NECK: Supple, trachea midline. No JVD.


CARDIOVASCULAR: Tachycardic .regular rate and rhythm without murmurs, gallops, 

or rubs. 


RESPIRATORY: Breath sounds equal bilaterally. No accessory muscle use.


GASTROINTESTINAL: Abdomen soft, non-tender, nondistended. 


MUSCULOSKELETAL: No cyanosis, or edema. 


BACK: Nontender without obvious deformity. No CVA tenderness.








A/P


Assessment and Plan














//SIRS meeting criteria, also tachycardiac, initiate sepsis protocol. With 

fevers patient did not have cardiac cath.  Discussed with the cardiology. Blood 

cultures, CXR, UA, LA ordered. Started on Levaquin. Monitor closely. 


= No evidence of infection.  Discontinue antibiotics.





//CHF with Exacerbation


BNP elevated.  Chest x-ray with edema.  Will start on nitro drip.  Consulted 

nephrology for dialysis.  Chest x-ray reviewed with congestion. Advance diet 

cardiac cath on hold.


= Cardiology following.  Appreciate assistance.





//Accelerated hypertension.  Patient started on nitro drip.  Restart hydralazine

, with as needed hydralazine.  Awaiting official list of home meds.


= Resolved.  Decreased blood pressure meds.





//NSTEMI


Elevated Troponin trending up  EKG sinus with T-wave inversions noted.    

Heparin drip.  Status post aspirin.  Continue daily aspirin.  Continue Plavix. 


= 5/11.  Tachycardia after dialysis.  Could be secondary to dialyzing 

metoprolol.  Will add metoprolol for heart rate control.  Decrease amlodipine 

due to overcontrolled blood pressure.  Decrease hydralazine blockages is on 

catheterization.  Cardiovascular surgery following.  Appreciate assistance.


= 5/13.  Follow-up cardiothoracic surgery recommendations.





//Pleuritic chest pain


elevated  d-dimer. CT pulmonary angiogram reviewed and no PE. Patient received 

HD 





//End-stage renal disease on hemodialysis Monday Wednesday Friday.  Consult 

nephrology for dialysis.  Dializef before and after CTA.  Appreciate assistance.





//Tobacco abuse.  Cessation counseling provided.





//Diabetes mellitus type 2.  Diabetic diet.  Insulin sliding scale, accuchecks. 


= 5/13.  Glucose slightly elevated yesterday in the 200s.  Will discontinue D10 

drip.





//Transaminitis


Nausea


Check liver ultrasound.  Lipase only in the 60s.  Consult GI given past history 

of pancreatitis


= Resolved.  Liver ultrasound normal.


Discharge Planning


Follow-up CT surgery recommendations.


Home when cleared by cardiology/CT surgery











Alex Pizano MD May 13, 2018 10:42

## 2018-05-13 NOTE — PD.CARD.PN
Subjective


Subjective Remarks


No chest pain. Resting comfortably





Objective


Medications





Current Medications








 Medications


  (Trade)  Dose


 Ordered  Sig/Luis Ramando


 Route  Start Time


 Stop Time Status Last Admin


 


  (Nitrostat Sl)  0.4 mg  Q5M  PRN


 SL  5/7/18 23:15


    5/8/18 21:57


 


 


  (NS Flush)  2 ml  UNSCH  PRN


 IV FLUSH  5/8/18 02:15


    5/8/18 23:02


 


 


  (NS Flush)  2 ml  BID


 IV FLUSH  5/8/18 09:00


    5/13/18 09:02


 


 


  (Narcan Inj)  0.4 mg  UNSCH  PRN


 IV PUSH  5/8/18 02:15


     


 


 


  (Milk Of


 Magnesia Liq)  30 ml  Q12H  PRN


 PO  5/8/18 02:15


    5/12/18 09:06


 


 


  (Senokot)  17.2 mg  Q12H  PRN


 PO  5/8/18 02:15


     


 


 


  (Dulcolax Supp)  10 mg  DAILY  PRN


 RECTAL  5/8/18 02:15


     


 


 


  (Lactulose Liq)  30 ml  DAILY  PRN


 PO  5/8/18 02:15


    5/12/18 15:29


 


 


  (Aspirin Chew)  81 mg  DAILY


 CHEW  5/8/18 09:00


    5/13/18 09:01


 


 


  (Plavix)  75 mg  DAILY


 PO  5/8/18 09:00


   Future Hold 5/11/18 09:11


 


 


  (Apresoline Inj)  10 mg  Q30M  PRN


 IV PUSH  5/8/18 02:15


     


 


 


  (NovoLOG


 SUPPLEMENTAL


 SCALE)  1  ACHS SLIDING  SCALE


 SQ  5/8/18 08:00


    5/12/18 20:48


 


 


  (D50w (Vial) Inj)  50 ml  UNSCH  PRN


 IV PUSH  5/8/18 04:15


     


 


 


  (Glucagon Inj)  1 mg  UNSCH  PRN


 OTHER  5/8/18 04:15


     


 


 


 Sodium Chloride  1,000 ml @ 


 0 mls/hr  Q0M PRN


 OTHER  5/8/18 08:52


     


 


 


  (Heparin Inj)  8,000 units  UNSCH  PRN


 IV FLUSH  5/8/18 09:00


     


 


 


 Sodium Chloride  1,000 ml @ 


 200 mls/hr  Q5H PRN


 IV  5/8/18 08:52


     


 


 


 Sodium Chloride  1,000 ml @ 


 0 mls/hr  Q0M PRN


 OTHER  5/8/18 08:52


     


 


 


  (Mannitol Inj)  12.5 gm  UNSCH  PRN


 IV  5/8/18 09:00


     


 


 


 Albumin Human  100 ml @ 


 60 mls/hr  UNSCH  PRN


 IV  5/8/18 09:00


     


 


 


  (NS Flush)  5 ml  UNSCH  PRN


 IV FLUSH  5/8/18 09:00


     


 


 


  (Heparin Inj)    UNSCH  PRN


 .XX  5/8/18 09:00


     


 


 


  (Gentamicin Inj)  20 mg  UNSCH  PRN


 OTHER  5/8/18 09:00


     


 


 


  (Zofran Inj)  4 mg  UNSCH  PRN


 IV PUSH  5/8/18 09:00


    5/12/18 18:32


 


 


  (Tylenol)  650 mg  UNSCH  PRN


 PO  5/8/18 09:00


    5/11/18 21:37


 


 


  (Benadryl)  25 mg  UNSCH  PRN


 PO  5/8/18 09:00


    5/13/18 05:11


 


 


  (Nitrostat Sl)  0.4 mg  UNSCH  PRN


 SL  5/8/18 09:00


    5/12/18 10:49


 


 


  (Catapres)  0.1 mg  UNSCH  PRN


 PO  5/8/18 09:00


     


 


 


  (Gelfoam 12 Mm/7


 Mm Top)  1 foam  UNSCH  PRN


 TOP  5/8/18 09:00


     


 


 


  (Ativan)  0.5 mg  Q12H  PRN


 PO  5/8/18 13:00


    5/10/18 22:03


 


 


  (Nitroglycerin


 2% Oint)  1 inch  Q6HR


 TOPICAL  5/9/18 00:00


    5/13/18 05:12


 


 


 Levofloxacin/


 Dextrose  50 ml @ 50


 mls/hr  Q48H


 IV  5/11/18 09:00


    5/11/18 09:14


 


 


  (Ferrous Sulfate)  325 mg  DAILY


 PO  5/9/18 13:00


    5/13/18 09:01


 


 


  (Megace Liq)  800 mg  DAILY


 PO  5/9/18 15:00


    5/13/18 09:02


 


 


 Dextrose  500 ml @ 


 15 mls/hr  Q24H


 IV  5/11/18 00:00


    5/11/18 02:10


 


 


  (Levemir Inj)  10 units  HS


 SQ  5/10/18 21:00


    5/12/18 20:48


 


 


  (Renvela)  800 mg  TIDAC


 PO  5/11/18 17:00


    5/13/18 09:01


 


 


  (CeleXA)  20 mg  DAILY


 PO  5/11/18 14:00


    5/13/18 09:01


 


 


  (Heparin Inj)  5,000 units  UNSCH  PRN


 IV PUSH  5/11/18 21:45


     


 


 


  (Heparin Inj)  2,500 units  UNSCH  PRN


 IV PUSH  5/11/18 21:45


    5/12/18 23:22


 


 


 Heparin Sodium/


 Dextrose  250 ml @ 


 6.55 mls/hr  TITRATE  PRN


 IV  5/11/18 20:00


    5/12/18 19:13


 


 


  (Lopressor Inj)  5 mg  Q5M  PRN


 IV PUSH  5/11/18 16:30


    5/11/18 16:50


 


 


  (Pill Splitter)  1 ea  UNSCH  PRN


 OTHER  5/11/18 16:30


     


 


 


  (Apresoline)  25 mg  Q8HR


 PO  5/11/18 22:00


   Future hold 5/13/18 05:11


 


 


  (Lopressor)  50 mg  Q12HR


 PO  5/11/18 21:00


    5/13/18 09:01


 


 


  (Norvasc)  10 mg  DAILY


 PO  5/12/18 09:00


    5/13/18 09:01


 


 


  (Pepcid)  10 mg  BID


 PO  5/12/18 21:00


    5/13/18 09:02


 








Vital Signs / I&O





Vital Signs








  Date Time  Temp Pulse Resp B/P (MAP) Pulse Ox O2 Delivery O2 Flow Rate FiO2


 


5/13/18 08:22 98.3 85 16 112/68 (83) 96   


 


5/13/18 07:00  82      


 


5/13/18 06:00  80      


 


5/13/18 05:00  76      


 


5/13/18 04:01  79 16 114/73 (87) 99   


 


5/13/18 04:00  80      


 


5/13/18 03:00  83      


 


5/13/18 02:00  80      


 


5/13/18 01:00  74      


 


5/13/18 00:00  76      


 


5/12/18 23:30  73 16 147/67 (93) 99   


 


5/12/18 23:00  77      


 


5/12/18 22:00  82      


 


5/12/18 21:00  84      


 


5/12/18 20:57     96   21


 


5/12/18 20:00  78      


 


5/12/18 19:40 98.9 78 16 143/64 (90) 99   


 


5/12/18 19:00  77      


 


5/12/18 18:00  79      


 


5/12/18 17:00  78      


 


5/12/18 16:00  77      


 


5/12/18 15:00 97.9 76 20 119/58 (78) 95   


 


5/12/18 15:00  73      


 


5/12/18 14:00  75      


 


5/12/18 13:00  79      


 


5/12/18 12:00  78      


 


5/12/18 11:00  80      


 


5/12/18 11:00 98.3 79 20 142/65 (90) 97   


 


5/12/18 10:00  81      














I/O      


 


 5/12/18 5/12/18 5/12/18 5/13/18 5/13/18 5/13/18





 07:00 15:00 23:00 07:00 15:00 23:00


 


Intake Total 480 ml  970 ml 480 ml  


 


Output Total 400 ml  600 ml 600 ml  


 


Balance 80 ml  370 ml -120 ml  


 


      


 


Intake Oral 480 ml  720 ml 480 ml  


 


IV Total   250 ml   


 


Output Urine Total 400 ml  600 ml 600 ml  


 


# Bowel Movements 0  0 1  








Physical Exam


Lungs clear 


RRR


Laboratory





Laboratory Tests








Test


  5/12/18


11:31 5/12/18


22:12 5/13/18


05:25


 


Activated Partial


Thromboplast Time 29.3 SEC 


  26.1 SEC 


  39.9 SEC 


 











Assessment and Plan


Assessment and Plan


Stable. Await PFT's and [facility] decision regarding CABG











Cali Huerta MD May 13, 2018 09:42

## 2018-05-14 VITALS
RESPIRATION RATE: 18 BRPM | OXYGEN SATURATION: 99 % | SYSTOLIC BLOOD PRESSURE: 104 MMHG | TEMPERATURE: 98.2 F | DIASTOLIC BLOOD PRESSURE: 57 MMHG | HEART RATE: 80 BPM

## 2018-05-14 VITALS — HEART RATE: 82 BPM

## 2018-05-14 VITALS
HEART RATE: 80 BPM | RESPIRATION RATE: 18 BRPM | SYSTOLIC BLOOD PRESSURE: 143 MMHG | DIASTOLIC BLOOD PRESSURE: 66 MMHG | TEMPERATURE: 97.8 F | OXYGEN SATURATION: 96 %

## 2018-05-14 VITALS
OXYGEN SATURATION: 95 % | SYSTOLIC BLOOD PRESSURE: 144 MMHG | DIASTOLIC BLOOD PRESSURE: 65 MMHG | RESPIRATION RATE: 16 BRPM | HEART RATE: 83 BPM

## 2018-05-14 VITALS — HEART RATE: 76 BPM

## 2018-05-14 VITALS — HEART RATE: 78 BPM

## 2018-05-14 VITALS
DIASTOLIC BLOOD PRESSURE: 67 MMHG | TEMPERATURE: 98.7 F | HEART RATE: 82 BPM | SYSTOLIC BLOOD PRESSURE: 148 MMHG | RESPIRATION RATE: 18 BRPM | OXYGEN SATURATION: 98 %

## 2018-05-14 VITALS
OXYGEN SATURATION: 99 % | RESPIRATION RATE: 16 BRPM | HEART RATE: 92 BPM | DIASTOLIC BLOOD PRESSURE: 72 MMHG | SYSTOLIC BLOOD PRESSURE: 168 MMHG | TEMPERATURE: 98.6 F

## 2018-05-14 VITALS — HEART RATE: 84 BPM

## 2018-05-14 VITALS — HEART RATE: 74 BPM

## 2018-05-14 VITALS — HEART RATE: 70 BPM

## 2018-05-14 VITALS — HEART RATE: 90 BPM

## 2018-05-14 VITALS — HEART RATE: 88 BPM

## 2018-05-14 VITALS — HEART RATE: 87 BPM

## 2018-05-14 VITALS — SYSTOLIC BLOOD PRESSURE: 145 MMHG | DIASTOLIC BLOOD PRESSURE: 66 MMHG

## 2018-05-14 VITALS — HEART RATE: 92 BPM

## 2018-05-14 LAB
ERYTHROCYTE [DISTWIDTH] IN BLOOD BY AUTOMATED COUNT: 15.1 % (ref 11.6–17.2)
HCT VFR BLD CALC: 33.4 % (ref 35–46)
HGB BLD-MCNC: 11.1 GM/DL (ref 11.6–15.3)
MCH RBC QN AUTO: 31.4 PG (ref 27–34)
MCHC RBC AUTO-ENTMCNC: 33.2 % (ref 32–36)
MCV RBC AUTO: 94.5 FL (ref 80–100)
MITOCHONDRIA AB SER QL: (no result) U (ref ?–20)
PLATELET # BLD: 309 TH/MM3 (ref 150–450)
PMV BLD AUTO: 7.5 FL (ref 7–11)
RBC # BLD AUTO: 3.54 MIL/MM3 (ref 4–5.3)
WBC # BLD AUTO: 6 TH/MM3 (ref 4–11)

## 2018-05-14 RX ADMIN — NITROGLYCERIN PRN MLS/HR: 200 INJECTION, SOLUTION INTRAVENOUS at 10:30

## 2018-05-14 RX ADMIN — NITROGLYCERIN PRN MLS/HR: 200 INJECTION, SOLUTION INTRAVENOUS at 19:06

## 2018-05-14 RX ADMIN — INSULIN ASPART SCH: 100 INJECTION, SOLUTION INTRAVENOUS; SUBCUTANEOUS at 08:00

## 2018-05-14 RX ADMIN — MEGESTROL ACETATE SCH MG: 40 SUSPENSION ORAL at 11:11

## 2018-05-14 RX ADMIN — SEVELAMER CARBONATE SCH MG: 800 TABLET, FILM COATED ORAL at 17:28

## 2018-05-14 RX ADMIN — Medication SCH ML: at 09:00

## 2018-05-14 RX ADMIN — CITALOPRAM SCH MG: 20 TABLET, FILM COATED ORAL at 11:10

## 2018-05-14 RX ADMIN — INSULIN ASPART SCH: 100 INJECTION, SOLUTION INTRAVENOUS; SUBCUTANEOUS at 21:00

## 2018-05-14 RX ADMIN — FERROUS SULFATE TAB 325 MG (65 MG ELEMENTAL FE) SCH MG: 325 (65 FE) TAB at 11:10

## 2018-05-14 RX ADMIN — Medication SCH ML: at 21:00

## 2018-05-14 RX ADMIN — MAGNESIUM HYDROXIDE PRN ML: 400 SUSPENSION ORAL at 14:23

## 2018-05-14 RX ADMIN — NITROGLYCERIN SCH INCH: 20 OINTMENT TOPICAL at 11:17

## 2018-05-14 RX ADMIN — SEVELAMER CARBONATE SCH MG: 800 TABLET, FILM COATED ORAL at 15:04

## 2018-05-14 RX ADMIN — METOPROLOL TARTRATE SCH MG: 25 TABLET, FILM COATED ORAL at 22:20

## 2018-05-14 RX ADMIN — INSULIN ASPART SCH: 100 INJECTION, SOLUTION INTRAVENOUS; SUBCUTANEOUS at 11:37

## 2018-05-14 RX ADMIN — ASPIRIN 81 MG SCH MG: 81 TABLET ORAL at 11:11

## 2018-05-14 RX ADMIN — SEVELAMER CARBONATE SCH MG: 800 TABLET, FILM COATED ORAL at 08:00

## 2018-05-14 RX ADMIN — NITROGLYCERIN SCH INCH: 20 OINTMENT TOPICAL at 06:00

## 2018-05-14 RX ADMIN — METOPROLOL TARTRATE SCH MG: 25 TABLET, FILM COATED ORAL at 11:10

## 2018-05-14 RX ADMIN — ACYCLOVIR SCH UNITS: 800 TABLET ORAL at 21:00

## 2018-05-14 RX ADMIN — WATER PRN ML: 1 IRRIGANT IRRIGATION at 14:23

## 2018-05-14 RX ADMIN — INSULIN ASPART SCH: 100 INJECTION, SOLUTION INTRAVENOUS; SUBCUTANEOUS at 17:28

## 2018-05-14 RX ADMIN — FAMOTIDINE SCH MG: 20 TABLET, FILM COATED ORAL at 11:09

## 2018-05-14 RX ADMIN — HEPARIN SODIUM PRN UNITS: 1000 INJECTION, SOLUTION INTRAVENOUS; SUBCUTANEOUS at 01:03

## 2018-05-14 RX ADMIN — NITROGLYCERIN SCH INCH: 20 OINTMENT TOPICAL at 18:00

## 2018-05-14 RX ADMIN — SEVELAMER CARBONATE SCH MG: 800 TABLET, FILM COATED ORAL at 11:17

## 2018-05-14 RX ADMIN — ONDANSETRON PRN MG: 2 INJECTION, SOLUTION INTRAMUSCULAR; INTRAVENOUS at 13:43

## 2018-05-14 RX ADMIN — FAMOTIDINE SCH MG: 20 TABLET, FILM COATED ORAL at 21:38

## 2018-05-14 NOTE — HHI.PR
Subjective


Remarks


Patient says she is feeling all right.  Denies any chest pain or shortness of 

breath.





Objective





Vital Signs








  Date Time  Temp Pulse Resp B/P (MAP) Pulse Ox O2 Delivery O2 Flow Rate FiO2


 


5/14/18 14:00  74      


 


5/14/18 13:00  82      


 


5/14/18 12:45  65  154/86    


 


5/14/18 12:00  90      


 


5/14/18 11:55   18     


 


5/14/18 11:48 98.6 92 16 168/72 (104) 99   


 


5/14/18 11:22  61  168/72    


 


5/14/18 11:00  92      


 


5/14/18 10:30  92  124/65    


 


5/14/18 10:00  88      


 


5/14/18 09:00  82      


 


5/14/18 08:00  87      


 


5/14/18 07:40 97.8 80 18 143/66 (91) 96   


 


5/14/18 07:15  80  143/66    


 


5/14/18 07:00  78      


 


5/14/18 06:25    145/66 (92)    


 


5/14/18 06:00  78      


 


5/14/18 05:00  76      


 


5/14/18 04:00  78      


 


5/14/18 03:50  83 16 144/65 (91) 95   


 


5/14/18 03:50  83  144/65    


 


5/14/18 03:00  78      


 


5/14/18 02:00  74      


 


5/14/18 01:00  74      


 


5/14/18 00:00  76      


 


5/13/18 23:15  75  123/56    


 


5/13/18 23:10  75 16 123/56 (78) 96   


 


5/13/18 23:00  80      


 


5/13/18 22:37     97   21


 


5/13/18 22:00  76      


 


5/13/18 21:00  86      


 


5/13/18 21:00    113/67    


 


5/13/18 20:00  82      


 


5/13/18 19:20 98.2 82 16 135/69 (91) 97   


 


5/13/18 19:00  80      


 


5/13/18 18:41  79  131/58    


 


5/13/18 18:31  80  144/65    


 


5/13/18 18:08  78  144/68    


 


5/13/18 18:00  76      


 


5/13/18 17:49  77  146/66    


 


5/13/18 17:26  76  148/73    


 


5/13/18 17:00  76      


 


5/13/18 16:00  76      














I/O      


 


 5/13/18 5/13/18 5/13/18 5/14/18 5/14/18 5/14/18





 06:59 14:59 22:59 06:59 14:59 22:59


 


Intake Total 480 ml  960 ml 240 ml  


 


Output Total 600 ml   700 ml 1000 ml 


 


Balance -120 ml  960 ml -460 ml -1000 ml 


 


      


 


Intake Oral 480 ml  960 ml 240 ml  


 


Output Urine Total 600 ml   700 ml  


 


Hemodialysis     1000 ml 


 


# Voids   3   


 


# Bowel Movements 1  0 0  








Result Diagram:  


5/14/18 0708                                                                   

             5/12/18 0135





Objective Remarks


GENERAL: Patient sitting up in bed eating.  Appears comfortable.  Alert.  No 

appreciable changes on exam.


SKIN: Warm and dry.


HEAD: Normocephalic.


EYES: No scleral icterus. No injection or drainage. 


NECK: Supple, trachea midline. No JVD.


CARDIOVASCULAR: Tachycardic .regular rate and rhythm without murmurs, gallops, 

or rubs. 


RESPIRATORY: Breath sounds equal bilaterally. No accessory muscle use.


GASTROINTESTINAL: Abdomen soft, non-tender, nondistended. 


MUSCULOSKELETAL: No cyanosis, or edema. 


BACK: Nontender without obvious deformity. No CVA tenderness.








A/P


Assessment and Plan














//SIRS meeting criteria, also tachycardiac, initiate sepsis protocol. With 

fevers patient did not have cardiac cath.  Discussed with the cardiology. Blood 

cultures, CXR, UA, LA ordered. Started on Levaquin. Monitor closely. 


= No evidence of infection.  Discontinued antibiotics.





//CHF with Exacerbation


BNP elevated.  Chest x-ray with edema.  Will start on nitro drip.  Consulted 

nephrology for dialysis.  Chest x-ray reviewed with congestion. Advance diet 

cardiac cath on hold.


= Cardiology following.  Appreciate assistance.





//Accelerated hypertension.  Patient started on nitro drip.  Restart hydralazine

, with as needed hydralazine.  Awaiting official list of home meds.


= Resolved.  Decreased blood pressure meds.





//NSTEMI


//CAD


Elevated Troponin trending up  EKG sinus with T-wave inversions noted.    

Heparin drip.  Status post aspirin.  Continue daily aspirin.  Continue Plavix. 


= 5/11.  Tachycardia after dialysis.  Could be secondary to dialyzing 

metoprolol.  Will add metoprolol for heart rate control.  Decrease amlodipine 

due to overcontrolled blood pressure.  Decrease hydralazine blockages is on 

catheterization.  Cardiovascular surgery following.  Appreciate assistance.


= 5/13.  Follow-up cardiothoracic surgery recommendations.


= Possible surgery by cardiothoracic surgery.





//Pleuritic chest pain


elevated  d-dimer. CT pulmonary angiogram reviewed and no PE. Patient received 

HD 





//End-stage renal disease on hemodialysis Monday Wednesday Friday.  Consult 

nephrology for dialysis.  Dializef before and after CTA.  Appreciate assistance.





//Tobacco abuse.  Cessation counseling provided.





//Diabetes mellitus type 2.  Diabetic diet.  Insulin sliding scale, accuchecks. 


= 5/13.  Glucose slightly elevated yesterday in the 200s.


= 5/14.  Glucose elevated.  Start moderate insulin sliding scale.





//Transaminitis


Nausea


Check liver ultrasound.  Lipase only in the 60s.  Consult GI given past history 

of pancreatitis


= Resolved.  Liver ultrasound normal.


Discharge Planning


Possible CABG.


Home when cleared by cardiology/CT surgery











Alex Pizano MD May 14, 2018 15:44

## 2018-05-14 NOTE — HHI.PR
Subjective


Remarks


alert


no sob





Objective





Vital Signs








  Date Time  Temp Pulse Resp B/P (MAP) Pulse Ox O2 Delivery O2 Flow Rate FiO2


 


5/14/18 15:50 98.7 82 18 148/67 (94) 98   


 


5/14/18 15:42   14     


 


5/14/18 14:00  74      


 


5/14/18 13:00  82      


 


5/14/18 12:45  65  154/86    


 


5/14/18 12:00  90      


 


5/14/18 11:55   18     


 


5/14/18 11:48 98.6 92 16 168/72 (104) 99   


 


5/14/18 11:22  61  168/72    


 


5/14/18 11:00  92      


 


5/14/18 10:30  92  124/65    


 


5/14/18 10:00  88      


 


5/14/18 09:00  82      


 


5/14/18 08:00  87      


 


5/14/18 07:40 97.8 80 18 143/66 (91) 96   


 


5/14/18 07:15  80  143/66    


 


5/14/18 07:00  78      


 


5/14/18 06:25    145/66 (92)    


 


5/14/18 06:00  78      


 


5/14/18 05:00  76      


 


5/14/18 04:00  78      


 


5/14/18 03:50  83 16 144/65 (91) 95   


 


5/14/18 03:50  83  144/65    


 


5/14/18 03:00  78      


 


5/14/18 02:00  74      


 


5/14/18 01:00  74      


 


5/14/18 00:00  76      


 


5/13/18 23:15  75  123/56    


 


5/13/18 23:10  75 16 123/56 (78) 96   


 


5/13/18 23:00  80      


 


5/13/18 22:37     97   21


 


5/13/18 22:00  76      


 


5/13/18 21:00  86      


 


5/13/18 21:00    113/67    


 


5/13/18 20:00  82      


 


5/13/18 19:20 98.2 82 16 135/69 (91) 97   


 


5/13/18 19:00  80      


 


5/13/18 18:41  79  131/58    


 


5/13/18 18:31  80  144/65    


 


5/13/18 18:08  78  144/68    


 


5/13/18 18:00  76      


 


5/13/18 17:49  77  146/66    


 


5/13/18 17:26  76  148/73    


 


5/13/18 17:00  76      














I/O      


 


 5/13/18 5/13/18 5/13/18 5/14/18 5/14/18 5/14/18





 07:00 15:00 23:00 07:00 15:00 23:00


 


Intake Total 480 ml  960 ml 240 ml  


 


Output Total 600 ml   700 ml 1000 ml 


 


Balance -120 ml  960 ml -460 ml -1000 ml 


 


      


 


Intake Oral 480 ml  960 ml 240 ml  


 


Output Urine Total 600 ml   700 ml  


 


Hemodialysis     1000 ml 


 


# Voids   3   


 


# Bowel Movements 1  0 0  








Result Diagram:  


5/14/18 0708                                                                   

             5/12/18 0135





Objective Remarks


GENERAL: 


SKIN: Warm and dry.


HEAD: Atraumatic. Normocephalic. 


EYES: Pupils equal and round. No scleral icterus. No injection or drainage. 


ENT: No nasal bleeding or discharge.  Mucous membranes pink and moist.


NECK: Trachea midline. No JVD. 


CARDIOVASCULAR: Regular rate and rhythm.  


RESPIRATORY: No accessory muscle use. Clear to auscultation. Breath sounds 

equal bilaterally. 


GASTROINTESTINAL: Abdomen soft, non-tender, nondistended. Hepatic and splenic 

margins not palpable. 


MUSCULOSKELETAL: Extremities without clubbing, cyanosis, or edema. No obvious 

deformities. 


NEUROLOGICAL: Awake and alert. No obvious cranial nerve deficits.  Motor 

grossly within normal limits. Five out of 5 muscle strength in the arms and 

legs.  Normal speech.


PSYCHIATRIC: Appropriate mood and affect; insight and judgment normal.





Assessment and Plan


Assessment and Plan


ASS





CAD


DM


HTN


ESRD ON [facility]





PLAN





O2 IF NEEDED


FOR SURGERY Kelle Harley MD May 14, 2018 16:30

## 2018-05-14 NOTE — PD.CAR.PN
CVT Progress Note


Subjective/Hospital Course:





5/12/18


c/o chest pain this morning





5/14


pt had chest pain last pm, now on NTG gtt


and heparin gtt 


Dr Huitron spoke with pt / scheduled for CABG in am 


received dialysis today


Objective:


GENERAL: A&O x 3 


SKIN: Warm and dry.


HEAD: Normocephalic.


EYES: No scleral icterus. No injection or drainage. 


NECK: Supple, trachea midline. No JVD or lymphadenopathy.


CARDIOVASCULAR: Regular rate and rhythm without murmurs, gallops, or rubs. 


right arm vas cath with + bruit 


RESPIRATORY: Breath sounds equal bilaterally. No accessory muscle use.


diminished in bases 


GASTROINTESTINAL: Abdomen soft, non-tender, nondistended. 


MUSCULOSKELETAL: No cyanosis, or edema. 


BACK: Nontender without obvious deformity. No CVA tenderness.








Vital Signs








  Date Time  Temp Pulse Resp B/P (MAP) Pulse Ox O2 Delivery O2 Flow Rate FiO2


 


5/14/18 15:50 98.7 82 18 148/67 (94) 98   


 


5/14/18 15:42   14     


 


5/14/18 14:00  74      


 


5/14/18 13:00  82      


 


5/14/18 12:45  65  154/86    


 


5/14/18 12:00  90      


 


5/14/18 11:55   18     


 


5/14/18 11:48 98.6 92 16 168/72 (104) 99   


 


5/14/18 11:22  61  168/72    


 


5/14/18 11:00  92      


 


5/14/18 10:30  92  124/65    


 


5/14/18 10:00  88      


 


5/14/18 09:00  82      


 


5/14/18 08:00  87      


 


5/14/18 07:40 97.8 80 18 143/66 (91) 96   


 


5/14/18 07:15  80  143/66    


 


5/14/18 07:00  78      


 


5/14/18 06:25    145/66 (92)    


 


5/14/18 06:00  78      


 


5/14/18 05:00  76      


 


5/14/18 04:00  78      


 


5/14/18 03:50  83 16 144/65 (91) 95   


 


5/14/18 03:50  83  144/65    


 


5/14/18 03:00  78      


 


5/14/18 02:00  74      


 


5/14/18 01:00  74      


 


5/14/18 00:00  76      


 


5/13/18 23:15  75  123/56    


 


5/13/18 23:10  75 16 123/56 (78) 96   


 


5/13/18 23:00  80      


 


5/13/18 22:37     97   21


 


5/13/18 22:00  76      


 


5/13/18 21:00  86      


 


5/13/18 21:00    113/67    


 


5/13/18 20:00  82      


 


5/13/18 19:20 98.2 82 16 135/69 (91) 97   


 


5/13/18 19:00  80      


 


5/13/18 18:41  79  131/58    


 


5/13/18 18:31  80  144/65    


 


5/13/18 18:08  78  144/68    


 


5/13/18 18:00  76      


 


5/13/18 17:49  77  146/66    


 


5/13/18 17:26  76  148/73    


 


5/13/18 17:00  76      








Labs:





Laboratory Tests








Test


  5/14/18


07:08 5/14/18


11:41 5/14/18


13:04


 


White Blood Count


  6.0 TH/MM3


(4.0-11.0) 


  


 


 


Red Blood Count


  3.54 MIL/MM3


(4.00-5.30) 


  


 


 


Hemoglobin


  11.1 GM/DL


(11.6-15.3) 


  


 


 


Hematocrit


  33.4 %


(35.0-46.0) 


  


 


 


Mean Corpuscular Volume


  94.5 FL


(80.0-100.0) 


  


 


 


Mean Corpuscular Hemoglobin


  31.4 PG


(27.0-34.0) 


  


 


 


Mean Corpuscular Hemoglobin


Concent 33.2 %


(32.0-36.0) 


  


 


 


Red Cell Distribution Width


  15.1 %


(11.6-17.2) 


  


 


 


Platelet Count


  309 TH/MM3


(150-450) 


  


 


 


Mean Platelet Volume


  7.5 FL


(7.0-11.0) 


  


 


 


Activated Partial


Thromboplast Time 43.6 SEC


(24.3-30.1) 


  76.7 SEC


(24.3-30.1)


 


Nasal Screen MRSA (PCR)


  


  MRSA NOT


DETECTED (NOT 


 








Result Diagram:  


5/14/18 0708                                                                   

             5/12/18 0135





Telemetry:


NSR





(1) ESRD (end stage renal disease) on dialysis


Plan:  dialysis M-W-F





(2) DM (diabetes mellitus)


(3) HTN (hypertension)


(4) CAD (coronary artery disease)


Plan:  for surgery in am 














Terwilliger,Jacqueline R. ARNP May 14, 2018 16:10

## 2018-05-15 VITALS
HEART RATE: 94 BPM | SYSTOLIC BLOOD PRESSURE: 122 MMHG | OXYGEN SATURATION: 94 % | RESPIRATION RATE: 13 BRPM | TEMPERATURE: 96.7 F | DIASTOLIC BLOOD PRESSURE: 63 MMHG

## 2018-05-15 VITALS
TEMPERATURE: 98.4 F | SYSTOLIC BLOOD PRESSURE: 110 MMHG | DIASTOLIC BLOOD PRESSURE: 64 MMHG | HEART RATE: 80 BPM | OXYGEN SATURATION: 98 % | RESPIRATION RATE: 18 BRPM

## 2018-05-15 VITALS — OXYGEN SATURATION: 98 %

## 2018-05-15 VITALS
OXYGEN SATURATION: 97 % | DIASTOLIC BLOOD PRESSURE: 53 MMHG | TEMPERATURE: 97.1 F | SYSTOLIC BLOOD PRESSURE: 127 MMHG | RESPIRATION RATE: 20 BRPM | HEART RATE: 97 BPM

## 2018-05-15 VITALS
RESPIRATION RATE: 18 BRPM | SYSTOLIC BLOOD PRESSURE: 150 MMHG | DIASTOLIC BLOOD PRESSURE: 57 MMHG | TEMPERATURE: 96.7 F | OXYGEN SATURATION: 98 % | HEART RATE: 99 BPM

## 2018-05-15 VITALS
DIASTOLIC BLOOD PRESSURE: 57 MMHG | SYSTOLIC BLOOD PRESSURE: 129 MMHG | RESPIRATION RATE: 11 BRPM | TEMPERATURE: 97.3 F | OXYGEN SATURATION: 98 % | HEART RATE: 95 BPM

## 2018-05-15 VITALS
HEART RATE: 90 BPM | RESPIRATION RATE: 10 BRPM | OXYGEN SATURATION: 98 % | TEMPERATURE: 96.8 F | DIASTOLIC BLOOD PRESSURE: 53 MMHG | SYSTOLIC BLOOD PRESSURE: 108 MMHG

## 2018-05-15 VITALS — HEART RATE: 82 BPM

## 2018-05-15 VITALS
SYSTOLIC BLOOD PRESSURE: 119 MMHG | OXYGEN SATURATION: 99 % | RESPIRATION RATE: 19 BRPM | DIASTOLIC BLOOD PRESSURE: 67 MMHG | TEMPERATURE: 98 F | HEART RATE: 83 BPM

## 2018-05-15 VITALS — HEART RATE: 84 BPM

## 2018-05-15 VITALS — HEART RATE: 99 BPM

## 2018-05-15 VITALS — HEART RATE: 78 BPM

## 2018-05-15 VITALS — OXYGEN SATURATION: 99 % | TEMPERATURE: 96.8 F

## 2018-05-15 VITALS — HEART RATE: 76 BPM

## 2018-05-15 LAB
ALBUMIN SERPL-MCNC: 3.6 GM/DL (ref 3.4–5)
BUN SERPL-MCNC: 34 MG/DL (ref 7–18)
CALCIUM SERPL-MCNC: 8.8 MG/DL (ref 8.5–10.1)
CHLORIDE SERPL-SCNC: 95 MEQ/L (ref 98–107)
CREAT SERPL-MCNC: 5.1 MG/DL (ref 0.5–1)
GFR SERPLBLD BASED ON 1.73 SQ M-ARVRAT: 11 ML/MIN (ref 89–?)
GLUCOSE SERPL-MCNC: 157 MG/DL (ref 74–106)
HCO3 BLD-SCNC: 28.6 MEQ/L (ref 21–32)
PHOSPHATE SERPL-MCNC: 3.3 MG/DL (ref 2.5–4.9)
SODIUM SERPL-SCNC: 134 MEQ/L (ref 136–145)

## 2018-05-15 PROCEDURE — 5A1221Z PERFORMANCE OF CARDIAC OUTPUT, CONTINUOUS: ICD-10-PCS | Performed by: THORACIC SURGERY (CARDIOTHORACIC VASCULAR SURGERY)

## 2018-05-15 PROCEDURE — 021009W BYPASS CORONARY ARTERY, ONE ARTERY FROM AORTA WITH AUTOLOGOUS VENOUS TISSUE, OPEN APPROACH: ICD-10-PCS | Performed by: THORACIC SURGERY (CARDIOTHORACIC VASCULAR SURGERY)

## 2018-05-15 PROCEDURE — 0T9B70Z DRAINAGE OF BLADDER WITH DRAINAGE DEVICE, VIA NATURAL OR ARTIFICIAL OPENING: ICD-10-PCS | Performed by: THORACIC SURGERY (CARDIOTHORACIC VASCULAR SURGERY)

## 2018-05-15 PROCEDURE — 06BQ4ZZ EXCISION OF LEFT SAPHENOUS VEIN, PERCUTANEOUS ENDOSCOPIC APPROACH: ICD-10-PCS | Performed by: THORACIC SURGERY (CARDIOTHORACIC VASCULAR SURGERY)

## 2018-05-15 PROCEDURE — 02100Z9 BYPASS CORONARY ARTERY, ONE ARTERY FROM LEFT INTERNAL MAMMARY, OPEN APPROACH: ICD-10-PCS | Performed by: THORACIC SURGERY (CARDIOTHORACIC VASCULAR SURGERY)

## 2018-05-15 RX ADMIN — ONDANSETRON PRN MG: 2 INJECTION, SOLUTION INTRAMUSCULAR; INTRAVENOUS at 06:36

## 2018-05-15 RX ADMIN — HEPARIN SODIUM PRN UNITS: 1000 INJECTION, SOLUTION INTRAVENOUS; SUBCUTANEOUS at 00:37

## 2018-05-15 RX ADMIN — ACYCLOVIR SCH UNITS: 800 TABLET ORAL at 21:00

## 2018-05-15 RX ADMIN — NITROGLYCERIN SCH INCH: 20 OINTMENT TOPICAL at 06:00

## 2018-05-15 RX ADMIN — ACETAMINOPHEN SCH MLS/HR: 10 INJECTION, SOLUTION INTRAVENOUS at 14:14

## 2018-05-15 RX ADMIN — NITROGLYCERIN PRN MLS/HR: 200 INJECTION, SOLUTION INTRAVENOUS at 04:37

## 2018-05-15 RX ADMIN — ACETAMINOPHEN PRN MG: 325 TABLET ORAL at 00:38

## 2018-05-15 RX ADMIN — FAMOTIDINE SCH MG: 20 TABLET, FILM COATED ORAL at 22:17

## 2018-05-15 RX ADMIN — CLEVIDIPINE PRN MLS/HR: 0.5 EMULSION INTRAVENOUS at 14:17

## 2018-05-15 RX ADMIN — Medication SCH ML: at 22:20

## 2018-05-15 RX ADMIN — NITROGLYCERIN SCH INCH: 20 OINTMENT TOPICAL at 00:00

## 2018-05-15 RX ADMIN — ACETAMINOPHEN SCH MLS/HR: 10 INJECTION, SOLUTION INTRAVENOUS at 20:24

## 2018-05-15 RX ADMIN — CLEVIDIPINE PRN MLS/HR: 0.5 EMULSION INTRAVENOUS at 18:35

## 2018-05-15 RX ADMIN — ONDANSETRON PRN MG: 2 INJECTION, SOLUTION INTRAMUSCULAR; INTRAVENOUS at 22:26

## 2018-05-15 RX ADMIN — METOPROLOL TARTRATE SCH MG: 25 TABLET, FILM COATED ORAL at 22:17

## 2018-05-15 NOTE — HHI.PR
Subjective


Remarks


Patient seen after CABG.  Somnolent, wakes up for exam.  Indicates that pain is 

controlled.





Objective





Vital Signs








  Date Time  Temp Pulse Resp B/P (MAP) Pulse Ox O2 Delivery O2 Flow Rate FiO2


 


5/15/18 16:00  100  125/56    


 


5/15/18 15:52     98 Venturi Mask 6.00 50


 


5/15/18 15:45     98 Venturi Mask 6 50


 


5/15/18 15:00        50


 


5/15/18 14:17  91  166/90    


 


5/15/18 14:00  92  129/79    


 


5/15/18 13:00  91  166/90    


 


5/15/18 12:45        60


 


5/15/18 12:43     99   50


 


5/15/18 06:00  82      


 


5/15/18 05:34 98.0 83 19 119/67 (84) 99   


 


5/15/18 05:00  82      


 


5/15/18 04:37  78  110/64    


 


5/15/18 04:00  82      


 


5/15/18 03:00  84      


 


5/15/18 02:00  78      


 


5/15/18 01:40   18     


 


5/15/18 01:00  76      


 


5/15/18 00:44 98.4 80 18 110/64 (79) 98   


 


5/15/18 00:00  84      


 


5/14/18 23:00  82      


 


5/14/18 22:00  84      


 


5/14/18 21:19 98.2 80 18 104/57 (73) 99   


 


5/14/18 21:00  78      


 


5/14/18 20:30        21


 


5/14/18 20:00  78      


 


5/14/18 19:06  66  125/70    


 


5/14/18 19:00  82      


 


5/14/18 18:00  82      


 


5/14/18 17:30  65  148/67    


 


5/14/18 17:00  82      














I/O      


 


 5/14/18 5/14/18 5/14/18 5/15/18 5/15/18 5/15/18





 07:00 15:00 23:00 07:00 15:00 23:00


 


Intake Total 240 ml  720 ml  3090 ml 


 


Output Total 700 ml 1000 ml 600 ml  1150 ml 


 


Balance -460 ml -1000 ml 120 ml  1940 ml 


 


      


 


Intake Oral 240 ml  720 ml  240 ml 


 


IV Total     100 ml 


 


Autotransfusion     450 ml 


 


Other     2300 ml 


 


Output Urine Total 700 ml  600 ml  250 ml 


 


Hemodialysis  1000 ml    


 


Estimated Blood Loss     900 ml 


 


# Bowel Movements 0    1 








Result Diagram:  


5/14/18 0708                                                                   

             5/15/18 0527





Objective Remarks


GENERAL: Patient in bed after CABG.  Somnolent, wakes up for exam.


SKIN: Warm and dry.


HEAD: Normocephalic.


EYES: No scleral icterus. No injection or drainage. 


NECK: Supple, trachea midline. No JVD.


CARDIOVASCULAR: Tachycardic .regular rate and rhythm without murmurs, gallops, 

or rubs. 


RESPIRATORY: Breath sounds equal bilaterally. No accessory muscle use.  

Postsurgical incisions and tubes not examined.


GASTROINTESTINAL: Abdomen soft, non-tender, nondistended. 


MUSCULOSKELETAL: No cyanosis, or edema. 


BACK: Nontender without obvious deformity. No CVA tenderness.








A/P


Assessment and Plan


53-year-old female with end-stage renal disease on dialysis who presented with 

N STEMI, and is currently status post CABG performed on 5/15.





//NSTEMI


//CAD


//Postop CABG performed on 5/15.


Elevated Troponin trending up  EKG sinus with T-wave inversions noted.    

Heparin drip.  Status post aspirin.  Continue daily aspirin.  Continue Plavix. 


= 5/11.  Tachycardia after dialysis.  Could be secondary to dialyzing 

metoprolol.  Will add metoprolol for heart rate control.  Decrease amlodipine 

due to overcontrolled blood pressure.  Decrease hydralazine blockages is on 

catheterization.  Cardiovascular surgery following.  Appreciate assistance.


= 5/13.  Follow-up cardiothoracic surgery recommendations.


= Possible surgery by cardiothoracic surgery.


= 5/15.  Postop CABG.  Postsurgical management as per surgical service.





//SIRS meeting criteria, also tachycardiac, initiate sepsis protocol. With 

fevers patient did not have cardiac cath.  Discussed with the cardiology. Blood 

cultures, CXR, UA, LA ordered. Started on Levaquin. Monitor closely. 


= No evidence of infection.  Discontinued antibiotics.





//CHF with Exacerbation


BNP elevated.  Chest x-ray with edema.  Will start on nitro drip.  Consulted 

nephrology for dialysis.  Chest x-ray reviewed with congestion. Advance diet 

cardiac cath on hold.


= Cardiology following.  Appreciate assistance.





//Accelerated hypertension.  Patient started on nitro drip.  Restart hydralazine

, with as needed hydralazine.  Awaiting official list of home meds.


= Resolved.  Decreased blood pressure meds.











//Pleuritic chest pain


elevated  d-dimer. CT pulmonary angiogram reviewed and no PE. Patient received 

HD 





//End-stage renal disease on hemodialysis Monday Wednesday Friday.  Consult 

nephrology for dialysis.  Dializef before and after CTA.  Appreciate assistance.





//Tobacco abuse.  Cessation counseling provided.





//Diabetes mellitus type 2.  Diabetic diet.  Insulin sliding scale, accuchecks. 


= 5/13.  Glucose slightly elevated yesterday in the 200s.


= 5/14.  Glucose elevated.  Start moderate insulin sliding scale.





//Transaminitis


Nausea


Check liver ultrasound.  Lipase only in the 60s.  Consult GI given past history 

of pancreatitis


= Resolved.  Liver ultrasound normal.


Discharge Planning


Postop CABG performed on 5/15


Discharge cleared by cardiology/CT surgery


= PT following











Alex Pizano MD May 15, 2018 16:54

## 2018-05-15 NOTE — PD.OP
cc:   Ban Huitron MD; Edgardo Mercado MD


__________________________________________________





Operative Report


Date of Surgery:  May 15, 2018


Preoperative Diagnosis:  


(1) CAD (coronary artery disease)


(2) NSTEMI (non-ST elevated myocardial infarction)


Postoperative Diagnosis:  


same


Procedure:


CABG x 2


LIMA to LAD - good


SVG to RCA - fair


EVH


Anesthesia:


Dr. Carty


Surgeon:


Ban Huitron


Assistant(s):


Yaneli Douglas, PAC


Operation and Findings:


The risks, benefits, complications, treatment options, and expected outcomes 

were discussed with the patient. The possibilities of reaction to medication, 

pulmonary aspiration, perforation of viscus, bleeding, recurrent infection, the 

need for additional procedures, failure to diagnose a condition, and creating a 

complication requiring transfusion or operation were discussed with the 

patient. The patient concurred with the proposed plan, giving informed consent.

  The site of surgery properly noted/marked. The patient was taken to Operating 

Room, identified as Eleazar Walker and the procedure verified as CABG, EVH. 

A Time Out was held and the above information confirmed.





Standard monitoring lines and Rhodes catheter were placed. General anesthesia 

was induced. The patient was prepped and draped in a sterile fashion. A median 

sternotomy was performed and electrocautery was used to obtain hemostasis. The 

left internal mammary artery was procured as a pedicle from the 7th rib to the 

1st rib in the usual manner. Simultaneously left greater saphenous vein was 

procured from the left leg using a minimally invasive endoscopic technique. The 

vein was prepared for anastomosis and the leg wound was irrigated and closed in 

2 layers. The pericardium was opened and a pericardial sling was created using 

interrupted 0 silk sutures. The patient was heparinized for cardiopulmonary 

bypass and the distal mammary pedicle was instrumented for anastomosis. The 

heart was instrumented for cardiopulmonary bypass in the usual manner. 

Antegrade blood cardioplegia was employed. The patient was placed on 

cardiopulmonary bypass. An aortic cross-clamp  was applied and the heart was 

arrested using cold blood cardioplegia. Antegrade cardioplegia was administered 

after he each anastomosis.





After adequate arrest, the distal right coronary circulation was investigated 

and the distal RCA was  opened with a [place] blade and found to be a 1.5 

millimeter fair target.  Saphenous vein was approximated to the RCA artery 

using a running 7 0 Prolene suture.  The graft was measured for length and 

orientation and was suspended from the pericardium. The distal LAD was opened 

with a [place] blade and found to be a 1.5 millimeter good target. The left 

internal mammary artery was approximated to the LAD using a running 7 0 Prolene 

suture. The pedicle was attached to the epicardium using interrupted 5 0 silk 

suture.  The patient was systemically rewarmed and received a hotshot dose of 

warm blood cardioplegia. The aorta was vented and the proximal anastomosis to 

the RCA graft was accomplished using a running 5 0 Prolene suture after 

creating an aortotomy was a 5 millimeter punch.   The cross-clamp was removed 

and all proximal and distal anastomoses were examined for hemostasis. Temporary 

atrial pacing on wires were positioned and brought out through the skin in the 

usual manner. The patient was paced at 80 beats per minute and weaned from 

cardiopulmonary bypass.  Protamine was given. There was no adverse reaction. 

Decannulation was carried out without incident.  Wound was checked for 

hemostasis which was obtained using electrocautery. A 36 Russian mediastinal and 

32 Russian left pleural chest tubes were placed and secured to the skin with 0 

silk suture. The sternum was closed with stainless steel wire. The fascia was 

closed with 1. PDS. The subcutaneous tissue was closed using a running 2-0 

Vicryl suture. The skin was closed with 4-0 Monocryl. Sterile dressings were 

placed.   At the end of the operation, all sponge, instruments, and needle 

counts were correct. The patient was transferred to the CVICU in stable 

condition.





Findings: 


Marked LVH with diffuse CAD





XC:  41 min


CPB:  52 min


       


Drains: mediastinal x 1


     pleural x 1


 Complications:  none


       


Disposition: to CVICU in stable condition











Ban Huitron MD May 15, 2018 12:28

## 2018-05-15 NOTE — PD.CAR.PN
CVT Progress Note


Subjective/Hospital Course:





5/12/18


c/o chest pain this morning





5/14


pt had chest pain last pm, now on NTG gtt


and heparin gtt 


Dr Huitron spoke with pt / scheduled for CABG in am 


received dialysis today


Objective:





Vital Signs








  Date Time  Temp Pulse Resp B/P (MAP) Pulse Ox O2 Delivery O2 Flow Rate FiO2


 


5/15/18 14:17  91  166/90    


 


5/15/18 14:00  92  129/79    


 


5/15/18 13:00  91  166/90    


 


5/15/18 06:00  82      


 


5/15/18 05:34 98.0 83 19 119/67 (84) 99   


 


5/15/18 05:00  82      


 


5/15/18 04:37  78  110/64    


 


5/15/18 04:00  82      


 


5/15/18 03:00  84      


 


5/15/18 02:00  78      


 


5/15/18 01:40   18     


 


5/15/18 01:00  76      


 


5/15/18 00:44 98.4 80 18 110/64 (79) 98   


 


5/15/18 00:00  84      


 


5/14/18 23:00  82      


 


5/14/18 22:00  84      


 


5/14/18 21:19 98.2 80 18 104/57 (73) 99   


 


5/14/18 21:00  78      


 


5/14/18 20:30        21


 


5/14/18 20:00  78      


 


5/14/18 19:06  66  125/70    


 


5/14/18 19:00  82      


 


5/14/18 18:00  82      


 


5/14/18 17:30  65  148/67    


 


5/14/18 17:00  82      


 


5/14/18 16:00  82      


 


5/14/18 15:50 98.7 82 18 148/67 (94) 98   


 


5/14/18 15:42   14     








Labs:





Laboratory Tests








Test


  5/15/18


05:27


 


Blood Urea Nitrogen


  34 MG/DL


(7-18)


 


Creatinine


  5.10 MG/DL


(0.50-1.00)


 


Random Glucose


  157 MG/DL


()


 


Albumin


  3.6 GM/DL


(3.4-5.0)


 


Calcium Level


  8.8 MG/DL


(8.5-10.1)


 


Phosphorus Level


  3.3 MG/DL


(2.5-4.9)


 


Sodium Level


  134 MEQ/L


(136-145)


 


Potassium Level


  4.3 MEQ/L


(3.5-5.1)


 


Chloride Level


  95 MEQ/L


()


 


Carbon Dioxide Level


  28.6 MEQ/L


(21.0-32.0)


 


Anion Gap


  10 MEQ/L


(5-15)


 


Estimat Glomerular Filtration


Rate 11 ML/MIN


(>89)








Result Diagram:  


5/14/18 0708                                                                   

             5/15/18 0527








(1) ESRD (end stage renal disease) on dialysis


Plan:  dialysis M-W-F





(2) DM (diabetes mellitus)


(3) HTN (hypertension)


(4) CAD (coronary artery disease)


Plan:  for surgery in am 





(5) S/P CABG (coronary artery bypass graft)











Terwilliger,Jacqueline R. ARNP May 15, 2018 15:10

## 2018-05-15 NOTE — RADRPT
EXAM DATE/TIME:  05/15/2018 13:19 

 

[facility] COMPARISON:     

No previous studies available for comparison.

 

                     

INDICATIONS :     

S/P CABG.

                     

 

MEDICAL HISTORY :            

Cardiovascular disease. Diabetes mellitus type 2. Hypertension   

 

SURGICAL HISTORY :     

Tubal ligation.   

 

ENCOUNTER:     

Initial                                        

 

ACUITY:     

2 weeks      

 

PAIN SCORE:     

Non-responsive.

 

LOCATION:     

Bilateral chest 

 

FINDINGS:     

Sternotomy wires, left-sided chest tube, endotracheal tube tip at the superior margin of the clavicle
s. Enteric tube tip terminates in the stomach, side port at the level of the distal esophagus. I do n
ot see a pneumothorax. Lungs are clear.

 

CONCLUSION:     Clear lungs.

 

 

 

 Quoc Connell MD on May 15, 2018 at 13:43           

Board Certified Radiologist.

 This report was verified electronically.

## 2018-05-15 NOTE — HHI.PR
Subjective


Remarks


alert


no sob





Objective





Vital Signs








  Date Time  Temp Pulse Resp B/P (MAP) Pulse Ox O2 Delivery O2 Flow Rate FiO2


 


5/15/18 16:00  100  125/56    


 


5/15/18 15:52     98 Venturi Mask 6.00 50


 


5/15/18 15:45     98 Venturi Mask 6 50


 


5/15/18 15:00        50


 


5/15/18 14:44   11     


 


5/15/18 14:17  91  166/90    


 


5/15/18 14:00  92  129/79    


 


5/15/18 13:00  91  166/90    


 


5/15/18 12:45        60


 


5/15/18 12:43     99   50


 


5/15/18 06:00  82      


 


5/15/18 05:34 98.0 83 19 119/67 (84) 99   


 


5/15/18 05:00  82      


 


5/15/18 04:37  78  110/64    


 


5/15/18 04:00  82      


 


5/15/18 03:00  84      


 


5/15/18 02:00  78      


 


5/15/18 01:40   18     


 


5/15/18 01:00  76      


 


5/15/18 00:44 98.4 80 18 110/64 (79) 98   


 


5/15/18 00:00  84      


 


5/14/18 23:00  82      


 


5/14/18 22:00  84      


 


5/14/18 21:19 98.2 80 18 104/57 (73) 99   


 


5/14/18 21:00  78      


 


5/14/18 20:30        21


 


5/14/18 20:00  78      


 


5/14/18 19:06  66  125/70    


 


5/14/18 19:00  82      


 


5/14/18 18:00  82      














I/O      


 


 5/14/18 5/14/18 5/14/18 5/15/18 5/15/18 5/15/18





 07:00 15:00 23:00 07:00 15:00 23:00


 


Intake Total 240 ml  720 ml  3090 ml 


 


Output Total 700 ml 1000 ml 600 ml  1150 ml 


 


Balance -460 ml -1000 ml 120 ml  1940 ml 


 


      


 


Intake Oral 240 ml  720 ml  240 ml 


 


IV Total     100 ml 


 


Autotransfusion     450 ml 


 


Other     2300 ml 


 


Output Urine Total 700 ml  600 ml  250 ml 


 


Hemodialysis  1000 ml    


 


Estimated Blood Loss     900 ml 


 


# Bowel Movements 0    1 








Result Diagram:  


5/14/18 0708                                                                   

             5/15/18 0527





Objective Remarks


GENERAL: 


SKIN: Warm and dry.


HEAD: Atraumatic. Normocephalic. 


EYES: Pupils equal and round. No scleral icterus. No injection or drainage. 


ENT: No nasal bleeding or discharge.  Mucous membranes pink and moist.


NECK: Trachea midline. No JVD. 


CARDIOVASCULAR: Regular rate and rhythm.  


RESPIRATORY: No accessory muscle use. Clear to auscultation. Breath sounds 

equal bilaterally. 


GASTROINTESTINAL: Abdomen soft, non-tender, nondistended. Hepatic and splenic 

margins not palpable. 


MUSCULOSKELETAL: Extremities without clubbing, cyanosis, or edema. No obvious 

deformities. 


NEUROLOGICAL: Awake and alert. No obvious cranial nerve deficits.  Motor 

grossly within normal limits. Five out of 5 muscle strength in the arms and 

legs.  Normal speech.


PSYCHIATRIC: Appropriate mood and affect; insight and judgment normal.





Assessment and Plan


Assessment and Plan


ASS





CAD


DM


HTN


ESRD ON MHD





PLAN





O2 IF NEEDED


FOR SURGERY today











Kelle Nina MD May 15, 2018 17:32

## 2018-05-15 NOTE — HHI.NPPN
Subjective


Complaints:  Chest Pain, Shortness of Breath


General Problems:  Anemia


Renal Failure:  Chronic, End Stage Renal Disease


Interval History


She is s/p CABG x 2 today. Is seen in CVICU. Gtts include Clevidipine, insulin, 

precedex. She is awake but sleepy. Has chest tube, A line, TLC, and valladares in 

place.  ml. 


 (Migdalia Camejo)





Review of Systems


General


Constitutional:  Fatigue


 (Migdalia Camejo)





Respiratory


Lungs:  SOB


 (Migdalia Camejo)





Cardiovascular


Cardiac:  Chest Pain


 (Migdalia Camejo)





Musculoskeletal


MS:  Pain/Stiffness


 (Migdalia Camejo)





Objective Data


Data











 5/15/18 5/16/18





 19:00 07:00


 


Intake Total 3090 ml 


 


Output Total 1150 ml 


 


Balance 1940 ml 


 


  


 


Intake Oral 240 ml 


 


IV Total 100 ml 


 


Autotransfusion 450 ml 


 


Other 2300 ml 


 


Output Urine Total 250 ml 


 


Estimated Blood Loss 900 ml 


 


# Bowel Movements 1 











Vital Signs








  Date Time  Temp Pulse Resp B/P (MAP) Pulse Ox O2 Delivery O2 Flow Rate FiO2


 


5/15/18 16:00  100  125/56    


 


5/15/18 15:52     98 Venturi Mask 6.00 50


 


5/15/18 15:45     98 Venturi Mask 6 50


 


5/15/18 15:00        50


 


5/15/18 14:44   11     


 


5/15/18 14:17  91  166/90    


 


5/15/18 14:00  92  129/79    


 


5/15/18 13:00  91  166/90    


 


5/15/18 12:45        60


 


5/15/18 12:43     99   50


 


5/15/18 06:00  82      


 


5/15/18 05:34 98.0 83 19 119/67 (84) 99   


 


5/15/18 05:00  82      


 


5/15/18 04:37  78  110/64    


 


5/15/18 04:00  82      


 


5/15/18 03:00  84      


 


5/15/18 02:00  78      


 


5/15/18 01:40   18     


 


5/15/18 01:00  76      


 


5/15/18 00:44 98.4 80 18 110/64 (79) 98   


 


5/15/18 00:00  84      


 


5/14/18 23:00  82      


 


5/14/18 22:00  84      


 


5/14/18 21:19 98.2 80 18 104/57 (73) 99   


 


5/14/18 21:00  78      


 


5/14/18 20:30        21


 


5/14/18 20:00  78      


 


5/14/18 19:06  66  125/70    


 


5/14/18 19:00  82      


 


5/14/18 18:00  82      


 


5/14/18 17:30  65  148/67    








 (Migdalia Camejo)


-:  


5/14/18 0708                                                                   

             5/15/18 0527





Imaging





Last 72 hours Impressions








Chest X-Ray 5/15/18 0000 Signed





Impressions: 





 Service Date/Time:  Tuesday, May 15, 2018 13:19 - CONCLUSION: Clear lungs.     





 Quoc Connell MD 








Tubes & Lines:  Perma-Cath, Valladares


Tubes & Lines Comment


TLC right groin, Chest tube anterior, A line right radial


Drip Comment


Clevidipine, insulin, precedex


 (Migdalia Camejo)





Physical Exam


General


Appearance:  Well Developed, No Acute Distress, Comfortable, Sleeping, 

Malnourished


 (Migdalia Camejo)





Eyes


Eye Exam:  Pupils Equal, Pupils Reactive


 (Migdalia Camejo)





Pulmonary


Resp Exam:  Clear Bilaterally, Breath Sounds Equal, No Distress, Diminished 

Breath Sounds


Resp Remarks


shallow respirations 


 (Migdalia Camejo)





Cardiology


CV Exam:  Regular, Normal Sinus Rhythm


CV Remarks


chest tube, anterior 


 (Migdalia Camejo)





Gastrointestinal/Abdomen


GI Exam:  Soft, Non-Tender, Bowel Sounds Present


 (Migdalia Camejo)





Musculoskeletal


MS Exam:  Joints Intact, Atrophy, Unable to Ambulate


 (Migdalia Camejo)





Integumentary


Skin Exam:  Clear, Warm, Dry, Intact


 (Migdalia Camejo)





Extremeties


Extremities Exam:  No Edema, Pedal Pulses Palpable


Extremeties Remarks


Left arm AVF, + patent


 (Midgalia Camejo)





Neurologic


Neuro Exam:  Alert, Awake, Oriented, Speech Clear, Moving All Extremities


 (Migdalia Camejo)





Psychiatric


Psych Remarks


sad, tearful 


 (Migdalia Camejo)





Assessment/Plan


Discussed Condition With:  Patient


Assessment Summary:  Anemia of CKD, CHF, Hypertension, Diabetes Mellitus, End 

Stage Renal Disease


Problem List:  


(1) ESRD (end stage renal disease) on dialysis


ICD Codes:  N18.6 - End stage renal disease; Z99.2 - Dependence on renal 

dialysis


Plan:  Continue HD MWF. Due tomorrow. Assess fluid status post op to determine 

UF.


Renal panel ordered for AM


On  Renvela with meals when diet is advanced. 


We can transfuse if needed during HD tomorrow. 


On protein supplements with meals, Megace for stimulant, and antidepressant. 


Avoid excess IVF. Gadolinium is contraindicated.





AVF not mature, using Permcath. She has second step surgery  with Dr. Rees to 

be rescheduled. 





(2) Chest pain, rule out acute myocardial infarction


ICD Codes:  R07.9 - Chest pain, unspecified


Status:  Acute


Plan:  Cardiology and CTS following  


s/p cath, 90% occlusion of Bare Metal Stent previously placed. [facility] has agreed 

to proceed with CABG. 


Echo: Ef 40-45%, LVH is present. 





s/p CABG x 2, continue supportive care





(3) DM (diabetes mellitus)


ICD Codes:  E11.9 - Type 2 diabetes mellitus without complications


Plan:  maintain glucose 140-180 mg/dL while hospitalized 


On insulin gtt post op 





(4) HTN (hypertension)


ICD Codes:  I10 - Hypertension


Status:  Chronic


Plan:  Monitor blood pressure


continue medications as ordered


on clevidipine gtt currently 


Fluid removal with dialysis





(5) Anemia


ICD Codes:  D64.9 - Anemia, unspecified


Status:  Chronic


Plan:  Epogen not required


Monitor hemoglobin 


On oral iron


900 EBL, 450 given back


 (Migdalia Camejo)


Plan


patient was seen and examined after surgery. Discussed with RN. Extubated. 

Monitor fluid and electrolytes. Dialysis tomorrow. 


 (Kelton Whiteside MD)











Migdalia Camejo May 15, 2018 17:30


Kelton Whiteside MD May 15, 2018 20:38

## 2018-05-16 VITALS — HEART RATE: 112 BPM

## 2018-05-16 VITALS — OXYGEN SATURATION: 99 %

## 2018-05-16 VITALS
SYSTOLIC BLOOD PRESSURE: 120 MMHG | DIASTOLIC BLOOD PRESSURE: 56 MMHG | HEART RATE: 92 BPM | OXYGEN SATURATION: 99 % | RESPIRATION RATE: 18 BRPM | TEMPERATURE: 97.9 F

## 2018-05-16 VITALS — HEART RATE: 104 BPM

## 2018-05-16 VITALS
SYSTOLIC BLOOD PRESSURE: 139 MMHG | HEART RATE: 97 BPM | DIASTOLIC BLOOD PRESSURE: 69 MMHG | OXYGEN SATURATION: 99 % | RESPIRATION RATE: 16 BRPM

## 2018-05-16 VITALS
SYSTOLIC BLOOD PRESSURE: 88 MMHG | RESPIRATION RATE: 19 BRPM | HEART RATE: 102 BPM | DIASTOLIC BLOOD PRESSURE: 55 MMHG | TEMPERATURE: 98.4 F | OXYGEN SATURATION: 96 %

## 2018-05-16 VITALS — HEART RATE: 108 BPM | SYSTOLIC BLOOD PRESSURE: 94 MMHG | DIASTOLIC BLOOD PRESSURE: 47 MMHG

## 2018-05-16 VITALS
RESPIRATION RATE: 20 BRPM | TEMPERATURE: 97.6 F | DIASTOLIC BLOOD PRESSURE: 56 MMHG | HEART RATE: 96 BPM | SYSTOLIC BLOOD PRESSURE: 134 MMHG | OXYGEN SATURATION: 99 %

## 2018-05-16 VITALS — OXYGEN SATURATION: 100 %

## 2018-05-16 VITALS
DIASTOLIC BLOOD PRESSURE: 62 MMHG | HEART RATE: 90 BPM | RESPIRATION RATE: 18 BRPM | OXYGEN SATURATION: 93 % | TEMPERATURE: 96.8 F | SYSTOLIC BLOOD PRESSURE: 124 MMHG

## 2018-05-16 VITALS
DIASTOLIC BLOOD PRESSURE: 65 MMHG | RESPIRATION RATE: 18 BRPM | OXYGEN SATURATION: 96 % | SYSTOLIC BLOOD PRESSURE: 105 MMHG | TEMPERATURE: 98.6 F | HEART RATE: 98 BPM

## 2018-05-16 VITALS — HEART RATE: 97 BPM

## 2018-05-16 VITALS — HEART RATE: 101 BPM

## 2018-05-16 VITALS — HEART RATE: 96 BPM

## 2018-05-16 VITALS — HEART RATE: 95 BPM

## 2018-05-16 VITALS — HEART RATE: 92 BPM

## 2018-05-16 VITALS — HEART RATE: 93 BPM

## 2018-05-16 VITALS — HEART RATE: 110 BPM

## 2018-05-16 LAB
BUN SERPL-MCNC: 38 MG/DL (ref 7–18)
CALCIUM SERPL-MCNC: 9.3 MG/DL (ref 8.5–10.1)
CHLORIDE SERPL-SCNC: 99 MEQ/L (ref 98–107)
CREAT SERPL-MCNC: 5.19 MG/DL (ref 0.5–1)
ERYTHROCYTE [DISTWIDTH] IN BLOOD BY AUTOMATED COUNT: 15.2 % (ref 11.6–17.2)
GFR SERPLBLD BASED ON 1.73 SQ M-ARVRAT: 11 ML/MIN (ref 89–?)
GLUCOSE SERPL-MCNC: 167 MG/DL (ref 74–106)
HCO3 BLD-SCNC: 25.1 MEQ/L (ref 21–32)
HCT VFR BLD CALC: 33 % (ref 35–46)
HGB BLD-MCNC: 10.6 GM/DL (ref 11.6–15.3)
MAGNESIUM SERPL-MCNC: 5 MG/DL (ref 1.5–2.5)
MCH RBC QN AUTO: 30.7 PG (ref 27–34)
MCHC RBC AUTO-ENTMCNC: 32.1 % (ref 32–36)
MCV RBC AUTO: 95.5 FL (ref 80–100)
PHOSPHATE SERPL-MCNC: 5.6 MG/DL (ref 2.5–4.9)
PLATELET # BLD: 343 TH/MM3 (ref 150–450)
PMV BLD AUTO: 8.1 FL (ref 7–11)
RBC # BLD AUTO: 3.46 MIL/MM3 (ref 4–5.3)
SODIUM SERPL-SCNC: 137 MEQ/L (ref 136–145)
WBC # BLD AUTO: 20.4 TH/MM3 (ref 4–11)

## 2018-05-16 RX ADMIN — CITALOPRAM SCH MG: 20 TABLET, FILM COATED ORAL at 09:00

## 2018-05-16 RX ADMIN — ONDANSETRON PRN MG: 2 INJECTION, SOLUTION INTRAMUSCULAR; INTRAVENOUS at 05:21

## 2018-05-16 RX ADMIN — CLEVIDIPINE PRN MLS/HR: 0.5 EMULSION INTRAVENOUS at 04:58

## 2018-05-16 RX ADMIN — IPRATROPIUM BROMIDE AND ALBUTEROL SULFATE SCH AMPULE: .5; 3 SOLUTION RESPIRATORY (INHALATION) at 20:20

## 2018-05-16 RX ADMIN — INSULIN ASPART SCH: 100 INJECTION, SOLUTION INTRAVENOUS; SUBCUTANEOUS at 10:00

## 2018-05-16 RX ADMIN — METOPROLOL TARTRATE SCH MG: 25 TABLET, FILM COATED ORAL at 23:15

## 2018-05-16 RX ADMIN — OXYCODONE HYDROCHLORIDE AND ACETAMINOPHEN PRN TAB: 5; 325 TABLET ORAL at 22:03

## 2018-05-16 RX ADMIN — INSULIN ASPART SCH: 100 INJECTION, SOLUTION INTRAVENOUS; SUBCUTANEOUS at 17:39

## 2018-05-16 RX ADMIN — SEVELAMER CARBONATE SCH MG: 800 TABLET, FILM COATED ORAL at 08:00

## 2018-05-16 RX ADMIN — INSULIN ASPART SCH: 100 INJECTION, SOLUTION INTRAVENOUS; SUBCUTANEOUS at 14:43

## 2018-05-16 RX ADMIN — MEGESTROL ACETATE SCH MG: 40 SUSPENSION ORAL at 09:00

## 2018-05-16 RX ADMIN — SENNOSIDES SCH MG: 8.6 TABLET, FILM COATED ORAL at 22:03

## 2018-05-16 RX ADMIN — SODIUM CHLORIDE SCH MG: 900 INJECTION, SOLUTION INTRAVENOUS at 22:02

## 2018-05-16 RX ADMIN — ACETAMINOPHEN SCH MLS/HR: 10 INJECTION, SOLUTION INTRAVENOUS at 08:18

## 2018-05-16 RX ADMIN — FAMOTIDINE SCH MG: 20 TABLET, FILM COATED ORAL at 22:03

## 2018-05-16 RX ADMIN — GENTAMICIN SULFATE PRN MG: 10 INJECTION, SOLUTION INTRAMUSCULAR; INTRAVENOUS at 12:29

## 2018-05-16 RX ADMIN — ASPIRIN 81 MG SCH MG: 81 TABLET ORAL at 08:07

## 2018-05-16 RX ADMIN — CLEVIDIPINE PRN MLS/HR: 0.5 EMULSION INTRAVENOUS at 08:55

## 2018-05-16 RX ADMIN — OXYCODONE HYDROCHLORIDE AND ACETAMINOPHEN PRN TAB: 5; 325 TABLET ORAL at 16:52

## 2018-05-16 RX ADMIN — ACYCLOVIR SCH UNITS: 800 TABLET ORAL at 21:00

## 2018-05-16 RX ADMIN — ACYCLOVIR SCH UNITS: 800 TABLET ORAL at 08:08

## 2018-05-16 RX ADMIN — FAMOTIDINE SCH MG: 20 TABLET, FILM COATED ORAL at 08:04

## 2018-05-16 RX ADMIN — HEPARIN SODIUM PRN UNITS: 1000 INJECTION, SOLUTION INTRAVENOUS; SUBCUTANEOUS at 12:30

## 2018-05-16 RX ADMIN — INSULIN ASPART SCH: 100 INJECTION, SOLUTION INTRAVENOUS; SUBCUTANEOUS at 22:00

## 2018-05-16 RX ADMIN — FERROUS SULFATE TAB 325 MG (65 MG ELEMENTAL FE) SCH MG: 325 (65 FE) TAB at 09:00

## 2018-05-16 RX ADMIN — SEVELAMER CARBONATE SCH MG: 800 TABLET, FILM COATED ORAL at 12:00

## 2018-05-16 RX ADMIN — DOCUSATE SODIUM SCH MG: 100 CAPSULE, LIQUID FILLED ORAL at 22:03

## 2018-05-16 RX ADMIN — SEVELAMER CARBONATE SCH MG: 800 TABLET, FILM COATED ORAL at 17:00

## 2018-05-16 RX ADMIN — ACETAMINOPHEN SCH MLS/HR: 10 INJECTION, SOLUTION INTRAVENOUS at 02:11

## 2018-05-16 RX ADMIN — ONDANSETRON PRN MG: 4 TABLET, ORALLY DISINTEGRATING ORAL at 17:39

## 2018-05-16 RX ADMIN — MULTIPLE VITAMINS W/ MINERALS TAB SCH TAB: TAB at 09:00

## 2018-05-16 RX ADMIN — Medication SCH ML: at 21:00

## 2018-05-16 RX ADMIN — SODIUM CHLORIDE SCH MG: 900 INJECTION, SOLUTION INTRAVENOUS at 16:04

## 2018-05-16 RX ADMIN — METOPROLOL TARTRATE SCH MG: 25 TABLET, FILM COATED ORAL at 08:06

## 2018-05-16 RX ADMIN — Medication SCH ML: at 09:00

## 2018-05-16 RX ADMIN — CITALOPRAM SCH MG: 20 TABLET, FILM COATED ORAL at 08:07

## 2018-05-16 RX ADMIN — ONDANSETRON PRN MG: 2 INJECTION, SOLUTION INTRAMUSCULAR; INTRAVENOUS at 00:30

## 2018-05-16 RX ADMIN — IPRATROPIUM BROMIDE AND ALBUTEROL SULFATE SCH AMPULE: .5; 3 SOLUTION RESPIRATORY (INHALATION) at 14:18

## 2018-05-16 RX ADMIN — SODIUM CHLORIDE SCH MG: 900 INJECTION, SOLUTION INTRAVENOUS at 11:52

## 2018-05-16 NOTE — RADRPT
EXAM DATE/TIME:  05/16/2018 03:47 

 

[facility] COMPARISON:     

CHEST SINGLE AP, May 15, 2018, 13:19.

 

                     

INDICATIONS :     

Post CABG.

                     

 

MEDICAL HISTORY :            

Cardiovascular disease. Diabetes mellitus type 2. Hypertension   

 

SURGICAL HISTORY :        

Tubal ligation.

 

ENCOUNTER:     

Subsequent                                        

 

ACUITY:     

2 days      

 

PAIN SCORE:     

0/10

 

LOCATION:     

Bilateral  chest

 

FINDINGS:     

A single portable frontal view of the chest shows interval extubation and removal of nasogastric tube
s. Bilateral thoracostomy tubes and right sided perm catheter remain. No pneumothorax or effusion. No
 infiltrate. Mild linear atelectasis within the left base. Top normal heart size. Median sternotomy w
ires.

 

CONCLUSION:     

Mild left basilar atelectasis.

 

 

 

 Fei Greenfield Jr., MD on May 16, 2018 at 4:27           

Board Certified Radiologist.

 This report was verified electronically.

## 2018-05-16 NOTE — HHI.PR
Subjective


Remarks


Patient laying in bed getting hemodialysis


Awake but she does not answer question


She complained of abdominal pain however by further questioning seems to be 

soreness around the chest tube





Objective


Vitals





Vital Signs








  Date Time  Temp Pulse Resp B/P (MAP) Pulse Ox O2 Delivery O2 Flow Rate FiO2


 


5/16/18 18:03  96      


 


5/16/18 17:31  101      


 


5/16/18 17:30   16     


 


5/16/18 15:00  97 16 139/69 (92) 99   





    Arterial Line    


 


5/16/18 15:00  96      


 


5/16/18 14:32     100 Nasal Cannula 2.00 


 


5/16/18 14:22     99 Nasal Cannula 4.00 


 


5/16/18 14:00  97      


 


5/16/18 13:00  95      


 


5/16/18 12:48  93      


 


5/16/18 11:15 97.9 92 18 133/71 (91) 99   





    120/56 (77)    


 


5/16/18 10:58   20     


 


5/16/18 09:45  94  149/47    


 


5/16/18 08:55  93  139/51    


 


5/16/18 07:00 97.6 96 20 134/56 (82) 99   





    146/55 (85)    


 


5/16/18 04:58  102  167/64    


 


5/16/18 04:15  92      


 


5/16/18 03:00 96.8 90 18 124/62 (82) 93   





    129/63 (85)    


 


5/16/18 02:45   18     


 


5/15/18 23:00  99      


 


5/15/18 23:00 96.7 96 18 146/73 (97) 98   





    150/57 (88)    


 


5/15/18 22:10  97  139/68    


 


5/15/18 20:57     98 Nasal Cannula 4.00 


 


5/15/18 19:00 97.1 97 20 127/66 (86) 97   





    125/53 (77)    


 


5/15/18 18:45  99      


 


5/15/18 18:35  99  105/69    














I/O      


 


 5/15/18 5/15/18 5/15/18 5/16/18 5/16/18 5/16/18





 06:59 14:59 22:59 06:59 14:59 22:59


 


Intake Total  3090 ml 184.3 ml 294 ml 414.7 ml 


 


Output Total  1150 ml 380 ml 445 ml 2400 ml 140 ml


 


Balance  1940 ml -195.7 ml -151 ml -1985.3 ml -140 ml


 


      


 


Intake Oral  240 ml  90 ml  


 


IV Total  100 ml 184.3 ml 204 ml 414.7 ml 


 


Autotransfusion  450 ml    


 


Other  2300 ml    


 


Output Urine Total  250 ml 100 ml 275 ml  0 ml


 


Chest Tube Drainage Total   280 ml 170 ml  140 ml


 


Hemodialysis     2400 ml 


 


Estimated Blood Loss  900 ml    


 


# Voids   5   0


 


# Bowel Movements  1  0  








Result Diagram:  


5/16/18 0500                                                                   

             5/16/18 0500





Objective Remarks


GENERAL: This is a well-nourished, well-developed patient, in no apparent 

distress.


CARDIOVASCULAR: RRR,  no gallops, or rubs. 


RESPIRATORY: Fair air entry bilaterally. No W, R, or R, chest tube in place


GASTROINTESTINAL: Abdomen soft, non-tender, nondistended.  Positive bowel sounds


MUSCULOSKELETAL: Extremities without clubbing, cyanosis, or edema.  Pedal 

pulses appreciated


NEUROLOGICAL: Awake and alert.  Moves all extremity.  Normal speech.no focal 

neurological deficit





A/P


Assessment and Plan


53-year-old female with end-stage renal disease on dialysis who presented with 

N STEMI, and is currently status post CABG performed on 5/15.





5/16: Stable postop discussed with the nurse she is off all the drips, soreness 

around the chest tube, discussed with the [facility] ARNP, continue monitoring closely 

CBC BMP, vitals








A/P:


//NSTEMI


//CAD


//Postop CABG performed on 5/15.


Elevated Troponin trending up  EKG sinus with T-wave inversions noted.    

Heparin drip.  Status post aspirin.  Continue daily aspirin.  Continue Plavix. 


= 5/15.  Postop CABG.  Postsurgical management as per surgical service.





//SIRS meeting criteria, also tachycardiac, initiate sepsis protocol. With 

fevers patient did not have cardiac cath.  Discussed with the cardiology. Blood 

cultures, CXR, UA, LA ordered. Started on Levaquin. Monitor closely. 


= No evidence of infection.  Discontinued antibiotics.





//CHF with Exacerbation


BNP elevated.  Chest x-ray with edema.  Will start on nitro drip.  Consulted 

nephrology for dialysis.  Chest x-ray reviewed with congestion. Advance diet 

cardiac cath on hold.


= Cardiology following.  Appreciate assistance.





//Accelerated hypertension.  Patient started on nitro drip.  Restart hydralazine

, with as needed hydralazine.  Awaiting official list of home meds.


= Resolved.  Decreased blood pressure meds.











//Pleuritic chest pain


elevated  d-dimer. CT pulmonary angiogram reviewed and no PE. Patient received 

HD 





//End-stage renal disease on hemodialysis Monday Wednesday Friday.  Consult 

nephrology for dialysis.  Dializef before and after CTA.  Appreciate assistance.





//Tobacco abuse.  Cessation counseling provided.





//Diabetes mellitus type 2.  Diabetic diet.  Insulin sliding scale, accuchecks. 





//Transaminitis


Nausea


Check liver ultrasound.  Lipase only in the 60s.  Consult GI given past history 

of pancreatitis


= Resolved.  Liver ultrasound normal.


Discharge Planning


Postop CABG performed on 5/15


Discharge cleared by cardiology/CT surgery











Jesus Manuel Pham MD May 16, 2018 18:26

## 2018-05-16 NOTE — PD.CARD.PN
Subjective


Subjective Remarks


getting dialysis now


feeling tired


no CP





Objective


Medications





Current Medications








 Medications


  (Trade)  Dose


 Ordered  Sig/Luis Armando


 Route  Start Time


 Stop Time Status Last Admin


 


  (Narcan Inj)  0.4 mg  UNSCH  PRN


 IV PUSH  5/8/18 02:15


     


 


 


  (Milk Of


 Magnesia Liq)  30 ml  Q12H  PRN


 PO  5/8/18 02:15


    5/14/18 14:23


 


 


  (Senokot)  17.2 mg  Q12H  PRN


 PO  5/8/18 02:15


     


 


 


  (Dulcolax Supp)  10 mg  DAILY  PRN


 RECTAL  5/8/18 02:15


     


 


 


  (Lactulose Liq)  30 ml  DAILY  PRN


 PO  5/8/18 02:15


    5/14/18 14:23


 


 


  (Ativan)  0.5 mg  Q12H  PRN


 PO  5/8/18 13:00


    5/16/18 12:14


 


 


  (Ferrous Sulfate)  325 mg  DAILY


 PO  5/9/18 13:00


    5/14/18 11:10


 


 


  (Megace Liq)  800 mg  DAILY


 PO  5/9/18 15:00


    5/14/18 11:11


 


 


  (Renvela)  800 mg  TIDAC


 PO  5/11/18 17:00


    5/14/18 17:28


 


 


  (CeleXA)  20 mg  DAILY


 PO  5/11/18 14:00


    5/16/18 08:07


 


 


  (Pill Splitter)  1 ea  UNSCH  PRN


 OTHER  5/11/18 16:30


     


 


 


  (Apresoline)  25 mg  Q8HR


 PO  5/11/18 22:00


   Future hold 5/16/18 04:59


 


 


  (Lopressor)  50 mg  Q12HR


 PO  5/11/18 21:00


    5/16/18 08:06


 


 


  (Norvasc)  10 mg  DAILY


 PO  5/12/18 09:00


    5/16/18 08:03


 


 


  (Pepcid)  10 mg  BID


 PO  5/12/18 21:00


    5/16/18 08:04


 


 


  (Levemir Inj)  8 units  BID


 SQ  5/14/18 21:00


    5/16/18 08:08


 


 


  (NS Flush)  2 ml  BID


 IV FLUSH  5/15/18 21:00


    5/15/18 22:20


 


 


  (NS Flush)  2 ml  UNSCH  PRN


 IV FLUSH  5/15/18 12:15


     


 


 


 Clevidipine  50 ml @ 2


 mls/hr  TITRATE  PRN


 IV  5/15/18 12:15


    5/16/18 08:55


 


 


 Albumin Human  250 ml @ 


 250 mls/hr  UNSCH  PRN


 IV  5/15/18 12:15


     


 


 


 Cefazolin Sodium


 1000 mg/Sodium


 Chloride  100 ml @ 


 200 mls/hr  Q24H


 IV  5/16/18 10:00


 5/18/18 10:29  5/16/18 09:04


 


 


  (Aspirin Chew)  81 mg  DAILY


 PO  5/16/18 09:00


    5/16/18 08:07


 


 


  (Tylenol)  650 mg  Q4H  PRN


 PO  5/15/18 12:15


     


 


 


  (Percocet  5-325


 Mg)  1 tab  Q3H  PRN


 PO  5/15/18 12:15


     


 


 


  (fentaNYL INJ)  25 mcg  Q1H  PRN


 IV PUSH  5/15/18 12:15


    5/16/18 08:11


 


 


  (Zofran  Odt)  4 mg  Q6H  PRN


 PO  5/15/18 12:15


     


 


 


  (Apresoline Inj)  10 mg  Q4H  PRN


 IV PUSH  5/15/18 12:15


     


 


 


  (Duoneb Neb)  1 ampule  Q2HR NEB  PRN


 NEB  5/15/18 12:15


     


 


 


  (Duoneb Neb)  1 ampule  Q6HR WHILE AWAKE  NEB


 NEB  5/16/18 14:00


 5/18/18 13:59   


 


 


  (Reglan Inj)  5 mg  Q8HR


 IV PUSH  5/16/18 08:30


 5/16/18 22:01  5/16/18 11:52


 


 


  (Colace)  100 mg  BID


 PO  5/16/18 21:00


     


 


 


  (Theragran M Tab)  1 tab  DAILY


 PO  5/16/18 09:00


     


 


 


  (Dulcolax Supp)  10 mg  UNSCH  PRN


 RECTAL  5/16/18 08:30


     


 


 


  (Miralax)  17 gm  DAILY


 PO  5/17/18 09:00


     


 


 


  (Senokot)  8.6 mg  HS


 PO  5/16/18 21:00


     


 


 


  (Fleets Enema


  (Adult))  118 ml  UNSCH  PRN


 RECTAL  5/16/18 08:30


     


 


 


  (NovoLOG


 SUPPLEMENTAL


 SCALE)  1  02,06,10,14,18,22


 SQ  5/16/18 10:00


 5/17/18 09:59   


 


 


  (D50w (Vial) Inj)  50 ml  UNSCH  PRN


 IV PUSH  5/16/18 08:30


     


 


 


  (Glucagon Inj)  1 mg  UNSCH  PRN


 OTHER  5/16/18 08:30


     


 


 


  (NovoLOG


 SUPPLEMENTAL


 SCALE)  1  ACHS


 SQ  5/17/18 12:00


     


 


 


 Sodium Chloride  1,000 ml @ 


 0 mls/hr  TITRATE  PRN


 OTHER  5/16/18 12:00


     


 


 


  (Heparin Inj)  8,000 units  UNSCH  PRN


 IV FLUSH  5/16/18 12:00


     


 


 


 Sodium Chloride  1,000 ml @ 


 200 mls/hr  Q5H PRN


 OTHER  5/16/18 12:00


     


 


 


 Sodium Chloride  200 ml @ 0


 mls/hr  UNSCH  PRN


 IV  5/16/18 12:00


     


 


 


  (Mannitol Inj)  12.5 gm  UNSCH  PRN


 IV PUSH  5/16/18 12:00


     


 


 


 Albumin Human  100 ml @ 


 60 mls/hr  UNSCH  PRN


 IV  5/16/18 12:00


     


 


 


  (NS Flush)  5 ml  UNSCH  PRN


 IV FLUSH  5/16/18 12:00


     


 


 


  (Heparin Inj)  Dwell


 Heparin to


 f...  UNSCH  PRN


 OTHER  5/16/18 12:00


    5/16/18 12:30


 


 


  (Gentamicin Inj)  10 mg  UNSCH  PRN


 OTHER  5/16/18 12:00


    5/16/18 12:29


 


 


  (Gelfoam 12 Mm/7


 Mm Top)  1 foam  UNSCH  PRN


 TOPICAL  5/16/18 12:00


     


 


 


  (Zofran Inj)  4 mg  UNSCH  PRN


 IV PUSH  5/16/18 12:00


     


 


 


  (Tylenol)  650 mg  UNSCH  PRN


 PO  5/16/18 12:00


     


 


 


  (Benadryl)  25 mg  UNSCH  PRN


 PO  5/16/18 12:00


     


 


 


  (Nitrostat Sl)  0.4 mg  UNSCH  PRN


 SL  5/16/18 12:00


     


 


 


  (Catapres)  0.1 mg  UNSCH  PRN


 PO  5/16/18 12:00


     


 








Vital Signs / I&O





Vital Signs








  Date Time  Temp Pulse Resp B/P (MAP) Pulse Ox O2 Delivery O2 Flow Rate FiO2


 


5/16/18 12:48  93      


 


5/16/18 11:15 97.9 92 18 133/71 (91) 99   





    120/56 (77)    


 


5/16/18 10:58   20     


 


5/16/18 09:45  94  149/47    


 


5/16/18 08:55  93  139/51    


 


5/16/18 07:00 97.6 96 20 134/56 (82) 99   





    146/55 (85)    


 


5/16/18 04:58  102  167/64    


 


5/16/18 04:15  92      


 


5/16/18 03:00 96.8 90 18 124/62 (82) 93   





    129/63 (85)    


 


5/16/18 02:45   18     


 


5/15/18 23:00  99      


 


5/15/18 23:00 96.7 96 18 146/73 (97) 98   





    150/57 (88)    


 


5/15/18 22:10  97  139/68    


 


5/15/18 20:57     98 Nasal Cannula 4.00 


 


5/15/18 19:00 97.1 97 20 127/66 (86) 97   





    125/53 (77)    


 


5/15/18 18:45  99      


 


5/15/18 18:35  99  105/69    


 


5/15/18 17:28 96.7 94 13 122/63 (82) 94   


 


5/15/18 17:00  94  128/59    


 


5/15/18 16:00  100  125/56    


 


5/15/18 15:52     98 Venturi Mask 6.00 50


 


5/15/18 15:45     98 Venturi Mask 6 50


 


5/15/18 15:00 97.3 92 11 129/65 (86) 98   





    129/57 (81)    


 


5/15/18 15:00  95      


 


5/15/18 15:00        50


 


5/15/18 14:17  91  166/90    


 


5/15/18 14:00  92  129/79    














I/O      


 


 5/15/18 5/15/18 5/15/18 5/16/18 5/16/18 5/16/18





 07:00 15:00 23:00 07:00 15:00 23:00


 


Intake Total  3090 ml 184.3 ml 294 ml 414.7 ml 


 


Output Total  1150 ml 380 ml 445 ml 2400 ml 


 


Balance  1940 ml -195.7 ml -151 ml -1985.3 ml 


 


      


 


Intake Oral  240 ml  90 ml  


 


IV Total  100 ml 184.3 ml 204 ml 414.7 ml 


 


Autotransfusion  450 ml    


 


Other  2300 ml    


 


Output Urine Total  250 ml 100 ml 275 ml  


 


Chest Tube Drainage Total   280 ml 170 ml  


 


Hemodialysis     2400 ml 


 


Estimated Blood Loss  900 ml    


 


# Voids   5   


 


# Bowel Movements  1  0  








Physical Exam


GENERAL: 


SKIN: Warm and dry.


HEAD: Normocephalic.


EYES: No scleral icterus. No injection or drainage. 


NECK: Supple, trachea midline. No JVD or lymphadenopathy.


CARDIOVASCULAR: Regular rate and rhythm without murmurs, gallops, or rubs. 


RESPIRATORY: Breath sounds equal bilaterally. No accessory muscle use.


GASTROINTESTINAL: Abdomen soft, non-tender, nondistended. 


MUSCULOSKELETAL: No cyanosis, or edema. 


BACK: Nontender without obvious deformity. No CVA tenderness.





Laboratory





Laboratory Tests








Test


  5/16/18


05:00


 


White Blood Count 20.4 TH/MM3 


 


Red Blood Count 3.46 MIL/MM3 


 


Hemoglobin 10.6 GM/DL 


 


Hematocrit 33.0 % 


 


Mean Corpuscular Volume 95.5 FL 


 


Mean Corpuscular Hemoglobin 30.7 PG 


 


Mean Corpuscular Hemoglobin


Concent 32.1 % 


 


 


Red Cell Distribution Width 15.2 % 


 


Platelet Count 343 TH/MM3 


 


Mean Platelet Volume 8.1 FL 


 


Blood Urea Nitrogen 38 MG/DL 


 


Creatinine 5.19 MG/DL 


 


Random Glucose 167 MG/DL 


 


Calcium Level 9.3 MG/DL 


 


Phosphorus Level 5.6 MG/DL 


 


Magnesium Level 5.0 MG/DL 


 


Sodium Level 137 MEQ/L 


 


Potassium Level 4.9 MEQ/L 


 


Chloride Level 99 MEQ/L 


 


Carbon Dioxide Level 25.1 MEQ/L 


 


Anion Gap 13 MEQ/L 


 


Estimat Glomerular Filtration


Rate 11 ML/MIN 


 








Imaging





Last 24 hours Impressions








Chest X-Ray 5/16/18 0500 Signed





Impressions: 





 Service Date/Time:  Wednesday, May 16, 2018 03:47 - CONCLUSION:  Mild left 





 basilar atelectasis.     Fei Greenfield Jr., MD 











Assessment and Plan


Problem List:  


(1) ESRD (end stage renal disease) on dialysis


ICD Codes:  N18.6 - End stage renal disease; Z99.2 - Dependence on renal 

dialysis


(2) DM (diabetes mellitus)


ICD Codes:  E11.9 - Type 2 diabetes mellitus without complications


(3) HTN (hypertension)


ICD Codes:  I10 - Hypertension


Status:  Chronic


(4) CAD (coronary artery disease)


ICD Codes:  I25.10 - Atherosclerotic heart disease of native coronary artery 

without angina pectoris


Status:  Chronic


(5) S/P CABG (coronary artery bypass graft)


ICD Codes:  Z95.1 - Presence of aortocoronary bypass graft


Assessment and Plan


------------------


[facility] with severe instent restenosis.


s/p CABG


postoperative care


will sign off


call with questions











Minor,Edgardo E. MD May 16, 2018 13:38

## 2018-05-16 NOTE — PD.CAR.PN
CVT Progress Note


Subjective/Hospital Course:


A 53-year-old female with history of coronary artery disease with prior stenting

, bare metal stent to the mid-LAD, distal RCA, proximal RCA back in February by 

Dr. Edgardo Mercado.  She has continued to take the Plavix.  Despite this, she 

developed chest pain during dialysis on the 05/08/2018, also had some 

associated nausea.  It was improved with a nitro drip.  She underwent re-

catheterization today by Dr. Mercado which showed severe in-stent restenosis of 

the LAD and RCA bare metal stents.   HX of ESRD on dialysis  Monday, Wednesday 

and Friday.  She has had an A-V fistula 


placed in her left forearm for which she is supposed to be scheduled for 

revision this next week with Dr. Rees.


 


PAST MEDICAL HISTORY: Includes hypertension, hyperlipidemia, history of CHF, 

diabetes mellitus on insulin, coronary artery disease, pancreatitis, depression.





5/12/18


c/o chest pain this morning





5/14


pt had chest pain last pm, now on NTG gtt


and heparin gtt 


Dr Huitron spoke with pt / scheduled for CABG in am 


received dialysis today





5/15


surgery : CABG x 2, LIMA to LAD - good, SVG to RCA - fair, EVH


extubated after surgery


crystalloid 2300cc, cell saver 450cc , EBL 900cc, steroids 








5/16


nausea last pm, for dialysis today 


weaning off cleviprex, on BB 


weaning off insulin gtt / on levemir 


will transfer to stepdown after dialysis and off cleviprex


Objective:


GENERAL: A&O x 3 , flat affect 


SKIN: Warm and dry. prevena dressing to chest ace to left leg 


HEAD: Normocephalic.


EYES: No scleral icterus. No injection or drainage. 


NECK: Supple, trachea midline. No JVD or lymphadenopathy.


CARDIOVASCULAR: Regular rate and rhythm without murmurs, gallops, or rubs. 


RESPIRATORY: Breath sounds equal bilaterally. No accessory muscle use.


GASTROINTESTINAL: Abdomen soft, non-tender, nondistended. 


MUSCULOSKELETAL: No cyanosis, or edema. 


BACK: Nontender without obvious deformity. No CVA tenderness.








Vital Signs








  Date Time  Temp Pulse Resp B/P (MAP) Pulse Ox O2 Delivery O2 Flow Rate FiO2


 


5/16/18 08:55  93  139/51    


 


5/16/18 04:58  102  167/64    


 


5/16/18 04:15  92      


 


5/16/18 03:00 96.8 90 18 124/62 (82) 93   





    129/63 (85)    


 


5/16/18 02:45   18     


 


5/15/18 23:00  99      


 


5/15/18 23:00 96.7 96 18 146/73 (97) 98   





    150/57 (88)    


 


5/15/18 22:10  97  139/68    


 


5/15/18 20:57     98 Nasal Cannula 4.00 


 


5/15/18 19:05   18     


 


5/15/18 19:00 97.1 97 20 127/66 (86) 97   





    125/53 (77)    


 


5/15/18 18:45  99      


 


5/15/18 18:35  99  105/69    


 


5/15/18 17:28 96.7 94 13 122/63 (82) 94   


 


5/15/18 17:00  94  128/59    


 


5/15/18 16:00  100  125/56    


 


5/15/18 15:52     98 Venturi Mask 6.00 50


 


5/15/18 15:45     98 Venturi Mask 6 50


 


5/15/18 15:00 97.3 92 11 129/65 (86) 98   





    129/57 (81)    


 


5/15/18 15:00  95      


 


5/15/18 15:00        50


 


5/15/18 14:17  91  166/90    


 


5/15/18 14:00  92  129/79    


 


5/15/18 13:00  91  166/90    


 


5/15/18 12:45        60


 


5/15/18 12:45  90      


 


5/15/18 12:45 96.8 84 10 112/45 (67) 98   





    108/53 (71)    


 


5/15/18 12:43     99   50


 


5/15/18 12:43 96.8       








Labs:





Laboratory Tests








Test


  5/16/18


05:00


 


White Blood Count


  20.4 TH/MM3


(4.0-11.0)


 


Red Blood Count


  3.46 MIL/MM3


(4.00-5.30)


 


Hemoglobin


  10.6 GM/DL


(11.6-15.3)


 


Hematocrit


  33.0 %


(35.0-46.0)


 


Mean Corpuscular Volume


  95.5 FL


(80.0-100.0)


 


Mean Corpuscular Hemoglobin


  30.7 PG


(27.0-34.0)


 


Mean Corpuscular Hemoglobin


Concent 32.1 %


(32.0-36.0)


 


Red Cell Distribution Width


  15.2 %


(11.6-17.2)


 


Platelet Count


  343 TH/MM3


(150-450)


 


Mean Platelet Volume


  8.1 FL


(7.0-11.0)


 


Blood Urea Nitrogen


  38 MG/DL


(7-18)


 


Creatinine


  5.19 MG/DL


(0.50-1.00)


 


Random Glucose


  167 MG/DL


()


 


Calcium Level


  9.3 MG/DL


(8.5-10.1)


 


Phosphorus Level


  5.6 MG/DL


(2.5-4.9)


 


Magnesium Level


  5.0 MG/DL


(1.5-2.5)


 


Sodium Level


  137 MEQ/L


(136-145)


 


Potassium Level


  4.9 MEQ/L


(3.5-5.1)


 


Chloride Level


  99 MEQ/L


()


 


Carbon Dioxide Level


  25.1 MEQ/L


(21.0-32.0)


 


Anion Gap


  13 MEQ/L


(5-15)


 


Estimat Glomerular Filtration


Rate 11 ML/MIN


(>89)








Result Diagram:  


5/16/18 0500                                                                   

             5/16/18 0500





Telemetry:


NSR





(1) ESRD (end stage renal disease) on dialysis


Plan:  dialysis M-W-F





(2) DM (diabetes mellitus)


Plan:  on levemir, insulin sliding scale 





(3) HTN (hypertension)


Plan:  norvasc , hydralazine and BB added 





(4) CAD (coronary artery disease)


Plan:  for surgery in am 





(5) S/P CABG (coronary artery bypass graft)


Plan:  ASA, statin BB 





OOB ambulate,


remove chest tubes 





CM to eval for [facility] 














Terwilliger,Jacqueline R. ARNP May 16, 2018 09:58

## 2018-05-16 NOTE — HHI.PR
Subjective


Remarks


ALERT


POS OP


EXTUBATED ON O2 NC





Objective





Vital Signs








  Date Time  Temp Pulse Resp B/P (MAP) Pulse Ox O2 Delivery O2 Flow Rate FiO2


 


5/16/18 04:58  102  167/64    


 


5/16/18 04:15  92      


 


5/16/18 03:00 96.8 90 18 124/62 (82) 93   





    129/63 (85)    


 


5/16/18 02:45   18     


 


5/15/18 23:00  99      


 


5/15/18 23:00 96.7 96 18 146/73 (97) 98   





    150/57 (88)    


 


5/15/18 22:10  97  139/68    


 


5/15/18 20:57     98 Nasal Cannula 4.00 


 


5/15/18 19:05   18     


 


5/15/18 19:00 97.1 97 20 127/66 (86) 97   





    125/53 (77)    


 


5/15/18 18:45  99      


 


5/15/18 18:35  99  105/69    


 


5/15/18 17:28 96.7 94 13 122/63 (82) 94   


 


5/15/18 17:00  94  128/59    


 


5/15/18 16:00  100  125/56    


 


5/15/18 15:52     98 Venturi Mask 6.00 50


 


5/15/18 15:45     98 Venturi Mask 6 50


 


5/15/18 15:00 97.3 92 11 129/65 (86) 98   





    129/57 (81)    


 


5/15/18 15:00  95      


 


5/15/18 15:00        50


 


5/15/18 14:17  91  166/90    


 


5/15/18 14:00  92  129/79    


 


5/15/18 13:00  91  166/90    


 


5/15/18 12:45        60


 


5/15/18 12:45  90      


 


5/15/18 12:45 96.8 84 10 112/45 (67) 98   





    108/53 (71)    


 


5/15/18 12:43     99   50


 


5/15/18 12:43 96.8       














I/O      


 


 5/15/18 5/15/18 5/15/18 5/16/18 5/16/18 5/16/18





 07:00 15:00 23:00 07:00 15:00 23:00


 


Intake Total  3090 ml 184.3 ml 294 ml  


 


Output Total  1150 ml 380 ml 445 ml  


 


Balance  1940 ml -195.7 ml -151 ml  


 


      


 


Intake Oral  240 ml  90 ml  


 


IV Total  100 ml 184.3 ml 204 ml  


 


Autotransfusion  450 ml    


 


Other  2300 ml    


 


Output Urine Total  250 ml 100 ml 275 ml  


 


Chest Tube Drainage Total   280 ml 170 ml  


 


Estimated Blood Loss  900 ml    


 


# Voids   5   


 


# Bowel Movements  1  0  








Result Diagram:  


5/16/18 0500                                                                   

             5/16/18 0500





Objective Remarks


GENERAL: 


SKIN: Warm and dry.


HEAD: Atraumatic. Normocephalic. 


EYES: Pupils equal and round. No scleral icterus. No injection or drainage. 


ENT: No nasal bleeding or discharge.  Mucous membranes pink and moist.


NECK: Trachea midline. No JVD. 


CARDIOVASCULAR: Regular rate and rhythm.  


RESPIRATORY: No accessory muscle use. Clear to auscultation. Breath sounds 

equal bilaterally. 


GASTROINTESTINAL: Abdomen soft, non-tender, nondistended. Hepatic and splenic 

margins not palpable. 


MUSCULOSKELETAL: Extremities without clubbing, cyanosis, or edema. No obvious 

deformities. 


NEUROLOGICAL: Awake and alert. No obvious cranial nerve deficits.  Motor 

grossly within normal limits. Five out of 5 muscle strength in the arms and 

legs.  Normal speech.


PSYCHIATRIC: Appropriate mood and affect; insight and judgment normal.





Assessment and Plan


Assessment and Plan


ASS





CAD, DOING WELL POST OP


DM


HTN


ESRD ON MHD





PLAN





O2 IF NEEDED


PULM TOILET


INCREASE ACTIVITY


DALASIS TODAY











Kelle Nina MD May 16, 2018 08:08

## 2018-05-16 NOTE — EKG
Date Performed: 05/16/2018       Time Performed: 08:40:36

 

PTAGE:      53 years

 

EKG:      Sinus rhythm 

 

 Possible faulty V2 - omitted from analysis Anterolateral T wave changes may be due to myocardial isc
hemia Abnormal ECG

 

PREVIOUS TRACING       : 05/08/2018 07.14 Since the previous tracing, no significant change noted

 

DOCTOR:   Rene Phan  Interpretating Date/Time  05/16/2018 22:12:06

## 2018-05-16 NOTE — HHI.NPPN
Subjective


Complaints:  Chest Pain, Shortness of Breath


General Problems:  Anemia


Renal Failure:  Chronic, End Stage Renal Disease


Interval History


She is awake and nauseated. Vomited earlier today. Seen during dialysis. 

Clevidipine gtt has been weened off. 


Rhodes is out. Central line (right groin), A line, and chest tube remain in 

place. 


 (Migdalia Camejo)





Review of Systems


General


Constitutional:  Fatigue


General Remarks


Generalized pain


 (Migdalia Camjeo)





Respiratory


Lungs:  SOB


 (Migdalia Camejo)





Cardiovascular


Cardiac:  Chest Pain


 (Migdalia Camejo)





Musculoskeletal


MS:  Pain/Stiffness


 (Migdalia Camejo)





Objective Data


Data











 5/16/18 5/17/18





 19:00 07:00


 


Intake Total 200 ml 


 


Balance 200 ml 


 


  


 


IV Total 200 ml 











Vital Signs








  Date Time  Temp Pulse Resp B/P (MAP) Pulse Ox O2 Delivery O2 Flow Rate FiO2


 


5/16/18 09:45  94  149/47    


 


5/16/18 08:55  93  139/51    


 


5/16/18 04:58  102  167/64    


 


5/16/18 04:15  92      


 


5/16/18 03:00 96.8 90 18 124/62 (82) 93   





    129/63 (85)    


 


5/16/18 02:45   18     


 


5/15/18 23:00  99      


 


5/15/18 23:00 96.7 96 18 146/73 (97) 98   





    150/57 (88)    


 


5/15/18 22:10  97  139/68    


 


5/15/18 20:57     98 Nasal Cannula 4.00 


 


5/15/18 19:05   18     


 


5/15/18 19:00 97.1 97 20 127/66 (86) 97   





    125/53 (77)    


 


5/15/18 18:45  99      


 


5/15/18 18:35  99  105/69    


 


5/15/18 17:28 96.7 94 13 122/63 (82) 94   


 


5/15/18 17:00  94  128/59    


 


5/15/18 16:00  100  125/56    


 


5/15/18 15:52     98 Venturi Mask 6.00 50


 


5/15/18 15:45     98 Venturi Mask 6 50


 


5/15/18 15:00 97.3 92 11 129/65 (86) 98   





    129/57 (81)    


 


5/15/18 15:00  95      


 


5/15/18 15:00        50


 


5/15/18 14:17  91  166/90    


 


5/15/18 14:00  92  129/79    


 


5/15/18 13:00  91  166/90    


 


5/15/18 12:45        60


 


5/15/18 12:45  90      


 


5/15/18 12:45 96.8 84 10 112/45 (67) 98   





    108/53 (71)    


 


5/15/18 12:43     99   50


 


5/15/18 12:43 96.8       








 (Migdalia Camejo)


-:  


5/16/18 0500                                                                   

             5/16/18 0500





Tubes & Lines:  Perma-Cath


Tubes & Lines Comment


TLC right groin, Chest tube anterior, A line right brachial


Drip Comment


Clevidipine


 (Migdalia Camejo)





Physical Exam


General


Appearance:  Well Developed, No Acute Distress, Comfortable, Malnourished


Appearance Remarks


quite, not speaking much


 (Migdalia Camejo)





Eyes


Eye Exam:  Pupils Equal, Pupils Reactive


 (Migdalia Camejo)





Pulmonary


Resp Exam:  Clear Bilaterally, Breath Sounds Equal, No Distress, Diminished 

Breath Sounds


Resp Remarks


shallow respirations 


 (Migdalia Camejo)





Cardiology


CV Exam:  Regular, Normal Sinus Rhythm


CV Remarks


chest tube, anterior 


 (Migdalia Camejo)





Gastrointestinal/Abdomen


GI Exam:  Soft, Non-Tender, Bowel Sounds Present


 (Migdalia Camejo)





Musculoskeletal


MS Exam:  Joints Intact, Atrophy, Unable to Ambulate


 (Migdalia Camejo)





Integumentary


Skin Exam:  Clear, Warm, Dry, Intact


 (Migdalia Camejo)





Extremeties


Extremities Exam:  No Edema, Pedal Pulses Palpable


Extremeties Remarks


Left arm AVF, + patent


 (Migdalia Camejo)





Neurologic


Neuro Exam:  Alert, Awake, Oriented, Speech Clear, Moving All Extremities


 (Migdalia Camejo)





Psychiatric


Psych Remarks


sad, tearful 


 (Migdalia Camejo)





Assessment/Plan


Discussed Condition With:  Patient


Assessment Summary:  Anemia of CKD, CHF, Hypertension, Diabetes Mellitus, End 

Stage Renal Disease


Problem List:  


(1) ESRD (end stage renal disease) on dialysis


ICD Codes:  N18.6 - End stage renal disease; Z99.2 - Dependence on renal 

dialysis


Plan:  Seen during dialysis on a 2K, 300 BFR, goal 3L


Continue HD MWF. 


Renal panel reviewed.


On  Renvela with meals, monitor phosphorus level intermittently. 


On protein supplements with meals, Megace for stimulant, and antidepressant. 


Avoid excess IVF. Gadolinium is contraindicated.





AVF not mature, using Permcath. She has second step surgery  with Dr. Rees to 

be rescheduled. Depending on recovery, may still happen this admission. 





(2) Chest pain, rule out acute myocardial infarction


ICD Codes:  R07.9 - Chest pain, unspecified


Status:  Acute


Plan:  Cardiology and CTS following  


s/p cath, 90% occlusion of Bare Metal Stent previously placed. 


Echo: Ef 40-45%, LVH is present. 





s/p CABG x 2 (5/15), continue supportive care





(3) DM (diabetes mellitus)


ICD Codes:  E11.9 - Type 2 diabetes mellitus without complications


Plan:  maintain glucose 140-180 mg/dL while hospitalized 


On insulin gtt post op 





(4) HTN (hypertension)


ICD Codes:  I10 - Hypertension


Status:  Chronic


Plan:  Monitor blood pressure


continue medications as ordered, consider changing metoprolol to carvedilol. 


ween clevidipine gtt today


Fluid removal with dialysis





(5) Anemia


ICD Codes:  D64.9 - Anemia, unspecified


Status:  Chronic


Plan:  Epogen not required


Monitor hemoglobin 


On oral iron


900 EBL, 450 given back


 (Migdalia Camejo)


Plan


patient was seen and examined during dialysis. Overall stable after surgery. 

Continue supportive care. 


 (Kelton Whiteside MD)











Migdalia Camejo May 16, 2018 10:07


Kelton Whiteside MD May 17, 2018 08:11

## 2018-05-17 VITALS
DIASTOLIC BLOOD PRESSURE: 72 MMHG | HEART RATE: 87 BPM | OXYGEN SATURATION: 98 % | RESPIRATION RATE: 20 BRPM | TEMPERATURE: 98.7 F | SYSTOLIC BLOOD PRESSURE: 146 MMHG

## 2018-05-17 VITALS — OXYGEN SATURATION: 100 %

## 2018-05-17 VITALS
HEART RATE: 95 BPM | TEMPERATURE: 98.2 F | RESPIRATION RATE: 20 BRPM | SYSTOLIC BLOOD PRESSURE: 147 MMHG | DIASTOLIC BLOOD PRESSURE: 65 MMHG | OXYGEN SATURATION: 96 %

## 2018-05-17 VITALS — SYSTOLIC BLOOD PRESSURE: 94 MMHG | DIASTOLIC BLOOD PRESSURE: 67 MMHG

## 2018-05-17 VITALS
RESPIRATION RATE: 20 BRPM | SYSTOLIC BLOOD PRESSURE: 163 MMHG | TEMPERATURE: 98.2 F | OXYGEN SATURATION: 94 % | DIASTOLIC BLOOD PRESSURE: 70 MMHG | HEART RATE: 104 BPM

## 2018-05-17 VITALS — DIASTOLIC BLOOD PRESSURE: 67 MMHG | SYSTOLIC BLOOD PRESSURE: 155 MMHG

## 2018-05-17 VITALS — HEART RATE: 94 BPM

## 2018-05-17 VITALS
TEMPERATURE: 98.8 F | HEART RATE: 93 BPM | SYSTOLIC BLOOD PRESSURE: 139 MMHG | DIASTOLIC BLOOD PRESSURE: 65 MMHG | RESPIRATION RATE: 16 BRPM | OXYGEN SATURATION: 97 %

## 2018-05-17 VITALS
HEART RATE: 98 BPM | RESPIRATION RATE: 21 BRPM | DIASTOLIC BLOOD PRESSURE: 58 MMHG | SYSTOLIC BLOOD PRESSURE: 118 MMHG | TEMPERATURE: 98.4 F | OXYGEN SATURATION: 97 %

## 2018-05-17 VITALS — HEART RATE: 90 BPM

## 2018-05-17 VITALS — HEART RATE: 97 BPM

## 2018-05-17 VITALS — HEART RATE: 105 BPM

## 2018-05-17 VITALS
DIASTOLIC BLOOD PRESSURE: 62 MMHG | RESPIRATION RATE: 16 BRPM | TEMPERATURE: 97.4 F | SYSTOLIC BLOOD PRESSURE: 133 MMHG | OXYGEN SATURATION: 99 % | HEART RATE: 86 BPM

## 2018-05-17 VITALS — SYSTOLIC BLOOD PRESSURE: 84 MMHG | DIASTOLIC BLOOD PRESSURE: 53 MMHG

## 2018-05-17 VITALS — HEART RATE: 86 BPM

## 2018-05-17 VITALS — HEART RATE: 87 BPM

## 2018-05-17 VITALS — HEART RATE: 93 BPM

## 2018-05-17 VITALS — HEART RATE: 98 BPM

## 2018-05-17 VITALS — HEART RATE: 106 BPM

## 2018-05-17 VITALS — HEART RATE: 96 BPM

## 2018-05-17 VITALS — HEART RATE: 88 BPM

## 2018-05-17 LAB
BASOPHILS # BLD AUTO: 0 TH/MM3 (ref 0–0.2)
BASOPHILS NFR BLD: 0.2 % (ref 0–2)
BUN SERPL-MCNC: 27 MG/DL (ref 7–18)
CALCIUM SERPL-MCNC: 8.7 MG/DL (ref 8.5–10.1)
CHLORIDE SERPL-SCNC: 99 MEQ/L (ref 98–107)
CREAT SERPL-MCNC: 3.95 MG/DL (ref 0.5–1)
EOSINOPHIL # BLD: 0 TH/MM3 (ref 0–0.4)
EOSINOPHIL NFR BLD: 0 % (ref 0–4)
ERYTHROCYTE [DISTWIDTH] IN BLOOD BY AUTOMATED COUNT: 15.3 % (ref 11.6–17.2)
GFR SERPLBLD BASED ON 1.73 SQ M-ARVRAT: 14 ML/MIN (ref 89–?)
GLUCOSE SERPL-MCNC: 52 MG/DL (ref 74–106)
HCO3 BLD-SCNC: 28.6 MEQ/L (ref 21–32)
HCT VFR BLD CALC: 26.9 % (ref 35–46)
HGB BLD-MCNC: 8.7 GM/DL (ref 11.6–15.3)
LYMPHOCYTES # BLD AUTO: 0.8 TH/MM3 (ref 1–4.8)
LYMPHOCYTES NFR BLD AUTO: 4.6 % (ref 9–44)
LYMPHOCYTES: 8 % (ref 9–44)
MAGNESIUM SERPL-MCNC: 3.8 MG/DL (ref 1.5–2.5)
MCH RBC QN AUTO: 30.7 PG (ref 27–34)
MCHC RBC AUTO-ENTMCNC: 32.3 % (ref 32–36)
MCV RBC AUTO: 95 FL (ref 80–100)
MONOCYTE #: 1.5 TH/MM3 (ref 0–0.9)
MONOCYTES NFR BLD: 8.5 % (ref 0–8)
MONOCYTES: 6 % (ref 0–8)
NEUTROPHILS # BLD AUTO: 15.7 TH/MM3 (ref 1.8–7.7)
NEUTROPHILS NFR BLD AUTO: 86.7 % (ref 16–70)
NEUTS BAND # BLD MANUAL: 15.6 TH/MM3 (ref 1.8–7.7)
NEUTS BAND NFR BLD: 1 % (ref 0–6)
NEUTS SEG NFR BLD MANUAL: 85 % (ref 16–70)
PLATELET # BLD: 290 TH/MM3 (ref 150–450)
PMV BLD AUTO: 8.7 FL (ref 7–11)
RBC # BLD AUTO: 2.83 MIL/MM3 (ref 4–5.3)
SODIUM SERPL-SCNC: 139 MEQ/L (ref 136–145)
WBC # BLD AUTO: 18.1 TH/MM3 (ref 4–11)

## 2018-05-17 RX ADMIN — SEVELAMER CARBONATE SCH MG: 800 TABLET, FILM COATED ORAL at 16:26

## 2018-05-17 RX ADMIN — ACYCLOVIR SCH UNITS: 800 TABLET ORAL at 08:20

## 2018-05-17 RX ADMIN — SENNOSIDES SCH MG: 8.6 TABLET, FILM COATED ORAL at 21:33

## 2018-05-17 RX ADMIN — CARVEDILOL SCH MG: 12.5 TABLET, FILM COATED ORAL at 21:33

## 2018-05-17 RX ADMIN — CITALOPRAM SCH MG: 20 TABLET, FILM COATED ORAL at 08:20

## 2018-05-17 RX ADMIN — MULTIPLE VITAMINS W/ MINERALS TAB SCH TAB: TAB at 08:19

## 2018-05-17 RX ADMIN — OXYCODONE HYDROCHLORIDE AND ACETAMINOPHEN PRN TAB: 5; 325 TABLET ORAL at 08:18

## 2018-05-17 RX ADMIN — IPRATROPIUM BROMIDE AND ALBUTEROL SULFATE SCH AMPULE: .5; 3 SOLUTION RESPIRATORY (INHALATION) at 07:55

## 2018-05-17 RX ADMIN — CARVEDILOL SCH MG: 12.5 TABLET, FILM COATED ORAL at 08:54

## 2018-05-17 RX ADMIN — Medication SCH ML: at 08:20

## 2018-05-17 RX ADMIN — HYDROCODONE BITARTRATE AND ACETAMINOPHEN PRN TAB: 5; 325 TABLET ORAL at 18:07

## 2018-05-17 RX ADMIN — FERROUS SULFATE TAB 325 MG (65 MG ELEMENTAL FE) SCH MG: 325 (65 FE) TAB at 08:19

## 2018-05-17 RX ADMIN — FAMOTIDINE SCH MG: 20 TABLET, FILM COATED ORAL at 08:19

## 2018-05-17 RX ADMIN — OXYCODONE HYDROCHLORIDE AND ACETAMINOPHEN PRN TAB: 5; 325 TABLET ORAL at 01:29

## 2018-05-17 RX ADMIN — INSULIN ASPART SCH: 100 INJECTION, SOLUTION INTRAVENOUS; SUBCUTANEOUS at 01:33

## 2018-05-17 RX ADMIN — INSULIN ASPART SCH: 100 INJECTION, SOLUTION INTRAVENOUS; SUBCUTANEOUS at 06:00

## 2018-05-17 RX ADMIN — POLYETHYLENE GLYCOL 3350 SCH GM: 17 POWDER, FOR SOLUTION ORAL at 08:19

## 2018-05-17 RX ADMIN — MEGESTROL ACETATE SCH MG: 40 SUSPENSION ORAL at 08:19

## 2018-05-17 RX ADMIN — FAMOTIDINE SCH MG: 20 TABLET, FILM COATED ORAL at 21:32

## 2018-05-17 RX ADMIN — METOPROLOL TARTRATE SCH MG: 25 TABLET, FILM COATED ORAL at 08:19

## 2018-05-17 RX ADMIN — ACYCLOVIR SCH UNITS: 800 TABLET ORAL at 21:00

## 2018-05-17 RX ADMIN — ASPIRIN 81 MG SCH MG: 81 TABLET ORAL at 08:19

## 2018-05-17 RX ADMIN — INSULIN ASPART SCH: 100 INJECTION, SOLUTION INTRAVENOUS; SUBCUTANEOUS at 21:00

## 2018-05-17 RX ADMIN — INSULIN ASPART SCH: 100 INJECTION, SOLUTION INTRAVENOUS; SUBCUTANEOUS at 16:26

## 2018-05-17 RX ADMIN — PRAVASTATIN SODIUM SCH MG: 40 TABLET ORAL at 21:33

## 2018-05-17 RX ADMIN — SEVELAMER CARBONATE SCH MG: 800 TABLET, FILM COATED ORAL at 08:19

## 2018-05-17 RX ADMIN — DOCUSATE SODIUM SCH MG: 100 CAPSULE, LIQUID FILLED ORAL at 08:19

## 2018-05-17 RX ADMIN — IPRATROPIUM BROMIDE AND ALBUTEROL SULFATE SCH AMPULE: .5; 3 SOLUTION RESPIRATORY (INHALATION) at 14:00

## 2018-05-17 RX ADMIN — INSULIN ASPART SCH: 100 INJECTION, SOLUTION INTRAVENOUS; SUBCUTANEOUS at 10:59

## 2018-05-17 RX ADMIN — IPRATROPIUM BROMIDE AND ALBUTEROL SULFATE SCH AMPULE: .5; 3 SOLUTION RESPIRATORY (INHALATION) at 19:46

## 2018-05-17 RX ADMIN — DOCUSATE SODIUM SCH MG: 100 CAPSULE, LIQUID FILLED ORAL at 21:32

## 2018-05-17 RX ADMIN — Medication SCH ML: at 21:33

## 2018-05-17 RX ADMIN — SEVELAMER CARBONATE SCH MG: 800 TABLET, FILM COATED ORAL at 12:00

## 2018-05-17 NOTE — HHI.PR
Subjective


Remarks


ALERT


POS OP


EXTUBATED ON O2 NC





Objective





Vital Signs








  Date Time  Temp Pulse Resp B/P (MAP) Pulse Ox O2 Delivery O2 Flow Rate FiO2


 


5/17/18 17:00  98      


 


5/17/18 16:00  90      


 


5/17/18 15:00 98.2 96 20 147/65 (92) 96   


 


5/17/18 15:00  95      


 


5/17/18 14:00  94      


 


5/17/18 13:00  90      


 


5/17/18 12:00  94      


 


5/17/18 11:00 98.7 87 20 146/72 (96) 98   


 


5/17/18 11:00  91      


 


5/17/18 10:00  87      


 


5/17/18 09:43    155/67 (96)    


 


5/17/18 09:00  97      


 


5/17/18 08:00  106      


 


5/17/18 07:15 98.2 104 20 163/70 (101) 94   





    105/60 (75)    


 


5/17/18 07:00  105      


 


5/17/18 06:30    84/53 (63)    


 


5/17/18 06:05  97      


 


5/17/18 05:16  97      


 


5/17/18 04:32  93      


 


5/17/18 03:37  98      


 


5/17/18 03:37 98.4 95 21 118/58 (78) 97   


 


5/17/18 02:36    94/67 (76)    


 


5/17/18 02:07  97      


 


5/17/18 01:50  96      


 


5/17/18 00:05  97      


 


5/16/18 23:59 98.6 98 18 105/65 (78) 96   


 


5/16/18 23:47  112      


 


5/16/18 23:15   19     


 


5/16/18 22:00  110      


 


5/16/18 21:00  108      


 


5/16/18 21:00    94/47 (63)    


 


5/16/18 20:00  104      


 


5/16/18 19:20  98      


 


5/16/18 19:20 98.4 102 19 88/55 (66) 96   


 


5/16/18 18:03  96      














I/O      


 


 5/16/18 5/16/18 5/16/18 5/17/18 5/17/18 5/17/18





 07:00 15:00 23:00 07:00 15:00 23:00


 


Intake Total 294 ml 414.7 ml  480 ml 100 ml 720 ml


 


Output Total 445 ml 2400 ml 140 ml 50 ml  500 ml


 


Balance -151 ml -1985.3 ml -140 ml 430 ml 100 ml 220 ml


 


      


 


Intake Oral 90 ml   480 ml  720 ml


 


IV Total 204 ml 414.7 ml   100 ml 


 


Output Urine Total 275 ml  0 ml   500 ml


 


Chest Tube Drainage Total 170 ml  140 ml 50 ml  


 


Hemodialysis  2400 ml    


 


# Voids   0 1  1


 


# Bowel Movements 0     0








Result Diagram:  


5/17/18 0450 5/17/18 0450





Objective Remarks


GENERAL: 


SKIN: Warm and dry.


HEAD: Atraumatic. Normocephalic. 


EYES: Pupils equal and round. No scleral icterus. No injection or drainage. 


ENT: No nasal bleeding or discharge.  Mucous membranes pink and moist.


NECK: Trachea midline. No JVD. 


CARDIOVASCULAR: Regular rate and rhythm.  


RESPIRATORY: No accessory muscle use. Clear to auscultation. Breath sounds 

equal bilaterally. 


GASTROINTESTINAL: Abdomen soft, non-tender, nondistended. Hepatic and splenic 

margins not palpable. 


MUSCULOSKELETAL: Extremities without clubbing, cyanosis, or edema. No obvious 

deformities. 


NEUROLOGICAL: Awake and alert. No obvious cranial nerve deficits.  Motor 

grossly within normal limits. Five out of 5 muscle strength in the arms and 

legs.  Normal speech.


PSYCHIATRIC: Appropriate mood and affect; insight and judgment normal.





Assessment and Plan


Assessment and Plan


ASS





CAD, DOING WELL POST OP


DM


HTN


ESRD ON MHD





PLAN





O2 IF NEEDED


PULM TOILET


INCREASE ACTIVITY











Kelle Nina MD May 17, 2018 17:47

## 2018-05-17 NOTE — HHI.NPPN
Subjective


Complaints:  Chest Pain, Shortness of Breath


General Problems:  Anemia


Renal Failure:  Chronic, End Stage Renal Disease


Interval History


Sitting up and having breakfast. Apparently was confused last night, appears to 

be at baseline today. No distress. Has chest tube.





Review of Systems


General


Constitutional:  Fatigue


General Remarks


Generalized pain





Cardiovascular


Cardiac:  Chest Pain





Musculoskeletal


MS:  Pain/Stiffness





Objective Data


Data





Vital Signs








  Date Time  Temp Pulse Resp B/P (MAP) Pulse Ox O2 Delivery O2 Flow Rate FiO2


 


5/17/18 08:00  106      


 


5/17/18 07:15 98.2 104 20 163/70 (101) 94   





    105/60 (75)    


 


5/17/18 07:00  105      


 


5/17/18 06:30    84/53 (63)    


 


5/17/18 06:05  97      


 


5/17/18 05:16  97      


 


5/17/18 04:32  93      


 


5/17/18 03:37  98      


 


5/17/18 03:37 98.4 95 21 118/58 (78) 97   


 


5/17/18 02:36    94/67 (76)    


 


5/17/18 02:07  97      


 


5/17/18 01:50  96      


 


5/17/18 00:05  97      


 


5/16/18 23:59 98.6 98 18 105/65 (78) 96   


 


5/16/18 23:47  112      


 


5/16/18 23:15   19     


 


5/16/18 22:00  110      


 


5/16/18 21:00  108      


 


5/16/18 21:00    94/47 (63)    


 


5/16/18 20:00  104      


 


5/16/18 19:20  98      


 


5/16/18 19:20 98.4 102 19 88/55 (66) 96   


 


5/16/18 18:03  96      


 


5/16/18 17:31  101      


 


5/16/18 15:00  97 16 139/69 (92) 99   





    Arterial Line    


 


5/16/18 15:00  96      


 


5/16/18 14:32     100 Nasal Cannula 2.00 


 


5/16/18 14:22     99 Nasal Cannula 4.00 


 


5/16/18 14:00  97      


 


5/16/18 13:00  95      


 


5/16/18 12:48  93      


 


5/16/18 11:15 97.9 92 18 133/71 (91) 99   





    120/56 (77)    


 


5/16/18 10:58   20     


 


5/16/18 09:45  94  149/47    


 


5/16/18 08:55  93  139/51    








-:  


5/17/18 0450                                                                   

             5/17/18 0450





Tubes & Lines:  Perma-Cath


Tubes & Lines Comment


TLC right groin, Chest tube anterior, A line right brachial


Drip Comment


Clevidipine





Physical Exam


General


Appearance:  Well Developed, No Acute Distress, Comfortable, Malnourished





Eyes


Eye Exam:  Pupils Equal, Pupils Reactive





Pulmonary


Resp Exam:  Clear Bilaterally, Breath Sounds Equal, No Distress





Cardiology


CV Exam:  Regular, Normal Sinus Rhythm





Gastrointestinal/Abdomen


GI Exam:  Soft, Non-Tender, Bowel Sounds Present





Musculoskeletal


MS Exam:  Joints Intact, Atrophy, Unable to Ambulate





Integumentary


Skin Exam:  Clear, Warm, Dry, Intact





Extremeties


Extremities Exam:  No Edema, Pedal Pulses Palpable





Neurologic


Neuro Exam:  Alert, Awake, Oriented, Speech Clear, Moving All Extremities





Assessment/Plan


Discussed Condition With:  Patient


Assessment Summary:  Anemia of CKD, CHF, Hypertension, Diabetes Mellitus, End 

Stage Renal Disease


Problem List:  


(1) ESRD (end stage renal disease) on dialysis


ICD Codes:  N18.6 - End stage renal disease; Z99.2 - Dependence on renal 

dialysis


Plan:  Continue HD MWF. 


Renal panel reviewed.


On  Renvela with meals, monitor phosphorus level intermittently. 


On protein supplements with meals, Megace for stimulant, and antidepressant. 


Avoid excess IVF. Gadolinium is contraindicated.





AVF not mature, using Permcath. She has second step surgery  with Dr. Rees to 

be rescheduled. Depending on recovery, may still happen during this admission. 





(2) Chest pain, rule out acute myocardial infarction


ICD Codes:  R07.9 - Chest pain, unspecified


Status:  Acute


Plan:  Cardiology and CTS following  


s/p cath, 90% occlusion of Bare Metal Stent previously placed. 


Echo: Ef 40-45%, LVH is present. 





s/p CABG x 2 (5/15), continue supportive care





(3) DM (diabetes mellitus)


ICD Codes:  E11.9 - Type 2 diabetes mellitus without complications


Plan:  maintain glucose 140-180 mg/dL while hospitalized 


On insulin gtt post op 





(4) HTN (hypertension)


ICD Codes:  I10 - Hypertension


Status:  Chronic


Plan:  Monitor blood pressure


Change Metoprolol to Carvedilol as Metoprolol gets dialyzed. Labetalol is 

another option. 


BP should be checked in right upper extremity. Discussed with RN. 





(5) Anemia


ICD Codes:  D64.9 - Anemia, unspecified


Status:  Chronic


Plan:  Start Epogen with dialysis. 





Plan


patient was seen and examined during dialysis. Overall stable after surgery. 

Continue supportive care.











Kelton Whiteside MD May 17, 2018 08:51

## 2018-05-17 NOTE — PD.CAR.PN
CVT Progress Note


Subjective/Hospital Course:


A 53-year-old female with history of coronary artery disease with prior stenting

, bare metal stent to the mid-LAD, distal RCA, proximal RCA back in February by 

Dr. Edgardo Mercado.  She has continued to take the Plavix.  Despite this, she 

developed chest pain during dialysis on the 05/08/2018, also had some 

associated nausea.  It was improved with a nitro drip.  She underwent re-

catheterization today by Dr. Mercado which showed severe in-stent restenosis of 

the LAD and RCA bare metal stents.   HX of ESRD on dialysis  Monday, Wednesday 

and Friday.  She has had an A-V fistula 


placed in her left forearm for which she is supposed to be scheduled for 

revision this next week with Dr. Rees.


 


PAST MEDICAL HISTORY: Includes hypertension, hyperlipidemia, history of CHF, 

diabetes mellitus on insulin, coronary artery disease, pancreatitis, depression.





5/12/18


c/o chest pain this morning





5/14


pt had chest pain last pm, now on NTG gtt


and heparin gtt 


Dr Huitron spoke with pt / scheduled for CABG in am 


received dialysis today





5/15


surgery : CABG x 2, LIMA to LAD - good, SVG to RCA - fair, EVH


extubated after surgery


crystalloid 2300cc, cell saver 450cc , EBL 900cc, steroids 








5/16


nausea last pm, for dialysis today 


weaning off cleviprex, on BB 


weaning off insulin gtt / on levemir 


will transfer to stepdown after dialysis and off cleviprex 





5/17


chest tube removed without difficulty


very sleepy , pain meds reduced 


needs aggressive pulm toileting 


OOB, ambulate, f/u CBC in am HGB 8.7


Objective:


GENERAL:  awake, sleepy , emotional 


SKIN: Warm and dry. prevena dressing to chest , incision intact left leg 


HEAD: Normocephalic.


EYES: No scleral icterus. No injection or drainage. 


NECK: Supple, trachea midline. No JVD or lymphadenopathy.


CARDIOVASCULAR: Regular rate and rhythm without murmurs, gallops, or rubs. 


RESPIRATORY: Breath sounds equal bilaterally. No accessory muscle use.


diminished in bases 


GASTROINTESTINAL: Abdomen soft, non-tender, nondistended. 


MUSCULOSKELETAL: No cyanosis, or edema. 


BACK: Nontender without obvious deformity. No CVA tenderness.








Vital Signs








  Date Time  Temp Pulse Resp B/P (MAP) Pulse Ox O2 Delivery O2 Flow Rate FiO2


 


5/17/18 10:00  87      


 


5/17/18 09:43    155/67 (96)    


 


5/17/18 09:00  97      


 


5/17/18 08:00  106      


 


5/17/18 07:15 98.2 104 20 163/70 (101) 94   





    105/60 (75)    


 


5/17/18 07:00  105      


 


5/17/18 06:30    84/53 (63)    


 


5/17/18 06:05  97      


 


5/17/18 05:16  97      


 


5/17/18 04:32  93      


 


5/17/18 03:37  98      


 


5/17/18 03:37 98.4 95 21 118/58 (78) 97   


 


5/17/18 02:36    94/67 (76)    


 


5/17/18 02:07  97      


 


5/17/18 01:50  96      


 


5/17/18 00:05  97      


 


5/16/18 23:59 98.6 98 18 105/65 (78) 96   


 


5/16/18 23:47  112      


 


5/16/18 23:15   19     


 


5/16/18 22:00  110      


 


5/16/18 21:00  108      


 


5/16/18 21:00    94/47 (63)    


 


5/16/18 20:00  104      


 


5/16/18 19:20  98      


 


5/16/18 19:20 98.4 102 19 88/55 (66) 96   


 


5/16/18 18:03  96      


 


5/16/18 17:31  101      


 


5/16/18 15:00  97 16 139/69 (92) 99   





    Arterial Line    


 


5/16/18 15:00  96      


 


5/16/18 14:32     100 Nasal Cannula 2.00 


 


5/16/18 14:22     99 Nasal Cannula 4.00 


 


5/16/18 14:00  97      


 


5/16/18 13:00  95      


 


5/16/18 12:48  93      


 


5/16/18 11:15 97.9 92 18 133/71 (91) 99   





    120/56 (77)    


 


5/16/18 10:58   20     








Labs:





Laboratory Tests








Test


  5/17/18


04:50


 


White Blood Count


  18.1 TH/MM3


(4.0-11.0)


 


Red Blood Count


  2.83 MIL/MM3


(4.00-5.30)


 


Hemoglobin


  8.7 GM/DL


(11.6-15.3)


 


Hematocrit


  26.9 %


(35.0-46.0)


 


Mean Corpuscular Volume


  95.0 FL


(80.0-100.0)


 


Mean Corpuscular Hemoglobin


  30.7 PG


(27.0-34.0)


 


Mean Corpuscular Hemoglobin


Concent 32.3 %


(32.0-36.0)


 


Red Cell Distribution Width


  15.3 %


(11.6-17.2)


 


Platelet Count


  290 TH/MM3


(150-450)


 


Mean Platelet Volume


  8.7 FL


(7.0-11.0)


 


Neutrophils (%) (Auto)


  86.7 %


(16.0-70.0)


 


Lymphocytes (%) (Auto)


  4.6 %


(9.0-44.0)


 


Monocytes (%) (Auto)


  8.5 %


(0.0-8.0)


 


Eosinophils (%) (Auto)


  0.0 %


(0.0-4.0)


 


Basophils (%) (Auto)


  0.2 %


(0.0-2.0)


 


Neutrophils # (Auto)


  15.7 TH/MM3


(1.8-7.7)


 


Lymphocytes # (Auto)


  0.8 TH/MM3


(1.0-4.8)


 


Monocytes # (Auto)


  1.5 TH/MM3


(0-0.9)


 


Eosinophils # (Auto)


  0.0 TH/MM3


(0-0.4)


 


Basophils # (Auto)


  0.0 TH/MM3


(0-0.2)


 


CBC Comment AUTO DIFF 


 


Differential Total Cells


Counted 100 


 


 


Neutrophils % (Manual) 85 % (16-70) 


 


Band Neutrophils % 1 % (0-6) 


 


Lymphocytes % 8 % (9-44) 


 


Monocytes % 6 % (0-8) 


 


Neutrophils # (Manual)


  15.6 TH/MM3


(1.8-7.7)


 


Differential Comment


  FINAL DIFF


MANUAL


 


Platelet Estimate


  NORMAL


(NORMAL)


 


Platelet Morphology Comment


  NORMAL


(NORMAL)


 


Hematology Comments  


 


Blood Urea Nitrogen


  27 MG/DL


(7-18)


 


Creatinine


  3.95 MG/DL


(0.50-1.00)


 


Random Glucose


  52 MG/DL


()


 


Calcium Level


  8.7 MG/DL


(8.5-10.1)


 


Magnesium Level


  3.8 MG/DL


(1.5-2.5)


 


Sodium Level


  139 MEQ/L


(136-145)


 


Potassium Level


  4.1 MEQ/L


(3.5-5.1)


 


Chloride Level


  99 MEQ/L


()


 


Carbon Dioxide Level


  28.6 MEQ/L


(21.0-32.0)


 


Anion Gap


  11 MEQ/L


(5-15)


 


Estimat Glomerular Filtration


Rate 14 ML/MIN


(>89)








Result Diagram:  


5/17/18 0450 5/17/18 0450








(1) ESRD (end stage renal disease) on dialysis


Plan:  for dialysis in am , right sub vas cath in place





(2) DM (diabetes mellitus)


Plan:  controlled, on levemir and insulin sliding scale 





(3) HTN (hypertension)


Plan:  controlled , home meds resumed 





(4) CAD (coronary artery disease)


(5) S/P CABG (coronary artery bypass graft)


Plan:  ASA, statin BB 





(6) Anemia


Plan:  f/u HGB in am 














Terwilliger,Jacqueline R. ARNP May 17, 2018 10:32

## 2018-05-18 VITALS — HEART RATE: 94 BPM

## 2018-05-18 VITALS
TEMPERATURE: 98.5 F | RESPIRATION RATE: 16 BRPM | DIASTOLIC BLOOD PRESSURE: 66 MMHG | SYSTOLIC BLOOD PRESSURE: 144 MMHG | HEART RATE: 95 BPM | OXYGEN SATURATION: 96 %

## 2018-05-18 VITALS — HEART RATE: 109 BPM

## 2018-05-18 VITALS
DIASTOLIC BLOOD PRESSURE: 78 MMHG | RESPIRATION RATE: 16 BRPM | SYSTOLIC BLOOD PRESSURE: 138 MMHG | OXYGEN SATURATION: 99 % | TEMPERATURE: 99.8 F | HEART RATE: 111 BPM

## 2018-05-18 VITALS
HEART RATE: 108 BPM | DIASTOLIC BLOOD PRESSURE: 66 MMHG | TEMPERATURE: 98 F | RESPIRATION RATE: 14 BRPM | SYSTOLIC BLOOD PRESSURE: 149 MMHG | OXYGEN SATURATION: 96 %

## 2018-05-18 VITALS — HEART RATE: 110 BPM

## 2018-05-18 VITALS — HEART RATE: 108 BPM

## 2018-05-18 VITALS
TEMPERATURE: 98.6 F | SYSTOLIC BLOOD PRESSURE: 147 MMHG | OXYGEN SATURATION: 93 % | HEART RATE: 100 BPM | RESPIRATION RATE: 16 BRPM | DIASTOLIC BLOOD PRESSURE: 65 MMHG

## 2018-05-18 VITALS — OXYGEN SATURATION: 96 %

## 2018-05-18 VITALS — HEART RATE: 112 BPM

## 2018-05-18 VITALS — HEART RATE: 105 BPM

## 2018-05-18 VITALS
RESPIRATION RATE: 18 BRPM | TEMPERATURE: 98.4 F | OXYGEN SATURATION: 96 % | HEART RATE: 112 BPM | SYSTOLIC BLOOD PRESSURE: 144 MMHG | DIASTOLIC BLOOD PRESSURE: 66 MMHG

## 2018-05-18 VITALS — OXYGEN SATURATION: 95 %

## 2018-05-18 VITALS — HEART RATE: 92 BPM

## 2018-05-18 VITALS — HEART RATE: 95 BPM

## 2018-05-18 VITALS — HEART RATE: 107 BPM

## 2018-05-18 VITALS — HEART RATE: 106 BPM

## 2018-05-18 VITALS — HEART RATE: 104 BPM

## 2018-05-18 VITALS — HEART RATE: 96 BPM

## 2018-05-18 VITALS — HEART RATE: 111 BPM

## 2018-05-18 LAB
BUN SERPL-MCNC: 39 MG/DL (ref 7–18)
CALCIUM SERPL-MCNC: 8.5 MG/DL (ref 8.5–10.1)
CHLORIDE SERPL-SCNC: 97 MEQ/L (ref 98–107)
CREAT SERPL-MCNC: 4.56 MG/DL (ref 0.5–1)
ERYTHROCYTE [DISTWIDTH] IN BLOOD BY AUTOMATED COUNT: 15.1 % (ref 11.6–17.2)
GFR SERPLBLD BASED ON 1.73 SQ M-ARVRAT: 12 ML/MIN (ref 89–?)
GLUCOSE SERPL-MCNC: 125 MG/DL (ref 74–106)
HCO3 BLD-SCNC: 28.4 MEQ/L (ref 21–32)
HCT VFR BLD CALC: 24.8 % (ref 35–46)
HGB BLD-MCNC: 8.3 GM/DL (ref 11.6–15.3)
MCH RBC QN AUTO: 32.2 PG (ref 27–34)
MCHC RBC AUTO-ENTMCNC: 33.5 % (ref 32–36)
MCV RBC AUTO: 96 FL (ref 80–100)
PLATELET # BLD: 350 TH/MM3 (ref 150–450)
PMV BLD AUTO: 8 FL (ref 7–11)
RBC # BLD AUTO: 2.58 MIL/MM3 (ref 4–5.3)
SODIUM SERPL-SCNC: 135 MEQ/L (ref 136–145)
WBC # BLD AUTO: 10.9 TH/MM3 (ref 4–11)

## 2018-05-18 RX ADMIN — MULTIPLE VITAMINS W/ MINERALS TAB SCH TAB: TAB at 12:50

## 2018-05-18 RX ADMIN — BUPROPION HYDROCHLORIDE SCH MG: 100 TABLET, FILM COATED ORAL at 21:38

## 2018-05-18 RX ADMIN — HYDROCODONE BITARTRATE AND ACETAMINOPHEN PRN TAB: 5; 325 TABLET ORAL at 00:47

## 2018-05-18 RX ADMIN — OXYCODONE HYDROCHLORIDE AND ACETAMINOPHEN PRN TAB: 7.5; 325 TABLET ORAL at 19:55

## 2018-05-18 RX ADMIN — PRAVASTATIN SODIUM SCH MG: 40 TABLET ORAL at 21:38

## 2018-05-18 RX ADMIN — INSULIN ASPART SCH: 100 INJECTION, SOLUTION INTRAVENOUS; SUBCUTANEOUS at 12:53

## 2018-05-18 RX ADMIN — SEVELAMER CARBONATE SCH MG: 800 TABLET, FILM COATED ORAL at 17:32

## 2018-05-18 RX ADMIN — ONDANSETRON PRN MG: 4 TABLET, ORALLY DISINTEGRATING ORAL at 03:23

## 2018-05-18 RX ADMIN — FAMOTIDINE SCH MG: 20 TABLET, FILM COATED ORAL at 12:52

## 2018-05-18 RX ADMIN — SODIUM CHLORIDE SCH MLS/HR: 900 INJECTION, SOLUTION INTRAVENOUS at 14:45

## 2018-05-18 RX ADMIN — FAMOTIDINE SCH MG: 20 TABLET, FILM COATED ORAL at 21:37

## 2018-05-18 RX ADMIN — SEVELAMER CARBONATE SCH MG: 800 TABLET, FILM COATED ORAL at 08:31

## 2018-05-18 RX ADMIN — GENTAMICIN SULFATE PRN MG: 10 INJECTION, SOLUTION INTRAMUSCULAR; INTRAVENOUS at 11:45

## 2018-05-18 RX ADMIN — SEVELAMER CARBONATE SCH MG: 800 TABLET, FILM COATED ORAL at 12:50

## 2018-05-18 RX ADMIN — DOCUSATE SODIUM SCH MG: 100 CAPSULE, LIQUID FILLED ORAL at 21:38

## 2018-05-18 RX ADMIN — HEPARIN SODIUM PRN UNITS: 1000 INJECTION, SOLUTION INTRAVENOUS; SUBCUTANEOUS at 11:46

## 2018-05-18 RX ADMIN — INSULIN ASPART SCH: 100 INJECTION, SOLUTION INTRAVENOUS; SUBCUTANEOUS at 21:00

## 2018-05-18 RX ADMIN — DOCUSATE SODIUM SCH MG: 100 CAPSULE, LIQUID FILLED ORAL at 12:52

## 2018-05-18 RX ADMIN — CARVEDILOL SCH MG: 12.5 TABLET, FILM COATED ORAL at 21:37

## 2018-05-18 RX ADMIN — SENNOSIDES SCH MG: 8.6 TABLET, FILM COATED ORAL at 21:37

## 2018-05-18 RX ADMIN — POLYETHYLENE GLYCOL 3350 SCH GM: 17 POWDER, FOR SOLUTION ORAL at 12:50

## 2018-05-18 RX ADMIN — HYDROCODONE BITARTRATE AND ACETAMINOPHEN PRN TAB: 5; 325 TABLET ORAL at 06:07

## 2018-05-18 RX ADMIN — OXYCODONE HYDROCHLORIDE AND ACETAMINOPHEN PRN TAB: 7.5; 325 TABLET ORAL at 13:44

## 2018-05-18 RX ADMIN — Medication SCH ML: at 22:24

## 2018-05-18 RX ADMIN — FERROUS SULFATE TAB 325 MG (65 MG ELEMENTAL FE) SCH MG: 325 (65 FE) TAB at 12:51

## 2018-05-18 RX ADMIN — MEGESTROL ACETATE SCH MG: 40 SUSPENSION ORAL at 12:50

## 2018-05-18 RX ADMIN — Medication SCH ML: at 12:52

## 2018-05-18 RX ADMIN — INSULIN ASPART SCH: 100 INJECTION, SOLUTION INTRAVENOUS; SUBCUTANEOUS at 08:31

## 2018-05-18 RX ADMIN — INSULIN ASPART SCH: 100 INJECTION, SOLUTION INTRAVENOUS; SUBCUTANEOUS at 17:32

## 2018-05-18 RX ADMIN — IPRATROPIUM BROMIDE AND ALBUTEROL SULFATE SCH AMPULE: .5; 3 SOLUTION RESPIRATORY (INHALATION) at 08:14

## 2018-05-18 RX ADMIN — ASPIRIN 81 MG SCH MG: 81 TABLET ORAL at 12:50

## 2018-05-18 NOTE — HHI.PR
Subjective


Remarks


Patient resting on the chair looks very sad she burst into crying


No fever or chills chest pain or short of breath clinically she seems to be 

coming along well postop





Objective


Vitals





Vital Signs








  Date Time  Temp Pulse Resp B/P (MAP) Pulse Ox O2 Delivery O2 Flow Rate FiO2


 


5/18/18 17:16     96   21


 


5/18/18 17:00  111      


 


5/18/18 16:00  109      


 


5/18/18 15:00 98.0 108 14 149/66 (93) 96   


 


5/18/18 15:00  109      


 


5/18/18 14:00  110      


 


5/18/18 13:00  110      


 


5/18/18 12:00  108      


 


5/18/18 11:00 98.4 112 18 144/66 (92) 96   


 


5/18/18 11:00  106      


 


5/18/18 10:00  106      


 


5/18/18 09:00  104      


 


5/18/18 08:14     95   21


 


5/18/18 08:00  105      


 


5/18/18 07:00  100      


 


5/18/18 07:00 98.6 100 16 147/65 (92) 93   


 


5/18/18 05:00  94      


 


5/18/18 04:00  95      


 


5/18/18 03:28 98.5 95 16 144/66 (92) 96   


 


5/18/18 03:00  96      


 


5/18/18 02:00  94      


 


5/18/18 01:00  92      


 


5/18/18 00:00  92      


 


5/17/18 23:00 98.8 93 16 139/65 (89) 97   


 


5/17/18 23:00  96      


 


5/17/18 22:00  94      


 


5/17/18 21:00  88      


 


5/17/18 20:10 97.4 86 16 133/62 (85) 99   


 


5/17/18 20:00  86      


 


5/17/18 19:47     100   


 


5/17/18 19:00  96      














I/O      


 


 5/17/18 5/17/18 5/17/18 5/18/18 5/18/18 5/18/18





 07:00 15:00 23:00 07:00 15:00 23:00


 


Intake Total 480 ml 100 ml 720 ml 50 ml  720 ml


 


Output Total 50 ml  500 ml  2000 ml 440 ml


 


Balance 430 ml 100 ml 220 ml 50 ml -2000 ml 280 ml


 


      


 


Intake Oral 480 ml  720 ml 50 ml  720 ml


 


IV Total  100 ml    


 


Output Urine Total   500 ml   440 ml


 


Chest Tube Drainage Total 50 ml     


 


Hemodialysis     2000 ml 


 


# Voids 1  1 2  


 


# Bowel Movements   0   








Result Diagram:  


5/18/18 0636                                                                   

             5/18/18 0636





Objective Remarks


GENERAL: This is a well-nourished, well-developed patient, in no apparent 

distress.


CARDIOVASCULAR: RRR,  no gallops, or rubs. 


RESPIRATORY: Fair air entry bilaterally. No W, R, or R, chest tube in place


GASTROINTESTINAL: Abdomen soft, non-tender, nondistended.  Positive bowel sounds


MUSCULOSKELETAL: Extremities without clubbing, cyanosis, or edema.  Pedal 

pulses appreciated


NEUROLOGICAL: Awake and alert.  Moves all extremity.  Normal speech.no focal 

neurological deficit





A/P


Assessment and Plan


53-year-old female with end-stage renal disease on dialysis who presented with 

N STEMI, and is currently status post CABG performed on 5/15.





5/16: Stable postop discussed with the nurse she is off all the drips, soreness 

around the chest tube, discussed with the [facility], continue monitoring closely 

CBC BMP, vitals


5/17: Continue current care.  CABG, appreciate [facility] follow-up, continue 

monitoring postop improvement, WBC dropped to 18 K, hemoglobin dropped from 10.6

-8.7 continue following closely CBC and BMP


5/18: Very low mood with sadness will consult psychiatry, continue monitoring 

CBC postop monitoring


A/P:


//NSTEMI


//CAD


//Postop CABG performed on 5/15.


Elevated Troponin trending up  EKG sinus with T-wave inversions noted.    

Heparin drip.  Status post aspirin.  Continue daily aspirin.  Continue Plavix. 


= 5/15.  Postop CABG.  Postsurgical management as per surgical service.





//SIRS meeting criteria, also tachycardiac, initiate sepsis protocol. With 

fevers patient did not have cardiac cath.  Discussed with the cardiology. Blood 

cultures, CXR, UA, LA ordered. Started on Levaquin. Monitor closely. 


= No evidence of infection.  Discontinued antibiotics.





//CHF with Exacerbation


BNP elevated.  Chest x-ray with edema.  Will start on nitro drip.  Consulted 

nephrology for dialysis.  Chest x-ray reviewed with congestion. Advance diet 

cardiac cath on hold.


= Cardiology following.  Appreciate assistance.





//Accelerated hypertension.  Patient started on nitro drip.  Restart hydralazine

, with as needed hydralazine.  Awaiting official list of home meds.


= Resolved.  Decreased blood pressure meds.











//Pleuritic chest pain


elevated  d-dimer. CT pulmonary angiogram reviewed and no PE. Patient received 

HD 





//End-stage renal disease on hemodialysis Monday Wednesday Friday.  Consult 

nephrology for dialysis.  Dializef before and after CTA.  Appreciate assistance.





//Tobacco abuse.  Cessation counseling provided.





//Diabetes mellitus type 2.  Diabetic diet.  Insulin sliding scale, accuchecks. 





//Transaminitis


Nausea


Check liver ultrasound.  Lipase only in the 60s.  Consult GI given past history 

of pancreatitis


= Resolved.  Liver ultrasound normal.


Discharge Planning


Postop CABG performed on 5/15


Discharge cleared by cardiology/CT surgery











Jesus Manuel Pham MD May 18, 2018 18:19

## 2018-05-18 NOTE — HHI.NPPN
Subjective


Complaints:  Chest Pain, Shortness of Breath


General Problems:  Anemia


Renal Failure:  Chronic, End Stage Renal Disease


Interval History


Seen immediately after finishing dialysis. She is tearful, asking for pain 

medications. Tolerated dialysis well, appetite is fair. 


 (Migdalia Camejo)





Review of Systems


General


Constitutional:  Fatigue


General Remarks


Generalized pain


 (Migdalia Camejo)





Musculoskeletal


MS:  Pain/Stiffness


 (Migdalia Camejo)





Objective Data


Data











 5/18/18 5/19/18





 19:00 07:00


 


Output Total 2000 ml 


 


Balance -2000 ml 


 


  


 


Hemodialysis 2000 ml 











Vital Signs








  Date Time  Temp Pulse Resp B/P (MAP) Pulse Ox O2 Delivery O2 Flow Rate FiO2


 


5/18/18 10:00  106      


 


5/18/18 09:00  104      


 


5/18/18 08:14     95   21


 


5/18/18 08:00  105      


 


5/18/18 07:00  100      


 


5/18/18 07:00 98.6 100 16 147/65 (92) 93   


 


5/18/18 05:00  94      


 


5/18/18 04:00  95      


 


5/18/18 03:28 98.5 95 16 144/66 (92) 96   


 


5/18/18 03:00  96      


 


5/18/18 02:00  94      


 


5/18/18 01:00  92      


 


5/18/18 00:00  92      


 


5/17/18 23:00 98.8 93 16 139/65 (89) 97   


 


5/17/18 23:00  96      


 


5/17/18 22:00  94      


 


5/17/18 21:00  88      


 


5/17/18 20:10 97.4 86 16 133/62 (85) 99   


 


5/17/18 20:00  86      


 


5/17/18 19:47     100   


 


5/17/18 19:00  96      


 


5/17/18 18:10  97      


 


5/17/18 17:00  98      


 


5/17/18 16:00  90      


 


5/17/18 15:00 98.2 96 20 147/65 (92) 96   


 


5/17/18 15:00  95      


 


5/17/18 14:00  94      


 


5/17/18 13:00  90      








 (Migdalia Camejo)


-:  


5/18/18 0636                                                                   

             5/18/18 0636





Imaging





Last 72 hours Impressions








Chest X-Ray 5/18/18 0600 Signed





Impressions: 





 Service Date/Time:  Friday, May 18, 2018 05:04 - CONCLUSION:  Left 

thoracostomy 





 tube removal without pneumothorax.     Fei Greenfield Jr., MD 


 


Chest X-Ray 5/16/18 0500 Signed





Impressions: 





 Service Date/Time:  Wednesday, May 16, 2018 03:47 - CONCLUSION:  Mild left 





 basilar atelectasis.     Fei Greenfield Jr., MD 








Tubes & Lines:  Perma-Cath


Tubes & Lines Comment


all lines removed


 (Migdalia Camejo)





Physical Exam


General


Appearance:  Well Developed, No Acute Distress, Comfortable, Malnourished


Appearance Remarks


quiet, appears uncomfortable. 


 (Migdalia Camejo)





Eyes


Eye Exam:  Pupils Equal, Pupils Reactive


 (Migdalia Camejo)





Pulmonary


Resp Exam:  Clear Bilaterally, Breath Sounds Equal, No Distress


Resp Remarks


shallow respirations 


 (Migdalia Camejo)





Cardiology


CV Exam:  Regular, Normal Sinus Rhythm


CV Remarks


midsternal incision no drainage. 


 (Migdalia Camejo)





Gastrointestinal/Abdomen


GI Exam:  Soft, Non-Tender, Bowel Sounds Present


 (Migdalia Camejo)





Musculoskeletal


MS Exam:  Joints Intact, Atrophy, Unable to Ambulate


 (Migdalia Camejo)





Integumentary


Skin Exam:  Clear, Warm, Dry, Intact


 (Migdalia Camejo)





Extremeties


Extremities Exam:  No Edema, Pedal Pulses Palpable


Extremeties Remarks


Left arm AVF, + patent


 (Migdalia Camejo)





Neurologic


Neuro Exam:  Alert, Awake, Oriented, Speech Clear, Moving All Extremities


 (Migdalia Camejo)





Assessment/Plan


Discussed Condition With:  Patient


Assessment Summary:  Anemia of CKD, CHF, Hypertension, Diabetes Mellitus, End 

Stage Renal Disease


Problem List:  


(1) ESRD (end stage renal disease) on dialysis


ICD Codes:  N18.6 - End stage renal disease; Z99.2 - Dependence on renal 

dialysis


Plan:  Continue HD MWF. She tolerated treatment today, 2L UF


Intermittently monitor electrolyte profile. 


On  Renvela with meals, monitor phosphorus level intermittently. 


On protein supplements with meals, Megace for stimulant, and antidepressant. 


Avoid excess IVF. Gadolinium is contraindicated.


Using Permcath for HD. AVF not ready, needs second step with Dr. Rees. 

Depending on recovery, may still happen during this admission. Will follow up. 





(2) Chest pain, rule out acute myocardial infarction


ICD Codes:  R07.9 - Chest pain, unspecified


Status:  Acute


Plan:  Cardiology and CTS following  


s/p cath, 90% occlusion of Bare Metal Stent previously placed. 


Echo: Ef 40-45%, LVH is present. 


s/p CABG x 2 (5/15), continue supportive care


   PRN pain control 





(3) DM (diabetes mellitus)


ICD Codes:  E11.9 - Type 2 diabetes mellitus without complications


Plan:  maintain glucose 140-180 mg/dL while hospitalized 





(4) HTN (hypertension)


ICD Codes:  I10 - Hypertension


Status:  Chronic


Plan:  Monitor blood pressure


On Carvedilol , hydralazine, and Norvasc. 


BP should be checked in right upper extremity. 





(5) Anemia


ICD Codes:  D64.9 - Anemia, unspecified


Status:  Chronic


Plan:  Epogen with dialysis. 


Transfuse if needed


Start venofer


 (Migdalia Camejo)


Problem List:  


(1) ESRD (end stage renal disease) on dialysis


ICD Codes:  N18.6 - End stage renal disease; Z99.2 - Dependence on renal 

dialysis


Plan:  Continue HD MWF. She tolerated treatment today, 2L UF


Intermittently monitor electrolyte profile. 


On  Renvela with meals, monitor phosphorus level intermittently. 


On protein supplements with meals, Megace for stimulant, and antidepressant. 


Avoid excess IVF. Gadolinium is contraindicated.


Using Permcath for HD. AVF not ready, needs second step with Dr. Rees. 

Depending on recovery, may still happen during this admission. Will follow up. 





(2) Chest pain, rule out acute myocardial infarction


ICD Codes:  R07.9 - Chest pain, unspecified


Status:  Acute


Plan:  Cardiology and CTS following  


s/p cath, 90% occlusion of Bare Metal Stent previously placed. 


Echo: Ef 40-45%, LVH is present. 


s/p CABG x 2 (5/15), continue supportive care


   PRN pain control 





(3) DM (diabetes mellitus)


ICD Codes:  E11.9 - Type 2 diabetes mellitus without complications


Plan:  maintain glucose 140-180 mg/dL while hospitalized 





(4) HTN (hypertension)


ICD Codes:  I10 - Hypertension


Status:  Chronic


Plan:  Monitor blood pressure


On Carvedilol , hydralazine, and Norvasc. 


BP should be checked in right upper extremity. 





(5) Anemia


ICD Codes:  D64.9 - Anemia, unspecified


Status:  Chronic


Plan:  Epogen with dialysis. 


Transfuse if needed


Start venofer





Plan


patient was seen and examined. Agree with above assessment and plan. 


 (Kelton Whiteside MD)











Migdalia Camejo May 18, 2018 12:45


Kelton Whiteside MD May 18, 2018 14:06

## 2018-05-18 NOTE — HHI.FF
Face to Face Verification


Diagnosis:  


(1) ESRD (end stage renal disease) on dialysis


(2) CAD (coronary artery disease)


(3) NSTEMI (non-ST elevated myocardial infarction)


(4) DM (diabetes mellitus)


(5) HTN (hypertension)


(6) S/P CABG (coronary artery bypass graft)


Home Health Nursing








Order: Signs/symptoms of disease process





 Diabetic education





 Medication education-adverse effect





 Wound care and dressing changes





 Nursing assessment with vital signs








Instructions:


Heart and Vascular Surgery patients


*Special attention to sternal dressing


Mandatory frequency


   Assess and evaluation, 4 days in a row


   The next week 3X week


   2 times a week for 4 weeks


   1 time a week for 5 weeks


   Schedule Heart and Vascular patients for full 60 day certification period





Initial visit


   Review Open Heart Surgery Discharge Instructions (Sternal precautions, 

Activity, Elastic hose, Incision care, Driving, Incentive spirometry, Smoking, 

[place], Work and other)


   Need Betadine to paint incision


   Medication reconciliation


   Importance of follow up care/ check on appointments


   Make calendar  record temperature daily


   When to call [facility] nurse, review instructions, phone list


   Incentive Spirometry, demonstration





Visit 1- Begin discharge instruction for patient family and/ or caregiver using 

teach back method-


   Signs and symptoms of infection


   Disease characteristics


   Medicines and side effects


   Foods and nutrition/ appetite


   Infection control/ hand washing/ hygiene





Visit 2- Continue teaching


   Discharge instructions- include additional information on smoking cessation

, sternal dressing (sternal vac)





Visit 3- Continue teaching- 


   Cough and deep breathing, incision monitoring.


   Choose my plate





Visit 4- Continue teaching- 


   Discuss limitations


   Discuss how they are feeling


   Discuss progress toward goals


   


Remaining visits- continue teaching and monitoring





For any questions please call : 





Monday Friday 8am-5pm   Heart & Vascular Surgery Office


(  Dr. Montelongo & Dr. Huitron), [phone]





After Hours / Nights (5pm -8am)  Weekends and Holidays 


Please call [facility] ([facility])  Charge Nurse 


[phone]





 PREVENA


 Single Use Negative Wound Therapy System 


 Caregiver Instruction Sheet 








1.   A Prevena dressing system was applied to the chest incision during surgery

, to promote wound healing.  It works via a suction device (negative pressure 

wound therapy) to remove low to moderate levels of exudate (drainage) and 

infectious materials.   We recommend that the device stay in place for up to 

seven days, from day of surgery. 





2.    Day of Surgery___5/15/18_______  Day of Removal ___5/22/18_______





3.   The dressing should only be removed by a health care professional.  Please 

arrange removal of device to coincide with Home Health visit and or with 

Nursing staff at Rehab 





4.   If skin reddening or irritation of skin occurs, or excessive drainage, 

please notify the Cardiovascular Surgeons office at [phone].  





5.   Light showering is permissible; however the pump should be disconnected 

and placed in safe location, where it will not get wet.  The dressing should 

not be exposed to direct spray or submerged in water.  No bath tub / shower 

only. Ensure the end of the tubing attached to the dressing is facing down so 

that water does not enter the top of the tube. 





6.   To remove Prevena dressing: press purple button to turn off device / 

remove the suction.  Then disconnect the tubing from the pump.  The fixation 

strips should be stretched away from the skin and the dressing lifted at one 

corner and peeled back until it has been fully removed.  





7.   After removal, it is ok to shower daily using liquid dial soap and clean 

wash cloth, rinse and pat dry, and leave incision open to air dry.  For any 

concerns regarding Prevena dressing, and or wounds, please contact Grace Ureña, patient navigator at [phone] or notify the Cardiovascular Surgeons 

office at [phone].  





Incentive spirometry Q1 hr x 10, while awake, also use acapella device hourly 

whole awake 





Sternal Breast Bone Precautions: NO pushing or pulling, ( pt must use sternal 

pillow to support chest with all activities and with coughing ( takes up to 3 

months breast bone to heal ) 





All females to wear sternal bra , launder as needed 





Daily incision care:  ok to shower daily, no tub bath.  Wash all incisions with 

liquid dial soap, clean wash cloth to each site, rinse and pat dry.  Observe 

for any signs of infection, such as drainage which is dark yellow, gray, green 

or foul smelling.  Immediately report to the surgeon any drainage from the 

chest incision, or legs, and for any abnormal drainage from the chest tube 

sites.  Notify surgeon if any temp >101.5 degrees F.  When specialty dressing 

removed/ or if you do not have one, continue to shower daily as above, then 

rinse and pat incision dry and paint with betadine daily x 5 days.  Allow steri 

strips to fall off if you have any.  Avoid lotions, creams, salves, oils, etc. 

for the first month





Please see attached forms for additional instructions regarding post Open Heart 

specialty wound vacuum dressings.  DEMARCUS or  Prevena , Dressing to be removed by 

Nursing staff on _5/22/18______





For Dr. Huitron patients , please obtain  CBC, BMP, PA & Lat CXR in 2 weeks, 

results to Dr. Huitron  ( prescription will be given) (Fax: [phone]) (Tele: 

[phone]) , 





F/U appointment: as per DC instructions: PCP in 2 weeks, CV surgeon 2 weeks,  


Cardiologist 3-4 weeks





For any questions regarding incisions/ dressing / meds / post op care or above 

                            


Symptoms, 





Monday Friday 8am-5pm   Heart & Vascular Surgery Office


(  Dr. Montelongo & Dr. Huitron), [phone]





After Hours / Nights (5pm -8am) Weekends and Holidays 


Please call [facility] ([facility]) Charge Nurse 


[phone]











I have seen patient Eleazar Walker on 5/18/18. My clinical findings 

support the need for the requested home health care services because:








 Patient has SOB





 Deconditioned w/ increased weakness














I certify that my clinical findings support that this patient is homebound 

because:








 Post-op weakness (on renal dialysis M-W-F/ )

















Terwilliger,Jacqueline R. ARNP May 18, 2018 15:30

## 2018-05-18 NOTE — RADRPT
EXAM DATE/TIME:  05/18/2018 05:04 

 

[facility] COMPARISON:     

CHEST SINGLE AP, May 16, 2018, 3:47.

 

                     

INDICATIONS :     

Chest tube removal- Evaluate for pneumothorax. 

                     

 

MEDICAL HISTORY :            

Cardiovascular disease. Diabetes mellitus type 2. Hypertension.   

 

SURGICAL HISTORY :        

Tubal ligation.

 

ENCOUNTER:     

Subsequent                                        

 

ACUITY:     

1 day      

 

PAIN SCORE:     

Non-responsive.

 

LOCATION:     

Bilateral chest 

 

FINDINGS:     

A single portable frontal view the chest shows interval removal of a left thoracostomy tube. No pneum
othorax. Right sided perm catheter remains. Heart remains mildly enlarged. Linear atelectasis within 
the left base. No infiltrates or effusions. Median sternotomy wires.

 

CONCLUSION:     

Left thoracostomy tube removal without pneumothorax.

 

 

 

 Fei Greenfield Jr., MD on May 18, 2018 at 6:32           

Board Certified Radiologist.

 This report was verified electronically.

## 2018-05-18 NOTE — PD.CAR.PN
CVT Progress Note


Subjective/Hospital Course:


A 53-year-old female with history of coronary artery disease with prior stenting

, bare metal stent to the mid-LAD, distal RCA, proximal RCA back in February by 

Dr. Edgardo Mercado.  She has continued to take the Plavix.  Despite this, she 

developed chest pain during dialysis on the 05/08/2018, also had some 

associated nausea.  It was improved with a nitro drip.  She underwent re-

catheterization today by Dr. Mercado which showed severe in-stent restenosis of 

the LAD and RCA bare metal stents.   HX of ESRD on dialysis  Monday, Wednesday 

and Friday.  She has had an A-V fistula 


placed in her left forearm for which she is supposed to be scheduled for 

revision this next week with Dr. Rees.


 


PAST MEDICAL HISTORY: Includes hypertension, hyperlipidemia, history of CHF, 

diabetes mellitus on insulin, coronary artery disease, pancreatitis, depression.





5/12/18


c/o chest pain this morning





5/14


pt had chest pain last pm, now on NTG gtt


and heparin gtt 


Dr Huitron spoke with pt / scheduled for CABG in am 


received dialysis today





5/15


surgery : CABG x 2, LIMA to LAD - good, SVG to RCA - fair, EVH


extubated after surgery


crystalloid 2300cc, cell saver 450cc , EBL 900cc, steroids 








5/16


nausea last pm, for dialysis today 


weaning off cleviprex, on BB 


weaning off insulin gtt / on levemir 


will transfer to stepdown after dialysis and off cleviprex 





5/17


chest tube removed without difficulty


very sleepy , pain meds reduced 


needs aggressive pulm toileting 


OOB, ambulate, f/u CBC in am HGB 8.7





5/18


dialysis today 


painful/ norco changed to percocet per Dr Huitron


eval for dc home with C in am


Objective:


GENERAL: A&O x 3 


SKIN: Warm and dry. prevena dressing to chest , incision intact to left leg 


HEAD: Normocephalic.


EYES: No scleral icterus. No injection or drainage. 


NECK: Supple, trachea midline. No JVD or lymphadenopathy.


CARDIOVASCULAR: Regular rate and rhythm without murmurs, gallops, or rubs. 


RESPIRATORY: Breath sounds equal bilaterally. No accessory muscle use.


GASTROINTESTINAL: Abdomen soft, non-tender, nondistended. 


MUSCULOSKELETAL: No cyanosis, or edema. 


BACK: Nontender without obvious deformity. No CVA tenderness.








Vital Signs








  Date Time  Temp Pulse Resp B/P (MAP) Pulse Ox O2 Delivery O2 Flow Rate FiO2


 


5/18/18 14:00  110      


 


5/18/18 13:00  110      


 


5/18/18 12:00  108      


 


5/18/18 11:00 98.4 112 18 144/66 (92) 96   


 


5/18/18 11:00  106      


 


5/18/18 10:00  106      


 


5/18/18 09:00  104      


 


5/18/18 08:14     95   21


 


5/18/18 08:00  105      


 


5/18/18 07:00  100      


 


5/18/18 07:00 98.6 100 16 147/65 (92) 93   


 


5/18/18 05:00  94      


 


5/18/18 04:00  95      


 


5/18/18 03:28 98.5 95 16 144/66 (92) 96   


 


5/18/18 03:00  96      


 


5/18/18 02:00  94      


 


5/18/18 01:00  92      


 


5/18/18 00:00  92      


 


5/17/18 23:00 98.8 93 16 139/65 (89) 97   


 


5/17/18 23:00  96      


 


5/17/18 22:00  94      


 


5/17/18 21:00  88      


 


5/17/18 20:10 97.4 86 16 133/62 (85) 99   


 


5/17/18 20:00  86      


 


5/17/18 19:47     100   


 


5/17/18 19:00  96      


 


5/17/18 18:10  97      


 


5/17/18 17:00  98      


 


5/17/18 16:00  90      








Labs:





Laboratory Tests








Test


  5/18/18


06:36


 


White Blood Count


  10.9 TH/MM3


(4.0-11.0)


 


Red Blood Count


  2.58 MIL/MM3


(4.00-5.30)


 


Hemoglobin


  8.3 GM/DL


(11.6-15.3)


 


Hematocrit


  24.8 %


(35.0-46.0)


 


Mean Corpuscular Volume


  96.0 FL


(80.0-100.0)


 


Mean Corpuscular Hemoglobin


  32.2 PG


(27.0-34.0)


 


Mean Corpuscular Hemoglobin


Concent 33.5 %


(32.0-36.0)


 


Red Cell Distribution Width


  15.1 %


(11.6-17.2)


 


Platelet Count


  350 TH/MM3


(150-450)


 


Mean Platelet Volume


  8.0 FL


(7.0-11.0)


 


Blood Urea Nitrogen


  39 MG/DL


(7-18)


 


Creatinine


  4.56 MG/DL


(0.50-1.00)


 


Random Glucose


  125 MG/DL


()


 


Calcium Level


  8.5 MG/DL


(8.5-10.1)


 


Sodium Level


  135 MEQ/L


(136-145)


 


Potassium Level


  4.1 MEQ/L


(3.5-5.1)


 


Chloride Level


  97 MEQ/L


()


 


Carbon Dioxide Level


  28.4 MEQ/L


(21.0-32.0)


 


Anion Gap


  10 MEQ/L


(5-15)


 


Estimat Glomerular Filtration


Rate 12 ML/MIN


(>89)








Result Diagram:  


5/18/18 0636                                                                   

             5/18/18 0636








(1) ESRD (end stage renal disease) on dialysis


Plan:  s/p  dialysis today , right sub vas cath in place





(2) DM (diabetes mellitus)


Plan:  controlled  levemir on hold , continue  insulin sliding scale 





(3) HTN (hypertension)


Plan:  controlled , home meds resumed 





(4) CAD (coronary artery disease)


(5) S/P CABG (coronary artery bypass graft)


Plan:  ASA, statin BB , plavix





(6) Anemia


Plan:  f/u HGB in am / stable 8.3


on IV Iron














Terwilliger,Jacqueline R. ARNP May 18, 2018 15:11

## 2018-05-18 NOTE — HHI.PR
Subjective


Remarks


Delayed entry note from 5/17





Patient seen and examined on 5/17


Looks sad, does not answer question, sister at the bedside


No fever or chills, no short of breath





Objective


Vitals





Vital Signs








  Date Time  Temp Pulse Resp B/P (MAP) Pulse Ox O2 Delivery O2 Flow Rate FiO2


 


5/18/18 10:00  106      


 


5/18/18 09:00  104      


 


5/18/18 08:14     95   21


 


5/18/18 08:00  105      


 


5/18/18 07:00  100      


 


5/18/18 07:00 98.6 100 16 147/65 (92) 93   


 


5/18/18 05:00  94      


 


5/18/18 04:00  95      


 


5/18/18 03:28 98.5 95 16 144/66 (92) 96   


 


5/18/18 03:00  96      


 


5/18/18 02:00  94      


 


5/18/18 01:00  92      


 


5/18/18 00:00  92      


 


5/17/18 23:00 98.8 93 16 139/65 (89) 97   


 


5/17/18 23:00  96      


 


5/17/18 22:00  94      


 


5/17/18 21:00  88      


 


5/17/18 20:10 97.4 86 16 133/62 (85) 99   


 


5/17/18 20:00  86      


 


5/17/18 19:47     100   


 


5/17/18 19:00  96      


 


5/17/18 18:10  97      


 


5/17/18 17:00  98      


 


5/17/18 16:00  90      


 


5/17/18 15:00 98.2 96 20 147/65 (92) 96   


 


5/17/18 15:00  95      


 


5/17/18 14:00  94      


 


5/17/18 13:00  90      


 


5/17/18 12:00  94      


 


5/17/18 11:00 98.7 87 20 146/72 (96) 98   


 


5/17/18 11:00  91      














I/O      


 


 5/17/18 5/17/18 5/17/18 5/18/18 5/18/18 5/18/18





 07:00 15:00 23:00 07:00 15:00 23:00


 


Intake Total 480 ml 100 ml 720 ml 50 ml  


 


Output Total 50 ml  500 ml   


 


Balance 430 ml 100 ml 220 ml 50 ml  


 


      


 


Intake Oral 480 ml  720 ml 50 ml  


 


IV Total  100 ml    


 


Output Urine Total   500 ml   


 


Chest Tube Drainage Total 50 ml     


 


# Voids 1  1 2  


 


# Bowel Movements   0   








Result Diagram:  


5/18/18 0636                                                                   

             5/18/18 0636





Objective Remarks


GENERAL: This is a well-nourished, well-developed patient, in no apparent 

distress.


CARDIOVASCULAR: RRR,  no gallops, or rubs. 


RESPIRATORY: Fair air entry bilaterally. No W, R, or R, chest tube in place


GASTROINTESTINAL: Abdomen soft, non-tender, nondistended.  Positive bowel sounds


MUSCULOSKELETAL: Extremities without clubbing, cyanosis, or edema.  Pedal 

pulses appreciated


NEUROLOGICAL: Awake and alert.  Moves all extremity.  Normal speech.no focal 

neurological deficit





A/P


Assessment and Plan


53-year-old female with end-stage renal disease on dialysis who presented with 

N STEMI, and is currently status post CABG performed on 5/15.





5/16: Stable postop discussed with the nurse she is off all the drips, soreness 

around the chest tube, discussed with the [facility] ARNP, continue monitoring closely 

CBC BMP, vitals


5/17: Continue current care.  CABG, appreciate [facility] follow-up, continue 

monitoring postop improvement, WBC dropped to 18 K, hemoglobin dropped from 10.6

-8.7 continue following closely CBC and BMP





A/P:


//NSTEMI


//CAD


//Postop CABG performed on 5/15.


Elevated Troponin trending up  EKG sinus with T-wave inversions noted.    

Heparin drip.  Status post aspirin.  Continue daily aspirin.  Continue Plavix. 


= 5/15.  Postop CABG.  Postsurgical management as per surgical service.





//SIRS meeting criteria, also tachycardiac, initiate sepsis protocol. With 

fevers patient did not have cardiac cath.  Discussed with the cardiology. Blood 

cultures, CXR, UA, LA ordered. Started on Levaquin. Monitor closely. 


= No evidence of infection.  Discontinued antibiotics.





//CHF with Exacerbation


BNP elevated.  Chest x-ray with edema.  Will start on nitro drip.  Consulted 

nephrology for dialysis.  Chest x-ray reviewed with congestion. Advance diet 

cardiac cath on hold.


= Cardiology following.  Appreciate assistance.





//Accelerated hypertension.  Patient started on nitro drip.  Restart hydralazine

, with as needed hydralazine.  Awaiting official list of home meds.


= Resolved.  Decreased blood pressure meds.











//Pleuritic chest pain


elevated  d-dimer. CT pulmonary angiogram reviewed and no PE. Patient received 

HD 





//End-stage renal disease on hemodialysis Monday Wednesday Friday.  Consult 

nephrology for dialysis.  Dializef before and after CTA.  Appreciate assistance.





//Tobacco abuse.  Cessation counseling provided.





//Diabetes mellitus type 2.  Diabetic diet.  Insulin sliding scale, accuchecks. 





//Transaminitis


Nausea


Check liver ultrasound.  Lipase only in the 60s.  Consult GI given past history 

of pancreatitis


= Resolved.  Liver ultrasound normal.


Discharge Planning


Postop CABG performed on 5/15


Discharge cleared by cardiology/CT surgery











Jesus Manuel Pham MD May 18, 2018 10:51

## 2018-05-19 VITALS
DIASTOLIC BLOOD PRESSURE: 63 MMHG | OXYGEN SATURATION: 96 % | TEMPERATURE: 98.9 F | RESPIRATION RATE: 18 BRPM | HEART RATE: 101 BPM | SYSTOLIC BLOOD PRESSURE: 140 MMHG

## 2018-05-19 VITALS
TEMPERATURE: 98.9 F | OXYGEN SATURATION: 96 % | DIASTOLIC BLOOD PRESSURE: 55 MMHG | RESPIRATION RATE: 18 BRPM | SYSTOLIC BLOOD PRESSURE: 108 MMHG | HEART RATE: 82 BPM

## 2018-05-19 VITALS — HEART RATE: 102 BPM

## 2018-05-19 VITALS
SYSTOLIC BLOOD PRESSURE: 110 MMHG | OXYGEN SATURATION: 97 % | DIASTOLIC BLOOD PRESSURE: 60 MMHG | TEMPERATURE: 99.4 F | RESPIRATION RATE: 16 BRPM | HEART RATE: 100 BPM

## 2018-05-19 VITALS
DIASTOLIC BLOOD PRESSURE: 58 MMHG | OXYGEN SATURATION: 96 % | TEMPERATURE: 98.7 F | RESPIRATION RATE: 16 BRPM | SYSTOLIC BLOOD PRESSURE: 114 MMHG | HEART RATE: 96 BPM

## 2018-05-19 VITALS
HEART RATE: 96 BPM | OXYGEN SATURATION: 98 % | RESPIRATION RATE: 16 BRPM | DIASTOLIC BLOOD PRESSURE: 60 MMHG | SYSTOLIC BLOOD PRESSURE: 127 MMHG | TEMPERATURE: 98.7 F

## 2018-05-19 VITALS — HEART RATE: 97 BPM

## 2018-05-19 VITALS
SYSTOLIC BLOOD PRESSURE: 150 MMHG | OXYGEN SATURATION: 95 % | RESPIRATION RATE: 18 BRPM | HEART RATE: 105 BPM | TEMPERATURE: 98.8 F | DIASTOLIC BLOOD PRESSURE: 69 MMHG

## 2018-05-19 VITALS — HEART RATE: 94 BPM

## 2018-05-19 VITALS — HEART RATE: 101 BPM

## 2018-05-19 VITALS — HEART RATE: 100 BPM

## 2018-05-19 VITALS — HEART RATE: 92 BPM

## 2018-05-19 VITALS — HEART RATE: 96 BPM

## 2018-05-19 VITALS — HEART RATE: 90 BPM

## 2018-05-19 VITALS
RESPIRATION RATE: 16 BRPM | HEART RATE: 100 BPM | SYSTOLIC BLOOD PRESSURE: 108 MMHG | DIASTOLIC BLOOD PRESSURE: 55 MMHG | OXYGEN SATURATION: 93 % | TEMPERATURE: 99.1 F

## 2018-05-19 VITALS — HEART RATE: 80 BPM

## 2018-05-19 VITALS — HEART RATE: 86 BPM

## 2018-05-19 VITALS — HEART RATE: 99 BPM

## 2018-05-19 VITALS — HEART RATE: 93 BPM

## 2018-05-19 VITALS — HEART RATE: 84 BPM

## 2018-05-19 RX ADMIN — SEVELAMER CARBONATE SCH MG: 800 TABLET, FILM COATED ORAL at 11:30

## 2018-05-19 RX ADMIN — OXYCODONE HYDROCHLORIDE AND ACETAMINOPHEN PRN TAB: 7.5; 325 TABLET ORAL at 00:08

## 2018-05-19 RX ADMIN — INSULIN ASPART SCH: 100 INJECTION, SOLUTION INTRAVENOUS; SUBCUTANEOUS at 22:35

## 2018-05-19 RX ADMIN — MEGESTROL ACETATE SCH MG: 40 SUSPENSION ORAL at 08:23

## 2018-05-19 RX ADMIN — BUPROPION HYDROCHLORIDE SCH MG: 100 TABLET, FILM COATED ORAL at 08:23

## 2018-05-19 RX ADMIN — FAMOTIDINE SCH MG: 20 TABLET, FILM COATED ORAL at 08:23

## 2018-05-19 RX ADMIN — FAMOTIDINE SCH MG: 20 TABLET, FILM COATED ORAL at 22:28

## 2018-05-19 RX ADMIN — POLYETHYLENE GLYCOL 3350 SCH GM: 17 POWDER, FOR SOLUTION ORAL at 08:23

## 2018-05-19 RX ADMIN — DOCUSATE SODIUM SCH MG: 100 CAPSULE, LIQUID FILLED ORAL at 22:29

## 2018-05-19 RX ADMIN — INSULIN ASPART SCH: 100 INJECTION, SOLUTION INTRAVENOUS; SUBCUTANEOUS at 16:31

## 2018-05-19 RX ADMIN — BUPROPION HYDROCHLORIDE SCH MG: 100 TABLET, FILM COATED ORAL at 22:29

## 2018-05-19 RX ADMIN — ONDANSETRON PRN MG: 4 TABLET, ORALLY DISINTEGRATING ORAL at 14:58

## 2018-05-19 RX ADMIN — SODIUM CHLORIDE SCH MLS/HR: 900 INJECTION, SOLUTION INTRAVENOUS at 14:57

## 2018-05-19 RX ADMIN — MULTIPLE VITAMINS W/ MINERALS TAB SCH TAB: TAB at 08:23

## 2018-05-19 RX ADMIN — SENNOSIDES SCH MG: 8.6 TABLET, FILM COATED ORAL at 22:28

## 2018-05-19 RX ADMIN — DOCUSATE SODIUM SCH MG: 100 CAPSULE, LIQUID FILLED ORAL at 08:23

## 2018-05-19 RX ADMIN — Medication SCH ML: at 22:35

## 2018-05-19 RX ADMIN — OXYCODONE HYDROCHLORIDE AND ACETAMINOPHEN PRN TAB: 7.5; 325 TABLET ORAL at 05:33

## 2018-05-19 RX ADMIN — INSULIN ASPART SCH: 100 INJECTION, SOLUTION INTRAVENOUS; SUBCUTANEOUS at 11:30

## 2018-05-19 RX ADMIN — OXYCODONE HYDROCHLORIDE AND ACETAMINOPHEN PRN TAB: 7.5; 325 TABLET ORAL at 22:30

## 2018-05-19 RX ADMIN — PRAVASTATIN SODIUM SCH MG: 40 TABLET ORAL at 22:28

## 2018-05-19 RX ADMIN — Medication SCH ML: at 22:30

## 2018-05-19 RX ADMIN — SEVELAMER CARBONATE SCH MG: 800 TABLET, FILM COATED ORAL at 16:31

## 2018-05-19 RX ADMIN — SODIUM CHLORIDE SCH MLS/HR: 900 INJECTION, SOLUTION INTRAVENOUS at 09:00

## 2018-05-19 RX ADMIN — ASPIRIN 81 MG SCH MG: 81 TABLET ORAL at 08:25

## 2018-05-19 RX ADMIN — SEVELAMER CARBONATE SCH MG: 800 TABLET, FILM COATED ORAL at 08:25

## 2018-05-19 RX ADMIN — CARVEDILOL SCH MG: 12.5 TABLET, FILM COATED ORAL at 08:24

## 2018-05-19 RX ADMIN — CLOPIDOGREL BISULFATE SCH MG: 75 TABLET, FILM COATED ORAL at 15:42

## 2018-05-19 RX ADMIN — CARVEDILOL SCH MG: 12.5 TABLET, FILM COATED ORAL at 22:29

## 2018-05-19 RX ADMIN — OXYCODONE HYDROCHLORIDE AND ACETAMINOPHEN PRN TAB: 7.5; 325 TABLET ORAL at 17:56

## 2018-05-19 RX ADMIN — INSULIN ASPART SCH: 100 INJECTION, SOLUTION INTRAVENOUS; SUBCUTANEOUS at 08:22

## 2018-05-19 NOTE — HHI.PR
Subjective


Remarks


Patient looks much better today, her brother and sister-in-law at the bedside


She reported feeling better no chest pain or short of breath, discussed with 

the nurse, patient cleared by thoracic surgeon for discharge to rehab or home 

with home health care


Afebrile, patient was started on Wellbutrin for depressed mood however no 

suicidal ideation whatsoever





Objective


Vitals





Vital Signs








  Date Time  Temp Pulse Resp B/P (MAP) Pulse Ox O2 Delivery O2 Flow Rate FiO2


 


5/19/18 14:00  84      


 


5/19/18 13:00  86      


 


5/19/18 12:00  94      


 


5/19/18 11:18 98.9 101 18 140/63 (88) 96   


 


5/19/18 11:00  93      


 


5/19/18 10:47   20     


 


5/19/18 10:00  100      


 


5/19/18 09:00  102      


 


5/19/18 08:00  101      


 


5/19/18 07:00 98.8 105 18 150/69 (96) 95   


 


5/19/18 07:00  105      


 


5/19/18 06:00  99      


 


5/19/18 05:00  96      


 


5/19/18 04:14 99.1 100 16 108/55 (72) 93   


 


5/19/18 04:00  97      


 


5/19/18 03:00  80      


 


5/19/18 02:00  97      


 


5/19/18 01:00  94      


 


5/19/18 00:00 99.4 108 16 110/60 (77) 97   


 


5/19/18 00:00  100      


 


5/18/18 23:00  107      


 


5/18/18 22:00  110      


 


5/18/18 21:00  110      


 


5/18/18 20:55   16     


 


5/18/18 20:02 99.8 111 16 138/78 (98) 99   


 


5/18/18 20:00  110      


 


5/18/18 19:00  110      


 


5/18/18 18:00  112      


 


5/18/18 17:16     96   21


 


5/18/18 17:00  111      














I/O      


 


 5/18/18 5/18/18 5/18/18 5/19/18 5/19/18 5/19/18





 07:00 15:00 23:00 07:00 15:00 23:00


 


Intake Total 50 ml  925 ml 240 ml  


 


Output Total  2000 ml 440 ml 375 ml  


 


Balance 50 ml -2000 ml 485 ml -135 ml  


 


      


 


Intake Oral 50 ml  720 ml 240 ml  


 


IV Total   205 ml   


 


Output Urine Total   440 ml 375 ml  


 


Hemodialysis  2000 ml    


 


# Voids 2     


 


# Bowel Movements    0  








Result Diagram:  


5/18/18 0636                                                                   

             5/18/18 0636





Objective Remarks


GENERAL: This is a well-nourished, well-developed patient, in no apparent 

distress.


CARDIOVASCULAR: RRR,  no gallops, or rubs. 


RESPIRATORY: Fair air entry bilaterally. No W, R, or R, chest tube in place


GASTROINTESTINAL: Abdomen soft, non-tender, nondistended.  Positive bowel sounds


MUSCULOSKELETAL: Extremities without clubbing, cyanosis, or edema.  Pedal 

pulses appreciated


NEUROLOGICAL: Awake and alert.  Moves all extremity.  Normal speech.no focal 

neurological deficit





A/P


Assessment and Plan


53-year-old female with end-stage renal disease on dialysis who presented with 

N STEMI, and is currently status post CABG performed on 5/15.





5/16: Stable postop discussed with the nurse she is off all the drips, soreness 

around the chest tube, discussed with the [facility] AR[facility], continue monitoring closely 

CBC BMP, vitals


5/17: Continue current care.  CABG, appreciate [facility] follow-up, continue 

monitoring postop improvement, WBC dropped to 18 K, hemoglobin dropped from 10.6

-8.7 continue following closely CBC and BMP


5/18: Very low mood with sadness will consult psychiatry, continue monitoring 

CBC postop monitoring


5/19: Patient started on Wellbutrin for depressive mood, cleared by thoracic 

surgeon to be discharged, discussed with charge nurse


A/P:


//NSTEMI


//CAD


//Postop CABG performed on 5/15.


Elevated Troponin trending up  EKG sinus with T-wave inversions noted.    

Heparin drip.  Status post aspirin.  Continue daily aspirin.  Continue Plavix. 


= 5/15.  Postop CABG.  Postsurgical management as per surgical service.





//CHF with Exacerbation


BNP elevated.  Chest x-ray with edema.  Will start on nitro drip.  Consulted 

nephrology for dialysis.  Chest x-ray reviewed with congestion. Advance diet 

cardiac cath on hold.


= Cardiology following.  Appreciate assistance.





//Accelerated hypertension.  Patient started on nitro drip.  Restart hydralazine

, with as needed hydralazine.  Awaiting official list of home meds.


= Resolved.  Decreased blood pressure meds.











//Pleuritic chest pain


elevated  d-dimer. CT pulmonary angiogram reviewed and no PE. Patient received 

HD 





//End-stage renal disease on hemodialysis Monday Wednesday Friday.  Consult 

nephrology for dialysis.  Dializef before and after CTA.  Appreciate assistance.





//Tobacco abuse.  Cessation counseling provided.





//Diabetes mellitus type 2.  Diabetic diet.  Insulin sliding scale, accuchecks. 





//Transaminitis


Nausea


Check liver ultrasound.  Lipase only in the 60s.  Consult GI given past history 

of pancreatitis


= Resolved.  Liver ultrasound normal.


Discharge Planning


Postop CABG performed on 5/15


Discharge cleared by cardiology/CT surgery











Jesus Manuel Pham MD May 19, 2018 16:07

## 2018-05-19 NOTE — HHI.NPPN
Subjective


Complaints:  Chest Pain, Shortness of Breath


General Problems:  Anemia


Renal Failure:  Chronic, End Stage Renal Disease


Additional Remarks


No acute complaints





Review of Systems


General


Constitutional:  Fatigue


General Remarks


Generalized pain





Musculoskeletal


MS:  Pain/Stiffness





Objective Data


Data





Vital Signs








  Date Time  Temp Pulse Resp B/P (MAP) Pulse Ox O2 Delivery O2 Flow Rate FiO2


 


5/19/18 11:18 98.9 101 18 140/63 (88) 96   


 


5/19/18 10:47   20     


 


5/19/18 07:00 98.8 105 18 150/69 (96) 95   


 


5/19/18 07:00  105      


 


5/19/18 06:00  99      


 


5/19/18 05:00  96      


 


5/19/18 04:14 99.1 100 16 108/55 (72) 93   


 


5/19/18 04:00  97      


 


5/19/18 03:00  80      


 


5/19/18 02:00  97      


 


5/19/18 01:00  94      


 


5/19/18 00:00 99.4 108 16 110/60 (77) 97   


 


5/19/18 00:00  100      


 


5/18/18 23:00  107      


 


5/18/18 22:00  110      


 


5/18/18 21:00  110      


 


5/18/18 20:55   16     


 


5/18/18 20:02 99.8 111 16 138/78 (98) 99   


 


5/18/18 20:00  110      


 


5/18/18 19:00  110      


 


5/18/18 18:00  112      


 


5/18/18 17:16     96   21


 


5/18/18 17:00  111      


 


5/18/18 16:00  109      


 


5/18/18 15:00 98.0 108 14 149/66 (93) 96   


 


5/18/18 15:00  109      


 


5/18/18 14:00  110      


 


5/18/18 13:00  110      








-:  


5/18/18 0636                                                                   

             5/18/18 0636





Tubes & Lines:  Perma-Cath


Tubes & Lines Comment


all lines removed





Physical Exam


General


Appearance:  Well Developed, No Acute Distress, Comfortable, Malnourished





Eyes


Eye Exam:  Pupils Equal, Pupils Reactive





Pulmonary


Resp Exam:  Clear Bilaterally, Breath Sounds Equal, No Distress





Cardiology


CV Exam:  Regular, Normal Sinus Rhythm





Gastrointestinal/Abdomen


GI Exam:  Soft, Non-Tender, Bowel Sounds Present





Musculoskeletal


MS Exam:  Joints Intact, Atrophy, Unable to Ambulate





Integumentary


Skin Exam:  Clear, Warm, Dry, Intact





Extremeties


Extremities Exam:  No Edema, Pedal Pulses Palpable





Neurologic


Neuro Exam:  Alert, Awake, Oriented, Speech Clear, Moving All Extremities





Assessment/Plan


Discussed Condition With:  Patient


Assessment Summary:  Anemia of CKD, CHF, Hypertension, Diabetes Mellitus, End 

Stage Renal Disease


Problem List:  


(1) ESRD (end stage renal disease) on dialysis


ICD Codes:  N18.6 - End stage renal disease; Z99.2 - Dependence on renal 

dialysis


Plan:  Continue HD MWF. 


HD done yesterday.  Next HD Monday (outpatient if D/C)


Intermittently monitor electrolyte profile. 


On  Renvela with meals, monitor phosphorus level intermittently. 


On protein supplements with meals, Megace for stimulant, and antidepressant. 


Avoid excess IVF. Gadolinium is contraindicated.





Using Permcath for HD. AVF not ready, needs second step with Dr. Rees.


Can evaluate as outpatient if D/C.





Clear for D/C from renal standpoint.





(2) Chest pain, rule out acute myocardial infarction


ICD Codes:  R07.9 - Chest pain, unspecified


Status:  Acute


Plan:  Cardiology and CTS following  


s/p cath, 90% occlusion of Bare Metal Stent previously placed. 


Echo: Ef 40-45%, LVH is present. 


s/p CABG x 2 (5/15), continue supportive care


   PRN pain control 





(3) DM (diabetes mellitus)


ICD Codes:  E11.9 - Type 2 diabetes mellitus without complications


Plan:  maintain glucose 140-180 mg/dL while hospitalized 





(4) HTN (hypertension)


ICD Codes:  I10 - Hypertension


Status:  Chronic


Plan:  Monitor blood pressure


On Carvedilol , hydralazine, and Norvasc. 


BP should be checked in right upper extremity. 





(5) Anemia


ICD Codes:  D64.9 - Anemia, unspecified


Status:  Chronic


Plan:  Epogen with dialysis. 


Transfuse if needed


Pepe Zuluaga MD May 19, 2018 12:07

## 2018-05-19 NOTE — PD.CAR.PN
CVT Progress Note


CVT: POD #:  4


Subjective/Hospital Course:


A 53-year-old female with history of coronary artery disease with prior stenting

, bare metal stent to the mid-LAD, distal RCA, proximal RCA back in February by 

Dr. Edgardo Mercado.  She has continued to take the Plavix.  Despite this, she 

developed chest pain during dialysis on the 05/08/2018, also had some 

associated nausea.  It was improved with a nitro drip.  She underwent re-

catheterization today by Dr. Mercado which showed severe in-stent restenosis of 

the LAD and RCA bare metal stents.   HX of ESRD on dialysis  Monday, Wednesday 

and Friday.  She has had an A-V fistula 


placed in her left forearm for which she is supposed to be scheduled for 

revision this next week with Dr. Rees.


 


PAST MEDICAL HISTORY: Includes hypertension, hyperlipidemia, history of CHF, 

diabetes mellitus on insulin, coronary artery disease, pancreatitis, depression.





5/12/18


c/o chest pain this morning





5/14


pt had chest pain last pm, now on NTG gtt


and heparin gtt 


Dr Huitron spoke with pt / scheduled for CABG in am 


received dialysis today





5/15


surgery : CABG x 2, LIMA to LAD - good, SVG to RCA - fair, EVH


extubated after surgery


crystalloid 2300cc, cell saver 450cc , EBL 900cc, steroids 








5/16


nausea last pm, for dialysis today 


weaning off cleviprex, on BB 


weaning off insulin gtt / on levemir 


will transfer to stepdown after dialysis and off cleviprex 





5/17


chest tube removed without difficulty


very sleepy , pain meds reduced 


needs aggressive pulm toileting 


OOB, ambulate, f/u CBC in am HGB 8.7





5/18


dialysis today 


painful/ norco changed to percocet per Dr Huitron


eval for dc home with [facility] in am 





5/19/18


Alert and oriented


No complaints


Objective:





Vital Signs








  Date Time  Temp Pulse Resp B/P (MAP) Pulse Ox O2 Delivery O2 Flow Rate FiO2


 


5/19/18 11:18 98.9 101 18 140/63 (88) 96   


 


5/19/18 10:47   20     


 


5/19/18 07:00 98.8 105 18 150/69 (96) 95   


 


5/19/18 07:00  105      


 


5/19/18 06:00  99      


 


5/19/18 05:00  96      


 


5/19/18 04:14 99.1 100 16 108/55 (72) 93   


 


5/19/18 04:00  97      


 


5/19/18 03:00  80      


 


5/19/18 02:00  97      


 


5/19/18 01:00  94      


 


5/19/18 00:00 99.4 108 16 110/60 (77) 97   


 


5/19/18 00:00  100      


 


5/18/18 23:00  107      


 


5/18/18 22:00  110      


 


5/18/18 21:00  110      


 


5/18/18 20:55   16     


 


5/18/18 20:02 99.8 111 16 138/78 (98) 99   


 


5/18/18 20:00  110      


 


5/18/18 19:00  110      


 


5/18/18 18:00  112      


 


5/18/18 17:16     96   21


 


5/18/18 17:00  111      


 


5/18/18 16:00  109      


 


5/18/18 15:00 98.0 108 14 149/66 (93) 96   


 


5/18/18 15:00  109      


 


5/18/18 14:00  110      


 


5/18/18 13:00  110      


 


5/18/18 12:00  108      








Result Diagram:  


5/18/18 0636                                                                   

             5/18/18 0636





Cardiovascular:


RRR


Telemetry:


NSR


Pulmonary:


CTA


GI/:


NABS, NT


Incision:


dry and intact


Plan:


Stim BM


Encourage ambulation, up to chair


Discharge per primary service if OK with Nephrology - Follow-up with cardiology 

and myself ~3 weeks.





(1) ESRD (end stage renal disease) on dialysis


Plan:  s/p  dialysis today , right sub vas cath in place





(2) DM (diabetes mellitus)


Plan:  controlled  levemir on hold , continue  insulin sliding scale 





(3) HTN (hypertension)


Plan:  controlled , home meds resumed 





(4) CAD (coronary artery disease)


(5) S/P CABG (coronary artery bypass graft)


Plan:  ASA, statin BB , plavix





(6) Anemia


Plan:  f/u HGB in am / stable 8.3


on IV Iron














Ban Huitron MD May 19, 2018 11:51

## 2018-05-20 VITALS
RESPIRATION RATE: 20 BRPM | OXYGEN SATURATION: 97 % | TEMPERATURE: 99.2 F | SYSTOLIC BLOOD PRESSURE: 111 MMHG | HEART RATE: 90 BPM | DIASTOLIC BLOOD PRESSURE: 56 MMHG

## 2018-05-20 VITALS
DIASTOLIC BLOOD PRESSURE: 55 MMHG | SYSTOLIC BLOOD PRESSURE: 93 MMHG | TEMPERATURE: 99.2 F | RESPIRATION RATE: 20 BRPM | OXYGEN SATURATION: 97 % | HEART RATE: 90 BPM

## 2018-05-20 VITALS
HEART RATE: 92 BPM | DIASTOLIC BLOOD PRESSURE: 62 MMHG | RESPIRATION RATE: 16 BRPM | SYSTOLIC BLOOD PRESSURE: 130 MMHG | TEMPERATURE: 99.5 F | OXYGEN SATURATION: 98 %

## 2018-05-20 VITALS — HEART RATE: 84 BPM

## 2018-05-20 VITALS — HEART RATE: 90 BPM

## 2018-05-20 VITALS — HEART RATE: 96 BPM

## 2018-05-20 VITALS
OXYGEN SATURATION: 98 % | RESPIRATION RATE: 16 BRPM | DIASTOLIC BLOOD PRESSURE: 65 MMHG | TEMPERATURE: 99.3 F | SYSTOLIC BLOOD PRESSURE: 140 MMHG | HEART RATE: 94 BPM

## 2018-05-20 VITALS
TEMPERATURE: 99 F | OXYGEN SATURATION: 98 % | RESPIRATION RATE: 18 BRPM | SYSTOLIC BLOOD PRESSURE: 147 MMHG | DIASTOLIC BLOOD PRESSURE: 67 MMHG | HEART RATE: 94 BPM

## 2018-05-20 VITALS — HEART RATE: 94 BPM

## 2018-05-20 VITALS
OXYGEN SATURATION: 96 % | HEART RATE: 89 BPM | RESPIRATION RATE: 16 BRPM | DIASTOLIC BLOOD PRESSURE: 60 MMHG | SYSTOLIC BLOOD PRESSURE: 127 MMHG | TEMPERATURE: 98.5 F

## 2018-05-20 VITALS — HEART RATE: 83 BPM

## 2018-05-20 VITALS — HEART RATE: 88 BPM

## 2018-05-20 VITALS — HEART RATE: 89 BPM

## 2018-05-20 VITALS — HEART RATE: 98 BPM

## 2018-05-20 VITALS — HEART RATE: 97 BPM

## 2018-05-20 VITALS — HEART RATE: 92 BPM

## 2018-05-20 LAB
ERYTHROCYTE [DISTWIDTH] IN BLOOD BY AUTOMATED COUNT: 14.7 % (ref 11.6–17.2)
HCT VFR BLD CALC: 23.6 % (ref 35–46)
HGB BLD-MCNC: 8 GM/DL (ref 11.6–15.3)
MCH RBC QN AUTO: 32.1 PG (ref 27–34)
MCHC RBC AUTO-ENTMCNC: 33.7 % (ref 32–36)
MCV RBC AUTO: 95 FL (ref 80–100)
PLATELET # BLD: 487 TH/MM3 (ref 150–450)
PMV BLD AUTO: 7.6 FL (ref 7–11)
RBC # BLD AUTO: 2.48 MIL/MM3 (ref 4–5.3)
WBC # BLD AUTO: 8.7 TH/MM3 (ref 4–11)

## 2018-05-20 RX ADMIN — DOCUSATE SODIUM SCH MG: 100 CAPSULE, LIQUID FILLED ORAL at 08:56

## 2018-05-20 RX ADMIN — INSULIN ASPART SCH: 100 INJECTION, SOLUTION INTRAVENOUS; SUBCUTANEOUS at 23:08

## 2018-05-20 RX ADMIN — SEVELAMER CARBONATE SCH MG: 800 TABLET, FILM COATED ORAL at 16:12

## 2018-05-20 RX ADMIN — FAMOTIDINE SCH MG: 20 TABLET, FILM COATED ORAL at 20:59

## 2018-05-20 RX ADMIN — CARVEDILOL SCH MG: 12.5 TABLET, FILM COATED ORAL at 20:58

## 2018-05-20 RX ADMIN — DOCUSATE SODIUM SCH MG: 100 CAPSULE, LIQUID FILLED ORAL at 20:57

## 2018-05-20 RX ADMIN — SENNOSIDES SCH MG: 8.6 TABLET, FILM COATED ORAL at 20:57

## 2018-05-20 RX ADMIN — MULTIPLE VITAMINS W/ MINERALS TAB SCH TAB: TAB at 08:57

## 2018-05-20 RX ADMIN — SEVELAMER CARBONATE SCH MG: 800 TABLET, FILM COATED ORAL at 11:20

## 2018-05-20 RX ADMIN — POLYETHYLENE GLYCOL 3350 SCH GM: 17 POWDER, FOR SOLUTION ORAL at 08:55

## 2018-05-20 RX ADMIN — FAMOTIDINE SCH MG: 20 TABLET, FILM COATED ORAL at 08:56

## 2018-05-20 RX ADMIN — OXYCODONE HYDROCHLORIDE AND ACETAMINOPHEN PRN TAB: 7.5; 325 TABLET ORAL at 06:16

## 2018-05-20 RX ADMIN — PRAVASTATIN SODIUM SCH MG: 40 TABLET ORAL at 20:57

## 2018-05-20 RX ADMIN — ASPIRIN 81 MG SCH MG: 81 TABLET ORAL at 08:57

## 2018-05-20 RX ADMIN — BUPROPION HYDROCHLORIDE SCH MG: 100 TABLET, FILM COATED ORAL at 20:57

## 2018-05-20 RX ADMIN — CLOPIDOGREL BISULFATE SCH MG: 75 TABLET, FILM COATED ORAL at 08:57

## 2018-05-20 RX ADMIN — OXYCODONE HYDROCHLORIDE AND ACETAMINOPHEN PRN TAB: 7.5; 325 TABLET ORAL at 14:42

## 2018-05-20 RX ADMIN — MEGESTROL ACETATE SCH MG: 40 SUSPENSION ORAL at 08:55

## 2018-05-20 RX ADMIN — SODIUM CHLORIDE SCH MLS/HR: 900 INJECTION, SOLUTION INTRAVENOUS at 08:55

## 2018-05-20 RX ADMIN — CARVEDILOL SCH MG: 12.5 TABLET, FILM COATED ORAL at 08:57

## 2018-05-20 RX ADMIN — Medication SCH ML: at 20:59

## 2018-05-20 RX ADMIN — OXYCODONE HYDROCHLORIDE AND ACETAMINOPHEN PRN TAB: 7.5; 325 TABLET ORAL at 20:59

## 2018-05-20 RX ADMIN — SEVELAMER CARBONATE SCH MG: 800 TABLET, FILM COATED ORAL at 08:56

## 2018-05-20 RX ADMIN — BUPROPION HYDROCHLORIDE SCH MG: 100 TABLET, FILM COATED ORAL at 08:56

## 2018-05-20 RX ADMIN — INSULIN ASPART SCH: 100 INJECTION, SOLUTION INTRAVENOUS; SUBCUTANEOUS at 11:20

## 2018-05-20 RX ADMIN — INSULIN ASPART SCH: 100 INJECTION, SOLUTION INTRAVENOUS; SUBCUTANEOUS at 16:04

## 2018-05-20 RX ADMIN — INSULIN ASPART SCH: 100 INJECTION, SOLUTION INTRAVENOUS; SUBCUTANEOUS at 08:00

## 2018-05-20 RX ADMIN — ONDANSETRON PRN MG: 2 INJECTION, SOLUTION INTRAMUSCULAR; INTRAVENOUS at 12:06

## 2018-05-20 RX ADMIN — Medication SCH ML: at 08:57

## 2018-05-20 NOTE — HHI.PR
Subjective


Remarks


Resting in bed comfortably


Daughter at the bedside


She requested keeping the fentanyl patch





Objective


Vitals





Vital Signs








  Date Time  Temp Pulse Resp B/P (MAP) Pulse Ox O2 Delivery O2 Flow Rate FiO2


 


5/20/18 17:00  88      


 


5/20/18 16:10  83      


 


5/20/18 15:42   16     


 


5/20/18 15:00  84      


 


5/20/18 15:00 99.2 90 20 111/56 (74) 97   


 


5/20/18 14:00  83      


 


5/20/18 13:00  84      


 


5/20/18 12:00  84      


 


5/20/18 11:00  84      


 


5/20/18 11:00 99.2 90 20 93/55 (68) 97   


 


5/20/18 10:12  94      


 


5/20/18 09:00  97      


 


5/20/18 08:00  96      


 


5/20/18 07:00  97      


 


5/20/18 07:00 99.0 94 18 147/67 (93) 98   


 


5/20/18 06:00  96      


 


5/20/18 05:00  88      


 


5/20/18 04:00  84      


 


5/20/18 03:27 98.5 89 16 127/60 (82) 96   


 


5/20/18 03:00  89      


 


5/20/18 02:00  92      


 


5/20/18 01:00  92      


 


5/20/18 00:00  94      


 


5/19/18 23:00  97      


 


5/19/18 23:00 98.7 96 16 127/60 (82) 98   


 


5/19/18 22:00  96      


 


5/19/18 21:00  96      


 


5/19/18 20:00 98.7 98 16 114/58 (76) 96   


 


5/19/18 20:00  96      


 


5/19/18 19:00  92      


 


5/19/18 18:00  96      














I/O      


 


 5/19/18 5/19/18 5/19/18 5/20/18 5/20/18 5/20/18





 07:00 15:00 23:00 07:00 15:00 23:00


 


Intake Total 240 ml  480 ml 585 ml  720 ml


 


Output Total 375 ml   375 ml  400 ml


 


Balance -135 ml  480 ml 210 ml  320 ml


 


      


 


Intake Oral 240 ml  480 ml 480 ml  720 ml


 


IV Total    105 ml  


 


Output Urine Total 375 ml   375 ml  400 ml


 


Stool Total    0 ml  


 


# Voids   2   1


 


# Bowel Movements 0     1








Result Diagram:  


5/20/18 0330                                                                   

             5/18/18 0636





Objective Remarks


GENERAL: This is a well-nourished, well-developed patient, in no apparent 

distress.


CARDIOVASCULAR: RRR,  no gallops, or rubs. 


RESPIRATORY: Fair air entry bilaterally. No W, R, or R, chest tube in place


GASTROINTESTINAL: Abdomen soft, non-tender, nondistended.  Positive bowel sounds


MUSCULOSKELETAL: Extremities without clubbing, cyanosis, or edema.  Pedal 

pulses appreciated


NEUROLOGICAL: Awake and alert.  Moves all extremity.  Normal speech.no focal 

neurological deficit


Procedures


  Left heart cath, CABG





A/P


Problem List:  


(1) S/P CABG (coronary artery bypass graft)


ICD Code:  Z95.1 - Presence of aortocoronary bypass graft


(2) NSTEMI (non-ST elevated myocardial infarction)


ICD Code:  I21.4 - Non-ST elevation (NSTEMI) myocardial infarction


(3) CAD (coronary artery disease)


ICD Code:  I25.10 - Atherosclerotic heart disease of native coronary artery 

without angina pectoris


Status:  Chronic


(4) ESRD (end stage renal disease) on dialysis


ICD Code:  N18.6 - End stage renal disease; Z99.2 - Dependence on renal dialysis


(5) DM (diabetes mellitus)


ICD Code:  E11.9 - Type 2 diabetes mellitus without complications


(6) HTN (hypertension)


ICD Code:  I10 - Hypertension


Status:  Chronic


(7) Anemia


ICD Code:  D64.9 - Anemia, unspecified


Status:  Chronic


(8) Chest pain, rule out acute myocardial infarction


ICD Code:  R07.9 - Chest pain, unspecified


Status:  Acute


(9) Acute exacerbation of congestive heart failure


ICD Code:  I50.9 - Heart failure, unspecified


Status:  Acute


(10) Elevated troponin


ICD Code:  R74.8 - Abnormal levels of other serum enzymes


Status:  Acute


Assessment and Plan


53-year-old female with end-stage renal disease on dialysis who presented with 

N STEMI, and is currently status post CABG performed on 5/15.





5/16: Stable postop discussed with the nurse she is off all the drips, soreness 

around the chest tube, discussed with the [facility], continue monitoring closely 

CBC BMP, vitals


5/17: Continue current care.  CABG, appreciate [facility] follow-up, continue 

monitoring postop improvement, WBC dropped to 18 K, hemoglobin dropped from 10.6

-8.7 continue following closely CBC and BMP


5/18: Very low mood with sadness will consult psychiatry, continue monitoring 

CBC postop monitoring


5/19: Patient started on Wellbutrin for depressive mood, cleared by thoracic 

surgeon to be discharged, discussed with charge nurse


5/20: Patient stable for discharge however due to no one at home to take care 

of her, now working on rehab placement, discussed with charge nurse and case 

manager


A/P:


//NSTEMI


//CAD


//Postop CABG performed on 5/15.


Elevated Troponin trending up  EKG sinus with T-wave inversions noted.    

Heparin drip.  Status post aspirin.  Continue daily aspirin.  Continue Plavix. 


= 5/15.  Postop CABG.  Postsurgical management as per surgical service.





//CHF with Exacerbation


BNP elevated.  Chest x-ray with edema.  Will start on nitro drip.  Consulted 

nephrology for dialysis.  Chest x-ray reviewed with congestion. Advance diet 

cardiac cath on hold.


= Cardiology following.  Appreciate assistance.





//Accelerated hypertension.  Patient started on nitro drip.  Restart hydralazine

, with as needed hydralazine.  Awaiting official list of home meds.


= Resolved.  Decreased blood pressure meds.











//Pleuritic chest pain


elevated  d-dimer. CT pulmonary angiogram reviewed and no PE. Patient received 

HD 





//End-stage renal disease on hemodialysis Monday Wednesday Friday.  Consult 

nephrology for dialysis.  Dializef before and after CTA.  Appreciate assistance.





//Tobacco abuse.  Cessation counseling provided.





//Diabetes mellitus type 2.  Diabetic diet.  Insulin sliding scale, accuchecks. 





//Transaminitis


Nausea


Check liver ultrasound.  Lipase only in the 60s.  Consult GI given past history 

of pancreatitis


= Resolved.  Liver ultrasound normal.


Discharge Planning


Postop CABG performed on 5/15


Discharge cleared by cardiology/CT surgery





Problem Qualifiers





(1) Acute exacerbation of congestive heart failure:  


Qualified Codes:  I50.9 - Heart failure, unspecified








Jesus Manuel Pham MD May 20, 2018 17:44

## 2018-05-20 NOTE — PD.CAR.PN
CVT Progress Note


CVT: POD #:  5


Subjective/Hospital Course:


A 53-year-old female with history of coronary artery disease with prior stenting

, bare metal stent to the mid-LAD, distal RCA, proximal RCA back in February by 

Dr. Edgardo Mercado.  She has continued to take the Plavix.  Despite this, she 

developed chest pain during dialysis on the 05/08/2018, also had some 

associated nausea.  It was improved with a nitro drip.  She underwent re-

catheterization today by Dr. Mercado which showed severe in-stent restenosis of 

the LAD and RCA bare metal stents.   HX of ESRD on dialysis  Monday, Wednesday 

and Friday.  She has had an A-V fistula 


placed in her left forearm for which she is supposed to be scheduled for 

revision this next week with Dr. Rees.


 


PAST MEDICAL HISTORY: Includes hypertension, hyperlipidemia, history of CHF, 

diabetes mellitus on insulin, coronary artery disease, pancreatitis, depression.





5/12/18


c/o chest pain this morning





5/14


pt had chest pain last pm, now on NTG gtt


and heparin gtt 


Dr Huitron spoke with pt / scheduled for CABG in am 


received dialysis today





5/15


surgery : CABG x 2, LIMA to LAD - good, SVG to RCA - fair, EVH


extubated after surgery


crystalloid 2300cc, cell saver 450cc , EBL 900cc, steroids 








5/16


nausea last pm, for dialysis today 


weaning off cleviprex, on BB 


weaning off insulin gtt / on levemir 


will transfer to stepdown after dialysis and off cleviprex 





5/17


chest tube removed without difficulty


very sleepy , pain meds reduced 


needs aggressive pulm toileting 


OOB, ambulate, f/u CBC in am HGB 8.7





5/18


dialysis today 


painful/ norco changed to percocet per Dr Huitron


eval for dc home with [facility] in am 





5/19/18


Alert and oriented


No complaints





5/20/18


No complaints today.  In good spirits.  Family leaning toward SNF placement.


Objective:





Vital Signs








  Date Time  Temp Pulse Resp B/P (MAP) Pulse Ox O2 Delivery O2 Flow Rate FiO2


 


5/20/18 08:00  96      


 


5/20/18 07:00  97      


 


5/20/18 07:00 99.0 94 18 147/67 (93) 98   


 


5/20/18 06:00  96      


 


5/20/18 05:00  88      


 


5/20/18 04:00  84      


 


5/20/18 03:27 98.5 89 16 127/60 (82) 96   


 


5/20/18 03:00  89      


 


5/20/18 02:00  92      


 


5/20/18 01:00  92      


 


5/20/18 00:00  94      


 


5/19/18 23:00  97      


 


5/19/18 23:00 98.7 96 16 127/60 (82) 98   


 


5/19/18 22:00  96      


 


5/19/18 21:00  96      


 


5/19/18 20:00 98.7 98 16 114/58 (76) 96   


 


5/19/18 20:00  96      


 


5/19/18 19:00  92      


 


5/19/18 18:00  96      


 


5/19/18 16:00  90      


 


5/19/18 15:00 98.9 77 18 108/55 (72) 96   


 


5/19/18 15:00  82      


 


5/19/18 14:00  84      


 


5/19/18 13:00  86      


 


5/19/18 12:00  94      


 


5/19/18 11:18 98.9 101 18 140/63 (88) 96   


 


5/19/18 11:00  93      


 


5/19/18 10:47   20     


 


5/19/18 10:00  100      








Labs:





Laboratory Tests








Test


  5/20/18


03:30


 


White Blood Count


  8.7 TH/MM3


(4.0-11.0)


 


Red Blood Count


  2.48 MIL/MM3


(4.00-5.30)


 


Hemoglobin


  8.0 GM/DL


(11.6-15.3)


 


Hematocrit


  23.6 %


(35.0-46.0)


 


Mean Corpuscular Volume


  95.0 FL


(80.0-100.0)


 


Mean Corpuscular Hemoglobin


  32.1 PG


(27.0-34.0)


 


Mean Corpuscular Hemoglobin


Concent 33.7 %


(32.0-36.0)


 


Red Cell Distribution Width


  14.7 %


(11.6-17.2)


 


Platelet Count


  487 TH/MM3


(150-450)


 


Mean Platelet Volume


  7.6 FL


(7.0-11.0)








Result Diagram:  


5/20/18 0330                                                                   

             5/18/18 0636





Cardiovascular:


RRR


Telemetry:


NSR


Pulmonary:


CTA


GI/:


NABS


Incision:


dry and intact


Plan:


Case management for discharge disposition ASAP


D/C per primary service.





(1) ESRD (end stage renal disease) on dialysis


Plan:  s/p  dialysis today , right sub vas cath in place





(2) DM (diabetes mellitus)


Plan:  controlled  levemir on hold , continue  insulin sliding scale 





(3) HTN (hypertension)


Plan:  controlled , home meds resumed 





(4) CAD (coronary artery disease)


(5) S/P CABG (coronary artery bypass graft)


Plan:  ASA, statin BB , plavix





(6) Anemia


Plan:  f/u HGB in am / stable 8.3


on IV Iron














Ban Huitron MD May 20, 2018 10:01

## 2018-05-20 NOTE — HHI.NPPN
Subjective


Complaints:  Chest Pain, Shortness of Breath


General Problems:  Anemia


Renal Failure:  Chronic, End Stage Renal Disease


Additional Remarks


No acute complaints





Review of Systems


General


Constitutional:  Fatigue


General Remarks


Generalized pain





Musculoskeletal


MS:  Pain/Stiffness





Objective Data


Data





Vital Signs








  Date Time  Temp Pulse Resp B/P (MAP) Pulse Ox O2 Delivery O2 Flow Rate FiO2


 


5/20/18 08:00  96      


 


5/20/18 07:00  97      


 


5/20/18 07:00 99.0 94 18 147/67 (93) 98   


 


5/20/18 06:00  96      


 


5/20/18 05:00  88      


 


5/20/18 04:00  84      


 


5/20/18 03:27 98.5 89 16 127/60 (82) 96   


 


5/20/18 03:00  89      


 


5/20/18 02:00  92      


 


5/20/18 01:00  92      


 


5/20/18 00:00  94      


 


5/19/18 23:00  97      


 


5/19/18 23:00 98.7 96 16 127/60 (82) 98   


 


5/19/18 22:00  96      


 


5/19/18 21:00  96      


 


5/19/18 20:00 98.7 98 16 114/58 (76) 96   


 


5/19/18 20:00  96      


 


5/19/18 19:00  92      


 


5/19/18 18:00  96      


 


5/19/18 16:00  90      


 


5/19/18 15:00 98.9 77 18 108/55 (72) 96   


 


5/19/18 15:00  82      


 


5/19/18 14:00  84      


 


5/19/18 13:00  86      


 


5/19/18 12:00  94      


 


5/19/18 11:18 98.9 101 18 140/63 (88) 96   


 


5/19/18 11:00  93      


 


5/19/18 10:47   20     


 


5/19/18 10:00  100      








-:  


5/20/18 0330                                                                   

             5/18/18 0636





Tubes & Lines:  Perma-Cath


Tubes & Lines Comment


all lines removed





Physical Exam


General


Appearance:  Well Developed, No Acute Distress, Comfortable, Malnourished





Eyes


Eye Exam:  Pupils Equal, Pupils Reactive





Pulmonary


Resp Exam:  Clear Bilaterally, Breath Sounds Equal, No Distress





Cardiology


CV Exam:  Regular, Normal Sinus Rhythm





Gastrointestinal/Abdomen


GI Exam:  Soft, Non-Tender, Bowel Sounds Present





Musculoskeletal


MS Exam:  Joints Intact, Atrophy, Unable to Ambulate





Integumentary


Skin Exam:  Clear, Warm, Dry, Intact





Extremeties


Extremities Exam:  No Edema, Pedal Pulses Palpable





Neurologic


Neuro Exam:  Alert, Awake, Oriented, Speech Clear, Moving All Extremities





Assessment/Plan


Discussed Condition With:  Patient


Assessment Summary:  Anemia of CKD, CHF, Hypertension, Diabetes Mellitus, End 

Stage Renal Disease


Problem List:  


(1) ESRD (end stage renal disease) on dialysis


ICD Codes:  N18.6 - End stage renal disease; Z99.2 - Dependence on renal 

dialysis


Plan:  Continue HD MWF. 


HD done yesterday.  Next HD Monday 


Intermittently monitor electrolyte profile. 


On  Renvela with meals, monitor phosphorus level intermittently. 


On protein supplements with meals, Megace for stimulant, and antidepressant. 


Avoid excess IVF. Gadolinium is contraindicated.





Using Permcath for HD. AVF not ready, needs second step with Dr. Rees.


Can evaluate as outpatient if D/C soon.





Clear for D/C from renal standpoint


Being seen with psychiatry for depression.





(2) Chest pain, rule out acute myocardial infarction


ICD Codes:  R07.9 - Chest pain, unspecified


Status:  Acute


Plan:  Cardiology and CTS following  


s/p cath, 90% occlusion of Bare Metal Stent previously placed. 


Echo: Ef 40-45%, LVH is present. 


s/p CABG x 2 (5/15), continue supportive care


   PRN pain control 





(3) DM (diabetes mellitus)


ICD Codes:  E11.9 - Type 2 diabetes mellitus without complications


Plan:  maintain glucose 140-180 mg/dL while hospitalized 





(4) HTN (hypertension)


ICD Codes:  I10 - Hypertension


Status:  Chronic


Plan:  Monitor blood pressure


On Carvedilol , hydralazine, and Norvasc. 


BP should be checked in right upper extremity. 





(5) Anemia


ICD Codes:  D64.9 - Anemia, unspecified


Status:  Chronic


Plan:  Epogen with dialysis. 


Transfuse if needed


Receiving Pepe Baires MD May 20, 2018 09:53
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
## 2018-05-20 NOTE — HHI.PR
Subjective


Remarks


ALERT


POS OP CABG - DOING WELL 


EXTUBATED ON O2 NC


NO SOB 


SAT 94% ON RA





Objective





Vital Signs








  Date Time  Temp Pulse Resp B/P (MAP) Pulse Ox O2 Delivery O2 Flow Rate FiO2


 


5/20/18 13:00  84      


 


5/20/18 12:00  84      


 


5/20/18 11:00  84      


 


5/20/18 11:00 99.2 90 20 93/55 (68) 97   


 


5/20/18 10:12  94      


 


5/20/18 09:00  97      


 


5/20/18 08:00  96      


 


5/20/18 07:00  97      


 


5/20/18 07:00 99.0 94 18 147/67 (93) 98   


 


5/20/18 06:00  96      


 


5/20/18 05:00  88      


 


5/20/18 04:00  84      


 


5/20/18 03:27 98.5 89 16 127/60 (82) 96   


 


5/20/18 03:00  89      


 


5/20/18 02:00  92      


 


5/20/18 01:00  92      


 


5/20/18 00:00  94      


 


5/19/18 23:00  97      


 


5/19/18 23:00 98.7 96 16 127/60 (82) 98   


 


5/19/18 22:00  96      


 


5/19/18 21:00  96      


 


5/19/18 20:00 98.7 98 16 114/58 (76) 96   


 


5/19/18 20:00  96      


 


5/19/18 19:00  92      


 


5/19/18 18:00  96      


 


5/19/18 16:00  90      


 


5/19/18 15:00 98.9 77 18 108/55 (72) 96   


 


5/19/18 15:00  82      














I/O      


 


 5/19/18 5/19/18 5/19/18 5/20/18 5/20/18 5/20/18





 07:00 15:00 23:00 07:00 15:00 23:00


 


Intake Total 240 ml  480 ml 585 ml  


 


Output Total 375 ml   375 ml  


 


Balance -135 ml  480 ml 210 ml  


 


      


 


Intake Oral 240 ml  480 ml 480 ml  


 


IV Total    105 ml  


 


Output Urine Total 375 ml   375 ml  


 


Stool Total    0 ml  


 


# Voids   2   


 


# Bowel Movements 0     








Result Diagram:  


5/20/18 0330                                                                   

             5/18/18 0636





Objective Remarks


GENERAL: ALERT, NO DISTRESS 


SKIN: Warm and dry.


HEAD: Atraumatic. Normocephalic. 


NECK: Trachea midline. No JVD. 


CARDIOVASCULAR: Regular rate and rhythm.  


RESPIRATORY: No accessory muscle use. Clear to auscultation. Breath sounds 

equal bilaterally. 


GASTROINTESTINAL: Abdomen soft, non-tender, nondistended. Hepatic and splenic 

margins not palpable. 


MUSCULOSKELETAL: Extremities without clubbing, cyanosis, or edema. No obvious 

deformities. 


NEUROLOGICAL: Awake and alert. .  Normal speech.


PSYCHIATRIC: Appropriate mood and affect; insight and judgment normal.





Assessment and Plan


Assessment and Plan


ASS





CAD, DOING WELL POST OP CABG


DM


HTN


ESRD ON HD





PLAN





O2 IF NEEDED


PULM TOILET


INCREASE ACTIVITY


HOME AM if stable - as per primary and cardio 


Will continue to follow with you











Kelle Nina MD May 20, 2018 14:14

## 2018-05-21 VITALS
SYSTOLIC BLOOD PRESSURE: 127 MMHG | TEMPERATURE: 97.9 F | DIASTOLIC BLOOD PRESSURE: 59 MMHG | OXYGEN SATURATION: 98 % | HEART RATE: 91 BPM | RESPIRATION RATE: 16 BRPM

## 2018-05-21 VITALS — HEART RATE: 90 BPM

## 2018-05-21 VITALS — HEART RATE: 84 BPM

## 2018-05-21 VITALS
HEART RATE: 92 BPM | OXYGEN SATURATION: 97 % | DIASTOLIC BLOOD PRESSURE: 58 MMHG | TEMPERATURE: 98.7 F | SYSTOLIC BLOOD PRESSURE: 119 MMHG | RESPIRATION RATE: 16 BRPM

## 2018-05-21 VITALS — HEART RATE: 91 BPM

## 2018-05-21 VITALS — HEART RATE: 92 BPM

## 2018-05-21 VITALS — HEART RATE: 94 BPM

## 2018-05-21 VITALS — HEART RATE: 88 BPM

## 2018-05-21 VITALS — HEART RATE: 87 BPM

## 2018-05-21 LAB
BASOPHILS # BLD AUTO: 0.1 TH/MM3 (ref 0–0.2)
BASOPHILS NFR BLD: 0.8 % (ref 0–2)
BUN SERPL-MCNC: 45 MG/DL (ref 7–18)
CALCIUM SERPL-MCNC: 7.9 MG/DL (ref 8.5–10.1)
CHLORIDE SERPL-SCNC: 96 MEQ/L (ref 98–107)
CREAT SERPL-MCNC: 5.45 MG/DL (ref 0.5–1)
EOSINOPHIL # BLD: 0.2 TH/MM3 (ref 0–0.4)
EOSINOPHIL NFR BLD: 2 % (ref 0–4)
ERYTHROCYTE [DISTWIDTH] IN BLOOD BY AUTOMATED COUNT: 15 % (ref 11.6–17.2)
GFR SERPLBLD BASED ON 1.73 SQ M-ARVRAT: 10 ML/MIN (ref 89–?)
GLUCOSE SERPL-MCNC: 187 MG/DL (ref 74–106)
HCO3 BLD-SCNC: 25.4 MEQ/L (ref 21–32)
HCT VFR BLD CALC: 23.6 % (ref 35–46)
HGB BLD-MCNC: 7.9 GM/DL (ref 11.6–15.3)
LYMPHOCYTES # BLD AUTO: 1.3 TH/MM3 (ref 1–4.8)
LYMPHOCYTES NFR BLD AUTO: 14.3 % (ref 9–44)
MCH RBC QN AUTO: 32.2 PG (ref 27–34)
MCHC RBC AUTO-ENTMCNC: 33.6 % (ref 32–36)
MCV RBC AUTO: 95.6 FL (ref 80–100)
MONOCYTE #: 0.8 TH/MM3 (ref 0–0.9)
MONOCYTES NFR BLD: 8.5 % (ref 0–8)
NEUTROPHILS # BLD AUTO: 6.8 TH/MM3 (ref 1.8–7.7)
NEUTROPHILS NFR BLD AUTO: 74.4 % (ref 16–70)
PLATELET # BLD: 540 TH/MM3 (ref 150–450)
PMV BLD AUTO: 7.1 FL (ref 7–11)
RBC # BLD AUTO: 2.47 MIL/MM3 (ref 4–5.3)
SODIUM SERPL-SCNC: 133 MEQ/L (ref 136–145)
WBC # BLD AUTO: 9.1 TH/MM3 (ref 4–11)

## 2018-05-21 RX ADMIN — OXYCODONE HYDROCHLORIDE AND ACETAMINOPHEN PRN TAB: 7.5; 325 TABLET ORAL at 13:37

## 2018-05-21 RX ADMIN — MULTIPLE VITAMINS W/ MINERALS TAB SCH TAB: TAB at 12:17

## 2018-05-21 RX ADMIN — MEGESTROL ACETATE SCH MG: 40 SUSPENSION ORAL at 12:16

## 2018-05-21 RX ADMIN — CARVEDILOL SCH MG: 12.5 TABLET, FILM COATED ORAL at 12:17

## 2018-05-21 RX ADMIN — FAMOTIDINE SCH MG: 20 TABLET, FILM COATED ORAL at 12:16

## 2018-05-21 RX ADMIN — ASPIRIN 81 MG SCH MG: 81 TABLET ORAL at 12:17

## 2018-05-21 RX ADMIN — DOCUSATE SODIUM SCH MG: 100 CAPSULE, LIQUID FILLED ORAL at 12:17

## 2018-05-21 RX ADMIN — ONDANSETRON PRN MG: 2 INJECTION, SOLUTION INTRAMUSCULAR; INTRAVENOUS at 00:22

## 2018-05-21 RX ADMIN — OXYCODONE HYDROCHLORIDE AND ACETAMINOPHEN PRN TAB: 7.5; 325 TABLET ORAL at 06:20

## 2018-05-21 RX ADMIN — SEVELAMER CARBONATE SCH MG: 800 TABLET, FILM COATED ORAL at 12:16

## 2018-05-21 RX ADMIN — CLOPIDOGREL BISULFATE SCH MG: 75 TABLET, FILM COATED ORAL at 12:17

## 2018-05-21 RX ADMIN — OXYCODONE HYDROCHLORIDE AND ACETAMINOPHEN PRN TAB: 7.5; 325 TABLET ORAL at 00:23

## 2018-05-21 RX ADMIN — POLYETHYLENE GLYCOL 3350 SCH GM: 17 POWDER, FOR SOLUTION ORAL at 12:21

## 2018-05-21 RX ADMIN — SODIUM CHLORIDE SCH MLS/HR: 900 INJECTION, SOLUTION INTRAVENOUS at 12:21

## 2018-05-21 RX ADMIN — INSULIN ASPART SCH: 100 INJECTION, SOLUTION INTRAVENOUS; SUBCUTANEOUS at 08:21

## 2018-05-21 RX ADMIN — Medication SCH ML: at 12:21

## 2018-05-21 RX ADMIN — BUPROPION HYDROCHLORIDE SCH MG: 100 TABLET, FILM COATED ORAL at 12:18

## 2018-05-21 RX ADMIN — SEVELAMER CARBONATE SCH MG: 800 TABLET, FILM COATED ORAL at 08:00

## 2018-05-21 RX ADMIN — INSULIN ASPART SCH: 100 INJECTION, SOLUTION INTRAVENOUS; SUBCUTANEOUS at 12:20

## 2018-05-21 NOTE — HHI.NPPN
Subjective


Complaints:  Chest Pain, Shortness of Breath


General Problems:  Anemia


Renal Failure:  Chronic, End Stage Renal Disease


Interval History


Seen during dialysis. States she is eating well. Has been evaluated by 

psychiatry. Pending SNF transfer. 


 (Migdalia Camejo)





Review of Systems


General


Constitutional:  Fatigue


General Remarks


Generalized pain


 (Migdalia Camejo)





Musculoskeletal


MS:  Pain/Stiffness


 (Migdalia Camejo)





Psych


Psych:  Depression, Sleep problems


 (Migdalia Camejo)





Objective Data


Data





Vital Signs








  Date Time  Temp Pulse Resp B/P (MAP) Pulse Ox O2 Delivery O2 Flow Rate FiO2


 


5/21/18 08:00  90      


 


5/21/18 07:00  92      


 


5/21/18 07:00 98.7 92 16 119/58 (78) 97   


 


5/21/18 06:19 97.9 91 16 127/59 (81) 98   


 


5/21/18 06:00  90      


 


5/21/18 05:00  91      


 


5/21/18 04:00  87      


 


5/21/18 03:00  84      


 


5/21/18 02:00  87      


 


5/21/18 01:00  88      


 


5/21/18 00:00  92      


 


5/20/18 23:22 99.3 94 16 140/65 (90) 98   


 


5/20/18 23:00  98      


 


5/20/18 22:00  96      


 


5/20/18 21:00  96      


 


5/20/18 20:00  92      


 


5/20/18 20:00 99.5 96 16 130/62 (84) 98   


 


5/20/18 19:00  90      


 


5/20/18 18:00  90      


 


5/20/18 17:00  88      


 


5/20/18 16:10  83      


 


5/20/18 15:42   16     


 


5/20/18 15:00  84      


 


5/20/18 15:00 99.2 90 20 111/56 (74) 97   


 


5/20/18 14:00  83      


 


5/20/18 13:00  84      


 


5/20/18 12:00  84      


 


5/20/18 11:00  84      


 


5/20/18 11:00 99.2 90 20 93/55 (68) 97   


 


5/20/18 10:12  94      








 (Migdalia Camejo)


-:  


5/21/18 0452                                                                   

             5/21/18 0452





Tubes & Lines:  Perma-Cath


 (Migdalia Camejo)





Physical Exam


General


Appearance:  Well Developed, No Acute Distress, Comfortable, Malnourished


Appearance Remarks


quiet but pleasant 


 (Migdalia Camejo)





Eyes


Eye Exam:  Pupils Equal, Pupils Reactive


 (Migdalia Camejo)





Pulmonary


Resp Exam:  Clear Bilaterally, Breath Sounds Equal, No Distress


 (Migdalia Camejo)





Cardiology


CV Exam:  Regular, Normal Sinus Rhythm


CV Remarks


midsternal incision no drainage. 


 (Migdalia Camejo)





Gastrointestinal/Abdomen


GI Exam:  Soft, Non-Tender, Bowel Sounds Present


 (Migdalia Camejo)





Musculoskeletal


MS Exam:  Joints Intact, Normal Tone, Atrophy


 (Migdalia Camejo)





Integumentary


Skin Exam:  Clear, Warm, Dry, Intact


 (Migdalia Camejo)





Extremeties


Extremities Exam:  No Edema, Pedal Pulses Palpable


Extremeties Remarks


Left arm AVF, + patent


 (Migdalia Camejo)





Neurologic


Neuro Exam:  Alert, Awake, Oriented, Speech Clear, Moving All Extremities


 (Migdalia Camejo)





Psychiatric


Psych Exam:  Appropriate Responses


 (Migdalia Camejo)





Assessment/Plan


Discussed Condition With:  Patient


Assessment Summary:  Anemia of CKD, CHF, Hypertension, Diabetes Mellitus, End 

Stage Renal Disease


Problem List:  


(1) ESRD (end stage renal disease) on dialysis


ICD Codes:  N18.6 - End stage renal disease; Z99.2 - Dependence on renal 

dialysis


Plan:  Seen during dialysis today on a 3K, 350 BFR, goal 2L


Continue HD MWF. Has outpatient arrangements at [facility]


Intermittently monitor electrolyte profile. 


On  Renvela with meals, monitor phosphorus level intermittently. 


On protein supplements with meals, Megace for stimulant, and antidepressant. 


Avoid excess IVF. Gadolinium is contraindicated.


Using Permcath for HD. AVF not ready, needs second step with Dr. Rees.


Can evaluate as outpatient if D/C soon.





Clear for D/C from renal standpoint





(2) Chest pain, rule out acute myocardial infarction


ICD Codes:  R07.9 - Chest pain, unspecified


Status:  Acute


Plan:  Cardiology and CTS following  


Echo: Ef 40-45%, LVH is present.


s/p cath this admission, 90% occlusion of Bare Metal Stent previously placed. 


s/p CABG x 2 (5/15), continue supportive care


   PRN pain control 





(3) DM (diabetes mellitus)


ICD Codes:  E11.9 - Type 2 diabetes mellitus without complications


Plan:  maintain glucose 140-180 mg/dL while hospitalized 





(4) HTN (hypertension)


ICD Codes:  I10 - Hypertension


Status:  Chronic


Plan:  Monitor blood pressure


On Carvedilol , hydralazine, and Norvasc. 


BP should be checked in right upper extremity. 





(5) Anemia


ICD Codes:  D64.9 - Anemia, unspecified


Status:  Chronic


Plan:  Epogen and Venofer ordered to be given with dialysis. 


Transfuse if needed





(6) Adjustment disorder with depressed mood


ICD Codes:  F43.21 - Adjustment disorder with depressed mood


Plan:  On Bupropion. Consider changing to Remeron per psych. 


 (Migdalia Camejo)


Plan


patient was seen and examined during dialysis. She may be discharged to rehab. 

On 3K, UF goal is 2 liters. Appears depressed. 


 (Kelton Whiteside MD)











Migdalia Camejo May 21, 2018 10:14


Kelton Whiteside MD May 21, 2018 11:19

## 2018-05-21 NOTE — PD.CAR.PN
CVT Progress Note


Subjective/Hospital Course:


A 53-year-old female with history of coronary artery disease with prior stenting

, bare metal stent to the mid-LAD, distal RCA, proximal RCA back in February by 

Dr. Edgardo Mercado.  She has continued to take the Plavix.  Despite this, she 

developed chest pain during dialysis on the 05/08/2018, also had some 

associated nausea.  It was improved with a nitro drip.  She underwent re-

catheterization today by Dr. Mercado which showed severe in-stent restenosis of 

the LAD and RCA bare metal stents.   HX of ESRD on dialysis  Monday, Wednesday 

and Friday.  She has had an A-V fistula 


placed in her left forearm for which she is supposed to be scheduled for 

revision this next week with Dr. Rees.


 


PAST MEDICAL HISTORY: Includes hypertension, hyperlipidemia, history of CHF, 

diabetes mellitus on insulin, coronary artery disease, pancreatitis, depression.





5/12/18


c/o chest pain this morning





5/14


pt had chest pain last pm, now on NTG gtt


and heparin gtt 


Dr Huitron spoke with pt / scheduled for CABG in am 


received dialysis today





5/15


surgery : CABG x 2, LIMA to LAD - good, SVG to RCA - fair, EVH


extubated after surgery


crystalloid 2300cc, cell saver 450cc , EBL 900cc, steroids 








5/16


nausea last pm, for dialysis today 


weaning off cleviprex, on BB 


weaning off insulin gtt / on levemir 


will transfer to stepdown after dialysis and off cleviprex 





5/17


chest tube removed without difficulty


very sleepy , pain meds reduced 


needs aggressive pulm toileting 


OOB, ambulate, f/u CBC in am HGB 8.7





5/18


dialysis today 


painful/ norco changed to percocet per Dr Huitron


eval for dc home with C in am 





5/19/18


Alert and oriented


No complaints





5/20/18


No complaints today.  In good spirits.  Family leaning toward SNF placement.





5/21


pt does not meet criteria for SNF placement 


she is stable for discharge from [facility] 


receiving dialysis today


Objective:


GENERAL:  A&o x 3 


SKIN: Warm and dry. prevena dressing intact to chest 


HEAD: Normocephalic.


EYES: No scleral icterus. No injection or drainage. 


NECK: Supple, trachea midline. No JVD or lymphadenopathy.


CARDIOVASCULAR: Regular rate and rhythm without murmurs, gallops, or rubs. 


AV fistula left forearm 


RESPIRATORY: Breath sounds equal bilaterally. No accessory muscle use.


diminished in bases


GASTROINTESTINAL: Abdomen soft, non-tender, nondistended. 


MUSCULOSKELETAL: No cyanosis, or edema. 


BACK: Nontender without obvious deformity. No CVA tenderness.








Vital Signs








  Date Time  Temp Pulse Resp B/P (MAP) Pulse Ox O2 Delivery O2 Flow Rate FiO2


 


5/21/18 08:00  90      


 


5/21/18 07:00  92      


 


5/21/18 07:00 98.7 92 16 119/58 (78) 97   


 


5/21/18 06:19 97.9 91 16 127/59 (81) 98   


 


5/21/18 06:00  90      


 


5/21/18 05:00  91      


 


5/21/18 04:00  87      


 


5/21/18 03:00  84      


 


5/21/18 02:00  87      


 


5/21/18 01:00  88      


 


5/21/18 00:00  92      


 


5/20/18 23:22 99.3 94 16 140/65 (90) 98   


 


5/20/18 23:00  98      


 


5/20/18 22:00  96      


 


5/20/18 21:00  96      


 


5/20/18 20:00  92      


 


5/20/18 20:00 99.5 96 16 130/62 (84) 98   


 


5/20/18 19:00  90      


 


5/20/18 18:00  90      


 


5/20/18 17:00  88      


 


5/20/18 16:10  83      


 


5/20/18 15:42   16     


 


5/20/18 15:00  84      


 


5/20/18 15:00 99.2 90 20 111/56 (74) 97   


 


5/20/18 14:00  83      


 


5/20/18 13:00  84      


 


5/20/18 12:00  84      


 


5/20/18 11:00  84      


 


5/20/18 11:00 99.2 90 20 93/55 (68) 97   








Labs:





Laboratory Tests








Test


  5/21/18


04:52


 


White Blood Count


  9.1 TH/MM3


(4.0-11.0)


 


Red Blood Count


  2.47 MIL/MM3


(4.00-5.30)


 


Hemoglobin


  7.9 GM/DL


(11.6-15.3)


 


Hematocrit


  23.6 %


(35.0-46.0)


 


Mean Corpuscular Volume


  95.6 FL


(80.0-100.0)


 


Mean Corpuscular Hemoglobin


  32.2 PG


(27.0-34.0)


 


Mean Corpuscular Hemoglobin


Concent 33.6 %


(32.0-36.0)


 


Red Cell Distribution Width


  15.0 %


(11.6-17.2)


 


Platelet Count


  540 TH/MM3


(150-450)


 


Mean Platelet Volume


  7.1 FL


(7.0-11.0)


 


Neutrophils (%) (Auto)


  74.4 %


(16.0-70.0)


 


Lymphocytes (%) (Auto)


  14.3 %


(9.0-44.0)


 


Monocytes (%) (Auto)


  8.5 %


(0.0-8.0)


 


Eosinophils (%) (Auto)


  2.0 %


(0.0-4.0)


 


Basophils (%) (Auto)


  0.8 %


(0.0-2.0)


 


Neutrophils # (Auto)


  6.8 TH/MM3


(1.8-7.7)


 


Lymphocytes # (Auto)


  1.3 TH/MM3


(1.0-4.8)


 


Monocytes # (Auto)


  0.8 TH/MM3


(0-0.9)


 


Eosinophils # (Auto)


  0.2 TH/MM3


(0-0.4)


 


Basophils # (Auto)


  0.1 TH/MM3


(0-0.2)


 


CBC Comment DIFF FINAL 


 


Differential Comment  


 


Blood Urea Nitrogen


  45 MG/DL


(7-18)


 


Creatinine


  5.45 MG/DL


(0.50-1.00)


 


Random Glucose


  187 MG/DL


()


 


Calcium Level


  7.9 MG/DL


(8.5-10.1)


 


Sodium Level


  133 MEQ/L


(136-145)


 


Potassium Level


  4.0 MEQ/L


(3.5-5.1)


 


Chloride Level


  96 MEQ/L


()


 


Carbon Dioxide Level


  25.4 MEQ/L


(21.0-32.0)


 


Anion Gap


  12 MEQ/L


(5-15)


 


Estimat Glomerular Filtration


Rate 10 ML/MIN


(>89)








Result Diagram:  


5/21/18 0452 5/21/18 0452





Telemetry:


NSR





(1) ESRD (end stage renal disease) on dialysis


Plan:   dialysis today , right sub vas cath in place





(2) DM (diabetes mellitus)


Plan:  controlled  levemir resumed 


continue  insulin sliding scale 





(3) HTN (hypertension)


Plan:  controlled , home meds resumed 





(4) CAD (coronary artery disease)


(5) S/P CABG (coronary artery bypass graft)


Plan:  ASA, statin BB , plavix


stable for [facility] from [facility]





(6) Anemia


Plan:  chronic / epogen with dialysis 


received  IV Iron














Terwilliger,Jacqueline R. ARNP May 21, 2018 10:38

## 2018-06-01 ENCOUNTER — HOSPITAL ENCOUNTER (EMERGENCY)
Dept: HOSPITAL 17 - NEPC | Age: 53
Discharge: HOME | End: 2018-06-01
Payer: MEDICAID

## 2018-06-01 VITALS
TEMPERATURE: 99.3 F | RESPIRATION RATE: 16 BRPM | SYSTOLIC BLOOD PRESSURE: 169 MMHG | OXYGEN SATURATION: 100 % | DIASTOLIC BLOOD PRESSURE: 71 MMHG | HEART RATE: 97 BPM

## 2018-06-01 VITALS — HEIGHT: 66 IN | BODY MASS INDEX: 23.38 KG/M2 | WEIGHT: 145.51 LBS

## 2018-06-01 DIAGNOSIS — Z99.2: ICD-10-CM

## 2018-06-01 DIAGNOSIS — N18.9: ICD-10-CM

## 2018-06-01 DIAGNOSIS — K21.9: ICD-10-CM

## 2018-06-01 DIAGNOSIS — D63.1: ICD-10-CM

## 2018-06-01 DIAGNOSIS — E11.22: Primary | ICD-10-CM

## 2018-06-01 DIAGNOSIS — I12.9: ICD-10-CM

## 2018-06-01 DIAGNOSIS — Z87.19: ICD-10-CM

## 2018-06-01 DIAGNOSIS — I25.10: ICD-10-CM

## 2018-06-01 DIAGNOSIS — E78.00: ICD-10-CM

## 2018-06-01 LAB
BASOPHILS # BLD AUTO: 0 TH/MM3 (ref 0–0.2)
BASOPHILS NFR BLD: 0.7 % (ref 0–2)
BUN SERPL-MCNC: 17 MG/DL (ref 7–18)
CALCIUM SERPL-MCNC: 8.7 MG/DL (ref 8.5–10.1)
CHLORIDE SERPL-SCNC: 101 MEQ/L (ref 98–107)
CREAT SERPL-MCNC: 2.84 MG/DL (ref 0.5–1)
EOSINOPHIL # BLD: 0.2 TH/MM3 (ref 0–0.4)
EOSINOPHIL NFR BLD: 2.4 % (ref 0–4)
ERYTHROCYTE [DISTWIDTH] IN BLOOD BY AUTOMATED COUNT: 17.1 % (ref 11.6–17.2)
GFR SERPLBLD BASED ON 1.73 SQ M-ARVRAT: 21 ML/MIN (ref 89–?)
GLUCOSE SERPL-MCNC: 191 MG/DL (ref 74–106)
HCO3 BLD-SCNC: 22.7 MEQ/L (ref 21–32)
HCT VFR BLD CALC: 22.6 % (ref 35–46)
HGB BLD-MCNC: 7.6 GM/DL (ref 11.6–15.3)
LYMPHOCYTES # BLD AUTO: 0.8 TH/MM3 (ref 1–4.8)
LYMPHOCYTES NFR BLD AUTO: 13.8 % (ref 9–44)
MCH RBC QN AUTO: 33 PG (ref 27–34)
MCHC RBC AUTO-ENTMCNC: 33.5 % (ref 32–36)
MCV RBC AUTO: 98.6 FL (ref 80–100)
MONOCYTE #: 0.6 TH/MM3 (ref 0–0.9)
MONOCYTES NFR BLD: 9.5 % (ref 0–8)
NEUTROPHILS # BLD AUTO: 4.5 TH/MM3 (ref 1.8–7.7)
NEUTROPHILS NFR BLD AUTO: 73.6 % (ref 16–70)
PLATELET # BLD: 425 TH/MM3 (ref 150–450)
PMV BLD AUTO: 7 FL (ref 7–11)
RBC # BLD AUTO: 2.29 MIL/MM3 (ref 4–5.3)
SODIUM SERPL-SCNC: 135 MEQ/L (ref 136–145)
WBC # BLD AUTO: 6.1 TH/MM3 (ref 4–11)

## 2018-06-01 PROCEDURE — 85025 COMPLETE CBC W/AUTO DIFF WBC: CPT

## 2018-06-01 PROCEDURE — 80048 BASIC METABOLIC PNL TOTAL CA: CPT

## 2018-06-01 PROCEDURE — 86900 BLOOD TYPING SEROLOGIC ABO: CPT

## 2018-06-01 PROCEDURE — 86901 BLOOD TYPING SEROLOGIC RH(D): CPT

## 2018-06-01 PROCEDURE — 99283 EMERGENCY DEPT VISIT LOW MDM: CPT

## 2018-06-01 PROCEDURE — 86850 RBC ANTIBODY SCREEN: CPT

## 2018-06-01 NOTE — PD
HPI


Chief Complaint:  Abnormal Results


Time Seen by Provider:  16:36


Travel History


International Travel<30 days:  No


Contact w/Intl Traveler<30days:  No


Traveled to known affect area:  No





History of Present Illness


HPI


Patient is a 53-year-old female with history of end-stage renal disease who 

receives hemodialysis every ,  and  via her permacath 

dialysis port, presents to the emergency room with complaints of anemia.  

Patient reports that she was told to go to the emergency room as she was noted 

to have a low hemoglobin at dialysis today.  Patient unsure what her hemoglobin 

was.  Patient reports that she does have history of anemia, she has had one 

blood transfusion in the past.  Patient reports that she feels fine, does not 

feel lightheaded or dizzy or is not weak, patient with no complaints.





PFSH


Past Medical History


Hx Anticoagulant Therapy:  Yes


Arthritis:  Yes


Asthma:  No


Blood Disorders:  No


Anxiety:  No


Depression:  No


Heart Rhythm Problems:  No


Cancer:  No


Cardiac Catheterization:  Yes


Cardiovascular Problems:  Yes (CABBAGE 1 WEEK AGO, CHF )


High Cholesterol:  Yes


Chemotherapy:  No


Chest Pain:  Yes


Congestive Heart Failure:  No


COPD:  No


Cerebrovascular Accident:  No


Coronary Artery Disease:  Yes


Diabetes:  Yes


Dialysis:  Yes (m,w,f)


Diminished Hearing:  No


Endocrine:  Yes


Gastrointestinal Disorders:  Yes (HX PANCREATITIS)


GERD:  Yes


Genitourinary:  No


Hypertension:  Yes


Immune Disorder:  No


Implanted Vascular Access Dvce:  Yes


Kidney Stones:  No


Musculoskeletal:  No


Neurologic:  No


Psychiatric:  No


Reproductive:  No


Respiratory:  Yes (pna)


Migraines:  Yes


Pancreatitis:  Yes


Pneumonia:  Yes


Renal Failure:  No


Sleep Apnea:  No


Thyroid Disease:  No


Pregnant?:  Not Pregnant


Menopausal:  Yes


:  2


Para:  3


Miscarriage:  0


:  0


Tubal Ligation:  Yes





Past Surgical History


Abdominal Surgery:  No


Cardiac Surgery:  Yes (5 stents in 2018)


Cholecystectomy:  Yes


Coronary Stent:  Yes


Ear Surgery:  No


Endocrine Surgery:  No


Eye Surgery:  No


Genitourinary Surgery:  Yes (gallblaller)


Gynecologic Surgery:  No


Hysterectomy:  No


Neurologic Surgery:  No


Oral Surgery:  Yes (abcessed tooth )


Pacemaker:  No


Thoracic Surgery:  No


Other Surgery:  Yes





Social History


Alcohol Use:  No (use to abuse; quit 3 years ago. )


Tobacco Use:  Yes (0.5 ppd)


Substance Use:  Yes (weed occasionally)





Allergies-Medications


(Allergen,Severity, Reaction):  


Coded Allergies:  


     shellfish derived (Unverified  Adverse Reaction, Mild, VOMITNG, 18)


Reported Meds & Prescriptions





Reported Meds & Active Scripts


Active


Novolog Inj (Insulin Aspart) 100 Unit/Ml Inj 1 Unit SQ ACHS 30 Days


     Blood glucose      unit


     150-199-------------1


     200-249-------------3


     250-299-------------5


     300-349-------------7


     >350----------------9


Walker with Front Wheels (Device) 1 Mis Mis Ea .XX AS DIRECTED


Bedside Commode (Device) 1 Mis Mis Ea .XX AS DIRECTED


Wellbutrin SR 12 HR (Bupropion HCl) 100 Mg Tab 100 Mg PO BID


Coreg (Carvedilol) 12.5 Mg Tab 25 Mg PO Q12HR


Lantus Inj (Insulin Glargine) 1,000 Unit/10 Ml Vial 8 Units SQ HS 30 Days


Lisinopril 40 Mg Tab 40 Mg PO DAILY


Roller Walker (Misc. Devices) 1 Mis Mis Ea .XX NOW


Norvasc (Amlodipine Besylate) 10 Mg Tab 10 Mg PO DAILY


Nitrostat SL (Nitroglycerin) 0.4 Mg Subl 0.4 Mg SL Q5M PRN


Pravachol (Pravastatin) 40 Mg Tab 40 Mg PO HS


Plavix (Clopidogrel Bisulfate) 75 Mg Tab 75 Mg PO DAILY


Zofran (Ondansetron HCl) 4 Mg Tab 4 Mg PO Q6HR PRN


Gas Relief Maximum Streng (Simethicone) 125 Mg Chw 125 Mg PO Q8HR


Reported


Lantus Inj (Insulin Glargine) 1,000 Unit/10 Ml Vial 15 Units SQ HS


Gabapentin 100 Mg Cap 300 Mg PO HS


Folic Acid 400 Mcg Tab 1 Mg PO DAILY


Aspirin Children's (Aspirin) 81 Mg Chew 81 Mg CHEW DAILY








Review of Systems


General / Constitutional:  No: Fever


Eyes:  No: Visual changes


HENT:  No: Headaches


Cardiovascular:  No: Chest Pain or Discomfort


Respiratory:  No: Shortness of Breath


Gastrointestinal:  No: Abdominal Pain


Genitourinary:  No: Dysuria


Musculoskeletal:  No: Pain


Skin:  No Rash


Neurologic:  No: Weakness


Psychiatric:  No: Depression


Endocrine:  No: Polydipsia


Hematologic/Lymphatic:  No: Easy Bruising





Physical Exam


Narrative


GENERAL: NAD


SKIN: Focused skin assessment warm/dry.


HEAD: Atraumatic. Normocephalic. 


EYES: Pupils equal and round. No scleral icterus. No injection or drainage. 


ENT: No nasal bleeding or discharge.  Mucous membranes pink and moist.


NECK: Trachea midline. No JVD. 


CARDIOVASCULAR: Regular rate and rhythm.  No murmur appreciated. Permicath in 

place


RESPIRATORY: No accessory muscle use. Clear to auscultation. Breath sounds 

equal bilaterally. 


GASTROINTESTINAL: Abdomen soft, non-tender, nondistended. Hepatic and splenic 

margins not palpable. 


MUSCULOSKELETAL: No obvious deformities. No clubbing.  No cyanosis.  No edema. 


NEUROLOGICAL: Awake and alert. No obvious cranial nerve deficits.  Motor 

grossly within normal limits. Normal speech.


PSYCHIATRIC: Appropriate mood and affect; insight and judgment normal.





Data


Data


Last Documented VS





Vital Signs








  Date Time  Temp Pulse Resp B/P (MAP) Pulse Ox O2 Delivery O2 Flow Rate FiO2


 


18 16:15 99.3 97 16 169/71 (103) 100   








Orders





 Orders


Basic Metabolic Panel (Bmp) (18 16:42)


Complete Blood Count With Diff (18 16:42)


Iv Access Insert/Monitor (18 16:42)


Ecg Monitoring (18 16:42)


Oximetry (18 16:42)


Sodium Chloride 0.9% Flush (Ns Flush) (18 16:45)


Type And Screen (18 16:42)





Labs





Laboratory Tests








Test


  18


18:03


 


White Blood Count 6.1 TH/MM3 


 


Red Blood Count 2.29 MIL/MM3 


 


Hemoglobin 7.6 GM/DL 


 


Hematocrit 22.6 % 


 


Mean Corpuscular Volume 98.6 FL 


 


Mean Corpuscular Hemoglobin 33.0 PG 


 


Mean Corpuscular Hemoglobin


Concent 33.5 % 


 


 


Red Cell Distribution Width 17.1 % 


 


Platelet Count 425 TH/MM3 


 


Mean Platelet Volume 7.0 FL 


 


Neutrophils (%) (Auto) 73.6 % 


 


Lymphocytes (%) (Auto) 13.8 % 


 


Monocytes (%) (Auto) 9.5 % 


 


Eosinophils (%) (Auto) 2.4 % 


 


Basophils (%) (Auto) 0.7 % 


 


Neutrophils # (Auto) 4.5 TH/MM3 


 


Lymphocytes # (Auto) 0.8 TH/MM3 


 


Monocytes # (Auto) 0.6 TH/MM3 


 


Eosinophils # (Auto) 0.2 TH/MM3 


 


Basophils # (Auto) 0.0 TH/MM3 


 


CBC Comment DIFF FINAL 


 


Differential Comment  


 


Blood Urea Nitrogen 17 MG/DL 


 


Creatinine 2.84 MG/DL 


 


Random Glucose 191 MG/DL 


 


Calcium Level 8.7 MG/DL 


 


Sodium Level 135 MEQ/L 


 


Potassium Level 4.3 MEQ/L 


 


Chloride Level 101 MEQ/L 


 


Carbon Dioxide Level 22.7 MEQ/L 


 


Anion Gap 11 MEQ/L 


 


Estimat Glomerular Filtration


Rate 21 ML/MIN 


 











MDM


Medical Decision Making


Medical Screen Exam Complete:  Yes


Emergency Medical Condition:  Yes


Medical Record Reviewed:  Yes


Interpretation(s)





Vital Signs








  Date Time  Temp Pulse Resp B/P (MAP) Pulse Ox O2 Delivery O2 Flow Rate FiO2


 


18 16:15 99.3 97 16 169/71 (103) 100   








Differential Diagnosis


anemia, electrolyte abnormality


Narrative Course


Patient is a 53-year-old female sent to the emergency room for evaluation of 

low hemoglobin after she had dialysis today.  Patient with no complaints.





Vital Signs








  Date Time  Temp Pulse Resp B/P (MAP) Pulse Ox O2 Delivery O2 Flow Rate FiO2


 


18 16:15 99.3 97 16 169/71 (103) 100   





Vital signs are stable, CBC ordered as well as type and screen





CBC & BMP Diagram


18 18:03








hgb 7.6 which is stable and at baseline





patient does not require blood transfusion








Laboratory Tests








Test


  18


18:03


 


White Blood Count


  6.1 TH/MM3


(4.0-11.0)


 


Red Blood Count


  2.29 MIL/MM3


(4.00-5.30)


 


Hemoglobin


  7.6 GM/DL


(11.6-15.3)


 


Hematocrit


  22.6 %


(35.0-46.0)


 


Mean Corpuscular Volume


  98.6 FL


(80.0-100.0)


 


Mean Corpuscular Hemoglobin


  33.0 PG


(27.0-34.0)


 


Mean Corpuscular Hemoglobin


Concent 33.5 %


(32.0-36.0)


 


Red Cell Distribution Width


  17.1 %


(11.6-17.2)


 


Platelet Count


  425 TH/MM3


(150-450)


 


Mean Platelet Volume


  7.0 FL


(7.0-11.0)


 


Neutrophils (%) (Auto)


  73.6 %


(16.0-70.0)


 


Lymphocytes (%) (Auto)


  13.8 %


(9.0-44.0)


 


Monocytes (%) (Auto)


  9.5 %


(0.0-8.0)


 


Eosinophils (%) (Auto)


  2.4 %


(0.0-4.0)


 


Basophils (%) (Auto)


  0.7 %


(0.0-2.0)


 


Neutrophils # (Auto)


  4.5 TH/MM3


(1.8-7.7)


 


Lymphocytes # (Auto)


  0.8 TH/MM3


(1.0-4.8)


 


Monocytes # (Auto)


  0.6 TH/MM3


(0-0.9)


 


Eosinophils # (Auto)


  0.2 TH/MM3


(0-0.4)


 


Basophils # (Auto)


  0.0 TH/MM3


(0-0.2)


 


CBC Comment DIFF FINAL 


 


Differential Comment  


 


Blood Urea Nitrogen


  17 MG/DL


(7-18)


 


Creatinine


  2.84 MG/DL


(0.50-1.00)


 


Random Glucose


  191 MG/DL


()


 


Calcium Level


  8.7 MG/DL


(8.5-10.1)


 


Sodium Level


  135 MEQ/L


(136-145)


 


Potassium Level


  4.3 MEQ/L


(3.5-5.1)


 


Chloride Level


  101 MEQ/L


()


 


Carbon Dioxide Level


  22.7 MEQ/L


(21.0-32.0)


 


Anion Gap


  11 MEQ/L


(5-15)


 


Estimat Glomerular Filtration


Rate 21 ML/MIN


(>89)





patient will return to ER as needed





Diagnosis





 Primary Impression:  


 Anemia


 Qualified Codes:  D64.9 - Anemia, unspecified


Patient Instructions:  General Instructions





***Additional Instructions:  


**Please provide patient with a copy of their lab work and studies at discharge*

*





Please follow up with your primary care doctor in 2-3 days





Return to the ER if symptoms worsen or progress





Return to the ER as needed


Disposition:  01 DISCHARGE HOME


Condition:  Stable











Isatu Burleson DO 2018 16:48

## 2018-11-19 NOTE — HHI.DS
Incentive spirometry Q1 hr x 10, while awake, also use acapella device hourly 

whole awake 





Sternal Breast Bone Precautions: NO pushing or pulling, ( pt must use sternal 

pillow to support chest with all activities and with coughing ( takes up to 3 

months breast bone to heal ) 





All females to wear sternal bra , launder as needed 





Daily incision care:  ok to shower daily, no tub bath.  Wash all incisions with 

liquid dial soap, clean wash cloth to each site, rinse and pat dry.  Observe 

for any signs of infection, such as drainage which is dark yellow, gray, green 

or foul smelling.  Immediately report to the surgeon any drainage from the 

chest incision, or legs, and for any abnormal drainage from the chest tube 

sites.  Notify surgeon if any temp >101.5 degrees F.  When specialty dressing 

removed/ or if you do not have one, continue to shower daily as above, then 

rinse and pat incision dry and paint with betadine daily x 5 days.  Allow steri 

strips to fall off if you have any.  Avoid lotions, creams, salves, oils, etc. 

for the first month





For Dr. Huitron patients , please obtain  CBC, BMP, PA & Lat CXR in 2 weeks, 

results to Dr. Huitron  ( prescription will be given) (Fax: [phone]) (Tele: 

[phone]) ,





F/U appointment: as per ND instructions: PCP in 2 weeks, CV surgeon 2 weeks,  


Cardiologist 3-4 weeks





For any questions regarding incisions/ dressing / meds / post op care or above 

                            


Symptoms, 





Monday Friday 8am-5pm   Heart & Vascular Surgery Office


(  Dr. Montelongo & Dr. Huitron), [phone]





After Hours / Nights (5pm -8am) Weekends and Holidays 


Please call [facility] Cardiac Intermediate Care Unit (CIC) Charge Nurse 


[phone]





Discharge Summary


Admission Date


May 8, 2018 at 01:54


Discharge Date:  May 19, 2018


Admitting Diagnosis





cp r/o ACS, elevated troponin, h/o CAD, CHF exacerbation





(1) S/P CABG (coronary artery bypass graft)


ICD Code:  Z95.1 - Presence of aortocoronary bypass graft


(2) NSTEMI (non-ST elevated myocardial infarction)


ICD Code:  I21.4 - Non-ST elevation (NSTEMI) myocardial infarction


(3) CAD (coronary artery disease)


ICD Code:  I25.10 - Atherosclerotic heart disease of native coronary artery 

without angina pectoris


Status:  Chronic


(4) ESRD (end stage renal disease) on dialysis


ICD Code:  N18.6 - End stage renal disease; Z99.2 - Dependence on renal dialysis


(5) DM (diabetes mellitus)


ICD Code:  E11.9 - Type 2 diabetes mellitus without complications


(6) HTN (hypertension)


ICD Code:  I10 - Hypertension


Status:  Chronic


(7) Anemia


ICD Code:  D64.9 - Anemia, unspecified


Status:  Chronic


(8) Chest pain, rule out acute myocardial infarction


ICD Code:  R07.9 - Chest pain, unspecified


Status:  Acute


(9) Acute exacerbation of congestive heart failure


ICD Code:  I50.9 - Heart failure, unspecified


Status:  Acute


(10) Elevated troponin


ICD Code:  R74.8 - Abnormal levels of other serum enzymes


Status:  Acute


Procedures


  Left heart cath, CABG


Brief History - From Admission





53-year-old female with a history of end-stage renal disease on hemodialysis 

Monday Wednesday Friday, CAD who presents with sharp pleuritic type chest pain 

in the left chest beginning after dialysis today.  Patient initially 

experienced nausea without any vomiting during dialysis, then subsequently this 

nonradiating pleuritic type left-sided chest pain.  Denies any fevers, chills.  

She does report a cough with whitish sputum which began today.  She denies any 

increase in bilateral lower extremity edema.  She does report that left-sided 

chest pain has almost resolved on nitro drip.


CBC/BMP:  


5/18/18 0636                                                                   

             5/18/18 0636





Significant Findings





Laboratory Tests








Test


  5/17/18


04:50 5/18/18


06:36


 


White Blood Count


  18.1 TH/MM3


(4.0-11.0) 


 


 


Red Blood Count


  2.83 MIL/MM3


(4.00-5.30) 2.58 MIL/MM3


(4.00-5.30)


 


Hemoglobin


  8.7 GM/DL


(11.6-15.3) 8.3 GM/DL


(11.6-15.3)


 


Hematocrit


  26.9 %


(35.0-46.0) 24.8 %


(35.0-46.0)


 


Neutrophils (%) (Auto)


  86.7 %


(16.0-70.0) 


 


 


Lymphocytes (%) (Auto)


  4.6 %


(9.0-44.0) 


 


 


Monocytes (%) (Auto)


  8.5 %


(0.0-8.0) 


 


 


Neutrophils # (Auto)


  15.7 TH/MM3


(1.8-7.7) 


 


 


Lymphocytes # (Auto)


  0.8 TH/MM3


(1.0-4.8) 


 


 


Monocytes # (Auto)


  1.5 TH/MM3


(0-0.9) 


 


 


Neutrophils % (Manual) 85 % (16-70)  


 


Lymphocytes % 8 % (9-44)  


 


Neutrophils # (Manual)


  15.6 TH/MM3


(1.8-7.7) 


 


 


Blood Urea Nitrogen


  27 MG/DL


(7-18) 39 MG/DL


(7-18)


 


Creatinine


  3.95 MG/DL


(0.50-1.00) 4.56 MG/DL


(0.50-1.00)


 


Random Glucose


  52 MG/DL


() 125 MG/DL


()


 


Magnesium Level


  3.8 MG/DL


(1.5-2.5) 


 


 


Estimat Glomerular Filtration


Rate 14 ML/MIN


(>89) 12 ML/MIN


(>89)


 


Sodium Level


  


  135 MEQ/L


(136-145)


 


Chloride Level


  


  97 MEQ/L


()








PE at Discharge


GENERAL: This is a well-nourished, well-developed patient, in no apparent 

distress.


CARDIOVASCULAR: RRR,  no gallops, or rubs. 


RESPIRATORY: Fair air entry bilaterally. No W, R, or R, chest tube in place


GASTROINTESTINAL: Abdomen soft, non-tender, nondistended.  Positive bowel sounds


MUSCULOSKELETAL: Extremities without clubbing, cyanosis, or edema.  Pedal 

pulses appreciated


NEUROLOGICAL: Awake and alert.  Moves all extremity.  Normal speech.no focal 

neurological deficit


Hospital Course


60 years old -American female admitted with non-STEMI, cardiology 

consulted, patient underwent heart cath followed by consultation for thoracic 

surgeon patient underwent CABG on 515, postop management, patient had elevated 

BNP CHF exacerbation, she also with ESRD consulted nephrology for dialysis, 

hypertensive management.





Face-to-face encounter performed with the patient on discharge day, as well as 

physical exam, summary of hospitalization course and postdischarge plan has 

been  


D/W the patient.


D/W nurse 


D/W .


Discharge medications reviewed and printed and signed, post discharge follow up 

visit with PCP and other specialist as well as Brief hospital course and 

discharge summary has been placed.


Pt Condition on Discharge:  Fair


Discharge Disposition:  Disch w/ Home Health Serv


Discharge Time:  > 30 minutes


Discharge Instructions


DIET: Follow Instructions for:  Heart Healthy Diet, Diabetic Diet


Activities you can perform:  See Additionl Instruction


Other Activity Instructions:  


per PT


Follow up Referrals:  


Cardiology - 4 Weeks with Edgardo Mercado MD


PCP Follow-up - 2 Weeks [facility] [place] [address] [phone]


Surgical - 2 Weeks with Terwilliger,Jacqueline R. ARNP


Vascular Surgery - 2 Weeks @  Vascular Surgery with Claire Potter





New Orders:  


BASIC METABOLIC PROF - 2 Weeks


CBC NO DIFF - 2 Weeks


X-RAY CHEST PA & LAT - 2 Weeks





New Medications:  


Insulin Glargine Inj (Lantus Inj) 1,000 Unit/10 Ml Vial


8 UNITS SQ HS for Blood Sugar Management for 30 Days, VIAL 0 Refills





Bupropion HCl ER 12 HR (Wellbutrin SR 12 HR) 100 Mg Tab


100 MG PO BID for depression, #30 TAB





Carvedilol (Coreg) 12.5 Mg Tab


25 MG PO Q12HR for card, #60 TAB





Insulin Aspart Inj (Novolog Inj) 100 Unit/Ml Inj


1 UNIT SQ ACHS for dm for 30 Days, INJECTION





Oxycodone HCl/Acetaminophen (Oxycodon-Acetaminophen 7.5-325) 7.5 Mg-325 Mg 

Tablet


1 TAB PO Q4H PRN for PAIN SCALE 1 TO 10, #15 TAB





 


Continued Medications:  


Amlodipine (Norvasc) 10 Mg Tab


10 MG PO DAILY for Blood Pressure Management, #30 TAB





Aspirin (Aspirin Children's) 81 Mg Chew


81 MG CHEW DAILY, TAB 0 Refills





Clonidine (Catapres) 0.1 Mg Tab


0.1 MG PO Q8H for Blood Pressure Management, #90 TAB





Clopidogrel (Plavix) 75 Mg Tab


75 MG PO DAILY for Blood Clot Prevention, #30 TAB





Folic Acid (Folic Acid) 400 Mcg Tab


1 MG PO DAILY for Nutritional Supplement, TAB 0 Refills





Hydralazine HCl (Hydralazine HCl) 50 Mg Tablet


50 MG PO Q8HR for Blood Pressure Management, #90 MG





Nitroglycerin SL (Nitrostat SL) 0.4 Mg Subl


0.4 MG SL Q5M PRN for SEE LABEL COMMENTS, #30 TAB





Ondansetron (Zofran) 4 Mg Tab


4 MG PO Q6HR PRN for NAUSEA OR VOMITING, #15 TAB 0 Refills





Pravastatin (Pravachol) 40 Mg Tab


40 MG PO HS for Cholesterol Management, #30 TAB





 


Discontinued Medications:  


Isosorbide Mononitrate ER (Isosorbide Mononitrate ER) 60 Mg Tab


120 MG PO DAILY@07 for Blood Pressure Management, #30 TAB





Metoprolol Tartrate (Lopressor) 100 Mg Tab


100 MG PO Q12HR for Blood Pressure Management, #60 TAB

















Jesus Manuel Pham MD May 19, 2018 16:08


Terwilliger,Jacqueline R. ARNP May 21, 2018 14:51 PAC NOTE:       ANESTHESIA PRE EVALUATION:  Anesthesia Evaluation     . Pt has had prior anesthetic. Type: General    No history of anesthetic complications          ROS/MED HX    ENT/Pulmonary:     (+)tobacco use, Past use moderate COPD, , recent URI resolved . .    Neurologic:  - neg neurologic ROS     Cardiovascular:     (+) hypertension----. : . CHF etiology: stress induced cardiomyopathy resolved Last EF: 60 NYHA classification: I. . :. . Previous cardiac testing Echodate:2017results:Normal LV fx with likely pulmonary HTNdate: results: date: results: date: results:          METS/Exercise Tolerance:     Hematologic:     (+) Anemia, Other Hematologic Disorder-CLL      Musculoskeletal:   (+) arthritis, , fracture lower extremity: Hip, -       GI/Hepatic:  - neg GI/hepatic ROS       Renal/Genitourinary:     (+) chronic renal disease, type: CRI, Pt does not require dialysis, Pt has no history of transplant, Other Renal/ Genitourinary,       Endo:     (+) thyroid problem hypothyroidism, .   Type I DM: bladder Ca on BCG therapy.   Psychiatric:     (+) psychiatric history anxiety and depression      Infectious Disease:  - neg infectious disease ROS       Malignancy:   (+) Malignancy History of Lymphoma/Leukemia  Lymph CA Remission status post Chemo,         Other:    - neg other ROS                 Physical Exam  Normal systems: cardiovascular and dental    Airway   Mallampati: II  TM distance: >3 FB  Neck ROM: full    Dental     Cardiovascular   Rhythm and rate: regular and normal      Pulmonary (+) rhonchi and decreased breath sounds       Other findings: Lab Test        11/18/18 09/17/18 09/12/18 08/24/18      --          10/09/14                       1924          1359          0918          1306           --           0350          WBC          73.5*        102.6*        --          70.7*          < >        167.9*        HGB          12.0         13.2          --          12.2           < >         9.6*          MCV          96           93            --          95             < >        92            PLT          79*          99*          90*          97*            < >        250           INR           --           --           --          1.00          --          1.21*          < > = values in this interval not displayed.                  Lab Test        11/18/18 09/17/18 08/24/18                       1924          1359          1306          NA           140          133          140           POTASSIUM    4.3          4.5          4.5           CHLORIDE     103          97           103           CO2          33*          30           35*           BUN          11           16           14            CR           1.04         1.09*        1.01          ANIONGAP     4            6            2*            CARLOS          8.2*         8.6          8.1*          GLC          105*         121*         87                     ECG 2017  Sinus rhythm  Normal ECG  When compared with ECG of 20-MAR-2017 21:28,  No significant change was found         Anesthesia Plan      History & Physical Review  History and physical reviewed and following examination; no interval change.    ASA Status:  3 .    NPO Status:  > 8 hours    Plan for General, ETT and LMA with Intravenous induction. Maintenance will be Balanced.    PONV prophylaxis:  Ondansetron (or other 5HT-3) and Dexamethasone or Solumedrol       Postoperative Care  Postoperative pain management:  IV analgesics, Oral pain medications and Multi-modal analgesia.      Consents  Anesthetic plan, risks, benefits and alternatives discussed with:  Patient.  Use of blood products discussed: No .   .                            .

## 2019-03-25 NOTE — PD.CARD.PN
Subjective


Subjective Remarks


continues to feel SOB at rest with stable intermittent chest discomfort. Hgb 

dropped to 6.6


 (Blanca Luong)





Objective


Medications





Current Medications








 Medications


  (Trade)  Dose


 Ordered  Sig/Luis Armando


 Route  Start Time


 Stop Time Status Last Admin


 


  (NS Flush)  2 ml  UNSCH  PRN


 IVF  1/15/18 01:30


    1/15/18 01:42


 


 


  (D50w (Vial) Inj)  50 ml  UNSCH  PRN


 IV PUSH  1/15/18 04:00


     


 


 


  (Glucagon Inj)  1 mg  UNSCH  PRN


 OTHER  1/15/18 04:00


     


 


 


  (NovoLOG


 SUPPLEMENTAL


 SCALE)  1  ACHS SLIDING  SCALE


 SQ  1/15/18 08:00


    1/17/18 21:00


 


 


 Ceftriaxone


 Sodium 1000 mg/


 Sodium Chloride  100 ml @ 


 200 mls/hr  Q24H


 IV  1/15/18 05:00


    1/18/18 05:00


 


 


 Azithromycin 500


 mg/Sodium Chloride  250 ml @ 


 250 mls/hr  Q24H


 IV  1/15/18 04:00


    1/18/18 04:45


 


 


  (Duoneb Neb)  1 ampule  Q4HR NEB  PRN


 NEB  1/15/18 04:00


    1/18/18 00:04


 


 


  (NS Flush)  2 ml  UNSCH  PRN


 IV FLUSH  1/15/18 04:00


    1/18/18 00:12


 


 


  (NS Flush)  2 ml  BID


 IV FLUSH  1/15/18 09:00


    1/17/18 21:34


 


 


  (Zofran Inj)  4 mg  Q6H  PRN


 IVP  1/15/18 04:00


    1/17/18 21:39


 


 


  (Tylenol)  650 mg  Q6H  PRN


 PO  1/15/18 04:00


     


 


 


  (Norco  5-325 Mg)  1 tab  Q4H  PRN


 PO  1/15/18 04:00


    1/17/18 08:21


 


 


  (Morphine Inj)  2 mg  Q3H  PRN


 IV PUSH  1/15/18 04:00


    1/18/18 00:12


 


 


  (Theresa-Colace)  1 tab  BID


 PO  1/15/18 09:00


    1/17/18 21:34


 


 


  (Milk Of


 Magnesia Liq)  30 ml  Q12H  PRN


 PO  1/15/18 04:00


     


 


 


  (Senokot)  17.2 mg  Q12H  PRN


 PO  1/15/18 04:00


     


 


 


  (Dulcolax Supp)  10 mg  DAILY  PRN


 RECTAL  1/15/18 04:00


     


 


 


  (Lactulose Liq)  30 ml  DAILY  PRN


 PO  1/15/18 04:00


     


 


 


  (Aspirin Chew)  81 mg  DAILY


 CHEW  1/15/18 09:00


    1/17/18 08:20


 


 


  (Folate)  1 mg  DAILY


 PO  1/15/18 09:00


    1/17/18 08:21


 


 


  (Neurontin)  300 mg  HS


 PO  1/15/18 21:00


    1/17/18 21:34


 


 


  (Levemir Inj)  15 units  HS


 SQ  1/15/18 21:00


    1/16/18 20:57


 


 


  (Lopressor)  50 mg  Q12HR


 PO  1/15/18 09:00


    1/17/18 21:34


 


 


  (Procardia)  30 mg  TID


 PO  1/15/18 09:00


    1/17/18 17:57


 


 


  (Pravachol)  40 mg  HS


 PO  1/15/18 21:00


    1/17/18 21:34


 


 


  (Imdur)  120 mg  DAILY@07


 PO  1/18/18 07:00


    1/18/18 05:55


 


 


 Sodium Chloride  1,000 ml @ 


 50 mls/hr  Q20H


 IV  1/17/18 23:45


    1/17/18 23:45


 








Vital Signs / I&O





Vital Signs








  Date Time  Temp Pulse Resp B/P (MAP) Pulse Ox O2 Delivery O2 Flow Rate FiO2


 


1/18/18 06:00  90      


 


1/18/18 05:00  88      


 


1/18/18 04:00  92      


 


1/18/18 04:00 98.5 94 20 177/91 (119) 92   


 


1/18/18 03:00     96 Nasal Cannula 2.00 


 


1/18/18 03:00  84      


 


1/18/18 02:00  86      


 


1/18/18 01:00  92      


 


1/18/18 00:17   18     


 


1/18/18 00:07     96 Nasal Cannula 2.00 


 


1/18/18 00:00 98.5 92 18 163/79 (107) 93   


 


1/18/18 00:00  90      


 


1/17/18 23:00     96 Nasal Cannula 2.00 


 


1/17/18 23:00  96      


 


1/17/18 22:00  92      


 


1/17/18 21:34 98.2 97 20 153/76 (101)    


 


1/17/18 21:00  90      


 


1/17/18 20:00  91      


 


1/17/18 20:00   18  97   


 


1/17/18 19:00     96 Nasal Cannula 2.00 


 


1/17/18 18:00  88      


 


1/17/18 17:43     97 Nasal Cannula 2.00 


 


1/17/18 17:00  85      


 


1/17/18 16:00  87      


 


1/17/18 15:00     97 Nasal Cannula 2.00 





      Humidified  


 


1/17/18 15:00 98.1 89 16 152/87 (108) 97   


 


1/17/18 15:00  89      


 


1/17/18 14:00  88      


 


1/17/18 13:00  88      


 


1/17/18 12:00  92      


 


1/17/18 11:00 98.3 85 16 142/76 (98) 95   


 


1/17/18 11:00     95 Nasal Cannula 2.00 





      Humidified  


 


1/17/18 11:00  86      


 


1/17/18 11:00     96 Nasal Cannula 2.00 





      Humidified  


 


1/17/18 10:00  95      














I/O      


 


 1/17/18 1/17/18 1/17/18 1/18/18 1/18/18 1/18/18





 07:00 15:00 23:00 07:00 15:00 23:00


 


Intake Total 547 ml 56 ml 600 ml 1240 ml  


 


Output Total 550 ml  500 ml   


 


Balance -3 ml 56 ml 100 ml 1240 ml  


 


      


 


Intake Oral 420 ml  600 ml 240 ml  


 


IV Total 127 ml 56 ml  1000 ml  


 


Output Urine Total 550 ml  500 ml   


 


# Bowel Movements   0   








Physical Exam


GENERAL: 


SKIN: Warm and dry.


HEAD: Atraumatic. Normocephalic. 


EYES: Pupils equal and round. No scleral icterus. No injection or drainage. 


ENT: No nasal bleeding or discharge. .


NECK: Trachea midline. No JVD. 


CARDIOVASCULAR: Regular rate and rhythm.  no murmurs


RESPIRATORY: No accessory muscle use. decreased breath sounds to bilateral 

bases. 


GASTROINTESTINAL: Abdomen soft, non-tender, nondistended. Hepatic and splenic 

margins not palpable. 


MUSCULOSKELETAL: Extremities without clubbing, cyanosis, or edema. No obvious 

deformities. 


NEUROLOGICAL: Awake and alert. No obvious cranial nerve deficits. Normal speech.


PSYCHIATRIC: Appropriate mood and affect; insight and judgment normal.


Laboratory





Laboratory Tests








Test


  1/17/18


13:54 1/17/18


21:16 1/18/18


05:32


 


Activated Partial


Thromboplast Time 28.9 SEC 


  38.0 SEC 


  


 


 


White Blood Count   8.0 TH/MM3 


 


Red Blood Count   1.96 MIL/MM3 


 


Hemoglobin   6.6 GM/DL 


 


Hematocrit   19.7 % 


 


Mean Corpuscular Volume   100.5 FL 


 


Mean Corpuscular Hemoglobin   33.9 PG 


 


Mean Corpuscular Hemoglobin


Concent 


  


  33.8 % 


 


 


Red Cell Distribution Width   13.5 % 


 


Platelet Count   335 TH/MM3 


 


Mean Platelet Volume   8.5 FL 


 


Neutrophils (%) (Auto)   69.4 % 


 


Lymphocytes (%) (Auto)   19.0 % 


 


Monocytes (%) (Auto)   6.6 % 


 


Eosinophils (%) (Auto)   4.0 % 


 


Basophils (%) (Auto)   1.0 % 


 


Neutrophils # (Auto)   5.5 TH/MM3 


 


Lymphocytes # (Auto)   1.5 TH/MM3 


 


Monocytes # (Auto)   0.5 TH/MM3 


 


Eosinophils # (Auto)   0.3 TH/MM3 


 


Basophils # (Auto)   0.1 TH/MM3 


 


CBC Comment   DIFF FINAL 


 


Differential Comment    








 (Blanca Luong)





Assessment and Plan


Problem List:  


(1) NSTEMI (non-ST elevated myocardial infarction)


ICD Codes:  I21.4 - Non-ST elevation (NSTEMI) myocardial infarction


(2) CHF (congestive heart failure)


ICD Codes:  I50.9 - Heart failure, unspecified


Assessment and Plan


51 yo AAF with history of CAD, cardiac stent placed ~ 10 years ago, CKD IV, CHF 

and diabetes admitted for progressive SOB and chest pain. 





anemia- Hgb dropped to 6.6, not likely due to CKD. will need evaluation by GI, 

2 units of PRBCs ordered





NSTEMI- likely demand mediated due to CKD vs. ischemic injury


she will need a [facility] once anemia is stabilized





CKD IV- nephrology following





 


respiratory insufficiency- multifactorial CHF vs. PNA vs. anemia





 (Blanca Luong)


Assessment and Plan


-------------------------


acute Hb drop. 8.6 -> 6.6.  no obvious bleeding.  transfusion planned.  

maintain Hb > 9 mg/dL given NSTEMI.  


CKD - Cr 2.75.  gentle hydration. EF 55%.


hold off on LHC until anemia issue resolved


hemolysis panel


GI consult.


 (Edgardo Mecrado MD)











Blanca Luong Jan 18, 2018 09:07


Edgardo Mercado MD Jan 18, 2018 09:45 [Normal Appearance] : normal appearance [Well Groomed] : well groomed [General Appearance - In No Acute Distress] : no acute distress [Normal Conjunctiva] : the conjunctiva exhibited no abnormalities [Normal Oral Mucosa] : normal oral mucosa [Normal Oropharynx] : normal oropharynx [Normal Jugular Venous A Waves Present] : normal jugular venous A waves present [Normal Jugular Venous V Waves Present] : normal jugular venous V waves present [Respiration, Rhythm And Depth] : normal respiratory rhythm and effort [Auscultation Breath Sounds / Voice Sounds] : lungs were clear to auscultation bilaterally [Heart Rate And Rhythm] : heart rate and rhythm were normal [Heart Sounds] : normal S1 and S2 [Arterial Pulses Normal] : the arterial pulses were normal [Veins - Varicosity Changes] : no varicosital changes were noted in the lower extremities [FreeTextEntry1] : 3/6 sm lsb., no edema  [Bowel Sounds] : normal bowel sounds [Abdomen Soft] : soft [Abdomen Tenderness] : non-tender [Nail Clubbing] : no clubbing of the fingernails [Skin Turgor] : normal skin turgor [] : no rash [No Venous Stasis] : no venous stasis [Oriented To Time, Place, And Person] : oriented to person, place, and time [Impaired Insight] : insight and judgment were intact [Affect] : the affect was normal

## 2021-01-01 NOTE — HHI.PR
Bedside shift change report given to WEST Potts RN (oncoming nurse) by Roland Ceron RN (offgoing nurse). Report included the following information SBAR. Subjective


Remarks


doing ok.


no complaints





Objective


Vitals


heart reg


lung cta


abd s/nt


ext no edema


Vital Signs








  Date Time  Temp Pulse Resp B/P (MAP) Pulse Ox O2 Delivery O2 Flow Rate FiO2


 


8/25/17 07:30  89      


 


8/25/17 07:00 98.9 88 16 150/67 (94) 94   


 


8/25/17 06:00  85      


 


8/25/17 05:00  84      


 


8/25/17 04:00  87      


 


8/25/17 03:30 98.3 96 16 142/71 (94) 98   


 


8/25/17 03:00  85      


 


8/25/17 02:00  85      


 


8/25/17 01:00  91      


 


8/25/17 00:00  84      


 


8/24/17 23:20 98.5 84 16 162/74 (103) 98   


 


8/24/17 23:00  84      


 


8/24/17 22:00  86      


 


8/24/17 21:28     99   21


 


8/24/17 21:00  96      


 


8/24/17 20:00 98.4 92 16 159/82 (107) 98   


 


8/24/17 20:00  90      


 


8/24/17 19:00  94      


 


8/24/17 18:00  84      


 


8/24/17 17:00  90      


 


8/24/17 16:00  90      


 


8/24/17 15:32  87      


 


8/24/17 15:31 98.3 87 18 161/75 (103) 99   


 


8/24/17 13:22  85      


 


8/24/17 12:10  88      


 


8/24/17 11:08  90      


 


8/24/17 11:08 98.1 88 18 129/69 (89) 98   


 


8/24/17 09:31  88      


 


8/24/17 08:50  90      








Result Diagram:  


8/22/17 1024                                                                   

             8/25/17 0555





Imaging





Last Impressions








Chest X-Ray 8/16/17 2144 Signed





Impressions: 





 Service Date/Time:  Wednesday, August 16, 2017 22:26 - CONCLUSION:  Diffuse 





 interstitial prominence and alveolar consolidation seen bilaterally being more 





 prominent on the left. Diffuse infectious processes should be considered.     





 Severo Weir MD 











A/P


Problem List:  


(1) Severe sepsis without septic shock


ICD Codes:  A41.9 - Sepsis, unspecified organism; R65.20 - Severe sepsis 

without septic shock


Status:  Acute


Plan:  - Pt is a 53 y/o female with HTN, diabetes, chronic tobacco use, and CKD 

who presented to the ED with worsening productive cough, SOB, decreased appetite

, 


 and fevers. - In the ED she was noted to have a fever and an elevated WBC 

count of 24.6. 


bilateral pneumonia


navid/ckd felt related to acute illness/infection and atn. ?meds.......bun/cr 

improved today.


pulmonary edema better.


acute anemia. stable after transfusion . no obvious bleeding..Patient states 

she had a colonoscopy 3-4 months ago by Dr. Granados and was negative.


Likely multifactorial anemia secondary to iron deficiency/anemia chronic disease

/renal failure. no hemolysis.


dm- hypoglycemia. better after holding scheduled insulin.





oob/PT


dvt prophylaxis


pt on abx levaquin/cefepime for pna


duonebs. oxygen as needed.


renal followingl....so far cr improving and no HD requirement


diuretics per renal.


f/u positive phillip panel


ssi. monitor bg. hold levemir


avoid nephrotoxins


adjust bp meds as needed


simethicone and ppi












































(2) Acute kidney injury superimposed on CKD


ICD Codes:  N17.9 - Acute kidney failure, unspecified; N18.9 - Chronic kidney 

disease, unspecified


Status:  Acute


Plan:  


- See above. 





(3) Dehydration


ICD Codes:  E86.0 - Dehydration


Status:  Acute


Plan:  


- See above. 





(4) HTN (hypertension)


ICD Codes:  I10 - Hypertension


Status:  Chronic


Plan:  


see above





(5) Diabetes


ICD Codes:  E11.9 - Diabetes mellitus


Status:  Chronic


Plan:  see above





(6) CAD (coronary artery disease)


ICD Codes:  I25.10 - Atherosclerotic heart disease of native coronary artery 

without angina pectoris


Status:  Chronic











Vik Jasmine MD Aug 25, 2017 08:31

## 2023-12-04 NOTE — HHI.PR
Subjective


Remarks


52 YOAA female with ESRD,HTN,DM


No hemoptysis


Occ cough


Mild sob


Up in chair


Denies sob





Objective


Vital Signs





Vital Signs








  Date Time  Temp Pulse Resp B/P (MAP) Pulse Ox O2 Delivery O2 Flow Rate FiO2


 


1/29/18 15:37     99 Room Air  


 


1/29/18 15:21 97.7 66 19 120/66 (84) 99   


 


1/29/18 15:00  68      


 


1/29/18 14:00  66      


 


1/29/18 13:00  66      


 


1/29/18 12:30 97.6 65 19 115/63 (80) 99   


 


1/29/18 12:15     99 Room Air  


 


1/29/18 12:00  66      


 


1/29/18 11:00  68      


 


1/29/18 10:00  70      


 


1/29/18 09:00  72      


 


1/29/18 08:30 98.2 73 19 162/75 (104) 99   


 


1/29/18 08:30     99 Room Air  


 


1/29/18 08:00  70      


 


1/29/18 07:18     99   21


 


1/29/18 07:00  73      


 


1/29/18 06:00  71      


 


1/29/18 05:00  74      


 


1/29/18 04:00 98.3 74 18 129/71 (90) 100   


 


1/29/18 04:00  72      


 


1/29/18 03:00     100 Room Air  


 


1/29/18 03:00  72      


 


1/29/18 02:00  73      


 


1/29/18 01:00  70      


 


1/29/18 00:00  70      


 


1/29/18 00:00 98.1 70 16 138/60 (86) 100   


 


1/28/18 23:00  90      


 


1/28/18 23:00     100 Room Air  


 


1/28/18 22:00  88      


 


1/28/18 21:00  100      


 


1/28/18 20:00  97      


 


1/28/18 19:00  96      


 


1/28/18 19:00     100 Room Air  


 


1/28/18 19:00 98.2 73 14 149/57 (87) 100   


 


1/28/18 18:00  76      


 


1/28/18 17:35   16     


 


1/28/18 17:00  76      


 


1/28/18 16:00  75      














I/O      


 


 1/28/18 1/28/18 1/28/18 1/29/18 1/29/18 1/29/18





 07:00 15:00 23:00 07:00 15:00 23:00


 


Intake Total 480 ml  960 ml 480 ml  


 


Output Total 500 ml   1800 ml  


 


Balance -20 ml  960 ml -1320 ml  


 


      


 


Intake Oral 480 ml  960 ml 480 ml  


 


Output Urine Total 500 ml   1800 ml  


 


# Voids   4   


 


# Bowel Movements 1   0  








Result Diagram:  


1/29/18 0530 1/29/18 0530





Objective Remarks


GENERAL: MBMN AA female, NAD


SKIN: Warm and dry.


HEAD: Normocephalic.


EYES: No scleral icterus. No injection or drainage. 


NECK: Supple, trachea midline. No JVD or lymphadenopathy.


CARDIOVASCULAR: Regular rate and rhythm without murmurs, gallops, or rubs. 


RESPIRATORY: Breath sounds equal bilaterally. No accessory muscle use.


GASTROINTESTINAL: Abdomen soft, non-tender, nondistended. 


MUSCULOSKELETAL: No cyanosis, or edema. 


BACK: Nontender without obvious deformity. No CVA tenderness.








A/P


Assessment and Plan


? Mild hemoptysis, resolved


ESRD


DM


HTN





PLAN:





Will get PFT


Monitor hemoptysis


Stable on RA


monitor Karl Acosta MD Jan 29, 2018 16:00 Yes